# Patient Record
Sex: FEMALE | Race: WHITE | Employment: OTHER | URBAN - NONMETROPOLITAN AREA
[De-identification: names, ages, dates, MRNs, and addresses within clinical notes are randomized per-mention and may not be internally consistent; named-entity substitution may affect disease eponyms.]

---

## 2017-01-08 PROBLEM — E87.6 HYPOKALEMIA: Status: ACTIVE | Noted: 2017-01-08

## 2017-01-08 PROBLEM — I50.32 CHRONIC DIASTOLIC HEART FAILURE (HCC): Status: ACTIVE | Noted: 2017-01-08

## 2017-01-08 PROBLEM — R53.83 LETHARGY: Status: ACTIVE | Noted: 2017-01-08

## 2017-01-17 ENCOUNTER — CLINICAL DOCUMENTATION (OUTPATIENT)
Dept: FAMILY MEDICINE CLINIC | Age: 67
End: 2017-01-17

## 2017-01-17 VITALS
HEART RATE: 59 BPM | SYSTOLIC BLOOD PRESSURE: 132 MMHG | WEIGHT: 169 LBS | TEMPERATURE: 97.6 F | RESPIRATION RATE: 18 BRPM | DIASTOLIC BLOOD PRESSURE: 81 MMHG | BODY MASS INDEX: 29.94 KG/M2

## 2017-01-17 DIAGNOSIS — J18.9 CAP (COMMUNITY ACQUIRED PNEUMONIA): ICD-10-CM

## 2017-01-17 DIAGNOSIS — E23.0 HYPOPITUITARISM DUE TO PITUITARY TUMOR (HCC): ICD-10-CM

## 2017-01-17 DIAGNOSIS — E78.00 PURE HYPERCHOLESTEROLEMIA: ICD-10-CM

## 2017-01-17 DIAGNOSIS — E03.8 SECONDARY HYPOTHYROIDISM: ICD-10-CM

## 2017-01-17 DIAGNOSIS — I50.32 CHRONIC DIASTOLIC CONGESTIVE HEART FAILURE (HCC): ICD-10-CM

## 2017-01-17 DIAGNOSIS — N39.41 URGE URINARY INCONTINENCE: ICD-10-CM

## 2017-01-17 DIAGNOSIS — I10 ESSENTIAL HYPERTENSION: ICD-10-CM

## 2017-01-17 DIAGNOSIS — R53.81 PHYSICAL DECONDITIONING: ICD-10-CM

## 2017-01-17 DIAGNOSIS — D49.7 HYPOPITUITARISM DUE TO PITUITARY TUMOR (HCC): ICD-10-CM

## 2017-01-17 DIAGNOSIS — K21.9 GASTROESOPHAGEAL REFLUX DISEASE WITHOUT ESOPHAGITIS: ICD-10-CM

## 2017-01-17 DIAGNOSIS — J45.20 MILD INTERMITTENT ASTHMA WITHOUT COMPLICATION: ICD-10-CM

## 2017-01-17 DIAGNOSIS — E87.0 HYPERNATREMIA: ICD-10-CM

## 2017-01-17 DIAGNOSIS — E27.2 ACUTE ADRENAL CRISIS (HCC): ICD-10-CM

## 2017-01-17 DIAGNOSIS — R40.4 TRANSIENT ALTERATION OF AWARENESS: Primary | ICD-10-CM

## 2017-01-24 PROBLEM — R41.82 MENTAL STATUS ALTERATION: Status: ACTIVE | Noted: 2017-01-24

## 2017-01-24 PROBLEM — A41.9 SEPSIS (HCC): Status: ACTIVE | Noted: 2017-01-24

## 2017-02-03 ENCOUNTER — TELEPHONE (OUTPATIENT)
Dept: CARDIOLOGY | Age: 67
End: 2017-02-03

## 2017-02-03 DIAGNOSIS — R06.02 SHORTNESS OF BREATH: ICD-10-CM

## 2017-02-03 DIAGNOSIS — R53.83 LETHARGY: ICD-10-CM

## 2017-02-03 DIAGNOSIS — R00.1 SINUS BRADYCARDIA: ICD-10-CM

## 2017-02-03 DIAGNOSIS — R07.89 OTHER CHEST PAIN: ICD-10-CM

## 2017-02-03 DIAGNOSIS — R41.89 UNRESPONSIVE: Primary | ICD-10-CM

## 2017-02-07 ENCOUNTER — CLINICAL DOCUMENTATION (OUTPATIENT)
Dept: FAMILY MEDICINE CLINIC | Age: 67
End: 2017-02-07

## 2017-02-07 VITALS
TEMPERATURE: 98.2 F | DIASTOLIC BLOOD PRESSURE: 70 MMHG | SYSTOLIC BLOOD PRESSURE: 134 MMHG | WEIGHT: 165.6 LBS | HEART RATE: 62 BPM | RESPIRATION RATE: 16 BRPM | BODY MASS INDEX: 29.33 KG/M2

## 2017-02-07 DIAGNOSIS — R40.4 TRANSIENT ALTERATION OF AWARENESS: Primary | ICD-10-CM

## 2017-02-07 DIAGNOSIS — R53.81 PHYSICAL DECONDITIONING: ICD-10-CM

## 2017-02-07 DIAGNOSIS — N39.41 URGE URINARY INCONTINENCE: ICD-10-CM

## 2017-02-07 DIAGNOSIS — E78.00 PURE HYPERCHOLESTEROLEMIA: ICD-10-CM

## 2017-02-07 DIAGNOSIS — E87.0 HYPERNATREMIA: ICD-10-CM

## 2017-02-07 DIAGNOSIS — K21.9 GASTROESOPHAGEAL REFLUX DISEASE WITHOUT ESOPHAGITIS: ICD-10-CM

## 2017-02-07 DIAGNOSIS — I50.32 CHRONIC DIASTOLIC CONGESTIVE HEART FAILURE (HCC): ICD-10-CM

## 2017-02-07 DIAGNOSIS — E23.0 HYPOPITUITARISM DUE TO PITUITARY TUMOR (HCC): ICD-10-CM

## 2017-02-07 DIAGNOSIS — R30.0 DYSURIA: ICD-10-CM

## 2017-02-07 DIAGNOSIS — E03.8 SECONDARY HYPOTHYROIDISM: ICD-10-CM

## 2017-02-07 DIAGNOSIS — I10 ESSENTIAL HYPERTENSION: ICD-10-CM

## 2017-02-07 DIAGNOSIS — E27.2 ACUTE ADRENAL CRISIS (HCC): ICD-10-CM

## 2017-02-07 DIAGNOSIS — D72.828 OTHER ELEVATED WHITE BLOOD CELL (WBC) COUNT: ICD-10-CM

## 2017-02-07 DIAGNOSIS — J45.20 MILD INTERMITTENT ASTHMA WITHOUT COMPLICATION: ICD-10-CM

## 2017-02-07 DIAGNOSIS — D49.7 HYPOPITUITARISM DUE TO PITUITARY TUMOR (HCC): ICD-10-CM

## 2017-02-07 DIAGNOSIS — Z86.39 HISTORY OF DIABETES INSIPIDUS: ICD-10-CM

## 2017-02-09 PROBLEM — I48.0 PAROXYSMAL ATRIAL FIBRILLATION (HCC): Status: ACTIVE | Noted: 2017-02-09

## 2017-02-09 PROBLEM — R55 SYNCOPE AND COLLAPSE: Status: ACTIVE | Noted: 2017-02-09

## 2017-02-10 PROBLEM — H49.22 SIXTH NERVE PALSY OF LEFT EYE: Status: ACTIVE | Noted: 2017-02-10

## 2017-02-10 PROBLEM — I69.30 HISTORY OF STROKE WITH RESIDUAL DEFICIT: Chronic | Status: ACTIVE | Noted: 2017-02-10

## 2017-02-10 PROBLEM — I69.30 HISTORY OF STROKE WITH RESIDUAL DEFICIT: Status: ACTIVE | Noted: 2017-02-10

## 2017-02-10 PROBLEM — G81.94 LEFT HEMIPARESIS (HCC): Chronic | Status: ACTIVE | Noted: 2017-02-10

## 2017-02-21 ENCOUNTER — CLINICAL DOCUMENTATION (OUTPATIENT)
Dept: FAMILY MEDICINE CLINIC | Age: 67
End: 2017-02-21

## 2017-02-21 VITALS
DIASTOLIC BLOOD PRESSURE: 78 MMHG | BODY MASS INDEX: 28.23 KG/M2 | TEMPERATURE: 97 F | HEIGHT: 62 IN | HEART RATE: 62 BPM | WEIGHT: 153.4 LBS | SYSTOLIC BLOOD PRESSURE: 138 MMHG | RESPIRATION RATE: 16 BRPM

## 2017-02-21 DIAGNOSIS — E03.8 SECONDARY HYPOTHYROIDISM: ICD-10-CM

## 2017-02-21 DIAGNOSIS — I10 ESSENTIAL HYPERTENSION: ICD-10-CM

## 2017-02-21 DIAGNOSIS — I51.3 ATRIAL THROMBOSIS: ICD-10-CM

## 2017-02-21 DIAGNOSIS — E87.0 HYPERNATREMIA: ICD-10-CM

## 2017-02-21 DIAGNOSIS — K21.9 GASTROESOPHAGEAL REFLUX DISEASE WITHOUT ESOPHAGITIS: ICD-10-CM

## 2017-02-21 DIAGNOSIS — R40.4 TRANSIENT ALTERATION OF AWARENESS: Primary | ICD-10-CM

## 2017-02-21 DIAGNOSIS — D49.7 HYPOPITUITARISM DUE TO PITUITARY TUMOR (HCC): ICD-10-CM

## 2017-02-21 DIAGNOSIS — J45.20 MILD INTERMITTENT ASTHMA WITHOUT COMPLICATION: ICD-10-CM

## 2017-02-21 DIAGNOSIS — Z86.39 HISTORY OF DIABETES INSIPIDUS: ICD-10-CM

## 2017-02-21 DIAGNOSIS — D72.828 OTHER ELEVATED WHITE BLOOD CELL (WBC) COUNT: ICD-10-CM

## 2017-02-21 DIAGNOSIS — E23.0 HYPOPITUITARISM DUE TO PITUITARY TUMOR (HCC): ICD-10-CM

## 2017-02-21 DIAGNOSIS — R53.81 PHYSICAL DECONDITIONING: ICD-10-CM

## 2017-02-21 DIAGNOSIS — E78.00 PURE HYPERCHOLESTEROLEMIA: ICD-10-CM

## 2017-02-21 DIAGNOSIS — I50.32 CHRONIC DIASTOLIC CONGESTIVE HEART FAILURE (HCC): ICD-10-CM

## 2017-02-21 DIAGNOSIS — N39.41 URGE URINARY INCONTINENCE: ICD-10-CM

## 2017-03-14 ENCOUNTER — CLINICAL DOCUMENTATION (OUTPATIENT)
Dept: FAMILY MEDICINE CLINIC | Age: 67
End: 2017-03-14

## 2017-03-14 VITALS
HEART RATE: 72 BPM | RESPIRATION RATE: 18 BRPM | HEIGHT: 62 IN | BODY MASS INDEX: 31.73 KG/M2 | TEMPERATURE: 97.5 F | WEIGHT: 172.4 LBS | DIASTOLIC BLOOD PRESSURE: 80 MMHG | SYSTOLIC BLOOD PRESSURE: 140 MMHG

## 2017-03-14 DIAGNOSIS — D72.828 OTHER ELEVATED WHITE BLOOD CELL (WBC) COUNT: ICD-10-CM

## 2017-03-14 DIAGNOSIS — I71.20 THORACIC AORTIC ANEURYSM WITHOUT RUPTURE: ICD-10-CM
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
## 2017-03-14 DIAGNOSIS — R40.4 TRANSIENT ALTERATION OF AWARENESS: ICD-10-CM
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
## 2017-03-14 DIAGNOSIS — S01.81XA LACERATION OF FACE, INITIAL ENCOUNTER: ICD-10-CM

## 2017-03-14 DIAGNOSIS — S02.85XA ORBITAL FRACTURE, CLOSED, INITIAL ENCOUNTER (HCC): Primary | ICD-10-CM

## 2017-03-14 DIAGNOSIS — I10 ESSENTIAL HYPERTENSION: ICD-10-CM

## 2017-03-17 PROBLEM — N17.9 AKI (ACUTE KIDNEY INJURY) (HCC): Status: ACTIVE | Noted: 2017-03-17

## 2017-03-17 PROBLEM — E87.5 HYPERKALEMIA: Status: ACTIVE | Noted: 2017-03-17
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
## 2017-03-17 PROBLEM — S06.5XAA SUBDURAL HEMATOMA (HCC): Status: ACTIVE | Noted: 2017-03-17

## 2017-03-28 ENCOUNTER — CLINICAL DOCUMENTATION (OUTPATIENT)
Dept: FAMILY MEDICINE CLINIC | Age: 67
End: 2017-03-28

## 2017-03-28 VITALS
HEIGHT: 62 IN | BODY MASS INDEX: 32.02 KG/M2 | TEMPERATURE: 97.6 F | DIASTOLIC BLOOD PRESSURE: 66 MMHG | RESPIRATION RATE: 16 BRPM | WEIGHT: 174 LBS | SYSTOLIC BLOOD PRESSURE: 138 MMHG | HEART RATE: 74 BPM

## 2017-03-28 DIAGNOSIS — R53.81 PHYSICAL DECONDITIONING: ICD-10-CM

## 2017-03-28 DIAGNOSIS — A49.8 CLOSTRIDIUM DIFFICILE INFECTION: ICD-10-CM

## 2017-03-28 DIAGNOSIS — S06.5XAA SUBDURAL HEMATOMA: Primary | ICD-10-CM

## 2017-03-28 DIAGNOSIS — Z86.39 HISTORY OF DIABETES INSIPIDUS: ICD-10-CM

## 2017-03-28 DIAGNOSIS — R40.4 TRANSIENT ALTERATION OF AWARENESS: ICD-10-CM

## 2017-03-28 DIAGNOSIS — I51.3 ATRIAL THROMBOSIS: ICD-10-CM

## 2017-03-28 DIAGNOSIS — E03.8 SECONDARY HYPOTHYROIDISM: ICD-10-CM

## 2017-03-28 DIAGNOSIS — I71.20 THORACIC AORTIC ANEURYSM WITHOUT RUPTURE: ICD-10-CM

## 2017-03-28 DIAGNOSIS — E78.00 PURE HYPERCHOLESTEROLEMIA: ICD-10-CM

## 2017-03-28 DIAGNOSIS — D72.828 OTHER ELEVATED WHITE BLOOD CELL (WBC) COUNT: ICD-10-CM

## 2017-03-28 DIAGNOSIS — S01.81XD LACERATION OF FACE, SUBSEQUENT ENCOUNTER: ICD-10-CM

## 2017-03-28 DIAGNOSIS — D49.7 HYPOPITUITARISM DUE TO PITUITARY TUMOR (HCC): ICD-10-CM

## 2017-03-28 DIAGNOSIS — K21.9 GASTROESOPHAGEAL REFLUX DISEASE WITHOUT ESOPHAGITIS: ICD-10-CM

## 2017-03-28 DIAGNOSIS — N39.41 URGE URINARY INCONTINENCE: ICD-10-CM

## 2017-03-28 DIAGNOSIS — E23.0 HYPOPITUITARISM DUE TO PITUITARY TUMOR (HCC): ICD-10-CM

## 2017-03-28 DIAGNOSIS — J45.20 MILD INTERMITTENT ASTHMA WITHOUT COMPLICATION: ICD-10-CM

## 2017-03-28 DIAGNOSIS — I50.32 CHRONIC DIASTOLIC CONGESTIVE HEART FAILURE (HCC): ICD-10-CM

## 2017-03-28 DIAGNOSIS — I10 ESSENTIAL HYPERTENSION: ICD-10-CM

## 2017-03-28 DIAGNOSIS — E87.0 HYPERNATREMIA: ICD-10-CM

## 2017-04-05 PROBLEM — I50.31 ACUTE DIASTOLIC HEART FAILURE (HCC): Status: ACTIVE | Noted: 2017-04-05

## 2017-04-05 PROBLEM — J96.01 ACUTE HYPOXEMIC RESPIRATORY FAILURE (HCC): Status: ACTIVE | Noted: 2017-04-05

## 2017-04-14 PROBLEM — I50.33 ACUTE ON CHRONIC DIASTOLIC (CONGESTIVE) HEART FAILURE (HCC): Status: ACTIVE | Noted: 2017-01-08

## 2017-04-18 ENCOUNTER — CLINICAL DOCUMENTATION (OUTPATIENT)
Dept: FAMILY MEDICINE CLINIC | Age: 67
End: 2017-04-18

## 2017-04-18 VITALS
TEMPERATURE: 97.8 F | WEIGHT: 175 LBS | HEART RATE: 78 BPM | HEIGHT: 62 IN | BODY MASS INDEX: 32.2 KG/M2 | SYSTOLIC BLOOD PRESSURE: 149 MMHG | RESPIRATION RATE: 18 BRPM | DIASTOLIC BLOOD PRESSURE: 83 MMHG

## 2017-04-18 DIAGNOSIS — E78.00 PURE HYPERCHOLESTEROLEMIA: ICD-10-CM

## 2017-04-18 DIAGNOSIS — J45.20 MILD INTERMITTENT ASTHMA WITHOUT COMPLICATION: ICD-10-CM

## 2017-04-18 DIAGNOSIS — A49.8 CLOSTRIDIUM DIFFICILE INFECTION: ICD-10-CM

## 2017-04-18 DIAGNOSIS — D72.828 OTHER ELEVATED WHITE BLOOD CELL (WBC) COUNT: ICD-10-CM

## 2017-04-18 DIAGNOSIS — D49.7 HYPOPITUITARISM DUE TO PITUITARY TUMOR (HCC): ICD-10-CM

## 2017-04-18 DIAGNOSIS — K21.9 GASTROESOPHAGEAL REFLUX DISEASE WITHOUT ESOPHAGITIS: ICD-10-CM

## 2017-04-18 DIAGNOSIS — I51.3 ATRIAL THROMBOSIS: ICD-10-CM

## 2017-04-18 DIAGNOSIS — E23.0 HYPOPITUITARISM DUE TO PITUITARY TUMOR (HCC): ICD-10-CM

## 2017-04-18 DIAGNOSIS — E87.0 HYPERNATREMIA: ICD-10-CM

## 2017-04-18 DIAGNOSIS — E23.2 DIABETES INSIPIDUS (HCC): ICD-10-CM

## 2017-04-18 DIAGNOSIS — E87.6 HYPOKALEMIA: ICD-10-CM

## 2017-04-18 DIAGNOSIS — E03.8 SECONDARY HYPOTHYROIDISM: ICD-10-CM

## 2017-04-18 DIAGNOSIS — S06.5XAA SUBDURAL HEMATOMA: ICD-10-CM

## 2017-04-18 DIAGNOSIS — N39.41 URGE URINARY INCONTINENCE: ICD-10-CM

## 2017-04-18 DIAGNOSIS — L03.115 CELLULITIS OF RIGHT LEG: Primary | ICD-10-CM

## 2017-04-18 DIAGNOSIS — D64.9 NORMOCYTIC ANEMIA: ICD-10-CM

## 2017-04-18 DIAGNOSIS — I50.33 ACUTE ON CHRONIC DIASTOLIC CONGESTIVE HEART FAILURE (HCC): ICD-10-CM

## 2017-04-18 DIAGNOSIS — I10 ESSENTIAL HYPERTENSION: ICD-10-CM

## 2017-04-18 DIAGNOSIS — I71.20 THORACIC AORTIC ANEURYSM WITHOUT RUPTURE: ICD-10-CM

## 2017-04-18 DIAGNOSIS — R53.81 PHYSICAL DECONDITIONING: ICD-10-CM

## 2017-05-01 RX ORDER — LISINOPRIL 40 MG/1
40 TABLET ORAL DAILY
Qty: 90 TABLET | Refills: 3 | Status: SHIPPED | OUTPATIENT
Start: 2017-05-01 | End: 2018-02-23 | Stop reason: SDUPTHER

## 2017-05-01 RX ORDER — CARVEDILOL 12.5 MG/1
12.5 TABLET ORAL 2 TIMES DAILY WITH MEALS
Qty: 180 TABLET | Refills: 3 | Status: SHIPPED | OUTPATIENT
Start: 2017-05-01 | End: 2018-05-29 | Stop reason: SDUPTHER

## 2017-05-01 RX ORDER — AMLODIPINE BESYLATE 2.5 MG/1
2.5 TABLET ORAL DAILY
Qty: 90 TABLET | Refills: 3 | Status: SHIPPED | OUTPATIENT
Start: 2017-05-01 | End: 2017-05-15

## 2017-05-02 ENCOUNTER — TELEPHONE (OUTPATIENT)
Dept: FAMILY MEDICINE CLINIC | Age: 67
End: 2017-05-02

## 2017-05-09 ENCOUNTER — TELEPHONE (OUTPATIENT)
Dept: CARDIOLOGY | Age: 67
End: 2017-05-09

## 2017-05-09 DIAGNOSIS — I10 ESSENTIAL HYPERTENSION: Primary | ICD-10-CM

## 2017-05-15 RX ORDER — AMLODIPINE BESYLATE 5 MG/1
5 TABLET ORAL DAILY
Qty: 90 TABLET | Refills: 3 | Status: SHIPPED | OUTPATIENT
Start: 2017-05-15 | End: 2018-02-23 | Stop reason: SDUPTHER

## 2017-05-15 RX ORDER — AMLODIPINE BESYLATE 5 MG/1
5 TABLET ORAL DAILY
Qty: 90 TABLET | Refills: 3 | Status: CANCELLED | OUTPATIENT
Start: 2017-05-15

## 2017-05-17 LAB
ANION GAP SERPL CALCULATED.3IONS-SCNC: 15 MMOL/L
BUN BLDV-MCNC: 22 MG/DL (ref 10–20)
CALCIUM SERPL-MCNC: 9 MG/DL (ref 8.7–10.8)
CHLORIDE BLD-SCNC: 107 MMOL/L (ref 95–111)
CO2: 23 MMOL/L (ref 21–32)
CREAT SERPL-MCNC: 1 MG/DL (ref 0.5–1.3)
EGFR AFRICAN AMERICAN: 67
EGFR IF NONAFRICAN AMERICAN: 56
GLUCOSE: 77 MG/DL (ref 70–100)
POTASSIUM SERPL-SCNC: 4.6 MMOL/L (ref 3.5–5.4)
SODIUM BLD-SCNC: 140 MMOL/L (ref 134–147)

## 2017-05-18 ENCOUNTER — OFFICE VISIT (OUTPATIENT)
Dept: CARDIOLOGY | Age: 67
End: 2017-05-18

## 2017-05-18 VITALS
HEART RATE: 64 BPM | DIASTOLIC BLOOD PRESSURE: 90 MMHG | SYSTOLIC BLOOD PRESSURE: 118 MMHG | BODY MASS INDEX: 26.98 KG/M2 | HEIGHT: 68 IN | WEIGHT: 178 LBS

## 2017-05-18 DIAGNOSIS — I50.32 CHRONIC DIASTOLIC CONGESTIVE HEART FAILURE (HCC): Primary | ICD-10-CM

## 2017-05-18 PROCEDURE — 99213 OFFICE O/P EST LOW 20 MIN: CPT | Performed by: INTERNAL MEDICINE

## 2017-05-18 RX ORDER — POTASSIUM CHLORIDE 20MEQ/15ML
LIQUID (ML) ORAL SEE ADMIN INSTRUCTIONS
COMMUNITY
End: 2018-03-20 | Stop reason: ALTCHOICE

## 2017-05-18 RX ORDER — METOLAZONE 2.5 MG/1
2.5 TABLET ORAL EVERY OTHER DAY
Qty: 30 TABLET | Refills: 3 | Status: SHIPPED | OUTPATIENT
Start: 2017-05-18 | End: 2017-05-18 | Stop reason: SDUPTHER

## 2017-05-18 RX ORDER — METOLAZONE 2.5 MG/1
2.5 TABLET ORAL SEE ADMIN INSTRUCTIONS
COMMUNITY
End: 2018-04-23 | Stop reason: SDUPTHER

## 2017-05-18 RX ORDER — METOLAZONE 2.5 MG/1
2.5 TABLET ORAL SEE ADMIN INSTRUCTIONS
Qty: 30 TABLET | Refills: 3 | Status: SHIPPED | OUTPATIENT
Start: 2017-05-18 | End: 2018-03-20 | Stop reason: SDUPTHER

## 2017-05-18 RX ORDER — POTASSIUM CHLORIDE 20MEQ/15ML
20 LIQUID (ML) ORAL SEE ADMIN INSTRUCTIONS
Qty: 450 ML | Refills: 3 | Status: SHIPPED | OUTPATIENT
Start: 2017-05-18 | End: 2018-03-20 | Stop reason: ALTCHOICE

## 2017-06-05 ENCOUNTER — TELEPHONE (OUTPATIENT)
Dept: FAMILY MEDICINE CLINIC | Age: 67
End: 2017-06-05

## 2017-06-06 ENCOUNTER — TELEPHONE (OUTPATIENT)
Dept: FAMILY MEDICINE CLINIC | Age: 67
End: 2017-06-06

## 2017-06-08 RX ORDER — POTASSIUM CHLORIDE 1500 MG/1
TABLET, FILM COATED, EXTENDED RELEASE ORAL
Qty: 72 TABLET | Refills: 4 | Status: SHIPPED | OUTPATIENT
Start: 2017-06-08 | End: 2017-11-27 | Stop reason: SDUPTHER

## 2017-06-08 RX ORDER — BUMETANIDE 1 MG/1
TABLET ORAL
Qty: 30 TABLET | Refills: 3 | Status: SHIPPED | OUTPATIENT
Start: 2017-06-08 | End: 2017-11-20 | Stop reason: SDUPTHER

## 2017-06-15 ENCOUNTER — TELEPHONE (OUTPATIENT)
Dept: FAMILY MEDICINE CLINIC | Age: 67
End: 2017-06-15

## 2017-06-16 ENCOUNTER — OFFICE VISIT (OUTPATIENT)
Dept: FAMILY MEDICINE CLINIC | Age: 67
End: 2017-06-16

## 2017-06-16 VITALS
OXYGEN SATURATION: 95 % | SYSTOLIC BLOOD PRESSURE: 120 MMHG | HEART RATE: 61 BPM | RESPIRATION RATE: 12 BRPM | DIASTOLIC BLOOD PRESSURE: 70 MMHG

## 2017-06-16 DIAGNOSIS — Z12.31 ENCOUNTER FOR SCREENING MAMMOGRAM FOR BREAST CANCER: ICD-10-CM

## 2017-06-16 DIAGNOSIS — E03.8 OTHER SPECIFIED HYPOTHYROIDISM: ICD-10-CM

## 2017-06-16 DIAGNOSIS — K21.9 GASTROESOPHAGEAL REFLUX DISEASE WITHOUT ESOPHAGITIS: ICD-10-CM

## 2017-06-16 DIAGNOSIS — E23.0 HYPOPITUITARISM (HCC): ICD-10-CM

## 2017-06-16 DIAGNOSIS — Z13.820 OSTEOPOROSIS SCREENING: ICD-10-CM

## 2017-06-16 DIAGNOSIS — I50.41 ACUTE COMBINED SYSTOLIC AND DIASTOLIC CONGESTIVE HEART FAILURE (HCC): ICD-10-CM

## 2017-06-16 DIAGNOSIS — E78.00 PURE HYPERCHOLESTEROLEMIA: ICD-10-CM

## 2017-06-16 DIAGNOSIS — D64.89 ANEMIA DUE TO OTHER CAUSE: ICD-10-CM

## 2017-06-16 DIAGNOSIS — L89.302: ICD-10-CM

## 2017-06-16 DIAGNOSIS — D32.9 MENINGIOMA (HCC): ICD-10-CM

## 2017-06-16 DIAGNOSIS — I63.89 CEREBROVASCULAR ACCIDENT (CVA) DUE TO OTHER MECHANISM (HCC): ICD-10-CM

## 2017-06-16 DIAGNOSIS — L89.302: Primary | ICD-10-CM

## 2017-06-16 PROCEDURE — 99215 OFFICE O/P EST HI 40 MIN: CPT | Performed by: FAMILY MEDICINE

## 2017-06-16 RX ORDER — CEPHALEXIN 500 MG/1
500 CAPSULE ORAL 3 TIMES DAILY
Qty: 30 CAPSULE | Refills: 0 | Status: SHIPPED | OUTPATIENT
Start: 2017-06-16 | End: 2017-06-26

## 2017-06-16 RX ORDER — LIDOCAINE 40 MG/G
CREAM TOPICAL
Qty: 1 TUBE | Refills: 0 | Status: SHIPPED | OUTPATIENT
Start: 2017-06-16 | End: 2018-03-20 | Stop reason: ALTCHOICE

## 2017-06-16 ASSESSMENT — PATIENT HEALTH QUESTIONNAIRE - PHQ9
SUM OF ALL RESPONSES TO PHQ QUESTIONS 1-9: 0
1. LITTLE INTEREST OR PLEASURE IN DOING THINGS: 0
SUM OF ALL RESPONSES TO PHQ9 QUESTIONS 1 & 2: 0
2. FEELING DOWN, DEPRESSED OR HOPELESS: 0

## 2017-06-18 LAB
AEROBIC CULTURE: ABNORMAL
GRAM STAIN RESULT: ABNORMAL
ORGANISM: ABNORMAL

## 2017-06-19 ENCOUNTER — TELEPHONE (OUTPATIENT)
Dept: FAMILY MEDICINE CLINIC | Age: 67
End: 2017-06-19

## 2017-06-19 RX ORDER — SULFAMETHOXAZOLE AND TRIMETHOPRIM 400; 80 MG/1; MG/1
1 TABLET ORAL 2 TIMES DAILY
Qty: 20 TABLET | Refills: 0 | Status: SHIPPED | OUTPATIENT
Start: 2017-06-19 | End: 2017-06-29

## 2017-06-22 ENCOUNTER — TELEPHONE (OUTPATIENT)
Dept: FAMILY MEDICINE CLINIC | Age: 67
End: 2017-06-22

## 2017-06-22 DIAGNOSIS — L89.303: Primary | ICD-10-CM

## 2017-06-27 LAB
ABSOLUTE BASO #: 0 K/UL (ref 0–0.1)
ABSOLUTE EOS #: 0.1 K/UL (ref 0.1–0.4)
ABSOLUTE LYMPH #: 3.2 K/UL (ref 0.8–5.2)
ABSOLUTE MONO #: 0.6 K/UL (ref 0.1–0.9)
ABSOLUTE NEUT #: 9.7 K/UL (ref 1.3–9.1)
BASOPHILS RELATIVE PERCENT: 0.2 %
EOSINOPHILS RELATIVE PERCENT: 0.5 %
HCT VFR BLD CALC: 35.3 % (ref 36–48)
HEMOGLOBIN: 11.3 G/DL (ref 12–16)
LYMPHOCYTE %: 23.1 %
MCH RBC QN AUTO: 27.4 PG (ref 27–34)
MCHC RBC AUTO-ENTMCNC: 32 G/DL (ref 31–36)
MCV RBC AUTO: 85.5 FL (ref 80–100)
MONOCYTES # BLD: 4.6 %
NEUTROPHILS RELATIVE PERCENT: 70.9 %
PDW BLD-RTO: 15.1 % (ref 10.8–14.8)
PLATELETS: 454 K/UL (ref 150–450)
RBC: 4.13 M/UL (ref 4–5.5)
WBC: 13.7 K/UL (ref 3.7–10.8)

## 2017-06-28 LAB
ALBUMIN SERPL-MCNC: 3.4 G/DL (ref 3.2–5.3)
ALK PHOSPHATASE: 72 IU/L (ref 35–121)
ALT SERPL-CCNC: 24 IU/L (ref 5–59)
ANION GAP SERPL CALCULATED.3IONS-SCNC: 16 MMOL/L
ANION GAP SERPL CALCULATED.3IONS-SCNC: 23 MMOL/L
AST SERPL-CCNC: 20 IU/L (ref 10–42)
BILIRUB SERPL-MCNC: 0.3 MG/DL (ref 0.2–1.3)
BUN BLDV-MCNC: 29 MG/DL (ref 10–20)
BUN BLDV-MCNC: 29 MG/DL (ref 10–20)
CALCIUM SERPL-MCNC: 9.4 MG/DL (ref 8.7–10.8)
CALCIUM SERPL-MCNC: 9.4 MG/DL (ref 8.7–10.8)
CHLORIDE BLD-SCNC: 105 MMOL/L (ref 95–111)
CHLORIDE BLD-SCNC: 105 MMOL/L (ref 95–111)
CO2: 22 MMOL/L (ref 21–32)
CO2: 27 MMOL/L (ref 21–32)
CREAT SERPL-MCNC: 1.5 MG/DL (ref 0.5–1.3)
CREAT SERPL-MCNC: 1.5 MG/DL (ref 0.5–1.3)
EGFR AFRICAN AMERICAN: 42
EGFR AFRICAN AMERICAN: 42
EGFR IF NONAFRICAN AMERICAN: 35
EGFR IF NONAFRICAN AMERICAN: 35
GLUCOSE: 64 MG/DL (ref 70–100)
GLUCOSE: 65 MG/DL (ref 70–100)
IRON: 35 MCG/DL (ref 40–150)
POTASSIUM SERPL-SCNC: 4.7 MMOL/L (ref 3.5–5.4)
POTASSIUM SERPL-SCNC: 4.8 MMOL/L (ref 3.5–5.4)
SODIUM BLD-SCNC: 143 MMOL/L (ref 134–147)
SODIUM BLD-SCNC: 145 MMOL/L (ref 134–147)
T4 FREE: 1.13 NG/DL (ref 0.8–1.8)
TOTAL PROTEIN: 5.9 G/DL (ref 5.8–8)
TSH SERPL DL<=0.05 MIU/L-ACNC: 0.05 UIU/ML (ref 0.4–4.4)

## 2017-07-19 ENCOUNTER — HOSPITAL ENCOUNTER (OUTPATIENT)
Dept: WOUND CARE | Age: 67
Discharge: HOME | End: 2017-07-19
Payer: MEDICARE

## 2017-11-20 RX ORDER — BUMETANIDE 1 MG/1
TABLET ORAL
Qty: 30 TABLET | Refills: 3 | Status: SHIPPED | OUTPATIENT
Start: 2017-11-20 | End: 2018-03-20 | Stop reason: SDUPTHER

## 2017-11-27 RX ORDER — POTASSIUM CHLORIDE 1500 MG/1
TABLET, FILM COATED, EXTENDED RELEASE ORAL
Qty: 72 TABLET | Refills: 4 | Status: SHIPPED | OUTPATIENT
Start: 2017-11-27 | End: 2018-05-29 | Stop reason: SDUPTHER

## 2017-11-27 RX ORDER — TOLTERODINE 2 MG/1
CAPSULE, EXTENDED RELEASE ORAL
Qty: 180 CAPSULE | Refills: 0 | Status: SHIPPED | OUTPATIENT
Start: 2017-11-27 | End: 2018-02-23 | Stop reason: SDUPTHER

## 2017-12-04 RX ORDER — RIVAROXABAN 20 MG/1
TABLET, FILM COATED ORAL
Qty: 90 TABLET | Refills: 3 | Status: SHIPPED | OUTPATIENT
Start: 2017-12-04 | End: 2018-03-20 | Stop reason: SDUPTHER

## 2018-01-15 ENCOUNTER — TELEPHONE (OUTPATIENT)
Dept: FAMILY MEDICINE CLINIC | Age: 68
End: 2018-01-15

## 2018-01-22 ENCOUNTER — TELEPHONE (OUTPATIENT)
Dept: CARDIOLOGY CLINIC | Age: 68
End: 2018-01-22

## 2018-01-22 ENCOUNTER — OFFICE VISIT (OUTPATIENT)
Dept: FAMILY MEDICINE CLINIC | Age: 68
End: 2018-01-22
Payer: MEDICARE

## 2018-01-22 VITALS
WEIGHT: 177.91 LBS | SYSTOLIC BLOOD PRESSURE: 108 MMHG | HEIGHT: 68 IN | BODY MASS INDEX: 26.96 KG/M2 | RESPIRATION RATE: 12 BRPM | HEART RATE: 62 BPM | DIASTOLIC BLOOD PRESSURE: 56 MMHG | TEMPERATURE: 98.3 F

## 2018-01-22 DIAGNOSIS — I50.43 ACUTE ON CHRONIC COMBINED SYSTOLIC AND DIASTOLIC CONGESTIVE HEART FAILURE (HCC): ICD-10-CM

## 2018-01-22 DIAGNOSIS — D50.9 MICROCYTIC ANEMIA: ICD-10-CM

## 2018-01-22 DIAGNOSIS — E03.9 ACQUIRED HYPOTHYROIDISM: ICD-10-CM

## 2018-01-22 DIAGNOSIS — D50.9 IRON DEFICIENCY ANEMIA, UNSPECIFIED IRON DEFICIENCY ANEMIA TYPE: ICD-10-CM

## 2018-01-22 DIAGNOSIS — E03.8 OTHER SPECIFIED HYPOTHYROIDISM: Primary | ICD-10-CM

## 2018-01-22 DIAGNOSIS — K21.9 GASTROESOPHAGEAL REFLUX DISEASE WITHOUT ESOPHAGITIS: Primary | ICD-10-CM

## 2018-01-22 DIAGNOSIS — E23.0 HYPOPITUITARISM (HCC): ICD-10-CM

## 2018-01-22 DIAGNOSIS — L98.9 LESION OF SKIN OF FACE: ICD-10-CM

## 2018-01-22 PROCEDURE — 99214 OFFICE O/P EST MOD 30 MIN: CPT | Performed by: FAMILY MEDICINE

## 2018-01-22 RX ORDER — PANTOPRAZOLE SODIUM 40 MG/1
40 TABLET, DELAYED RELEASE ORAL NIGHTLY
Qty: 90 TABLET | Refills: 3 | Status: SHIPPED | OUTPATIENT
Start: 2018-01-22 | End: 2019-02-07 | Stop reason: SDUPTHER

## 2018-01-23 NOTE — PROGRESS NOTES
[facility] PROFESSIONAL SERVS  [facility] FAMILY MEDICINE  [address] [facility]  Dept: [phone]  Dept Fax: [phone]  Loc: Elena Fuller is a 78 y.o. female who presents today for:  Chief Complaint   Patient presents with    Check-Up     Follow up    Other     Patient has a spot on left check they would like looked at    Heartburn     Patient has been having a lot of heart burn           HPI:     HPI  Ciro Cifuentes is here for a couple of different issues. She is continually concerned about swelling in her feet but she was told that she has heart failure and is in denial yet. This is associated with cough with swallowing and weakness. She also has low appetite with reflux. Zantac prior to meals helps. There is a sore on the left cheek that is getting bigger the past 3 months. Reviewed chart for past medical history , surgical history , allergies, social history , family history and medications. Health Maintenance   Topic Date Due    Breast cancer screen  10/02/2015    DEXA (modify frequency per FRAX score)  09/07/2016    Flu vaccine (1) 09/01/2017    TSH testing  06/27/2018    Potassium monitoring  06/27/2018    Creatinine monitoring  06/27/2018    Lipid screen  04/06/2022    Colon cancer screen colonoscopy  06/12/2025    DTaP/Tdap/Td vaccine (2 - Td) 03/13/2027    Zostavax vaccine  Addressed    Pneumococcal low/med risk  Completed    Hepatitis C screen  Excluded       Subjective:      Constitutional: Negative for fever, chills, diaphoresis, activity change, appetite change and fatigue. HENT: Negative for hearing loss, ear pain, congestion, sore throat, rhinorrhea, postnasal drip and ear discharge. Eyes: Negative for photophobia and visual disturbance. Respiratory: Negative for cough, chest tightness, shortness of breath and wheezing. Cardiovascular: Negative for chest pain and leg swelling.    Gastrointestinal: Negative for nausea, appearance and bowel sounds are normal. She exhibits no distension and no mass. There is no hepatosplenomegaly. No tenderness. She has no rigidity, no rebound and no guarding. No hernia. Musculoskeletal:        Right lower leg: She exhibits 3+ edema. Left lower leg: She exhibits 3+ edema. Neurological: She is alert. Lesion on left cheek with patient's observations stated as increasing diameter, increasing thickness, tendency to be traumatized, exam of this area shows possible squamous cell carcinoma pigmented uneven, raised, pearly edges, symmetrical and friable size 20 mm noted on the left cheek. Assessment/Plan   Yoly Jenkins was seen today for check-up, other and heartburn. Diagnoses and all orders for this visit:    Gastroesophageal reflux disease without esophagitis    Acute on chronic combined systolic and diastolic congestive heart failure ([facility] [address]  -     Comprehensive Metabolic Panel; Future    Lesion of skin of face    Iron deficiency anemia, unspecified iron deficiency anemia type  -     CBC; Future  -     Iron; Future    Acquired hypothyroidism  -     TSH With Reflex Ft4; Future    Other orders  -     pantoprazole (PROTONIX) 40 MG tablet; Take 1 tablet by mouth nightly    [place] foods  Small meals  No late meals    Prpp up feet as much as possible  Compression stockings on am and off pm    Schedule excision    Discussed use, benefit, and side effects of prescribed medications. All patient questions answered. Pt voiced understanding. Reviewed health maintenance. Instructed to continue current medications, diet and exercise. Patient agreed with treatment plan. Follow up as directed.      Electronically signed by Naila Gomez MD

## 2018-01-30 ENCOUNTER — PROCEDURE VISIT (OUTPATIENT)
Dept: FAMILY MEDICINE CLINIC | Age: 68
End: 2018-01-30
Payer: MEDICARE

## 2018-01-30 VITALS
DIASTOLIC BLOOD PRESSURE: 64 MMHG | RESPIRATION RATE: 16 BRPM | HEIGHT: 68 IN | SYSTOLIC BLOOD PRESSURE: 100 MMHG | BODY MASS INDEX: 26.96 KG/M2 | WEIGHT: 177.91 LBS | HEART RATE: 60 BPM

## 2018-01-30 DIAGNOSIS — L98.9 SKIN LESION OF CHEEK: Primary | ICD-10-CM

## 2018-01-30 PROCEDURE — 11443 EXC FACE-MM B9+MARG 2.1-3 CM: CPT | Performed by: FAMILY MEDICINE

## 2018-01-31 LAB
ABSOLUTE BASO #: 0.1 K/UL (ref 0–0.1)
ABSOLUTE EOS #: 0.1 K/UL (ref 0.1–0.4)
ABSOLUTE LYMPH #: 2.4 K/UL (ref 0.8–5.2)
ABSOLUTE MONO #: 0.7 K/UL (ref 0.1–0.9)
ABSOLUTE NEUT #: 12.1 K/UL (ref 1.3–9.1)
ALBUMIN SERPL-MCNC: 3.4 G/DL (ref 3.2–5.3)
ALK PHOSPHATASE: 100 IU/L (ref 35–121)
ALT SERPL-CCNC: 10 IU/L (ref 5–59)
ANION GAP SERPL CALCULATED.3IONS-SCNC: 13 MMOL/L
ANION GAP SERPL CALCULATED.3IONS-SCNC: 21 MMOL/L
AST SERPL-CCNC: 12 IU/L (ref 10–42)
BASOPHILS RELATIVE PERCENT: 0.3 %
BILIRUB SERPL-MCNC: 0.3 MG/DL (ref 0.2–1.3)
BUN BLDV-MCNC: 37 MG/DL (ref 10–20)
BUN BLDV-MCNC: 37 MG/DL (ref 10–20)
CALCIUM SERPL-MCNC: 8.6 MG/DL (ref 8.7–10.8)
CALCIUM SERPL-MCNC: 8.6 MG/DL (ref 8.7–10.8)
CHLORIDE BLD-SCNC: 109 MMOL/L (ref 95–111)
CHLORIDE BLD-SCNC: 109 MMOL/L (ref 95–111)
CO2: 21 MMOL/L (ref 21–32)
CO2: 26 MMOL/L (ref 21–32)
CREAT SERPL-MCNC: 2.1 MG/DL (ref 0.5–1.3)
CREAT SERPL-MCNC: 2.1 MG/DL (ref 0.5–1.3)
EGFR AFRICAN AMERICAN: 29
EGFR AFRICAN AMERICAN: 29
EGFR IF NONAFRICAN AMERICAN: 24
EGFR IF NONAFRICAN AMERICAN: 24
EOSINOPHILS RELATIVE PERCENT: 0.7 %
FERRITIN: 25 NG/ML (ref 10–291)
GLUCOSE: 82 MG/DL (ref 70–100)
GLUCOSE: 83 MG/DL (ref 70–100)
HCT VFR BLD CALC: 37 % (ref 36–48)
HEMOGLOBIN: 12.1 G/DL (ref 12–16)
IRON: 50 MCG/DL (ref 40–150)
LYMPHOCYTE %: 15.6 %
MCH RBC QN AUTO: 29.4 PG (ref 27–34)
MCHC RBC AUTO-ENTMCNC: 32.7 G/DL (ref 31–36)
MCV RBC AUTO: 90 FL (ref 80–100)
MONOCYTES # BLD: 4.7 %
NEUTROPHILS RELATIVE PERCENT: 78 %
PDW BLD-RTO: 13.1 % (ref 10.8–14.8)
PLATELETS: 442 K/UL (ref 150–450)
POTASSIUM SERPL-SCNC: 5.1 MMOL/L (ref 3.5–5.4)
POTASSIUM SERPL-SCNC: 5.3 MMOL/L (ref 3.5–5.4)
RBC: 4.11 M/UL (ref 4–5.5)
SODIUM BLD-SCNC: 143 MMOL/L (ref 134–147)
SODIUM BLD-SCNC: 146 MMOL/L (ref 134–147)
T4 FREE: 1.2 NG/DL (ref 0.8–1.8)
T4 FREE: 1.25 NG/DL (ref 0.8–1.8)
TOTAL PROTEIN: 6 G/DL (ref 5.8–8)
TSH SERPL DL<=0.05 MIU/L-ACNC: 0.07 UIU/ML (ref 0.4–4.4)
TSH SERPL DL<=0.05 MIU/L-ACNC: 0.07 UIU/ML (ref 0.4–4.4)
WBC: 15.5 K/UL (ref 3.7–10.8)

## 2018-02-08 ENCOUNTER — NURSE ONLY (OUTPATIENT)
Dept: FAMILY MEDICINE CLINIC | Age: 68
End: 2018-02-08

## 2018-02-08 PROCEDURE — 99024 POSTOP FOLLOW-UP VISIT: CPT | Performed by: FAMILY MEDICINE

## 2018-02-22 ENCOUNTER — TELEPHONE (OUTPATIENT)
Dept: CARDIOLOGY CLINIC | Age: 68
End: 2018-02-22

## 2018-02-22 NOTE — TELEPHONE ENCOUNTER
02/22/2018 Patient  Rosaline Noe called scheduled appt. For patient to see Dr. Felipe Quiroz. Pt. Is a Dr. Helene Sandhoff pt.  wants to go over medications patient is needing refilled. 1st medication chart shows different dosage. Please call  at [phone]. da

## 2018-02-23 RX ORDER — LISINOPRIL 40 MG/1
40 TABLET ORAL DAILY
Qty: 90 TABLET | Refills: 0 | Status: SHIPPED | OUTPATIENT
Start: 2018-02-23 | End: 2018-03-20 | Stop reason: SDUPTHER

## 2018-02-23 RX ORDER — AMLODIPINE BESYLATE 2.5 MG/1
2.5 TABLET ORAL DAILY
Qty: 90 TABLET | Refills: 3 | Status: CANCELLED | OUTPATIENT
Start: 2018-02-23

## 2018-02-23 RX ORDER — AMLODIPINE BESYLATE 5 MG/1
5 TABLET ORAL DAILY
Qty: 90 TABLET | Refills: 0 | Status: SHIPPED | OUTPATIENT
Start: 2018-02-23 | End: 2018-08-06 | Stop reason: SDUPTHER

## 2018-02-23 NOTE — TELEPHONE ENCOUNTER
Clayton Hartman called requesting a refill on the following medications:  Requested Prescriptions     Pending Prescriptions Disp Refills    amLODIPine (NORVASC) 5 MG tablet 90 tablet 3     Sig: Take 1 tablet by mouth daily    amLODIPine (NORVASC) 2.5 MG tablet 90 tablet 3     Sig: Take 1 tablet by mouth daily    lisinopril (PRINIVIL;ZESTRIL) 40 MG tablet 90 tablet 3     Sig: Take 1 tablet by mouth daily     Pharmacy verified:  .pv      Date of last visit: 2/8/2018  Date of next visit (if applicable): Visit date not found

## 2018-03-20 ENCOUNTER — TELEPHONE (OUTPATIENT)
Dept: CARDIOLOGY CLINIC | Age: 68
End: 2018-03-20

## 2018-03-20 ENCOUNTER — OFFICE VISIT (OUTPATIENT)
Dept: CARDIOLOGY CLINIC | Age: 68
End: 2018-03-20
Payer: MEDICARE

## 2018-03-20 VITALS
WEIGHT: 182 LBS | BODY MASS INDEX: 32.25 KG/M2 | HEIGHT: 63 IN | HEART RATE: 56 BPM | SYSTOLIC BLOOD PRESSURE: 104 MMHG | DIASTOLIC BLOOD PRESSURE: 65 MMHG

## 2018-03-20 DIAGNOSIS — E78.00 PURE HYPERCHOLESTEROLEMIA: ICD-10-CM

## 2018-03-20 DIAGNOSIS — I48.0 PAROXYSMAL ATRIAL FIBRILLATION (HCC): ICD-10-CM

## 2018-03-20 DIAGNOSIS — I50.32 CHRONIC DIASTOLIC CONGESTIVE HEART FAILURE (HCC): Primary | ICD-10-CM

## 2018-03-20 DIAGNOSIS — N18.4 CKD (CHRONIC KIDNEY DISEASE), STAGE IV (HCC): ICD-10-CM

## 2018-03-20 DIAGNOSIS — I10 ESSENTIAL HYPERTENSION: ICD-10-CM

## 2018-03-20 PROCEDURE — 93000 ELECTROCARDIOGRAM COMPLETE: CPT | Performed by: INTERNAL MEDICINE

## 2018-03-20 PROCEDURE — 99214 OFFICE O/P EST MOD 30 MIN: CPT | Performed by: INTERNAL MEDICINE

## 2018-03-20 RX ORDER — BUMETANIDE 1 MG/1
TABLET ORAL
Qty: 90 TABLET | Refills: 1 | Status: SHIPPED | OUTPATIENT
Start: 2018-03-20 | End: 2019-02-15 | Stop reason: SDUPTHER

## 2018-03-20 RX ORDER — LISINOPRIL 20 MG/1
40 TABLET ORAL DAILY
Qty: 30 TABLET | Refills: 5 | Status: SHIPPED | OUTPATIENT
Start: 2018-03-20 | End: 2018-04-18 | Stop reason: DRUGHIGH

## 2018-03-20 RX ORDER — AMLODIPINE BESYLATE 2.5 MG/1
2.5 TABLET ORAL DAILY
COMMUNITY
End: 2018-04-18 | Stop reason: SDUPTHER

## 2018-03-20 NOTE — PROGRESS NOTES
Chief Complaint   Patient presents with    Check-Up    Congestive Heart Failure    Hypertension   Pt here for check up, former  pt. For chf and PAF    Denied cp, sob or palpitations      Occasional dizziness on standing    Hx of leg edema      EKG done today!      stated she has episodes of difficulty of walking    Records reviewed      Patient Active Problem List   Diagnosis    Asthma    Stroke ([facility] [address]    Hypothyroidism    Hyperlipidemia    Osteoarthritis    GERD (gastroesophageal reflux disease)    Meningioma (HCC)    Obesity    Hypopituitarism ([address]    Urinary incontinence    Bursitis of shoulder    Mechanical loosening of prosthetic joint (HCC)    Abdominal pain    Acute appendicitis    Hyperglycemia    SIRS (systemic inflammatory response syndrome) ([facility])    Leukocytosis    Gastroenteritis    Diarrhea    Nausea vomiting and diarrhea    Acute adrenal crisis ([address]    Right sided weakness    Slurred speech    Cerebrovascular disease    Metabolic acidosis    Sinus bradycardia    Generalized weakness    Hx of subdural hematoma    Cerebral infarction ([address]    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 ([address]    Acquired hypercoagulable state ([address]    Hematoma of left lower extremity    Intractable pain    Debility    Hematoma    Atrial thrombosis    Venous ulcer of left leg (HCC)    Left lower lobe pneumonia (HCC)    Bilateral edema of lower extremity    Pneumonia due to organism    Microcytic anemia    Leg wound, left    Peripheral edema    Accelerated hypertension    Shortness of breath    Chest pain    Acute congestive heart failure (HCC)    Lethargy    Acute on chronic diastolic congestive heart failure (HCC)    Hypokalemia    Altered mental status    Postoperative hypothyroidism    History of pituitary tumor    Hypernatremia    Metabolic encephalopathy    Mental status alteration    Sepsis ([address]  cellulitis lower extremities    Stupor    Panhypopituitarism ([facility] [address]    Transient alteration of awareness    Acquired hypothyroidism    Hypersomnia    Long term (current) use of systemic steroids    Essential hypertension    Cellulitis of right leg    Diabetes insipidus (HCC)    Somnolence    Syncope and collapse    Paroxysmal atrial fibrillation (HCC)    Sixth nerve palsy of left eye    Left hemiparesis ([facility] [address]    History of stroke with residual deficit    Subdural hematoma (HCC)    Fall from chair    JESSE (acute kidney injury) ([facility] [address]    Hyperkalemia    C. difficile enteritis    Gastroenteritis due to norovirus    Acute diastolic heart failure (HCC)    Acute hypoxemic respiratory failure (HCC)    History of stroke with residual effects    Chronic venous stasis dermatitis of both lower extremities    Bronchitis    Normocytic anemia    Chronic diastolic congestive heart failure (HCC)    CKD (chronic kidney disease), stage IV (HCC)       Past Surgical History:   Procedure Laterality Date    APPENDECTOMY      BACK SURGERY      BRAIN SURGERY      1970 due to pituitary mass, drained and s/p radiation    CHOLECYSTECTOMY      HYSTERECTOMY      total done for DUB    OTHER SURGICAL HISTORY  9/30/2014    LAPAROSCOPIC RUPTURED APPENDECTOMY    OTHER SURGICAL HISTORY Left 10/26/15    Evacuation and Debridement of Left Lower Leg Hematoma - Dr. Jnaet Alfaro  3/13/2013    left    SHOULDER ARTHROPLASTY      right    TOOTH EXTRACTION  03/19/2018    TOTAL HIP ARTHROPLASTY      bilateral    TOTAL KNEE ARTHROPLASTY      right        Allergies   Allergen Reactions    Tape [Adhesive Tape] Dermatitis        Family History   Problem Relation Age of Onset    High Blood Pressure Mother     Stroke Maternal Grandmother 72        Social History     Social History    Marital status:      Spouse name: N/A    Number of children: 2    Years of education: N/A     Occupational History  Not on file.      Social History Main Topics    Smoking status: Never Smoker    Smokeless tobacco: Never Used    Alcohol use No    Drug use: No    Sexual activity: Yes     Partners: Male     Other Topics Concern    Not on file     Social History Narrative    No narrative on file       Current Outpatient Prescriptions   Medication Sig Dispense Refill    lisinopril (PRINIVIL;ZESTRIL) 20 MG tablet Take 2 tablets by mouth daily 30 tablet 5    bumetanide (BUMEX) 1 MG tablet TAKE 1 TABLET BY MOUTH AS NEEDED FOR WEIGHT GAIN>3 POUNDS 90 tablet 1    amLODIPine (NORVASC) 2.5 MG tablet Take 2.5 mg by mouth daily      rivaroxaban (XARELTO) 15 MG TABS tablet Take 1 tablet by mouth Daily with supper 30 tablet 2    tolterodine (DETROL LA) 2 MG extended release capsule TAKE 1 CAPSULE BY MOUTH TWICE DAILY 180 capsule 3    amLODIPine (NORVASC) 5 MG tablet Take 1 tablet by mouth daily (Patient taking differently: Take 5 mg by mouth daily Pt taking 2.5 mg tab with 5 mg tab for total of 7.5 mg daily.) 90 tablet 0    pantoprazole (PROTONIX) 40 MG tablet Take 1 tablet by mouth nightly 90 tablet 3    potassium chloride (KLOR-CON M) 20 MEQ TBCR extended release tablet TAKE 1 TABLET BY MOUTH TWICE DAILY AND 1 TABLET THREE TIMES DAILY ON MONDAY, WEDNESDAY AND FRIDAY 72 tablet 4    metolazone (ZAROXOLYN) 2.5 MG tablet Take 2.5 mg by mouth See Admin Instructions Monday Wednesday and Friday      carvedilol (COREG) 12.5 MG tablet Take 1 tablet by mouth 2 times daily (with meals) 180 tablet 3    beclomethasone (QVAR) 80 MCG/ACT inhaler Inhale 2 puffs into the lungs 2 times daily 3 Inhaler 3    miconazole (MICOTIN) 2 % powder Apply topically 2 times daily PRN for excoriation 45 g 1    ferrous sulfate 325 (65 FE) MG tablet Take 1 tablet by mouth 2 times daily (with meals) 30 tablet 3    hydrocortisone (CORTEF) 20 MG tablet Take 20 mg by mouth every morning       hydrocortisone (CORTEF) 5 MG tablet Take 10 mg by mouth nightly 01/30/2018    TSH 0.065 01/30/2018       EKG  3/20/18    Sinus  Bradycardia  -Short AR syndrome   Miguel A = 104  Rate 56 bpm  Low voltage -possible pulmonary disease. ABNORMAL     Assessment    1. Chronic diastolic congestive heart failure (HCC)     2. Paroxysmal atrial fibrillation ([address]     3. Essential hypertension     4. Pure hypercholesterolemia     5. CKD (chronic kidney disease), stage IV (HCC)           Plan   Congestive heart failure: no evidence of fluid overload today, no recent hospitalization for CHF    PAF  Hx of CVA-multiple CVA  Cont coreg  Cont xeralto for now  And consider to change to apixaban for CKD  For now decrease xarelto to 15 mg po qd due to CKD stage IV       stated she has episodes of difficulty of walking  ?? If related to low BP  Decrease lisinopril and see      Hypertension, on medical treatment. Seems to be under good control. Patient is compliant with medical treatment. Low normal BP  Decrease lisinopril to 20 from 40  Consider to decrease norvasc down the line if sbp remain low normal     Hyperlipidemia: on statins, followed periodically. Patient need periodic lipid and liver profile.     NO hx of CAD    CKD IV and pat and  about CKD and would to talk to pcp and address it there  Recommend nephrology eval  Unhappy that no  Informed him of the CKD and want to call His pcp    RTC in 1 months for more meds adjustement        Steve Damon [facility]

## 2018-04-18 RX ORDER — AMLODIPINE BESYLATE 2.5 MG/1
2.5 TABLET ORAL DAILY
Qty: 90 TABLET | Refills: 0 | Status: SHIPPED | OUTPATIENT
Start: 2018-04-18 | End: 2018-08-06 | Stop reason: SDUPTHER

## 2018-04-18 RX ORDER — LISINOPRIL 20 MG/1
20 TABLET ORAL DAILY
COMMUNITY
End: 2018-12-30

## 2018-04-23 RX ORDER — METOLAZONE 2.5 MG/1
2.5 TABLET ORAL SEE ADMIN INSTRUCTIONS
Qty: 36 TABLET | Refills: 0 | Status: SHIPPED | OUTPATIENT
Start: 2018-04-23 | End: 2018-07-06 | Stop reason: SDUPTHER

## 2018-05-29 RX ORDER — POTASSIUM CHLORIDE 1500 MG/1
TABLET, FILM COATED, EXTENDED RELEASE ORAL
Qty: 72 TABLET | Refills: 4 | Status: SHIPPED | OUTPATIENT
Start: 2018-05-29 | End: 2018-12-09 | Stop reason: SDUPTHER

## 2018-05-29 RX ORDER — CARVEDILOL 12.5 MG/1
12.5 TABLET ORAL 2 TIMES DAILY WITH MEALS
Qty: 180 TABLET | Refills: 3 | Status: SHIPPED | OUTPATIENT
Start: 2018-05-29 | End: 2019-06-28 | Stop reason: SDUPTHER

## 2018-06-04 ENCOUNTER — TELEPHONE (OUTPATIENT)
Dept: CARDIOLOGY CLINIC | Age: 68
End: 2018-06-04

## 2018-07-06 RX ORDER — METOLAZONE 2.5 MG/1
2.5 TABLET ORAL SEE ADMIN INSTRUCTIONS
Qty: 36 TABLET | Refills: 0 | Status: SHIPPED | OUTPATIENT
Start: 2018-07-06 | End: 2018-10-04 | Stop reason: SDUPTHER

## 2018-08-06 RX ORDER — AMLODIPINE BESYLATE 5 MG/1
5 TABLET ORAL DAILY
Qty: 90 TABLET | Refills: 0 | Status: SHIPPED | OUTPATIENT
Start: 2018-08-06 | End: 2018-09-27

## 2018-08-06 RX ORDER — AMLODIPINE BESYLATE 2.5 MG/1
2.5 TABLET ORAL DAILY
Qty: 90 TABLET | Refills: 0 | Status: SHIPPED | OUTPATIENT
Start: 2018-08-06 | End: 2018-09-27

## 2018-08-31 ENCOUNTER — TELEPHONE (OUTPATIENT)
Dept: FAMILY MEDICINE CLINIC | Age: 68
End: 2018-08-31

## 2018-08-31 DIAGNOSIS — Z00.00 ROUTINE GENERAL MEDICAL EXAMINATION AT A HEALTH CARE FACILITY: Primary | ICD-10-CM

## 2018-08-31 DIAGNOSIS — E03.9 ACQUIRED HYPOTHYROIDISM: ICD-10-CM

## 2018-08-31 RX ORDER — RIVAROXABAN 15 MG/1
15 TABLET, FILM COATED ORAL
Qty: 30 TABLET | Refills: 0 | Status: SHIPPED | OUTPATIENT
Start: 2018-08-31 | End: 2018-09-27 | Stop reason: SDUPTHER

## 2018-09-27 ENCOUNTER — OFFICE VISIT (OUTPATIENT)
Dept: CARDIOLOGY CLINIC | Age: 68
End: 2018-09-27
Payer: MEDICARE

## 2018-09-27 VITALS
HEIGHT: 63 IN | WEIGHT: 174 LBS | SYSTOLIC BLOOD PRESSURE: 108 MMHG | BODY MASS INDEX: 30.83 KG/M2 | HEART RATE: 59 BPM | DIASTOLIC BLOOD PRESSURE: 67 MMHG

## 2018-09-27 DIAGNOSIS — I50.32 CHRONIC DIASTOLIC CONGESTIVE HEART FAILURE (HCC): ICD-10-CM

## 2018-09-27 DIAGNOSIS — I10 ESSENTIAL HYPERTENSION: ICD-10-CM

## 2018-09-27 DIAGNOSIS — E78.00 PURE HYPERCHOLESTEROLEMIA: ICD-10-CM

## 2018-09-27 DIAGNOSIS — I48.0 PAROXYSMAL ATRIAL FIBRILLATION (HCC): Primary | ICD-10-CM

## 2018-09-27 PROCEDURE — 99214 OFFICE O/P EST MOD 30 MIN: CPT | Performed by: INTERNAL MEDICINE

## 2018-09-27 RX ORDER — AMLODIPINE BESYLATE 2.5 MG/1
2.5 TABLET ORAL DAILY
Qty: 30 TABLET | Refills: 5
Start: 2018-09-27 | End: 2018-11-12 | Stop reason: SDUPTHER

## 2018-09-27 NOTE — PROGRESS NOTES
Chief Complaint   Patient presents with    1 Month Follow-Up    Hypertension   , former  pt. For chf and PAF  Pt here for one month follow up. Pt c/o SOB on exertion, occasional palpitations, bilateral swelling in feet. Would like something other than Xarelto because of cost.      Denied cp, or palpitations      Occasional dizziness on standing    Hx of leg edema      EKG done today!      stated she has episodes of difficulty of walking    Records reviewed      Patient Active Problem List   Diagnosis    Asthma    Stroke ([facility] [address]    Hypothyroidism    Hyperlipidemia    Osteoarthritis    GERD (gastroesophageal reflux disease)    Meningioma (HCC)    Obesity    Hypopituitarism ([address]    Urinary incontinence    Bursitis of shoulder    Mechanical loosening of prosthetic joint (HCC)    Abdominal pain    Acute appendicitis    Hyperglycemia    SIRS (systemic inflammatory response syndrome) (HCC)    Leukocytosis    Gastroenteritis    Diarrhea    Nausea vomiting and diarrhea    Acute adrenal crisis ([address]    Right sided weakness    Slurred speech    Cerebrovascular disease    Metabolic acidosis    Sinus bradycardia    Generalized weakness    Hx of subdural hematoma    Cerebral infarction ([address]    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 ([address]    Acquired hypercoagulable state ([address]    Hematoma of left lower extremity    Intractable pain    Debility    Hematoma    Atrial thrombosis    Venous ulcer of left leg (HCC)    Left lower lobe pneumonia (HCC)    Bilateral edema of lower extremity    Pneumonia due to organism    Microcytic anemia    Leg wound, left    Peripheral edema    Accelerated hypertension    Shortness of breath    Chest pain    Acute congestive heart failure (HCC)    Lethargy    Acute on chronic diastolic congestive heart failure (HCC)    Hypokalemia    Altered mental status    Postoperative hypothyroidism    History of pituitary tumor   

## 2018-10-02 PROBLEM — I50.33 ACUTE ON CHRONIC DIASTOLIC CONGESTIVE HEART FAILURE (HCC): Status: RESOLVED | Noted: 2017-01-08 | Resolved: 2018-10-02

## 2018-10-02 PROBLEM — R55 SYNCOPE AND COLLAPSE: Status: RESOLVED | Noted: 2017-02-09 | Resolved: 2018-10-02

## 2018-10-02 PROBLEM — N17.9 AKI (ACUTE KIDNEY INJURY) (HCC): Status: RESOLVED | Noted: 2017-03-17 | Resolved: 2018-10-02

## 2018-10-02 PROBLEM — A41.9 SEPSIS (HCC): Status: RESOLVED | Noted: 2017-01-24 | Resolved: 2018-10-02

## 2018-10-02 PROBLEM — J96.01 ACUTE HYPOXEMIC RESPIRATORY FAILURE (HCC): Status: RESOLVED | Noted: 2017-04-05 | Resolved: 2018-10-02

## 2018-10-02 PROBLEM — R41.82 MENTAL STATUS ALTERATION: Status: RESOLVED | Noted: 2017-01-24 | Resolved: 2018-10-02

## 2018-10-02 PROBLEM — R53.83 LETHARGY: Status: RESOLVED | Noted: 2017-01-08 | Resolved: 2018-10-02
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
## 2018-10-05 RX ORDER — METOLAZONE 2.5 MG/1
TABLET ORAL
Qty: 30 TABLET | Refills: 0 | Status: ON HOLD | OUTPATIENT
Start: 2018-10-05 | End: 2018-11-19 | Stop reason: HOSPADM

## 2018-10-16 LAB
ABSOLUTE BASO #: 0.1 K/UL (ref 0–0.1)
ABSOLUTE EOS #: 0.1 K/UL (ref 0.1–0.4)
ABSOLUTE LYMPH #: 4 K/UL (ref 0.8–5.2)
ABSOLUTE MONO #: 0.8 K/UL (ref 0.1–0.9)
ABSOLUTE NEUT #: 8.9 K/UL (ref 1.3–9.1)
ALBUMIN SERPL-MCNC: 3.2 G/DL (ref 3.5–5.2)
ALK PHOSPHATASE: 102 U/L (ref 30–146)
ALT SERPL-CCNC: 13 U/L (ref 5–40)
ANION GAP SERPL CALCULATED.3IONS-SCNC: 11 MEQ/L (ref 10–19)
ANION GAP SERPL CALCULATED.3IONS-SCNC: 14 MEQ/L (ref 10–19)
AST SERPL-CCNC: 12 U/L (ref 9–40)
BASOPHILS RELATIVE PERCENT: 0.4 %
BILIRUB SERPL-MCNC: <0.2 MG/DL
BUN BLDV-MCNC: 31 MG/DL (ref 8–23)
BUN BLDV-MCNC: 31 MG/DL (ref 8–23)
CALCIUM SERPL-MCNC: 9.4 MG/DL (ref 8.5–10.5)
CALCIUM SERPL-MCNC: 9.4 MG/DL (ref 8.5–10.5)
CHLORIDE BLD-SCNC: 110 MEQ/L (ref 95–107)
CHLORIDE BLD-SCNC: 111 MEQ/L (ref 95–107)
CHOLESTEROL/HDL RATIO: 4.1
CHOLESTEROL: 143 MG/DL
CO2: 20 MEQ/L (ref 19–31)
CO2: 22 MEQ/L (ref 19–31)
CREAT SERPL-MCNC: 1.7 MG/DL (ref 0.6–1.3)
CREAT SERPL-MCNC: 1.7 MG/DL (ref 0.6–1.3)
EGFR AFRICAN AMERICAN: 35.5 ML/MIN/1.73 M2
EGFR AFRICAN AMERICAN: 35.5 ML/MIN/1.73 M2
EGFR IF NONAFRICAN AMERICAN: 30.7 ML/MIN/1.73 M2
EGFR IF NONAFRICAN AMERICAN: 30.7 ML/MIN/1.73 M2
EOSINOPHILS RELATIVE PERCENT: 0.9 %
GLUCOSE: 86 MG/DL (ref 70–99)
GLUCOSE: 86 MG/DL (ref 70–99)
HCT VFR BLD CALC: 30.2 % (ref 36–48)
HDLC SERPL-MCNC: 35.1 MG/DL
HEMOGLOBIN: 10.4 G/DL (ref 12–16)
IRON: 39 MCG/DL (ref 37–145)
LDL CHOLESTEROL CALCULATED: 56 MG/DL
LDL/HDL RATIO: 1.6
LYMPHOCYTE %: 28.6 %
MAGNESIUM: 2.3 MG/DL (ref 1.6–2.6)
MCH RBC QN AUTO: 29.7 PG (ref 27–34)
MCHC RBC AUTO-ENTMCNC: 34.4 G/DL (ref 31–36)
MCV RBC AUTO: 86.3 FL (ref 80–100)
MONOCYTES # BLD: 5.9 %
NEUTROPHILS RELATIVE PERCENT: 63.3 %
PDW BLD-RTO: 14 % (ref 10.8–14.8)
PLATELETS: 516 K/UL (ref 150–450)
POTASSIUM SERPL-SCNC: 4.9 MEQ/L (ref 3.5–5.4)
POTASSIUM SERPL-SCNC: 5 MEQ/L (ref 3.5–5.4)
RBC: 3.5 M/UL (ref 4–5.5)
SODIUM BLD-SCNC: 144 MEQ/L (ref 135–146)
SODIUM BLD-SCNC: 144 MEQ/L (ref 135–146)
T4 FREE: 1.32 NG/DL (ref 0.8–1.9)
TOTAL PROTEIN: 5.5 G/DL (ref 6.1–8.3)
TRIGL SERPL-MCNC: 262 MG/DL
TSH SERPL DL<=0.05 MIU/L-ACNC: 0.07 UIU/ML (ref 0.4–4.1)
VLDLC SERPL CALC-MCNC: 52 MG/DL
WBC: 14 K/UL (ref 3.7–10.8)

## 2018-10-18 ENCOUNTER — TELEPHONE (OUTPATIENT)
Dept: FAMILY MEDICINE CLINIC | Age: 68
End: 2018-10-18

## 2018-10-19 ENCOUNTER — TELEPHONE (OUTPATIENT)
Dept: CARDIOLOGY CLINIC | Age: 68
End: 2018-10-19

## 2018-10-19 NOTE — TELEPHONE ENCOUNTER
Spoke with patient's .   Patient states patient is taking iron 65mg BID,  Instructed filomena to have patient increase to TID

## 2018-11-12 RX ORDER — AMLODIPINE BESYLATE 2.5 MG/1
TABLET ORAL
Qty: 90 TABLET | Refills: 3 | Status: SHIPPED | OUTPATIENT
Start: 2018-11-12 | End: 2019-02-15

## 2018-11-14 ENCOUNTER — HOSPITAL ENCOUNTER (INPATIENT)
Age: 68
LOS: 4 days | Discharge: HOME HEALTH | DRG: 871 | End: 2018-11-19
Attending: FAMILY MEDICINE | Admitting: INTERNAL MEDICINE
Payer: MEDICARE

## 2018-11-14 ENCOUNTER — APPOINTMENT (OUTPATIENT)
Dept: CT IMAGING | Age: 68
DRG: 871 | End: 2018-11-14
Payer: MEDICARE

## 2018-11-14 ENCOUNTER — APPOINTMENT (OUTPATIENT)
Dept: GENERAL RADIOLOGY | Age: 68
DRG: 871 | End: 2018-11-14
Payer: MEDICARE

## 2018-11-14 DIAGNOSIS — R41.0 CONFUSION: ICD-10-CM

## 2018-11-14 DIAGNOSIS — N17.9 ACUTE KIDNEY INJURY (HCC): ICD-10-CM

## 2018-11-14 DIAGNOSIS — R77.8 ELEVATED TROPONIN: ICD-10-CM

## 2018-11-14 DIAGNOSIS — J18.9 SEPSIS DUE TO PNEUMONIA (HCC): Primary | ICD-10-CM

## 2018-11-14 DIAGNOSIS — A41.9 SEPSIS DUE TO PNEUMONIA (HCC): Primary | ICD-10-CM

## 2018-11-14 DIAGNOSIS — E87.5 HYPERKALEMIA: ICD-10-CM

## 2018-11-14 DIAGNOSIS — D72.829 LEUKOCYTOSIS, UNSPECIFIED TYPE: ICD-10-CM

## 2018-11-14 LAB
AMMONIA: 37 UMOL/L (ref 11–60)
AMPHETAMINE+METHAMPHETAMINE URINE SCREEN: NEGATIVE
BACTERIA: ABNORMAL /HPF
BARBITURATE QUANTITATIVE URINE: NEGATIVE
BASOPHILS # BLD: 0.2 %
BASOPHILS ABSOLUTE: 0 THOU/MM3 (ref 0–0.1)
BENZODIAZEPINE QUANTITATIVE URINE: NEGATIVE
BILIRUBIN URINE: NEGATIVE
BLOOD, URINE: ABNORMAL
CANNABINOID QUANTITATIVE URINE: NEGATIVE
CASTS 2: ABNORMAL /LPF
CASTS UA: ABNORMAL /LPF
CHARACTER, URINE: ABNORMAL
COCAINE METABOLITE QUANTITATIVE URINE: NEGATIVE
COLOR: YELLOW
CRYSTALS, UA: ABNORMAL
EOSINOPHIL # BLD: 0.5 %
EOSINOPHILS ABSOLUTE: 0.1 THOU/MM3 (ref 0–0.4)
EPITHELIAL CELLS, UA: ABNORMAL /HPF
ERYTHROCYTE [DISTWIDTH] IN BLOOD BY AUTOMATED COUNT: 15.2 % (ref 11.5–14.5)
ERYTHROCYTE [DISTWIDTH] IN BLOOD BY AUTOMATED COUNT: 53.2 FL (ref 35–45)
FLU A ANTIGEN: NEGATIVE
FLU B ANTIGEN: NEGATIVE
GLUCOSE BLD-MCNC: 92 MG/DL (ref 70–108)
GLUCOSE URINE: NEGATIVE MG/DL
HCT VFR BLD CALC: 34.7 % (ref 37–47)
HEMOGLOBIN: 10.9 GM/DL (ref 12–16)
IMMATURE GRANS (ABS): 0.07 THOU/MM3 (ref 0–0.07)
IMMATURE GRANULOCYTES: 0.3 %
KETONES, URINE: NEGATIVE
LACTIC ACID: 0.8 MMOL/L (ref 0.5–2.2)
LEUKOCYTE ESTERASE, URINE: NEGATIVE
LYMPHOCYTES # BLD: 17 %
LYMPHOCYTES ABSOLUTE: 3.5 THOU/MM3 (ref 1–4.8)
MCH RBC QN AUTO: 29.8 PG (ref 26–33)
MCHC RBC AUTO-ENTMCNC: 31.4 GM/DL (ref 32.2–35.5)
MCV RBC AUTO: 94.8 FL (ref 81–99)
MISCELLANEOUS 2: ABNORMAL
MONOCYTES # BLD: 3.8 %
MONOCYTES ABSOLUTE: 0.8 THOU/MM3 (ref 0.4–1.3)
NITRITE, URINE: NEGATIVE
NUCLEATED RED BLOOD CELLS: 0 /100 WBC
OPIATES, URINE: NEGATIVE
OXYCODONE: NEGATIVE
PH UA: 5
PHENCYCLIDINE QUANTITATIVE URINE: NEGATIVE
PLATELET # BLD: 521 THOU/MM3 (ref 130–400)
PMV BLD AUTO: 9.6 FL (ref 9.4–12.4)
PROTEIN UA: NEGATIVE
RBC # BLD: 3.66 MILL/MM3 (ref 4.2–5.4)
RBC URINE: ABNORMAL /HPF
RENAL EPITHELIAL, UA: ABNORMAL
SEG NEUTROPHILS: 78.2 %
SEGMENTED NEUTROPHILS ABSOLUTE COUNT: 16 THOU/MM3 (ref 1.8–7.7)
SPECIFIC GRAVITY, URINE: 1.02 (ref 1–1.03)
UROBILINOGEN, URINE: 0.2 EU/DL
WBC # BLD: 20.5 THOU/MM3 (ref 4.8–10.8)
WBC UA: ABNORMAL /HPF
YEAST: ABNORMAL

## 2018-11-14 PROCEDURE — 83605 ASSAY OF LACTIC ACID: CPT

## 2018-11-14 PROCEDURE — 81001 URINALYSIS AUTO W/SCOPE: CPT

## 2018-11-14 PROCEDURE — 74176 CT ABD & PELVIS W/O CONTRAST: CPT

## 2018-11-14 PROCEDURE — 71046 X-RAY EXAM CHEST 2 VIEWS: CPT

## 2018-11-14 PROCEDURE — 96365 THER/PROPH/DIAG IV INF INIT: CPT

## 2018-11-14 PROCEDURE — 85025 COMPLETE CBC W/AUTO DIFF WBC: CPT

## 2018-11-14 PROCEDURE — 93005 ELECTROCARDIOGRAM TRACING: CPT | Performed by: FAMILY MEDICINE

## 2018-11-14 PROCEDURE — 82948 REAGENT STRIP/BLOOD GLUCOSE: CPT

## 2018-11-14 PROCEDURE — 2580000003 HC RX 258: Performed by: FAMILY MEDICINE

## 2018-11-14 PROCEDURE — 87040 BLOOD CULTURE FOR BACTERIA: CPT

## 2018-11-14 PROCEDURE — 6360000002 HC RX W HCPCS: Performed by: FAMILY MEDICINE

## 2018-11-14 PROCEDURE — 87804 INFLUENZA ASSAY W/OPTIC: CPT

## 2018-11-14 PROCEDURE — 82248 BILIRUBIN DIRECT: CPT

## 2018-11-14 PROCEDURE — 70450 CT HEAD/BRAIN W/O DYE: CPT

## 2018-11-14 PROCEDURE — 84443 ASSAY THYROID STIM HORMONE: CPT

## 2018-11-14 PROCEDURE — 84145 PROCALCITONIN (PCT): CPT

## 2018-11-14 PROCEDURE — 84439 ASSAY OF FREE THYROXINE: CPT

## 2018-11-14 PROCEDURE — 36415 COLL VENOUS BLD VENIPUNCTURE: CPT

## 2018-11-14 PROCEDURE — 80053 COMPREHEN METABOLIC PANEL: CPT

## 2018-11-14 PROCEDURE — 82140 ASSAY OF AMMONIA: CPT

## 2018-11-14 PROCEDURE — 84484 ASSAY OF TROPONIN QUANT: CPT

## 2018-11-14 PROCEDURE — G0480 DRUG TEST DEF 1-7 CLASSES: HCPCS

## 2018-11-14 PROCEDURE — 80307 DRUG TEST PRSMV CHEM ANLYZR: CPT

## 2018-11-14 PROCEDURE — 99285 EMERGENCY DEPT VISIT HI MDM: CPT

## 2018-11-14 RX ORDER — 0.9 % SODIUM CHLORIDE 0.9 %
2000 INTRAVENOUS SOLUTION INTRAVENOUS ONCE
Status: COMPLETED | OUTPATIENT
Start: 2018-11-14 | End: 2018-11-15

## 2018-11-14 RX ORDER — DEXAMETHASONE SODIUM PHOSPHATE 4 MG/ML
8 INJECTION, SOLUTION INTRA-ARTICULAR; INTRALESIONAL; INTRAMUSCULAR; INTRAVENOUS; SOFT TISSUE ONCE
Status: COMPLETED | OUTPATIENT
Start: 2018-11-14 | End: 2018-11-14

## 2018-11-14 RX ORDER — DEXAMETHASONE SODIUM PHOSPHATE 4 MG/ML
4.5 INJECTION, SOLUTION INTRA-ARTICULAR; INTRALESIONAL; INTRAMUSCULAR; INTRAVENOUS; SOFT TISSUE ONCE
Status: DISCONTINUED | OUTPATIENT
Start: 2018-11-14 | End: 2018-11-14

## 2018-11-14 RX ADMIN — DEXAMETHASONE SODIUM PHOSPHATE 8 MG: 4 INJECTION, SOLUTION INTRA-ARTICULAR; INTRALESIONAL; INTRAMUSCULAR; INTRAVENOUS; SOFT TISSUE at 23:22

## 2018-11-14 RX ADMIN — CEFEPIME HYDROCHLORIDE 2 G: 2 INJECTION, POWDER, FOR SOLUTION INTRAVENOUS at 22:34

## 2018-11-14 RX ADMIN — VANCOMYCIN HYDROCHLORIDE 1500 MG: 1 INJECTION, POWDER, LYOPHILIZED, FOR SOLUTION INTRAVENOUS at 23:18

## 2018-11-14 RX ADMIN — SODIUM CHLORIDE 2000 ML: 9 INJECTION, SOLUTION INTRAVENOUS at 22:34

## 2018-11-14 ASSESSMENT — ENCOUNTER SYMPTOMS
COUGH: 0
VOMITING: 0
EYE PAIN: 0
SHORTNESS OF BREATH: 0
ABDOMINAL PAIN: 1
RHINORRHEA: 0
EYE DISCHARGE: 0
DIARRHEA: 0
WHEEZING: 0
BACK PAIN: 0
NAUSEA: 0
SORE THROAT: 0

## 2018-11-15 ENCOUNTER — APPOINTMENT (OUTPATIENT)
Dept: INTERVENTIONAL RADIOLOGY/VASCULAR | Age: 68
DRG: 871 | End: 2018-11-15
Payer: MEDICARE

## 2018-11-15 PROBLEM — J18.9 SEPSIS DUE TO PNEUMONIA (HCC): Status: ACTIVE | Noted: 2018-11-15

## 2018-11-15 PROBLEM — A41.9 SEPSIS DUE TO PNEUMONIA (HCC): Status: ACTIVE | Noted: 2018-11-15

## 2018-11-15 LAB
ALBUMIN SERPL-MCNC: 3 G/DL (ref 3.5–5.1)
ALP BLD-CCNC: 133 U/L (ref 38–126)
ALT SERPL-CCNC: 31 U/L (ref 11–66)
ANION GAP SERPL CALCULATED.3IONS-SCNC: 13 MEQ/L (ref 8–16)
ANION GAP SERPL CALCULATED.3IONS-SCNC: 13 MEQ/L (ref 8–16)
ANION GAP SERPL CALCULATED.3IONS-SCNC: 14 MEQ/L (ref 8–16)
ANION GAP SERPL CALCULATED.3IONS-SCNC: 15 MEQ/L (ref 8–16)
ANTISTREPTOLYSIN-O: 85.3 IU/ML (ref 0–200)
AST SERPL-CCNC: 41 U/L (ref 5–40)
BILIRUB SERPL-MCNC: 0.4 MG/DL (ref 0.3–1.2)
BILIRUBIN DIRECT: < 0.2 MG/DL (ref 0–0.3)
BUN BLDV-MCNC: 41 MG/DL (ref 7–22)
BUN BLDV-MCNC: 43 MG/DL (ref 7–22)
CALCIUM SERPL-MCNC: 9 MG/DL (ref 8.5–10.5)
CALCIUM SERPL-MCNC: 9.1 MG/DL (ref 8.5–10.5)
CALCIUM SERPL-MCNC: 9.2 MG/DL (ref 8.5–10.5)
CALCIUM SERPL-MCNC: 9.7 MG/DL (ref 8.5–10.5)
CHLORIDE BLD-SCNC: 110 MEQ/L (ref 98–111)
CHLORIDE BLD-SCNC: 111 MEQ/L (ref 98–111)
CHLORIDE BLD-SCNC: 113 MEQ/L (ref 98–111)
CHLORIDE BLD-SCNC: 113 MEQ/L (ref 98–111)
CHLORIDE, URINE: 68 MEQ/L
CO2: 13 MEQ/L (ref 23–33)
CO2: 14 MEQ/L (ref 23–33)
CO2: 15 MEQ/L (ref 23–33)
CO2: 18 MEQ/L (ref 23–33)
CREAT SERPL-MCNC: 2.4 MG/DL (ref 0.4–1.2)
CREAT SERPL-MCNC: 2.4 MG/DL (ref 0.4–1.2)
CREAT SERPL-MCNC: 2.5 MG/DL (ref 0.4–1.2)
CREAT SERPL-MCNC: 2.5 MG/DL (ref 0.4–1.2)
CREATININE URINE: 118 MG/DL
EKG ATRIAL RATE: 71 BPM
EKG P AXIS: 49 DEGREES
EKG P-R INTERVAL: 124 MS
EKG Q-T INTERVAL: 362 MS
EKG QRS DURATION: 98 MS
EKG QTC CALCULATION (BAZETT): 393 MS
EKG R AXIS: -31 DEGREES
EKG T AXIS: 45 DEGREES
EKG VENTRICULAR RATE: 71 BPM
EOSINOPHIL SMEAR: NORMAL
GFR SERPL CREATININE-BSD FRML MDRD: 19 ML/MIN/1.73M2
GFR SERPL CREATININE-BSD FRML MDRD: 19 ML/MIN/1.73M2
GFR SERPL CREATININE-BSD FRML MDRD: 20 ML/MIN/1.73M2
GFR SERPL CREATININE-BSD FRML MDRD: 20 ML/MIN/1.73M2
GLUCOSE BLD-MCNC: 100 MG/DL (ref 70–108)
GLUCOSE BLD-MCNC: 139 MG/DL (ref 70–108)
GLUCOSE BLD-MCNC: 142 MG/DL (ref 70–108)
GLUCOSE BLD-MCNC: 146 MG/DL (ref 70–108)
MYOGLOBIN URINE: POSITIVE
OSMOLALITY CALCULATION: 288.4 MOSMOL/KG (ref 275–300)
OSMOLALITY CALCULATION: 294 MOSMOL/KG (ref 275–300)
OSMOLALITY CALCULATION: 295.7 MOSMOL/KG (ref 275–300)
OSMOLALITY URINE: 469 MOSMOL/KG (ref 250–750)
OSMOLALITY: 312 MOSMOL/KG (ref 275–295)
POTASSIUM SERPL-SCNC: 5.2 MEQ/L (ref 3.5–5.2)
POTASSIUM SERPL-SCNC: 5.4 MEQ/L (ref 3.5–5.2)
POTASSIUM SERPL-SCNC: 5.7 MEQ/L (ref 3.5–5.2)
POTASSIUM SERPL-SCNC: 6.8 MEQ/L (ref 3.5–5.2)
POTASSIUM, URINE: 88.9 MEQ/L
PROCALCITONIN: 1.47 NG/ML (ref 0.01–0.09)
SODIUM BLD-SCNC: 138 MEQ/L (ref 135–145)
SODIUM BLD-SCNC: 140 MEQ/L (ref 135–145)
SODIUM BLD-SCNC: 142 MEQ/L (ref 135–145)
SODIUM BLD-SCNC: 142 MEQ/L (ref 135–145)
SODIUM URINE: 25 MEQ/L
SPECIMEN: NORMAL
T4 FREE: 1.71 NG/DL (ref 0.93–1.76)
TOTAL CK: 75 U/L (ref 30–135)
TOTAL PROTEIN: 5.8 G/DL (ref 6.1–8)
TROPONIN T: 0.06 NG/ML
TROPONIN T: 0.07 NG/ML
TSH SERPL DL<=0.05 MIU/L-ACNC: 0.06 UIU/ML (ref 0.4–4.2)
URIC ACID: 10.5 MG/DL (ref 2.4–5.7)
VANCOMYCIN RESISTANT ENTEROCOCCUS: NEGATIVE

## 2018-11-15 PROCEDURE — 86060 ANTISTREPTOLYSIN O TITER: CPT

## 2018-11-15 PROCEDURE — 02H633Z INSERTION OF INFUSION DEVICE INTO RIGHT ATRIUM, PERCUTANEOUS APPROACH: ICD-10-PCS | Performed by: RADIOLOGY

## 2018-11-15 PROCEDURE — 82550 ASSAY OF CK (CPK): CPT

## 2018-11-15 PROCEDURE — 96366 THER/PROPH/DIAG IV INF ADDON: CPT

## 2018-11-15 PROCEDURE — 2709999900 HC NON-CHARGEABLE SUPPLY

## 2018-11-15 PROCEDURE — 84300 ASSAY OF URINE SODIUM: CPT

## 2018-11-15 PROCEDURE — 6370000000 HC RX 637 (ALT 250 FOR IP): Performed by: INTERNAL MEDICINE

## 2018-11-15 PROCEDURE — 99223 1ST HOSP IP/OBS HIGH 75: CPT | Performed by: INTERNAL MEDICINE

## 2018-11-15 PROCEDURE — 82610 CYSTATIN C: CPT

## 2018-11-15 PROCEDURE — 6360000002 HC RX W HCPCS

## 2018-11-15 PROCEDURE — 84550 ASSAY OF BLOOD/URIC ACID: CPT

## 2018-11-15 PROCEDURE — 89190 NASAL SMEAR FOR EOSINOPHILS: CPT

## 2018-11-15 PROCEDURE — 2500000003 HC RX 250 WO HCPCS: Performed by: FAMILY MEDICINE

## 2018-11-15 PROCEDURE — 99999 PR OFFICE/OUTPT VISIT,PROCEDURE ONLY: CPT | Performed by: FAMILY MEDICINE

## 2018-11-15 PROCEDURE — 82436 ASSAY OF URINE CHLORIDE: CPT

## 2018-11-15 PROCEDURE — 1200000003 HC TELEMETRY R&B

## 2018-11-15 PROCEDURE — 76937 US GUIDE VASCULAR ACCESS: CPT | Performed by: RADIOLOGY

## 2018-11-15 PROCEDURE — 83930 ASSAY OF BLOOD OSMOLALITY: CPT

## 2018-11-15 PROCEDURE — 99221 1ST HOSP IP/OBS SF/LOW 40: CPT | Performed by: INTERNAL MEDICINE

## 2018-11-15 PROCEDURE — B2141ZZ FLUOROSCOPY OF RIGHT HEART USING LOW OSMOLAR CONTRAST: ICD-10-PCS | Performed by: RADIOLOGY

## 2018-11-15 PROCEDURE — 77001 FLUOROGUIDE FOR VEIN DEVICE: CPT | Performed by: RADIOLOGY

## 2018-11-15 PROCEDURE — 2580000003 HC RX 258: Performed by: INTERNAL MEDICINE

## 2018-11-15 PROCEDURE — 2500000003 HC RX 250 WO HCPCS: Performed by: INTERNAL MEDICINE

## 2018-11-15 PROCEDURE — 2580000003 HC RX 258: Performed by: FAMILY MEDICINE

## 2018-11-15 PROCEDURE — 84133 ASSAY OF URINE POTASSIUM: CPT

## 2018-11-15 PROCEDURE — C1751 CATH, INF, PER/CENT/MIDLINE: HCPCS

## 2018-11-15 PROCEDURE — 97163 PT EVAL HIGH COMPLEX 45 MIN: CPT

## 2018-11-15 PROCEDURE — 84484 ASSAY OF TROPONIN QUANT: CPT

## 2018-11-15 PROCEDURE — 6370000000 HC RX 637 (ALT 250 FOR IP): Performed by: FAMILY MEDICINE

## 2018-11-15 PROCEDURE — 83874 ASSAY OF MYOGLOBIN: CPT

## 2018-11-15 PROCEDURE — G8978 MOBILITY CURRENT STATUS: HCPCS

## 2018-11-15 PROCEDURE — 97530 THERAPEUTIC ACTIVITIES: CPT

## 2018-11-15 PROCEDURE — 83935 ASSAY OF URINE OSMOLALITY: CPT

## 2018-11-15 PROCEDURE — G8979 MOBILITY GOAL STATUS: HCPCS

## 2018-11-15 PROCEDURE — 36415 COLL VENOUS BLD VENIPUNCTURE: CPT

## 2018-11-15 PROCEDURE — 82570 ASSAY OF URINE CREATININE: CPT

## 2018-11-15 PROCEDURE — 93010 ELECTROCARDIOGRAM REPORT: CPT | Performed by: INTERNAL MEDICINE

## 2018-11-15 PROCEDURE — 87081 CULTURE SCREEN ONLY: CPT

## 2018-11-15 PROCEDURE — 6360000002 HC RX W HCPCS: Performed by: INTERNAL MEDICINE

## 2018-11-15 PROCEDURE — 87500 VANOMYCIN DNA AMP PROBE: CPT

## 2018-11-15 PROCEDURE — 94640 AIRWAY INHALATION TREATMENT: CPT

## 2018-11-15 PROCEDURE — 92610 EVALUATE SWALLOWING FUNCTION: CPT

## 2018-11-15 PROCEDURE — 80048 BASIC METABOLIC PNL TOTAL CA: CPT

## 2018-11-15 PROCEDURE — C1769 GUIDE WIRE: HCPCS

## 2018-11-15 PROCEDURE — 36556 INSERT NON-TUNNEL CV CATH: CPT | Performed by: RADIOLOGY

## 2018-11-15 RX ORDER — DIPHENHYDRAMINE HCL 25 MG
25 TABLET ORAL NIGHTLY
Status: DISCONTINUED | OUTPATIENT
Start: 2018-11-15 | End: 2018-11-16

## 2018-11-15 RX ORDER — DEXTROSE MONOHYDRATE 25 G/50ML
12.5 INJECTION, SOLUTION INTRAVENOUS PRN
Status: DISCONTINUED | OUTPATIENT
Start: 2018-11-15 | End: 2018-11-19 | Stop reason: HOSPADM

## 2018-11-15 RX ORDER — DEXTROSE MONOHYDRATE 50 MG/ML
100 INJECTION, SOLUTION INTRAVENOUS PRN
Status: DISCONTINUED | OUTPATIENT
Start: 2018-11-15 | End: 2018-11-19 | Stop reason: HOSPADM

## 2018-11-15 RX ORDER — DEXTROSE MONOHYDRATE 25 G/50ML
25 INJECTION, SOLUTION INTRAVENOUS ONCE
Status: DISCONTINUED | OUTPATIENT
Start: 2018-11-15 | End: 2018-11-19 | Stop reason: HOSPADM

## 2018-11-15 RX ORDER — ACETAMINOPHEN 325 MG/1
650 TABLET ORAL EVERY 6 HOURS PRN
Status: DISCONTINUED | OUTPATIENT
Start: 2018-11-15 | End: 2018-11-19 | Stop reason: HOSPADM

## 2018-11-15 RX ORDER — ACETAMINOPHEN,DIPHENHYDRAMINE HCL 500; 25 MG/1; MG/1
1 TABLET, FILM COATED ORAL NIGHTLY
Status: DISCONTINUED | OUTPATIENT
Start: 2018-11-15 | End: 2018-11-15 | Stop reason: CLARIF

## 2018-11-15 RX ORDER — SODIUM POLYSTYRENE SULFONATE 4.1 MEQ/G
30 POWDER, FOR SUSPENSION ORAL; RECTAL ONCE
Status: COMPLETED | OUTPATIENT
Start: 2018-11-15 | End: 2018-11-15

## 2018-11-15 RX ORDER — HYDROCORTISONE 10 MG/1
10 TABLET ORAL NIGHTLY
Status: DISCONTINUED | OUTPATIENT
Start: 2018-11-15 | End: 2018-11-19 | Stop reason: HOSPADM

## 2018-11-15 RX ORDER — LIDOCAINE HYDROCHLORIDE 10 MG/ML
5 INJECTION, SOLUTION EPIDURAL; INFILTRATION; INTRACAUDAL; PERINEURAL ONCE
Status: DISCONTINUED | OUTPATIENT
Start: 2018-11-15 | End: 2018-11-19 | Stop reason: HOSPADM

## 2018-11-15 RX ORDER — SODIUM CHLORIDE 0.9 % (FLUSH) 0.9 %
10 SYRINGE (ML) INJECTION EVERY 12 HOURS SCHEDULED
Status: DISCONTINUED | OUTPATIENT
Start: 2018-11-15 | End: 2018-11-19 | Stop reason: HOSPADM

## 2018-11-15 RX ORDER — M-VIT,TX,IRON,MINS/CALC/FOLIC 27MG-0.4MG
1 TABLET ORAL
Status: DISCONTINUED | OUTPATIENT
Start: 2018-11-15 | End: 2018-11-19 | Stop reason: HOSPADM

## 2018-11-15 RX ORDER — AMLODIPINE BESYLATE 2.5 MG/1
2.5 TABLET ORAL DAILY
Status: DISCONTINUED | OUTPATIENT
Start: 2018-11-15 | End: 2018-11-19 | Stop reason: HOSPADM

## 2018-11-15 RX ORDER — SODIUM CHLORIDE 0.9 % (FLUSH) 0.9 %
10 SYRINGE (ML) INJECTION PRN
Status: DISCONTINUED | OUTPATIENT
Start: 2018-11-15 | End: 2018-11-19 | Stop reason: HOSPADM

## 2018-11-15 RX ORDER — FERROUS SULFATE 325(65) MG
325 TABLET ORAL 2 TIMES DAILY WITH MEALS
Status: DISCONTINUED | OUTPATIENT
Start: 2018-11-15 | End: 2018-11-19 | Stop reason: HOSPADM

## 2018-11-15 RX ORDER — CARVEDILOL 6.25 MG/1
12.5 TABLET ORAL 2 TIMES DAILY WITH MEALS
Status: DISCONTINUED | OUTPATIENT
Start: 2018-11-15 | End: 2018-11-19 | Stop reason: HOSPADM

## 2018-11-15 RX ORDER — IPRATROPIUM BROMIDE AND ALBUTEROL SULFATE 2.5; .5 MG/3ML; MG/3ML
1 SOLUTION RESPIRATORY (INHALATION)
Status: DISCONTINUED | OUTPATIENT
Start: 2018-11-15 | End: 2018-11-19 | Stop reason: HOSPADM

## 2018-11-15 RX ORDER — SIMVASTATIN 20 MG
20 TABLET ORAL NIGHTLY
Status: DISCONTINUED | OUTPATIENT
Start: 2018-11-15 | End: 2018-11-19 | Stop reason: HOSPADM

## 2018-11-15 RX ORDER — NICOTINE POLACRILEX 4 MG
15 LOZENGE BUCCAL PRN
Status: DISCONTINUED | OUTPATIENT
Start: 2018-11-15 | End: 2018-11-19 | Stop reason: HOSPADM

## 2018-11-15 RX ORDER — DEXTROSE MONOHYDRATE 25 G/50ML
25 INJECTION, SOLUTION INTRAVENOUS PRN
Status: DISCONTINUED | OUTPATIENT
Start: 2018-11-15 | End: 2018-11-15 | Stop reason: DRUGHIGH

## 2018-11-15 RX ORDER — HYDROCORTISONE 10 MG/1
20 TABLET ORAL EVERY MORNING
Status: DISCONTINUED | OUTPATIENT
Start: 2018-11-15 | End: 2018-11-19 | Stop reason: HOSPADM

## 2018-11-15 RX ORDER — SODIUM CHLORIDE 9 MG/ML
INJECTION, SOLUTION INTRAVENOUS CONTINUOUS
Status: DISCONTINUED | OUTPATIENT
Start: 2018-11-15 | End: 2018-11-16

## 2018-11-15 RX ORDER — LEVOTHYROXINE SODIUM 137 UG/1
137 TABLET ORAL
Status: DISCONTINUED | OUTPATIENT
Start: 2018-11-15 | End: 2018-11-19 | Stop reason: HOSPADM

## 2018-11-15 RX ORDER — ACETAMINOPHEN 500 MG
500 TABLET ORAL NIGHTLY
Status: DISCONTINUED | OUTPATIENT
Start: 2018-11-15 | End: 2018-11-16

## 2018-11-15 RX ORDER — PANTOPRAZOLE SODIUM 40 MG/1
40 TABLET, DELAYED RELEASE ORAL NIGHTLY
Status: DISCONTINUED | OUTPATIENT
Start: 2018-11-15 | End: 2018-11-19 | Stop reason: HOSPADM

## 2018-11-15 RX ORDER — LACTOBACILLUS RHAMNOSUS GG 10B CELL
1 CAPSULE ORAL 2 TIMES DAILY WITH MEALS
Status: DISCONTINUED | OUTPATIENT
Start: 2018-11-15 | End: 2018-11-19 | Stop reason: HOSPADM

## 2018-11-15 RX ADMIN — PIPERACILLIN SODIUM AND TAZOBACTAM SODIUM 3.38 G: 3; .375 INJECTION, POWDER, LYOPHILIZED, FOR SOLUTION INTRAVENOUS at 11:48

## 2018-11-15 RX ADMIN — CARVEDILOL 12.5 MG: 6.25 TABLET, FILM COATED ORAL at 12:04

## 2018-11-15 RX ADMIN — PANTOPRAZOLE SODIUM 40 MG: 40 TABLET, DELAYED RELEASE ORAL at 21:24

## 2018-11-15 RX ADMIN — PIPERACILLIN SODIUM AND TAZOBACTAM SODIUM 3.38 G: 3; .375 INJECTION, POWDER, LYOPHILIZED, FOR SOLUTION INTRAVENOUS at 23:44

## 2018-11-15 RX ADMIN — SIMVASTATIN 20 MG: 20 TABLET, FILM COATED ORAL at 21:24

## 2018-11-15 RX ADMIN — Medication 1 CAPSULE: at 21:24

## 2018-11-15 RX ADMIN — SODIUM POLYSTYRENE SULFONATE 30 G: 1 POWDER ORAL; RECTAL at 07:11

## 2018-11-15 RX ADMIN — MICONAZOLE NITRATE: 2 POWDER TOPICAL at 23:44

## 2018-11-15 RX ADMIN — FERROUS SULFATE TAB 325 MG (65 MG ELEMENTAL FE) 325 MG: 325 (65 FE) TAB at 12:04

## 2018-11-15 RX ADMIN — DIPHENHYDRAMINE HCL 25 MG: 25 TABLET ORAL at 21:25

## 2018-11-15 RX ADMIN — LEVOTHYROXINE SODIUM 137 MCG: 137 TABLET ORAL at 12:04

## 2018-11-15 RX ADMIN — IPRATROPIUM BROMIDE AND ALBUTEROL SULFATE 1 AMPULE: .5; 3 SOLUTION RESPIRATORY (INHALATION) at 07:43

## 2018-11-15 RX ADMIN — HYDROCORTISONE 20 MG: 10 TABLET ORAL at 12:04

## 2018-11-15 RX ADMIN — Medication 10 ML: at 21:26

## 2018-11-15 RX ADMIN — FERROUS SULFATE TAB 325 MG (65 MG ELEMENTAL FE) 325 MG: 325 (65 FE) TAB at 21:24

## 2018-11-15 RX ADMIN — AZITHROMYCIN MONOHYDRATE 500 MG: 500 INJECTION, POWDER, LYOPHILIZED, FOR SOLUTION INTRAVENOUS at 12:45

## 2018-11-15 RX ADMIN — CALCIUM GLUCONATE 1 G: 98 INJECTION, SOLUTION INTRAVENOUS at 11:39

## 2018-11-15 RX ADMIN — IPRATROPIUM BROMIDE AND ALBUTEROL SULFATE 1 AMPULE: .5; 3 SOLUTION RESPIRATORY (INHALATION) at 20:33

## 2018-11-15 RX ADMIN — IPRATROPIUM BROMIDE AND ALBUTEROL SULFATE 1 AMPULE: .5; 3 SOLUTION RESPIRATORY (INHALATION) at 16:41

## 2018-11-15 RX ADMIN — CARVEDILOL 12.5 MG: 6.25 TABLET, FILM COATED ORAL at 21:24

## 2018-11-15 RX ADMIN — ACETAMINOPHEN 500 MG: 500 TABLET, FILM COATED ORAL at 21:25

## 2018-11-15 RX ADMIN — MULTIPLE VITAMINS W/ MINERALS TAB 1 TABLET: TAB at 12:03

## 2018-11-15 RX ADMIN — Medication 1 CAPSULE: at 12:04

## 2018-11-15 RX ADMIN — HYDROCORTISONE 10 MG: 10 TABLET ORAL at 21:24

## 2018-11-15 RX ADMIN — AMLODIPINE BESYLATE 2.5 MG: 2.5 TABLET ORAL at 12:04

## 2018-11-15 RX ADMIN — SODIUM BICARBONATE 50 MEQ: 84 INJECTION, SOLUTION INTRAVENOUS at 07:02

## 2018-11-15 RX ADMIN — SODIUM CHLORIDE: 9 INJECTION, SOLUTION INTRAVENOUS at 03:45

## 2018-11-15 RX ADMIN — Medication 10 ML: at 12:04

## 2018-11-15 RX ADMIN — IPRATROPIUM BROMIDE AND ALBUTEROL SULFATE 1 AMPULE: .5; 3 SOLUTION RESPIRATORY (INHALATION) at 11:59

## 2018-11-15 ASSESSMENT — PAIN SCALES - GENERAL
PAINLEVEL_OUTOF10: 0

## 2018-11-15 NOTE — H&P
HYSTERECTOMY      total done for DUB    OTHER SURGICAL HISTORY  9/30/2014    LAPAROSCOPIC RUPTURED APPENDECTOMY    OTHER SURGICAL HISTORY Left 10/26/15    Evacuation and Debridement of Left Lower Leg Hematoma - Dr. Nicol Peguero  3/13/2013    left    SHOULDER ARTHROPLASTY      right    TOOTH EXTRACTION  03/19/2018    TOTAL HIP ARTHROPLASTY      bilateral    TOTAL KNEE ARTHROPLASTY      right        Medications Prior to Admission:      Prior to Admission medications    Medication Sig Start Date End Date Taking?  Authorizing Provider   amLODIPine (NORVASC) 2.5 MG tablet TAKE 1 TABLET DAILY 11/12/18   Jyothi Puckett PA-C   metolazone (ZAROXOLYN) 2.5 MG tablet TAKE 1 TABLET BY MOUTH ON MONDAYS, WEDNESDAYS, AND FRIDAYS 10/5/18   NIDIA Gan - CNP   rivaroxaban (XARELTO) 15 MG TABS tablet Take 1 tablet by mouth Daily with supper 9/27/18   Irineo Whitney MD   carvedilol (COREG) 12.5 MG tablet Take 1 tablet by mouth 2 times daily (with meals) 5/29/18   Irineo Whitney MD   potassium chloride (KLOR-CON M) 20 MEQ TBCR extended release tablet TAKE 1 TABLET BY MOUTH TWICE DAILY AND 1 TABLET THREE TIMES DAILY ON Theresa Mustard 5/29/18   Irineo Whitney MD   lisinopril (PRINIVIL;ZESTRIL) 20 MG tablet Take 20 mg by mouth daily    Historical Provider, MD   bumetanide (BUMEX) 1 MG tablet TAKE 1 TABLET BY MOUTH AS NEEDED FOR WEIGHT GAIN>3 POUNDS 3/20/18   Irineo Whitney MD   tolterodine (DETROL LA) 2 MG extended release capsule TAKE 1 CAPSULE BY MOUTH TWICE DAILY 2/23/18   Umesh Kelly MD   pantoprazole (PROTONIX) 40 MG tablet Take 1 tablet by mouth nightly 1/22/18   Umesh Kelly MD   beclomethasone (QVAR) 80 MCG/ACT inhaler Inhale 2 puffs into the lungs 2 times daily 5/1/17   Umesh Kelly MD   miconazole (MICOTIN) 2 % powder Apply topically 2 times daily PRN for excoriation 4/14/17   Claudy Borden MD   ferrous sulfate 325 (65 FE) MG tablet Take 1 tablet by mouth 2 times daily (with meals) 4/14/17   Willie Sears MD   hydrocortisone (CORTEF) 20 MG tablet Take 20 mg by mouth every morning     Historical Provider, MD   hydrocortisone (CORTEF) 5 MG tablet Take 15 mg by mouth nightly     Historical Provider, MD   ranitidine (ZANTAC) 150 MG tablet Take 150 mg by mouth 2 times daily    Historical Provider, MD   Multiple Vitamins-Minerals (THERAPEUTIC MULTIVITAMIN-MINERALS) tablet Take 1 tablet by mouth daily    Historical Provider, MD   Nutritional Supplements (BOOST) LIQD Take by mouth as needed     Historical Provider, MD   diphenhydrAMINE-APAP, sleep, (TYLENOL PM EXTRA STRENGTH)  MG tablet Take 1 tablet by mouth nightly     Historical Provider, MD   acetaminophen (TYLENOL) 325 MG tablet Take 650 mg by mouth every 6 hours as needed for Pain 1 or 2 tabs q6h prn pain    Historical Provider, MD   lactobacillus (CULTURELLE) capsule Take 1 capsule by mouth 2 times daily (with meals)  Patient taking differently: Take 1 capsule by mouth daily  1/11/17   Jamel Saenz MD   SYNTHROID 137 MCG tablet Take 1 tablet by mouth daily 10/27/15   Valentino Forrester MD   Calcium-Phosphorus-Vitamin D (CITRACAL +D3 PO) Take 1 tablet by mouth 2 times daily     Historical Provider, MD   simvastatin (ZOCOR) 20 MG tablet Take 1 tablet by mouth nightly 7/2/15   Leann Alberto MD       Allergies:  Tape Marj Font tape]    Social History:      TOBACCO:   reports that she has never smoked. She has never used smokeless tobacco.  ETOH:   reports that she does not drink alcohol. Family History:    Reviewed in detail and negative for DM, CAD, Cancer, CVA. Positive as follows:    Family History   Problem Relation Age of Onset    High Blood Pressure Mother     Stroke Maternal Grandmother 72       Diet:       REVIEW OF SYSTEMS:   Pertinent positives as noted in the HPI. All other systems reviewed and negative.     PHYSICAL EXAM:    BP (!) 117/58   Pulse 68   Temp 100.2 °F (37.9 °C) (Rectal)   Resp 19   Ht 5' 3\" (1.6 m)   Wt 185 lb (83.9 kg)   SpO2 97%   BMI 32.77 kg/m²     General appearance:  Patient eyes open, follows commands. HEENT:  Normal cephalic, atraumatic without obvious deformity. Pupils equal, round, and reactive to light. Extra ocular muscles intact. Conjunctivae/corneas clear. Neck: Supple, with full range of motion. No jugular venous distention. Trachea midline. Respiratory:  Normal respiratory effort. Clear to auscultation, bilaterally without Rales/Wheezes/Rhonchi. Cardiovascular:  Regular rate and rhythm with normal S1/S2 without murmurs, rubs or gallops. Abdomen: Soft, non-tender, non-distended with normal bowel sounds. Musculoskeletal:  No clubbing, cyanosis or edema bilaterally. Full range of motion without deformity. Skin: Skin color, texture, turgor normal.  Multiple skin traumas over her arms  Neurologic:  Axox1, squeeze fingers and raise rle but no movement against gravity in LLE. Psychiatric:  Alert and oriented, thought content appropriate, normal insight  Capillary Refill: Brisk,< 3 seconds   Peripheral Pulses: +2 palpable, equal bilaterally       Labs:     Recent Labs      11/14/18 2125   WBC  20.5*   HGB  10.9*   HCT  34.7*   PLT  521*     Recent Labs      11/14/18   2239   NA  142   K  5.7*   CL  113*   CO2  14*   BUN  43*   CREATININE  2.5*   CALCIUM  9.7     Recent Labs      11/14/18   2239   AST  41*   ALT  31   BILIDIR  <0.2   BILITOT  0.4   ALKPHOS  133*     No results for input(s): INR in the last 72 hours. No results for input(s): Mal Roche in the last 72 hours. Urinalysis:      Lab Results   Component Value Date    NITRU NEGATIVE 11/14/2018    WBCUA 0-2 11/14/2018    WBCUA 2-4 02/22/2012    BACTERIA NONE 11/14/2018    RBCUA 0-2 11/14/2018    BLOODU SMALL 11/14/2018    SPECGRAV 1.009 04/05/2017    GLUCOSEU NEGATIVE 11/14/2018       Intake & Output:  No intake/output data recorded.   No

## 2018-11-15 NOTE — PLAN OF CARE
Problem: Impaired respiratory status  Goal: Clear lung sounds  Outcome: Ongoing  Pt started on aerosol txs to improve breath sounds/aeration.

## 2018-11-15 NOTE — CARE COORDINATION
Nightly    And    diphenhydrAMINE  25 mg Oral Nightly    piperacillin-tazobactam  3.375 g Intravenous Q12H     Continuous Infusions:   sodium chloride 75 mL/hr at 11/15/18 0345    dextrose        Pertinent Info/Orders/Treatment Plan:  Troponin 0.058, BUN 41, creatinine 2.5, procalcitonin 1.47, IV fluids, IV antibiotics, diabetes management, telemetry, PT,Nephrology consult. Diet:     Smoking status:  reports that she has never smoked. She has never used smokeless tobacco.   PCP: Roderick Deng MD  Readmission: no  Readmission Risk Score: 19%    Discharge Planning  Current Residence:  Private Residence  Living Arrangements:  Spouse/Significant Other   Support Systems:  Spouse/Significant Other, [employer], Children  Current Services PTA:     Potential Assistance Needed:  N/A  Potential Assistance Purchasing Medications:  Yes  Does patient want to participate in local refill/ meds to beds program?  Yes  Type of Home Care Services:     Patient expects to be discharged to:     Expected Discharge date: Follow Up Appointment: Best Day/ Time:      Discharge Plan: Met with Stephani Diaz. She currently lives at home with her spouse. She uses a walker. Plan is to return at discharge. She denies need for DME and is receptive to [facility]. Will follow. SW consult placed.      Evaluation: yes

## 2018-11-15 NOTE — CONSULTS
[facility]    [facility]  [address]  [facility]  [address] [place] Nephrology Consult      11/15/2018 6:02 AM         Pt Name:    Mark Villa  MRN:     [id number]   [id number]  YOB: 1950  Admit Date:    2018  8:29 PM  Primary Care Physician:  Lissa Orellana MD   Room Number:  4A-11/011-A   Reason for Consult:  Acute decrease in eGFR  Requesting Providor: Dr. Tj Álvarez  Primary Nephrologist: none    ### ISOLATION ###:   yes     Admit Chief Complaint: abdominal pain     History of Chief Complaint:   The patient is a 80y.o. year old white female who has never seen a nephrologist before. She developed abdominal pain 18. She presented to ER where interview was difficult. CT scan of the abdomen without IV contrast provided no answer as to the pain. Her appetite has been poor. She denied N/V/C/D/SOB/CP. She was taking lisinopril, Bumex, metolazone prior to admission. She has some trouble at bladder emptying. She is . She has never had kidney stones nor hematuria. Data suggests that she has had CKD for months. She has HTN but does not know for how long. Nephrology is following the patient for: acute kidney injury from dehydration. 24 hour summary:   The patient was asleep in bed. She woke up easily. She answers questions slowly. She has vague abdominal pain. Baseline eGFR 30-35 ml/min   Admit eGFR 19 ml/min   Current eGFR 20 ml/min       eGFR trend    Lab Results   Component Value Date    LABGLOM 20 11/15/2018    LABGLOM 20 11/15/2018    LABGLOM 19 2018    LABGLOM 84 2017    LABGLOM 72 2017    LABGLOM 84 2017    LABGLOM 84 2017    LABGLOM 72 04/10/2017    LABGLOM 84 2017    LABGLOM 84 2017    LABGLOM 63 2017    LABGLOM 63 2017          Compliance with treatment plan: unknown     Allergies and Intolerances:   ALLERGIES: Tape Dejon Devoid tape]  MEDICATION INTOLERANCES: none  FOOD Historical Provider, MD   lactobacillus (CULTURELLE) capsule Take 1 capsule by mouth 2 times daily (with meals)  Patient taking differently: Take 1 capsule by mouth daily  1/11/17  Yes Jailene Pitts MD   SYNTHROID 137 MCG tablet Take 1 tablet by mouth daily 10/27/15  Yes Linda Quinonez MD   Calcium-Phosphorus-Vitamin D (CITRACAL +D3 PO) Take 1 tablet by mouth 2 times daily    Yes Historical Provider, MD   simvastatin (ZOCOR) 20 MG tablet Take 1 tablet by mouth nightly 7/2/15  Yes Latosha Recio MD   metolazone (ZAROXOLYN) 2.5 MG tablet TAKE 1 TABLET BY MOUTH ON MONDAYS, WEDNESDAYS, AND FRIDAYS 10/5/18   NIDIA Wright - CNP   pantoprazole (PROTONIX) 40 MG tablet Take 1 tablet by mouth nightly 1/22/18   Andrade Perkins MD   beclomethasone (QVAR) 80 MCG/ACT inhaler Inhale 2 puffs into the lungs 2 times daily 5/1/17   Andrade Perkins MD   miconazole (MICOTIN) 2 % powder Apply topically 2 times daily PRN for excoriation 4/14/17   Yolanda Grider MD   Multiple Vitamins-Minerals (THERAPEUTIC MULTIVITAMIN-MINERALS) tablet Take 1 tablet by mouth daily    Historical Provider, MD   Nutritional Supplements (BOOST) LIQD Take by mouth as needed     Historical Provider, MD   diphenhydrAMINE-APAP, sleep, (TYLENOL PM EXTRA STRENGTH)  MG tablet Take 1 tablet by mouth nightly     Historical Provider, MD   acetaminophen (TYLENOL) 325 MG tablet Take 650 mg by mouth every 6 hours as needed for Pain 1 or 2 tabs q6h prn pain    Historical Provider, MD        Lab data:  CBC:    Recent Labs      11/14/18   2125   WBC  20.5*   HGB  10.9*   PLT  521*     CMP:    Recent Labs      11/14/18   2239  11/15/18   0050  11/15/18   0339   NA  142  138  140   K  5.7*  5.2  6.8*   CL  113*  110  113*   CO2  14*  15*  13*   BUN  43*  41*  41*   CREATININE  2.5*  2.4*  2.4*   GLUCOSE  100  146*  139*   CALCIUM  9.7  9.0  9.2     Urine:  Recent Labs      11/14/18   2159   COLORU  YELLOW   PHUR  5.0   WBCUA  0-2   RBCUA  0-2

## 2018-11-15 NOTE — PROGRESS NOTES
[facility] Palliative [facility]           Progress Note    Patient Name:  Arnold Atwood  Medical Record Number:  [id number]  YOB: 1950    Date:  11/15/2018  Time:  9:54 AM  Completed By:  Johnie Jones RN    Reason for Palliative Care Evaluation: code status    Current Issues:none noted at present  []  Pain  []  Fatigue  []  Nausea  []  Anxiety  []  Depression  []  Shortness of Breath  []  Constipation  []  Appetite  []  Other:    Advance Directives  [x] [place] DNR Form  [x] Living Will  [x] Medical POA    Current Code Status  [x] Full Resuscitation  [] DNR-Comfort Care-Arrest  [] DNR-Comfort Care  [] Limited   [] No CPR   [] No shock   [] No ET intubation/reintubation   [] No resuscitative medications   [] Other limitation:    Performance Status: 50    Family/Patient Discussion:  Patient resting in bed. Patient is alert and oriented to name only at this time. Did update her to place, month, year and president. Patient is able to state that Majo Ramachandran is her . Patient is able to state, \"yes that's right\" when discussed that she has expressed in the past that she did not want resuscitative measures. Plan/Follow-Up:  Updated primary RN, Yen Gottlieb. Will attempt to stop back when her  is present to confirm patient's wishes regarding resuscitative measures.     Johnie Jones RN  11/15/2018,  9:54 AM

## 2018-11-15 NOTE — ED PROVIDER NOTES
[facility]  eMERGENCY dEPARTMENT eNCOUnter          CHIEF COMPLAINT       Chief Complaint   Patient presents with    Abdominal Pain    Extremity Weakness     general       Nurses Notes reviewed and I agree except as noted in the HPI. HISTORY OF PRESENT ILLNESS    Deana Azul is a 80 y.o. female who presents to the Emergency Department with a medical history of DM, HTN, cancer, stroke, TIA, and urinary incontinence for the evaluation of abdominal pain and fatigue. The patient's  states that the patient woke up this morning complaining of abdominal pain and generalized fatigue. He states that due to the patient's continual symptoms that she was brought to the ED for evaluation. The patient's  denies any vomiting, diarrhea, or constipation. The patient, during my initial evaluation, denied any chest pain, abdominal pain, back pain, or weakness. She is a DNR-CCA. A limited history is obtained due to the patient's presenting condition. The HPI was provided by the patient and her . REVIEW OF SYSTEMS     Review of Systems   Constitutional: Positive for fatigue. Negative for appetite change, chills and fever. HENT: Negative for congestion, ear pain, rhinorrhea and sore throat. Eyes: Negative for pain, discharge and visual disturbance. Respiratory: Negative for cough, shortness of breath and wheezing. Cardiovascular: Negative for chest pain, palpitations and leg swelling. Gastrointestinal: Positive for abdominal pain. Negative for diarrhea, nausea and vomiting. Genitourinary: Negative for difficulty urinating, dysuria, hematuria and vaginal discharge. Musculoskeletal: Negative for arthralgias, back pain, joint swelling and neck pain. Skin: Negative for pallor and rash. Neurological: Negative for dizziness, syncope, weakness, light-headedness, numbness and headaches. Hematological: Negative for adenopathy.    Psychiatric/Behavioral: Negative for confusion and suicidal ideas. The patient is not nervous/anxious. PAST MEDICAL HISTORY    has a past medical history of Asthma; Atrial thrombosis; Bursitis; Cancer ([facility] CVA (cerebral infarction); Diabetes insipidus ([facility] GERD (gastroesophageal reflux disease); History of diabetes insipidus; History of meningioma of the brain; Hyperlipidemia; Hypertension; 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 ([facility] Hypothyroidism; Meningioma ([facility] MRSA nasal colonization; Obesity (BMI 30-39.9); Osteoarthritis; [facility] ([facility] Stroke [facility] Stroke [facility]); and Urinary incontinence. SURGICAL HISTORY      has a past surgical history that includes brain surgery; Total hip arthroplasty; Total knee arthroplasty; Shoulder Arthroplasty; Cholecystectomy; Hysterectomy; back surgery; Revision total hip arthroplasty (3/13/2013); other surgical history (9/30/2014); Appendectomy; pituitary surgery (1988); other surgical history (Left, 10/26/15); and Tooth Extraction (03/19/2018).     CURRENT MEDICATIONS       Previous Medications    ACETAMINOPHEN (TYLENOL) 325 MG TABLET    Take 650 mg by mouth every 6 hours as needed for Pain 1 or 2 tabs q6h prn pain    AMLODIPINE (NORVASC) 2.5 MG TABLET    TAKE 1 TABLET DAILY    BECLOMETHASONE (QVAR) 80 MCG/ACT INHALER    Inhale 2 puffs into the lungs 2 times daily    BUMETANIDE (BUMEX) 1 MG TABLET    TAKE 1 TABLET BY MOUTH AS NEEDED FOR WEIGHT GAIN>3 POUNDS    CALCIUM-PHOSPHORUS-VITAMIN D (CITRACAL +D3 PO)    Take 1 tablet by mouth 2 times daily     CARVEDILOL (COREG) 12.5 MG TABLET    Take 1 tablet by mouth 2 times daily (with meals)    DIPHENHYDRAMINE-APAP, SLEEP, (TYLENOL PM EXTRA STRENGTH)  MG TABLET    Take 1 tablet by mouth nightly     FERROUS SULFATE 325 (65 FE) MG TABLET    Take 1 tablet by mouth 2 times daily (with meals)    HYDROCORTISONE (CORTEF) 20 MG TABLET    Take 20 mg by mouth every morning     HYDROCORTISONE (CORTEF) 5 MG 97%.    Physical Exam   Constitutional: She is oriented to person, place, and time. She appears well-developed and well-nourished. Non-toxic appearance. HENT:   Head: Normocephalic and atraumatic. Right Ear: Tympanic membrane and external ear normal.   Left Ear: Tympanic membrane and external ear normal.   Nose: Nose normal.   Mouth/Throat: Oropharynx is clear and moist and mucous membranes are normal. No oropharyngeal exudate, posterior oropharyngeal edema or posterior oropharyngeal erythema. Eyes: Conjunctivae and EOM are normal.   Neck: Normal range of motion. Neck supple. No JVD present. Cardiovascular: Normal rate, regular rhythm, normal heart sounds, intact distal pulses and normal pulses. Exam reveals no gallop and no friction rub. No murmur heard. Pulmonary/Chest: Effort normal. No respiratory distress. She has no decreased breath sounds. She has no wheezes. She has no rhonchi. She has rales. Abdominal: Soft. Bowel sounds are normal. She exhibits no distension. There is no tenderness. There is no rebound, no guarding and no CVA tenderness. Musculoskeletal: Normal range of motion. She exhibits no edema. Neurological: She is alert and oriented to person, place, and time. She exhibits normal muscle tone. Coordination normal.   The patient has residual weakness of the left upper and left lower extremity due to her history of stroke. Skin: Skin is warm and dry. No rash noted. She is not diaphoretic. Nursing note and vitals reviewed. DIFFERENTIALDIAGNOSIS:   Include and are not limited to: sepsis, AMS, electrolyte abnormality, hypopituitarism, infection, pneumonia    DIAGNOSTICRESULTS     EKG: All EKG's are interpreted by the Emergency Department Physician who either signs or Co-signs this chart in the absence of acardiologist.  EKG interpreted by Fabian Marte MD:    Vent. Rate: 71 bpm  VA interval: 124 ms  QRS duration: 98 ms  QTc: 393 ms  P-R-T axes: 49, -31, 45  No STEMI.  EKG gives impression of NSR. Compared to old EKG on 20-Mar-2018    RADIOLOGY: non-plain film images(s) such as CT, Ultrasound and MRI are read by the radiologist.    CT ABDOMEN PELVIS WO CONTRAST Additional Contrast? None   Final Result   Right lower lobe pneumonia. Small amount of fluid versus adenopathy adjacent to the distal esophagus. Small hiatal hernia with thickening of the wall of the hernia suggestive of inflammation. No acute inflammatory or infectious process in the abdomen or pelvis. No evidence of bowel obstruction. Moderate stool throughout the colon. No urinary tract calculi. No hydroureteronephrosis. **This report has been created using voice recognition software. It may contain minor errors which are inherent in voice recognition technology. **      Final report electronically signed by Dr. Richard Langston on 11/15/2018 12:52 AM      CT HEAD WO CONTRAST   Final Result      No acute ischemic infarct, hemorrhage, or mass effect. Stable appearance of the brain and calvarium compared to the prior study as discussed above. **This report has been created using voice recognition software. It may contain minor errors which are inherent in voice recognition technology. **      Final report electronically signed by Dr. Richard Langston on 11/15/2018 12:42 AM      XR CHEST STANDARD (2 VW)   Final Result      Right lower lobe pneumonia. **This report has been created using voice recognition software. It may contain minor errors which are inherent in voice recognition technology. **      Final report electronically signed by Dr. Richard Langston on 11/14/2018 10:41 PM          LABS:   Labs Reviewed   CBC WITH AUTO DIFFERENTIAL - Abnormal; Notable for the following:        Result Value    WBC 20.5 (*)     RBC 3.66 (*)     Hemoglobin 10.9 (*)     Hematocrit 34.7 (*)     MCHC 31.4 (*)     RDW-CV 15.2 (*)     RDW-SD 53.2 (*)     Platelets 509 (*)     Segs Absolute 16.0 (*)     All other

## 2018-11-16 PROBLEM — J18.9 COMMUNITY ACQUIRED PNEUMONIA OF RIGHT LUNG: Status: ACTIVE | Noted: 2018-11-16

## 2018-11-16 PROBLEM — J18.9 SEPSIS DUE TO PNEUMONIA (HCC): Status: RESOLVED | Noted: 2018-11-15 | Resolved: 2018-11-16

## 2018-11-16 PROBLEM — A41.9 SEPSIS DUE TO PNEUMONIA (HCC): Status: RESOLVED | Noted: 2018-11-15 | Resolved: 2018-11-16

## 2018-11-16 LAB
ALBUMIN SERPL-MCNC: 3 G/DL (ref 3.5–5.1)
ALP BLD-CCNC: 112 U/L (ref 38–126)
ALT SERPL-CCNC: 24 U/L (ref 11–66)
ANION GAP SERPL CALCULATED.3IONS-SCNC: 15 MEQ/L (ref 8–16)
AST SERPL-CCNC: 18 U/L (ref 5–40)
BILIRUB SERPL-MCNC: 0.2 MG/DL (ref 0.3–1.2)
BUN BLDV-MCNC: 32 MG/DL (ref 7–22)
CALCIUM SERPL-MCNC: 8.8 MG/DL (ref 8.5–10.5)
CHLORIDE BLD-SCNC: 111 MEQ/L (ref 98–111)
CO2: 19 MEQ/L (ref 23–33)
CREAT SERPL-MCNC: 1.8 MG/DL (ref 0.4–1.2)
CREATININE URINE: 68.3 MG/DL
ERYTHROCYTE [DISTWIDTH] IN BLOOD BY AUTOMATED COUNT: 14.5 % (ref 11.5–14.5)
ERYTHROCYTE [DISTWIDTH] IN BLOOD BY AUTOMATED COUNT: 50.1 FL (ref 35–45)
GFR SERPL CREATININE-BSD FRML MDRD: 28 ML/MIN/1.73M2
GLUCOSE BLD-MCNC: 129 MG/DL (ref 70–108)
GLUCOSE BLD-MCNC: 133 MG/DL (ref 70–108)
HCT VFR BLD CALC: 32 % (ref 37–47)
HEMOGLOBIN: 10.4 GM/DL (ref 12–16)
MCH RBC QN AUTO: 30.5 PG (ref 26–33)
MCHC RBC AUTO-ENTMCNC: 32.5 GM/DL (ref 32.2–35.5)
MCV RBC AUTO: 93.8 FL (ref 81–99)
MRSA SCREEN: NORMAL
PLATELET # BLD: 465 THOU/MM3 (ref 130–400)
PMV BLD AUTO: 9.7 FL (ref 9.4–12.4)
POTASSIUM SERPL-SCNC: 3.8 MEQ/L (ref 3.5–5.2)
PROT/CREAT RATIO, UR: 0.21
PROTEIN, URINE: 14.2 MG/DL
RBC # BLD: 3.41 MILL/MM3 (ref 4.2–5.4)
SODIUM BLD-SCNC: 145 MEQ/L (ref 135–145)
TOTAL PROTEIN: 5 G/DL (ref 6.1–8)
WBC # BLD: 17.6 THOU/MM3 (ref 4.8–10.8)

## 2018-11-16 PROCEDURE — 97110 THERAPEUTIC EXERCISES: CPT

## 2018-11-16 PROCEDURE — 84156 ASSAY OF PROTEIN URINE: CPT

## 2018-11-16 PROCEDURE — 1200000003 HC TELEMETRY R&B

## 2018-11-16 PROCEDURE — 36415 COLL VENOUS BLD VENIPUNCTURE: CPT

## 2018-11-16 PROCEDURE — G8988 SELF CARE GOAL STATUS: HCPCS

## 2018-11-16 PROCEDURE — 97166 OT EVAL MOD COMPLEX 45 MIN: CPT

## 2018-11-16 PROCEDURE — 6370000000 HC RX 637 (ALT 250 FOR IP): Performed by: INTERNAL MEDICINE

## 2018-11-16 PROCEDURE — 99233 SBSQ HOSP IP/OBS HIGH 50: CPT | Performed by: INTERNAL MEDICINE

## 2018-11-16 PROCEDURE — 82570 ASSAY OF URINE CREATININE: CPT

## 2018-11-16 PROCEDURE — 2500000003 HC RX 250 WO HCPCS: Performed by: INTERNAL MEDICINE

## 2018-11-16 PROCEDURE — 80053 COMPREHEN METABOLIC PANEL: CPT

## 2018-11-16 PROCEDURE — 2580000003 HC RX 258: Performed by: INTERNAL MEDICINE

## 2018-11-16 PROCEDURE — 94640 AIRWAY INHALATION TREATMENT: CPT

## 2018-11-16 PROCEDURE — 6360000002 HC RX W HCPCS: Performed by: INTERNAL MEDICINE

## 2018-11-16 PROCEDURE — 82948 REAGENT STRIP/BLOOD GLUCOSE: CPT

## 2018-11-16 PROCEDURE — 99232 SBSQ HOSP IP/OBS MODERATE 35: CPT | Performed by: INTERNAL MEDICINE

## 2018-11-16 PROCEDURE — G8987 SELF CARE CURRENT STATUS: HCPCS

## 2018-11-16 PROCEDURE — 2580000003 HC RX 258: Performed by: FAMILY MEDICINE

## 2018-11-16 PROCEDURE — 6370000000 HC RX 637 (ALT 250 FOR IP): Performed by: FAMILY MEDICINE

## 2018-11-16 PROCEDURE — 85027 COMPLETE CBC AUTOMATED: CPT

## 2018-11-16 PROCEDURE — 2709999900 HC NON-CHARGEABLE SUPPLY

## 2018-11-16 PROCEDURE — 97535 SELF CARE MNGMENT TRAINING: CPT

## 2018-11-16 RX ADMIN — HYDROCORTISONE 20 MG: 10 TABLET ORAL at 08:54

## 2018-11-16 RX ADMIN — MULTIPLE VITAMINS W/ MINERALS TAB 1 TABLET: TAB at 13:36

## 2018-11-16 RX ADMIN — MICONAZOLE NITRATE: 2 POWDER TOPICAL at 08:54

## 2018-11-16 RX ADMIN — IPRATROPIUM BROMIDE AND ALBUTEROL SULFATE 1 AMPULE: .5; 3 SOLUTION RESPIRATORY (INHALATION) at 20:39

## 2018-11-16 RX ADMIN — Medication 10 ML: at 08:54

## 2018-11-16 RX ADMIN — IPRATROPIUM BROMIDE AND ALBUTEROL SULFATE 1 AMPULE: .5; 3 SOLUTION RESPIRATORY (INHALATION) at 17:16

## 2018-11-16 RX ADMIN — LEVOTHYROXINE SODIUM 137 MCG: 137 TABLET ORAL at 06:09

## 2018-11-16 RX ADMIN — AZITHROMYCIN MONOHYDRATE 500 MG: 500 INJECTION, POWDER, LYOPHILIZED, FOR SOLUTION INTRAVENOUS at 08:47

## 2018-11-16 RX ADMIN — SIMVASTATIN 20 MG: 20 TABLET, FILM COATED ORAL at 22:16

## 2018-11-16 RX ADMIN — CARVEDILOL 12.5 MG: 6.25 TABLET, FILM COATED ORAL at 17:51

## 2018-11-16 RX ADMIN — IPRATROPIUM BROMIDE AND ALBUTEROL SULFATE 1 AMPULE: .5; 3 SOLUTION RESPIRATORY (INHALATION) at 09:55

## 2018-11-16 RX ADMIN — SODIUM BICARBONATE: 84 INJECTION, SOLUTION INTRAVENOUS at 08:50

## 2018-11-16 RX ADMIN — PANTOPRAZOLE SODIUM 40 MG: 40 TABLET, DELAYED RELEASE ORAL at 22:17

## 2018-11-16 RX ADMIN — CEFTRIAXONE SODIUM 1 G: 1 INJECTION, POWDER, FOR SOLUTION INTRAMUSCULAR; INTRAVENOUS at 16:22

## 2018-11-16 RX ADMIN — IPRATROPIUM BROMIDE AND ALBUTEROL SULFATE 1 AMPULE: .5; 3 SOLUTION RESPIRATORY (INHALATION) at 00:30

## 2018-11-16 RX ADMIN — FERROUS SULFATE TAB 325 MG (65 MG ELEMENTAL FE) 325 MG: 325 (65 FE) TAB at 08:54

## 2018-11-16 RX ADMIN — HYDROCORTISONE 10 MG: 10 TABLET ORAL at 22:16

## 2018-11-16 RX ADMIN — Medication 10 ML: at 22:17

## 2018-11-16 RX ADMIN — AMLODIPINE BESYLATE 2.5 MG: 2.5 TABLET ORAL at 08:54

## 2018-11-16 RX ADMIN — IPRATROPIUM BROMIDE AND ALBUTEROL SULFATE 1 AMPULE: .5; 3 SOLUTION RESPIRATORY (INHALATION) at 14:17

## 2018-11-16 RX ADMIN — RIVAROXABAN 15 MG: 15 TABLET, FILM COATED ORAL at 17:51

## 2018-11-16 RX ADMIN — Medication 1 CAPSULE: at 08:54

## 2018-11-16 RX ADMIN — MICONAZOLE NITRATE: 2 POWDER TOPICAL at 22:17

## 2018-11-16 RX ADMIN — FERROUS SULFATE TAB 325 MG (65 MG ELEMENTAL FE) 325 MG: 325 (65 FE) TAB at 17:52

## 2018-11-16 RX ADMIN — PIPERACILLIN SODIUM AND TAZOBACTAM SODIUM 3.38 G: 3; .375 INJECTION, POWDER, LYOPHILIZED, FOR SOLUTION INTRAVENOUS at 09:58

## 2018-11-16 RX ADMIN — CARVEDILOL 12.5 MG: 6.25 TABLET, FILM COATED ORAL at 08:54

## 2018-11-16 RX ADMIN — Medication 1 CAPSULE: at 17:51

## 2018-11-16 ASSESSMENT — PAIN SCALES - GENERAL
PAINLEVEL_OUTOF10: 0
PAINLEVEL_OUTOF10: 0

## 2018-11-16 NOTE — PROGRESS NOTES
Renal Adjustment Follow-up    Recent Labs      11/15/18   0528  11/16/18   0526   BUN  41*  32*       Recent Labs      11/15/18   0528  11/16/18   0526   CREATININE  2.5*  1.8*       Estimated Creatinine Clearance: 32 mL/min (A) (based on SCr of 1.8 mg/dL (H)). Plan: Will adjust Zosyn to 3.375 grams IV every 8 hours (infused over 4 hours). No other medications need to be renally adjusted at this time.     Johann LinD, BCPS  11/16/2018  7:35 AM

## 2018-11-16 NOTE — PROGRESS NOTES
2  Entrance Stairs - Rails: Both  Home Equipment: 4 wheeled walker, Rolling walker, Lift chair     Bathroom Shower/Tub: Tub/Shower unit (tub with door)  Bathroom Toilet: Handicap height  Bathroom Equipment: Shower chair, Grab bars in shower  Bathroom Accessibility: Accessible    Receives Help From: Family  ADL Assistance: Needs assistance  Bath: Moderate assistance ( assists)  Dressing: Maximum assistance  Grooming: Modified independent   Feeding: Modified independent   Toileting: Needs assistance ( performs all hygiene tasks)  Homemaking Assistance: Needs assistance ( does all)  Homemaking Responsibilities: No    Ambulation Assistance: Needs assistance (pt does not walk, uses rollator as a wheelchair)  Transfer Assistance: Needs assistance    Active : No  Leisure & Hobbies: go to [employer]       Objective        Overall Cognitive Status: Exceptions  Arousal/Alertness: Delayed responses to stimuli  Following Commands:  Follows one step commands with increased time, Follows one step commands with repetition  Attention Span: Attends with cues to redirect  Memory: Decreased short term memory, Decreased recall of recent events (unable to recall the last time she ambulated)  Safety Judgement: Decreased awareness of need for safety, Decreased awareness of need for assistance  Insights: Decreased awareness of deficits  Initiation: Requires cues for some  Sequencing: Requires cues for some  Cognition Comment: poor recall, stating ate breakfast this AM, RN reporting pt did not eat this AM; delayed processing         Sensation  Overall Sensation Status: WNL                 LUE AROM (degrees)  LUE General AROM: shoulder ROM severely limited, less than 30 degrees; Elbow ROM WFL, lacking 15 degrees extension and 10 flexion; hand, wrist, digits WFL          RUE AROM (degrees)  RUE General AROM: shoulder ROM limited to less than 90, distally WFL       LUE Strength  L Elbow Flex: 3+/5  L Elbow Ext: 3+/5  L

## 2018-11-16 NOTE — CONSULTS
Take 1 tablet by mouth 2 times daily (with meals)  Patient taking differently: Take 325 mg by mouth 2 times daily (with meals)  4/14/17  Yes Alexei Wall MD   hydrocortisone (CORTEF) 20 MG tablet Take 20 mg by mouth every morning    Yes Historical Provider, MD   hydrocortisone (CORTEF) 5 MG tablet Take 10 mg by mouth nightly    Yes Historical Provider, MD   ranitidine (ZANTAC) 150 MG tablet Take 150 mg by mouth 2 times daily   Yes Historical Provider, MD   Multiple Vitamins-Minerals (THERAPEUTIC MULTIVITAMIN-MINERALS) tablet Take 1 tablet by mouth daily   Yes Historical Provider, MD   lactobacillus (CULTURELLE) capsule Take 1 capsule by mouth 2 times daily (with meals)  Patient taking differently: Take 1 capsule by mouth daily  1/11/17  Yes Madison Logan MD   SYNTHROID 137 MCG tablet Take 1 tablet by mouth daily 10/27/15  Yes Lauren Tavera MD   Calcium-Phosphorus-Vitamin D (CITRACAL +D3 PO) Take 1 tablet by mouth 2 times daily    Yes Historical Provider, MD   simvastatin (ZOCOR) 20 MG tablet Take 1 tablet by mouth nightly 7/2/15  Yes Deshawn Melgar MD   beclomethasone (QVAR) 80 MCG/ACT inhaler Inhale 2 puffs into the lungs 2 times daily 5/1/17   Nela Stephenson MD   miconazole (MICOTIN) 2 % powder Apply topically 2 times daily PRN for excoriation 4/14/17   Alexei Wall MD   Nutritional Supplements (BOOST) LIQD Take by mouth as needed     Historical Provider, MD   diphenhydrAMINE-APAP, sleep, (TYLENOL PM EXTRA STRENGTH)  MG tablet Take 1 tablet by mouth nightly     Historical Provider, MD   acetaminophen (TYLENOL) 325 MG tablet Take 650 mg by mouth every 6 hours as needed for Pain 1 or 2 tabs q6h prn pain    Historical Provider, MD        Lab data:  CBC:    Recent Labs      11/14/18   2125  11/16/18   0422   WBC  20.5*  17.6*   HGB  10.9*  10.4*   PLT  521*  465*     CMP:    Recent Labs      11/15/18   0339  11/15/18   0528  11/16/18   0526   NA  140  142  145   K  6.8*  5.4*  3.8   CL  113*

## 2018-11-17 LAB
ANION GAP SERPL CALCULATED.3IONS-SCNC: 12 MEQ/L (ref 8–16)
BUN BLDV-MCNC: 24 MG/DL (ref 7–22)
CALCIUM SERPL-MCNC: 8.5 MG/DL (ref 8.5–10.5)
CHLORIDE BLD-SCNC: 107 MEQ/L (ref 98–111)
CO2: 26 MEQ/L (ref 23–33)
CREAT SERPL-MCNC: 1.4 MG/DL (ref 0.4–1.2)
CYSTATIN C: 2.4 MG/L (ref 0.5–1)
GFR SERPL CREATININE-BSD FRML MDRD: 37 ML/MIN/1.73M2
GLUCOSE BLD-MCNC: 103 MG/DL (ref 70–108)
GLUCOSE BLD-MCNC: 107 MG/DL (ref 70–108)
GLUCOSE BLD-MCNC: 113 MG/DL (ref 70–108)
GLUCOSE BLD-MCNC: 123 MG/DL (ref 70–108)
GLUCOSE BLD-MCNC: 97 MG/DL (ref 70–108)
MISC REFERENCE: NORMAL
MRSA SCREEN: NORMAL
POTASSIUM SERPL-SCNC: 3 MEQ/L (ref 3.5–5.2)
POTASSIUM SERPL-SCNC: 4 MEQ/L (ref 3.5–5.2)
SODIUM BLD-SCNC: 145 MEQ/L (ref 135–145)

## 2018-11-17 PROCEDURE — 94640 AIRWAY INHALATION TREATMENT: CPT

## 2018-11-17 PROCEDURE — 80048 BASIC METABOLIC PNL TOTAL CA: CPT

## 2018-11-17 PROCEDURE — 6370000000 HC RX 637 (ALT 250 FOR IP): Performed by: FAMILY MEDICINE

## 2018-11-17 PROCEDURE — 2580000003 HC RX 258: Performed by: INTERNAL MEDICINE

## 2018-11-17 PROCEDURE — 2500000003 HC RX 250 WO HCPCS: Performed by: INTERNAL MEDICINE

## 2018-11-17 PROCEDURE — 6370000000 HC RX 637 (ALT 250 FOR IP): Performed by: INTERNAL MEDICINE

## 2018-11-17 PROCEDURE — 1200000003 HC TELEMETRY R&B

## 2018-11-17 PROCEDURE — 99232 SBSQ HOSP IP/OBS MODERATE 35: CPT | Performed by: INTERNAL MEDICINE

## 2018-11-17 PROCEDURE — 84132 ASSAY OF SERUM POTASSIUM: CPT

## 2018-11-17 PROCEDURE — 6360000002 HC RX W HCPCS: Performed by: FAMILY MEDICINE

## 2018-11-17 PROCEDURE — 6360000002 HC RX W HCPCS: Performed by: INTERNAL MEDICINE

## 2018-11-17 PROCEDURE — 36415 COLL VENOUS BLD VENIPUNCTURE: CPT

## 2018-11-17 PROCEDURE — 82948 REAGENT STRIP/BLOOD GLUCOSE: CPT

## 2018-11-17 PROCEDURE — 2580000003 HC RX 258: Performed by: FAMILY MEDICINE

## 2018-11-17 PROCEDURE — 2709999900 HC NON-CHARGEABLE SUPPLY

## 2018-11-17 RX ORDER — POTASSIUM CHLORIDE 29.8 MG/ML
20 INJECTION INTRAVENOUS
Status: COMPLETED | OUTPATIENT
Start: 2018-11-17 | End: 2018-11-17

## 2018-11-17 RX ADMIN — Medication 1 CAPSULE: at 18:15

## 2018-11-17 RX ADMIN — MULTIPLE VITAMINS W/ MINERALS TAB 1 TABLET: TAB at 12:18

## 2018-11-17 RX ADMIN — SODIUM BICARBONATE: 84 INJECTION, SOLUTION INTRAVENOUS at 00:26

## 2018-11-17 RX ADMIN — POTASSIUM CHLORIDE 20 MEQ: 29.8 INJECTION, SOLUTION INTRAVENOUS at 09:51

## 2018-11-17 RX ADMIN — LEVOTHYROXINE SODIUM 137 MCG: 137 TABLET ORAL at 06:16

## 2018-11-17 RX ADMIN — POTASSIUM CHLORIDE 20 MEQ: 29.8 INJECTION, SOLUTION INTRAVENOUS at 08:56

## 2018-11-17 RX ADMIN — IPRATROPIUM BROMIDE AND ALBUTEROL SULFATE 1 AMPULE: .5; 3 SOLUTION RESPIRATORY (INHALATION) at 16:16

## 2018-11-17 RX ADMIN — RIVAROXABAN 15 MG: 15 TABLET, FILM COATED ORAL at 18:16

## 2018-11-17 RX ADMIN — Medication 10 ML: at 09:04

## 2018-11-17 RX ADMIN — SIMVASTATIN 20 MG: 20 TABLET, FILM COATED ORAL at 22:15

## 2018-11-17 RX ADMIN — POTASSIUM CHLORIDE 20 MEQ: 29.8 INJECTION, SOLUTION INTRAVENOUS at 10:50

## 2018-11-17 RX ADMIN — IPRATROPIUM BROMIDE AND ALBUTEROL SULFATE 1 AMPULE: .5; 3 SOLUTION RESPIRATORY (INHALATION) at 22:26

## 2018-11-17 RX ADMIN — IPRATROPIUM BROMIDE AND ALBUTEROL SULFATE 1 AMPULE: .5; 3 SOLUTION RESPIRATORY (INHALATION) at 13:32

## 2018-11-17 RX ADMIN — PANTOPRAZOLE SODIUM 40 MG: 40 TABLET, DELAYED RELEASE ORAL at 22:15

## 2018-11-17 RX ADMIN — Medication 1 CAPSULE: at 09:11

## 2018-11-17 RX ADMIN — FERROUS SULFATE TAB 325 MG (65 MG ELEMENTAL FE) 325 MG: 325 (65 FE) TAB at 09:11

## 2018-11-17 RX ADMIN — Medication 10 ML: at 22:15

## 2018-11-17 RX ADMIN — HYDROCORTISONE 20 MG: 10 TABLET ORAL at 09:11

## 2018-11-17 RX ADMIN — CARVEDILOL 12.5 MG: 6.25 TABLET, FILM COATED ORAL at 09:11

## 2018-11-17 RX ADMIN — MICONAZOLE NITRATE: 2 POWDER TOPICAL at 22:16

## 2018-11-17 RX ADMIN — AMLODIPINE BESYLATE 2.5 MG: 2.5 TABLET ORAL at 09:11

## 2018-11-17 RX ADMIN — CEFTRIAXONE SODIUM 1 G: 1 INJECTION, POWDER, FOR SOLUTION INTRAMUSCULAR; INTRAVENOUS at 18:16

## 2018-11-17 RX ADMIN — IPRATROPIUM BROMIDE AND ALBUTEROL SULFATE 1 AMPULE: .5; 3 SOLUTION RESPIRATORY (INHALATION) at 08:56

## 2018-11-17 RX ADMIN — HYDROCORTISONE 10 MG: 10 TABLET ORAL at 22:15

## 2018-11-17 RX ADMIN — MICONAZOLE NITRATE: 2 POWDER TOPICAL at 09:04

## 2018-11-17 RX ADMIN — AZITHROMYCIN MONOHYDRATE 500 MG: 500 INJECTION, POWDER, LYOPHILIZED, FOR SOLUTION INTRAVENOUS at 08:54

## 2018-11-17 RX ADMIN — FERROUS SULFATE TAB 325 MG (65 MG ELEMENTAL FE) 325 MG: 325 (65 FE) TAB at 18:16

## 2018-11-17 ASSESSMENT — PAIN SCALES - GENERAL
PAINLEVEL_OUTOF10: 0

## 2018-11-17 NOTE — PLAN OF CARE
Problem: Falls - Risk of:  Goal: Will remain free from falls  Will remain free from falls   Outcome: Ongoing  Call light in reach, bed in lowest position, bed alarm activated. Educated on use of call light before ambulation and when in need of assistance. Patient expressed understanding. Hourly visual checks performed and charted. Patient has a zaman. No falls during shift, at this time. Arm band & falling star in place. Continuing to monitor     Problem: Risk for Impaired Skin Integrity  Goal: Tissue integrity - skin and mucous membranes  Structural intactness and normal physiological function of skin and  mucous membranes. Outcome: Ongoing       11/15/18 2005   Skin Integrity   Skin Integrity Bruising   Location Scattered across body   Preventative Dressing No   Skin Fold Management No   Multiple Skin Integrity Sites Yes   Skin Integrity Site 2   Skin Integrity Location 2 Excoriation   Location 2 Groin fold   Skin Integrity Site 3   Skin Integrity Location 3 Redness  (Powder applied)   Location 3 Under bilateral breasts   Skin Integrity Site 4   Skin Integrity Location 4 Redness  (Blanches)   Location 4 Coccyx         Problem: Discharge Planning:    Goal: Discharged to appropriate level of care    Discharged to appropriate level of care    Outcome: Ongoing    Patient wants to live at home with her , Eliseo. Would like to use [employer].        Problem: Pain:    Goal: Pain level will decrease    Pain level will decrease    Outcome: Ongoing    Patient able to use 0-10 pain scale. Denies pain at this time. Agreeable to take     PRN pain medications     Comments: Care plan reviewed with patient. Patient verbalize understanding of the plan of care and contribute to goal setting.

## 2018-11-17 NOTE — PROGRESS NOTES
MG 2.3 10/15/2018     Lab Results   Component Value Date    PHOS 3.1 04/12/2017     No results found for: BNP  Lab Results   Component Value Date    ALKPHOS 112 11/16/2018    ALT 24 11/16/2018    AST 18 11/16/2018    PROT 5.0 11/16/2018    BILITOT 0.2 11/16/2018    BILITOT NEGATIVE 08/19/2015    BILIDIR <0.2 11/14/2018    LABALBU 3.0 11/16/2018     Lab Results   Component Value Date    LACTA 0.8 11/14/2018     Lab Results   Component Value Date    AMYLASE 39 12/29/2016     Lab Results   Component Value Date    LIPASE 30.6 12/29/2016     Lab Results   Component Value Date    CHOL 143 10/15/2018    CHOL 122 04/06/2017    TRIG 262 10/15/2018    HDL 35.1 10/15/2018    LDLCALC 56 10/15/2018     Recent Labs      11/17/18   0809  11/17/18   1239  11/17/18   1803   POCGLU  113*  107  97     Recent Labs      11/15/18   0339   CKTOTAL  75     Lab Results   Component Value Date    LABA1C 4.9 01/25/2017     Lab Results   Component Value Date    INR 2.69 03/17/2017    PROTIME 9.9 08/14/2015     No results found for: PHART, PO2ART, PPH2KDN, TMZ8TYT, BEART    CXR:     ABD XR:    Normal.  See report for details. EKG:   Clinical Impression: no acute changes  Conduction: normal  Ectopy: PVCs: No  Axis: normal  ST Segment: no acute change      Physical Exam:    Vitals: /63   Pulse 59   Temp 97.9 °F (36.6 °C) (Oral)   Resp 16   Ht 5' 3\" (1.6 m)   Wt 193 lb 9.6 oz (87.8 kg)   SpO2 97%   BMI 34.29 kg/m²   24 hour intake/output:  Intake/Output Summary (Last 24 hours) at 11/17/18 1830  Last data filed at 11/17/18 1806   Gross per 24 hour   Intake             2062 ml   Output              825 ml   Net             1237 ml     Last 3 weights:   Wt Readings from Last 3 Encounters:   11/17/18 193 lb 9.6 oz (87.8 kg)   09/27/18 174 lb (78.9 kg)   03/20/18 182 lb (82.6 kg)         General appearance - alert, well appearing, and in no distress  Eyes - pupils equal and reactive, extraocular eye movements intact  Ears - bilateral

## 2018-11-18 LAB
ALBUMIN SERPL-MCNC: 2.6 G/DL (ref 3.5–5.1)
ALP BLD-CCNC: 78 U/L (ref 38–126)
ALT SERPL-CCNC: 16 U/L (ref 11–66)
ANION GAP SERPL CALCULATED.3IONS-SCNC: 10 MEQ/L (ref 8–16)
AST SERPL-CCNC: 12 U/L (ref 5–40)
BILIRUB SERPL-MCNC: < 0.2 MG/DL (ref 0.3–1.2)
BUN BLDV-MCNC: 20 MG/DL (ref 7–22)
CALCIUM SERPL-MCNC: 8.3 MG/DL (ref 8.5–10.5)
CHLORIDE BLD-SCNC: 109 MEQ/L (ref 98–111)
CO2: 27 MEQ/L (ref 23–33)
CREAT SERPL-MCNC: 1.1 MG/DL (ref 0.4–1.2)
GFR SERPL CREATININE-BSD FRML MDRD: 50 ML/MIN/1.73M2
GLUCOSE BLD-MCNC: 119 MG/DL (ref 70–108)
GLUCOSE BLD-MCNC: 126 MG/DL (ref 70–108)
GLUCOSE BLD-MCNC: 81 MG/DL (ref 70–108)
GLUCOSE BLD-MCNC: 86 MG/DL (ref 70–108)
GLUCOSE BLD-MCNC: 94 MG/DL (ref 70–108)
MYOGLOBIN URINE: < 1 MG/L (ref 0–1)
POTASSIUM SERPL-SCNC: 3.8 MEQ/L (ref 3.5–5.2)
SODIUM BLD-SCNC: 146 MEQ/L (ref 135–145)
TOTAL PROTEIN: 4.7 G/DL (ref 6.1–8)

## 2018-11-18 PROCEDURE — 82948 REAGENT STRIP/BLOOD GLUCOSE: CPT

## 2018-11-18 PROCEDURE — 94760 N-INVAS EAR/PLS OXIMETRY 1: CPT

## 2018-11-18 PROCEDURE — 94640 AIRWAY INHALATION TREATMENT: CPT

## 2018-11-18 PROCEDURE — 6370000000 HC RX 637 (ALT 250 FOR IP): Performed by: INTERNAL MEDICINE

## 2018-11-18 PROCEDURE — 99232 SBSQ HOSP IP/OBS MODERATE 35: CPT | Performed by: INTERNAL MEDICINE

## 2018-11-18 PROCEDURE — 6360000002 HC RX W HCPCS: Performed by: INTERNAL MEDICINE

## 2018-11-18 PROCEDURE — 2580000003 HC RX 258: Performed by: FAMILY MEDICINE

## 2018-11-18 PROCEDURE — 2580000003 HC RX 258: Performed by: INTERNAL MEDICINE

## 2018-11-18 PROCEDURE — 80053 COMPREHEN METABOLIC PANEL: CPT

## 2018-11-18 PROCEDURE — 6370000000 HC RX 637 (ALT 250 FOR IP): Performed by: FAMILY MEDICINE

## 2018-11-18 PROCEDURE — 1200000003 HC TELEMETRY R&B

## 2018-11-18 PROCEDURE — 36415 COLL VENOUS BLD VENIPUNCTURE: CPT

## 2018-11-18 RX ADMIN — Medication 10 ML: at 08:53

## 2018-11-18 RX ADMIN — MICONAZOLE NITRATE: 2 POWDER TOPICAL at 08:53

## 2018-11-18 RX ADMIN — IPRATROPIUM BROMIDE AND ALBUTEROL SULFATE 1 AMPULE: .5; 3 SOLUTION RESPIRATORY (INHALATION) at 21:02

## 2018-11-18 RX ADMIN — PANTOPRAZOLE SODIUM 40 MG: 40 TABLET, DELAYED RELEASE ORAL at 20:41

## 2018-11-18 RX ADMIN — IPRATROPIUM BROMIDE AND ALBUTEROL SULFATE 1 AMPULE: .5; 3 SOLUTION RESPIRATORY (INHALATION) at 11:57

## 2018-11-18 RX ADMIN — SIMVASTATIN 20 MG: 20 TABLET, FILM COATED ORAL at 21:17

## 2018-11-18 RX ADMIN — HYDROCORTISONE 10 MG: 10 TABLET ORAL at 21:17

## 2018-11-18 RX ADMIN — AZITHROMYCIN MONOHYDRATE 500 MG: 500 INJECTION, POWDER, LYOPHILIZED, FOR SOLUTION INTRAVENOUS at 08:53

## 2018-11-18 RX ADMIN — CARVEDILOL 12.5 MG: 6.25 TABLET, FILM COATED ORAL at 16:29

## 2018-11-18 RX ADMIN — MICONAZOLE NITRATE: 2 POWDER TOPICAL at 20:41

## 2018-11-18 RX ADMIN — CARVEDILOL 12.5 MG: 6.25 TABLET, FILM COATED ORAL at 08:53

## 2018-11-18 RX ADMIN — MULTIPLE VITAMINS W/ MINERALS TAB 1 TABLET: TAB at 14:52

## 2018-11-18 RX ADMIN — FERROUS SULFATE TAB 325 MG (65 MG ELEMENTAL FE) 325 MG: 325 (65 FE) TAB at 16:29

## 2018-11-18 RX ADMIN — LEVOTHYROXINE SODIUM 137 MCG: 137 TABLET ORAL at 06:34

## 2018-11-18 RX ADMIN — HYDROCORTISONE 20 MG: 10 TABLET ORAL at 08:53

## 2018-11-18 RX ADMIN — IPRATROPIUM BROMIDE AND ALBUTEROL SULFATE 1 AMPULE: .5; 3 SOLUTION RESPIRATORY (INHALATION) at 08:25

## 2018-11-18 RX ADMIN — Medication 1 CAPSULE: at 08:53

## 2018-11-18 RX ADMIN — CEFTRIAXONE SODIUM 1 G: 1 INJECTION, POWDER, FOR SOLUTION INTRAMUSCULAR; INTRAVENOUS at 15:06

## 2018-11-18 RX ADMIN — AMLODIPINE BESYLATE 2.5 MG: 2.5 TABLET ORAL at 08:53

## 2018-11-18 RX ADMIN — Medication 10 ML: at 20:41

## 2018-11-18 RX ADMIN — RIVAROXABAN 15 MG: 15 TABLET, FILM COATED ORAL at 16:29

## 2018-11-18 RX ADMIN — Medication 1 CAPSULE: at 16:29

## 2018-11-18 RX ADMIN — FERROUS SULFATE TAB 325 MG (65 MG ELEMENTAL FE) 325 MG: 325 (65 FE) TAB at 08:53

## 2018-11-18 ASSESSMENT — PAIN SCALES - GENERAL: PAINLEVEL_OUTOF10: 0

## 2018-11-19 ENCOUNTER — TELEPHONE (OUTPATIENT)
Dept: FAMILY MEDICINE CLINIC | Age: 68
End: 2018-11-19

## 2018-11-19 VITALS
RESPIRATION RATE: 16 BRPM | DIASTOLIC BLOOD PRESSURE: 60 MMHG | TEMPERATURE: 97.8 F | WEIGHT: 200.3 LBS | BODY MASS INDEX: 35.49 KG/M2 | OXYGEN SATURATION: 96 % | HEIGHT: 63 IN | HEART RATE: 61 BPM | SYSTOLIC BLOOD PRESSURE: 115 MMHG

## 2018-11-19 LAB
ALBUMIN SERPL-MCNC: 2.7 G/DL (ref 3.5–5.1)
ALP BLD-CCNC: 79 U/L (ref 38–126)
ALT SERPL-CCNC: 18 U/L (ref 11–66)
ANION GAP SERPL CALCULATED.3IONS-SCNC: 13 MEQ/L (ref 8–16)
AST SERPL-CCNC: 14 U/L (ref 5–40)
BILIRUB SERPL-MCNC: < 0.2 MG/DL (ref 0.3–1.2)
BUN BLDV-MCNC: 18 MG/DL (ref 7–22)
CALCIUM SERPL-MCNC: 8.7 MG/DL (ref 8.5–10.5)
CHLORIDE BLD-SCNC: 108 MEQ/L (ref 98–111)
CO2: 26 MEQ/L (ref 23–33)
CREAT SERPL-MCNC: 1 MG/DL (ref 0.4–1.2)
GFR SERPL CREATININE-BSD FRML MDRD: 55 ML/MIN/1.73M2
GLUCOSE BLD-MCNC: 86 MG/DL (ref 70–108)
POTASSIUM SERPL-SCNC: 3.7 MEQ/L (ref 3.5–5.2)
SODIUM BLD-SCNC: 147 MEQ/L (ref 135–145)
TOTAL PROTEIN: 5.2 G/DL (ref 6.1–8)

## 2018-11-19 PROCEDURE — 6370000000 HC RX 637 (ALT 250 FOR IP): Performed by: INTERNAL MEDICINE

## 2018-11-19 PROCEDURE — 99239 HOSP IP/OBS DSCHRG MGMT >30: CPT | Performed by: INTERNAL MEDICINE

## 2018-11-19 PROCEDURE — 2580000003 HC RX 258: Performed by: FAMILY MEDICINE

## 2018-11-19 PROCEDURE — 2709999900 HC NON-CHARGEABLE SUPPLY

## 2018-11-19 PROCEDURE — 97530 THERAPEUTIC ACTIVITIES: CPT

## 2018-11-19 PROCEDURE — 92523 SPEECH SOUND LANG COMPREHEN: CPT

## 2018-11-19 PROCEDURE — 36592 COLLECT BLOOD FROM PICC: CPT

## 2018-11-19 PROCEDURE — 97110 THERAPEUTIC EXERCISES: CPT

## 2018-11-19 PROCEDURE — 2580000003 HC RX 258: Performed by: INTERNAL MEDICINE

## 2018-11-19 PROCEDURE — 94640 AIRWAY INHALATION TREATMENT: CPT

## 2018-11-19 PROCEDURE — 80053 COMPREHEN METABOLIC PANEL: CPT

## 2018-11-19 PROCEDURE — 6360000002 HC RX W HCPCS: Performed by: INTERNAL MEDICINE

## 2018-11-19 PROCEDURE — 99232 SBSQ HOSP IP/OBS MODERATE 35: CPT | Performed by: INTERNAL MEDICINE

## 2018-11-19 PROCEDURE — 36415 COLL VENOUS BLD VENIPUNCTURE: CPT

## 2018-11-19 RX ORDER — LEVOFLOXACIN 500 MG/1
500 TABLET, FILM COATED ORAL DAILY
Qty: 5 TABLET | Refills: 0 | Status: SHIPPED | OUTPATIENT
Start: 2018-11-19 | End: 2018-11-24

## 2018-11-19 RX ORDER — DEXTROSE MONOHYDRATE 50 MG/ML
INJECTION, SOLUTION INTRAVENOUS CONTINUOUS
Status: DISCONTINUED | OUTPATIENT
Start: 2018-11-19 | End: 2018-11-19 | Stop reason: HOSPADM

## 2018-11-19 RX ADMIN — FERROUS SULFATE TAB 325 MG (65 MG ELEMENTAL FE) 325 MG: 325 (65 FE) TAB at 09:56

## 2018-11-19 RX ADMIN — AZITHROMYCIN MONOHYDRATE 500 MG: 500 INJECTION, POWDER, LYOPHILIZED, FOR SOLUTION INTRAVENOUS at 09:57

## 2018-11-19 RX ADMIN — MICONAZOLE NITRATE: 2 POWDER TOPICAL at 09:57

## 2018-11-19 RX ADMIN — DEXTROSE MONOHYDRATE: 50 INJECTION, SOLUTION INTRAVENOUS at 11:29

## 2018-11-19 RX ADMIN — HYDROCORTISONE 20 MG: 10 TABLET ORAL at 09:56

## 2018-11-19 RX ADMIN — CARVEDILOL 12.5 MG: 6.25 TABLET, FILM COATED ORAL at 09:56

## 2018-11-19 RX ADMIN — Medication 10 ML: at 09:57

## 2018-11-19 RX ADMIN — IPRATROPIUM BROMIDE AND ALBUTEROL SULFATE 1 AMPULE: .5; 3 SOLUTION RESPIRATORY (INHALATION) at 12:50

## 2018-11-19 RX ADMIN — Medication 1 CAPSULE: at 09:56

## 2018-11-19 RX ADMIN — LEVOTHYROXINE SODIUM 137 MCG: 137 TABLET ORAL at 05:32

## 2018-11-19 RX ADMIN — AMLODIPINE BESYLATE 2.5 MG: 2.5 TABLET ORAL at 09:56

## 2018-11-19 RX ADMIN — IPRATROPIUM BROMIDE AND ALBUTEROL SULFATE 1 AMPULE: .5; 3 SOLUTION RESPIRATORY (INHALATION) at 08:56

## 2018-11-19 NOTE — PROGRESS NOTES
[place] [address]  INPATIENT PHYSICAL THERAPY  DAILYNOTE  [facility] NEUROSCIENCES 4A - 4A-11/011-A    Time In: 1200  Time Out: 0178  Timed Code Treatment Minutes: 27 Minutes  Minutes: 27          Date: 2018  Patient Name: Deana Azul,  Gender:  female        MRN: [id number]  : 1950  (80 y.o.)     Referring Practitioner: Terrell Loya MD  Diagnosis: sepsis due to pneumonia  Additional Pertinent Hx: per ED, 80 y.o. female who presented to [place] [address] with abdominal pain per       Past Medical History:   Diagnosis Date    Asthma     uses albuterol 1-2x per year    Atrial thrombosis     on [address]    Bursitis     right shoulder -s/p steroid injections    Cancer ([address]     Chronic diastolic heart failure ([address]     CVA (cerebral infarction) 6/14/15    Mult. 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 Dylan Waldron    Hypothyroidism     Meningioma [facility])     3 separate meningiomas, s/p gamma knife (2012) , [facility]    Obesity (BMI 30-39. 9)     Osteoarthritis     Panhypopituitarism ([address]     status post resection for macroadenoma in the s    Stroke [facility])     due to radiation -bleeding CVA - residual left upper and lower extremity weakness    Stroke [facility]) 2015    Urinary incontinence      Past Surgical History:   Procedure Laterality Date    APPENDECTOMY      BACK SURGERY      BRAIN SURGERY       due to pituitary mass, drained and s/p radiation    CHOLECYSTECTOMY      HYSTERECTOMY      total done for DUB    OTHER SURGICAL HISTORY  2014    LAPAROSCOPIC RUPTURED APPENDECTOMY    OTHER

## 2018-11-19 NOTE — PLAN OF CARE
Problem: Impaired respiratory status  Goal: Clear lung sounds  Outcome: Ongoing  Continue aerosols as ordered to improve breath sounds

## 2018-11-19 NOTE — PROGRESS NOTES
following significant coughing episode). Pt has goals to return to home environment with . At this time, due to cognitive-linguistic deficits, pt will require extensive care in the home setting. Further skilled ST services are required to address the above deficits so pt can make the safest return to the home setting upon discharge. ST services may be recommend upon discharge. EDUCATION:   Learner: [x]Patient [] Significant other [] Son/Daughter [] Parent     [] Other:   Education: [x] Reviewed results and recommendations of this evaluation     [x] Reviewed diet and strategies     [] Reviewed signs, symptoms and risk of aspiration     [] Demonstrated how to thick liquid appropriately. [] Reviewed goals and Plan of Care     [] OTHER:   Method: [x] Discussion [] Demonstration [] Hand-out     [] OTHER:   Evaluation of Education:     [x] Verbalizes understanding [] Demonstrates with assistance     [] Demonstrates without assistance [x]Needs further instruction     [] No evidence of learning  [x] Family not present    PLAN / TREATMENT RECOMMENDATIONS:  [x] Skilled SLP intervention on acute care 3-5 x per week or until goals met and/or pt plateaus in function. Specific interventions for next session may include: dysphagia and cognitive tasks    SHORT TERM GOALS:  Short-term Goals  Timeframe for Short-term Goals: 2 weeks  Goal 1: Patient will tolerate dysphagia II diet with thin liquids, no straws without s/s of apsiration in order to safely maintain adequate hydration and nutrition  Goal 2: Patient will complete advanced PO trials demonstrating with functional mastication and approrpaite endurance without s/s of aspiraiton in order to safley advance patient diet. Goal 3: Complete close pulmonary monitoning, if delcine presents recommend instrumental evaluation to furthur evaluate dysphagia.    Goal 4: Pt will complete reasoning, problem solving, and thought organization tasks with 70% accuracy, given mod cues, to improve safe return to home setting  Goal 5: Pt will complete immediate and delayed recall tasks with 70% accuracy, given mod cues, to permit retention of newly learned medical information  Goal 6: Pt will complete naming tasks with 70% accuracy, given mod cues to improve mental flexibility     LONG TERM GOALS:  No LTG due to short ELOS.     Saida Woods, Speech Intern   Elizabeth Couch M.A., [address] [place] [address]

## 2018-11-19 NOTE — PROGRESS NOTES
[facility]    [facility]  [address]  [place], [address]  [address] [place] [facility] Progress Note      2018 3:06 PM         Pt Name:    Anton Fine  MRN:     [id number]   [id number]  YOB: 1950  Admit Date:    2018  8:29 PM  Primary Care Physician:  Zulema Leblanc MD   Room Number:  4A-11/011-A   Reason for Consult:  Acute decrease in eGFR  Requesting Providor: Dr. Marija Alonso  Primary Nephrologist: none    ### ISOLATION ###:   yes     Admit Chief Complaint: abdominal pain     History of Chief Complaint:   The patient is a 80y.o. year old white female who has never seen a nephrologist before. She developed abdominal pain 18. She presented to ER where interview was difficult. CT scan of the abdomen without IV contrast provided no answer as to the pain. Her appetite has been poor. She denied N/V/C/D/SOB/CP. She was taking lisinopril, Bumex, metolazone prior to admission. She has some trouble at bladder emptying. She is . She has never had kidney stones nor hematuria. Data suggests that she has had CKD for months. She has HTN but does not know for how long. Nephrology is following the patient for: acute kidney injury from dehydration. 24 hour summary:   The patient was relaxing in bed. She seems mores alert and stated that she feels improved. Her appetite and bowel habits and eGFR are all improving. She has metabolic acidosis from acute kidney injury that has resolved.       Baseline eGFR 30-35 ml/min   Admit eGFR 19 ml/min   Current eGFR 55 ml/min       eGFR trend    Lab Results   Component Value Date    LABGLOM 55 2018    LABGLOM 50 2018    LABGLOM 37 2018    LABGLOM 28 2018    LABGLOM 19 11/15/2018    LABGLOM 20 11/15/2018    LABGLOM 20 11/15/2018    LABGLOM 19 2018    LABGLOM 84 2017    LABGLOM 72 2017    LABGLOM 84 2017    LABGLOM 84 2017          Compliance with treatment plan: Associates.

## 2018-11-20 ENCOUNTER — TELEPHONE (OUTPATIENT)
Dept: UROLOGY | Age: 68
End: 2018-11-20

## 2018-11-20 LAB
BLOOD CULTURE, ROUTINE: NORMAL
BLOOD CULTURE, ROUTINE: NORMAL

## 2018-11-21 ENCOUNTER — TELEPHONE (OUTPATIENT)
Dept: CARDIOLOGY CLINIC | Age: 68
End: 2018-11-21

## 2018-11-21 ENCOUNTER — TELEPHONE (OUTPATIENT)
Dept: FAMILY MEDICINE CLINIC | Age: 68
End: 2018-11-21

## 2018-11-21 ENCOUNTER — TELEPHONE (OUTPATIENT)
Dept: UROLOGY | Age: 68
End: 2018-11-21

## 2018-12-03 ENCOUNTER — OFFICE VISIT (OUTPATIENT)
Dept: FAMILY MEDICINE CLINIC | Age: 68
End: 2018-12-03
Payer: MEDICARE

## 2018-12-03 VITALS
SYSTOLIC BLOOD PRESSURE: 118 MMHG | DIASTOLIC BLOOD PRESSURE: 72 MMHG | HEART RATE: 64 BPM | TEMPERATURE: 97.8 F | RESPIRATION RATE: 16 BRPM

## 2018-12-03 DIAGNOSIS — M15.9 PRIMARY OSTEOARTHRITIS INVOLVING MULTIPLE JOINTS: ICD-10-CM

## 2018-12-03 DIAGNOSIS — E61.1 IRON DEFICIENCY: ICD-10-CM

## 2018-12-03 DIAGNOSIS — E78.00 PURE HYPERCHOLESTEROLEMIA: ICD-10-CM

## 2018-12-03 DIAGNOSIS — J45.909 MODERATE ASTHMA WITHOUT COMPLICATION, UNSPECIFIED WHETHER PERSISTENT: ICD-10-CM

## 2018-12-03 DIAGNOSIS — N18.30 CKD (CHRONIC KIDNEY DISEASE) STAGE 3, GFR 30-59 ML/MIN (HCC): ICD-10-CM

## 2018-12-03 DIAGNOSIS — E03.8 OTHER SPECIFIED HYPOTHYROIDISM: ICD-10-CM

## 2018-12-03 DIAGNOSIS — Z12.39 BREAST CANCER SCREENING: ICD-10-CM

## 2018-12-03 DIAGNOSIS — R65.10 SIRS (SYSTEMIC INFLAMMATORY RESPONSE SYNDROME) (HCC): Primary | ICD-10-CM

## 2018-12-03 DIAGNOSIS — E66.9 OBESITY (BMI 30.0-34.9): ICD-10-CM

## 2018-12-03 DIAGNOSIS — K21.9 GASTROESOPHAGEAL REFLUX DISEASE WITHOUT ESOPHAGITIS: ICD-10-CM

## 2018-12-03 DIAGNOSIS — D32.9 MENINGIOMA (HCC): ICD-10-CM

## 2018-12-03 DIAGNOSIS — Z23 NEEDS FLU SHOT: ICD-10-CM

## 2018-12-03 DIAGNOSIS — I63.00 CEREBRAL INFARCTION DUE TO THROMBOSIS OF PRECEREBRAL ARTERY (HCC): ICD-10-CM

## 2018-12-03 DIAGNOSIS — E66.01 CLASS 2 SEVERE OBESITY WITH SERIOUS COMORBIDITY AND BODY MASS INDEX (BMI) OF 35.0 TO 35.9 IN ADULT, UNSPECIFIED OBESITY TYPE (HCC): ICD-10-CM

## 2018-12-03 PROCEDURE — 90662 IIV NO PRSV INCREASED AG IM: CPT | Performed by: FAMILY MEDICINE

## 2018-12-03 PROCEDURE — 99215 OFFICE O/P EST HI 40 MIN: CPT | Performed by: FAMILY MEDICINE

## 2018-12-03 PROCEDURE — G0008 ADMIN INFLUENZA VIRUS VAC: HCPCS | Performed by: FAMILY MEDICINE

## 2018-12-03 ASSESSMENT — PATIENT HEALTH QUESTIONNAIRE - PHQ9
2. FEELING DOWN, DEPRESSED OR HOPELESS: 0
1. LITTLE INTEREST OR PLEASURE IN DOING THINGS: 0
SUM OF ALL RESPONSES TO PHQ9 QUESTIONS 1 & 2: 0
SUM OF ALL RESPONSES TO PHQ QUESTIONS 1-9: 0
SUM OF ALL RESPONSES TO PHQ QUESTIONS 1-9: 0

## 2018-12-11 ENCOUNTER — TELEPHONE (OUTPATIENT)
Dept: FAMILY MEDICINE CLINIC | Age: 68
End: 2018-12-11

## 2018-12-11 RX ORDER — POTASSIUM CHLORIDE 1500 MG/1
TABLET, FILM COATED, EXTENDED RELEASE ORAL
Qty: 72 TABLET | Refills: 0 | Status: ON HOLD | OUTPATIENT
Start: 2018-12-11 | End: 2018-12-31

## 2018-12-17 ENCOUNTER — OFFICE VISIT (OUTPATIENT)
Dept: FAMILY MEDICINE CLINIC | Age: 68
End: 2018-12-17
Payer: MEDICARE

## 2018-12-17 VITALS — SYSTOLIC BLOOD PRESSURE: 124 MMHG | DIASTOLIC BLOOD PRESSURE: 74 MMHG | TEMPERATURE: 98.2 F | HEART RATE: 62 BPM

## 2018-12-17 DIAGNOSIS — S71.001A OPEN WOUND OF RIGHT HIP AND THIGH, INITIAL ENCOUNTER: Primary | ICD-10-CM

## 2018-12-17 DIAGNOSIS — S71.101A OPEN WOUND OF RIGHT HIP AND THIGH, INITIAL ENCOUNTER: Primary | ICD-10-CM

## 2018-12-17 PROCEDURE — 99213 OFFICE O/P EST LOW 20 MIN: CPT | Performed by: FAMILY MEDICINE

## 2018-12-17 RX ORDER — SULFAMETHOXAZOLE AND TRIMETHOPRIM 800; 160 MG/1; MG/1
1 TABLET ORAL 2 TIMES DAILY
Qty: 20 TABLET | Refills: 0 | Status: SHIPPED | OUTPATIENT
Start: 2018-12-17 | End: 2018-12-27

## 2018-12-17 NOTE — PROGRESS NOTES
and bowel sounds are normal. She exhibits no distension and no mass. There is no hepatosplenomegaly. No tenderness. She has no rigidity, no rebound and no guarding. No hernia. Musculoskeletal:        Right lower leg: She exhibits no edema. Left lower leg: She exhibits no edema. Neurological: She is alert. Skin:  Right hip has a 2 cm diameter ulceration that is oozing mild amount of greenish yellow thick drainage. Pain only with pain directly to the ulceration. Assessment:      Osman Nielsen was seen today for other. Diagnoses and all orders for this visit:    Open wound of right hip and thigh, initial encounter  -     Cancel: Aerobic Culture  -     XR HIP RIGHT (2-3 VIEWS); Future  -     Cancel: Aerobic Culture  -     sulfamethoxazole-trimethoprim (BACTRIM DS;SEPTRA DS) 800-160 MG per tablet; Take 1 tablet by mouth 2 times daily for 10 days  -     Aerobic Culture      Keep clean   Sleep on another side and alternate the side every 3 hours. Call or return to clinic prn if these symptoms worsen or fail to improve as anticipated.     Zulema Leblanc MD

## 2018-12-19 LAB
AEROBIC CULTURE: ABNORMAL
AEROBIC CULTURE: ABNORMAL
GRAM STAIN RESULT: ABNORMAL
ORGANISM: ABNORMAL

## 2018-12-21 ENCOUNTER — TELEPHONE (OUTPATIENT)
Dept: FAMILY MEDICINE CLINIC | Age: 68
End: 2018-12-21

## 2018-12-21 DIAGNOSIS — F51.02 ADJUSTMENT INSOMNIA: Primary | ICD-10-CM

## 2018-12-21 RX ORDER — ZOLPIDEM TARTRATE 5 MG/1
5-10 TABLET ORAL NIGHTLY PRN
Qty: 20 TABLET | Refills: 0 | Status: ON HOLD | OUTPATIENT
Start: 2018-12-21 | End: 2019-01-04 | Stop reason: HOSPADM

## 2018-12-30 ENCOUNTER — HOSPITAL ENCOUNTER (INPATIENT)
Age: 68
LOS: 5 days | Discharge: NURSING, REHAB OR LONG-TERM CARE | DRG: 683 | End: 2019-01-04
Attending: EMERGENCY MEDICINE | Admitting: INTERNAL MEDICINE
Payer: MEDICARE

## 2018-12-30 ENCOUNTER — APPOINTMENT (OUTPATIENT)
Dept: GENERAL RADIOLOGY | Age: 68
DRG: 683 | End: 2018-12-30
Payer: MEDICARE

## 2018-12-30 DIAGNOSIS — E86.0 DEHYDRATION: ICD-10-CM

## 2018-12-30 DIAGNOSIS — N18.3 ACUTE RENAL FAILURE SUPERIMPOSED ON STAGE 3 CHRONIC KIDNEY DISEASE, UNSPECIFIED ACUTE RENAL FAILURE TYPE: ICD-10-CM

## 2018-12-30 DIAGNOSIS — N17.9 ACUTE RENAL FAILURE SUPERIMPOSED ON STAGE 3 CHRONIC KIDNEY DISEASE, UNSPECIFIED ACUTE RENAL FAILURE TYPE: ICD-10-CM

## 2018-12-30 DIAGNOSIS — E87.5 HYPERKALEMIA: Primary | ICD-10-CM

## 2018-12-30 PROBLEM — N18.30 ACUTE RENAL FAILURE SUPERIMPOSED ON STAGE 3 CHRONIC KIDNEY DISEASE (HCC): Status: ACTIVE | Noted: 2018-12-30

## 2018-12-30 LAB
ALBUMIN SERPL-MCNC: 3.5 G/DL (ref 3.5–5.1)
ALP BLD-CCNC: 117 U/L (ref 38–126)
ALT SERPL-CCNC: 12 U/L (ref 11–66)
AMMONIA: 29 UMOL/L (ref 11–60)
ANION GAP SERPL CALCULATED.3IONS-SCNC: 12 MEQ/L (ref 8–16)
AST SERPL-CCNC: 13 U/L (ref 5–40)
BASOPHILS # BLD: 0.2 %
BASOPHILS ABSOLUTE: 0 THOU/MM3 (ref 0–0.1)
BILIRUB SERPL-MCNC: 0.2 MG/DL (ref 0.3–1.2)
BILIRUBIN DIRECT: < 0.2 MG/DL (ref 0–0.3)
BILIRUBIN URINE: ABNORMAL
BLOOD, URINE: NEGATIVE
BUN BLDV-MCNC: 42 MG/DL (ref 7–22)
CALCIUM SERPL-MCNC: 9.9 MG/DL (ref 8.5–10.5)
CHARACTER, URINE: CLEAR
CHLORIDE BLD-SCNC: 112 MEQ/L (ref 98–111)
CHLORIDE, URINE: 80 MEQ/L
CO2: 15 MEQ/L (ref 23–33)
COLOR: YELLOW
CREAT SERPL-MCNC: 3.5 MG/DL (ref 0.4–1.2)
CREATININE URINE: 133.9 MG/DL
EKG ATRIAL RATE: 62 BPM
EKG P AXIS: 55 DEGREES
EKG P-R INTERVAL: 138 MS
EKG Q-T INTERVAL: 364 MS
EKG QRS DURATION: 112 MS
EKG QTC CALCULATION (BAZETT): 369 MS
EKG R AXIS: -51 DEGREES
EKG T AXIS: 19 DEGREES
EKG VENTRICULAR RATE: 62 BPM
EOSINOPHIL # BLD: 0.9 %
EOSINOPHIL SMEAR: NORMAL
EOSINOPHILS ABSOLUTE: 0.1 THOU/MM3 (ref 0–0.4)
ERYTHROCYTE [DISTWIDTH] IN BLOOD BY AUTOMATED COUNT: 15.7 % (ref 11.5–14.5)
ERYTHROCYTE [DISTWIDTH] IN BLOOD BY AUTOMATED COUNT: 55.8 FL (ref 35–45)
FERRITIN: 153 NG/ML (ref 10–291)
GFR SERPL CREATININE-BSD FRML MDRD: 13 ML/MIN/1.73M2
GLUCOSE BLD-MCNC: 106 MG/DL (ref 70–108)
GLUCOSE URINE: NEGATIVE MG/DL
HBV SURFACE AB TITR SER: NEGATIVE {TITER}
HCT VFR BLD CALC: 37.4 % (ref 37–47)
HEMOGLOBIN: 11.1 GM/DL (ref 12–16)
HEPATITIS B CORE IGM ANTIBODY: NEGATIVE
HEPATITIS B SURFACE ANTIGEN: NEGATIVE
ICTOTEST: NEGATIVE
IMMATURE GRANS (ABS): 0.06 THOU/MM3 (ref 0–0.07)
IMMATURE GRANULOCYTES: 0.5 %
IRON: 25 UG/DL (ref 50–170)
KETONES, URINE: NEGATIVE
LEUKOCYTE ESTERASE, URINE: NEGATIVE
LIPASE: 43.3 U/L (ref 5.6–51.3)
LYMPHOCYTES # BLD: 27.1 %
LYMPHOCYTES ABSOLUTE: 3.5 THOU/MM3 (ref 1–4.8)
MAGNESIUM: 2.2 MG/DL (ref 1.6–2.4)
MCH RBC QN AUTO: 29 PG (ref 26–33)
MCHC RBC AUTO-ENTMCNC: 29.7 GM/DL (ref 32.2–35.5)
MCV RBC AUTO: 97.7 FL (ref 81–99)
MONOCYTES # BLD: 7.1 %
MONOCYTES ABSOLUTE: 0.9 THOU/MM3 (ref 0.4–1.3)
MRSA SCREEN RT-PCR: NEGATIVE
NITRITE, URINE: NEGATIVE
NUCLEATED RED BLOOD CELLS: 0 /100 WBC
OSMOLALITY CALCULATION: 288.4 MOSMOL/KG (ref 275–300)
OSMOLALITY URINE: 518 MOSMOL/KG (ref 250–750)
OSMOLALITY: 312 MOSMOL/KG (ref 275–295)
PH UA: 5
PHOSPHORUS: 2.3 MG/DL (ref 2.4–4.7)
PLATELET # BLD: 511 THOU/MM3 (ref 130–400)
PMV BLD AUTO: 9.1 FL (ref 9.4–12.4)
POTASSIUM SERPL-SCNC: 8.4 MEQ/L (ref 3.5–5.2)
POTASSIUM, URINE: 113.8 MEQ/L
PRO-BNP: 837.7 PG/ML (ref 0–900)
PROTEIN UA: NEGATIVE
RBC # BLD: 3.83 MILL/MM3 (ref 4.2–5.4)
SEG NEUTROPHILS: 64.2 %
SEGMENTED NEUTROPHILS ABSOLUTE COUNT: 8.2 THOU/MM3 (ref 1.8–7.7)
SODIUM BLD-SCNC: 139 MEQ/L (ref 135–145)
SODIUM URINE: 31 MEQ/L
SPECIFIC GRAVITY, URINE: 1.02 (ref 1–1.03)
SPECIMEN: NORMAL
T4 FREE: 1.31 NG/DL (ref 0.93–1.76)
TOTAL IRON BINDING CAPACITY: 173 UG/DL (ref 171–450)
TOTAL PROTEIN: 5.9 G/DL (ref 6.1–8)
TROPONIN T: 0.03 NG/ML
TSH SERPL DL<=0.05 MIU/L-ACNC: 0.08 UIU/ML (ref 0.4–4.2)
URIC ACID: 6.6 MG/DL (ref 2.4–5.7)
UROBILINOGEN, URINE: 0.2 EU/DL
VANCOMYCIN RESISTANT ENTEROCOCCUS: NEGATIVE
WBC # BLD: 12.8 THOU/MM3 (ref 4.8–10.8)

## 2018-12-30 PROCEDURE — 81003 URINALYSIS AUTO W/O SCOPE: CPT

## 2018-12-30 PROCEDURE — 90935 HEMODIALYSIS ONE EVALUATION: CPT

## 2018-12-30 PROCEDURE — 86705 HEP B CORE ANTIBODY IGM: CPT

## 2018-12-30 PROCEDURE — 86060 ANTISTREPTOLYSIN O TITER: CPT

## 2018-12-30 PROCEDURE — 82436 ASSAY OF URINE CHLORIDE: CPT

## 2018-12-30 PROCEDURE — 89190 NASAL SMEAR FOR EOSINOPHILS: CPT

## 2018-12-30 PROCEDURE — 83930 ASSAY OF BLOOD OSMOLALITY: CPT

## 2018-12-30 PROCEDURE — 84550 ASSAY OF BLOOD/URIC ACID: CPT

## 2018-12-30 PROCEDURE — 6360000002 HC RX W HCPCS: Performed by: EMERGENCY MEDICINE

## 2018-12-30 PROCEDURE — 84100 ASSAY OF PHOSPHORUS: CPT

## 2018-12-30 PROCEDURE — 83735 ASSAY OF MAGNESIUM: CPT

## 2018-12-30 PROCEDURE — 84133 ASSAY OF URINE POTASSIUM: CPT

## 2018-12-30 PROCEDURE — 36556 INSERT NON-TUNNEL CV CATH: CPT

## 2018-12-30 PROCEDURE — 82140 ASSAY OF AMMONIA: CPT

## 2018-12-30 PROCEDURE — 93010 ELECTROCARDIOGRAM REPORT: CPT | Performed by: INTERNAL MEDICINE

## 2018-12-30 PROCEDURE — 83540 ASSAY OF IRON: CPT

## 2018-12-30 PROCEDURE — 83880 ASSAY OF NATRIURETIC PEPTIDE: CPT

## 2018-12-30 PROCEDURE — 86706 HEP B SURFACE ANTIBODY: CPT

## 2018-12-30 PROCEDURE — 80053 COMPREHEN METABOLIC PANEL: CPT

## 2018-12-30 PROCEDURE — 82570 ASSAY OF URINE CREATININE: CPT

## 2018-12-30 PROCEDURE — 96375 TX/PRO/DX INJ NEW DRUG ADDON: CPT

## 2018-12-30 PROCEDURE — 2060000000 HC ICU INTERMEDIATE R&B

## 2018-12-30 PROCEDURE — 5A1D70Z PERFORMANCE OF URINARY FILTRATION, INTERMITTENT, LESS THAN 6 HOURS PER DAY: ICD-10-PCS | Performed by: INTERNAL MEDICINE

## 2018-12-30 PROCEDURE — 2709999900 HC NON-CHARGEABLE SUPPLY

## 2018-12-30 PROCEDURE — 96376 TX/PRO/DX INJ SAME DRUG ADON: CPT

## 2018-12-30 PROCEDURE — 71046 X-RAY EXAM CHEST 2 VIEWS: CPT

## 2018-12-30 PROCEDURE — 87340 HEPATITIS B SURFACE AG IA: CPT

## 2018-12-30 PROCEDURE — 99223 1ST HOSP IP/OBS HIGH 75: CPT | Performed by: INTERNAL MEDICINE

## 2018-12-30 PROCEDURE — 93005 ELECTROCARDIOGRAM TRACING: CPT | Performed by: EMERGENCY MEDICINE

## 2018-12-30 PROCEDURE — 99285 EMERGENCY DEPT VISIT HI MDM: CPT

## 2018-12-30 PROCEDURE — 2580000003 HC RX 258: Performed by: INTERNAL MEDICINE

## 2018-12-30 PROCEDURE — 84443 ASSAY THYROID STIM HORMONE: CPT

## 2018-12-30 PROCEDURE — 87081 CULTURE SCREEN ONLY: CPT

## 2018-12-30 PROCEDURE — 2580000003 HC RX 258: Performed by: EMERGENCY MEDICINE

## 2018-12-30 PROCEDURE — 2500000003 HC RX 250 WO HCPCS: Performed by: EMERGENCY MEDICINE

## 2018-12-30 PROCEDURE — 82728 ASSAY OF FERRITIN: CPT

## 2018-12-30 PROCEDURE — 87641 MR-STAPH DNA AMP PROBE: CPT

## 2018-12-30 PROCEDURE — 2500000003 HC RX 250 WO HCPCS

## 2018-12-30 PROCEDURE — 87500 VANOMYCIN DNA AMP PROBE: CPT

## 2018-12-30 PROCEDURE — 06HY33Z INSERTION OF INFUSION DEVICE INTO LOWER VEIN, PERCUTANEOUS APPROACH: ICD-10-PCS | Performed by: EMERGENCY MEDICINE

## 2018-12-30 PROCEDURE — 74018 RADEX ABDOMEN 1 VIEW: CPT

## 2018-12-30 PROCEDURE — 83550 IRON BINDING TEST: CPT

## 2018-12-30 PROCEDURE — 2500000003 HC RX 250 WO HCPCS: Performed by: INTERNAL MEDICINE

## 2018-12-30 PROCEDURE — 84439 ASSAY OF FREE THYROXINE: CPT

## 2018-12-30 PROCEDURE — 96365 THER/PROPH/DIAG IV INF INIT: CPT

## 2018-12-30 PROCEDURE — 6370000000 HC RX 637 (ALT 250 FOR IP): Performed by: EMERGENCY MEDICINE

## 2018-12-30 PROCEDURE — 83935 ASSAY OF URINE OSMOLALITY: CPT

## 2018-12-30 PROCEDURE — 85025 COMPLETE CBC W/AUTO DIFF WBC: CPT

## 2018-12-30 PROCEDURE — 84300 ASSAY OF URINE SODIUM: CPT

## 2018-12-30 PROCEDURE — 36415 COLL VENOUS BLD VENIPUNCTURE: CPT

## 2018-12-30 PROCEDURE — 84484 ASSAY OF TROPONIN QUANT: CPT

## 2018-12-30 PROCEDURE — 83690 ASSAY OF LIPASE: CPT

## 2018-12-30 PROCEDURE — 82248 BILIRUBIN DIRECT: CPT

## 2018-12-30 PROCEDURE — 6370000000 HC RX 637 (ALT 250 FOR IP): Performed by: INTERNAL MEDICINE

## 2018-12-30 RX ORDER — AMLODIPINE BESYLATE 2.5 MG/1
2.5 TABLET ORAL DAILY
Status: DISCONTINUED | OUTPATIENT
Start: 2018-12-31 | End: 2019-01-04 | Stop reason: HOSPADM

## 2018-12-30 RX ORDER — SODIUM CHLORIDE 0.9 % (FLUSH) 0.9 %
10 SYRINGE (ML) INJECTION EVERY 12 HOURS SCHEDULED
Status: DISCONTINUED | OUTPATIENT
Start: 2018-12-30 | End: 2019-01-04 | Stop reason: HOSPADM

## 2018-12-30 RX ORDER — DOCUSATE SODIUM 100 MG/1
100 CAPSULE, LIQUID FILLED ORAL 2 TIMES DAILY
Status: DISCONTINUED | OUTPATIENT
Start: 2018-12-30 | End: 2019-01-04 | Stop reason: HOSPADM

## 2018-12-30 RX ORDER — CALCIUM CHLORIDE 100 MG/ML
1 INJECTION INTRAVENOUS; INTRAVENTRICULAR ONCE
Status: COMPLETED | OUTPATIENT
Start: 2018-12-30 | End: 2018-12-30

## 2018-12-30 RX ORDER — ONDANSETRON 2 MG/ML
4 INJECTION INTRAMUSCULAR; INTRAVENOUS EVERY 6 HOURS PRN
Status: DISCONTINUED | OUTPATIENT
Start: 2018-12-30 | End: 2019-01-04 | Stop reason: HOSPADM

## 2018-12-30 RX ORDER — PANTOPRAZOLE SODIUM 40 MG/1
40 TABLET, DELAYED RELEASE ORAL NIGHTLY
Status: DISCONTINUED | OUTPATIENT
Start: 2018-12-30 | End: 2019-01-02

## 2018-12-30 RX ORDER — HYDROCORTISONE 10 MG/1
10 TABLET ORAL NIGHTLY
Status: DISCONTINUED | OUTPATIENT
Start: 2018-12-30 | End: 2019-01-04 | Stop reason: HOSPADM

## 2018-12-30 RX ORDER — SODIUM CHLORIDE 0.9 % (FLUSH) 0.9 %
10 SYRINGE (ML) INJECTION PRN
Status: DISCONTINUED | OUTPATIENT
Start: 2018-12-30 | End: 2019-01-04 | Stop reason: HOSPADM

## 2018-12-30 RX ORDER — LEVOTHYROXINE SODIUM 137 UG/1
137 TABLET ORAL DAILY
Status: DISCONTINUED | OUTPATIENT
Start: 2018-12-31 | End: 2019-01-04 | Stop reason: HOSPADM

## 2018-12-30 RX ORDER — CARVEDILOL 6.25 MG/1
12.5 TABLET ORAL 2 TIMES DAILY WITH MEALS
Status: DISCONTINUED | OUTPATIENT
Start: 2018-12-30 | End: 2019-01-04 | Stop reason: HOSPADM

## 2018-12-30 RX ORDER — DEXTROSE MONOHYDRATE 25 G/50ML
25 INJECTION, SOLUTION INTRAVENOUS ONCE
Status: COMPLETED | OUTPATIENT
Start: 2018-12-30 | End: 2018-12-30

## 2018-12-30 RX ORDER — HYDROCORTISONE 10 MG/1
20 TABLET ORAL EVERY MORNING
Status: DISCONTINUED | OUTPATIENT
Start: 2018-12-31 | End: 2019-01-04 | Stop reason: HOSPADM

## 2018-12-30 RX ORDER — 0.9 % SODIUM CHLORIDE 0.9 %
1000 INTRAVENOUS SOLUTION INTRAVENOUS ONCE
Status: COMPLETED | OUTPATIENT
Start: 2018-12-30 | End: 2018-12-30

## 2018-12-30 RX ORDER — HEPARIN SODIUM 5000 [USP'U]/ML
5000 INJECTION, SOLUTION INTRAVENOUS; SUBCUTANEOUS EVERY 8 HOURS SCHEDULED
Status: DISCONTINUED | OUTPATIENT
Start: 2018-12-30 | End: 2018-12-30 | Stop reason: SDUPTHER

## 2018-12-30 RX ORDER — SIMVASTATIN 20 MG
20 TABLET ORAL NIGHTLY
Status: DISCONTINUED | OUTPATIENT
Start: 2018-12-30 | End: 2019-01-04 | Stop reason: HOSPADM

## 2018-12-30 RX ORDER — CALCIUM CHLORIDE 100 MG/ML
INJECTION INTRAVENOUS; INTRAVENTRICULAR
Status: COMPLETED
Start: 2018-12-30 | End: 2018-12-30

## 2018-12-30 RX ORDER — SODIUM CHLORIDE 9 MG/ML
INJECTION, SOLUTION INTRAVENOUS CONTINUOUS
Status: CANCELLED | OUTPATIENT
Start: 2018-12-30

## 2018-12-30 RX ORDER — DIPHENHYDRAMINE HYDROCHLORIDE 50 MG/ML
INJECTION INTRAMUSCULAR; INTRAVENOUS
Status: DISCONTINUED
Start: 2018-12-30 | End: 2018-12-30 | Stop reason: WASHOUT

## 2018-12-30 RX ORDER — ACETAMINOPHEN 325 MG/1
650 TABLET ORAL EVERY 4 HOURS PRN
Status: DISCONTINUED | OUTPATIENT
Start: 2018-12-30 | End: 2018-12-30 | Stop reason: SDUPTHER

## 2018-12-30 RX ORDER — ACETAMINOPHEN 325 MG/1
650 TABLET ORAL EVERY 6 HOURS PRN
Status: DISCONTINUED | OUTPATIENT
Start: 2018-12-30 | End: 2019-01-04 | Stop reason: HOSPADM

## 2018-12-30 RX ADMIN — CALCIUM CHLORIDE 1 G: 100 INJECTION, SOLUTION INTRAVENOUS at 14:14

## 2018-12-30 RX ADMIN — DOCUSATE SODIUM 100 MG: 100 CAPSULE, LIQUID FILLED ORAL at 21:18

## 2018-12-30 RX ADMIN — HYDROCORTISONE 10 MG: 10 TABLET ORAL at 21:18

## 2018-12-30 RX ADMIN — CALCIUM CHLORIDE 1 G: 100 INJECTION INTRAVENOUS; INTRAVENTRICULAR at 16:49

## 2018-12-30 RX ADMIN — SIMVASTATIN 20 MG: 20 TABLET, FILM COATED ORAL at 21:17

## 2018-12-30 RX ADMIN — HYDROCORTISONE SODIUM SUCCINATE 100 MG: 100 INJECTION, POWDER, FOR SOLUTION INTRAMUSCULAR; INTRAVENOUS at 14:28

## 2018-12-30 RX ADMIN — PIPERACILLIN SODIUM,TAZOBACTAM SODIUM 3.38 G: 3; .375 INJECTION, POWDER, FOR SOLUTION INTRAVENOUS at 15:58

## 2018-12-30 RX ADMIN — CALCIUM CHLORIDE 1 G: 100 INJECTION, SOLUTION INTRAVENOUS at 16:49

## 2018-12-30 RX ADMIN — DEXTROSE MONOHYDRATE 25 G: 25 INJECTION, SOLUTION INTRAVENOUS at 14:13

## 2018-12-30 RX ADMIN — SODIUM CHLORIDE 1000 ML: 9 INJECTION, SOLUTION INTRAVENOUS at 14:22

## 2018-12-30 RX ADMIN — Medication: at 14:25

## 2018-12-30 RX ADMIN — ACETAMINOPHEN 650 MG: 325 TABLET ORAL at 21:18

## 2018-12-30 RX ADMIN — Medication 50 MEQ: at 14:14

## 2018-12-30 RX ADMIN — PANTOPRAZOLE SODIUM 40 MG: 40 TABLET, DELAYED RELEASE ORAL at 21:17

## 2018-12-30 RX ADMIN — CARVEDILOL 12.5 MG: 6.25 TABLET, FILM COATED ORAL at 21:17

## 2018-12-30 RX ADMIN — Medication 4 UNITS: at 14:14

## 2018-12-30 ASSESSMENT — ENCOUNTER SYMPTOMS
SORE THROAT: 0
EYE PAIN: 0
EYE DISCHARGE: 0
VOMITING: 0
BACK PAIN: 0
SHORTNESS OF BREATH: 0
NAUSEA: 0
RHINORRHEA: 0
DIARRHEA: 0
WHEEZING: 0
ABDOMINAL PAIN: 0
COUGH: 0

## 2018-12-30 ASSESSMENT — PAIN SCALES - GENERAL
PAINLEVEL_OUTOF10: 0
PAINLEVEL_OUTOF10: 6

## 2018-12-30 ASSESSMENT — PAIN DESCRIPTION - PAIN TYPE: TYPE: CHRONIC PAIN;ACUTE PAIN

## 2018-12-30 ASSESSMENT — PAIN DESCRIPTION - LOCATION: LOCATION: BUTTOCKS;HIP

## 2018-12-31 LAB
ALBUMIN SERPL-MCNC: 2.4 G/DL (ref 3.5–5.1)
ALP BLD-CCNC: 105 U/L (ref 38–126)
ALT SERPL-CCNC: 13 U/L (ref 11–66)
ANION GAP SERPL CALCULATED.3IONS-SCNC: 10 MEQ/L (ref 8–16)
ANTISTREPTOLYSIN-O: 63.5 IU/ML (ref 0–200)
AST SERPL-CCNC: 14 U/L (ref 5–40)
BASOPHILS # BLD: 0.2 %
BASOPHILS ABSOLUTE: 0 THOU/MM3 (ref 0–0.1)
BILIRUB SERPL-MCNC: 0.2 MG/DL (ref 0.3–1.2)
BUN BLDV-MCNC: 31 MG/DL (ref 7–22)
CALCIUM SERPL-MCNC: 9.9 MG/DL (ref 8.5–10.5)
CHLORIDE BLD-SCNC: 101 MEQ/L (ref 98–111)
CO2: 24 MEQ/L (ref 23–33)
CREAT SERPL-MCNC: 2.7 MG/DL (ref 0.4–1.2)
EOSINOPHIL # BLD: 0 %
EOSINOPHILS ABSOLUTE: 0 THOU/MM3 (ref 0–0.4)
ERYTHROCYTE [DISTWIDTH] IN BLOOD BY AUTOMATED COUNT: 15 % (ref 11.5–14.5)
ERYTHROCYTE [DISTWIDTH] IN BLOOD BY AUTOMATED COUNT: 52 FL (ref 35–45)
GFR SERPL CREATININE-BSD FRML MDRD: 18 ML/MIN/1.73M2
GLUCOSE BLD-MCNC: 147 MG/DL (ref 70–108)
HCT VFR BLD CALC: 31 % (ref 37–47)
HEMOGLOBIN: 9.5 GM/DL (ref 12–16)
IMMATURE GRANS (ABS): 0.09 THOU/MM3 (ref 0–0.07)
IMMATURE GRANULOCYTES: 0.5 %
LV EF: 55 %
LVEF MODALITY: NORMAL
LYMPHOCYTES # BLD: 13.1 %
LYMPHOCYTES ABSOLUTE: 2.2 THOU/MM3 (ref 1–4.8)
MAGNESIUM: 1.9 MG/DL (ref 1.6–2.4)
MCH RBC QN AUTO: 29.5 PG (ref 26–33)
MCHC RBC AUTO-ENTMCNC: 30.6 GM/DL (ref 32.2–35.5)
MCV RBC AUTO: 96.3 FL (ref 81–99)
MONOCYTES # BLD: 5.4 %
MONOCYTES ABSOLUTE: 0.9 THOU/MM3 (ref 0.4–1.3)
NUCLEATED RED BLOOD CELLS: 0 /100 WBC
PHOSPHORUS: 3.9 MG/DL (ref 2.4–4.7)
PLATELET # BLD: 398 THOU/MM3 (ref 130–400)
PMV BLD AUTO: 9.2 FL (ref 9.4–12.4)
POTASSIUM REFLEX MAGNESIUM: 5.5 MEQ/L (ref 3.5–5.2)
POTASSIUM SERPL-SCNC: 5.5 MEQ/L (ref 3.5–5.2)
RBC # BLD: 3.22 MILL/MM3 (ref 4.2–5.4)
SEG NEUTROPHILS: 80.8 %
SEGMENTED NEUTROPHILS ABSOLUTE COUNT: 13.5 THOU/MM3 (ref 1.8–7.7)
SODIUM BLD-SCNC: 135 MEQ/L (ref 135–145)
TOTAL PROTEIN: 5 G/DL (ref 6.1–8)
WBC # BLD: 16.7 THOU/MM3 (ref 4.8–10.8)

## 2018-12-31 PROCEDURE — 93306 TTE W/DOPPLER COMPLETE: CPT

## 2018-12-31 PROCEDURE — 2580000003 HC RX 258: Performed by: INTERNAL MEDICINE

## 2018-12-31 PROCEDURE — 80053 COMPREHEN METABOLIC PANEL: CPT

## 2018-12-31 PROCEDURE — 2709999900 HC NON-CHARGEABLE SUPPLY

## 2018-12-31 PROCEDURE — 80048 BASIC METABOLIC PNL TOTAL CA: CPT

## 2018-12-31 PROCEDURE — 99232 SBSQ HOSP IP/OBS MODERATE 35: CPT | Performed by: INTERNAL MEDICINE

## 2018-12-31 PROCEDURE — 6360000002 HC RX W HCPCS: Performed by: INTERNAL MEDICINE

## 2018-12-31 PROCEDURE — 85025 COMPLETE CBC W/AUTO DIFF WBC: CPT

## 2018-12-31 PROCEDURE — 2500000003 HC RX 250 WO HCPCS: Performed by: INTERNAL MEDICINE

## 2018-12-31 PROCEDURE — 99233 SBSQ HOSP IP/OBS HIGH 50: CPT | Performed by: FAMILY MEDICINE

## 2018-12-31 PROCEDURE — 84100 ASSAY OF PHOSPHORUS: CPT

## 2018-12-31 PROCEDURE — 83735 ASSAY OF MAGNESIUM: CPT

## 2018-12-31 PROCEDURE — 6370000000 HC RX 637 (ALT 250 FOR IP): Performed by: INTERNAL MEDICINE

## 2018-12-31 PROCEDURE — 2060000000 HC ICU INTERMEDIATE R&B

## 2018-12-31 PROCEDURE — 36415 COLL VENOUS BLD VENIPUNCTURE: CPT

## 2018-12-31 RX ORDER — POTASSIUM CHLORIDE 20 MEQ/1
20 TABLET, EXTENDED RELEASE ORAL 2 TIMES DAILY
Status: ON HOLD | COMMUNITY
End: 2019-01-04 | Stop reason: HOSPADM

## 2018-12-31 RX ORDER — FERROUS SULFATE 325(65) MG
650 TABLET ORAL
Status: ON HOLD | COMMUNITY
End: 2019-01-04 | Stop reason: HOSPADM

## 2018-12-31 RX ORDER — LACTOBACILLUS RHAMNOSUS GG 10B CELL
1 CAPSULE ORAL DAILY
COMMUNITY
End: 2020-09-16

## 2018-12-31 RX ORDER — FERROUS SULFATE 325(65) MG
325 TABLET ORAL
Status: ON HOLD | COMMUNITY
End: 2019-01-04 | Stop reason: HOSPADM

## 2018-12-31 RX ORDER — SODIUM CHLORIDE 9 MG/ML
INJECTION, SOLUTION INTRAVENOUS CONTINUOUS
Status: DISCONTINUED | OUTPATIENT
Start: 2018-12-31 | End: 2019-01-02

## 2018-12-31 RX ADMIN — LEVOTHYROXINE SODIUM 137 MCG: 137 TABLET ORAL at 08:39

## 2018-12-31 RX ADMIN — CARVEDILOL 12.5 MG: 6.25 TABLET, FILM COATED ORAL at 11:36

## 2018-12-31 RX ADMIN — CARVEDILOL 12.5 MG: 6.25 TABLET, FILM COATED ORAL at 17:25

## 2018-12-31 RX ADMIN — AMLODIPINE BESYLATE 2.5 MG: 2.5 TABLET ORAL at 11:36

## 2018-12-31 RX ADMIN — PANTOPRAZOLE SODIUM 40 MG: 40 TABLET, DELAYED RELEASE ORAL at 21:05

## 2018-12-31 RX ADMIN — Medication: at 05:23

## 2018-12-31 RX ADMIN — HYDROCORTISONE 10 MG: 10 TABLET ORAL at 21:06

## 2018-12-31 RX ADMIN — DOCUSATE SODIUM 100 MG: 100 CAPSULE, LIQUID FILLED ORAL at 21:05

## 2018-12-31 RX ADMIN — SODIUM CHLORIDE: 9 INJECTION, SOLUTION INTRAVENOUS at 13:40

## 2018-12-31 RX ADMIN — HYDROCORTISONE 20 MG: 10 TABLET ORAL at 08:39

## 2018-12-31 RX ADMIN — PIPERACILLIN SODIUM,TAZOBACTAM SODIUM 3.38 G: 3; .375 INJECTION, POWDER, FOR SOLUTION INTRAVENOUS at 03:33

## 2018-12-31 RX ADMIN — RIVAROXABAN 15 MG: 15 TABLET, FILM COATED ORAL at 17:25

## 2018-12-31 RX ADMIN — SIMVASTATIN 20 MG: 20 TABLET, FILM COATED ORAL at 21:05

## 2018-12-31 ASSESSMENT — PAIN SCALES - GENERAL
PAINLEVEL_OUTOF10: 0
PAINLEVEL_OUTOF10: 0

## 2019-01-01 LAB
ANION GAP SERPL CALCULATED.3IONS-SCNC: 11 MEQ/L (ref 8–16)
BUN BLDV-MCNC: 21 MG/DL (ref 7–22)
CALCIUM SERPL-MCNC: 9.1 MG/DL (ref 8.5–10.5)
CHLORIDE BLD-SCNC: 108 MEQ/L (ref 98–111)
CO2: 24 MEQ/L (ref 23–33)
CREAT SERPL-MCNC: 2.1 MG/DL (ref 0.4–1.2)
ERYTHROCYTE [DISTWIDTH] IN BLOOD BY AUTOMATED COUNT: 15 % (ref 11.5–14.5)
ERYTHROCYTE [DISTWIDTH] IN BLOOD BY AUTOMATED COUNT: 53.5 FL (ref 35–45)
GFR SERPL CREATININE-BSD FRML MDRD: 23 ML/MIN/1.73M2
GLUCOSE BLD-MCNC: 81 MG/DL (ref 70–108)
HCT VFR BLD CALC: 28.6 % (ref 37–47)
HEMOGLOBIN: 8.6 GM/DL (ref 12–16)
MCH RBC QN AUTO: 29.5 PG (ref 26–33)
MCHC RBC AUTO-ENTMCNC: 30.1 GM/DL (ref 32.2–35.5)
MCV RBC AUTO: 97.9 FL (ref 81–99)
MRSA SCREEN: NORMAL
PLATELET # BLD: 358 THOU/MM3 (ref 130–400)
PMV BLD AUTO: 9.2 FL (ref 9.4–12.4)
POTASSIUM SERPL-SCNC: 4.3 MEQ/L (ref 3.5–5.2)
RBC # BLD: 2.92 MILL/MM3 (ref 4.2–5.4)
SODIUM BLD-SCNC: 143 MEQ/L (ref 135–145)
WBC # BLD: 11.4 THOU/MM3 (ref 4.8–10.8)

## 2019-01-01 PROCEDURE — 80048 BASIC METABOLIC PNL TOTAL CA: CPT

## 2019-01-01 PROCEDURE — 99232 SBSQ HOSP IP/OBS MODERATE 35: CPT | Performed by: INTERNAL MEDICINE

## 2019-01-01 PROCEDURE — 2709999900 HC NON-CHARGEABLE SUPPLY

## 2019-01-01 PROCEDURE — 36415 COLL VENOUS BLD VENIPUNCTURE: CPT

## 2019-01-01 PROCEDURE — 85027 COMPLETE CBC AUTOMATED: CPT

## 2019-01-01 PROCEDURE — 99232 SBSQ HOSP IP/OBS MODERATE 35: CPT | Performed by: FAMILY MEDICINE

## 2019-01-01 PROCEDURE — 6370000000 HC RX 637 (ALT 250 FOR IP): Performed by: INTERNAL MEDICINE

## 2019-01-01 PROCEDURE — 2580000003 HC RX 258: Performed by: INTERNAL MEDICINE

## 2019-01-01 PROCEDURE — 2060000000 HC ICU INTERMEDIATE R&B

## 2019-01-01 RX ADMIN — DOCUSATE SODIUM 100 MG: 100 CAPSULE, LIQUID FILLED ORAL at 09:16

## 2019-01-01 RX ADMIN — LEVOTHYROXINE SODIUM 137 MCG: 137 TABLET ORAL at 09:16

## 2019-01-01 RX ADMIN — RIVAROXABAN 15 MG: 15 TABLET, FILM COATED ORAL at 18:01

## 2019-01-01 RX ADMIN — SIMVASTATIN 20 MG: 20 TABLET, FILM COATED ORAL at 20:32

## 2019-01-01 RX ADMIN — HYDROCORTISONE 20 MG: 10 TABLET ORAL at 09:17

## 2019-01-01 RX ADMIN — CARVEDILOL 12.5 MG: 6.25 TABLET, FILM COATED ORAL at 09:17

## 2019-01-01 RX ADMIN — HYDROCORTISONE 10 MG: 10 TABLET ORAL at 20:32

## 2019-01-01 RX ADMIN — SODIUM CHLORIDE: 9 INJECTION, SOLUTION INTRAVENOUS at 18:06

## 2019-01-01 RX ADMIN — DOCUSATE SODIUM 100 MG: 100 CAPSULE, LIQUID FILLED ORAL at 20:32

## 2019-01-01 RX ADMIN — SODIUM CHLORIDE: 9 INJECTION, SOLUTION INTRAVENOUS at 03:59

## 2019-01-01 RX ADMIN — PANTOPRAZOLE SODIUM 40 MG: 40 TABLET, DELAYED RELEASE ORAL at 20:32

## 2019-01-01 RX ADMIN — AMLODIPINE BESYLATE 2.5 MG: 2.5 TABLET ORAL at 09:17

## 2019-01-01 RX ADMIN — CARVEDILOL 12.5 MG: 6.25 TABLET, FILM COATED ORAL at 18:01

## 2019-01-01 ASSESSMENT — PAIN SCALES - GENERAL
PAINLEVEL_OUTOF10: 0

## 2019-01-02 LAB
ANION GAP SERPL CALCULATED.3IONS-SCNC: 10 MEQ/L (ref 8–16)
BUN BLDV-MCNC: 15 MG/DL (ref 7–22)
CALCIUM SERPL-MCNC: 8.7 MG/DL (ref 8.5–10.5)
CHLORIDE BLD-SCNC: 110 MEQ/L (ref 98–111)
CO2: 22 MEQ/L (ref 23–33)
CREAT SERPL-MCNC: 1.8 MG/DL (ref 0.4–1.2)
GFR SERPL CREATININE-BSD FRML MDRD: 28 ML/MIN/1.73M2
GLUCOSE BLD-MCNC: 79 MG/DL (ref 70–108)
POTASSIUM SERPL-SCNC: 4.5 MEQ/L (ref 3.5–5.2)
SODIUM BLD-SCNC: 142 MEQ/L (ref 135–145)

## 2019-01-02 PROCEDURE — 6370000000 HC RX 637 (ALT 250 FOR IP): Performed by: INTERNAL MEDICINE

## 2019-01-02 PROCEDURE — 2580000003 HC RX 258: Performed by: INTERNAL MEDICINE

## 2019-01-02 PROCEDURE — 36415 COLL VENOUS BLD VENIPUNCTURE: CPT

## 2019-01-02 PROCEDURE — 2709999900 HC NON-CHARGEABLE SUPPLY

## 2019-01-02 PROCEDURE — 80048 BASIC METABOLIC PNL TOTAL CA: CPT

## 2019-01-02 PROCEDURE — 99232 SBSQ HOSP IP/OBS MODERATE 35: CPT | Performed by: INTERNAL MEDICINE

## 2019-01-02 PROCEDURE — 2060000000 HC ICU INTERMEDIATE R&B

## 2019-01-02 RX ORDER — FAMOTIDINE 20 MG/1
20 TABLET, FILM COATED ORAL DAILY
Status: DISCONTINUED | OUTPATIENT
Start: 2019-01-02 | End: 2019-01-04 | Stop reason: HOSPADM

## 2019-01-02 RX ADMIN — CARVEDILOL 12.5 MG: 6.25 TABLET, FILM COATED ORAL at 19:45

## 2019-01-02 RX ADMIN — DOCUSATE SODIUM 100 MG: 100 CAPSULE, LIQUID FILLED ORAL at 19:47

## 2019-01-02 RX ADMIN — CARVEDILOL 12.5 MG: 6.25 TABLET, FILM COATED ORAL at 08:55

## 2019-01-02 RX ADMIN — SIMVASTATIN 20 MG: 20 TABLET, FILM COATED ORAL at 19:45

## 2019-01-02 RX ADMIN — AMLODIPINE BESYLATE 2.5 MG: 2.5 TABLET ORAL at 08:55

## 2019-01-02 RX ADMIN — RIVAROXABAN 15 MG: 15 TABLET, FILM COATED ORAL at 19:45

## 2019-01-02 RX ADMIN — HYDROCORTISONE 20 MG: 10 TABLET ORAL at 08:55

## 2019-01-02 RX ADMIN — LEVOTHYROXINE SODIUM 137 MCG: 137 TABLET ORAL at 08:55

## 2019-01-02 RX ADMIN — HYDROCORTISONE 10 MG: 10 TABLET ORAL at 19:46

## 2019-01-02 RX ADMIN — Medication 10 ML: at 19:46

## 2019-01-02 RX ADMIN — FAMOTIDINE 20 MG: 20 TABLET ORAL at 08:54

## 2019-01-02 RX ADMIN — DOCUSATE SODIUM 100 MG: 100 CAPSULE, LIQUID FILLED ORAL at 08:54

## 2019-01-02 ASSESSMENT — PAIN SCALES - GENERAL
PAINLEVEL_OUTOF10: 0

## 2019-01-03 LAB
ANION GAP SERPL CALCULATED.3IONS-SCNC: 10 MEQ/L (ref 8–16)
BUN BLDV-MCNC: 14 MG/DL (ref 7–22)
CALCIUM SERPL-MCNC: 9 MG/DL (ref 8.5–10.5)
CHLORIDE BLD-SCNC: 112 MEQ/L (ref 98–111)
CO2: 20 MEQ/L (ref 23–33)
CREAT SERPL-MCNC: 1.5 MG/DL (ref 0.4–1.2)
GFR SERPL CREATININE-BSD FRML MDRD: 35 ML/MIN/1.73M2
GLUCOSE BLD-MCNC: 88 MG/DL (ref 70–108)
POTASSIUM SERPL-SCNC: 4.8 MEQ/L (ref 3.5–5.2)
SODIUM BLD-SCNC: 142 MEQ/L (ref 135–145)

## 2019-01-03 PROCEDURE — G8988 SELF CARE GOAL STATUS: HCPCS

## 2019-01-03 PROCEDURE — 6370000000 HC RX 637 (ALT 250 FOR IP): Performed by: INTERNAL MEDICINE

## 2019-01-03 PROCEDURE — 97110 THERAPEUTIC EXERCISES: CPT

## 2019-01-03 PROCEDURE — G8979 MOBILITY GOAL STATUS: HCPCS

## 2019-01-03 PROCEDURE — 36415 COLL VENOUS BLD VENIPUNCTURE: CPT

## 2019-01-03 PROCEDURE — G8987 SELF CARE CURRENT STATUS: HCPCS

## 2019-01-03 PROCEDURE — 99232 SBSQ HOSP IP/OBS MODERATE 35: CPT | Performed by: INTERNAL MEDICINE

## 2019-01-03 PROCEDURE — 97166 OT EVAL MOD COMPLEX 45 MIN: CPT

## 2019-01-03 PROCEDURE — G8978 MOBILITY CURRENT STATUS: HCPCS

## 2019-01-03 PROCEDURE — 97535 SELF CARE MNGMENT TRAINING: CPT

## 2019-01-03 PROCEDURE — 80048 BASIC METABOLIC PNL TOTAL CA: CPT

## 2019-01-03 PROCEDURE — 97162 PT EVAL MOD COMPLEX 30 MIN: CPT

## 2019-01-03 PROCEDURE — 2580000003 HC RX 258: Performed by: INTERNAL MEDICINE

## 2019-01-03 PROCEDURE — 2709999900 HC NON-CHARGEABLE SUPPLY

## 2019-01-03 PROCEDURE — 2060000000 HC ICU INTERMEDIATE R&B

## 2019-01-03 RX ADMIN — CARVEDILOL 12.5 MG: 6.25 TABLET, FILM COATED ORAL at 17:04

## 2019-01-03 RX ADMIN — CARVEDILOL 12.5 MG: 6.25 TABLET, FILM COATED ORAL at 08:46

## 2019-01-03 RX ADMIN — Medication 10 ML: at 08:48

## 2019-01-03 RX ADMIN — Medication 10 ML: at 21:38

## 2019-01-03 RX ADMIN — AMLODIPINE BESYLATE 2.5 MG: 2.5 TABLET ORAL at 08:46

## 2019-01-03 RX ADMIN — SIMVASTATIN 20 MG: 20 TABLET, FILM COATED ORAL at 21:37

## 2019-01-03 RX ADMIN — DOCUSATE SODIUM 100 MG: 100 CAPSULE, LIQUID FILLED ORAL at 08:46

## 2019-01-03 RX ADMIN — HYDROCORTISONE 10 MG: 10 TABLET ORAL at 21:38

## 2019-01-03 RX ADMIN — RIVAROXABAN 15 MG: 15 TABLET, FILM COATED ORAL at 17:04

## 2019-01-03 RX ADMIN — FAMOTIDINE 20 MG: 20 TABLET ORAL at 08:46

## 2019-01-03 RX ADMIN — HYDROCORTISONE 20 MG: 10 TABLET ORAL at 08:46

## 2019-01-03 RX ADMIN — LEVOTHYROXINE SODIUM 137 MCG: 137 TABLET ORAL at 08:46

## 2019-01-03 ASSESSMENT — PAIN SCALES - GENERAL
PAINLEVEL_OUTOF10: 0

## 2019-01-04 VITALS
HEART RATE: 57 BPM | SYSTOLIC BLOOD PRESSURE: 132 MMHG | RESPIRATION RATE: 18 BRPM | DIASTOLIC BLOOD PRESSURE: 63 MMHG | BODY MASS INDEX: 36.25 KG/M2 | TEMPERATURE: 98.3 F | WEIGHT: 204.6 LBS | OXYGEN SATURATION: 98 % | HEIGHT: 63 IN

## 2019-01-04 LAB
ANION GAP SERPL CALCULATED.3IONS-SCNC: 9 MEQ/L (ref 8–16)
BUN BLDV-MCNC: 13 MG/DL (ref 7–22)
CALCIUM SERPL-MCNC: 8.8 MG/DL (ref 8.5–10.5)
CHLORIDE BLD-SCNC: 113 MEQ/L (ref 98–111)
CO2: 22 MEQ/L (ref 23–33)
CREAT SERPL-MCNC: 1.3 MG/DL (ref 0.4–1.2)
GFR SERPL CREATININE-BSD FRML MDRD: 41 ML/MIN/1.73M2
GLUCOSE BLD-MCNC: 99 MG/DL (ref 70–108)
POTASSIUM SERPL-SCNC: 4 MEQ/L (ref 3.5–5.2)
SODIUM BLD-SCNC: 144 MEQ/L (ref 135–145)

## 2019-01-04 PROCEDURE — 2709999900 HC NON-CHARGEABLE SUPPLY

## 2019-01-04 PROCEDURE — 2580000003 HC RX 258: Performed by: INTERNAL MEDICINE

## 2019-01-04 PROCEDURE — 6370000000 HC RX 637 (ALT 250 FOR IP): Performed by: INTERNAL MEDICINE

## 2019-01-04 PROCEDURE — 99232 SBSQ HOSP IP/OBS MODERATE 35: CPT | Performed by: INTERNAL MEDICINE

## 2019-01-04 PROCEDURE — 97530 THERAPEUTIC ACTIVITIES: CPT

## 2019-01-04 PROCEDURE — 80048 BASIC METABOLIC PNL TOTAL CA: CPT

## 2019-01-04 PROCEDURE — 99239 HOSP IP/OBS DSCHRG MGMT >30: CPT | Performed by: INTERNAL MEDICINE

## 2019-01-04 PROCEDURE — 97110 THERAPEUTIC EXERCISES: CPT

## 2019-01-04 PROCEDURE — 36415 COLL VENOUS BLD VENIPUNCTURE: CPT

## 2019-01-04 RX ORDER — RANITIDINE 150 MG/1
150 TABLET ORAL 2 TIMES DAILY
Qty: 60 TABLET | Refills: 3 | DISCHARGE
Start: 2019-01-04 | End: 2020-09-16

## 2019-01-04 RX ORDER — CALCIUM CARBONATE 200(500)MG
500 TABLET,CHEWABLE ORAL 3 TIMES DAILY PRN
DISCHARGE
Start: 2019-01-04 | End: 2019-02-03

## 2019-01-04 RX ORDER — CALCIUM CARBONATE 200(500)MG
500 TABLET,CHEWABLE ORAL 3 TIMES DAILY PRN
Status: DISCONTINUED | OUTPATIENT
Start: 2019-01-04 | End: 2019-01-04 | Stop reason: HOSPADM

## 2019-01-04 RX ORDER — BUMETANIDE 1 MG/1
1 TABLET ORAL ONCE
Status: COMPLETED | OUTPATIENT
Start: 2019-01-04 | End: 2019-01-04

## 2019-01-04 RX ADMIN — Medication 10 ML: at 09:00

## 2019-01-04 RX ADMIN — BUMETANIDE 1 MG: 1 TABLET ORAL at 11:30

## 2019-01-04 RX ADMIN — DOCUSATE SODIUM 100 MG: 100 CAPSULE, LIQUID FILLED ORAL at 08:59

## 2019-01-04 RX ADMIN — CARVEDILOL 12.5 MG: 6.25 TABLET, FILM COATED ORAL at 08:59

## 2019-01-04 RX ADMIN — FAMOTIDINE 20 MG: 20 TABLET ORAL at 08:59

## 2019-01-04 RX ADMIN — LEVOTHYROXINE SODIUM 137 MCG: 137 TABLET ORAL at 08:59

## 2019-01-04 RX ADMIN — HYDROCORTISONE 20 MG: 10 TABLET ORAL at 08:59

## 2019-01-04 RX ADMIN — ANTACID TABLETS 500 MG: 500 TABLET, CHEWABLE ORAL at 13:09

## 2019-01-04 RX ADMIN — AMLODIPINE BESYLATE 2.5 MG: 2.5 TABLET ORAL at 08:59

## 2019-01-04 ASSESSMENT — PAIN SCALES - GENERAL: PAINLEVEL_OUTOF10: 0

## 2019-01-21 ENCOUNTER — TELEPHONE (OUTPATIENT)
Dept: FAMILY MEDICINE CLINIC | Age: 69
End: 2019-01-21

## 2019-01-25 ENCOUNTER — TELEPHONE (OUTPATIENT)
Dept: FAMILY MEDICINE CLINIC | Age: 69
End: 2019-01-25

## 2019-01-25 ENCOUNTER — TELEPHONE (OUTPATIENT)
Dept: CARDIOLOGY CLINIC | Age: 69
End: 2019-01-25

## 2019-01-28 ENCOUNTER — TELEPHONE (OUTPATIENT)
Dept: CARDIOLOGY CLINIC | Age: 69
End: 2019-01-28

## 2019-01-28 ENCOUNTER — HOSPITAL ENCOUNTER (OUTPATIENT)
Dept: WOUND CARE | Age: 69
Discharge: HOME | End: 2019-01-28
Payer: MEDICARE

## 2019-01-28 ENCOUNTER — OFFICE VISIT (OUTPATIENT)
Dept: CARDIOLOGY CLINIC | Age: 69
End: 2019-01-28
Payer: MEDICARE

## 2019-01-28 VITALS
HEIGHT: 63 IN | DIASTOLIC BLOOD PRESSURE: 64 MMHG | SYSTOLIC BLOOD PRESSURE: 136 MMHG | HEART RATE: 61 BPM | BODY MASS INDEX: 35.26 KG/M2 | WEIGHT: 199 LBS | RESPIRATION RATE: 16 BRPM | TEMPERATURE: 97.8 F | OXYGEN SATURATION: 99 %

## 2019-01-28 VITALS
HEART RATE: 62 BPM | BODY MASS INDEX: 35.26 KG/M2 | SYSTOLIC BLOOD PRESSURE: 126 MMHG | WEIGHT: 199 LBS | HEIGHT: 63 IN | DIASTOLIC BLOOD PRESSURE: 79 MMHG

## 2019-01-28 DIAGNOSIS — I50.32 CHRONIC DIASTOLIC CONGESTIVE HEART FAILURE (HCC): Primary | ICD-10-CM

## 2019-01-28 DIAGNOSIS — N18.30 CHRONIC KIDNEY DISEASE (CKD), STAGE III (MODERATE) (HCC): ICD-10-CM

## 2019-01-28 DIAGNOSIS — Z74.09 IMPAIRED MOBILITY: ICD-10-CM

## 2019-01-28 DIAGNOSIS — I10 ESSENTIAL HYPERTENSION: ICD-10-CM

## 2019-01-28 DIAGNOSIS — R60.0 BILATERAL LEG EDEMA: ICD-10-CM

## 2019-01-28 DIAGNOSIS — I48.0 PAF (PAROXYSMAL ATRIAL FIBRILLATION) (HCC): ICD-10-CM

## 2019-01-28 DIAGNOSIS — E78.5 HYPERLIPIDEMIA, UNSPECIFIED HYPERLIPIDEMIA TYPE: ICD-10-CM

## 2019-01-28 DIAGNOSIS — L89.210 PRESSURE INJURY OF RIGHT HIP, UNSTAGEABLE (HCC): ICD-10-CM

## 2019-01-28 PROCEDURE — 99213 OFFICE O/P EST LOW 20 MIN: CPT

## 2019-01-28 PROCEDURE — 97597 DBRDMT OPN WND 1ST 20 CM/<: CPT | Performed by: NURSE PRACTITIONER

## 2019-01-28 PROCEDURE — 2709999900 HC NON-CHARGEABLE SUPPLY

## 2019-01-28 PROCEDURE — 99215 OFFICE O/P EST HI 40 MIN: CPT | Performed by: INTERNAL MEDICINE

## 2019-01-28 PROCEDURE — 99203 OFFICE O/P NEW LOW 30 MIN: CPT | Performed by: NURSE PRACTITIONER

## 2019-01-28 PROCEDURE — 6370000000 HC RX 637 (ALT 250 FOR IP): Performed by: NURSE PRACTITIONER

## 2019-01-28 RX ORDER — POTASSIUM CITRATE 10 MEQ/1
10 TABLET, EXTENDED RELEASE ORAL
Qty: 24 TABLET | Refills: 3 | Status: SHIPPED | OUTPATIENT
Start: 2019-01-28 | End: 2019-02-15

## 2019-01-28 RX ORDER — POTASSIUM CITRATE 10 MEQ/1
10 TABLET, EXTENDED RELEASE ORAL
COMMUNITY
End: 2019-01-28 | Stop reason: SDUPTHER

## 2019-01-28 RX ADMIN — LIDOCAINE HYDROCHLORIDE: 20 JELLY TOPICAL at 10:08

## 2019-01-28 ASSESSMENT — PAIN SCALES - GENERAL: PAINLEVEL_OUTOF10: 0

## 2019-02-06 ENCOUNTER — TELEPHONE (OUTPATIENT)
Dept: FAMILY MEDICINE CLINIC | Age: 69
End: 2019-02-06

## 2019-02-15 ENCOUNTER — OFFICE VISIT (OUTPATIENT)
Dept: NEPHROLOGY | Age: 69
End: 2019-02-15
Payer: MEDICARE

## 2019-02-15 ENCOUNTER — HOSPITAL ENCOUNTER (OUTPATIENT)
Dept: WOUND CARE | Age: 69
Discharge: HOME | End: 2019-02-15
Payer: MEDICARE

## 2019-02-15 VITALS
HEART RATE: 60 BPM | WEIGHT: 199 LBS | BODY MASS INDEX: 35.25 KG/M2 | SYSTOLIC BLOOD PRESSURE: 104 MMHG | DIASTOLIC BLOOD PRESSURE: 78 MMHG | OXYGEN SATURATION: 99 %

## 2019-02-15 VITALS
RESPIRATION RATE: 16 BRPM | DIASTOLIC BLOOD PRESSURE: 65 MMHG | TEMPERATURE: 97.7 F | SYSTOLIC BLOOD PRESSURE: 136 MMHG | HEART RATE: 59 BPM | OXYGEN SATURATION: 96 %

## 2019-02-15 DIAGNOSIS — N18.30 CKD (CHRONIC KIDNEY DISEASE) STAGE 3, GFR 30-59 ML/MIN (HCC): Primary | ICD-10-CM

## 2019-02-15 DIAGNOSIS — E87.79 OTHER HYPERVOLEMIA: ICD-10-CM

## 2019-02-15 DIAGNOSIS — I10 ESSENTIAL HYPERTENSION: ICD-10-CM

## 2019-02-15 PROBLEM — L89.213 PRESSURE INJURY OF RIGHT HIP, STAGE 3 (HCC): Status: ACTIVE | Noted: 2019-01-28

## 2019-02-15 PROBLEM — E87.70 FLUID OVERLOAD: Status: ACTIVE | Noted: 2019-02-15

## 2019-02-15 LAB
ANION GAP SERPL CALCULATED.3IONS-SCNC: 13 MEQ/L (ref 10–19)
BUN BLDV-MCNC: 18 MG/DL (ref 8–23)
CALCIUM SERPL-MCNC: 9.4 MG/DL (ref 8.5–10.5)
CHLORIDE BLD-SCNC: 108 MEQ/L (ref 95–107)
CO2: 26 MEQ/L (ref 19–31)
CREAT SERPL-MCNC: 1.1 MG/DL (ref 0.6–1.3)
EGFR AFRICAN AMERICAN: 60.2 ML/MIN/1.73 M2
EGFR IF NONAFRICAN AMERICAN: 51.9 ML/MIN/1.73 M2
GLUCOSE: 106 MG/DL (ref 70–99)
POTASSIUM SERPL-SCNC: 4.2 MEQ/L (ref 3.5–5.4)
SODIUM BLD-SCNC: 147 MEQ/L (ref 135–146)

## 2019-02-15 PROCEDURE — 11042 DBRDMT SUBQ TIS 1ST 20SQCM/<: CPT | Performed by: NURSE PRACTITIONER

## 2019-02-15 PROCEDURE — 97597 DBRDMT OPN WND 1ST 20 CM/<: CPT

## 2019-02-15 PROCEDURE — 2709999900 HC NON-CHARGEABLE SUPPLY

## 2019-02-15 PROCEDURE — 99213 OFFICE O/P EST LOW 20 MIN: CPT | Performed by: INTERNAL MEDICINE

## 2019-02-15 RX ORDER — BUMETANIDE 1 MG/1
1 TABLET ORAL DAILY
Qty: 90 TABLET | Refills: 1 | Status: SHIPPED | OUTPATIENT
Start: 2019-02-15 | End: 2019-08-13 | Stop reason: SDUPTHER

## 2019-02-15 RX ORDER — POTASSIUM CHLORIDE 750 MG/1
10 TABLET, FILM COATED, EXTENDED RELEASE ORAL DAILY
Qty: 90 TABLET | Refills: 3 | Status: SHIPPED | OUTPATIENT
Start: 2019-02-15 | End: 2020-02-07

## 2019-02-19 ENCOUNTER — OFFICE VISIT (OUTPATIENT)
Dept: FAMILY MEDICINE CLINIC | Age: 69
End: 2019-02-19
Payer: MEDICARE

## 2019-02-19 VITALS
RESPIRATION RATE: 16 BRPM | HEART RATE: 60 BPM | SYSTOLIC BLOOD PRESSURE: 130 MMHG | DIASTOLIC BLOOD PRESSURE: 78 MMHG | TEMPERATURE: 98.3 F

## 2019-02-19 DIAGNOSIS — I50.32 CHRONIC DIASTOLIC CONGESTIVE HEART FAILURE (HCC): ICD-10-CM

## 2019-02-19 DIAGNOSIS — R65.10 SIRS (SYSTEMIC INFLAMMATORY RESPONSE SYNDROME) (HCC): ICD-10-CM

## 2019-02-19 DIAGNOSIS — I48.0 PAF (PAROXYSMAL ATRIAL FIBRILLATION) (HCC): ICD-10-CM

## 2019-02-19 DIAGNOSIS — E03.9 HYPOTHYROIDISM, UNSPECIFIED TYPE: ICD-10-CM

## 2019-02-19 DIAGNOSIS — K21.9 GASTROESOPHAGEAL REFLUX DISEASE WITHOUT ESOPHAGITIS: ICD-10-CM

## 2019-02-19 DIAGNOSIS — N18.4 CKD (CHRONIC KIDNEY DISEASE), STAGE IV (HCC): ICD-10-CM

## 2019-02-19 DIAGNOSIS — D64.9 ANEMIA, UNSPECIFIED TYPE: Primary | ICD-10-CM

## 2019-02-19 PROCEDURE — 99214 OFFICE O/P EST MOD 30 MIN: CPT | Performed by: FAMILY MEDICINE

## 2019-03-07 ENCOUNTER — TELEPHONE (OUTPATIENT)
Dept: FAMILY MEDICINE CLINIC | Age: 69
End: 2019-03-07

## 2019-03-08 ENCOUNTER — HOSPITAL ENCOUNTER (OUTPATIENT)
Dept: WOUND CARE | Age: 69
Discharge: HOME | End: 2019-03-08
Payer: MEDICARE

## 2019-03-08 VITALS
RESPIRATION RATE: 16 BRPM | DIASTOLIC BLOOD PRESSURE: 56 MMHG | SYSTOLIC BLOOD PRESSURE: 109 MMHG | TEMPERATURE: 97.8 F | HEART RATE: 56 BPM

## 2019-03-08 DIAGNOSIS — L89.213 PRESSURE INJURY OF RIGHT HIP, STAGE 3 (HCC): ICD-10-CM

## 2019-03-08 DIAGNOSIS — Z74.09 IMPAIRED MOBILITY: ICD-10-CM

## 2019-03-08 PROCEDURE — 2709999900 HC NON-CHARGEABLE SUPPLY

## 2019-03-08 PROCEDURE — 99213 OFFICE O/P EST LOW 20 MIN: CPT

## 2019-03-08 PROCEDURE — 99213 OFFICE O/P EST LOW 20 MIN: CPT | Performed by: NURSE PRACTITIONER

## 2019-03-08 ASSESSMENT — PAIN SCALES - GENERAL: PAINLEVEL_OUTOF10: 0

## 2019-03-09 LAB
ABSOLUTE BASO #: 0.1 K/UL (ref 0–0.1)
ABSOLUTE EOS #: 0.1 K/UL (ref 0.1–0.4)
ABSOLUTE LYMPH #: 2.4 K/UL (ref 0.8–5.2)
ABSOLUTE MONO #: 0.7 K/UL (ref 0.1–0.9)
ABSOLUTE NEUT #: 10.5 K/UL (ref 1.3–9.1)
ANION GAP SERPL CALCULATED.3IONS-SCNC: 13 MEQ/L (ref 10–19)
BASOPHILS RELATIVE PERCENT: 0.6 %
BUN BLDV-MCNC: 26 MG/DL (ref 8–23)
CALCIUM SERPL-MCNC: 10 MG/DL (ref 8.5–10.5)
CHLORIDE BLD-SCNC: 105 MEQ/L (ref 95–107)
CO2: 28 MEQ/L (ref 19–31)
CREAT SERPL-MCNC: 1.3 MG/DL (ref 0.6–1.3)
EGFR AFRICAN AMERICAN: 49.2 ML/MIN/1.73 M2
EGFR IF NONAFRICAN AMERICAN: 42.4 ML/MIN/1.73 M2
EOSINOPHILS RELATIVE PERCENT: 0.8 %
GLUCOSE: 90 MG/DL (ref 70–99)
HCT VFR BLD CALC: 39.6 % (ref 36–48)
HEMOGLOBIN: 12.7 G/DL (ref 12–16)
IRON: 48 MCG/DL (ref 37–145)
LYMPHOCYTE %: 17.5 %
MCH RBC QN AUTO: 28.5 PG (ref 27–34)
MCHC RBC AUTO-ENTMCNC: 32.1 G/DL (ref 31–36)
MCV RBC AUTO: 89 FL (ref 80–100)
MONOCYTES # BLD: 5.1 %
NEUTROPHILS RELATIVE PERCENT: 75.4 %
PDW BLD-RTO: 13.2 % (ref 10.8–14.8)
PLATELETS: 471 K/UL (ref 150–450)
POTASSIUM SERPL-SCNC: 4.6 MEQ/L (ref 3.5–5.4)
RBC: 4.45 M/UL (ref 4–5.5)
SODIUM BLD-SCNC: 146 MEQ/L (ref 135–146)
WBC: 13.9 K/UL (ref 3.7–10.8)

## 2019-03-20 ENCOUNTER — TELEPHONE (OUTPATIENT)
Dept: NEPHROLOGY | Age: 69
End: 2019-03-20

## 2019-03-20 ENCOUNTER — TELEPHONE (OUTPATIENT)
Dept: FAMILY MEDICINE CLINIC | Age: 69
End: 2019-03-20

## 2019-03-26 ENCOUNTER — TELEPHONE (OUTPATIENT)
Dept: CARE COORDINATION | Age: 69
End: 2019-03-26

## 2019-04-02 ENCOUNTER — TELEPHONE (OUTPATIENT)
Dept: WOUND CARE | Age: 69
End: 2019-04-02

## 2019-04-05 DIAGNOSIS — N18.30 CKD (CHRONIC KIDNEY DISEASE), STAGE III (HCC): Primary | ICD-10-CM

## 2019-04-08 ENCOUNTER — HOSPITAL ENCOUNTER (OUTPATIENT)
Dept: WOUND CARE | Age: 69
Discharge: HOME | End: 2019-04-08
Payer: MEDICARE

## 2019-04-12 LAB
ANION GAP SERPL CALCULATED.3IONS-SCNC: 9 MMOL/L (ref 4–12)
BUN BLDV-MCNC: 23 MG/DL (ref 5–27)
CALCIUM SERPL-MCNC: 9.6 MG/DL (ref 8.5–10.5)
CHLORIDE BLD-SCNC: 109 MMOL/L (ref 98–109)
CO2: 26 MMOL/L (ref 22–32)
CREAT SERPL-MCNC: 1.22 MG/DL (ref 0.4–1)
EGFR AFRICAN AMERICAN: 53 ML/MIN/1.73SQ.M
EGFR IF NONAFRICAN AMERICAN: 44 ML/MIN/1.73SQ.M
GLUCOSE: 72 MG/DL (ref 65–99)
POTASSIUM SERPL-SCNC: 4.4 MMOL/L (ref 3.5–5)
SODIUM BLD-SCNC: 144 MMOL/L (ref 134–146)

## 2019-04-24 ENCOUNTER — TELEPHONE (OUTPATIENT)
Dept: FAMILY MEDICINE CLINIC | Age: 69
End: 2019-04-24

## 2019-04-24 NOTE — TELEPHONE ENCOUNTER
Luis with [facility] home care called to inform patient was discharged today.   Patient was being seen for pressure ulcer and Laila states ulcer has healed and patient is doing well

## 2019-04-29 ENCOUNTER — TELEPHONE (OUTPATIENT)
Dept: WOUND CARE | Age: 69
End: 2019-04-29

## 2019-04-29 NOTE — TELEPHONE ENCOUNTER
Called pt regarding canceled appt at wound clinic.  stated wound is healed and they are not rescheduling.

## 2019-06-30 RX ORDER — CARVEDILOL 12.5 MG/1
12.5 TABLET ORAL 2 TIMES DAILY WITH MEALS
Qty: 180 TABLET | Refills: 0 | Status: SHIPPED | OUTPATIENT
Start: 2019-06-30 | End: 2019-07-02 | Stop reason: SDUPTHER

## 2019-06-30 RX ORDER — CARVEDILOL 12.5 MG/1
12.5 TABLET ORAL 2 TIMES DAILY WITH MEALS
Qty: 30 TABLET | Refills: 0 | Status: SHIPPED | OUTPATIENT
Start: 2019-06-30 | End: 2019-06-30 | Stop reason: SDUPTHER

## 2019-07-01 RX ORDER — CARVEDILOL 12.5 MG/1
TABLET ORAL
Qty: 180 TABLET | Refills: 0 | Status: SHIPPED | OUTPATIENT
Start: 2019-07-01 | End: 2019-10-10 | Stop reason: SDUPTHER

## 2019-07-01 RX ORDER — CARVEDILOL 12.5 MG/1
TABLET ORAL
Qty: 60 TABLET | Refills: 0 | Status: CANCELLED | OUTPATIENT
Start: 2019-07-01

## 2019-07-02 RX ORDER — CARVEDILOL 12.5 MG/1
12.5 TABLET ORAL 2 TIMES DAILY WITH MEALS
Qty: 60 TABLET | Refills: 0 | Status: SHIPPED | OUTPATIENT
Start: 2019-07-02 | End: 2019-10-10 | Stop reason: ALTCHOICE

## 2019-08-13 RX ORDER — BUMETANIDE 1 MG/1
1 TABLET ORAL DAILY
Qty: 90 TABLET | Refills: 1 | Status: SHIPPED | OUTPATIENT
Start: 2019-08-13 | End: 2020-11-02

## 2019-08-20 ENCOUNTER — HOSPITAL ENCOUNTER (EMERGENCY)
Age: 69
Discharge: HOME | End: 2019-08-20
Attending: EMERGENCY MEDICINE
Payer: MEDICARE

## 2019-08-20 ENCOUNTER — APPOINTMENT (OUTPATIENT)
Dept: CT IMAGING | Age: 69
End: 2019-08-20
Payer: MEDICARE

## 2019-08-20 ENCOUNTER — APPOINTMENT (OUTPATIENT)
Dept: GENERAL RADIOLOGY | Age: 69
End: 2019-08-20
Payer: MEDICARE

## 2019-08-20 VITALS
DIASTOLIC BLOOD PRESSURE: 52 MMHG | WEIGHT: 210 LBS | OXYGEN SATURATION: 99 % | HEART RATE: 60 BPM | HEIGHT: 63 IN | BODY MASS INDEX: 37.21 KG/M2 | SYSTOLIC BLOOD PRESSURE: 107 MMHG | TEMPERATURE: 98.3 F | RESPIRATION RATE: 18 BRPM

## 2019-08-20 DIAGNOSIS — S51.819A SKIN TEAR OF FOREARM WITHOUT COMPLICATION, INITIAL ENCOUNTER: ICD-10-CM

## 2019-08-20 DIAGNOSIS — S41.112A LACERATION OF LEFT UPPER EXTREMITY, INITIAL ENCOUNTER: ICD-10-CM
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
## 2019-08-20 PROCEDURE — 70450 CT HEAD/BRAIN W/O DYE: CPT

## 2019-08-20 PROCEDURE — 73502 X-RAY EXAM HIP UNI 2-3 VIEWS: CPT

## 2019-08-20 PROCEDURE — 99284 EMERGENCY DEPT VISIT MOD MDM: CPT

## 2019-08-20 PROCEDURE — 2500000003 HC RX 250 WO HCPCS: Performed by: EMERGENCY MEDICINE

## 2019-08-20 PROCEDURE — 6370000000 HC RX 637 (ALT 250 FOR IP): Performed by: EMERGENCY MEDICINE

## 2019-08-20 PROCEDURE — 2709999900 HC NON-CHARGEABLE SUPPLY

## 2019-08-20 PROCEDURE — 73060 X-RAY EXAM OF HUMERUS: CPT

## 2019-08-20 PROCEDURE — 12004 RPR S/N/AX/GEN/TRK7.6-12.5CM: CPT

## 2019-08-20 RX ORDER — LIDOCAINE HYDROCHLORIDE AND EPINEPHRINE 10; 10 MG/ML; UG/ML
5 INJECTION, SOLUTION INFILTRATION; PERINEURAL ONCE
Status: COMPLETED | OUTPATIENT
Start: 2019-08-20 | End: 2019-08-20

## 2019-08-20 RX ORDER — GINSENG 100 MG
CAPSULE ORAL 3 TIMES DAILY
Status: DISCONTINUED | OUTPATIENT
Start: 2019-08-20 | End: 2019-08-20 | Stop reason: HOSPADM

## 2019-08-20 RX ADMIN — LIDOCAINE HYDROCHLORIDE,EPINEPHRINE BITARTRATE 5 ML: 10; .01 INJECTION, SOLUTION INFILTRATION; PERINEURAL at 15:30

## 2019-08-20 RX ADMIN — BACITRACIN: 500 OINTMENT TOPICAL at 15:30

## 2019-08-20 ASSESSMENT — ENCOUNTER SYMPTOMS
DIARRHEA: 0
BACK PAIN: 0
RHINORRHEA: 0
VOMITING: 0
EYE PAIN: 0
WHEEZING: 0
NAUSEA: 0
EYE DISCHARGE: 0
SHORTNESS OF BREATH: 0
SORE THROAT: 0
COUGH: 0
ABDOMINAL PAIN: 0

## 2019-08-20 ASSESSMENT — PAIN DESCRIPTION - LOCATION: LOCATION: ARM

## 2019-08-20 ASSESSMENT — PAIN DESCRIPTION - PAIN TYPE: TYPE: ACUTE PAIN

## 2019-08-20 ASSESSMENT — PAIN DESCRIPTION - ORIENTATION: ORIENTATION: LEFT

## 2019-08-20 ASSESSMENT — PAIN SCALES - GENERAL: PAINLEVEL_OUTOF10: 2

## 2019-08-20 NOTE — ED PROVIDER NOTES
Negative for adenopathy. Psychiatric/Behavioral: Negative for confusion and suicidal ideas. The patient is not nervous/anxious. PAST MEDICALHISTORY    has a past medical history of Asthma, Atrial thrombosis, Bursitis, Cancer ([facility] Chronic diastolic heart failure ([facility] CVA (cerebral infarction), Diabetes insipidus ([facility] GERD (gastroesophageal reflux disease), History of diabetes insipidus, History of meningioma of the brain, Hyperlipidemia, Hypertension, 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 ([facility] Hypothyroidism, Meningioma ([facility] MRSA nasal colonization, Obesity (BMI 30-39.9), Osteoarthritis, PAF (paroxysmal atrial fibrillation) ([facility] Panhypopituitarism ([facility] Pneumonia, Stroke [facility]), Stroke ([facility] and Urinary incontinence. SURGICAL HISTORY    has a past surgical history that includes brain surgery; Total hip arthroplasty; Total knee arthroplasty; Shoulder Arthroplasty; Cholecystectomy; Hysterectomy; back surgery; Revision total hip arthroplasty (3/13/2013); other surgical history (9/30/2014); Appendectomy; pituitary surgery (1988); other surgical history (Left, 10/26/15); and Tooth Extraction (03/19/2018).     CURRENT MEDICATIONS       Previous Medications    ACETAMINOPHEN (TYLENOL) 325 MG TABLET    Take 650 mg by mouth every 6 hours as needed for Pain 1 or 2 tabs q6h prn pain    BECLOMETHASONE (QVAR) 80 MCG/ACT INHALER    Inhale 2 puffs into the lungs 2 times daily    BUMETANIDE (BUMEX) 1 MG TABLET    TAKE 1 TABLET BY MOUTH DAILY    CALCIUM-PHOSPHORUS-VITAMIN D (CITRACAL +D3 PO)    Take 1 tablet by mouth daily    CARVEDILOL (COREG) 12.5 MG TABLET    TAKE 1 TABLET BY MOUTH TWICE DAILY WITH MEALS    CARVEDILOL (COREG) 12.5 MG TABLET    Take 1 tablet by mouth 2 times daily (with meals)    DIPHENHYDRAMINE-APAP, SLEEP, (TYLENOL PM EXTRA STRENGTH)  MG TABLET    Take 1 tablet by mouth nightly     HYDROCORTISONE (CORTEF) 20 MG TABLET    Take 20 mg by mouth every morning     HYDROCORTISONE (CORTEF) 5 COURSE:   Vitals:    Vitals:    08/20/19 1253 08/20/19 1355 08/20/19 1625   BP: (!) 112/57 (!) 107/52    Pulse: 68 70 60   Resp: 18  18   Temp: 98.3 °F (36.8 °C)     TempSrc: Oral     SpO2: 95% 97% 99%   Weight: 210 lb (95.3 kg)     Height: 5' 3\" (1.6 m)         1:21 PM: The patient was seen and evaluated in a timely fashion. MDM:  21-year-old female history of left-sided CVA nonambulatory mechanical fall today. No LOC no known head injury fell into potty chair and found to have patient with laceration to left upper extremity. Superficial fat exposed. No muscle. Bleeding controlled. Skin tear to right forearm. No LOC on Xarelto CT head ordered and was negative left upper extremity no fractures sutured well approximated and bleeding controlled. X-ray of hip negative  Instructed on strict return precautions for head injury and laceration repairs suture removal in 10 days family and patient acknowledge and are agreeable to plan of care    CRITICAL CARE:   None     CONSULTS:  None    PROCEDURES:  Lac Repair  Date/Time: 8/20/2019 4:02 PM  Performed by: Sreekanth Drew DO  Authorized by: Sreekanth Drew DO     Consent:     Consent obtained:  Verbal    Consent given by:  Patient    Risks discussed:  Infection, need for additional repair and pain    Alternatives discussed:  No treatment  Anesthesia (see MAR for exact dosages): Anesthesia method:  Local infiltration    Local anesthetic:  Lidocaine 1% w/o epi  Laceration details:     Location:  Shoulder/arm    Shoulder/arm location:  L upper arm    Length (cm):  10    Depth (mm):  3  Repair type:     Repair type:   Intermediate  Pre-procedure details:     Preparation:  Patient was prepped and draped in usual sterile fashion  Exploration:     Hemostasis achieved with:  Direct pressure and epinephrine    Wound exploration: wound explored through full range of motion and entire depth of wound probed and visualized      Wound extent: no fascia violation noted, no foreign

## 2019-08-20 NOTE — ED NOTES
Pt wound irrigated and cleaned. 4x4 bandage applied and wrapped for Dr. Shiva Hollingsworth to do lac repair. Pt tolerated procedure well.       Song Burton RN  08/20/19 2361

## 2019-08-21 ENCOUNTER — TELEPHONE (OUTPATIENT)
Dept: FAMILY MEDICINE CLINIC | Age: 69
End: 2019-08-21

## 2019-08-22 ENCOUNTER — CARE COORDINATION (OUTPATIENT)
Dept: CASE MANAGEMENT | Age: 69
End: 2019-08-22

## 2019-08-30 ENCOUNTER — CARE COORDINATION (OUTPATIENT)
Dept: CASE MANAGEMENT | Age: 69
End: 2019-08-30

## 2019-10-10 ENCOUNTER — OFFICE VISIT (OUTPATIENT)
Dept: CARDIOLOGY CLINIC | Age: 69
End: 2019-10-10
Payer: MEDICARE

## 2019-10-10 VITALS
WEIGHT: 193 LBS | SYSTOLIC BLOOD PRESSURE: 130 MMHG | HEART RATE: 71 BPM | HEIGHT: 63 IN | BODY MASS INDEX: 34.2 KG/M2 | DIASTOLIC BLOOD PRESSURE: 88 MMHG

## 2019-10-10 DIAGNOSIS — N18.30 CKD (CHRONIC KIDNEY DISEASE) STAGE 3, GFR 30-59 ML/MIN (HCC): ICD-10-CM

## 2019-10-10 DIAGNOSIS — I50.32 CHRONIC DIASTOLIC CONGESTIVE HEART FAILURE (HCC): Primary | ICD-10-CM

## 2019-10-10 DIAGNOSIS — I10 ESSENTIAL HYPERTENSION: ICD-10-CM

## 2019-10-10 DIAGNOSIS — I48.0 PAF (PAROXYSMAL ATRIAL FIBRILLATION) (HCC): ICD-10-CM

## 2019-10-10 DIAGNOSIS — E78.00 PURE HYPERCHOLESTEROLEMIA: ICD-10-CM

## 2019-10-10 DIAGNOSIS — I69.30 HISTORY OF STROKE WITH RESIDUAL EFFECTS: ICD-10-CM

## 2019-10-10 DIAGNOSIS — R60.0 BILATERAL LEG EDEMA: ICD-10-CM

## 2019-10-10 PROCEDURE — 99214 OFFICE O/P EST MOD 30 MIN: CPT | Performed by: INTERNAL MEDICINE

## 2019-10-10 RX ORDER — CARVEDILOL 12.5 MG/1
TABLET ORAL
Qty: 180 TABLET | Refills: 4 | Status: SHIPPED | OUTPATIENT
Start: 2019-10-10 | End: 2020-12-28

## 2019-11-04 LAB
ALBUMIN SERPL-MCNC: 3.6 G/DL (ref 3.2–5.3)
ALK PHOSPHATASE: 98 U/L (ref 39–130)
ALT SERPL-CCNC: 15 U/L (ref 0–31)
ANION GAP SERPL CALCULATED.3IONS-SCNC: 10 MMOL/L (ref 4–12)
AST SERPL-CCNC: 11 U/L (ref 0–41)
BILIRUB SERPL-MCNC: 0.3 MG/DL (ref 0.3–1.2)
BILIRUBIN DIRECT: 0.1 MG/DL (ref 0–0.4)
BUN BLDV-MCNC: 18 MG/DL (ref 5–27)
CALCIUM SERPL-MCNC: 9.5 MG/DL (ref 8.5–10.5)
CHLORIDE BLD-SCNC: 109 MMOL/L (ref 98–109)
CHOLESTEROL/HDL RATIO: 4 (ref 1–5)
CHOLESTEROL: 145 MG/DL (ref 150–200)
CO2: 28 MMOL/L (ref 22–32)
CREAT SERPL-MCNC: 1.21 MG/DL (ref 0.4–1)
CREATINE, URINE: 88.66 MG/DL
EGFR AFRICAN AMERICAN: 54 ML/MIN/1.73SQ.M
EGFR IF NONAFRICAN AMERICAN: 44 ML/MIN/1.73SQ.M
GLUCOSE: 74 MG/DL (ref 65–99)
HDLC SERPL-MCNC: 36 MG/DL
LDL CHOLESTEROL CALCULATED: 50 MG/DL
LDL/HDL RATIO: 1.4
MICROALBUMIN/CREAT 24H UR: 1.1 MG/DL (ref 0–1.9)
MICROALBUMIN/CREAT UR-RTO: 12.4 MG/G CREAT (ref 0–30)
POTASSIUM SERPL-SCNC: 3.9 MMOL/L (ref 3.5–5)
PROTEIN, URINE: 50 MG/L
SODIUM BLD-SCNC: 147 MMOL/L (ref 134–146)
TOTAL PROTEIN: 5.8 G/DL (ref 6–8)
TRIGL SERPL-MCNC: 294 MG/DL (ref 27–150)
VLDLC SERPL CALC-MCNC: 59 MG/DL (ref 0–30)

## 2019-11-05 ENCOUNTER — TELEPHONE (OUTPATIENT)
Dept: NEPHROLOGY | Age: 69
End: 2019-11-05

## 2019-12-04 ENCOUNTER — TELEPHONE (OUTPATIENT)
Dept: NEPHROLOGY | Age: 69
End: 2019-12-04

## 2020-02-03 RX ORDER — PANTOPRAZOLE SODIUM 40 MG/1
TABLET, DELAYED RELEASE ORAL
Qty: 90 TABLET | Refills: 3 | Status: SHIPPED | OUTPATIENT
Start: 2020-02-03 | End: 2021-01-27

## 2020-02-07 RX ORDER — POTASSIUM CHLORIDE 750 MG/1
10 TABLET, FILM COATED, EXTENDED RELEASE ORAL DAILY
Qty: 90 TABLET | Refills: 3 | Status: SHIPPED | OUTPATIENT
Start: 2020-02-07 | End: 2021-04-26

## 2020-02-12 ENCOUNTER — TELEPHONE (OUTPATIENT)
Dept: FAMILY MEDICINE CLINIC | Age: 70
End: 2020-02-12

## 2020-02-12 NOTE — TELEPHONE ENCOUNTER
Getting weaker in the home   would like referral for home PT  Home health referral  Schedule office visit in 3 weeks for face to face

## 2020-02-28 RX ORDER — TOLTERODINE 2 MG/1
CAPSULE, EXTENDED RELEASE ORAL
Qty: 180 CAPSULE | Refills: 3 | Status: SHIPPED | OUTPATIENT
Start: 2020-02-28 | End: 2021-03-08

## 2020-03-14 LAB
BASOPHILS ABSOLUTE: NORMAL
BASOPHILS RELATIVE PERCENT: NORMAL
BUN BLDV-MCNC: 26 MG/DL
CALCIUM SERPL-MCNC: 10.4 MG/DL
CHLORIDE BLD-SCNC: 108 MMOL/L
CHOLESTEROL, TOTAL: 136 MG/DL
CHOLESTEROL/HDL RATIO: 3.7
CO2: 28 MMOL/L
CREAT SERPL-MCNC: 1.45 MG/DL
EOSINOPHILS ABSOLUTE: NORMAL
EOSINOPHILS RELATIVE PERCENT: NORMAL
GFR CALCULATED: 35
GLUCOSE BLD-MCNC: 82 MG/DL
HCT VFR BLD CALC: 38.9 % (ref 36–46)
HDLC SERPL-MCNC: 37 MG/DL (ref 35–70)
HEMOGLOBIN: 12.6 G/DL (ref 12–16)
LDL CHOLESTEROL CALCULATED: 51 MG/DL (ref 0–160)
LYMPHOCYTES ABSOLUTE: NORMAL
LYMPHOCYTES RELATIVE PERCENT: NORMAL
MCH RBC QN AUTO: NORMAL PG
MCHC RBC AUTO-ENTMCNC: NORMAL G/DL
MCV RBC AUTO: NORMAL FL
MONOCYTES ABSOLUTE: NORMAL
MONOCYTES RELATIVE PERCENT: NORMAL
NEUTROPHILS ABSOLUTE: NORMAL
NEUTROPHILS RELATIVE PERCENT: NORMAL
PLATELET # BLD: 493 K/ΜL
PMV BLD AUTO: NORMAL FL
POTASSIUM SERPL-SCNC: 4.7 MMOL/L
RBC # BLD: 4.47 10^6/ΜL
SODIUM BLD-SCNC: 147 MMOL/L
T4 TOTAL: 10.3
TRIGL SERPL-MCNC: 238 MG/DL
VLDLC SERPL CALC-MCNC: 48 MG/DL
WBC # BLD: 11.3 10^3/ML

## 2020-03-17 LAB
CREATINE, URINE: 108.95 MG/DL
MICROALBUMIN/CREAT 24H UR: <0.7 MG/DL (ref 0–1.9)
MICROALBUMIN/CREAT UR-RTO: <1.8 MG/G CREAT (ref 0–30)
PROTEIN, URINE: 70 MG/L

## 2020-03-25 PROBLEM — E03.9 HYPOTHYROIDISM: Status: RESOLVED | Noted: 2020-03-25 | Resolved: 2020-03-24

## 2020-06-08 ENCOUNTER — TELEPHONE (OUTPATIENT)
Dept: FAMILY MEDICINE CLINIC | Age: 70
End: 2020-06-08

## 2020-06-08 NOTE — TELEPHONE ENCOUNTER
Conner Mendoza (spouse) said Andry Jiménez needs a new script for her handicap placard. They are asking for the script to be mailed to her home address. (Address confirmed). Please advise.     DOLV  2/19/19  DONV  6/15/20 (VV-he cannot get her out and around easily at all to come in for an appt)

## 2020-06-15 ENCOUNTER — VIRTUAL VISIT (OUTPATIENT)
Dept: NEPHROLOGY | Age: 70
End: 2020-06-15
Payer: MEDICARE

## 2020-06-15 ENCOUNTER — VIRTUAL VISIT (OUTPATIENT)
Dept: FAMILY MEDICINE CLINIC | Age: 70
End: 2020-06-15
Payer: MEDICARE

## 2020-06-15 PROCEDURE — 99215 OFFICE O/P EST HI 40 MIN: CPT | Performed by: FAMILY MEDICINE

## 2020-06-15 PROCEDURE — 99213 OFFICE O/P EST LOW 20 MIN: CPT | Performed by: INTERNAL MEDICINE

## 2020-06-15 NOTE — PROGRESS NOTES
Meningioma [facility])     3 separate meningiomas, s/p gamma knife (1/2012) , Dr. Fior Kenny at [facility] MRSA nasal colonization 6/2013    Obesity (BMI 30-39. 9)     Osteoarthritis     PAF (paroxysmal atrial fibrillation) (HCC)     Panhypopituitarism (HCC)     status post resection for macroadenoma in the 1970's    Pneumonia 11/2018    Stroke [facility]) 1988    due to radiation -bleeding CVA - residual left upper and lower extremity weakness    Stroke [facility]) October 2015    Urinary incontinence      Past Surgical History:   Procedure Laterality Date    APPENDECTOMY      BACK SURGERY      BRAIN SURGERY      1970 due to pituitary mass, drained and s/p radiation    CHOLECYSTECTOMY      HYSTERECTOMY      total done for DUB    OTHER SURGICAL HISTORY  9/30/2014    LAPAROSCOPIC RUPTURED APPENDECTOMY    OTHER SURGICAL HISTORY Left 10/26/15    Evacuation and Debridement of Left Lower Leg Hematoma - Dr. Juarez Paris  3/13/2013    left    SHOULDER ARTHROPLASTY      right    TOOTH EXTRACTION  03/19/2018    TOTAL HIP ARTHROPLASTY      bilateral    TOTAL KNEE ARTHROPLASTY      right         Medications :     Outpatient Medications Marked as Taking for the 6/15/20 encounter (Virtual Visit) with Paige Grullon MD   Medication Sig Dispense Refill    tolterodine (DETROL LA) 2 MG extended release capsule TAKE 1 CAPSULE BY MOUTH TWICE DAILY 180 capsule 3    potassium chloride (KLOR-CON) 10 MEQ extended release tablet TAKE 1 TABLET BY MOUTH DAILY 90 tablet 3    pantoprazole (PROTONIX) 40 MG tablet TAKE 1 TABLET BY MOUTH EVERY NIGHT 90 tablet 3    rivaroxaban (XARELTO) 15 MG TABS tablet Take 1 tablet by mouth Daily with supper 90 tablet 2    carvedilol (COREG) 12.5 MG tablet TAKE 1 TABLET BY MOUTH TWICE DAILY WITH MEALS 180 tablet 4    bumetanide (BUMEX) 1 MG tablet TAKE 1 TABLET BY MOUTH DAILY (Patient taking differently: Take 0.5 mg by mouth daily ) Renal Ultrasound Scan -- 2016  Right kidney 10.8 cm left kidney 10.6 cm no other acute abnormality. Assessment    1. Renal - Chronic kidney disease stage III most likely secondary to senile nephrosclerosis and to some degree cardiorenal  -Creatinine slightly worse from the labs in March and her sodium is slightly higher at 147  -Mostly she is slightly volume depleted. Advised to repeat a BMP on Thursday  -We will advise accordingly. 2. Essential hypertension running slightly borderline plan as mentioned above. Amlodipine  3. Acute on chronic diastolic congestive heart failure plan as mentioned above. Please call with any questions or concerns or if the edema does not improve well  4. Electrolytes within normal limits. Continue potassium while on diuretics  5. meds reviewed and D/W patient and also the   6. Follow-up in 6 months    Tests and orders placed this Encounter     Orders Placed This Encounter   Procedures    Basic Metabolic Panel    Basic Metabolic Panel    Urinalysis with Microscopic    Creatinine, Random Urine    MICROALBUMIN, RANDOM URINE (W/O CREATININE)    Protein, urine, random       Paige Grullon M.D  [facility].

## 2020-06-15 NOTE — PROGRESS NOTES
6/15/2020    TELEHEALTH EVALUATION -- Audio/Visual (During NMIDF-44 public health emergency)    HPI:    Brooke Vasquez (:  1950) has requested an audio/video evaluation for the following concern(s):    Left knee pain schedule with OIO on 20. Also losing appetite. Calling for recheck after hospitalized from 18 to 19. She was then sent to [facility] for rehab and did well. She is now home with Home Health and the help of her . She is only able to walk with a walker for very short distances in the home. She is not strong enough to cook or clean but her  does a lot of these ADLS for her. Prn PT OT for strengthening. She has urinary frequency on a diuretic and potassium under the watch of Nephrology : Dr David Jett for CKD. Cardiology follow up is scheduled as well for CHF and persistent pedal edema. She usually keeps her feet up during the day and wears wraps but can not tolerate the compression stockings. Meningioma stable as of CT in 2019. Seeing endocrinology for hypothyroid and panhypopituitarism. Asthma controlled on current medications. Patient eval with videovisit. Patient at home. Provider at office. Visit performed as a synchronous telecommunication system. Review of Systems  Constitutional: Negative for fever, chills, diaphoresis, activity change, appetite change and fatigue. Early satiety but no weight change. HENT: Negative for hearing loss, ear pain, congestion, sore throat, rhinorrhea, postnasal drip and ear discharge. Eyes: Negative for photophobia and visual disturbance. Respiratory: Negative for cough, chest tightness, Exertional shortness of breath and wheezing. Cardiovascular: Negative for chest pain and leg swelling. Gastrointestinal: Negative for nausea, vomiting, abdominal pain, diarrhea and constipation. Genitourinary: Negative for dysuria, urgency and frequency.    Neurological: Negative for weakness, light-headedness and headaches. Psychiatric/Behavioral: Negative for sleep disturbance. Prior to Visit Medications    Medication Sig Taking?  Authorizing Provider   tolterodine (DETROL LA) 2 MG extended release capsule TAKE 1 CAPSULE BY MOUTH TWICE DAILY  Emeka Banerjee MD   potassium chloride (KLOR-CON) 10 MEQ extended release tablet TAKE 1 TABLET BY MOUTH DAILY  Beto Dumont MD   pantoprazole (PROTONIX) 40 MG tablet TAKE 1 TABLET BY MOUTH EVERY NIGHT  Emeka Banerjee MD   rivaroxaban (XARELTO) 15 MG TABS tablet Take 1 tablet by mouth Daily with supper  Garry Nunez MD   carvedilol (COREG) 12.5 MG tablet TAKE 1 TABLET BY MOUTH TWICE DAILY WITH MEALS  Garry Nunez MD   bumetanide (BUMEX) 1 MG tablet TAKE 1 TABLET BY MOUTH DAILY  Patient taking differently: Take 0.5 mg by mouth daily   Abhishek Bhatt MD   beclomethasone (QVAR) 80 MCG/ACT inhaler Inhale 2 puffs into the lungs 2 times daily  Emeka Banerjee MD   ranitidine (ZANTAC) 150 MG tablet Take 1 tablet by mouth 2 times daily  Patient taking differently: Take 150 mg by mouth nightly   Noah Diaz MD   Calcium-Phosphorus-Vitamin D (CITRACAL +D3 PO) Take 1 tablet by mouth daily  Historical Provider, MD   lactobacillus (CULTURELLE) capsule Take 1 capsule by mouth daily  Historical Provider, MD   miconazole (MICOTIN) 2 % powder Apply topically 2 times daily PRN for excoriation  Aditi Gilbert MD   hydrocortisone (CORTEF) 20 MG tablet Take 20 mg by mouth every morning   Historical Provider, MD   hydrocortisone (CORTEF) 5 MG tablet Take 10 mg by mouth nightly   Historical Provider, MD   Multiple Vitamins-Minerals (THERAPEUTIC MULTIVITAMIN-MINERALS) tablet Take 1 tablet by mouth daily  Historical Provider, MD   Nutritional Supplements (BOOST) LIQD Take by mouth as needed   Historical Provider, MD   diphenhydrAMINE-APAP, sleep, (TYLENOL PM EXTRA STRENGTH)  MG tablet Take 1 tablet by mouth nightly   Historical Provider, MD   acetaminophen  OTHER SURGICAL HISTORY  9/30/2014    LAPAROSCOPIC RUPTURED APPENDECTOMY    OTHER SURGICAL HISTORY Left 10/26/15    Evacuation and Debridement of Left Lower Leg Hematoma - Dr. Rodriguez Floor  3/13/2013    left    SHOULDER ARTHROPLASTY      right    TOOTH EXTRACTION  03/19/2018    TOTAL HIP ARTHROPLASTY      bilateral    TOTAL KNEE ARTHROPLASTY      right    ,   Social History     Tobacco Use    Smoking status: Never Smoker    Smokeless tobacco: Never Used   Substance Use Topics    Alcohol use: No    Drug use: No   ,   Family History   Problem Relation Age of Onset    High Blood Pressure Mother     Stroke Maternal Grandmother 72   ,   Immunization History   Administered Date(s) Administered    Influenza A ([id number]) Vaccine PF IM 11/17/2009    Influenza Virus Vaccine 10/16/2014, 12/01/2016    Influenza Whole 12/01/2016    Influenza, High Dose (Fluzone 65 yrs and older) 12/03/2018    Pneumococcal Conjugate 13-valent ([id number]) 01/28/2016    Pneumococcal Polysaccharide ([id number]) 01/31/2017    Tdap (Boostrix, Adacel) 08/29/2015, 03/13/2017    Zoster Live (Zostavax) 06/08/2015   ,   Health Maintenance   Topic Date Due    DEXA (modify frequency per FRAX score)  09/07/2006    Shingles Vaccine (2 of 3) 08/03/2015    Breast cancer screen  10/02/2015    Annual Wellness Visit (AWV)  05/29/2019    TSH testing  12/30/2019    Flu vaccine (Season Ended) 09/01/2020    Lipid screen  03/14/2021    Potassium monitoring  03/14/2021    Creatinine monitoring  03/14/2021    Colon cancer screen colonoscopy  06/12/2025    DTaP/Tdap/Td vaccine (3 - Td) 03/13/2027    Pneumococcal 65+ yrs at Risk Vaccine  Completed    Hepatitis A vaccine  Aged Out    Hepatitis B vaccine  Aged Out    Hib vaccine  Aged Out    Meningococcal (ACWY) vaccine  Aged Out    Hepatitis C screen  Discontinued       PHYSICAL EXAMINATION:  [ INSTRUCTIONS:  \"[x]\" Indicates (chronic kidney disease) stage 3, GFR 30-59 ml/min ([facility])  Care of nephrology    7. Diabetes insipidus ([address]  Care of endo    8. Hypercoagulable state ([address]    9. Meningioma ([address]  Recheck CT after COVID less severe    10. Essential hypertension  No change to medications   Continue healthy diet and exercise  Aspirin daily      11. Acquired hypothyroidism    12. Pure hypercholesterolemia    13. Gastroesophageal reflux disease without esophagitis  [place] foods  Small meals  No late meals    14. Panhypopituitarism ([address]    15. Moderate asthma without complication, unspecified whether persistent    16. Skin lesion of face  See dermatology but  to call for removal here if not able to get there in a timely fashion      Return in about 6 months (around 12/15/2020). Alexia Luna is a 77 y.o. female being evaluated by a Virtual Visit (video visit) encounter to address concerns as mentioned above. A caregiver was present when appropriate. Due to this being a TeleHealth encounter (During [facility]- public health emergency), evaluation of the following organ systems was limited: Vitals/Constitutional/EENT/Resp/CV/GI//MS/Neuro/Skin/Heme-Lymph-Imm. Pursuant to the emergency declaration under the [address] [place] [address] authority and the [employer] Act, this Virtual Visit was conducted with patient's (and/or legal guardian's) consent, to reduce the patient's risk of exposure to COVID-19 and provide necessary medical care. The patient (and/or legal guardian) has also been advised to contact this office for worsening conditions or problems, and seek emergency medical treatment and/or call 911 if deemed necessary.      Patient identification was verified at the start of the visit: Yes    Total time spent on this encounter: 45 minutes    Services were provided through a video synchronous discussion virtually to substitute for in-person clinic visit. Patient and provider were located at their individual homes. --Ashley Tatum MD on 6/15/2020 at 11:31 AM    An electronic signature was used to authenticate this note.

## 2020-06-15 NOTE — Clinical Note
08/09/18 1640   Quick Adds   Type of Visit Initial PT Evaluation   Living Environment   Lives With alone   Living Arrangements apartment   Home Accessibility stairs within home   Number of Stairs to Enter Home 0   Number of Stairs Within Home 12   Stair Railings at Home inside, present at both sides   Self-Care   Usual Activity Tolerance good   Current Activity Tolerance moderate   Regular Exercise yes   Activity/Exercise Type walking  (2-3 miles)   Equipment Currently Used at Home none  (FWW and crutches at home but does not use)   Functional Level Prior   Ambulation 0-->independent   Transferring 0-->independent   Fall history within last six months no   Which of the above functional risks had a recent onset or change? ambulation;transferring   General Information   Onset of Illness/Injury or Date of Surgery - Date 08/09/18   Referring Physician Chester Ortiz MD   Patient/Family Goals Statement return home   Pertinent History of Current Problem (include personal factors and/or comorbidities that impact the POC) Pt admitted s/p R anterior approach CATRACHITO. PMH includes: history of DVT, acute DVT of distal vein of L LE, anticoagulation management, chronic L shoulder pain, alcoholism, depression and anxiety, hypotestosteronism, hypothyroidism, hyperlipidemia, persistent insomnia   Precautions/Limitations fall precautions   Weight-Bearing Status - LLE full weight-bearing   Weight-Bearing Status - RLE weight-bearing as tolerated   Cognitive Status Examination   Orientation person;place   Level of Consciousness alert   Follows Commands and Answers Questions 100% of the time;able to follow single-step instructions   Personal Safety and Judgment intact   Pain Assessment   Patient Currently in Pain Yes, see Vital Sign flowsheet   Posture    Posture Forward head position   Range of Motion (ROM)   ROM Quick Adds No deficits were identified   Strength   Strength Comments functional antigravity strength in B LE   Bed  Call [facility] in [address] to see if they need an end date of disease or end date of the handicap placard "Mobility   Bed Mobility Comments independent moving supine to sitting EOB and back to supine. able to boost self in bed   Transfer Skills   Transfer Comments Sit <> stand with CGA and FWW support in standing    Gait   Gait Comments Pt ambulated 10' in room with CGA and FWW. Pt demonstrated decreased francisco javier and small step length bilaterally.    Balance   Balance Comments mild unsteadiness noted when turning to back up to bed prior to sitting   General Therapy Interventions   Planned Therapy Interventions bed mobility training;gait training;strengthening;transfer training   Clinical Impression   Criteria for Skilled Therapeutic Intervention yes, treatment indicated   PT Diagnosis difficulty ambulating    Influenced by the following impairments pain, weakness   Functional limitations due to impairments ambulation, transfers   Clinical Presentation Stable/Uncomplicated   Clinical Presentation Rationale medically stable   Clinical Decision Making (Complexity) Low complexity   Therapy Frequency` 2 times/day   Predicted Duration of Therapy Intervention (days/wks) 2 days   Anticipated Discharge Disposition Home   Risk & Benefits of therapy have been explained Yes   Patient, Family & other staff in agreement with plan of care Yes   [facility] [employer] TM \"6 Clicks\"   2016, Trustees of [facility], under license to [employer].  All rights reserved.   6 Clicks Short Forms Basic Mobility Inpatient Short Form   [facility] [employer]  \"6 Clicks\" V.2 Basic Mobility Inpatient Short Form   1. Turning from your back to your side while in a flat bed without using bedrails? 4 - None   2. Moving from lying on your back to sitting on the side of a flat bed without using bedrails? 4 - None   3. Moving to and from a bed to a chair (including a wheelchair)? 3 - A Little   4. Standing up from a chair using your arms (e.g., wheelchair, or bedside chair)? 3 - A Little   5. To walk in hospital room? 3 - A Little   6. Climbing " 3-5 steps with a railing? 3 - A Little   Basic Mobility Raw Score (Score out of 24.Lower scores equate to lower levels of function) 20   Total Evaluation Time   Total Evaluation Time (Minutes) 10

## 2020-06-29 ENCOUNTER — TELEPHONE (OUTPATIENT)
Dept: NEPHROLOGY | Age: 70
End: 2020-06-29

## 2020-06-29 NOTE — TELEPHONE ENCOUNTER
Adelita Rosales called back. He said that Nida Conway does not have any UTI symptoms. He said she does not drink enough now and she definitely goes to the restroom every 2 hours. I asked if she sees a urologist. He said no. He said it is no big deal just inconvenient.

## 2020-08-10 ENCOUNTER — TELEPHONE (OUTPATIENT)
Dept: FAMILY MEDICINE CLINIC | Age: 70
End: 2020-08-10

## 2020-08-10 NOTE — TELEPHONE ENCOUNTER
Pt's  is calling to schedule to have a growth removed from her forehead that the provider is already aware of.  Please advise

## 2020-09-16 ENCOUNTER — PROCEDURE VISIT (OUTPATIENT)
Dept: FAMILY MEDICINE CLINIC | Age: 70
End: 2020-09-16
Payer: MEDICARE

## 2020-09-16 ENCOUNTER — HOSPITAL ENCOUNTER (OUTPATIENT)
Age: 70
Discharge: HOME | End: 2020-09-16
Payer: MEDICARE

## 2020-09-16 VITALS
RESPIRATION RATE: 16 BRPM | HEART RATE: 60 BPM | TEMPERATURE: 97.4 F | DIASTOLIC BLOOD PRESSURE: 84 MMHG | SYSTOLIC BLOOD PRESSURE: 128 MMHG | OXYGEN SATURATION: 97 %

## 2020-09-16 DIAGNOSIS — E87.0 HYPERNATREMIA: ICD-10-CM

## 2020-09-16 DIAGNOSIS — N17.9 ACUTE RENAL FAILURE, UNSPECIFIED ACUTE RENAL FAILURE TYPE (HCC): ICD-10-CM

## 2020-09-16 DIAGNOSIS — N18.30 CKD (CHRONIC KIDNEY DISEASE), STAGE III (HCC): ICD-10-CM

## 2020-09-16 DIAGNOSIS — I10 HTN (HYPERTENSION), BENIGN: ICD-10-CM

## 2020-09-16 PROCEDURE — 11600 EXC TR-EXT MAL+MARG 0.5 CM/<: CPT | Performed by: FAMILY MEDICINE

## 2020-09-16 PROCEDURE — 80048 BASIC METABOLIC PNL TOTAL CA: CPT

## 2020-09-16 PROCEDURE — 99215 OFFICE O/P EST HI 40 MIN: CPT | Performed by: FAMILY MEDICINE

## 2020-09-16 PROCEDURE — 36415 COLL VENOUS BLD VENIPUNCTURE: CPT

## 2020-09-16 RX ORDER — CEPHALEXIN 500 MG/1
500 CAPSULE ORAL 4 TIMES DAILY
Qty: 40 CAPSULE | Refills: 0 | Status: SHIPPED | OUTPATIENT
Start: 2020-09-16 | End: 2020-09-26

## 2020-09-16 ASSESSMENT — PATIENT HEALTH QUESTIONNAIRE - PHQ9
SUM OF ALL RESPONSES TO PHQ QUESTIONS 1-9: 0
SUM OF ALL RESPONSES TO PHQ QUESTIONS 1-9: 0
1. LITTLE INTEREST OR PLEASURE IN DOING THINGS: 0
SUM OF ALL RESPONSES TO PHQ9 QUESTIONS 1 & 2: 0
2. FEELING DOWN, DEPRESSED OR HOPELESS: 0

## 2020-09-16 NOTE — PROGRESS NOTES
Subjective:      Patient ID: Tierra Kwon is a 72 y.o. female. HPI  Chief Complaint   Patient presents with    Procedure       Here for excision but her  also points out a large scab on her head that started over a year ago with a tick. It is getting larger and now associated with more areas of scabbing on the head. Lots of sun damaged skin all over the body worse on the arms and face. Very thin skin. Weakness getting worse at home and now she is very difficult to transfer. She does not cooperate much with this and needs 2 men to help to transfer her to the exam table for excision of a large forehead lesion that is bleeding much of the time. Her  is ready to have home health come to the house to help with strengthening or he will not be able to care for her at home anymore. Review of Systems  Constitutional: Negative for fever, chills, diaphoresis, activity change, appetite change and fatigue. HENT: Negative for hearing loss, ear pain, congestion, sore throat, rhinorrhea, postnasal drip and ear discharge. Eyes: Negative for photophobia and visual disturbance. Respiratory: Negative for cough, chest tightness, shortness of breath and wheezing. Cardiovascular: Negative for chest pain and leg swelling. Gastrointestinal: Negative for nausea, vomiting, abdominal pain, diarrhea and constipation. Genitourinary: Negative for dysuria, urgency and frequency. Neurological: Negative for weakness, light-headedness and headaches. Psychiatric/Behavioral: Negative for sleep disturbance. Objective:   Physical Exam   Vitals:    09/16/20 1409   BP: 128/84   Pulse: 60   Resp: 16   Temp: 97.4 °F (36.3 °C)   SpO2: 97%     Wt Readings from Last 3 Encounters:   10/10/19 193 lb (87.5 kg)   08/20/19 210 lb (95.3 kg)   02/15/19 199 lb (90.3 kg)       Physical Exam   Constitutional: Vital signs are normal. She appears well-developed and well-nourished. She is active.    Cardiovascular: Normal rate, regular rhythm, S1 normal, S2 normal and normal heart sounds. Exam reveals no gallop. No murmur heard. Pulmonary/Chest: Effort normal and breath sounds normal. No respiratory distress. She has no wheezes. She has no rhonchi. She has no rales. Abdominal: Soft. Normal appearance and bowel sounds are normal. She exhibits no distension and no mass. There is no hepatosplenomegaly. No tenderness. She has no rigidity, no rebound and no guarding. No hernia. Musculoskeletal:        Right lower leg: She exhibits no edema. Left lower leg: She exhibits no edema. Neurological: She is alert. Skin:  Large scab on the top of the head. I do not every recall this present in the past.  On exam there is no pain but there is purulent drainage that is cultured. Much sun damaged skin all over the body with possible early squamous cell cancers on the arms. Procedure:  Jeffery Pulido is a 72 y.o. female who presents for lesion removal. We have already discussed this procedure, including option of not performing surgery, technique of surgery and potential for scarring at a recent visit. OBJECTIVE:   Patient appears well. Vitals are normal.  Skin: changing lesion on the left forehead    ASSESSMENT: Bleeding skin lesion on the left forehead for the past 3 months.  reports it is the size of a dime and raised. PLAN:   After informed consent was obtained, using Betadine for cleansing and 1% Lidocaine with epinephrine for anesthetic, with sterile technique, elliptical excision in total was performed with 3 mm margin. The ski is closed with 4-0 ethilon and 5 interrupted sutures. Antibiotic dressing is applied, and wound care instructions provided. Be alert for any signs of cutaneous infection. The procedure was well tolerated without complications. Follow up: the specimen is labeled and sent to pathology for evaluation, return for suture removal in 14 days.       Assessment:      Thomas Savage was seen today for procedure. Diagnoses and all orders for this visit:    Sun-damaged skin  -     Keagan Fry MD, Dermatology, [facility]    Abscess, scalp  Kori Cabrera MD, Dermatology, [place]  -     cephALEXin [facility]) 500 MG capsule; Take 1 capsule by mouth 4 times daily for 10 days  -     Culture, Aerobic    Skin lesion of face  -     Keagan Fry MD, Dermatology, [facility] [place]note 2][facility]  -     Surgical Pathology    Chronic diastolic congestive heart failure [facility])  -     [address] [facility]'s    Primary osteoarthritis involving multiple joints  -     [address]'s    Left hemiparesis [facility])  -     [address] [facility]'s    History of CVA with residual deficit  -     [address] [address] area clean and use antibiotic ointment BID   Watch for redness swelling or drainage  Open to air until exposed to dirt then cover  Suture removal in 10 days    May need to consider debridement of the scalp wound    Call or return to clinic prn if these symptoms worsen or fail to improve as anticipated.     Over 50 minutes spent with patient with >50% spent in counseling and coordination of care    Lindsey Roman MD

## 2020-09-17 LAB
ANION GAP SERPL CALCULATED.3IONS-SCNC: 12 MEQ/L (ref 8–16)
BUN BLDV-MCNC: 17 MG/DL (ref 7–22)
CALCIUM SERPL-MCNC: 9.8 MG/DL (ref 8.5–10.5)
CHLORIDE BLD-SCNC: 106 MEQ/L (ref 98–111)
CO2: 23 MEQ/L (ref 23–33)
CREAT SERPL-MCNC: 1.1 MG/DL (ref 0.4–1.2)
GFR SERPL CREATININE-BSD FRML MDRD: 49 ML/MIN/1.73M2
GLUCOSE BLD-MCNC: 103 MG/DL (ref 70–108)
POTASSIUM SERPL-SCNC: 4.2 MEQ/L (ref 3.5–5.2)
SODIUM BLD-SCNC: 141 MEQ/L (ref 135–145)

## 2020-09-18 ENCOUNTER — HOSPITAL ENCOUNTER (OUTPATIENT)
Age: 70
Setting detail: SPECIMEN
Discharge: HOME | End: 2020-09-18
Payer: MEDICARE

## 2020-09-18 DIAGNOSIS — E87.0 HYPERNATREMIA: ICD-10-CM

## 2020-09-18 DIAGNOSIS — N18.30 CKD (CHRONIC KIDNEY DISEASE), STAGE III (HCC): ICD-10-CM

## 2020-09-18 DIAGNOSIS — I10 HTN (HYPERTENSION), BENIGN: ICD-10-CM

## 2020-09-18 DIAGNOSIS — N17.9 ACUTE RENAL FAILURE, UNSPECIFIED ACUTE RENAL FAILURE TYPE (HCC): ICD-10-CM

## 2020-09-18 LAB
AMORPHOUS: NORMAL
BACTERIA: NORMAL
BILIRUBIN URINE: NEGATIVE
BLOOD, URINE: NEGATIVE
CASTS UA: NORMAL /LPF
CHARACTER, URINE: CLEAR
COLOR: YELLOW
CRYSTALS, UA: NORMAL
EPITHELIAL CELLS, UA: NORMAL /HPF
GLUCOSE, URINE: NEGATIVE MG/DL
KETONES, URINE: NEGATIVE
LEUKOCYTE ESTERASE, URINE: NEGATIVE
MUCUS: NORMAL
NITRITE, URINE: NEGATIVE
PH UA: 5.5 (ref 5–9)
PROTEIN UA: NEGATIVE MG/DL
RBC URINE: NORMAL /HPF
SPECIFIC GRAVITY UA: 1.02 (ref 1–1.03)
UROBILINOGEN, URINE: 0.2 EU/DL (ref 0.2–1)
WBC UA: NORMAL /HPF

## 2020-09-18 PROCEDURE — 82570 ASSAY OF URINE CREATININE: CPT

## 2020-09-18 PROCEDURE — 82043 UR ALBUMIN QUANTITATIVE: CPT

## 2020-09-18 PROCEDURE — 81001 URINALYSIS AUTO W/SCOPE: CPT

## 2020-09-18 PROCEDURE — 84156 ASSAY OF PROTEIN URINE: CPT

## 2020-09-19 LAB
AEROBIC CULTURE: NORMAL
CREATININE URINE: 231.3 MG/DL
CREATININE, URINE: 231.3 MG/DL
GRAM STAIN RESULT: NORMAL
MICROALBUMIN UR-MCNC: < 1.2 MG/DL
MICROALBUMIN/CREAT UR-RTO: 5 MG/G (ref 0–30)
PROTEIN, URINE: 35.5 MG/DL

## 2020-10-23 NOTE — TELEPHONE ENCOUNTER
Ramy Stern called requesting a refill on the following medications:  Requested Prescriptions     Pending Prescriptions Disp Refills    rivaroxaban (XARELTO) 15 MG TABS tablet 90 tablet 2     Sig: Take 1 tablet by mouth Daily with supper     Pharmacy verified: [facility]   . lexa      Date of last visit: 10/10/2019  Date of next visit (if applicable): 76/16/2881

## 2020-11-02 RX ORDER — BUMETANIDE 1 MG/1
1 TABLET ORAL DAILY
Qty: 90 TABLET | Refills: 1 | Status: SHIPPED | OUTPATIENT
Start: 2020-11-02 | End: 2021-11-19

## 2020-11-03 PROBLEM — E03.9 ACQUIRED HYPOTHYROIDISM: Status: RESOLVED | Noted: 2020-11-03 | Resolved: 2020-11-03

## 2020-11-11 ENCOUNTER — TELEPHONE (OUTPATIENT)
Dept: FAMILY MEDICINE CLINIC | Age: 70
End: 2020-11-11

## 2020-11-11 NOTE — TELEPHONE ENCOUNTER
Continue dry dressing , change BID and prn  If signs of infection like redness or drainage then make appointment  Call Located within [facility]

## 2020-12-21 ENCOUNTER — VIRTUAL VISIT (OUTPATIENT)
Dept: NEPHROLOGY | Age: 70
End: 2020-12-21
Payer: MEDICARE

## 2020-12-21 PROCEDURE — 99213 OFFICE O/P EST LOW 20 MIN: CPT | Performed by: INTERNAL MEDICINE

## 2020-12-21 RX ORDER — FAMOTIDINE 10 MG
10 TABLET ORAL EVERY MORNING
COMMUNITY

## 2020-12-21 NOTE — PROGRESS NOTES
[facility]        Outpatient Follow-Up note         12/21/2020 2:32 PM    Patient Name:   Yen Velez  YOB: 1950  Primary Care Physician:  Jeremías Encinas MD       TELEHEALTH EVALUATION -- Audio/Visual (During [id number] public health emergency)     Telehealth service was provided with the patient at his home and myself the physician in my [address] [facility] office and my  Yolie who has initiated the visit. Pursuant to the emergency declaration under the [facility], [facility] waiver authority and the [employer] Act, this Virtual  Visit was conducted, with patient's consent, to reduce the patient's risk of exposure to COVID-19 and provide continuity of care for an established patient. Services were provided through a video synchronous discussion virtually to substitute for in-person clinic visit. Chief Complaint / Reason for follow-up : Follow Up of CKD     Interval History :  Patient seen and examined by me. Feels well. No chest pain or SOB.  B/L leg swelling  No hospitaliations or med changes      Past History :  Past Medical History:   Diagnosis Date    Asthma     uses albuterol 1-2x per year    Atrial thrombosis     on [address]    Bursitis     right shoulder -s/p steroid injections    Cancer ([address]     Chronic diastolic heart failure (HCC)     CVA (cerebral infarction) 6/14/15    Mult. 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 Kacie Mooney    Hypothyroidism     Meningioma [facility])     3 separate meningiomas, s/p gamma knife (1/2012) , Dr. Lissette Yates at [address] nasal colonization 6/2013    Obesity (BMI 30-39. 9)     Osteoarthritis     PAF (paroxysmal atrial fibrillation) (HCC)     Panhypopituitarism (HCC)     status post resection for macroadenoma in the 1970's    Pneumonia 11/2018    Stroke [facility]) 1988    due to radiation -bleeding CVA - residual left upper and lower extremity weakness    Stroke [facility]) October 2015    Urinary incontinence      Past Surgical History:   Procedure Laterality Date    APPENDECTOMY      BACK SURGERY      BRAIN SURGERY      1970 due to pituitary mass, drained and s/p radiation    CHOLECYSTECTOMY      HYSTERECTOMY      total done for DUB    OTHER SURGICAL HISTORY  9/30/2014    LAPAROSCOPIC RUPTURED APPENDECTOMY    OTHER SURGICAL HISTORY Left 10/26/15    Evacuation and Debridement of Left Lower Leg Hematoma - Dr. Maddi Lay  3/13/2013    left    SHOULDER ARTHROPLASTY      right    TOOTH EXTRACTION  03/19/2018    TOTAL HIP ARTHROPLASTY      bilateral    TOTAL KNEE ARTHROPLASTY      right         Medications :     Outpatient Medications Marked as Taking for the 12/21/20 encounter (Virtual Visit) with Addis Ruiz MD   Medication Sig Dispense Refill    famotidine (PEPCID) 10 MG tablet Take 10 mg by mouth every morning OTC      bumetanide (BUMEX) 1 MG tablet TAKE 1 TABLET BY MOUTH DAILY (Patient taking differently: Take 0.5 mg by mouth daily ) 90 tablet 1    rivaroxaban (XARELTO) 15 MG TABS tablet Take 1 tablet by mouth Daily with supper 90 tablet 0    tolterodine (DETROL LA) 2 MG extended release capsule TAKE 1 CAPSULE BY MOUTH TWICE DAILY 180 capsule 3    potassium chloride (KLOR-CON) 10 MEQ extended release tablet TAKE 1 TABLET BY MOUTH DAILY 90 tablet 3    pantoprazole (PROTONIX) 40 MG tablet TAKE 1 TABLET BY MOUTH EVERY NIGHT 90 tablet 3    carvedilol (COREG) 12.5 MG tablet TAKE 1 TABLET BY MOUTH TWICE DAILY WITH MEALS 180 tablet 4    Calcium-Phosphorus-Vitamin D (CITRACAL +D3 PO) Take 1 tablet by mouth daily      miconazole (MICOTIN) 2 % powder Apply topically 2 times daily PRN for excoriation 45 g 1    hydrocortisone (CORTEF) 20 MG tablet Take 20 mg by mouth every morning       hydrocortisone (CORTEF) 5 MG tablet Take 10 mg by mouth nightly       Multiple Vitamins-Minerals (THERAPEUTIC MULTIVITAMIN-MINERALS) tablet Take 1 tablet by mouth daily      diphenhydrAMINE-APAP, sleep, (TYLENOL PM EXTRA STRENGTH)  MG tablet Take 1 tablet by mouth nightly       acetaminophen (TYLENOL) 325 MG tablet Take 650 mg by mouth every 6 hours as needed for Pain 1 or 2 tabs q6h prn pain      SYNTHROID 137 MCG tablet Take 1 tablet by mouth daily 30 tablet 0    simvastatin (ZOCOR) 20 MG tablet Take 1 tablet by mouth nightly 1 tablet 0       Vitals     There were no vitals taken for this visit. Wt Readings from Last 3 Encounters:   10/10/19 193 lb (87.5 kg)   08/20/19 210 lb (95.3 kg)   02/15/19 199 lb (90.3 kg)        Physical Exam   General -- no distress  Oral Mucosa -- moist  Neck --  JVD - no  Extremities -- briuses and cuts noted both legs  CNS - awake and alert   Pschy - not agitated, mood and memory normal    Due to this being a TeleHealth encounter, evaluation of the following organ systems is limited: Vitals/EENT/Resp/CV/GI//MS/Neuro/Skin/Heme-Lymph-Imm. Labs, Radiology and Tests    Labs -    2/19 3/20 9/20         Potassium 4.2 4.7 4.2         BUN 18 26 17         Creatinine 1.1 1.4 1.1         eGFR 52 35 49                     UPCR            [facility]                          Renal Ultrasound Scan -- 2016  Right kidney 10.8 cm left kidney 10.6 cm no other acute abnormality. Assessment    1. Renal - Chronic kidney disease stage III most likely secondary to senile nephrosclerosis and to some degree cardiorenal  -Creatinine overall improved   - will obtain labs and monitor renal fx . 2. Essential hypertension running well.   3. Acute on chronic diastolic congestive heart failure plan - well compensated . 4. Electrolytes within normal limits. Continue potassium while on diuretics  5. meds reviewed and D/W patient and also the   6. Follow-up in 6 months    Tests and orders placed this Encounter     Orders Placed This Encounter   Procedures    Comprehensive Metabolic Panel    Comprehensive Metabolic Panel       Yanelis Jackson M.D  [facility].

## 2020-12-28 RX ORDER — CARVEDILOL 12.5 MG/1
TABLET ORAL
Qty: 180 TABLET | Refills: 3 | Status: SHIPPED | OUTPATIENT
Start: 2020-12-28 | End: 2022-01-03

## 2021-01-15 NOTE — TELEPHONE ENCOUNTER
[facility] the [facility]:  Requested Prescriptions     Pending Prescriptions Disp Refills    rivaroxaban (XARELTO) 15 MG TABS tablet 90 tablet 0     Sig: Take 1 tablet by mouth Daily with supper     [facility]    Date of last visit: 10/10/19  Date of next visit (if applicable): 1/20/3845

## 2021-01-27 RX ORDER — PANTOPRAZOLE SODIUM 40 MG/1
TABLET, DELAYED RELEASE ORAL
Qty: 90 TABLET | Refills: 3 | Status: SHIPPED | OUTPATIENT
Start: 2021-01-27 | End: 2021-11-01

## 2021-03-08 RX ORDER — TOLTERODINE 2 MG/1
CAPSULE, EXTENDED RELEASE ORAL
Qty: 180 CAPSULE | Refills: 3 | Status: SHIPPED | OUTPATIENT
Start: 2021-03-08 | End: 2021-12-20

## 2021-03-16 ENCOUNTER — VIRTUAL VISIT (OUTPATIENT)
Dept: FAMILY MEDICINE CLINIC | Age: 71
End: 2021-03-16
Payer: MEDICARE

## 2021-03-16 DIAGNOSIS — I69.30 HISTORY OF CVA WITH RESIDUAL DEFICIT: ICD-10-CM

## 2021-03-16 DIAGNOSIS — M15.9 PRIMARY OSTEOARTHRITIS INVOLVING MULTIPLE JOINTS: ICD-10-CM

## 2021-03-16 DIAGNOSIS — E23.0 PANHYPOPITUITARISM (HCC): ICD-10-CM

## 2021-03-16 DIAGNOSIS — D32.9 MENINGIOMA (HCC): ICD-10-CM

## 2021-03-16 DIAGNOSIS — E78.00 PURE HYPERCHOLESTEROLEMIA: ICD-10-CM

## 2021-03-16 DIAGNOSIS — D68.59 HYPERCOAGULABLE STATE (HCC): ICD-10-CM

## 2021-03-16 DIAGNOSIS — I10 ESSENTIAL HYPERTENSION: ICD-10-CM

## 2021-03-16 DIAGNOSIS — I50.32 CHRONIC DIASTOLIC CONGESTIVE HEART FAILURE (HCC): Primary | ICD-10-CM

## 2021-03-16 DIAGNOSIS — E23.2 DIABETES INSIPIDUS (HCC): ICD-10-CM

## 2021-03-16 DIAGNOSIS — K21.9 GASTROESOPHAGEAL REFLUX DISEASE WITHOUT ESOPHAGITIS: ICD-10-CM

## 2021-03-16 DIAGNOSIS — I48.0 PAF (PAROXYSMAL ATRIAL FIBRILLATION) (HCC): ICD-10-CM

## 2021-03-16 DIAGNOSIS — N18.32 STAGE 3B CHRONIC KIDNEY DISEASE (HCC): ICD-10-CM

## 2021-03-16 DIAGNOSIS — L02.811 ABSCESS, SCALP: ICD-10-CM

## 2021-03-16 DIAGNOSIS — E66.9 OBESITY (BMI 30.0-34.9): ICD-10-CM

## 2021-03-16 DIAGNOSIS — J45.909 MODERATE ASTHMA WITHOUT COMPLICATION, UNSPECIFIED WHETHER PERSISTENT: ICD-10-CM

## 2021-03-16 DIAGNOSIS — E03.9 ACQUIRED HYPOTHYROIDISM: ICD-10-CM

## 2021-03-16 PROCEDURE — 99215 OFFICE O/P EST HI 40 MIN: CPT | Performed by: FAMILY MEDICINE

## 2021-03-16 NOTE — PROGRESS NOTES
dysuria, urgency and frequency. Neurological: Negative for weakness, light-headedness and headaches. Psychiatric/Behavioral: Negative for sleep disturbance. Prior to Visit Medications    Medication Sig Taking?  Authorizing Provider   tolterodine (DETROL LA) 2 MG extended release capsule TAKE ONE CAPSULE BY MOUTH TWICE DAILY  Alistair Francis MD   pantoprazole (PROTONIX) 40 MG tablet TAKE 1 TABLET BY MOUTH EVERY NIGHT  Alistair Francis MD   rivaroxaban (XARELTO) 15 MG TABS tablet Take 1 tablet by mouth Daily with supper  Patricia Scales MD   carvedilol (COREG) 12.5 MG tablet TAKE 1 TABLET TWICE A DAY WITH MEALS  Patricia Scales MD   famotidine (PEPCID) 10 MG tablet Take 10 mg by mouth every morning OTC  Historical Provider, MD   bumetanide (BUMEX) 1 MG tablet TAKE 1 TABLET BY MOUTH DAILY  Patient taking differently: Take 0.5 mg by mouth daily   Bernice Houser MD   potassium chloride (KLOR-CON) 10 MEQ extended release tablet TAKE 1 TABLET BY MOUTH DAILY  Bernice Houser MD   Calcium-Phosphorus-Vitamin D (CITRACAL +D3 PO) Take 1 tablet by mouth daily  Historical Provider, MD   miconazole (MICOTIN) 2 % powder Apply topically 2 times daily PRN for excoriation  Titus Edmonds MD   hydrocortisone (CORTEF) 20 MG tablet Take 20 mg by mouth every morning   Historical Provider, MD   hydrocortisone (CORTEF) 5 MG tablet Take 10 mg by mouth nightly   Historical Provider, MD   Multiple Vitamins-Minerals (THERAPEUTIC MULTIVITAMIN-MINERALS) tablet Take 1 tablet by mouth daily  Historical Provider, MD   diphenhydrAMINE-APAP, sleep, (TYLENOL PM EXTRA STRENGTH)  MG tablet Take 1 tablet by mouth nightly   Historical Provider, MD   acetaminophen (TYLENOL) 325 MG tablet Take 650 mg by mouth every 6 hours as needed for Pain 1 or 2 tabs q6h prn pain  Historical Provider, MD   SYNTHROID 137 MCG tablet Take 1 tablet by mouth daily  Surya Dao MD   simvastatin (ZOCOR) 20 MG tablet Take 1 tablet by mouth nightly  Maria L Félix TOTAL HIP ARTHROPLASTY  3/13/2013    left    SHOULDER ARTHROPLASTY      right    TOOTH EXTRACTION  03/19/2018    TOTAL HIP ARTHROPLASTY      bilateral    TOTAL KNEE ARTHROPLASTY      right    ,   Social History     Tobacco Use    Smoking status: Never Smoker    Smokeless tobacco: Never Used   Substance Use Topics    Alcohol use: No    Drug use: No   ,   Family History   Problem Relation Age of Onset    High Blood Pressure Mother     Stroke Maternal Grandmother 72   ,   Immunization History   Administered Date(s) Administered    Influenza A ([id number]) Vaccine PF IM 11/17/2009    Influenza Virus Vaccine 10/16/2014, 12/01/2016    Influenza Whole 12/01/2016    Influenza, High Dose (Fluzone 65 yrs and older) 12/03/2018    Pneumococcal Conjugate 13-valent ([id number]) 01/28/2016    Pneumococcal Polysaccharide ([id number]) 01/31/2017    Tdap (Boostrix, Adacel) 08/29/2015, 03/13/2017    Zoster Live (Zostavax) 06/08/2015   ,   Health Maintenance   Topic Date Due    COVID-19 Vaccine (1) Never done    DEXA (modify frequency per FRAX score)  Never done    Shingles Vaccine (2 of 3) 08/03/2015    Breast cancer screen  10/02/2015    Annual Wellness Visit (AWV)  Never done    TSH testing  12/30/2019    Lipid screen  03/14/2021    Flu vaccine (1) 06/30/2021 (Originally 9/1/2020)    Potassium monitoring  09/16/2021    Creatinine monitoring  09/16/2021    Colon cancer screen colonoscopy  06/12/2025    DTaP/Tdap/Td vaccine (3 - Td) 03/13/2027    Pneumococcal 65+ yrs at Risk Vaccine  Completed    Hepatitis A vaccine  Aged Out    Hepatitis B vaccine  Aged Out    Hib vaccine  Aged Out    Meningococcal (ACWY) vaccine  Aged Out    Hepatitis C screen  Discontinued       PHYSICAL EXAMINATION:  [ INSTRUCTIONS:  \"[x]\" Indicates a positive item  \"[]\" Indicates a negative item  -- DELETE ALL ITEMS NOT EXAMINED]  Vital Signs: (As obtained by patient/caregiver or practitioner observation)    Blood pressure-  Heart rate-    Respiratory rate-    Temperature-  Pulse oximetry-     Patient-Reported Vitals 3/16/2021   Patient-Reported Systolic 340   Patient-Reported Diastolic 65   Patient-Reported Pulse 60        Constitutional: [x] Appears well-developed and well-nourished [x] No apparent distress      [] Abnormal-   Mental status  [x] Alert and awake  [x] Oriented to person/place/time [x]Able to follow commands      Eyes:  EOM    [x]  Normal  [] Abnormal-  Sclera  [x]  Normal  [] Abnormal -         Discharge [x]  None visible  [] Abnormal -    HENT:   [x] Normocephalic, atraumatic. [] Abnormal   [x] Mouth/Throat: Mucous membranes are moist.     External Ears [x] Normal  [] Abnormal-     Neck: [x] No visualized mass     Pulmonary/Chest: [x] Respiratory effort normal.  [x] No visualized signs of difficulty breathing or respiratory distress        [] Abnormal-      Musculoskeletal:   [x] Normal gait with no signs of ataxia         [x] Normal range of motion of neck        [] Abnormal-       Neurological:        [x] No Facial Asymmetry (Cranial nerve 7 motor function) (limited exam to video visit)          [x] No gaze palsy        [] Abnormal-         Skin:        [x] No significant exanthematous lesions or discoloration noted on facial skin         [] Abnormal-            Psychiatric:       [x] Normal Affect [x] No Hallucinations        [] Abnormal-     Other pertinent observable physical exam findings-     ASSESSMENT/PLAN:  1. Chronic diastolic congestive heart failure ([address]  Continue with cardiology    2. Abscess, scalp  Continue with dermatology    3. Primary osteoarthritis involving multiple joints  PT prn    4. History of CVA with residual deficit  PT prn    5. PAF (paroxysmal atrial fibrillation) ([address]  C/o cardiology    6. Stage 3b chronic kidney disease  C/o nephrology  - Basic Metabolic Panel; Future    7. Diabetes insipidus ([address]  C/o nephrology    8. Hypercoagulable state ([address]  Continue xarelto    9.  Meningioma [facility])  Consider recheck CT head    10. Essential hypertension  controlled    11. Acquired hypothyroidism  C/o endocrinology  - TSH With Reflex Ft4; Future    12. Pure hypercholesterolemia  controlled  - Basic Metabolic Panel; Future  - Lipid Panel; Future    13. Gastroesophageal reflux disease without esophagitis  Dent foods  Small meals  No late meals    - CBC; Future    14. Panhypopituitarism ([address]  endo    15. Moderate asthma without complication, unspecified whether persistent  Controlled on medications    16. Obesity (BMI 30.0-34. 9)  Exercise as tolerated      Return in about 6 months (around 9/16/2021). Caitlyn Nirmalagary, was evaluated through a synchronous (real-time) audio-video encounter. The patient (or guardian if applicable) is aware that this is a billable service. Verbal consent to proceed has been obtained within the past 12 months. The visit was conducted pursuant to the emergency declaration under the [address] [place] [address], [address] [place] [address] authority and the [employer] Act. Patient identification was verified, and a caregiver was present when appropriate. The patient was located in a state where the provider was credentialed to provide care. Total time spent on this encounter: Over 50 minutes spent with patient with >50% spent in counseling and coordination of care      --Meredith Brandt MD on 3/16/2021 at 8:05 PM    An electronic signature was used to authenticate this note.

## 2021-04-16 NOTE — TELEPHONE ENCOUNTER
Carol Ann Cortez called requesting a refill on the following medications:  Requested Prescriptions     Pending Prescriptions Disp Refills    rivaroxaban (XARELTO) 15 MG TABS tablet 90 tablet 0     Sig: Take 1 tablet by mouth Daily with supper     Pharmacy verified:  [facility] [address]    Date of last visit: 10/10/2019  Date of next visit (if applicable): 1/43/1322

## 2021-04-26 RX ORDER — POTASSIUM CHLORIDE 750 MG/1
10 TABLET, FILM COATED, EXTENDED RELEASE ORAL DAILY
Qty: 90 TABLET | Refills: 3 | Status: SHIPPED | OUTPATIENT
Start: 2021-04-26 | End: 2021-07-21

## 2021-06-17 ENCOUNTER — HOSPITAL ENCOUNTER (OUTPATIENT)
Dept: WOUND CARE | Age: 71
Discharge: HOME | End: 2021-06-17
Payer: MEDICARE

## 2021-06-17 VITALS
SYSTOLIC BLOOD PRESSURE: 128 MMHG | OXYGEN SATURATION: 97 % | HEART RATE: 58 BPM | RESPIRATION RATE: 16 BRPM | TEMPERATURE: 97.3 F | DIASTOLIC BLOOD PRESSURE: 59 MMHG

## 2021-06-17 DIAGNOSIS — L98.9 SCALP LESION: ICD-10-CM

## 2021-06-17 PROBLEM — L89.213 PRESSURE INJURY OF RIGHT HIP, STAGE 3 (HCC): Status: RESOLVED | Noted: 2019-01-28 | Resolved: 2021-06-17

## 2021-06-17 PROCEDURE — 99203 OFFICE O/P NEW LOW 30 MIN: CPT | Performed by: NURSE PRACTITIONER

## 2021-06-17 PROCEDURE — 99213 OFFICE O/P EST LOW 20 MIN: CPT

## 2021-06-17 RX ORDER — CLOBETASOL PROPIONATE 0.5 MG/G
OINTMENT TOPICAL ONCE
Status: CANCELLED | OUTPATIENT
Start: 2021-06-17 | End: 2021-06-17

## 2021-06-17 RX ORDER — BETAMETHASONE DIPROPIONATE 0.05 %
OINTMENT (GRAM) TOPICAL ONCE
Status: CANCELLED | OUTPATIENT
Start: 2021-06-17 | End: 2021-06-17

## 2021-06-17 RX ORDER — BACITRACIN ZINC AND POLYMYXIN B SULFATE 500; 1000 [USP'U]/G; [USP'U]/G
OINTMENT TOPICAL ONCE
Status: CANCELLED | OUTPATIENT
Start: 2021-06-17 | End: 2021-06-17

## 2021-06-17 RX ORDER — LIDOCAINE 40 MG/G
CREAM TOPICAL ONCE
Status: CANCELLED | OUTPATIENT
Start: 2021-06-17 | End: 2021-06-17

## 2021-06-17 RX ORDER — LIDOCAINE HYDROCHLORIDE 40 MG/ML
SOLUTION TOPICAL ONCE
Status: CANCELLED | OUTPATIENT
Start: 2021-06-17 | End: 2021-06-17

## 2021-06-17 RX ORDER — BACITRACIN, NEOMYCIN, POLYMYXIN B 400; 3.5; 5 [USP'U]/G; MG/G; [USP'U]/G
OINTMENT TOPICAL ONCE
Status: CANCELLED | OUTPATIENT
Start: 2021-06-17 | End: 2021-06-17

## 2021-06-17 RX ORDER — GINSENG 100 MG
CAPSULE ORAL ONCE
Status: CANCELLED | OUTPATIENT
Start: 2021-06-17 | End: 2021-06-17

## 2021-06-17 RX ORDER — LIDOCAINE HYDROCHLORIDE 20 MG/ML
JELLY TOPICAL ONCE
Status: CANCELLED | OUTPATIENT
Start: 2021-06-17 | End: 2021-06-17

## 2021-06-17 RX ORDER — GENTAMICIN SULFATE 1 MG/G
OINTMENT TOPICAL ONCE
Status: CANCELLED | OUTPATIENT
Start: 2021-06-17 | End: 2021-06-17

## 2021-06-17 RX ORDER — LIDOCAINE 50 MG/G
OINTMENT TOPICAL ONCE
Status: CANCELLED | OUTPATIENT
Start: 2021-06-17 | End: 2021-06-17

## 2021-06-17 RX ORDER — CHLORHEXIDINE GLUCONATE 4 G/100ML
SOLUTION TOPICAL
Qty: 1 BOTTLE | Refills: 2 | Status: SHIPPED | OUTPATIENT
Start: 2021-06-17

## 2021-06-17 ASSESSMENT — PAIN SCALES - GENERAL: PAINLEVEL_OUTOF10: 8

## 2021-06-17 ASSESSMENT — PAIN DESCRIPTION - PAIN TYPE: TYPE: CHRONIC PAIN

## 2021-06-17 ASSESSMENT — PAIN DESCRIPTION - LOCATION: LOCATION: LEG

## 2021-06-17 ASSESSMENT — PAIN DESCRIPTION - ORIENTATION: ORIENTATION: RIGHT

## 2021-06-17 NOTE — PROGRESS NOTES
[facility]  Consult and Procedure Note      [address] [place] RECORD NUMBER:  [id number]  AGE: 72 y.o. GENDER: female  : 1950  EPISODE DATE:  2021    SUBJECTIVE:     Chief Complaint   Patient presents with    Wound Check     scalp         HISTORY OF PRESENT ILLNESS      Sheldon Feldman is a 72 y.o. female who presents today for evaluation of scalp wounds. History obtained mostly from patient brother due to patient history of CVA. Patient has been following Dr. Harpreet Llanes for this problem for approximately 1 year. Brother states that she has been on several courses of antibiotics for this problem. She has been using Ketoconazole shampoo and mupirocin to lesions with no improvements. Review of Dr. Pratibha Khan notes she several debridements of the area with regrowth of abnormal tissue shortly after. Brother states a shave biopsy was performed, revealed lobular capillary hemangioma  He states that this area excised by Dr. Harpreet Llanes. He denies drainage from lesions. Patient does have history of Basal cell carcinoma, had lesion removed from her face by PCP 2020. No further needs or concerns identified.       PAST MEDICAL HISTORY             Diagnosis Date    Asthma     uses albuterol 1-2x per year    Atrial thrombosis     on [address]    Bursitis     right shoulder -s/p steroid injections    Cancer (HCC)     Chronic diastolic heart failure (HCC)     CVA (cerebral infarction) 6/14/15    Mult. 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 Medina Ann    Hypothyroidism     Meningioma [facility])     3 separate meningiomas, s/p gamma knife (2012) , Dr. uSssy Kidd at [facility] 0 cm^3 06/17/21 1142   Wound Assessment Eschar dry 06/17/21 1142   Drainage Amount None 06/17/21 1142   Odor None 06/17/21 1142   Jennifer-wound Assessment Intact;Dry/flaky 06/17/21 1142   Margins Attached edges 06/17/21 1142   Wound Thickness Description not for Pressure Injury Full thickness 06/17/21 1142   Number of days: 0       Wound 06/17/21 Head Left left side of scalp #3 (Active)   Wound Etiology Other 06/17/21 1142   Wound Cleansed Cleansed with saline 06/17/21 1142   Wound Length (cm) 3 cm 06/17/21 1142   Wound Width (cm) 1.5 cm 06/17/21 1142   Wound Depth (cm) 0 cm 06/17/21 1142   Wound Surface Area (cm^2) 4.5 cm^2 06/17/21 1142   Wound Volume (cm^3) 0 cm^3 06/17/21 1142   Wound Assessment Eschar dry 06/17/21 1142   Drainage Amount None 06/17/21 1142   Odor None 06/17/21 1142   Jennifer-wound Assessment Intact;Dry/flaky 06/17/21 1142   Margins Attached edges 06/17/21 1142   Wound Thickness Description not for Pressure Injury Full thickness 06/17/21 1142   Number of days: 0         LABS/IMAGING       UA: No results for input(s): SPECGRAV, PHUR, COLORU, CLARITYU, MUCUS, PROTEINU, BLOODU, RBCUA, WBCUA, BACTERIA, NITRU, GLUCOSEU, BILIRUBINUR, UROBILINOGEN, KETUA, LABCAST, LABCASTTY, AMORPHOS in the last 72 hours.     Invalid input(s): CRYSTALS  Micro:   Lab Results   Component Value Date    BC No growth-preliminary  No growth   11/14/2018       ASSESSMENT     -Scalp lesions  -History of basal cell carcinoma    Ulcer Identification:  Ulcer Type: nonhealing, nonsurgical wound of scalp    Depth of Diabetic/Pressure/Non Pressure Ulcers or Wound:  Wound, full thickness     Patient Active Problem List   Diagnosis Code    Asthma J45.909    Hyperlipidemia E78.5    Osteoarthritis M19.90    GERD (gastroesophageal reflux disease) K21.9    Meningioma ([facility]) D32.9    Mechanical loosening of prosthetic joint ([facility]) T84.039A    Hyperglycemia R73.9    SIRS (systemic inflammatory response syndrome) ([facility]) R65.10    Leukocytosis soap twice a week  - plan to have a punch biopsy at the next visit  - We will reach out to cardiology for clearance to stop the blood thinner prior to the biopsy    Wound Location: scalp      Follow up visit:   3 Weeks 7/7/21 at [address] next scheduled appointment. Please give 24 hour notice if unable to keep appointment. [phone]    If you experience any of the following, please call the Wound Care Service during business hours: Monday through Friday 8:00 am - 4:30 pm  ([phone]). *Increase in pain   *Temperature over 101   *Increase in drainage from your wound or a foul odor   *Uncontrolled swelling   *Need for compression bandage changes due to slippage, breakthrough drainage    If you need medical attention outside of business hours, please contact your Primary Care Doctor or go to the nearest emergency room.                    Electronically signed by Bryan Kramer, NIDIA - CNP on 6/17/2021 at 2:09 PM

## 2021-06-17 NOTE — PLAN OF CARE
Problem: Wound:  Goal: Will show signs of wound healing; wound closure and no evidence of infection  Description: Will show signs of wound healing; wound closure and no evidence of infection  Outcome: Ongoing   Pt. Seen today for scalp wounds see AVS for new orders. Follow up in 3 weeks. Care plan reviewed with patient and brother. Patient and brother verbalize understanding of the plan of care and contribute to goal setting.

## 2021-06-23 ENCOUNTER — TELEPHONE (OUTPATIENT)
Dept: WOUND CARE | Age: 71
End: 2021-06-23

## 2021-06-23 NOTE — TELEPHONE ENCOUNTER
----- Message from Allison Donohue sent at 6/23/2021  1:04 PM EDT -----  Regarding: Cardiology Clearance  A gentleman called in and left a voicemail stating the patient needs cardiology clearance before the punch biopsy is done, he stated the patient is to be taken off the zeralto (sp?) a couple days prior, and cardiology needs that cleared through us? [facility] # [phone] for the patient.

## 2021-06-24 ENCOUNTER — TELEPHONE (OUTPATIENT)
Dept: WOUND CARE | Age: 71
End: 2021-06-24

## 2021-06-24 DIAGNOSIS — L98.9 SCALP LESION: ICD-10-CM

## 2021-06-24 NOTE — TELEPHONE ENCOUNTER
Pt is needing to hold Xarelto prior to procedure. Pt has not been seen since 10/10/19. appt scheduled for 7/6/21 to see Dr. Alejandro An.

## 2021-06-24 NOTE — TELEPHONE ENCOUNTER
Called and left voicemail with Dr. Jeter Fuelling office regarding pt. Being able to stop Xarelto for biopsy on 7/7/21.

## 2021-06-25 NOTE — TELEPHONE ENCOUNTER
Record reviewed  PAF  Hx of multiple CVA    Recommend  May hold xeralto for 3 days  Recommend bridging with lovenox the 1st 2.5 days (5 doses)

## 2021-07-02 ENCOUNTER — TELEPHONE (OUTPATIENT)
Dept: WOUND CARE | Age: 71
End: 2021-07-02

## 2021-07-02 DIAGNOSIS — L98.9 SCALP LESION: ICD-10-CM

## 2021-07-02 NOTE — TELEPHONE ENCOUNTER
Patients spouse Godwin Amanda called questioning the patients anticoagulants prior to the patients scheduled biopsy on 7/14 at 230 pm. Spoke with Compa Fernandes CNP and she instructed the patient to follow up with the patients cardiologist regarding the patients anticoagulants.

## 2021-07-06 ENCOUNTER — TELEPHONE (OUTPATIENT)
Dept: CARDIOLOGY CLINIC | Age: 71
End: 2021-07-06

## 2021-07-06 ENCOUNTER — HOSPITAL ENCOUNTER (OUTPATIENT)
Age: 71
Discharge: HOME | End: 2021-07-06
Payer: MEDICARE

## 2021-07-06 ENCOUNTER — OFFICE VISIT (OUTPATIENT)
Dept: CARDIOLOGY CLINIC | Age: 71
End: 2021-07-06
Payer: MEDICARE

## 2021-07-06 VITALS
DIASTOLIC BLOOD PRESSURE: 74 MMHG | SYSTOLIC BLOOD PRESSURE: 129 MMHG | HEART RATE: 59 BPM | BODY MASS INDEX: 35.3 KG/M2 | HEIGHT: 62 IN

## 2021-07-06 DIAGNOSIS — I50.32 CHRONIC DIASTOLIC CONGESTIVE HEART FAILURE (HCC): ICD-10-CM

## 2021-07-06 DIAGNOSIS — I10 ESSENTIAL HYPERTENSION: ICD-10-CM

## 2021-07-06 DIAGNOSIS — I48.0 PAF (PAROXYSMAL ATRIAL FIBRILLATION) (HCC): ICD-10-CM

## 2021-07-06 DIAGNOSIS — N18.31 STAGE 3A CHRONIC KIDNEY DISEASE (HCC): ICD-10-CM

## 2021-07-06 DIAGNOSIS — Z91.199 NON-COMPLIANCE: ICD-10-CM

## 2021-07-06 DIAGNOSIS — R60.0 BILATERAL LEG EDEMA: ICD-10-CM

## 2021-07-06 DIAGNOSIS — E78.00 PURE HYPERCHOLESTEROLEMIA: ICD-10-CM

## 2021-07-06 DIAGNOSIS — I50.32 CHRONIC DIASTOLIC CONGESTIVE HEART FAILURE (HCC): Primary | ICD-10-CM

## 2021-07-06 DIAGNOSIS — Z01.818 PRE-OP EVALUATION: ICD-10-CM

## 2021-07-06 LAB
ALBUMIN SERPL-MCNC: 3.8 G/DL (ref 3.5–5.1)
ALP BLD-CCNC: 89 U/L (ref 38–126)
ALT SERPL-CCNC: 17 U/L (ref 11–66)
ANION GAP SERPL CALCULATED.3IONS-SCNC: 13 MEQ/L (ref 8–16)
AST SERPL-CCNC: 25 U/L (ref 5–40)
BILIRUB SERPL-MCNC: 0.4 MG/DL (ref 0.3–1.2)
BILIRUBIN DIRECT: < 0.2 MG/DL (ref 0–0.3)
BUN BLDV-MCNC: 17 MG/DL (ref 7–22)
CALCIUM SERPL-MCNC: 9.9 MG/DL (ref 8.5–10.5)
CHLORIDE BLD-SCNC: 106 MEQ/L (ref 98–111)
CHOLESTEROL, TOTAL: 135 MG/DL (ref 100–199)
CO2: 22 MEQ/L (ref 23–33)
CREAT SERPL-MCNC: 1 MG/DL (ref 0.4–1.2)
GFR SERPL CREATININE-BSD FRML MDRD: 55 ML/MIN/1.73M2
GLUCOSE BLD-MCNC: 95 MG/DL (ref 70–108)
HDLC SERPL-MCNC: 46 MG/DL
LDL CHOLESTEROL CALCULATED: 44 MG/DL
MAGNESIUM: 2.1 MG/DL (ref 1.6–2.4)
POTASSIUM SERPL-SCNC: 5.2 MEQ/L (ref 3.5–5.2)
SODIUM BLD-SCNC: 141 MEQ/L (ref 135–145)
TOTAL PROTEIN: 7.1 G/DL (ref 6.1–8)
TRIGL SERPL-MCNC: 225 MG/DL (ref 0–199)

## 2021-07-06 PROCEDURE — 83735 ASSAY OF MAGNESIUM: CPT

## 2021-07-06 PROCEDURE — 80053 COMPREHEN METABOLIC PANEL: CPT

## 2021-07-06 PROCEDURE — 99215 OFFICE O/P EST HI 40 MIN: CPT | Performed by: INTERNAL MEDICINE

## 2021-07-06 PROCEDURE — 82248 BILIRUBIN DIRECT: CPT

## 2021-07-06 PROCEDURE — 36415 COLL VENOUS BLD VENIPUNCTURE: CPT

## 2021-07-06 PROCEDURE — 80061 LIPID PANEL: CPT

## 2021-07-06 PROCEDURE — 93000 ELECTROCARDIOGRAM COMPLETE: CPT | Performed by: INTERNAL MEDICINE

## 2021-07-06 NOTE — PROGRESS NOTES
Chief Complaint   Patient presents with    Check-Up     ov 1 yr check up     Congestive Heart Failure    Atrial Fibrillation   , former  pt.  For chf and PAF    Hx of admission  admitted for jesse twice and seen by Nephrologist dr. Love Fontenot for JESSE and high K and med adjusted ad Dced  Last seen 10/2019    Pt here for over 1.6 yr check up     EKG  done today    Pt has upcoming biopsy 7/14/21 already cleared to hold xarelto over the phone     Sob on exertion    Denied sob, chest pain, or dizziness     occasional palpitations- better     Leg edema better    Records reviewed    Wheelchair bound after CVA      Patient Active Problem List   Diagnosis    Asthma    Hyperlipidemia    Osteoarthritis    GERD (gastroesophageal reflux disease)    Meningioma (HCC)    Mechanical loosening of prosthetic joint ([address]    Hyperglycemia    SIRS (systemic inflammatory response syndrome) (HCC)    Leukocytosis    Right sided weakness    Cerebrovascular disease    Metabolic acidosis    Sinus bradycardia    Generalized weakness    Hx of subdural hematoma    Cerebral infarction ([address]    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 ([address]    Hypercoagulable state ([address]    Fatigue    Atrial thrombosis    Microcytic anemia    Hypokalemia    Altered mental status    Postoperative hypothyroidism    Hypernatremia    Metabolic encephalopathy    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) ([address]    Hypersomnia    Long term (current) use of systemic steroids    Essential hypertension    Diabetes insipidus ([address]    Somnolence    PAF (paroxysmal atrial fibrillation) ([address]    Sixth nerve palsy of left eye    Left hemiparesis ([address]    History of CVA with residual deficit    Subdural hematoma (HCC)    JESSE (acute kidney injury) ([address]    Hyperkalemia    Gastroenteritis due to norovirus    Acute diastolic heart failure ([address]    History of stroke with residual effects    Chronic venous stasis dermatitis of both lower extremities    Normocytic anemia    Chronic diastolic congestive heart failure (HCC)    CKD (chronic kidney disease), stage IV (HCC)    Confusion    Chronic kidney disease (CKD), stage III (moderate) (HCC)    Obesity (BMI 30.0-34. 9)    Community acquired pneumonia of right lung    Acute renal failure superimposed on stage 3 chronic kidney disease (HCC)    Chronic anticoagulation    Hypoalbuminemia    Impaired mobility    Bilateral leg edema +2- better now trace    CKD (chronic kidney disease) stage 3, GFR 30-59 ml/min (HCC)    Fluid overload    Panhypopituitarism (HCC)    Hypothyroidism    Scalp lesion    Non-compliance F/u advised    Pre-op evaluation for scalp Biopsy       Past Surgical History:   Procedure Laterality Date    APPENDECTOMY      BACK SURGERY      BRAIN SURGERY      1970 due to pituitary mass, drained and s/p radiation    CHOLECYSTECTOMY      HYSTERECTOMY      total done for DUB    OTHER SURGICAL HISTORY  9/30/2014    LAPAROSCOPIC RUPTURED APPENDECTOMY    OTHER SURGICAL HISTORY Left 10/26/15    Evacuation and Debridement of Left Lower Leg Hematoma - Dr. Jessee Daugherty  3/13/2013    left    SHOULDER ARTHROPLASTY      right    TOOTH EXTRACTION  03/19/2018    TOTAL HIP ARTHROPLASTY      bilateral    TOTAL KNEE ARTHROPLASTY      right        Allergies   Allergen Reactions    Pregabalin      Other reaction(s): dizziness    Tape [Adhesive Tape] Dermatitis        Family History   Problem Relation Age of Onset    High Blood Pressure Mother     Stroke Maternal Grandmother 72        Social History     Socioeconomic History    Marital status:      Spouse name: Not on file    Number of children: 2    Years of education: Not on file    Highest education level: Not on file   Occupational History    Not on file   Tobacco Use    Smoking status: Never Smoker    Smokeless tobacco: Never Used   Vaping Use    Vaping Use: Never used   Substance and Sexual Activity    Alcohol use: No    Drug use: No    Sexual activity: Yes     Partners: Male   Other Topics Concern    Not on file   Social History Narrative    Not on file     Social Determinants of Health     Financial Resource Strain:     Difficulty of Paying Living Expenses:    Food Insecurity:     Worried About Running Out of Food in the Last Year:     [address] in the Last Year:    Transportation Needs:     Lack of Transportation (Medical):  Lack of Transportation (Non-Medical):    Physical Activity:     Days of Exercise per Week:     Minutes of Exercise per Session:    Stress:     Feeling of Stress :    Social Connections:     Frequency of Communication with Friends and Family:     Frequency of Social Gatherings with Friends and Family:     Attends [employer] Services:     Active Member of Clubs or Organizations:     Attends Club or Organization Meetings:     Marital Status:    Intimate Partner Violence:     Fear of Current or Ex-Partner:     Emotionally Abused:     Physically Abused:     Sexually Abused:        Current Outpatient Medications   Medication Sig Dispense Refill    Multiple Vitamin (MULTIVITAMIN ADULT PO) Take by mouth daily      enoxaparin (LOVENOX) 100 MG/ML injection Inject into the skin See Admin Instructions Holding xarelto, day 1 bid, day 2 bid, day 3 am only      chlorhexidine (HIBICLENS) 4 % external liquid Wash hair with small amount twice weekly.  1 Bottle 2    potassium chloride (KLOR-CON) 10 MEQ extended release tablet TAKE 1 TABLET BY MOUTH DAILY 90 tablet 3    rivaroxaban (XARELTO) 15 MG TABS tablet Take 1 tablet by mouth Daily with supper 90 tablet 0    tolterodine (DETROL LA) 2 MG extended release capsule TAKE ONE CAPSULE BY MOUTH TWICE DAILY 180 capsule 3    pantoprazole (PROTONIX) 40 MG tablet TAKE 1 TABLET BY MOUTH EVERY NIGHT 90 tablet 3    carvedilol (COREG) 12.5 MG tablet TAKE 1 TABLET TWICE A DAY WITH MEALS 180 tablet 3    famotidine (PEPCID) 10 MG tablet Take 10 mg by mouth every morning OTC      bumetanide (BUMEX) 1 MG tablet TAKE 1 TABLET BY MOUTH DAILY (Patient taking differently: Take 0.5 mg by mouth daily ) 90 tablet 1    miconazole (MICOTIN) 2 % powder Apply topically 2 times daily PRN for excoriation 45 g 1    hydrocortisone (CORTEF) 20 MG tablet Take 20 mg by mouth every morning       hydrocortisone (CORTEF) 5 MG tablet Take 10 mg by mouth nightly       Multiple Vitamins-Minerals (THERAPEUTIC MULTIVITAMIN-MINERALS) tablet Take 1 tablet by mouth daily      diphenhydrAMINE-APAP, sleep, (TYLENOL PM EXTRA STRENGTH)  MG tablet Take 1 tablet by mouth nightly       acetaminophen (TYLENOL) 325 MG tablet Take 650 mg by mouth every 6 hours as needed for Pain 1 or 2 tabs q6h prn pain      SYNTHROID 137 MCG tablet Take 1 tablet by mouth daily 30 tablet 0    simvastatin (ZOCOR) 20 MG tablet Take 1 tablet by mouth nightly 1 tablet 0     No current facility-administered medications for this visit. Review of Systems -     General ROS: negative  Psychological ROS: negative  Hematological and Lymphatic ROS: No history of blood clots or bleeding disorder. Respiratory ROS: no cough, shortness of breath, or wheezing  Cardiovascular ROS: no chest pain or dyspnea on exertion  Gastrointestinal ROS: negative  Genito-Urinary ROS: no dysuria, trouble voiding, or hematuria  Musculoskeletal ROS: negative  Neurological ROS: no TIA or stroke symptoms  Dermatological ROS: negative      Blood pressure 129/74, pulse 59, height 5' 2\" (1.575 m), not currently breastfeeding.       Physical Examination:    General appearance - alert, well appearing, and in no distress  Mental status - alert, oriented to person, place, and time  Neck - supple, no significant adenopathy, no JVD, or carotid bruits  Chest - clear to auscultation, no wheezes, rales or rhonchi, symmetric air entry  Heart - normal rate, regular rhythm, normal S1, S2, no murmurs, rubs, clicks or gallops  Abdomen - soft, nontender, nondistended, no masses or organomegaly  Neurological - alert, oriented, normal speech, no focal findings or movement disorder noted  Musculoskeletal - no joint tenderness, deformity or swelling  Extremities - peripheral pulses normal, no pedal edema, no clubbing or cyanosis  Skin - normal coloration and turgor, no rashes, no suspicious skin lesions noted    Lab  No results for input(s): CKTOTAL, CKMB, CKMBINDEX, TROPONINI in the last 72 hours.   CBC:   Lab Results   Component Value Date    WBC 11.3 03/14/2020    RBC 4.47 03/14/2020    RBC 4.45 03/08/2019    HGB 12.6 03/14/2020    HCT 38.9 03/14/2020    MCV 89.0 03/08/2019    MCH 28.5 03/08/2019    MCHC 32.1 03/08/2019    RDW 13.2 03/08/2019     03/14/2020    MPV 9.2 01/01/2019     BMP:    Lab Results   Component Value Date     09/16/2020    K 4.2 09/16/2020    K 5.5 12/31/2018     09/16/2020    CO2 23 09/16/2020    BUN 17 09/16/2020    LABALBU 3.6 11/04/2019    CREATININE 1.1 09/16/2020    CALCIUM 9.8 09/16/2020    LABGLOM 49 09/16/2020    GLUCOSE 103 09/16/2020    GLUCOSE 74 11/04/2019     Hepatic Function Panel:    Lab Results   Component Value Date    ALKPHOS 98 11/04/2019    ALKPHOS 105 12/31/2018    ALT 15 11/04/2019    AST 11 11/04/2019    PROT 5.8 11/04/2019    BILITOT 0.3 11/04/2019    BILITOT NEGATIVE 08/19/2015    BILIDIR 0.1 11/04/2019    LABALBU 3.6 11/04/2019     Magnesium:    Lab Results   Component Value Date    MG 1.9 12/31/2018     Warfarin PT/INR:  No components found for: PTPATWAR, PTINRWAR  HgBA1c:    Lab Results   Component Value Date    LABA1C 4.9 01/25/2017     FLP:    Lab Results   Component Value Date    TRIG 238 03/14/2020    HDL 37 03/14/2020    LDLCALC 51 03/14/2020    LABVLDL 59 11/04/2019     TSH:    Lab Results   Component Value Date    TSH 0.077 12/30/2018       EKG  3/20/18    Sinus  Bradycardia  -Short DE syndrome Miguel A = 104  Rate 56 bpm  Low voltage -possible pulmonary disease. ABNORMAL     ekg 7/6/21  Sinus   Bradycardia rate 59  Low voltage in precordial leads.    -Old anterior infarct.    -  Nonspecific T-abnormality. ABNORMAL       Assessment       Diagnosis Orders   1. Chronic diastolic congestive heart failure (HCC)  Basic Metabolic Panel    Magnesium    Lipid Panel    Hepatic Function Panel   2. PAF (paroxysmal atrial fibrillation) (HCC)  Basic Metabolic Panel    Magnesium    Lipid Panel    Hepatic Function Panel   3. Pure hypercholesterolemia  Basic Metabolic Panel    Magnesium    Lipid Panel    Hepatic Function Panel   4. Essential hypertension  Basic Metabolic Panel    Magnesium    Lipid Panel    Hepatic Function Panel   5. Pre-op evaluation for scalp Biopsy  Basic Metabolic Panel    Magnesium    Lipid Panel    Hepatic Function Panel   6. Bilateral leg edema +2- better now trace  Basic Metabolic Panel    Magnesium    Lipid Panel    Hepatic Function Panel   7. Non-compliance F/u advised  Basic Metabolic Panel    Magnesium    Lipid Panel    Hepatic Function Panel   8. Stage 3a chronic kidney disease (HCC)  Basic Metabolic Panel    Magnesium    Lipid Panel    Hepatic Function Panel         Plan   Hx of hyperkalemia nd JESSE  Has been off standing dose of lisinopril,bumex and kcl- now on  Bumex low dose    The current meds and labs reviewed    PAF  Hx of CVA-multiple CVA  Cont coreg  Cont xeralto for now  And consider to change to apixaban for CKD  On xarelto to 15 mg po qd due to CKD stage IV    Hx of CVA  And wheelchir bound    Congestive heart failure: no evidence of fluid overload today, no recent hospitalization for CHF  CKD and need med adjustment after BMP  BMP prior to visit to nephrology  Leg edema +2  And now trace better  Cont bumex 0.5 po qd  Cont KCL  10 po qd    To have scalp Bx  My hold xeralto and bridged with lovenox    Hypertension, on medical treatment. Seems to be under good control.  Patient is compliant with medical treatment. Hyperlipidemia: on statins, followed periodically. Patient need periodic lipid and liver profile. NO hx of CAD    CKD III to see Nephrology     Noncompliance with f/u advised    Need labs asap  BMP and mg  LP and LFT    Advised to f/u with nephrology    Discussed use, benefit, and side effects of prescribed medications. All patient questions answered. Pt voiced understanding. Instructed to continue current medications, diet and exercise. Continue risk factor modification and medical management. Patient agreed with treatment plan. Follow up as directed.       I spent 40 minutes involved in face-to-face discussion of medical issues, prognosis, record review  and plan with the patient today and more than 50% of the time was spent on counseling and coordination of care        RTC in 3 months      Lianey Hauser, [facility]

## 2021-07-14 ENCOUNTER — HOSPITAL ENCOUNTER (OUTPATIENT)
Dept: WOUND CARE | Age: 71
Discharge: HOME | End: 2021-07-14
Payer: MEDICARE

## 2021-07-14 VITALS
OXYGEN SATURATION: 98 % | RESPIRATION RATE: 16 BRPM | TEMPERATURE: 97.3 F | SYSTOLIC BLOOD PRESSURE: 130 MMHG | DIASTOLIC BLOOD PRESSURE: 60 MMHG | HEART RATE: 61 BPM

## 2021-07-14 DIAGNOSIS — L98.9 SCALP LESION: Primary | ICD-10-CM

## 2021-07-14 PROCEDURE — 11104 PUNCH BX SKIN SINGLE LESION: CPT | Performed by: NURSE PRACTITIONER

## 2021-07-14 PROCEDURE — 2500000003 HC RX 250 WO HCPCS: Performed by: NURSE PRACTITIONER

## 2021-07-14 PROCEDURE — 99214 OFFICE O/P EST MOD 30 MIN: CPT | Performed by: NURSE PRACTITIONER

## 2021-07-14 PROCEDURE — 87075 CULTR BACTERIA EXCEPT BLOOD: CPT

## 2021-07-14 PROCEDURE — 88305 TISSUE EXAM BY PATHOLOGIST: CPT

## 2021-07-14 PROCEDURE — 11105 PUNCH BX SKIN EA SEP/ADDL: CPT

## 2021-07-14 PROCEDURE — 11104 PUNCH BX SKIN SINGLE LESION: CPT

## 2021-07-14 PROCEDURE — 87205 SMEAR GRAM STAIN: CPT

## 2021-07-14 PROCEDURE — 87070 CULTURE OTHR SPECIMN AEROBIC: CPT

## 2021-07-14 RX ORDER — LIDOCAINE HYDROCHLORIDE AND EPINEPHRINE 10; 10 MG/ML; UG/ML
20 INJECTION, SOLUTION INFILTRATION; PERINEURAL ONCE
Status: COMPLETED | OUTPATIENT
Start: 2021-07-14 | End: 2021-07-14

## 2021-07-14 RX ORDER — RIVAROXABAN 15 MG/1
TABLET, FILM COATED ORAL
Qty: 90 TABLET | Refills: 2 | Status: SHIPPED | OUTPATIENT
Start: 2021-07-14 | End: 2022-04-13

## 2021-07-14 RX ADMIN — LIDOCAINE HYDROCHLORIDE,EPINEPHRINE BITARTRATE 20 ML: 10; .01 INJECTION, SOLUTION INFILTRATION; PERINEURAL at 14:26

## 2021-07-14 ASSESSMENT — PAIN SCALES - GENERAL: PAINLEVEL_OUTOF10: 0

## 2021-07-14 NOTE — PLAN OF CARE
Problem: Wound:  Goal: Will show signs of wound healing; wound closure and no evidence of infection  Description: Will show signs of wound healing; wound closure and no evidence of infection  Outcome: Ongoing   Pt. Seen today for scalp lesions. Biopsies taken today will go over results at next visit. Follow up in 1 week. Care plan reviewed with patient and brother. Patient and brother verbalize understanding of the plan of care and contribute to goal setting.

## 2021-07-14 NOTE — PROGRESS NOTES
[facility]  Consult and Procedure Note      [address] RECORD NUMBER:  [id number]  AGE: 72 y.o. GENDER: female  : 1950  EPISODE DATE:  2021    SUBJECTIVE:     Chief Complaint   Patient presents with    Wound Check     scalp         HISTORY OF PRESENT ILLNESS      Lizett Trimble is a 72 y.o. female who presents today for re-evaluation of scalp wounds. History obtained mostly from patient brother due to patient history of CVA. Patient has been following Dr. Veda Stephen for this problem for approximately 1 year. Brother states that she has been on several courses of antibiotics for this problem. She has been using Ketoconazole shampoo and mupirocin to lesions with no improvements. Review of Dr. Arlene Howell notes several debridements of the area with regrowth of abnormal tissue shortly after. Brother states a shave biopsy was performed, revealed lobular capillary hemangioma  He states that this area excised by Dr. Veda Stephen. Patient was provided with chlorhexidine wash at last visit. She reports occasional bloody drainage from lesions. Denies pain. Patient is on xarelto chronically for history of CVA, was bridged with Lovenox per cardiology recommendations to allow for planned biopsy today. Patient does have history of Basal cell carcinoma, had lesion removed from her face by PCP 2020. No further needs or concerns identified.       PAST MEDICAL HISTORY             Diagnosis Date    Asthma     uses albuterol 1-2x per year    Atrial thrombosis     on [address]    Bursitis     right shoulder -s/p steroid injections    Cancer (HCC)     Chronic diastolic heart failure (HCC)     CVA (cerebral infarction) 6/14/15    Mult. small acute infarctions- Left temporal, internal capsule, basal ganglia    Diabetes insipidus ([address] 1970's    borderline since 's    GERD (gastroesophageal reflux disease)     History of diabetes insipidus     History of meningioma of the brain     Hyperlipidemia     Hypertension     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 ([facility]     Dr. Taran Philip    Hypothyroidism     Meningioma [facility])     3 separate meningiomas, s/p gamma knife (1/2012) , Dr. Hiral Perdomo at [facility] MRSA nasal colonization 6/2013    Obesity (BMI 30-39. 9)     Osteoarthritis     PAF (paroxysmal atrial fibrillation) (HCC)     Panhypopituitarism (HCC)     status post resection for macroadenoma in the 1970's    Pneumonia 11/2018    Stroke [facility]) 1988    due to radiation -bleeding CVA - residual left upper and lower extremity weakness    Stroke [facility]) October 2015    Urinary incontinence        PAST SURGICAL HISTORY     Past Surgical History:   Procedure Laterality Date    APPENDECTOMY     [address] [place] due to pituitary mass, drained and s/p radiation    CHOLECYSTECTOMY      HYSTERECTOMY      total done for DUB    OTHER SURGICAL HISTORY  9/30/2014    LAPAROSCOPIC RUPTURED APPENDECTOMY    OTHER SURGICAL HISTORY Left 10/26/15    Evacuation and Debridement of Left Lower Leg Hematoma - Dr. Rob Ser  3/13/2013    left    SHOULDER ARTHROPLASTY      right    TOOTH EXTRACTION  03/19/2018    TOTAL HIP ARTHROPLASTY      bilateral    TOTAL KNEE ARTHROPLASTY      right        FAMILY HISTORY     Family History   Problem Relation Age of Onset    High Blood Pressure Mother     Stroke Maternal Grandmother 72       SOCIAL HISTORY     Social History     Tobacco Use    Smoking status: Never Smoker    Smokeless tobacco: Never Used   Vaping Use    Vaping Use: Never used   Substance Use Topics    Alcohol use: No    Drug use: No       ALLERGIES     Allergies   Allergen Reactions    Pregabalin      Other reaction(s): dizziness    Tape [Adhesive Tape] Dermatitis       MEDICATIONS     Current Outpatient Medications on File Prior to Encounter   Medication Sig Dispense Refill    Multiple Vitamin (MULTIVITAMIN ADULT PO) Take by mouth daily      enoxaparin (LOVENOX) 100 MG/ML injection Inject into the skin See Admin Instructions Holding xarelto, day 1 bid, day 2 bid, day 3 am only      chlorhexidine (HIBICLENS) 4 % external liquid Wash hair with small amount twice weekly. 1 Bottle 2    potassium chloride (KLOR-CON) 10 MEQ extended release tablet TAKE 1 TABLET BY MOUTH DAILY 90 tablet 3    tolterodine (DETROL LA) 2 MG extended release capsule TAKE ONE CAPSULE BY MOUTH TWICE DAILY 180 capsule 3    pantoprazole (PROTONIX) 40 MG tablet TAKE 1 TABLET BY MOUTH EVERY NIGHT 90 tablet 3    carvedilol (COREG) 12.5 MG tablet TAKE 1 TABLET TWICE A DAY WITH MEALS 180 tablet 3    famotidine (PEPCID) 10 MG tablet Take 10 mg by mouth every morning OTC      bumetanide (BUMEX) 1 MG tablet TAKE 1 TABLET BY MOUTH DAILY (Patient taking differently: Take 0.5 mg by mouth daily ) 90 tablet 1    miconazole (MICOTIN) 2 % powder Apply topically 2 times daily PRN for excoriation 45 g 1    hydrocortisone (CORTEF) 20 MG tablet Take 20 mg by mouth every morning       hydrocortisone (CORTEF) 5 MG tablet Take 10 mg by mouth nightly       Multiple Vitamins-Minerals (THERAPEUTIC MULTIVITAMIN-MINERALS) tablet Take 1 tablet by mouth daily      diphenhydrAMINE-APAP, sleep, (TYLENOL PM EXTRA STRENGTH)  MG tablet Take 1 tablet by mouth nightly       acetaminophen (TYLENOL) 325 MG tablet Take 650 mg by mouth every 6 hours as needed for Pain 1 or 2 tabs q6h prn pain      simvastatin (ZOCOR) 20 MG tablet Take 1 tablet by mouth nightly 1 tablet 0    SYNTHROID 137 MCG tablet Take 1 tablet by mouth daily 30 tablet 0     No current facility-administered medications on file prior to encounter.        REVIEW OF SYSTEMS:     Constitutional: Denies fever, chills, night sweats, fatigue, weight loss/gain, loss of appetite   Head: Denies headache,  dizziness, loss of consciousness  Respiratory: Denies shortness of breath, cough, wheezing, dyspnea with exertion  Cardiovascular:Denies chest pain, palpitations, edema  Gastrointestinal: Denies nausea, vomiting, constipation, diarrhea, abdominal pain   WALKER: Denies dysuria, frequency, urgency, hematuria  Musculoskeletal: +weakness  Denies joint pain, swelling , stiffness,  Endocrine: Denies polyuria, polydipsia, cold or heat intolerance  Hematology: +easy brusing or bleeding  Dermatology: +scalp lesions Denies skin rash, eczema, pruritis    PHYSICAL EXAM:     /60   Pulse 61   Temp 97.3 °F (36.3 °C) (Infrared)   Resp 16   SpO2 98%   Wt Readings from Last 3 Encounters:   10/10/19 193 lb (87.5 kg)   08/20/19 210 lb (95.3 kg)   02/15/19 199 lb (90.3 kg)     General:  Awake, alert, no apparent distress. Appears stated age  [de-identified]: conjuctivae are clear without exudate or hemorrhage, anicteric sclera, moist oral mucosa. Chest:  Respirations regular, non-labored. Chest rise and fall equal bilaterally. Neurological: Awake, alert  Psychiatric:  Appropriate mood and affect  Extremities: non-traumatic in appearance. Skin:  Warm and dry  Scalp:  Multiple crusted, dark brown and tan lesions to scalp with small amounts of bloody drainage. No drainage, fluctuance, edema or erythema noted. Wound 06/17/21 Head scalp #1 (Active)   Wound Image   07/14/21 1412   Wound Etiology Other 07/14/21 1412   Dressing Status Intact; Old drainage noted 07/14/21 1412   Wound Cleansed Cleansed with saline 07/14/21 1412   Dressing/Treatment ABD 07/14/21 1412   Wound Length (cm) 6 cm 07/14/21 1412   Wound Width (cm) 9.5 cm 07/14/21 1412   Wound Depth (cm) 0 cm 07/14/21 1412   Wound Surface Area (cm^2) 57 cm^2 07/14/21 1412   Change in Wound Size % (l*w) 0 07/14/21 1412   Wound Volume (cm^3) 0 cm^3 07/14/21 1412   Wound Assessment Eschar dry; Epithelialization 07/14/21 1412   Drainage Amount None 07/14/21 1412   Odor None 07/14/21 1412   Jennifer-wound Assessment Dry/flaky; Intact 07/14/21 1412   Margins Attached edges 07/14/21 1412   Wound Thickness Description not for Pressure Injury Full thickness 07/14/21 1412   Number of days: 27       Wound 06/17/21 Head Right right side of scalp #2 (Active)   Wound Image   07/14/21 1412   Wound Etiology Other 07/14/21 1412   Dressing Status Other (Comment) 07/14/21 1412   Wound Cleansed Cleansed with saline 07/14/21 1412   Dressing/Treatment ABD 07/14/21 1412   Wound Length (cm) 1.4 cm 07/14/21 1412   Wound Width (cm) 2.5 cm 07/14/21 1412   Wound Depth (cm) 0 cm 07/14/21 1412   Wound Surface Area (cm^2) 3.5 cm^2 07/14/21 1412   Change in Wound Size % (l*w) -55.56 07/14/21 1412   Wound Volume (cm^3) 0 cm^3 07/14/21 1412   Wound Assessment Eschar dry 07/14/21 1412   Drainage Amount None 07/14/21 1412   Odor None 07/14/21 1412   Jennifer-wound Assessment Intact;Dry/flaky 07/14/21 1412   Margins Attached edges 07/14/21 1412   Wound Thickness Description not for Pressure Injury Full thickness 07/14/21 1412   Number of days: 27         LABS/IMAGING     UA: No results for input(s): SPECGRAV, PHUR, COLORU, CLARITYU, MUCUS, PROTEINU, BLOODU, RBCUA, WBCUA, BACTERIA, NITRU, GLUCOSEU, BILIRUBINUR, UROBILINOGEN, KETUA, LABCAST, LABCASTTY, AMORPHOS in the last 72 hours.     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 Performed by: [address] [place] [address], APRN - [facility]    Consent obtained: Yes    Time out taken:  Yes    Pain control:  lidocaine 1% with epinephrine    After informed written consent was obtained, using Betadine for cleansing and 1% Lidocaine with epinephrine for anesthetic the loosely adhered devitalized tissue was removed with 10 blade and pickup forceps. With sterile technique a 5 mm punch biopsy was used to obtain a biopsy specimen of the lesion x3 locations. Hemostasis was obtained by pressure and Quickclot. Antibiotic dressing is applied, and wound care instructions provided. Be alert for any signs of cutaneous infection. The specimen is labeled and sent to pathology for evaluation. The procedure was well tolerated without complications. Wound/Ulcer Descriptions are listed under Physical Exam above. Wound/Ulcer Descriptions are Pre Debridement except measurements    Bleeding:  Minimal    Hemostasis Achieved:  by pressure    Procedural Pain:  2  / 10     Post Procedural Pain:  0 / 10     Response to treatment:  Well tolerated by patient. PLAN     Patient examined and evaluated. All available lab work, radiology studies, and progress notes pertaining to [place] reviewed prior to or during patient visit today.    -Scalp lesions- Loosely adhered devitalized tissue removed and punch biopsy was obtained as above. Skin of scalp revealed post removal of devitalized tissue showed ulcerated, friable tissue clotted blood.   Prescription sent for application of Mupirocin and dry gauze dressings twice daily until follow up visit next week at which time biopsy results will be reviewed. Leave dressing applied at clinic in place until tomorrow. No active bleeding observed from biopsy site at discharge from clinic today. If bleeding should occur recommend holding gentle pressure to area . If continues to bleed seek emergency care.     -No indications of acute infection requiring oral antibiotics at today's visit. Tissue culture of lesions sent, will await results prior to any further treatment. Topical Mupirocin ordered at this time as described above. Education provided on signs and symptoms of infection. Call clinic or seek emergency care should these occur. Follow up 1 week for re-evaulation. Call clinic with any needs or concerns prior to scheduled visit. All questions and concerns addressed prior to discharge from today's visit. Please see attached Discharge Instructions    Written patient dismissal instructions given to patient and signed by patient or POA. Treatment:   Orders Placed This Encounter   Procedures    Culture, Anaerobic and Aerobic     Standing Status:   Standing     Number of Occurrences:   1    Culture, Anaerobic and Aerobic     Standing Status:   Standing     Number of Occurrences:   1    Culture, Anaerobic and Aerobic     Standing Status:   Standing     Number of Occurrences:   1    Surgical Pathology     Standing Status:   Standing     Number of Occurrences:   1    Surgical Pathology     Standing Status:   Standing     Number of Occurrences:   1    Surgical Pathology     Standing Status:   Standing     Number of Occurrences:   1       I spent a total of 40 minutes reviewing previous notes, test results and face to face with Neo Quinn  on 7/14/2021. Greater than 50% of this time was spent on counseling, reviewing and discussing test results, answering questions, instructions on treatment and/or medications ordered, and coordinating the care based on my plan and assessment as noted.        Discharge Instructions Discharge Instructions       Visit Discharge/Physician Orders:   - punch biopsy done today we will go over results at next visit  - Apply Bactroban ointment to the areas 2 times per day and keep covered with a dry dressing  - Bactroban ointment sent to the pharmacy  - quick clot applied today leave in place until tomorrow  - hold off on using the chlorhexidine soap until monday     Wound Location: scalp        Follow up visit:   1 week 7/21/21 at [address] [place] [address] next scheduled appointment. Please give 24 hour notice if unable to keep appointment. [phone]     If you experience any of the following, please call the Wound Care Service during business hours: Monday through Friday 8:00 am - 4:30 pm  ([phone]). *Increase in pain              *Temperature over 101              *Increase in drainage from your wound or a foul odor              *Uncontrolled swelling              *Need for compression bandage changes due to slippage, breakthrough drainage     If you need medical attention outside of business hours, please contact your Primary Care Doctor or go to the nearest emergency room.          Electronically signed by NIDIA Adan CNP on 7/14/2021 at 5:08 PM

## 2021-07-18 LAB
AEROBIC CULTURE: NORMAL
ANAEROBIC CULTURE: NORMAL
GRAM STAIN RESULT: NORMAL

## 2021-07-19 LAB
AEROBIC CULTURE: NORMAL
AEROBIC CULTURE: NORMAL
ANAEROBIC CULTURE: NORMAL
ANAEROBIC CULTURE: NORMAL
GRAM STAIN RESULT: NORMAL
GRAM STAIN RESULT: NORMAL

## 2021-07-21 ENCOUNTER — HOSPITAL ENCOUNTER (OUTPATIENT)
Dept: WOUND CARE | Age: 71
Discharge: HOME | End: 2021-07-21
Payer: MEDICARE

## 2021-07-21 ENCOUNTER — OFFICE VISIT (OUTPATIENT)
Dept: NEPHROLOGY | Age: 71
End: 2021-07-21
Payer: MEDICARE

## 2021-07-21 VITALS
RESPIRATION RATE: 16 BRPM | SYSTOLIC BLOOD PRESSURE: 120 MMHG | HEART RATE: 58 BPM | OXYGEN SATURATION: 99 % | DIASTOLIC BLOOD PRESSURE: 63 MMHG | TEMPERATURE: 96 F

## 2021-07-21 VITALS
OXYGEN SATURATION: 99 % | SYSTOLIC BLOOD PRESSURE: 110 MMHG | DIASTOLIC BLOOD PRESSURE: 64 MMHG | HEART RATE: 67 BPM | TEMPERATURE: 96.8 F

## 2021-07-21 DIAGNOSIS — I10 HTN (HYPERTENSION), BENIGN: ICD-10-CM

## 2021-07-21 DIAGNOSIS — N18.31 STAGE 3A CHRONIC KIDNEY DISEASE (HCC): Primary | ICD-10-CM

## 2021-07-21 DIAGNOSIS — B07.9 VERRUCA VULGARIS: ICD-10-CM

## 2021-07-21 PROCEDURE — 99212 OFFICE O/P EST SF 10 MIN: CPT | Performed by: NURSE PRACTITIONER

## 2021-07-21 PROCEDURE — 99213 OFFICE O/P EST LOW 20 MIN: CPT

## 2021-07-21 PROCEDURE — 99213 OFFICE O/P EST LOW 20 MIN: CPT | Performed by: INTERNAL MEDICINE

## 2021-07-21 NOTE — PROGRESS NOTES
[facility]        Outpatient Follow-Up note         7/21/2021 3:31 PM    Patient Name:   Concepcion Barbour  YOB: 1950  Primary Care Physician:  Blabir Guzman MD     Chief Complaint / Reason for follow-up : Follow Up of CKD     Interval History :  Patient seen and examined by me. Feels well. No chest pain or SOB. B/L leg swelling  No hospitaliations or med changes      Past History :  Past Medical History:   Diagnosis Date    Asthma     uses albuterol 1-2x per year    Atrial thrombosis     on [address]    Bursitis     right shoulder -s/p steroid injections    Cancer ([address]     Chronic diastolic heart failure (HCC)     CVA (cerebral infarction) 6/14/15    Mult. small acute infarctions- Left temporal, internal capsule, basal ganglia    Diabetes insipidus ([address] 1970's    borderline since 1970's    GERD (gastroesophageal reflux disease)     History of diabetes insipidus     History of meningioma of the brain     Hyperlipidemia     Hypertension     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 ([address]     Dr. Jaja Lyle    Hypothyroidism     Meningioma [facility])     3 separate meningiomas, s/p gamma knife (1/2012) , Dr[facility] Hayde Dang at [facility] MRSA nasal colonization 6/2013    Obesity (BMI 30-39. 9)     Osteoarthritis     PAF (paroxysmal atrial fibrillation) (HCC)     Panhypopituitarism (HCC)     status post resection for macroadenoma in the 1970's    Pneumonia 11/2018    Stroke [facility]) 1988    due to radiation -bleeding CVA - residual left upper and lower extremity weakness    Stroke [facility]) October 2015    Urinary incontinence      Past Surgical History:   Procedure Laterality Date    APPENDECTOMY      BACK SURGERY      BRAIN SURGERY      1970 due to pituitary mass, drained and s/p radiation    CHOLECYSTECTOMY      HYSTERECTOMY      total done for DUB    OTHER SURGICAL HISTORY  9/30/2014    LAPAROSCOPIC RUPTURED APPENDECTOMY    OTHER SURGICAL HISTORY Left 10/26/15    Evacuation and Debridement of Left Lower Leg Hematoma - Dr. August Morales  3/13/2013    left    SHOULDER ARTHROPLASTY      right    TOOTH EXTRACTION  03/19/2018    TOTAL HIP ARTHROPLASTY      bilateral    TOTAL KNEE ARTHROPLASTY      right         Medications :     Outpatient Medications Marked as Taking for the 7/21/21 encounter (Office Visit) with Yanelis Jackson MD   Medication Sig Dispense Refill    XARELTO 15 MG TABS tablet TAKE 1 TABLET BY MOUTH DAILY WITH SUPPER 90 tablet 2    mupirocin (BACTROBAN) 2 % ointment Apply 2 times daily. 1 Tube 1    Multiple Vitamin (MULTIVITAMIN ADULT PO) Take by mouth daily      enoxaparin (LOVENOX) 100 MG/ML injection Inject into the skin See Admin Instructions Holding xarelto, day 1 bid, day 2 bid, day 3 am only      chlorhexidine (HIBICLENS) 4 % external liquid Wash hair with small amount twice weekly.  1 Bottle 2    potassium chloride (KLOR-CON) 10 MEQ extended release tablet TAKE 1 TABLET BY MOUTH DAILY 90 tablet 3    tolterodine (DETROL LA) 2 MG extended release capsule TAKE ONE CAPSULE BY MOUTH TWICE DAILY 180 capsule 3    pantoprazole (PROTONIX) 40 MG tablet TAKE 1 TABLET BY MOUTH EVERY NIGHT 90 tablet 3    carvedilol (COREG) 12.5 MG tablet TAKE 1 TABLET TWICE A DAY WITH MEALS 180 tablet 3    famotidine (PEPCID) 10 MG tablet Take 10 mg by mouth every morning OTC      bumetanide (BUMEX) 1 MG tablet TAKE 1 TABLET BY MOUTH DAILY (Patient taking differently: Take 0.5 mg by mouth daily ) 90 tablet 1    miconazole (MICOTIN) 2 % powder Apply topically 2 times daily PRN for excoriation 45 g 1    hydrocortisone (CORTEF) 20 MG tablet Take 20 mg by mouth every morning       hydrocortisone (CORTEF) 5 MG tablet Take 10 mg by mouth nightly       Multiple Vitamins-Minerals (THERAPEUTIC MULTIVITAMIN-MINERALS) tablet Take 1 tablet by mouth daily      diphenhydrAMINE-APAP, sleep, (TYLENOL PM EXTRA

## 2021-07-21 NOTE — PROGRESS NOTES
[facility]  Consult and Procedure Note      [address] [place] RECORD NUMBER:  [id number]  AGE: 72 y.o. GENDER: female  : 1950  EPISODE DATE:  2021    SUBJECTIVE:     Chief Complaint   Patient presents with    Wound Check     scalp         HISTORY OF PRESENT ILLNESS      Dinora Muñoz is a 72 y.o. female who presents today for re-evaluation of scalp wounds. Charlynne Mater obtained mostly from patient brother due to patient history of CVA. Georgette Escobar has been following Dr. Taran Mata for this problem for approximately 1 year. Husam Drew states that she has been on several courses of antibiotics for this problem.  She has been using Ketoconazole shampoo and mupirocin to lesions with no improvements.  Review of Dr. Fili Ramos notes several debridements of the area with regrowth of abnormal tissue shortly after. Husam Drew states a shave biopsy was performed, revealed lobular capillary hemangioma  He states that this area excised by Dr. Taran Mata. At last visit tissue culture and punch biopsy was completed. Patient seen today for re-evaluation of wounds and to review results.       PAST MEDICAL HISTORY             Diagnosis Date    Asthma     uses albuterol 1-2x per year    Atrial thrombosis     on [address]    Bursitis     right shoulder -s/p steroid injections    Cancer (HCC)     Chronic diastolic heart failure (HCC)     CVA (cerebral infarction) 6/14/15    Mult. 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 Xavier Laws    Hypothyroidism     Meningioma [facility])     3 separate meningiomas, s/p gamma knife (2012) , Dr. Zachary Carl at [facility] MRSA nasal colonization 2013    Obesity (BMI 30-39. 9)     Osteoarthritis     PAF (paroxysmal atrial fibrillation) (HCC)     Panhypopituitarism ([address]     status post resection for macroadenoma in the 1970's    Pneumonia 11/2018    Stroke [facility]) 1988    due to radiation -bleeding CVA - residual left upper and lower extremity weakness    Stroke [facility]) October 2015    Urinary incontinence        PAST SURGICAL HISTORY     Past Surgical History:   Procedure Laterality Date    APPENDECTOMY     [address] [place] due to pituitary mass, drained and s/p radiation    CHOLECYSTECTOMY      HYSTERECTOMY      total done for DUB    OTHER SURGICAL HISTORY  9/30/2014    LAPAROSCOPIC RUPTURED APPENDECTOMY    OTHER SURGICAL HISTORY Left 10/26/15    Evacuation and Debridement of Left Lower Leg Hematoma - Dr. Jory Esquivel  3/13/2013    left    SHOULDER ARTHROPLASTY      right    TOOTH EXTRACTION  03/19/2018    TOTAL HIP ARTHROPLASTY      bilateral    TOTAL KNEE ARTHROPLASTY      right        FAMILY HISTORY     Family History   Problem Relation Age of Onset    High Blood Pressure Mother     Stroke Maternal Grandmother 72       SOCIAL HISTORY     Social History     Tobacco Use    Smoking status: Never Smoker    Smokeless tobacco: Never Used   Vaping Use    Vaping Use: Never used   Substance Use Topics    Alcohol use: No    Drug use: No       ALLERGIES     Allergies   Allergen Reactions    Pregabalin      Other reaction(s): dizziness    Tape [Adhesive Tape] Dermatitis       MEDICATIONS     Current Outpatient Medications on File Prior to Encounter   Medication Sig Dispense Refill    XARELTO 15 MG TABS tablet TAKE 1 TABLET BY MOUTH DAILY WITH SUPPER 90 tablet 2    mupirocin (BACTROBAN) 2 % ointment Apply 2 times daily.  1 Tube 1    Multiple Vitamin (MULTIVITAMIN ADULT PO) Take by mouth daily      enoxaparin (LOVENOX) 100 MG/ML injection Inject into the skin See Admin Instructions Holding xarelto, day 1 bid, day 2 bid, day 3 am only      chlorhexidine (HIBICLENS) 4 % external liquid Wash hair with small amount twice weekly. 1 Bottle 2    potassium chloride (KLOR-CON) 10 MEQ extended release tablet TAKE 1 TABLET BY MOUTH DAILY 90 tablet 3    tolterodine (DETROL LA) 2 MG extended release capsule TAKE ONE CAPSULE BY MOUTH TWICE DAILY 180 capsule 3    pantoprazole (PROTONIX) 40 MG tablet TAKE 1 TABLET BY MOUTH EVERY NIGHT 90 tablet 3    carvedilol (COREG) 12.5 MG tablet TAKE 1 TABLET TWICE A DAY WITH MEALS 180 tablet 3    famotidine (PEPCID) 10 MG tablet Take 10 mg by mouth every morning OTC      bumetanide (BUMEX) 1 MG tablet TAKE 1 TABLET BY MOUTH DAILY (Patient taking differently: Take 0.5 mg by mouth daily ) 90 tablet 1    miconazole (MICOTIN) 2 % powder Apply topically 2 times daily PRN for excoriation 45 g 1    hydrocortisone (CORTEF) 20 MG tablet Take 20 mg by mouth every morning       hydrocortisone (CORTEF) 5 MG tablet Take 10 mg by mouth nightly       Multiple Vitamins-Minerals (THERAPEUTIC MULTIVITAMIN-MINERALS) tablet Take 1 tablet by mouth daily      diphenhydrAMINE-APAP, sleep, (TYLENOL PM EXTRA STRENGTH)  MG tablet Take 1 tablet by mouth nightly       acetaminophen (TYLENOL) 325 MG tablet Take 650 mg by mouth every 6 hours as needed for Pain 1 or 2 tabs q6h prn pain      SYNTHROID 137 MCG tablet Take 1 tablet by mouth daily 30 tablet 0    simvastatin (ZOCOR) 20 MG tablet Take 1 tablet by mouth nightly 1 tablet 0     No current facility-administered medications on file prior to encounter.        REVIEW OF SYSTEMS:     Constitutional: Denies fever, chills, night sweats, fatigue, weight loss/gain, loss of appetite   Head: Denies headache,  dizziness, loss of consciousness  Respiratory: Denies shortness of breath, cough, wheezing, dyspnea with exertion  Cardiovascular:Denies chest pain, palpitations, edema  Gastrointestinal: Denies nausea, vomiting, constipation, diarrhea, abdominal pain   WALKER: Denies dysuria, frequency, urgency, hematuria  Musculoskeletal: +weakness  Denies joint pain, swelling , stiffness,  Endocrine: Denies polyuria, polydipsia, cold or heat intolerance  Hematology: +easy brusing or bleeding  Dermatology: +scalp lesions Denies skin rash, eczema, pruritis    PHYSICAL EXAM:     /63   Pulse 58   Temp 96 °F (35.6 °C) (Infrared)   Resp 16   SpO2 99%   Wt Readings from Last 3 Encounters:   10/10/19 193 lb (87.5 kg)   08/20/19 210 lb (95.3 kg)   02/15/19 199 lb (90.3 kg)     General:  Awake, alert, no apparent distress.  Appears stated age  HEENT: conjuctivae are clear without exudate or hemorrhage, anicteric sclera, moist oral mucosa. Chest:  Respirations regular, non-labored.  Chest rise and fall equal bilaterally.    Neurological: Awake, alert  Psychiatric:  Appropriate mood and affect  Extremities: non-traumatic in appearance.    Skin:  Warm and dry  Scalp:  Multiple crusted, dark brown, ulcerated lesions to scalp with small amounts of bloody drainage.  No fluctuance, edema or erythema noted. Wound 06/17/21 Head scalp #1 (Active)   Wound Image   07/14/21 1412   Wound Etiology Other 07/21/21 1108   Dressing Status Intact; Old drainage noted 07/21/21 1108   Wound Cleansed Cleansed with saline 07/21/21 1108   Dressing/Treatment ABD 07/14/21 1412   Wound Length (cm) 5.5 cm 07/21/21 1108   Wound Width (cm) 8 cm 07/21/21 1108   Wound Depth (cm) 0 cm 07/21/21 1108   Wound Surface Area (cm^2) 44 cm^2 07/21/21 1108   Change in Wound Size % (l*w) 22.81 07/21/21 1108   Wound Volume (cm^3) 0 cm^3 07/21/21 1108   Wound Assessment Eschar dry; Epithelialization 07/21/21 1108   Drainage Amount Moderate 07/21/21 1108   Drainage Description Serosanguinous 07/21/21 1108   Odor None 07/21/21 1108   Jennifer-wound Assessment Dry/flaky; Intact 07/21/21 1108   Margins Attached edges 07/21/21 1108   Wound Thickness Description not for Pressure Injury Full SKIN BIOPSY, SCALP WOUND MARGIN   C) SKIN BIOPSY, SCALP WOUND BED PROXIMAL       Gross Examination:   A - The container is labeled Merissa Needs, scalp wound bed.  Received in   saline is an unoriented bit of yellow-tan tissue measuring 0.3 x 0.2 x   0.2 cm.  1 ns. B - The container is labeled Merissa Needs, scalp wound margin.  Received   in saline is a bit of white-tan tissue measuring 0.3 x 0.2 x 0.2 cm.  1   ns. C - The container is labeled Merissa Needs, scalp wound bed, proximal.   Received in saline are 2 mm bits of white-tan tissue.  1 ns. JJS/DKR:[id number]     Microscopic Examination:   A. Multiple sections of soft tissue are present for evaluation.  The   sections demonstrate acute and chronic inflammation with accompanying   fibrosis. [place] is no evidence of malignancy or other significant   abnormality noted. B. Multiple sections of skin and underlying dermal tissue are present   for evaluation.  The sections demonstrate an excoriated verruca. Shane [place]   is no evidence of malignancy or other abnormality noted. C. Multiple sections of soft tissue are examined microscopically.  The   sections contain acute and chronic inflammation and areas of fibrosis. There are areas of vascular dilatation. Shane [place] is no evidence of   malignancy. [id number]   [id number]                                                     <Sign Out Dr. Emiliano Carrera M.D., F.C.A.P. NVML/ [address] [address]  Printed on:  7/16/2021   [employer] [place] [address]   [facility].[place], [employer] [facility]   Original print date: 07/16/2021      Specimen Collected: 07/14/21 06:02 Last Resulted: 07/16/21 11:14           Component 7 d ago   Aerobic Culture No growth-preliminary No growth     Anaerobic Culture No growth-preliminary No growth     Gram Stain Result No segmented neutrophils observed. Rare epithelial cells observed. No organisms observed.      Resulting Agency [facility] Narrative  Performed by: [facility]  Source: scalp       Site: tissue scalp margin          Current Antibiotics: not stated      Specimen Collected: 07/14/21 14:39 Last Resulted: 07/19/21 10:27           Component 7 d ago   Aerobic Culture No growth-preliminary No growth     Anaerobic Culture No growth-preliminary Culture yielded very light growth of anaerobic gram positive bacilli most consistent with Cutibacterium species. Clinical correlation required. Gram Stain Result Rare segmented neutrophils observed. Rare epithelial cells observed. No organisms observed.      Resulting Agency [facility]      Narrative  Performed by: Rosy Wei.: scalp       Site: tissue wound bed proximal           Current Antibiotics: not   stated      Specimen Collected: 07/14/21 14:46 Last Resulted: 07/18/21 14:43            ASSESSMENT     -Verruca vulgaris     Patient Active Problem List   Diagnosis Code    Asthma J45.909    Hyperlipidemia E78.5    Osteoarthritis M19.90    GERD (gastroesophageal reflux disease) K21.9    Meningioma ([facility]) D32.9    Mechanical loosening of prosthetic joint ([address] T84.039A    Hyperglycemia R73.9    SIRS (systemic inflammatory response syndrome) ([facility]) R65.10    Leukocytosis D72.829    Right sided weakness R53.1    Cerebrovascular disease O03.0    Metabolic acidosis R66.8    Sinus bradycardia R00.1    Generalized weakness R53.1    Hx of subdural hematoma Z86.79    Cerebral infarction ([facility]) I63.9    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 ([facility]) E23.0, D49.7    Hypercoagulable state ([address] D68.59    Fatigue R53.83    Atrial thrombosis I51.3    Microcytic anemia D50.9    Hypokalemia E87.6    Altered mental status R41.82    Postoperative hypothyroidism E89.0    Hypernatremia K00.9    Metabolic encephalopathy Z91.84    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) ([facility]) E23.0    Hypersomnia G47.10    Long term (current) use of systemic steroids Z79.52    Essential hypertension I10    Diabetes insipidus (HCC) E23.2    Somnolence R40.0    PAF (paroxysmal atrial fibrillation) ([facility]) I48.0    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    Left hemiparesis ([facility]) G81.94    History of CVA with residual deficit I69.30    Subdural hematoma ([facility] S06.5X9A    JESSE (acute kidney injury) ([facility] N17.9    Hyperkalemia E87.5    Gastroenteritis due to norovirus A08.11    Acute diastolic heart failure ([facility]) I50.31    History of stroke with residual effects I69.30    Chronic venous stasis dermatitis of both lower extremities I87.2    Normocytic anemia D64.9    Chronic diastolic congestive heart failure ([facility]) I50.32    CKD (chronic kidney disease), stage IV (HCC) N18.4    Confusion R41.0    Chronic kidney disease (CKD), stage III (moderate) ([facility]) N18.30    Obesity (BMI 30.0-34. 9) E66.9    Community acquired pneumonia of right lung J18.9    Acute renal failure superimposed on stage 3 chronic kidney disease (HCC) N17.9, N18.30    Chronic anticoagulation Z79.01    Hypoalbuminemia E88.09    Impaired mobility Z74.09    Bilateral leg edema +2- better now trace R60.0    CKD (chronic kidney disease) stage 3, GFR 30-59 ml/min ([facility]) N18.30    Fluid overload E87.70    Panhypopituitarism ([facility]) E23.0    Hypothyroidism E03.9    Scalp lesion L98.9    Non-compliance F/u advised Z91.19    Pre-op evaluation for scalp Biopsy Z01.818    Verruca vulgaris B07.9     PLAN     Patient examined and evaluated. All available lab work, radiology studies, and progress notes pertaining to Dereck Pardo reviewed prior to or during patient visit today. -Verruca vulgaris- Culture and biopsy results reviewed with patient, her  and her brother. Recommend follow up with dermatology for ongoing care and treatment planning. Continue Mupirocin ointment to ulcerated areas as ordered for next week or until follow up with Dr. Tyson Negro. Continue Chlorhexidine wash twice weekly until evaluation by Dr. Tyson Negro.      -No indications of infectious process to wounds at today's visit. Education provided on signs and symptoms of infection. Call clinic or seek emergency care should these occur. Follow up as needed. Discharge from wound care clinic today, further plan of care per dermatology. Call clinic with any needs or concerns. All questions and concerns addressed prior to discharge from today's visit. Please see attached Discharge Instructions    Written patient dismissal instructions given to patient and signed by patient or POA. I spent a total of 18 minutes reviewing previous notes, test results and face to face with Dereck Pardo  on 7/21/2021. Greater than 50% of this time was spent on counseling, reviewing and discussing test results, answering questions, instructions on treatment and/or medications ordered, and coordinating the care based on my plan and assessment as noted. Discharge Instructions         Discharge Instructions       Visit Discharge/Physician Orders:   - Apply Bactroban ointment to the areas a few times a week  - Contact your dermatologist for wart treatments   - Continue washing the scalp with the chlorhexidine once or twice a week  - If a referral for a dermatologist is needed give us a call     Wound Location: scalp - warts        Follow up visit:   As needed      Keep next scheduled appointment. Please give 24 hour notice if unable to keep appointment. [phone]     If you experience any of the following, please call the Wound Care Service during business hours: Monday through Friday 8:00 am - 4:30 pm  ([phone]).               *Increase in pain              *Temperature over 101              *Increase in drainage from your wound or a foul odor              *Uncontrolled swelling              *Need for compression bandage changes due to slippage, breakthrough drainage     If you need medical attention outside of business hours, please contact your Primary Care Doctor or go to the nearest emergency room.               Electronically signed by NIDIA Donis CNP on 7/21/2021 at 11:35 AM

## 2021-07-21 NOTE — PLAN OF CARE
Problem: Wound:  Goal: Will show signs of wound healing; wound closure and no evidence of infection  Description: Will show signs of wound healing; wound closure and no evidence of infection  Outcome: Ongoing   Patient seen for scalp wound. Wound shows signs of proper closure and healing. No s/s of infection noted. Follow up as needed. See AVS for order changes. Care plan reviewed with patient and family. Patient and family verbalize understanding of the plan of care and contribute to goal setting.

## 2021-08-05 PROBLEM — Z01.818 PRE-OP EVALUATION: Status: RESOLVED | Noted: 2021-07-06 | Resolved: 2021-08-05

## 2021-10-11 ENCOUNTER — TELEPHONE (OUTPATIENT)
Dept: WOUND CARE | Age: 71
End: 2021-10-11

## 2021-11-01 RX ORDER — PANTOPRAZOLE SODIUM 40 MG/1
TABLET, DELAYED RELEASE ORAL
Qty: 90 TABLET | Refills: 0 | Status: SHIPPED | OUTPATIENT
Start: 2021-11-01 | End: 2022-04-29 | Stop reason: SDUPTHER

## 2021-11-19 RX ORDER — BUMETANIDE 1 MG/1
1 TABLET ORAL DAILY
Qty: 90 TABLET | Refills: 1 | Status: SHIPPED | OUTPATIENT
Start: 2021-11-19

## 2021-11-29 ENCOUNTER — TELEPHONE (OUTPATIENT)
Dept: FAMILY MEDICINE CLINIC | Age: 71
End: 2021-11-29

## 2021-11-29 DIAGNOSIS — R35.0 FREQUENT URINATION: ICD-10-CM

## 2021-11-29 DIAGNOSIS — R82.90 FOUL SMELLING URINE: Primary | ICD-10-CM

## 2021-12-01 LAB
APPEARANCE: CLEAR
BILIRUBIN: NEGATIVE
COLOR: YELLOW
GLUCOSE BLD-MCNC: NEGATIVE MG/DL
KETONES, URINE: NEGATIVE MG/DL
LEUKOCYTE ESTERASE, URINE: NEGATIVE
NITRITE, URINE: NEGATIVE
OCCULT BLOOD,URINE: NEGATIVE
PH: 6 (ref 5–8.5)
PROTEIN, URINE: NEGATIVE MG/DL
SP GRAVITY MISCELLANEOUS: 1.02 (ref 1–1.03)
UROBILINOGEN, URINE: <1.1 EU/DL

## 2021-12-16 ENCOUNTER — OFFICE VISIT (OUTPATIENT)
Dept: FAMILY MEDICINE CLINIC | Age: 71
End: 2021-12-16
Payer: MEDICARE

## 2021-12-16 VITALS
OXYGEN SATURATION: 97 % | BODY MASS INDEX: 35.3 KG/M2 | DIASTOLIC BLOOD PRESSURE: 82 MMHG | RESPIRATION RATE: 14 BRPM | SYSTOLIC BLOOD PRESSURE: 118 MMHG | HEART RATE: 61 BPM | TEMPERATURE: 97.8 F | WEIGHT: 193 LBS

## 2021-12-16 DIAGNOSIS — D68.59 HYPERCOAGULABLE STATE (HCC): ICD-10-CM

## 2021-12-16 DIAGNOSIS — K21.9 GASTROESOPHAGEAL REFLUX DISEASE WITHOUT ESOPHAGITIS: ICD-10-CM

## 2021-12-16 DIAGNOSIS — E03.9 ACQUIRED HYPOTHYROIDISM: ICD-10-CM

## 2021-12-16 DIAGNOSIS — N18.4 CKD (CHRONIC KIDNEY DISEASE), STAGE IV (HCC): ICD-10-CM

## 2021-12-16 DIAGNOSIS — M15.9 PRIMARY OSTEOARTHRITIS INVOLVING MULTIPLE JOINTS: ICD-10-CM

## 2021-12-16 DIAGNOSIS — D32.9 MENINGIOMA (HCC): ICD-10-CM

## 2021-12-16 DIAGNOSIS — I69.30 HISTORY OF CVA WITH RESIDUAL DEFICIT: ICD-10-CM

## 2021-12-16 DIAGNOSIS — E66.9 OBESITY (BMI 30.0-34.9): ICD-10-CM

## 2021-12-16 DIAGNOSIS — I50.32 CHRONIC DIASTOLIC CONGESTIVE HEART FAILURE (HCC): Primary | ICD-10-CM

## 2021-12-16 DIAGNOSIS — L98.9 SCALP LESION: ICD-10-CM

## 2021-12-16 DIAGNOSIS — E78.00 PURE HYPERCHOLESTEROLEMIA: ICD-10-CM

## 2021-12-16 DIAGNOSIS — J45.909 MODERATE ASTHMA WITHOUT COMPLICATION, UNSPECIFIED WHETHER PERSISTENT: ICD-10-CM

## 2021-12-16 DIAGNOSIS — N18.32 STAGE 3B CHRONIC KIDNEY DISEASE (HCC): ICD-10-CM

## 2021-12-16 DIAGNOSIS — I48.0 PAF (PAROXYSMAL ATRIAL FIBRILLATION) (HCC): ICD-10-CM

## 2021-12-16 DIAGNOSIS — I10 ESSENTIAL HYPERTENSION: ICD-10-CM

## 2021-12-16 DIAGNOSIS — E23.2 DIABETES INSIPIDUS (HCC): ICD-10-CM

## 2021-12-16 DIAGNOSIS — E23.0 PANHYPOPITUITARISM (HCC): ICD-10-CM

## 2021-12-16 DIAGNOSIS — G81.94 LEFT HEMIPARESIS (HCC): ICD-10-CM

## 2021-12-16 PROCEDURE — G0008 ADMIN INFLUENZA VIRUS VAC: HCPCS | Performed by: FAMILY MEDICINE

## 2021-12-16 PROCEDURE — 90694 VACC AIIV4 NO PRSRV 0.5ML IM: CPT | Performed by: FAMILY MEDICINE

## 2021-12-16 PROCEDURE — 99214 OFFICE O/P EST MOD 30 MIN: CPT | Performed by: FAMILY MEDICINE

## 2021-12-16 RX ORDER — TERBINAFINE HYDROCHLORIDE 250 MG/1
250 TABLET ORAL DAILY
Qty: 90 TABLET | Refills: 1 | Status: SHIPPED | OUTPATIENT
Start: 2021-12-16 | End: 2022-06-14

## 2021-12-16 SDOH — ECONOMIC STABILITY: FOOD INSECURITY: WITHIN THE PAST 12 MONTHS, YOU WORRIED THAT YOUR FOOD WOULD RUN OUT BEFORE YOU GOT MONEY TO BUY MORE.: NEVER TRUE

## 2021-12-16 SDOH — ECONOMIC STABILITY: FOOD INSECURITY: WITHIN THE PAST 12 MONTHS, THE FOOD YOU BOUGHT JUST DIDN'T LAST AND YOU DIDN'T HAVE MONEY TO GET MORE.: NEVER TRUE

## 2021-12-16 ASSESSMENT — SOCIAL DETERMINANTS OF HEALTH (SDOH): HOW HARD IS IT FOR YOU TO PAY FOR THE VERY BASICS LIKE FOOD, HOUSING, MEDICAL CARE, AND HEATING?: NOT HARD AT ALL

## 2021-12-16 NOTE — PROGRESS NOTES
[address] [place] [address]  Dept: [phone]  Dept Fax: [phone]  Loc: Adrian Gee is a 80 y.o. female who presents today for:  Chief Complaint   Patient presents with    6 Month Follow-Up    Nail Problem     fungus    Other     sores on head           HPI:     HPI    History of hospitalization from 12-30-18 to 1-4-19. She was then sent to [facility] for rehab and did well. She went home with Home Health and the help of her . She is only able to transfer with a walker in the home with a lot of help from her  who is also now laid up after a knee replacement. She is not strong enough to cook or clean but her  does a lot of these ADLS for her. Prn PT OT for strengthening. She has urinary frequency on a diuretic and potassium under the watch of Nephrology : Dr Suki Tobar for CKD. Cardiology follow up is scheduled as well for CHF and persistent pedal edema. She usually keeps her feet up during the day and wears wraps but can not tolerate the compression stockings. Meningioma stable as of CT in 8/2019. She has had a CVA with residual left sided weakness. Seeing endocrinology for hypothyroid and panhypopituitarism. Asthma controlled on current medications. Still seeing dermatology for multiple abscesses on her scalp. They are healing very slowly. He is using a water and vinegar spray daily to help debride and dermatology and wound clninccis following. He is offered a second opinion but refuses. Reviewed chart forpast medical history , surgical history , allergies, social history , family history and medications.     Health Maintenance   Topic Date Due    COVID-19 Vaccine (1) Never done    DEXA (modify frequency per FRAX score)  Never done    Shingles Vaccine (2 of 3) 08/03/2015    Breast cancer screen  10/02/2015   [employer] Annual Wellness Visit (AWV) Never done    TSH testing  12/30/2019    Lipid screen  07/06/2022    Potassium monitoring  07/06/2022    Creatinine monitoring  07/06/2022    Colon cancer screen colonoscopy  06/12/2025    DTaP/Tdap/Td vaccine (3 - Td or Tdap) 03/13/2027    Flu vaccine  Completed    Pneumococcal 65+ yrs at Risk Vaccine  Completed    Hepatitis A vaccine  Aged Out    Hepatitis B vaccine  Aged Out    Hib vaccine  Aged Out    Meningococcal (ACWY) vaccine  Aged Out    Hepatitis C screen  Discontinued       Subjective:      Constitutional:Negative for fever, chills, diaphoresis, activity change, appetite change and fatigue. HENT: Negative for hearing loss, ear pain, congestion, sore throat, rhinorrhea, postnasal drip and ear discharge. Eyes: Negative for photophobia and visual disturbance. Respiratory: Negative for cough, chest tightness, shortness of breath and wheezing. Cardiovascular: Negative for chest pain and leg swelling. Gastrointestinal: Negative for nausea, vomiting, abdominal pain, diarrhea and constipation. Genitourinary: Negative for dysuria, urgency and frequency. Neurological: Negative for weakness, light-headedness and headaches. Psychiatric/Behavioral: Negative for sleep disturbance.      :     Vitals:    12/16/21 1502   BP: 118/82   Site: Right Upper Arm   Position: Sitting   Cuff Size: Large Adult   Pulse: 61   Resp: 14   Temp: 97.8 °F (36.6 °C)   TempSrc: Temporal   SpO2: 97%   Weight: 193 lb (87.5 kg)     Wt Readings from Last 3 Encounters:   12/16/21 193 lb (87.5 kg)   10/10/19 193 lb (87.5 kg)   08/20/19 210 lb (95.3 kg)       Physical Exam  Physical Exam   Constitutional: Vital signs are normal. She appears well-developed and well-nourished. She is active. HENT:   Head: Normocephalic and atraumatic. Right Ear: Tympanic membrane, external ear and ear canal normal. No drainage or tenderness. Left Ear: Tympanic membrane, external ear and ear canal normal. No drainage or tenderness. Nose: Nose normal. No mucosal edema or rhinorrhea. Mouth/Throat: Uvula is midline, oropharynx is clear and moist and mucous membranes are normal. Mucous membranes are not pale. Normal dentition. No posterior oropharyngeal edema or posterior oropharyngeal erythema. Eyes: Lids are normal. Right eye exhibits no chemosis and no discharge. Left eye exhibits no chemosis and no drainage. Right conjunctiva has no hemorrhage. Left conjunctiva has no hemorrhage. Right eye exhibits normal extraocular motion. Left eye exhibits normal extraocular motion. Right pupil is round and reactive. Left pupil is round and reactive. Pupils are equal.   Cardiovascular: Normal rate, regular rhythm, S1 normal, S2 normal and normal heart sounds. Exam reveals no gallop. No murmur heard. Pulmonary/Chest: Effort normal and breath sounds normal. No respiratory distress. She has no wheezes. She has no rhonchi. She has no rales. Abdominal: Soft. Normal appearance and bowel sounds are normal. She exhibits no distension and no mass. There is no hepatosplenomegaly. No tenderness. She has no rigidity, no rebound and no guarding. No hernia. Musculoskeletal:        Right lower leg: She exhibits no edema. Left lower leg: She exhibits no edema. Neurological: She is alert. Assessment/Plan   Thad Fuller was seen today for 6 month follow-up, nail problem and other. Diagnoses and all orders for this visit:    Chronic diastolic congestive heart failure (HCC)  -     Comprehensive Metabolic Panel, Fasting; Future    Left hemiparesis (HCC)    CKD (chronic kidney disease), stage IV (HCC)  -     CBC;  Future    History of CVA with residual deficit    Primary osteoarthritis involving multiple joints    PAF (paroxysmal atrial fibrillation) (HCC)    Stage 3b chronic kidney disease ([address]    Diabetes insipidus ([address]    Essential hypertension    Meningioma (HCC)    Hypercoagulable state ([address]    Acquired hypothyroidism  -     TSH With Reflex Ft4; Future    Pure hypercholesterolemia    Gastroesophageal reflux disease without esophagitis    Obesity (BMI 30.0-34. 9)    Moderate asthma without complication, unspecified whether persistent    Panhypopituitarism (HCC)    Scalp lesion    Other orders  -     INFLUENZA, QUADV, ADJUVANTED, 65 YRS =, IM, PF, PREFILL SYR, 0.5ML (FLUAD)  -     terbinafine (LAMISIL) 250 MG tablet; Take 1 tablet by mouth daily    No change to medications   Continue healthy diet and exercise  Aspirin daily    [place] foods  Small meals  No late meals    Discussed use, benefit, and side effectsof prescribed medications. All patient questions answered. Pt voiced understanding. Reviewed health maintenance. Instructed to continue current medications, diet and exercise. Patient agreed with treatment plan. Followup as directed.      Electronically signed by Elia Magallanes MD

## 2021-12-16 NOTE — PROGRESS NOTES
Immunizations Administered     Name Date Dose Route    Influenza, Quadv, adjuvanted, 65 yrs +, IM, PF (Fluad) 12/16/2021 0.5 mL Intramuscular    Site: Deltoid- Left    Lot: [id number]    NDC: 04694-385-60          VIS GIVEN. CONSENT SIGNED  PATIENT TOLERATED WELL. Vaccine Information Sheet, \"Influenza - Inactivated\"  given to Charlene Garrett, or parent/legal guardian of  Charlene Garrett and verbalized understanding. Patient responses:    Have you ever had a reaction to a flu vaccine? No  Do you have an allergy to eggs, neomycin or polymixin? No  Do you have an allergy to Thimerosal, contact lens solution, or Merthiolate? No  Have you ever had Guillian Chatfield Syndrome? No  Do you have any current illness? No  Do you have a temperature above 100 degrees? No  Are you pregnant? No  If pregnant, permission obtained from physician? No  Do you have an active neurological disorder? No      Flu vaccine given per order. Please see immunization tab.

## 2021-12-20 RX ORDER — TOLTERODINE 2 MG/1
CAPSULE, EXTENDED RELEASE ORAL
Qty: 180 CAPSULE | Refills: 3 | Status: SHIPPED | OUTPATIENT
Start: 2021-12-20 | End: 2022-05-12

## 2021-12-21 ENCOUNTER — HOSPITAL ENCOUNTER (OUTPATIENT)
Dept: AUDIOLOGY | Age: 71
Discharge: HOME | End: 2021-12-21

## 2021-12-21 PROCEDURE — 92592 HC HEARING AID CHECK, ONE EAR: CPT | Performed by: AUDIOLOGIST

## 2021-12-21 PROCEDURE — V5267 HEARING AID SUP/ACCESS/DEV: HCPCS | Performed by: AUDIOLOGIST

## 2021-12-21 NOTE — PROGRESS NOTES
ACCOUNT #: [de-identified]    DIAGNOSIS: Asymmetrical HL (most recent audio 2016)    HEARING AID PROBLEM: Paulo Z Series i70 PRAKASH (R only, 2016). Patient's  brought aid in. C/O high battery drain recently. Replacing every couple days. Most recently heard low battery signal shortly after replacing.  purchases batteries on [employer] in large quantities. Cleaned and checked aid. Filter and smith screens clear, aid sounds good on initial check. Cleaned, vac'd, adjusted battery contacts. Replaced med open dome. No retention lock present. Aid sounds good. Reviewed battery troubleshooting tips with . Gave pack of power 1 312 tosha to try. Advised him to check expiration date on supply at home. Leave tab off for 2-5 minutes before closing battery door. He purchased 3 packs wax filters for $5. Paid $10 for out of warranty clean and check. F/U PRN.

## 2022-01-03 RX ORDER — CARVEDILOL 12.5 MG/1
TABLET ORAL
Qty: 180 TABLET | Refills: 0 | Status: SHIPPED | OUTPATIENT
Start: 2022-01-03 | End: 2022-01-05 | Stop reason: SDUPTHER

## 2022-01-05 ENCOUNTER — TELEPHONE (OUTPATIENT)
Dept: CARDIOLOGY CLINIC | Age: 72
End: 2022-01-05

## 2022-01-05 RX ORDER — CARVEDILOL 12.5 MG/1
TABLET ORAL
Qty: 60 TABLET | Refills: 0 | Status: SHIPPED | OUTPATIENT
Start: 2022-01-05 | End: 2022-04-29 | Stop reason: SDUPTHER

## 2022-01-05 NOTE — TELEPHONE ENCOUNTER
Patient's  called to check on this refill request, he said she will be out on Friday and he doesn't think they will have it in time. He wants to know if a temporary script can be sent to [facility] on [address]. He wants to know if this can just be for a week.

## 2022-01-05 NOTE — TELEPHONE ENCOUNTER
Pt  called saying they can not get the coreg from local due to having an script [facility]     Spoke with the local and they are going to call the insurance  And do an override   Pt  notified

## 2022-01-07 PROCEDURE — 99231 SBSQ HOSP IP/OBS SF/LOW 25: CPT | Performed by: NURSE PRACTITIONER

## 2022-03-11 ENCOUNTER — TELEPHONE (OUTPATIENT)
Dept: FAMILY MEDICINE CLINIC | Age: 72
End: 2022-03-11

## 2022-03-14 RX ORDER — OXYBUTYNIN CHLORIDE 15 MG/1
15 TABLET, EXTENDED RELEASE ORAL DAILY
Qty: 30 TABLET | Refills: 11 | Status: SHIPPED | OUTPATIENT
Start: 2022-03-14 | End: 2022-03-14

## 2022-03-14 RX ORDER — OXYBUTYNIN CHLORIDE 15 MG/1
15 TABLET, EXTENDED RELEASE ORAL DAILY
Qty: 90 TABLET | Refills: 1 | Status: SHIPPED | OUTPATIENT
Start: 2022-03-14 | End: 2022-10-03

## 2022-03-14 NOTE — TELEPHONE ENCOUNTER
Spoke with Karson Campos from the pharmacy who stated it's depending on pts deductible not being met. Marya Leonard said ramachandran of medication has not been changed. Marya Leonard did not have any alternatives to give but would check with pharmacist who stated oxybutynin would be least expensive for pt.

## 2022-03-14 NOTE — TELEPHONE ENCOUNTER
oxybutinin sent to the pharmacy  [facility] call to see if this is cheaper  Call patient

## 2022-04-13 RX ORDER — RIVAROXABAN 15 MG/1
TABLET, FILM COATED ORAL
Qty: 90 TABLET | Refills: 0 | Status: SHIPPED | OUTPATIENT
Start: 2022-04-13 | End: 2022-04-21

## 2022-04-14 ENCOUNTER — TELEPHONE (OUTPATIENT)
Dept: FAMILY MEDICINE CLINIC | Age: 72
End: 2022-04-14

## 2022-04-14 NOTE — TELEPHONE ENCOUNTER
Pt  called stating that pt is having heart palpitations. BP was 110/83 with HR 64. Pt is resting, non-anxious, and having no pain.

## 2022-04-21 RX ORDER — RIVAROXABAN 15 MG/1
TABLET, FILM COATED ORAL
Qty: 90 TABLET | Refills: 0 | Status: SHIPPED | OUTPATIENT
Start: 2022-04-21 | End: 2022-10-14 | Stop reason: SDUPTHER

## 2022-04-29 RX ORDER — CARVEDILOL 12.5 MG/1
TABLET ORAL
Qty: 180 TABLET | Refills: 0 | Status: SHIPPED | OUTPATIENT
Start: 2022-04-29 | End: 2022-08-12

## 2022-04-29 RX ORDER — CARVEDILOL 12.5 MG/1
TABLET ORAL
Qty: 60 TABLET | Refills: 0 | Status: SHIPPED | OUTPATIENT
Start: 2022-04-29 | End: 2022-04-29

## 2022-05-02 RX ORDER — PANTOPRAZOLE SODIUM 40 MG/1
TABLET, DELAYED RELEASE ORAL
Qty: 90 TABLET | Refills: 3 | Status: SHIPPED | OUTPATIENT
Start: 2022-05-02

## 2022-05-12 ENCOUNTER — OFFICE VISIT (OUTPATIENT)
Dept: CARDIOLOGY CLINIC | Age: 72
End: 2022-05-12
Payer: MEDICARE

## 2022-05-12 VITALS
BODY MASS INDEX: 34.19 KG/M2 | SYSTOLIC BLOOD PRESSURE: 104 MMHG | HEART RATE: 52 BPM | HEIGHT: 63 IN | DIASTOLIC BLOOD PRESSURE: 64 MMHG

## 2022-05-12 DIAGNOSIS — R60.0 BILATERAL LEG EDEMA: ICD-10-CM

## 2022-05-12 DIAGNOSIS — E78.00 PURE HYPERCHOLESTEROLEMIA: ICD-10-CM

## 2022-05-12 DIAGNOSIS — N18.31 STAGE 3A CHRONIC KIDNEY DISEASE (HCC): ICD-10-CM

## 2022-05-12 DIAGNOSIS — I50.32 CHRONIC DIASTOLIC CONGESTIVE HEART FAILURE (HCC): Primary | ICD-10-CM

## 2022-05-12 DIAGNOSIS — I10 ESSENTIAL HYPERTENSION: ICD-10-CM

## 2022-05-12 DIAGNOSIS — I48.0 PAF (PAROXYSMAL ATRIAL FIBRILLATION) (HCC): ICD-10-CM

## 2022-05-12 PROCEDURE — 99214 OFFICE O/P EST MOD 30 MIN: CPT | Performed by: INTERNAL MEDICINE

## 2022-05-12 NOTE — PROGRESS NOTES
Chief Complaint   Patient presents with    Follow-up   , former  pt.  For chf and PAF    Hx of admission  admitted for jesse twice and seen by Nephrologist dr. Pop Elizalde for JESSE and high K and med adjusted ad Dced  Last seen 10/2019      Pt here for 10 months follow up    Last ek21    Sob on exertion    Denied sob, chest pain, palpitation  or dizziness     occasional palpitations- better     Leg edema better    Records reviewed    Wheelchair bound after CVA      Patient Active Problem List   Diagnosis    Asthma    Hyperlipidemia    Osteoarthritis    GERD (gastroesophageal reflux disease)    Meningioma (HCC)    Mechanical loosening of prosthetic joint (HCC)    Hyperglycemia    SIRS (systemic inflammatory response syndrome) (HCC)    Leukocytosis    Right sided weakness    Cerebrovascular disease    Metabolic acidosis    Sinus bradycardia    Generalized weakness    Hx of subdural hematoma    [address]erebral infarction ([address]    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 ([address]    Hypercoagulable state ([address]    Fatigue    Atrial thrombosis    Microcytic anemia    Hypokalemia    Altered mental status    Postoperative hypothyroidism    Hypernatremia    Metabolic encephalopathy    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) ([address]    Hypersomnia    Long term (current) use of systemic steroids    Essential hypertension    Diabetes insipidus ([address]    Somnolence    PAF (paroxysmal atrial fibrillation) ([address]    Sixth nerve palsy of left eye    Left hemiparesis ([address]    History of CVA with residual deficit    Subdural hematoma (HCC)    JESSE (acute kidney injury) ([address]    Hyperkalemia    Gastroenteritis due to norovirus    Acute diastolic heart failure (HCC)    History of stroke with residual effects    Chronic venous stasis dermatitis of both lower extremities    Normocytic anemia    Chronic diastolic congestive heart failure (HCC)    CKD (chronic kidney disease), stage IV [facility]    Confusion    Chronic kidney disease (CKD), stage III (moderate) ([facility]    Obesity (BMI 30.0-34. 9)    Community acquired pneumonia of right lung    Acute renal failure superimposed on stage 3 chronic kidney disease (HCC)    Chronic anticoagulation    Hypoalbuminemia    Impaired mobility    Bilateral leg edema +2- better now trace    CKD (chronic kidney disease) stage 3, GFR 30-59 ml/min (HCC)    Fluid overload    Panhypopituitarism (HCC)    Hypothyroidism    Scalp lesion    Non-compliance F/u advised    Verruca vulgaris       Past Surgical History:   Procedure Laterality Date    APPENDECTOMY      BACK SURGERY      BRAIN SURGERY      1970 due to pituitary mass, drained and s/p radiation    CHOLECYSTECTOMY      HYSTERECTOMY      total done for DUB    OTHER SURGICAL HISTORY  9/30/2014    LAPAROSCOPIC RUPTURED APPENDECTOMY    OTHER SURGICAL HISTORY Left 10/26/15    Evacuation and Debridement of Left Lower Leg Hematoma - Dr. Jessee Daugherty  3/13/2013    left    SHOULDER ARTHROPLASTY      right    TOOTH EXTRACTION  03/19/2018    TOTAL HIP ARTHROPLASTY      bilateral    TOTAL KNEE ARTHROPLASTY      right        Allergies   Allergen Reactions    Pregabalin      Other reaction(s): dizziness    Tape [Adhesive Tape] Dermatitis        Family History   Problem Relation Age of Onset    High Blood Pressure Mother     Stroke Maternal Grandmother 72        Social History     Socioeconomic History    Marital status:      Spouse name: Not on file    Number of children: 2    Years of education: Not on file    Highest education level: Not on file   Occupational History    Not on file   Tobacco Use    Smoking status: Never Smoker    Smokeless tobacco: Never Used   Vaping Use    Vaping Use: Never used   Substance and Sexual Activity    Alcohol use: No    Drug use: No    Sexual activity: Yes     Partners: Male   Other Topics Concern    Not on file   Social History Narrative    Not on file     Social Determinants of Health     Financial Resource Strain: Low Risk     Difficulty of Paying Living Expenses: Not hard at all   Food Insecurity: No Food Insecurity    Worried About Running Out of Food in the Last Year: Never true    [address] in the Last Year: Never true   Transportation Needs:     Lack of Transportation (Medical): Not on file    Lack of Transportation (Non-Medical):  Not on file   Physical Activity:     Days of Exercise per Week: Not on file    Minutes of Exercise per Session: Not on file   Stress:     Feeling of Stress : Not on file   Social Connections:     Frequency of Communication with Friends and Family: Not on file    Frequency of Social Gatherings with Friends and Family: Not on file    Attends Gnosticism Services: Not on file    Active Member of Clubs or Organizations: Not on file    Attends Club or Organization Meetings: Not on file    Marital Status: Not on file   Intimate Partner Violence:     Fear of Current or Ex-Partner: Not on file    Emotionally Abused: Not on file    Physically Abused: Not on file    Sexually Abused: Not on file   Housing Stability:     Unable to Pay for Housing in the Last Year: Not on file    Number of Places Lived in the Last Year: Not on file    Unstable Housing in the Last Year: Not on file       Current Outpatient Medications   Medication Sig Dispense Refill    pantoprazole (PROTONIX) 40 MG tablet TAKE 1 TABLET BY MOUTH EVERY NIGHT 90 tablet 3    carvedilol (COREG) 12.5 MG tablet TAKE 1 TABLET BY MOUTH TWICE DAILY 180 tablet 0    XARELTO 15 MG TABS tablet TAKE 1 TABLET BY MOUTH DAILY WITH SUPPER 90 tablet 0    oxybutynin (DITROPAN XL) 15 MG extended release tablet TAKE 1 TABLET BY MOUTH DAILY 90 tablet 1    terbinafine (LAMISIL) 250 MG tablet Take 1 tablet by mouth daily 90 tablet 1    bumetanide (BUMEX) 1 MG tablet TAKE 1 TABLET BY MOUTH DAILY 90 tablet 1    Multiple Vitamin (MULTIVITAMIN ADULT PO) Take by mouth daily      chlorhexidine (HIBICLENS) 4 % external liquid Wash hair with small amount twice weekly. 1 Bottle 2    famotidine (PEPCID) 10 MG tablet Take 10 mg by mouth every morning OTC      miconazole (MICOTIN) 2 % powder Apply topically 2 times daily PRN for excoriation 45 g 1    hydrocortisone (CORTEF) 20 MG tablet Take 20 mg by mouth every morning       hydrocortisone (CORTEF) 5 MG tablet Take 10 mg by mouth nightly       Multiple Vitamins-Minerals (THERAPEUTIC MULTIVITAMIN-MINERALS) tablet Take 1 tablet by mouth daily      diphenhydrAMINE-APAP, sleep, (TYLENOL PM EXTRA STRENGTH)  MG tablet Take 1 tablet by mouth nightly       acetaminophen (TYLENOL) 325 MG tablet Take 650 mg by mouth every 6 hours as needed for Pain 1 or 2 tabs q6h prn pain      SYNTHROID 137 MCG tablet Take 1 tablet by mouth daily 30 tablet 0    simvastatin (ZOCOR) 20 MG tablet Take 1 tablet by mouth nightly 1 tablet 0     No current facility-administered medications for this visit. Review of Systems -     General ROS: negative  Psychological ROS: negative  Hematological and Lymphatic ROS: No history of blood clots or bleeding disorder. Respiratory ROS: no cough, shortness of breath, or wheezing  Cardiovascular ROS: no chest pain or dyspnea on exertion  Gastrointestinal ROS: negative  Genito-Urinary ROS: no dysuria, trouble voiding, or hematuria  Musculoskeletal ROS: negative  Neurological ROS: no TIA or stroke symptoms  Dermatological ROS: negative      Blood pressure 104/64, pulse 52, height 5' 3\" (1.6 m), not currently breastfeeding.       Physical Examination:    General appearance - alert, well appearing, and in no distress  Mental status - alert, oriented to person, place, and time  Neck - supple, no significant adenopathy, no JVD, or carotid bruits  Chest - clear to auscultation, no wheezes, rales or rhonchi, symmetric air entry  Heart - normal rate, regular rhythm, normal S1, S2, no murmurs, rubs, clicks or gallops  Abdomen - soft, nontender, nondistended, no masses or organomegaly  Neurological - alert, oriented, normal speech, no focal findings or movement disorder noted  Musculoskeletal - no joint tenderness, deformity or swelling  Extremities - peripheral pulses normal, no pedal edema, no clubbing or cyanosis  Skin - normal coloration and turgor, no rashes, no suspicious skin lesions noted    Lab  No results for input(s): CKTOTAL, CKMB, CKMBINDEX, TROPONINI in the last 72 hours.   CBC:   Lab Results   Component Value Date    WBC 11.3 03/14/2020    RBC 4.47 03/14/2020    RBC 4.45 03/08/2019    HGB 12.6 03/14/2020    HCT 38.9 03/14/2020    MCV 89.0 03/08/2019    MCH 28.5 03/08/2019    MCHC 32.1 03/08/2019    RDW 13.2 03/08/2019     03/14/2020    MPV 9.2 01/01/2019     BMP:    Lab Results   Component Value Date     07/06/2021    K 5.2 07/06/2021    K 5.5 12/31/2018     07/06/2021    CO2 22 07/06/2021    BUN 17 07/06/2021    LABALBU 3.8 07/06/2021    CREATININE 1.0 07/06/2021    CALCIUM 9.9 07/06/2021    LABGLOM 55 07/06/2021    GLUCOSE Negative 11/30/2021    GLUCOSE 74 11/04/2019     Hepatic Function Panel:    Lab Results   Component Value Date    ALKPHOS 89 07/06/2021    ALT 17 07/06/2021    AST 25 07/06/2021    PROT 7.1 07/06/2021    BILITOT Negative 11/30/2021    BILIDIR <0.2 07/06/2021    LABALBU 3.8 07/06/2021     Magnesium:    Lab Results   Component Value Date    MG 2.1 07/06/2021     Warfarin PT/INR:  No components found for: PTPATWAR, PTINRWAR  HgBA1c:    Lab Results   Component Value Date    LABA1C 4.9 01/25/2017     FLP:    Lab Results   Component Value Date    TRIG 225 07/06/2021    HDL 46 07/06/2021    LDLCALC 44 07/06/2021    LABVLDL 59 11/04/2019     TSH:    Lab Results   Component Value Date    TSH 0.077 12/30/2018       EKG  3/20/18    Sinus  Bradycardia  -Short SD syndrome   Miguel A = 104  Rate 56 bpm  Low voltage -possible pulmonary disease. ABNORMAL     ekg 7/6/21  Sinus   Bradycardia rate 59  Low voltage in precordial leads.    -Old anterior infarct.    -  Nonspecific T-abnormality. ABNORMAL       Assessment       Diagnosis Orders   1. Chronic diastolic congestive heart failure ([facility]     2. PAF (paroxysmal atrial fibrillation) ([facility]     3. Essential hypertension     4. Pure hypercholesterolemia     5. Bilateral leg edema +2- better now trace     6. Stage 3a chronic kidney disease (HCC)           Plan       Hx of hyperkalemia nd JESSE  Has been off standing dose of lisinopril,bumex and kcl- now on  Bumex low dose    The most current meds and labs reviewed    PAF  Hx of CVA-multiple CVA  Cont coreg  Cont xeralto for now  And consider to change to apixaban for CKD  On xarelto to 15 mg po qd due to CKD stage IV    Hx of CVA  And wheelchir bound    Congestive heart failure: no evidence of fluid overload today, no recent hospitalization for CHF  CKD and need med adjustment after BMP  BMP prior to visit to nephrology  Leg edema +2  And now trace better  Cont bumex  1mg   Off  KCL  10 po qd    Hypertension, on medical treatment. Seems to be under good control. Patient is compliant with medical treatment. Hyperlipidemia: on statins, followed periodically. Patient need periodic lipid and liver profile. NO hx of CAD    CKD III to see Nephrology     Noncompliance with f/u advised    Need labs asap  BMP and mg  LP and LFT    Advised to f/u with nephrology    Stable and cont the current    Discussed use, benefit, and side effects of prescribed medications. All patient questions answered. Pt voiced understanding. Instructed to continue current medications, diet and exercise. Continue risk factor modification and medical management. Patient agreed with treatment plan. Follow up as directed.       RTC in 12 months      [facility]

## 2022-05-13 LAB
ABSOLUTE BASO #: 0.1 X10E9/L (ref 0–0.2)
ABSOLUTE EOS #: 0.2 X10E9/L (ref 0–0.4)
ABSOLUTE LYMPH #: 2.2 X10E9/L (ref 1–3.5)
ABSOLUTE MONO #: 0.4 X10E9/L (ref 0–0.9)
ABSOLUTE NEUT #: 10.3 X10E9/L (ref 1.5–6.6)
ALBUMIN SERPL-MCNC: 3.9 G/DL (ref 3.2–5.3)
ALK PHOSPHATASE: 86 U/L (ref 39–130)
ALT SERPL-CCNC: 29 U/L (ref 0–31)
ANION GAP SERPL CALCULATED.3IONS-SCNC: 15 MMOL/L (ref 5–15)
AST SERPL-CCNC: 25 U/L (ref 0–41)
BASOPHILS RELATIVE PERCENT: 0.6 %
BILIRUB SERPL-MCNC: 0.6 MG/DL (ref 0.3–1.2)
BUN BLDV-MCNC: 17 MG/DL (ref 5–27)
CALCIUM SERPL-MCNC: 9.7 MG/DL (ref 8.5–10.5)
CHLORIDE BLD-SCNC: 105 MMOL/L (ref 98–109)
CO2: 24 MMOL/L (ref 22–32)
CREAT SERPL-MCNC: 0.97 MG/DL (ref 0.4–1)
EGFR AFRICAN AMERICAN: >60 ML/MIN/1.73SQ.M
EGFR IF NONAFRICAN AMERICAN: 57 ML/MIN/1.73SQ.M
EOSINOPHILS RELATIVE PERCENT: 1.2 %
GLUCOSE: 85 MG/DL (ref 65–99)
HCT VFR BLD CALC: 42.5 % (ref 35–47)
HEMOGLOBIN: 14.7 G/DL (ref 11.7–15.5)
LYMPHOCYTE %: 16.8 %
MCH RBC QN AUTO: 31.2 PG (ref 27–34)
MCHC RBC AUTO-ENTMCNC: 34.5 G/DL (ref 32–36)
MCV RBC AUTO: 91 FL (ref 80–100)
MONOCYTES # BLD: 3.1 %
NEUTROPHILS RELATIVE PERCENT: 78.3 %
PDW BLD-RTO: 14.1 % (ref 11.5–15)
PLATELETS: 272 X10E9/L (ref 150–450)
PMV BLD AUTO: 9.1 FL (ref 7–12)
POTASSIUM SERPL-SCNC: 4.5 MMOL/L (ref 3.5–5)
RBC: 4.7 X10E12/L (ref 3.8–5.2)
SODIUM BLD-SCNC: 144 MMOL/L (ref 134–146)
T4 FREE: 1.84 NG/DL (ref 0.61–1.6)
TOTAL PROTEIN: 6.3 G/DL (ref 6–8)
TSH SERPL DL<=0.05 MIU/L-ACNC: 0.02 UIU/ML (ref 0.49–4.67)
WBC: 13.2 X10E9/L (ref 4–11)

## 2022-05-16 ENCOUNTER — TELEPHONE (OUTPATIENT)
Dept: FAMILY MEDICINE CLINIC | Age: 72
End: 2022-05-16

## 2022-05-16 DIAGNOSIS — D72.828 OTHER ELEVATED WHITE BLOOD CELL (WBC) COUNT: ICD-10-CM

## 2022-05-16 DIAGNOSIS — E03.9 ACQUIRED HYPOTHYROIDISM: Primary | ICD-10-CM

## 2022-05-16 DIAGNOSIS — E03.9 ACQUIRED HYPOTHYROIDISM: ICD-10-CM

## 2022-05-16 RX ORDER — LEVOTHYROXINE SODIUM 0.12 MG/1
125 TABLET ORAL DAILY
Qty: 90 TABLET | Refills: 1 | Status: SHIPPED | OUTPATIENT
Start: 2022-05-16

## 2022-05-16 RX ORDER — LEVOTHYROXINE SODIUM 0.12 MG/1
125 TABLET ORAL DAILY
Qty: 30 TABLET | Refills: 5 | Status: SHIPPED | OUTPATIENT
Start: 2022-05-16 | End: 2022-05-16

## 2022-05-16 NOTE — TELEPHONE ENCOUNTER
Spoke with patient's .  states patient is taking synthroid every day. Patient is being treated for scabs on her head.  also states she has a fungus on her toes and that is why labs were drawn.   To see if patient could take lamisil

## 2022-05-16 NOTE — TELEPHONE ENCOUNTER
Pt sees derm in June. Her  also asked about the pt using Lamisil for her foot fungus. He wanted it rx'ed for her.

## 2022-05-16 NOTE — TELEPHONE ENCOUNTER
Decrease synthroid to 125 mcg and recheck in 6 weeks    WBC likely from the sores on the scalp  Is she taking any antibiotic or seeing derm for the sores?     Call

## 2022-05-16 NOTE — TELEPHONE ENCOUNTER
Thyroid is high  Is she taking synthroid? WBC high  Is she having any sign of infection?     Call patient

## 2022-05-17 NOTE — TELEPHONE ENCOUNTER
Pt never started taking the Lamisil. Her  wanted to know if she could. He states he wasn't sure if it would affect her kidney or liver.

## 2022-06-14 ENCOUNTER — TELEPHONE (OUTPATIENT)
Dept: NEPHROLOGY | Age: 72
End: 2022-06-14

## 2022-06-14 NOTE — TELEPHONE ENCOUNTER
Telephone call from the patient's , Gloria Powers. He states that he didn't give the patient her Bumex yesterday because she had a doctor appointment. While out she hurt her knee. He would like to know if it would be OK to not give it to her again today due to the fact it is hard for her to get to the bathroom with the injured knee. Please advise.

## 2022-08-13 RX ORDER — CARVEDILOL 12.5 MG/1
TABLET ORAL
Qty: 180 TABLET | Refills: 3 | Status: SHIPPED | OUTPATIENT
Start: 2022-08-13

## 2022-10-03 RX ORDER — OXYBUTYNIN CHLORIDE 15 MG/1
15 TABLET, EXTENDED RELEASE ORAL DAILY
Qty: 90 TABLET | Refills: 1 | Status: SHIPPED | OUTPATIENT
Start: 2022-10-03

## 2022-10-04 RX ORDER — BUMETANIDE 1 MG/1
1 TABLET ORAL DAILY
Qty: 90 TABLET | Refills: 1 | OUTPATIENT
Start: 2022-10-04

## 2022-10-14 NOTE — TELEPHONE ENCOUNTER
Jeannine Padilla called requesting a refill on the following medications:  Requested Prescriptions     Pending Prescriptions Disp Refills    rivaroxaban (XARELTO) 15 MG TABS tablet 90 tablet 0     Pharmacy verified:  [id number] - [place], [place] - [address] [phone]    Date of last visit: 05/12/2022  Date of next visit (if applicable): 49/80/6318

## 2022-11-21 RX ORDER — TERBINAFINE HYDROCHLORIDE 250 MG/1
250 TABLET ORAL DAILY
Qty: 90 TABLET | Refills: 1 | Status: ON HOLD | OUTPATIENT
Start: 2022-11-21 | End: 2023-05-20

## 2022-11-23 ENCOUNTER — HOSPITAL ENCOUNTER (INPATIENT)
Age: 72
LOS: 12 days | Discharge: NURSING, REHAB OR LONG-TERM CARE | DRG: 177 | End: 2022-12-09
Attending: EMERGENCY MEDICINE
Payer: MEDICARE

## 2022-11-23 ENCOUNTER — APPOINTMENT (OUTPATIENT)
Dept: GENERAL RADIOLOGY | Age: 72
DRG: 177 | End: 2022-11-23
Payer: MEDICARE

## 2022-11-23 DIAGNOSIS — Z74.09 IMPAIRED MOBILITY AND ADLS: ICD-10-CM

## 2022-11-23 DIAGNOSIS — U07.1 COVID-19 VIRUS INFECTION: Primary | ICD-10-CM

## 2022-11-23 DIAGNOSIS — Z78.9 IMPAIRED MOBILITY AND ADLS: ICD-10-CM

## 2022-11-23 LAB
ALBUMIN SERPL-MCNC: 3.8 G/DL (ref 3.5–5.1)
ALP BLD-CCNC: 100 U/L (ref 38–126)
ALT SERPL-CCNC: 17 U/L (ref 11–66)
ANION GAP SERPL CALCULATED.3IONS-SCNC: 12 MEQ/L (ref 8–16)
AST SERPL-CCNC: 25 U/L (ref 5–40)
BACTERIA: ABNORMAL /HPF
BASOPHILS # BLD: 0.4 %
BASOPHILS ABSOLUTE: 0 THOU/MM3 (ref 0–0.1)
BILIRUB SERPL-MCNC: 0.3 MG/DL (ref 0.3–1.2)
BILIRUBIN URINE: NEGATIVE
BLOOD, URINE: NEGATIVE
BUN BLDV-MCNC: 15 MG/DL (ref 7–22)
CALCIUM SERPL-MCNC: 9.4 MG/DL (ref 8.5–10.5)
CASTS 2: ABNORMAL /LPF
CASTS UA: ABNORMAL /LPF
CHARACTER, URINE: CLEAR
CHLORIDE BLD-SCNC: 107 MEQ/L (ref 98–111)
CO2: 24 MEQ/L (ref 23–33)
COLOR: YELLOW
CREAT SERPL-MCNC: 1 MG/DL (ref 0.4–1.2)
CRYSTALS, UA: ABNORMAL
EKG ATRIAL RATE: 81 BPM
EKG P AXIS: 37 DEGREES
EKG P-R INTERVAL: 132 MS
EKG Q-T INTERVAL: 342 MS
EKG QRS DURATION: 90 MS
EKG QTC CALCULATION (BAZETT): 397 MS
EKG R AXIS: -41 DEGREES
EKG T AXIS: 37 DEGREES
EKG VENTRICULAR RATE: 81 BPM
EOSINOPHIL # BLD: 1.6 %
EOSINOPHILS ABSOLUTE: 0.1 THOU/MM3 (ref 0–0.4)
EPITHELIAL CELLS, UA: ABNORMAL /HPF
ERYTHROCYTE [DISTWIDTH] IN BLOOD BY AUTOMATED COUNT: 13.7 % (ref 11.5–14.5)
ERYTHROCYTE [DISTWIDTH] IN BLOOD BY AUTOMATED COUNT: 48.3 FL (ref 35–45)
GFR SERPL CREATININE-BSD FRML MDRD: 60 ML/MIN/1.73M2
GLUCOSE BLD-MCNC: 82 MG/DL (ref 70–108)
GLUCOSE URINE: NEGATIVE MG/DL
HCT VFR BLD CALC: 46.4 % (ref 37–47)
HEMOGLOBIN: 14.6 GM/DL (ref 12–16)
IMMATURE GRANS (ABS): 0.04 THOU/MM3 (ref 0–0.07)
IMMATURE GRANULOCYTES: 0.5 %
INFLUENZA A: NOT DETECTED
INFLUENZA B: NOT DETECTED
KETONES, URINE: 15
LEUKOCYTE ESTERASE, URINE: ABNORMAL
LYMPHOCYTES # BLD: 24.7 %
LYMPHOCYTES ABSOLUTE: 2 THOU/MM3 (ref 1–4.8)
MCH RBC QN AUTO: 30 PG (ref 26–33)
MCHC RBC AUTO-ENTMCNC: 31.5 GM/DL (ref 32.2–35.5)
MCV RBC AUTO: 95.5 FL (ref 81–99)
MISCELLANEOUS 2: ABNORMAL
MONOCYTES # BLD: 7.4 %
MONOCYTES ABSOLUTE: 0.6 THOU/MM3 (ref 0.4–1.3)
NITRITE, URINE: NEGATIVE
NUCLEATED RED BLOOD CELLS: 0 /100 WBC
PH UA: 5.5 (ref 5–9)
PLATELET # BLD: 241 THOU/MM3 (ref 130–400)
PMV BLD AUTO: 9.5 FL (ref 9.4–12.4)
POTASSIUM REFLEX MAGNESIUM: 4.6 MEQ/L (ref 3.5–5.2)
POTASSIUM SERPL-SCNC: 3.9 MEQ/L (ref 3.5–5.2)
PRO-BNP: 240.6 PG/ML (ref 0–900)
PROTEIN UA: NEGATIVE
RBC # BLD: 4.86 MILL/MM3 (ref 4.2–5.4)
RBC URINE: ABNORMAL /HPF
RENAL EPITHELIAL, UA: ABNORMAL
SARS-COV-2 RNA, RT PCR: DETECTED
SEG NEUTROPHILS: 65.4 %
SEGMENTED NEUTROPHILS ABSOLUTE COUNT: 5.2 THOU/MM3 (ref 1.8–7.7)
SODIUM BLD-SCNC: 143 MEQ/L (ref 135–145)
SPECIFIC GRAVITY, URINE: 1.02 (ref 1–1.03)
T4 FREE: 1.96 NG/DL (ref 0.93–1.76)
TOTAL PROTEIN: 6.8 G/DL (ref 6.1–8)
TROPONIN T: < 0.01 NG/ML
TSH SERPL DL<=0.05 MIU/L-ACNC: 0.04 UIU/ML (ref 0.4–4.2)
UROBILINOGEN, URINE: 0.2 EU/DL (ref 0–1)
WBC # BLD: 8 THOU/MM3 (ref 4.8–10.8)
WBC UA: ABNORMAL /HPF
YEAST: ABNORMAL

## 2022-11-23 PROCEDURE — 85025 COMPLETE CBC W/AUTO DIFF WBC: CPT

## 2022-11-23 PROCEDURE — 81001 URINALYSIS AUTO W/SCOPE: CPT

## 2022-11-23 PROCEDURE — 93005 ELECTROCARDIOGRAM TRACING: CPT

## 2022-11-23 PROCEDURE — G0378 HOSPITAL OBSERVATION PER HR: HCPCS

## 2022-11-23 PROCEDURE — 2580000003 HC RX 258

## 2022-11-23 PROCEDURE — 36415 COLL VENOUS BLD VENIPUNCTURE: CPT

## 2022-11-23 PROCEDURE — 6370000000 HC RX 637 (ALT 250 FOR IP)

## 2022-11-23 PROCEDURE — 93010 ELECTROCARDIOGRAM REPORT: CPT | Performed by: INTERNAL MEDICINE

## 2022-11-23 PROCEDURE — 84132 ASSAY OF SERUM POTASSIUM: CPT

## 2022-11-23 PROCEDURE — 84439 ASSAY OF FREE THYROXINE: CPT

## 2022-11-23 PROCEDURE — 87636 SARSCOV2 & INF A&B AMP PRB: CPT

## 2022-11-23 PROCEDURE — 84484 ASSAY OF TROPONIN QUANT: CPT

## 2022-11-23 PROCEDURE — 84443 ASSAY THYROID STIM HORMONE: CPT

## 2022-11-23 PROCEDURE — 80053 COMPREHEN METABOLIC PANEL: CPT

## 2022-11-23 PROCEDURE — 94640 AIRWAY INHALATION TREATMENT: CPT

## 2022-11-23 PROCEDURE — 71045 X-RAY EXAM CHEST 1 VIEW: CPT

## 2022-11-23 PROCEDURE — 83880 ASSAY OF NATRIURETIC PEPTIDE: CPT

## 2022-11-23 PROCEDURE — 99223 1ST HOSP IP/OBS HIGH 75: CPT

## 2022-11-23 PROCEDURE — 99285 EMERGENCY DEPT VISIT HI MDM: CPT

## 2022-11-23 RX ORDER — IPRATROPIUM BROMIDE AND ALBUTEROL SULFATE 2.5; .5 MG/3ML; MG/3ML
1 SOLUTION RESPIRATORY (INHALATION) ONCE
Status: COMPLETED | OUTPATIENT
Start: 2022-11-23 | End: 2022-11-23

## 2022-11-23 RX ORDER — HYDROCORTISONE 10 MG/1
20 TABLET ORAL EVERY MORNING
Status: DISCONTINUED | OUTPATIENT
Start: 2022-11-23 | End: 2022-12-09 | Stop reason: HOSPADM

## 2022-11-23 RX ORDER — MAGNESIUM SULFATE IN WATER 40 MG/ML
2000 INJECTION, SOLUTION INTRAVENOUS PRN
Status: DISCONTINUED | OUTPATIENT
Start: 2022-11-23 | End: 2022-12-09 | Stop reason: HOSPADM

## 2022-11-23 RX ORDER — BUMETANIDE 1 MG/1
1 TABLET ORAL DAILY
Status: DISCONTINUED | OUTPATIENT
Start: 2022-11-23 | End: 2022-12-09 | Stop reason: HOSPADM

## 2022-11-23 RX ORDER — SODIUM CHLORIDE 0.9 % (FLUSH) 0.9 %
10 SYRINGE (ML) INJECTION PRN
Status: DISCONTINUED | OUTPATIENT
Start: 2022-11-23 | End: 2022-12-09 | Stop reason: HOSPADM

## 2022-11-23 RX ORDER — OXYBUTYNIN CHLORIDE 15 MG/1
1 TABLET, EXTENDED RELEASE ORAL DAILY
Status: DISCONTINUED | OUTPATIENT
Start: 2022-11-23 | End: 2022-11-23 | Stop reason: SDUPTHER

## 2022-11-23 RX ORDER — ONDANSETRON 4 MG/1
4 TABLET, ORALLY DISINTEGRATING ORAL EVERY 8 HOURS PRN
Status: DISCONTINUED | OUTPATIENT
Start: 2022-11-23 | End: 2022-12-09 | Stop reason: HOSPADM

## 2022-11-23 RX ORDER — ONDANSETRON 2 MG/ML
4 INJECTION INTRAMUSCULAR; INTRAVENOUS EVERY 6 HOURS PRN
Status: DISCONTINUED | OUTPATIENT
Start: 2022-11-23 | End: 2022-12-09 | Stop reason: HOSPADM

## 2022-11-23 RX ORDER — CARVEDILOL 6.25 MG/1
12.5 TABLET ORAL 2 TIMES DAILY
Status: DISCONTINUED | OUTPATIENT
Start: 2022-11-23 | End: 2022-12-09 | Stop reason: HOSPADM

## 2022-11-23 RX ORDER — HYDROCORTISONE 10 MG/1
20 TABLET ORAL EVERY MORNING
Status: DISCONTINUED | OUTPATIENT
Start: 2022-11-24 | End: 2022-11-23

## 2022-11-23 RX ORDER — ACETAMINOPHEN 650 MG/1
650 SUPPOSITORY RECTAL EVERY 6 HOURS PRN
Status: DISCONTINUED | OUTPATIENT
Start: 2022-11-23 | End: 2022-12-09 | Stop reason: HOSPADM

## 2022-11-23 RX ORDER — POTASSIUM CHLORIDE 20 MEQ/1
40 TABLET, EXTENDED RELEASE ORAL PRN
Status: DISCONTINUED | OUTPATIENT
Start: 2022-11-23 | End: 2022-12-09 | Stop reason: HOSPADM

## 2022-11-23 RX ORDER — FAMOTIDINE 20 MG/1
10 TABLET, FILM COATED ORAL EVERY MORNING
Status: DISCONTINUED | OUTPATIENT
Start: 2022-11-24 | End: 2022-11-23

## 2022-11-23 RX ORDER — HYDROCORTISONE 10 MG/1
10 TABLET ORAL NIGHTLY
Status: DISCONTINUED | OUTPATIENT
Start: 2022-11-23 | End: 2022-12-09 | Stop reason: HOSPADM

## 2022-11-23 RX ORDER — LEVOTHYROXINE SODIUM 0.12 MG/1
125 TABLET ORAL DAILY
Status: DISCONTINUED | OUTPATIENT
Start: 2022-11-24 | End: 2022-12-09 | Stop reason: HOSPADM

## 2022-11-23 RX ORDER — TERBINAFINE HYDROCHLORIDE 250 MG/1
250 TABLET ORAL DAILY
Status: DISCONTINUED | OUTPATIENT
Start: 2022-11-23 | End: 2022-12-09 | Stop reason: HOSPADM

## 2022-11-23 RX ORDER — SODIUM CHLORIDE 9 MG/ML
INJECTION, SOLUTION INTRAVENOUS PRN
Status: DISCONTINUED | OUTPATIENT
Start: 2022-11-23 | End: 2022-12-09 | Stop reason: HOSPADM

## 2022-11-23 RX ORDER — ATORVASTATIN CALCIUM 20 MG/1
20 TABLET, FILM COATED ORAL NIGHTLY
Status: DISCONTINUED | OUTPATIENT
Start: 2022-11-23 | End: 2022-11-24

## 2022-11-23 RX ORDER — SODIUM CHLORIDE 0.9 % (FLUSH) 0.9 %
10 SYRINGE (ML) INJECTION EVERY 12 HOURS SCHEDULED
Status: DISCONTINUED | OUTPATIENT
Start: 2022-11-23 | End: 2022-12-09 | Stop reason: HOSPADM

## 2022-11-23 RX ORDER — POLYETHYLENE GLYCOL 3350 17 G/17G
17 POWDER, FOR SOLUTION ORAL DAILY PRN
Status: DISCONTINUED | OUTPATIENT
Start: 2022-11-23 | End: 2022-11-30

## 2022-11-23 RX ORDER — ACETAMINOPHEN 325 MG/1
650 TABLET ORAL EVERY 6 HOURS PRN
Status: DISCONTINUED | OUTPATIENT
Start: 2022-11-23 | End: 2022-12-09 | Stop reason: HOSPADM

## 2022-11-23 RX ORDER — PANTOPRAZOLE SODIUM 40 MG/1
40 TABLET, DELAYED RELEASE ORAL
Status: DISCONTINUED | OUTPATIENT
Start: 2022-11-24 | End: 2022-12-09 | Stop reason: HOSPADM

## 2022-11-23 RX ORDER — ELECTROLYTES/DEXTROSE
1 SOLUTION, ORAL ORAL DAILY
Status: DISCONTINUED | OUTPATIENT
Start: 2022-11-23 | End: 2022-11-23

## 2022-11-23 RX ORDER — POTASSIUM CHLORIDE 7.45 MG/ML
10 INJECTION INTRAVENOUS PRN
Status: DISCONTINUED | OUTPATIENT
Start: 2022-11-23 | End: 2022-12-09 | Stop reason: HOSPADM

## 2022-11-23 RX ADMIN — TERBINAFINE TABLETS 250 MG 250 MG: 250 TABLET ORAL at 22:07

## 2022-11-23 RX ADMIN — BUMETANIDE 1 MG: 1 TABLET ORAL at 21:58

## 2022-11-23 RX ADMIN — HYDROCORTISONE 20 MG: 10 TABLET ORAL at 14:25

## 2022-11-23 RX ADMIN — ATORVASTATIN CALCIUM 20 MG: 20 TABLET, FILM COATED ORAL at 21:57

## 2022-11-23 RX ADMIN — SODIUM CHLORIDE, PRESERVATIVE FREE 10 ML: 5 INJECTION INTRAVENOUS at 21:30

## 2022-11-23 RX ADMIN — RIVAROXABAN 15 MG: 15 TABLET, FILM COATED ORAL at 21:59

## 2022-11-23 RX ADMIN — ACETAMINOPHEN 650 MG: 325 TABLET ORAL at 21:17

## 2022-11-23 RX ADMIN — IPRATROPIUM BROMIDE AND ALBUTEROL SULFATE 1 AMPULE: .5; 3 SOLUTION RESPIRATORY (INHALATION) at 11:39

## 2022-11-23 RX ADMIN — HYDROCORTISONE 10 MG: 10 TABLET ORAL at 21:59

## 2022-11-23 RX ADMIN — CARVEDILOL 12.5 MG: 6.25 TABLET, FILM COATED ORAL at 21:30

## 2022-11-23 ASSESSMENT — PAIN - FUNCTIONAL ASSESSMENT
PAIN_FUNCTIONAL_ASSESSMENT: NONE - DENIES PAIN

## 2022-11-23 ASSESSMENT — ENCOUNTER SYMPTOMS
RHINORRHEA: 0
CONSTIPATION: 0
DIARRHEA: 0
COLOR CHANGE: 0
SHORTNESS OF BREATH: 0
COUGH: 0
NAUSEA: 0
ABDOMINAL PAIN: 0
CHEST TIGHTNESS: 0
VOMITING: 0
SORE THROAT: 0

## 2022-11-23 NOTE — ED NOTES
Patient offered straight cath to retreive urine sample. Patient declines stating she will use external catheter. Patient is resting in bed with easy and unlabored respirations. Call light in reach. Side rails up x2. Patient denies further complaints or concerns. Will monitor.         Chung Estrada RN  11/23/22 [id number]

## 2022-11-23 NOTE — ED NOTES
ED to inpatient nurses report    Chief Complaint   Patient presents with    Fatigue      Present to ED from home  LOC: alert and orientated to name, place, date  Vital signs   Vitals:    11/23/22 0958 11/23/22 1125 11/23/22 1139 11/23/22 1403   BP: 129/78 (!) 124/103  128/89   Pulse: 79 85 84 92   Resp: 20 19 19 18   Temp: 97.8 °F (36.6 °C)      TempSrc: Oral      SpO2: 96% 96%  97%   Weight: 193 lb (87.5 kg)         Oxygen Baseline 97  Bipap/Cpap No  LDAs:   Peripheral IV 11/23/22 Right Forearm (Active)   Site Assessment Clean, dry & intact 11/23/22 1406   Line Status Infusing 11/23/22 1406   Phlebitis Assessment No symptoms 11/23/22 1406   Infiltration Assessment 0 11/23/22 1406   Dressing Status Clean, dry & intact 11/23/22 1406   Dressing Type Transparent 11/23/22 1406     Mobility: Requires assistance * 2  Pending ED orders: Urine reflex  Present condition: stable    C-SSRS Risk of Suicide: No Risk  Swallow Screening    Preferred Language: [place]     Electronically signed by Shannon Walker RN on 11/23/2022 at 2:06 PM       Shannon Walker RN  11/23/22 1406

## 2022-11-23 NOTE — ED NOTES
Pt transported to [facility]. Floor called prior to transport, spoke with Miracle.      Fidel Thomas  11/23/22 [id number]

## 2022-11-23 NOTE — ED PROVIDER NOTES
[facility] ENCOUNTER          Pt Name: Rashad Mckeon  MRN: [id number]  Armstrongfdeandra 1950  Date of evaluation: 11/23/2022  Treating Resident Physician: Radha Negro MD  Supervising Physician: Dana Cohen    History obtained from the patient. CHIEF COMPLAINT       Chief Complaint   Patient presents with    Fatigue     HISTORY OF PRESENT ILLNESS    This is a 78-year-old female who presented to [address] [place] [address] ED on 11/23/2022 via EMS. Patient presented with a complaint of fatigue over the last 24 hours. She was seen and evaluated at the bedside. She is oriented to self, time, and place. She denies chest pain, fever, chills, nausea, vomiting, or diarrhea. She endorses general lethargy. She denies any recent hospitalizations, surgical interventions, changes in medication, travel, recent exposures, sick contacts, recent illness. She denies any additional acute symptoms or concerns. Case discussed with patient's  at bedside. He endorses that she has a history of CVA for which she has had left-sided weakness since. He also states that she has been wheelchair-bound since the CVA. 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 He states that patient was brought in primarily for a decreasing ability to participate in her ADLs over the last 24 hours. He is typically lifting her by himself out of the chair to a bedside commode to use the restroom and she has had difficulty with participating in that. He also endorses that she is unable to feed herself over the last 24 hours and that she has been primarily slumped over in her chair. He denies any evidence of worsening neurologic function compared to her baseline.   He also states that he does not have a standing order for stress dosing of her hydrocortisone and does not feel as though she would be able to transition back to the home given her declining participation in her ADLs as well as his personal history of active bladder carcinoma for which he is having treatment. He denies any additional acute symptoms or concerns. REVIEW OF SYSTEMS   12 point review of systems negative unless otherwise specified in HPI. PAST MEDICAL AND SURGICAL HISTORY     Past Medical History:   Diagnosis Date    Asthma     uses albuterol 1-2x per year    Atrial thrombosis     on [address]    Bursitis     right shoulder -s/p steroid injections    Cancer (HCC)     Chronic diastolic heart failure (HCC)     CVA (cerebral infarction) 6/14/15    Mult. 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 Christianson     Hypothyroidism     Meningioma [facility])     3 separate meningiomas, s/p gamma knife (1/2012) , Dr. Rossana Morton at [facility]    MRSA nasal colonization 6/2013    Obesity (BMI 30-39. 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[address]     status post resection for macroadenoma in the 1970's    Pneumonia 11/2018    Stroke [facility]) 1988    due to radiation -bleeding CVA - residual left upper and lower extremity weakness    Stroke [facility]) October 2015    Urinary incontinence      Past Surgical History:   Procedure Laterality Date    APPENDECTOMY      BACK [address] due to pituitary mass, drained and s/p radiation    CHOLECYSTECTOMY      HYSTERECTOMY (CERVIX STATUS UNKNOWN)      total done for DUB    OTHER SURGICAL HISTORY  9/30/2014    LAPAROSCOPIC RUPTURED APPENDECTOMY    OTHER SURGICAL HISTORY Left 10/26/15    Evacuation and Debridement of Left Lower Leg Hematoma - Dr. oLw Adamson  3/13/2013    left    SHOULDER ARTHROPLASTY      right    TOOTH EXTRACTION  03/19/2018    TOTAL HIP ARTHROPLASTY      bilateral    TOTAL KNEE ARTHROPLASTY      right          MEDICATIONS     Current Facility-Administered Medications:     bumetanide (BUMEX) tablet 1 mg, 1 tablet, Oral, Daily, Cee Saenz PA-C    carvedilol (COREG) tablet 12.5 mg, 1 tablet, Oral, BID, Cee Saenz PA-C    [START ON 11/24/2022] famotidine (PEPCID) tablet 10 mg, 10 mg, Oral, QAM, Cee Saenz PA-C    hydrocortisone (CORTEF) tablet 10 mg, 10 mg, Oral, Nightly, Cee Saenz PA-C    levothyroxine (SYNTHROID) tablet 125 mcg, 1 tablet, Oral, Daily, Cee Saenz PA-C    Multivitamin Adult TABS 1 tablet, 1 tablet, Oral, Daily, Cee Saenz PA-C    oxybutynin (DITROPAN XL) extended release tablet 15 mg, 1 tablet, Oral, Daily, Cee Saenz PA-C    [START ON 11/24/2022] pantoprazole (PROTONIX) tablet 40 mg, 40 mg, Oral, QAM AC, Cee Saenz PA-C    rivaroxaban (XARELTO) tablet 15 mg, 15 mg, Oral, Daily, Cee Saenz PA-C    atorvastatin (LIPITOR) tablet 20 mg, 20 mg, Oral, Daily, Cee Saenz PA-C    terbinafine (LAMISIL) tablet 250 mg, 250 mg, Oral, Daily, Cee Saenz PA-C    sodium chloride flush 0.9 % injection 10 mL, 10 mL, IntraVENous, 2 times per day, Cee Saenz PA-C    sodium chloride flush 0.9 % injection 10 mL, 10 mL, IntraVENous, PRN, Cee Saenz PA-C    0.9 % sodium chloride infusion, , IntraVENous, PRN, Cee Saenz PA-C    ondansetron (ZOFRAN-ODT) disintegrating tablet 4 mg, 4 mg, Oral, Q8H PRN **OR** ondansetron (ZOFRAN) injection 4 mg, 4 mg, IntraVENous, Q6H PRN, Cee Saenz PA-C    polyethylene glycol (GLYCOLAX) packet 17 g, 17 g, Oral, Daily PRN, Cee Saenz PA-C    acetaminophen (TYLENOL) tablet 650 mg, 650 mg, Oral, Q6H PRN **OR** acetaminophen (TYLENOL) suppository 650 mg, 650 mg, Rectal, Q6H PRN, MARIA ALEJANDRA ArredondoC    potassium chloride (KLOR-CON M) extended release tablet 40 mEq, 40 mEq, Oral, PRN **OR** potassium bicarb-citric acid (EFFER-K) effervescent tablet 40 mEq, 40 mEq, Oral, PRN **OR** potassium chloride 10 mEq/100 mL IVPB (Peripheral Line), 10 mEq, IntraVENous, PRN, OFELIA Arredondo-C    magnesium sulfate 2000 mg in 50 mL IVPB premix, 2,000 mg, IntraVENous, PRN, OFELIA Arredondo-C    hydrocortisone (CORTEF) tablet 20 mg, 20 mg, Oral, QAM, Cee Saenz PA-C, 20 mg at 11/23/22 4305      SOCIAL HISTORY     Social History     Social History Narrative    Not on file     Social History     Tobacco Use    Smoking status: Never    Smokeless tobacco: Never   Vaping Use    Vaping Use: Never used   Substance Use Topics    Alcohol use: No    Drug use: No         ALLERGIES     Allergies   Allergen Reactions    Pregabalin      Other reaction(s): dizziness    Tape [Adhesive Tape] Dermatitis         FAMILY HISTORY     Family History   Problem Relation Age of Onset    High Blood Pressure Mother     Stroke Maternal Grandmother 72         PREVIOUS RECORDS   05/12/2022 outpatient cardiology appointment for CHF and PAF. Noted history of JESSE. Documented CKD stage IIIa. Patient was recommended at that time given a history of multiple CVAs to continue with carvedilol as well as Xarelto. 12/16/2021 outpatient encounter for 6-month follow-up. Patient was noted to have a hospitalization from 12/30/2018 until 001/04/2019. Patient was documented to only be able to transfer with a walker it was documented that patient's  was doing the majority of her ADLs for her. Patient has a history of documented meningioma as well as CVA with residual left-sided weakness. He patient was established with endocrinology for hypothyroidism as well as panhypopituitarism. Patient was also documented to have multiple abscesses on her scalp.       PHYSICAL EXAM     ED Triage Vitals [11/23/22 0958]   BP Temp Temp Source Heart Rate Resp SpO2 Height Weight   129/78 97.8 °F (36.6 °C) Oral 79 20 96 % -- 193 lb (87.5 kg)     Initial vital signs and nursing assessment reviewed and normal. Body mass index is 34.19 kg/m². Pulsoximetry is normal per my interpretation. Additional Vital Signs:  Vitals:    11/23/22 1515   BP: 131/68   Pulse: 87   Resp: 18   Temp: 99.7 °F (37.6 °C)   SpO2: 100%       Physical Exam  Constitutional:       Appearance: Normal appearance. HENT:      Head: Normocephalic and atraumatic. Right Ear: Tympanic membrane normal.      Left Ear: Tympanic membrane normal.      Nose: Nose normal.      Mouth/Throat:      Mouth: Mucous membranes are moist.   Eyes:      Extraocular Movements: Extraocular movements intact. Pupils: Pupils are equal, round, and reactive to light. Cardiovascular:      Rate and Rhythm: Normal rate and regular rhythm. Pulses: Normal pulses. Heart sounds: Normal heart sounds. Pulmonary:      Comments: Diminished breath sounds bilaterally with no wheezes, crackles, or rhonchi  Abdominal:      General: Abdomen is flat. Bowel sounds are normal.      Palpations: Abdomen is soft. Musculoskeletal:      Cervical back: Normal range of motion and neck supple. Comments: +1 pitting edema of the lower extremities bilaterally trace pitting edema of the lower extremities bilaterally. Patient is unable to lift either extremity and hold. Patient is able to plantarflex as well as dorsiflex both lower extremities on command. Full range of motion in the upper extremities bilaterally with 4 out of 5 strength. Skin:     General: Skin is warm and dry. Capillary Refill: Capillary refill takes less than 2 seconds. Neurological:      Mental Status: She is alert. Comments: Neurological examination performed at the bedside with  present. No focal neurologic deficits noted compared to patient's baseline.   She is alert and oriented to self, place, time, and situation. Psychiatric:         Mood and Affect: Mood normal.         Behavior: Behavior normal.         Thought Content: Thought content normal.         Judgment: Judgment normal.           MEDICAL DECISION MAKING   Initial Assessment:   Acute metabolic encephalopathy, rule out  COVID-19, rule out  Urinary tract infection, rule out  Influenza, rule out  Bacterial pneumonia, rule out  Uncontrolled adrenal insufficiency, rule out  CHF exacerbation, rule out  Acute exacerbation of suspected obstructive lung disease, rule out  Plan:   COVID-19 and influenza testing  CBC  TSH  BNP  Urinalysis  Troponin  CXR        ED RESULTS   Laboratory results:  Labs Reviewed   COVID-19 & INFLUENZA COMBO - Abnormal; Notable for the following components:       Result Value    SARS-CoV-2 RNA, RT PCR DETECTED (*)     All other components within normal limits   CBC WITH AUTO DIFFERENTIAL - Abnormal; Notable for the following components:    MCHC 31.5 (*)     RDW-SD 48.3 (*)     All other components within normal limits   TSH WITH REFLEX - Abnormal; Notable for the following components:    TSH 0.043 (*)     All other components within normal limits   T4, FREE - Abnormal; Notable for the following components:    T4 Free 1.96 (*)     All other components within normal limits   BRAIN NATRIURETIC PEPTIDE   COMPREHENSIVE METABOLIC PANEL W/ REFLEX TO MG FOR LOW K   TROPONIN   GLOMERULAR FILTRATION RATE, ESTIMATED   ANION GAP   POTASSIUM   URINALYSIS WITH REFLEX TO CULTURE       Radiologic studies results:  XR CHEST PORTABLE   Final Result   No acute cardiopulmonary disease            **This report has been created using voice recognition software. It may contain minor errors which are inherent in voice recognition technology. **      Final report electronically signed by Dr. Nuris Chan on 11/23/2022 10:36 AM          ED Medications administered this visit:   Medications   bumetanide (BUMEX) tablet 1 mg (has no administration in time range)   carvedilol (COREG) tablet 12.5 mg (has no administration in time range)   famotidine (PEPCID) tablet 10 mg (has no administration in time range)   hydrocortisone (CORTEF) tablet 10 mg (has no administration in time range)   levothyroxine (SYNTHROID) tablet 125 mcg (has no administration in time range)   Multivitamin Adult TABS 1 tablet (has no administration in time range)   oxybutynin (DITROPAN XL) extended release tablet 15 mg (has no administration in time range)   pantoprazole (PROTONIX) tablet 40 mg (has no administration in time range)   rivaroxaban (XARELTO) tablet 15 mg (has no administration in time range)   atorvastatin (LIPITOR) tablet 20 mg (has no administration in time range)   terbinafine (LAMISIL) tablet 250 mg (has no administration in time range)   sodium chloride flush 0.9 % injection 10 mL (has no administration in time range)   sodium chloride flush 0.9 % injection 10 mL (has no administration in time range)   0.9 % sodium chloride infusion (has no administration in time range)   ondansetron (ZOFRAN-ODT) disintegrating tablet 4 mg (has no administration in time range)     Or   ondansetron (ZOFRAN) injection 4 mg (has no administration in time range)   polyethylene glycol (GLYCOLAX) packet 17 g (has no administration in time range)   acetaminophen (TYLENOL) tablet 650 mg (has no administration in time range)     Or   acetaminophen (TYLENOL) suppository 650 mg (has no administration in time range)   potassium chloride (KLOR-CON M) extended release tablet 40 mEq (has no administration in time range)     Or   potassium bicarb-citric acid (EFFER-K) effervescent tablet 40 mEq (has no administration in time range)     Or   potassium chloride 10 mEq/100 mL IVPB (Peripheral Line) (has no administration in time range)   magnesium sulfate 2000 mg in 50 mL IVPB premix (has no administration in time range)   hydrocortisone (CORTEF) tablet 20 mg (20 mg Oral Given 11/23/22 9531) ipratropium-albuterol (DUONEB) nebulizer solution 1 ampule (1 ampule Inhalation Given 11/23/22 1138)         ED COURSE   Patient was seen and evaluated at the bedside in no acute distress. No focal neurologic deficits were noted. On physical examination there was evidence of diminished breath sounds as well as increased respiratory effort. COVID-19 testing was ordered and was positive. Patient was also administered a DuoNeb with minimal alleviation of symptoms. Case was discussed extensively with patient's  at bedside. He states that he is having increasing difficulty with having the patient participate in her ADLs. Case discussed with hospitalist as well as  who graciously agreed to accept the patient for further evaluation and possible placement for-year-old as well as stress dosing of patient's hydrocortisone. All findings were relayed to the patient at bedside and patient has been she verbalized understanding. Case was also discussed with patient's  at bedside and he is agreeable to the plan of care. Patient was admitted in stable condition on 11/23/2022 for management of COVID-19 pneumonia. MEDICATION CHANGES     Current Discharge Medication List            FINAL DISPOSITION     Final diagnoses:   UMGCM-20 virus infection   Impaired mobility and ADLs     Condition: condition: stable  Dispo: Admit to med/surg floor      This transcription was electronically signed. Parts of this transcriptions may have been dictated by use of voice recognition software and electronically transcribed, and parts may have been transcribed with the assistance of an ED scribe. The transcription may contain errors not detected in proofreading. Please refer to my supervising physician's documentation if my documentation differs.     Electronically Signed: Jason Reyes MD, 11/23/22, 3:46 PM        Jason Reyes MD  Resident  11/23/22 7401

## 2022-11-23 NOTE — H&P
Hospitalist - History & Physical      Patient: Nia Garcia    Unit/Bed:31/[facility][note 3]A  YOB: 1950  MRN: [id number]   Acct: [de-identified]   PCP: Bell Navas MD    Date of Service: Pt seen/examined on 11/23/22  and Admitted to Observation with expected LOS less than two midnights due to medical therapy. Chief Complaint:  fatigue    Assessment and Plan:  COVID-19 infection, with generalized weakness and fatigue:   Tested positive today 11/23. Pt presents with gradually worsening weakness and fatigue since last night. Pt is afebrile, chronically ill appearing, and hemodynamically stable. Labs largely unremarkable- no acute leukocytosis. UA ordered. EKG with no acute changes. CXR unremarkable. Suspect generalized weakness and fatigue secondary to COVID-19 infection, however unable to say definitively. Given duoneb x 1 in the ED. Continue home meds. Await UA results, TSH ordered. PT/OT. Social work consulted as patient may need rehab upon discharge. Suspected adrenal insufficiency, with Hx of pituitary tumor:    Pt noted per ugo review and to be on hydrocortisone BID. Pt's  states she follows with an Endocrinologist in [facility]. Unable to find the documentation upon admission. Continue hydrocortisone BID. HFp EF:    Last ECHO 12/30/2018 reveals EF 55%. Follows with Dr. Rossy Teresa. Pt does not appear overterly overloaded today, but with chronic LE edema noted. Continue Bumex. Continue to monitor with daily weights, I&Os, Telemetry. Paroxysmal A. Fib:    Noted. Continue Xarelto, Coreg. CKD:    Cr. 1.0 today. Follows with Dr. Millie Amanda. Continue to monitor with daily BMP. Hypothyroidism:    Continue Synthroid. TSH with reflex ordered. Hx of CVA:    Noted per chart review. Chronic residual left side weakness noted.            History Of Present Illness:    Nasreen Urrutia is a 72 y/o  female with a PMHx of HTN, CVA, who presents to [facility] ED today via EMS for the evaluation of generalized weakness and fatigue since last night. Pt's overall very tired and poor historian.  at bedside states that he lives alone with his wife and yesterday when he was helping her pivot from her chair to the commode, the patient was very weak and had to carry her back into the chair. Pt reportedly slept very well in her recliner last night, as she does normally, but  states she woke up appearing very tired and ill appearing to him. He states once again this AM, when transitioning her from the chair to the commode, she could barely hold her own body weight and had to call the EMS for assistance. Pt denies chest pain, cough or shortness of breath, chills, abdominal pain, changes in bowel or urinary habits. Pt's  does states she has had a slight decrease in appetite since yesterday. Past Medical History:        Diagnosis Date    Asthma     uses albuterol 1-2x per year    Atrial thrombosis     on OAC    Bursitis     right shoulder -s/p steroid injections    Cancer (HCC)     Chronic diastolic heart failure (HCC)     CVA (cerebral infarction) 6/14/15    Mult. 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 Nasreen Olvera    Hypothyroidism     Meningioma [facility])     3 separate meningiomas, s/p gamma knife (1/2012) , Dr. Desi Gautam at [facility]    MRSA nasal colonization 6/2013    Obesity (BMI 30-39. 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[address]     status post resection for macroadenoma in the 1970's    Pneumonia 11/2018    Stroke [facility]) 1988    due to radiation -bleeding CVA - residual left upper and lower extremity weakness    Stroke [facility]) October 2015 Urinary incontinence        Past Surgical History:        Procedure Laterality Date    APPENDECTOMY      BACK SURGERY      BRAIN SURGERY      1970 due to pituitary mass, drained and s/p radiation    CHOLECYSTECTOMY      HYSTERECTOMY (CERVIX STATUS UNKNOWN)      total done for DUB    OTHER SURGICAL HISTORY  9/30/2014    LAPAROSCOPIC RUPTURED APPENDECTOMY    OTHER SURGICAL HISTORY Left 10/26/15    Evacuation and Debridement of Left Lower Leg Hematoma - Dr. Rama Enriquez  3/13/2013    left    SHOULDER ARTHROPLASTY      right    TOOTH EXTRACTION  03/19/2018    TOTAL HIP ARTHROPLASTY      bilateral    TOTAL KNEE ARTHROPLASTY      right        Home Medications:   No current facility-administered medications on file prior to encounter. Current Outpatient Medications on File Prior to Encounter   Medication Sig Dispense Refill    terbinafine (LAMISIL) 250 MG tablet Take 1 tablet by mouth daily 90 tablet 1    rivaroxaban (XARELTO) 15 MG TABS tablet TAKE 1 TABLET BY MOUTH DAILY WITH SUPPER 90 tablet 3    oxybutynin (DITROPAN XL) 15 MG extended release tablet TAKE 1 TABLET BY MOUTH DAILY 90 tablet 1    carvedilol (COREG) 12.5 MG tablet TAKE 1 TABLET BY MOUTH TWICE DAILY 180 tablet 3    levothyroxine (SYNTHROID) 125 MCG tablet TAKE 1 TABLET BY MOUTH DAILY 90 tablet 1    pantoprazole (PROTONIX) 40 MG tablet TAKE 1 TABLET BY MOUTH EVERY NIGHT 90 tablet 3    bumetanide (BUMEX) 1 MG tablet TAKE 1 TABLET BY MOUTH DAILY 90 tablet 1    Multiple Vitamin (MULTIVITAMIN ADULT PO) Take by mouth daily      chlorhexidine (HIBICLENS) 4 % external liquid Wash hair with small amount twice weekly.  1 Bottle 2    famotidine (PEPCID) 10 MG tablet Take 10 mg by mouth every morning OTC      miconazole (MICOTIN) 2 % powder Apply topically 2 times daily PRN for excoriation 45 g 1    hydrocortisone (CORTEF) 20 MG tablet Take 20 mg by mouth every morning       hydrocortisone (CORTEF) 5 MG tablet Take 10 mg by mouth nightly       Multiple Vitamins-Minerals (THERAPEUTIC MULTIVITAMIN-MINERALS) tablet Take 1 tablet by mouth daily      diphenhydrAMINE-APAP, sleep, (TYLENOL PM EXTRA STRENGTH)  MG tablet Take 1 tablet by mouth nightly       acetaminophen (TYLENOL) 325 MG tablet Take 650 mg by mouth every 6 hours as needed for Pain 1 or 2 tabs q6h prn pain      simvastatin (ZOCOR) 20 MG tablet Take 1 tablet by mouth nightly 1 tablet 0       Allergies:    Pregabalin and Tape [adhesive tape]    Social History:    reports that she has never smoked. She has never used smokeless tobacco. She reports that she does not drink alcohol and does not use drugs. Family History:       Problem Relation Age of Onset    High Blood Pressure Mother     Stroke Maternal Grandmother 72       Diet:  No diet orders on file    Review of systems:     Review of Systems   Constitutional:  Positive for activity change, appetite change, chills and fatigue. Negative for fever. HENT:  Negative for congestion, rhinorrhea and sore throat. Eyes:  Negative for visual disturbance. Respiratory:  Negative for cough, chest tightness and shortness of breath. Cardiovascular:  Positive for leg swelling (chronic). Negative for chest pain. Gastrointestinal:  Negative for abdominal pain, constipation, diarrhea, nausea and vomiting. Genitourinary:  Negative for difficulty urinating, frequency and urgency. Musculoskeletal:  Positive for gait problem. Negative for arthralgias. Skin:  Negative for color change, rash and wound. Neurological:  Positive for weakness. Negative for dizziness, syncope and light-headedness. Psychiatric/Behavioral:  Negative for confusion and sleep disturbance. The patient is not nervous/anxious. PHYSICAL EXAM:  BP (!) 124/103   Pulse 84   Temp 97.8 °F (36.6 °C) (Oral)   Resp 19   Wt 193 lb (87.5 kg)   SpO2 96%   BMI 34.19 kg/m²   General appearance: Chronically ill appearing.  No apparent distress. Appears stated age and cooperative. Skin: Appears very dry and flaky with sporadic areas of ecchymosis noted on exposed skin of upper and lower extremities. Skin color, normal.  No rashes. HEENT: Normal cephalic, atraumatic without obvious deformity. Pupils equal, round, and reactive to light. Extra-ocular muscles intact. Conjunctivae/corneas clear. Neck: Trachea midline. Supple, with full range of motion. Cardiovascular: Regular rate and rhythm with normal S1/S2. No murmurs, rubs or gallops. Respiratory:  Diminished lung sounds. Normal respiratory effort. Clear to auscultation, bilaterally without rales, wheezes, or rhonchi. Abdomen: Soft, non-tender, non-distended. Normal bowel sounds. Musculoskeletal:  Pitting bilateral LE edema noted. No weakness or instability noted. No erythema, or gross deformity noted. Vascular: Capillary refill brisk,< 3 seconds. Pulses +2 palpable, equal bilaterally. Neurologic:  Neurovascularly intact without any focal sensory/motor deficits. Cranial nerves: II-XII grossly intact. Psychiatric: Alert and oriented, thought content appropriate, normal insight      Labs:   Recent Labs     11/23/22  1015   WBC 8.0   HGB 14.6   HCT 46.4        Recent Labs     11/23/22  1015      K 4.6      CO2 24   BUN 15   CREATININE 1.0   CALCIUM 9.4     Recent Labs     11/23/22  1015   AST 25   ALT 17   BILITOT 0.3   ALKPHOS 100     No results for input(s): INR in the last 72 hours. No results for input(s): Kory Bigness in the last 72 hours.     Urinalysis:    Lab Results   Component Value Date/Time    NITRU Negative 11/30/2021 12:09 PM    WBCUA 5-9 09/18/2020 12:00 PM    WBCUA 2-4 02/22/2012 01:00 PM    BACTERIA FEW 09/18/2020 12:00 PM    RBCUA NONE 09/18/2020 12:00 PM    BLOODU NEGATIVE 09/18/2020 12:00 PM    SPECGRAV 1.025 09/18/2020 12:00 PM    GLUCOSEU NEGATIVE 12/30/2018 01:15 PM       Radiology:   XR CHEST PORTABLE   Final Result   No acute cardiopulmonary disease            **This report has been created using voice recognition software. It may contain minor errors which are inherent in voice recognition technology. **      Final report electronically signed by Dr. Nguyen Yee on 11/23/2022 10:36 AM        XR CHEST PORTABLE    Result Date: 11/23/2022  PROCEDURE: XR CHEST PORTABLE CLINICAL INFORMATION: shortness of breath . TECHNIQUE: Portable upright COMPARISON: 12/30/2018 FINDINGS: Patient is rotated and leaning to the right. Heart size is likely within normal limits based on the depth of inspiration and portable technique mediastinum is not widened. No infiltrates or effusions are seen. Vessels are not congested. Bilateral shoulder replacements. No acute cardiopulmonary disease **This report has been created using voice recognition software. It may contain minor errors which are inherent in voice recognition technology. ** Final report electronically signed by Dr. Nguyen Yee on 11/23/2022 10:36 AM        EKG:  NSR with left axis deviation    Electronically signed by Nichole Moore PA-C on 11/23/2022 at 1:12 PM

## 2022-11-23 NOTE — ED NOTES
Patient to the ED with complaint of fatigue since last night. Patient states that she has been feeling \"like I don't want to do anything. \" Patient also notes some shortness of breath. .Patient is resting in bed with easy and unlabored respirations. Call light in reach. Side rails up x2. Patient denies further complaints or concerns. Will monitor.         Ronel Blankenship RN  11/23/22 [id number]

## 2022-11-23 NOTE — ED NOTES
Patient is resting in bed with easy and unlabored respirations. Call light in reach. Side rails up x2. Patient denies further complaints or concerns. Will monitor.         Chiqui Sanches RN  11/23/22 7394

## 2022-11-23 NOTE — PROGRESS NOTES
Follow Up / Progress Note        Patient:   Anai Lott  YOB: 1950  Age:  71 y.o. Room:  [facility]/027-A  MRN:  [id number]                   Plan/Follow-Up:  Pt was transferred to unit, [facility] Writer aware code status to be Limited, no intubation for resp failure and no resuscitation for cardiac arrest when pt was in ER. After transport, code status noted to be Full code. Secure text sent to [facility] to discuss. Telephone order received from OFELIA Mike to change code status back to Limited, no intubation for resp failure and no resuscitation efforts for cardiac arrset-placed into Epic. Pt's nurse,EITAN Macr updated. Please call palliative care PRN if further needs arise.        Electronically signed by Mecca Lewis RN on 11/23/2022 at 2934 [facility] Office: [phone]

## 2022-11-23 NOTE — PROGRESS NOTES
Initial Evaluation          Patient:   Candance Corwin  YOB: 1950  Age:  71 y.o. Room:  DANG /DANG  MRN:  [id number]   Acct: [de-identified]    Date of Admission:  11/23/2022  9:50 AM  Date of Service:  11/23/2022  Completed By:  Demond Rothman RN                 Reason for Palliative Care Evaluation:-             [x] Code Status Discussion              [x] Goals of Care              [] Pain/Symptom Management               [] Emotional Support              [] Other:                   Current Issues:-  []  Pain  [x]  Fatigue  []  Nausea  []  Anxiety  []  Depression  []  Shortness of Breath  []  Constipation  []  Appetite  []  Other:             Advance Directives:-  [] [place] DNR Form  [] Living Will  [] Medical POA             Current Code Status:-changed to limited no x 4 after discussion  [x] Full Resuscitation  [] DNR-Comfort Care-Arrest  [] DNR-Comfort Care       [] Limited Resuscitation             [] No CPR            [] No shock            [] No ET intubation/reintubation            [] No resuscitative medications            [] Other limitation:              Palliative Performance Status:          [] 60%  Ambulation reduced; Significant disease;Can't do hobbies/housework; intake normal or reduced; occasional assist; LOC full/confusion        [] 50%  Mainly sit/lie; Extensive disease; Can't do any work; Considerable assist; intake normal or reduced; LOC full/confusion        [x] 40%  Mainly in bed; Extensive disease; Mainly assist; intake normal or reduced; LOC full/confusion         [] 30%  Bed Bound; Extensive disease; Total care; intake reduced; LOC full/confusion        [] 20%  Bed Bound; Extensive disease; Total care; intake minimal; Drowsy/coma        [] 10%  Bed Bound; Extensive disease;  Total care; Mouth care only; Drowsy/coma        [] 0  Death        Goals of care evaluation:-        The patient goals of care are to provide comfort care/supportive services/palliation & relieve suffering:  Goals of care discussed with:  [] Patient independently  [x] Patient and Family  [] Family or Healthcare DPOA independently  [] Unable to discuss with patient, family/DPOA not present         Family/Patient Discussion:  Patient resting in bed. Patient is having difficulty maintaining alertness. Patient is oriented to name and place but not month, year and situation. Patient indicates that tomorrow is [employer] Day when asked what holiday is tomorrow.  Saulother Elliot is at the bedside. Palliative care introduced. Discussion about code status levels and what each level entails. Discussed complications of rib fractures, brain and organ damage associated with resuscitative measures. After much discussion,  indicates that the patient would not want any resuscitative measures if her heart/breathing should stop. Discussed intubation with respiratory failure and  would not want the  patient intubated as he believes that this aligns with her wishes. Plan/Follow-Up:  Updated primary RN. Updated Quyen Gonsales PAC and order received to change code status. Please call palliative care if further needs arise.         Electronically signed by Altagracia Villarreal RN on 11/23/2022 at 2:57 PM           Palliative Care Office: [phone]

## 2022-11-24 LAB
ANION GAP SERPL CALCULATED.3IONS-SCNC: 13 MEQ/L (ref 8–16)
BASOPHILS # BLD: 0.4 %
BASOPHILS ABSOLUTE: 0 THOU/MM3 (ref 0–0.1)
BUN BLDV-MCNC: 16 MG/DL (ref 7–22)
C-REACTIVE PROTEIN: 14 MG/DL (ref 0–1)
CALCIUM SERPL-MCNC: 8.8 MG/DL (ref 8.5–10.5)
CHLORIDE BLD-SCNC: 108 MEQ/L (ref 98–111)
CO2: 25 MEQ/L (ref 23–33)
CREAT SERPL-MCNC: 1.3 MG/DL (ref 0.4–1.2)
D-DIMER QUANTITATIVE: 1109 NG/ML FEU (ref 0–500)
EOSINOPHIL # BLD: 0.4 %
EOSINOPHILS ABSOLUTE: 0 THOU/MM3 (ref 0–0.4)
ERYTHROCYTE [DISTWIDTH] IN BLOOD BY AUTOMATED COUNT: 13.9 % (ref 11.5–14.5)
ERYTHROCYTE [DISTWIDTH] IN BLOOD BY AUTOMATED COUNT: 49.2 FL (ref 35–45)
FERRITIN: 433 NG/ML (ref 10–291)
GFR SERPL CREATININE-BSD FRML MDRD: 44 ML/MIN/1.73M2
GLUCOSE BLD-MCNC: 79 MG/DL (ref 70–108)
HCT VFR BLD CALC: 43.2 % (ref 37–47)
HEMOGLOBIN: 13.6 GM/DL (ref 12–16)
IMMATURE GRANS (ABS): 0.03 THOU/MM3 (ref 0–0.07)
IMMATURE GRANULOCYTES: 0.4 %
LYMPHOCYTES # BLD: 28.9 %
LYMPHOCYTES ABSOLUTE: 2.1 THOU/MM3 (ref 1–4.8)
MCH RBC QN AUTO: 30.2 PG (ref 26–33)
MCHC RBC AUTO-ENTMCNC: 31.5 GM/DL (ref 32.2–35.5)
MCV RBC AUTO: 96 FL (ref 81–99)
MONOCYTES # BLD: 14 %
MONOCYTES ABSOLUTE: 1 THOU/MM3 (ref 0.4–1.3)
NUCLEATED RED BLOOD CELLS: 0 /100 WBC
PLATELET # BLD: 201 THOU/MM3 (ref 130–400)
PMV BLD AUTO: 9.9 FL (ref 9.4–12.4)
POTASSIUM REFLEX MAGNESIUM: 3.9 MEQ/L (ref 3.5–5.2)
RBC # BLD: 4.5 MILL/MM3 (ref 4.2–5.4)
SEG NEUTROPHILS: 55.9 %
SEGMENTED NEUTROPHILS ABSOLUTE COUNT: 4 THOU/MM3 (ref 1.8–7.7)
SODIUM BLD-SCNC: 146 MEQ/L (ref 135–145)
WBC # BLD: 7.2 THOU/MM3 (ref 4.8–10.8)

## 2022-11-24 PROCEDURE — 99233 SBSQ HOSP IP/OBS HIGH 50: CPT | Performed by: PHYSICIAN ASSISTANT

## 2022-11-24 PROCEDURE — 80048 BASIC METABOLIC PNL TOTAL CA: CPT

## 2022-11-24 PROCEDURE — 86140 C-REACTIVE PROTEIN: CPT

## 2022-11-24 PROCEDURE — G0378 HOSPITAL OBSERVATION PER HR: HCPCS

## 2022-11-24 PROCEDURE — 6370000000 HC RX 637 (ALT 250 FOR IP)

## 2022-11-24 PROCEDURE — 85379 FIBRIN DEGRADATION QUANT: CPT

## 2022-11-24 PROCEDURE — 85025 COMPLETE CBC W/AUTO DIFF WBC: CPT

## 2022-11-24 PROCEDURE — 36415 COLL VENOUS BLD VENIPUNCTURE: CPT

## 2022-11-24 PROCEDURE — 2580000003 HC RX 258

## 2022-11-24 PROCEDURE — 82728 ASSAY OF FERRITIN: CPT

## 2022-11-24 RX ORDER — SIMVASTATIN 20 MG
20 TABLET ORAL NIGHTLY
Status: DISCONTINUED | OUTPATIENT
Start: 2022-11-24 | End: 2022-12-09 | Stop reason: HOSPADM

## 2022-11-24 RX ADMIN — TERBINAFINE TABLETS 250 MG 250 MG: 250 TABLET ORAL at 09:00

## 2022-11-24 RX ADMIN — Medication 20 MG: at 22:22

## 2022-11-24 RX ADMIN — HYDROCORTISONE 20 MG: 10 TABLET ORAL at 08:01

## 2022-11-24 RX ADMIN — LEVOTHYROXINE SODIUM 125 MCG: 0.12 TABLET ORAL at 06:20

## 2022-11-24 RX ADMIN — SODIUM CHLORIDE, PRESERVATIVE FREE 10 ML: 5 INJECTION INTRAVENOUS at 22:17

## 2022-11-24 RX ADMIN — SODIUM CHLORIDE, PRESERVATIVE FREE 10 ML: 5 INJECTION INTRAVENOUS at 08:02

## 2022-11-24 RX ADMIN — PANTOPRAZOLE SODIUM 40 MG: 40 TABLET, DELAYED RELEASE ORAL at 06:21

## 2022-11-24 RX ADMIN — ACETAMINOPHEN 650 MG: 325 TABLET ORAL at 22:17

## 2022-11-24 RX ADMIN — CARVEDILOL 12.5 MG: 6.25 TABLET, FILM COATED ORAL at 22:17

## 2022-11-24 RX ADMIN — RIVAROXABAN 15 MG: 15 TABLET, FILM COATED ORAL at 17:32

## 2022-11-24 RX ADMIN — BUMETANIDE 1 MG: 1 TABLET ORAL at 08:01

## 2022-11-24 RX ADMIN — HYDROCORTISONE 10 MG: 10 TABLET ORAL at 22:17

## 2022-11-24 RX ADMIN — CARVEDILOL 12.5 MG: 6.25 TABLET, FILM COATED ORAL at 08:01

## 2022-11-24 ASSESSMENT — PAIN SCALES - GENERAL: PAINLEVEL_OUTOF10: 0

## 2022-11-24 NOTE — PROGRESS NOTES
Baricitinib Initiation    This patient does not meet criteria for baricitinib due to not requiring supplemental oxygen, high flow nasal cannula, invasive or non-invasive ventilation, or ECMO. Notified [facility]. Criteria:  Age 2 years or greater  COVID-19 positive & hospitalized  Requiring supplemental oxygen, high flow nasal cannula, invasive or non-invasive ventilation, or ECMO   Any elevation in any of the following: CRP, D-dimer, ferritin, or LDH  Must also be on dexamethasone     Exclusions: If patient already received tocilizumab (due to long half-life)  Patients with active TB    Special Considerations (that warrant provider discussion): Active DVT or PE  Pregnancy  Severe hepatic impairment  Chronic/recurrent infections  Hemoglobin < 8.0  Chronically immunosuppressed patients (drug or disease etiology)    eGFR:  Recent Labs     11/23/22  1003 11/23/22  1810   LABGLOM 60 44*     Plan:  Re-evaluate is respiratory support required.      Brandie Guadalupe [facility] , PGY1 Pharmacy Resident 11/24/2022 5:49 PM

## 2022-11-24 NOTE — PROGRESS NOTES
Hospitalist Progress Note    Patient:  Susanna Reyes      Unit/Bed:8B-27/027-A    YOB: 1950    MRN: [id number]       Acct: [de-identified]     PCP: Mele Restrepo MD    Date of Admission: 11/23/2022    Assessment/Plan:    COVID-19 infection, with generalized weakness and fatigue:              -Continue supportive care  -PT/OT/SLP   -Pharmacy consulted for possible barcitinib therapy, message received from pharmacist, as patient not currently hypoxic, she is not a candidate at this time  -Inflammatory markers ordered  -Patient already on hydrocortisone, consider increasing dose if patient becomes hypoxic     Suspected adrenal insufficiency, with Hx of pituitary tumor:               -Continue hydrocortisone  -Consider increased steroid dosage if patient becomes hypoxic     HFp EF:               -Per colleague, \"Last ECHO 12/30/2018 reveals EF 55%. Follows with Dr. Dasha Joseph. \"  -Continue Bumex   -Dialy weights  -Strict I&O's     Paroxysmal A. Fib:               -Continue Xarelto   -Continue Coreg     CKD:               -Trend BMP PRN     Hypothyroidism:               -Continue Synthroid. Hx of CVA:              -Noted per chart review. 8. Scalp Irritation   -Possible pustular dermatitis of scalp, patient has reportedly seen a dermatologist before   -Wound ostomy consulted for evaluation of scalp irritation, and notable wound of left leg    Disposition pending clinical course, anticipate patient will require at least home health, possible [facility] Course:    11/24/22  Inflammatory markers ordered  Pharmacy consulted for possible COVID-19 therapy    Chief Complaint: fatigue      Subjective: 71 y.o. female admitted to the hospitalist service. Patient seen in bed. Patient's  in room.  reports caring for and applying medication to an area of scalp irritation.        Medications:    Infusion Medications    sodium chloride       Scheduled Medications    simvastatin  20 mg Oral Nightly bumetanide  1 mg Oral Daily    carvedilol  12.5 mg Oral BID    hydrocortisone  10 mg Oral Nightly    levothyroxine  125 mcg Oral Daily    pantoprazole  40 mg Oral QAM AC    rivaroxaban  15 mg Oral Daily    terbinafine  250 mg Oral Daily    sodium chloride flush  10 mL IntraVENous 2 times per day    hydrocortisone  20 mg Oral QAM    oxybutynin  15 mg Oral Daily     PRN Meds: sodium chloride flush, sodium chloride, ondansetron **OR** ondansetron, polyethylene glycol, acetaminophen **OR** acetaminophen, potassium chloride **OR** potassium alternative oral replacement **OR** potassium chloride, magnesium sulfate      Intake/Output Summary (Last 24 hours) at 11/24/2022 1022  Last data filed at 11/24/2022 0224  Gross per 24 hour   Intake --   Output 400 ml   Net -400 ml       Diet:  ADULT DIET; Regular    Review of Systems   Cardiovascular:  Negative for chest pain. Genitourinary:  Negative for dysuria. Exam:  /61   Pulse 78   Temp 98.4 °F (36.9 °C) (Oral)   Resp 16   Wt 193 lb (87.5 kg)   SpO2 97%   BMI 34.19 kg/m²     Physical Exam  Constitutional:       Interventions: She is not intubated. HENT:      Head:      Comments: Area of parietal scalp irritation noted  Pulmonary:      Effort: She is not intubated. Musculoskeletal:      Comments: DO x 4   Neurological:      Comments: Patient drowsy but arouseable   Psychiatric:         Speech: She is communicative. Labs:   Recent Labs     11/23/22  1015 11/24/22  0558   WBC 8.0 7.2   HGB 14.6 13.6   HCT 46.4 43.2    201     Recent Labs     11/23/22  1015 11/23/22  1431 11/24/22  0558     --  146*   K 4.6 3.9 3.9     --  108   CO2 24  --  25   BUN 15  --  16   CREATININE 1.0  --  1.3*   CALCIUM 9.4  --  8.8     Recent Labs     11/23/22  1015   AST 25   ALT 17   BILITOT 0.3   ALKPHOS 100     No results for input(s): INR in the last 72 hours. No results for input(s): [place] in the last 72 hours.     Urinalysis: Lab Results   Component Value Date/Time    NITRU NEGATIVE 11/23/2022 09:30 PM    WBCUA 5-9 11/23/2022 09:30 PM    WBCUA 2-4 02/22/2012 01:00 PM    BACTERIA NONE SEEN 11/23/2022 09:30 PM    RBCUA 3-5 11/23/2022 09:30 PM    BLOODU NEGATIVE 11/23/2022 09:30 PM    SPECGRAV 1.025 09/18/2020 12:00 PM    GLUCOSEU NEGATIVE 11/23/2022 09:30 PM       Radiology:  XR CHEST PORTABLE   Final Result   No acute cardiopulmonary disease            **This report has been created using voice recognition software. It may contain minor errors which are inherent in voice recognition technology. **      Final report electronically signed by Dr. Ane Boast on 11/23/2022 10:36 AM          Diet: ADULT DIET;  Regular    DVT prophylaxis: [] Lovenox                                 [] SCDs                                 [] SQ Heparin                                 [] Encourage ambulation           [x] Already on Anticoagulation     Disposition:    [] Home       [] TCU       [] Rehab       [] Psych       [] SNF       [] [place]       [] Other-    Code Status: Limited    PT/OT Eval:        Electronically signed by Vinicius Cooper PA-C on 11/24/2022 at 8:37 AM

## 2022-11-24 NOTE — PLAN OF CARE
Problem: Discharge Planning  Goal: Discharge to home or other facility with appropriate resources  Outcome: Progressing     Problem: Neurosensory - Adult  Goal: Achieves stable or improved neurological status  Outcome: Progressing     Problem: Respiratory - Adult  Goal: Achieves optimal ventilation and oxygenation  Outcome: Progressing     Problem: Skin/Tissue Integrity - Adult  Goal: Skin integrity remains intact  Outcome: Progressing  Flowsheets (Taken 11/24/2022 0302)  Skin Integrity Remains Intact: Monitor for areas of redness and/or skin breakdown  Note: Patient presents with blanchable redness to bottom. Patient understands need to be turned every 2 hours with pillow support     Problem: Musculoskeletal - Adult  Goal: Return mobility to safest level of function  Outcome: Progressing     Problem: Infection - Adult  Goal: Absence of infection at discharge  Outcome: Progressing     Problem: Skin/Tissue Integrity  Goal: Absence of new skin breakdown  Description: 1. Monitor for areas of redness and/or skin breakdown  2. Assess vascular access sites hourly  3. Every 4-6 hours minimum:  Change oxygen saturation probe site  4. Every 4-6 hours:  If on nasal continuous positive airway pressure, respiratory therapy assess nares and determine need for appliance change or resting period. Outcome: Progressing     Problem: Safety - Adult  Goal: Free from fall injury  Outcome: Progressing     Problem: ABCDS Injury Assessment  Goal: Absence of physical injury  Outcome: Progressing     Care plan reviewed with patient. Patient verbalize understanding of the plan of care and contribute to goal setting.

## 2022-11-25 LAB
ANION GAP SERPL CALCULATED.3IONS-SCNC: 17 MEQ/L (ref 8–16)
BASOPHILS # BLD: 0.2 %
BASOPHILS ABSOLUTE: 0 THOU/MM3 (ref 0–0.1)
BUN BLDV-MCNC: 21 MG/DL (ref 7–22)
CALCIUM SERPL-MCNC: 8.8 MG/DL (ref 8.5–10.5)
CHLORIDE BLD-SCNC: 106 MEQ/L (ref 98–111)
CO2: 21 MEQ/L (ref 23–33)
CREAT SERPL-MCNC: 1.5 MG/DL (ref 0.4–1.2)
EOSINOPHIL # BLD: 0.3 %
EOSINOPHILS ABSOLUTE: 0 THOU/MM3 (ref 0–0.4)
ERYTHROCYTE [DISTWIDTH] IN BLOOD BY AUTOMATED COUNT: 13.9 % (ref 11.5–14.5)
ERYTHROCYTE [DISTWIDTH] IN BLOOD BY AUTOMATED COUNT: 49.2 FL (ref 35–45)
GFR SERPL CREATININE-BSD FRML MDRD: 37 ML/MIN/1.73M2
GLUCOSE BLD-MCNC: 65 MG/DL (ref 70–108)
HCT VFR BLD CALC: 44.4 % (ref 37–47)
HEMOGLOBIN: 14 GM/DL (ref 12–16)
IMMATURE GRANS (ABS): 0.05 THOU/MM3 (ref 0–0.07)
IMMATURE GRANULOCYTES: 0.5 %
LYMPHOCYTES # BLD: 29 %
LYMPHOCYTES ABSOLUTE: 2.9 THOU/MM3 (ref 1–4.8)
MCH RBC QN AUTO: 30.1 PG (ref 26–33)
MCHC RBC AUTO-ENTMCNC: 31.5 GM/DL (ref 32.2–35.5)
MCV RBC AUTO: 95.5 FL (ref 81–99)
MONOCYTES # BLD: 8.6 %
MONOCYTES ABSOLUTE: 0.9 THOU/MM3 (ref 0.4–1.3)
NUCLEATED RED BLOOD CELLS: 0 /100 WBC
PLATELET # BLD: 204 THOU/MM3 (ref 130–400)
PMV BLD AUTO: 10.7 FL (ref 9.4–12.4)
POTASSIUM REFLEX MAGNESIUM: 3.6 MEQ/L (ref 3.5–5.2)
RBC # BLD: 4.65 MILL/MM3 (ref 4.2–5.4)
SEG NEUTROPHILS: 61.4 %
SEGMENTED NEUTROPHILS ABSOLUTE COUNT: 6.1 THOU/MM3 (ref 1.8–7.7)
SODIUM BLD-SCNC: 144 MEQ/L (ref 135–145)
WBC # BLD: 9.9 THOU/MM3 (ref 4.8–10.8)

## 2022-11-25 PROCEDURE — G0378 HOSPITAL OBSERVATION PER HR: HCPCS

## 2022-11-25 PROCEDURE — 97162 PT EVAL MOD COMPLEX 30 MIN: CPT

## 2022-11-25 PROCEDURE — 97167 OT EVAL HIGH COMPLEX 60 MIN: CPT

## 2022-11-25 PROCEDURE — XW0DXM6 INTRODUCTION OF BARICITINIB INTO MOUTH AND PHARYNX, EXTERNAL APPROACH, NEW TECHNOLOGY GROUP 6: ICD-10-PCS | Performed by: HOSPITALIST

## 2022-11-25 PROCEDURE — 6360000002 HC RX W HCPCS: Performed by: PHYSICIAN ASSISTANT

## 2022-11-25 PROCEDURE — 92610 EVALUATE SWALLOWING FUNCTION: CPT

## 2022-11-25 PROCEDURE — 97530 THERAPEUTIC ACTIVITIES: CPT

## 2022-11-25 PROCEDURE — 85025 COMPLETE CBC W/AUTO DIFF WBC: CPT

## 2022-11-25 PROCEDURE — 80048 BASIC METABOLIC PNL TOTAL CA: CPT

## 2022-11-25 PROCEDURE — 92523 SPEECH SOUND LANG COMPREHEN: CPT

## 2022-11-25 PROCEDURE — 99233 SBSQ HOSP IP/OBS HIGH 50: CPT | Performed by: PHYSICIAN ASSISTANT

## 2022-11-25 PROCEDURE — 96361 HYDRATE IV INFUSION ADD-ON: CPT

## 2022-11-25 PROCEDURE — 97535 SELF CARE MNGMENT TRAINING: CPT

## 2022-11-25 PROCEDURE — 2580000003 HC RX 258: Performed by: PHYSICIAN ASSISTANT

## 2022-11-25 PROCEDURE — 6370000000 HC RX 637 (ALT 250 FOR IP): Performed by: PHYSICIAN ASSISTANT

## 2022-11-25 PROCEDURE — 36415 COLL VENOUS BLD VENIPUNCTURE: CPT

## 2022-11-25 PROCEDURE — 6370000000 HC RX 637 (ALT 250 FOR IP)

## 2022-11-25 PROCEDURE — 96360 HYDRATION IV INFUSION INIT: CPT

## 2022-11-25 RX ORDER — DEXAMETHASONE 4 MG/1
6 TABLET ORAL DAILY
Status: COMPLETED | OUTPATIENT
Start: 2022-11-25 | End: 2022-12-04

## 2022-11-25 RX ORDER — SODIUM CHLORIDE 9 MG/ML
INJECTION, SOLUTION INTRAVENOUS CONTINUOUS
Status: DISCONTINUED | OUTPATIENT
Start: 2022-11-25 | End: 2022-11-30

## 2022-11-25 RX ADMIN — BUMETANIDE 1 MG: 1 TABLET ORAL at 09:18

## 2022-11-25 RX ADMIN — BARICITINIB 2 MG: 2 TABLET, FILM COATED ORAL at 18:14

## 2022-11-25 RX ADMIN — PANTOPRAZOLE SODIUM 40 MG: 40 TABLET, DELAYED RELEASE ORAL at 08:38

## 2022-11-25 RX ADMIN — HYDROCORTISONE 20 MG: 10 TABLET ORAL at 09:18

## 2022-11-25 RX ADMIN — DEXAMETHASONE 6 MG: 4 TABLET ORAL at 18:14

## 2022-11-25 RX ADMIN — HYDROCORTISONE 10 MG: 10 TABLET ORAL at 22:18

## 2022-11-25 RX ADMIN — LEVOTHYROXINE SODIUM 125 MCG: 0.12 TABLET ORAL at 08:40

## 2022-11-25 RX ADMIN — Medication 20 MG: at 22:18

## 2022-11-25 RX ADMIN — RIVAROXABAN 15 MG: 15 TABLET, FILM COATED ORAL at 18:14

## 2022-11-25 RX ADMIN — CARVEDILOL 12.5 MG: 6.25 TABLET, FILM COATED ORAL at 09:18

## 2022-11-25 RX ADMIN — SODIUM CHLORIDE: 9 INJECTION, SOLUTION INTRAVENOUS at 11:26

## 2022-11-25 RX ADMIN — TERBINAFINE TABLETS 250 MG 250 MG: 250 TABLET ORAL at 09:20

## 2022-11-25 ASSESSMENT — PAIN SCALES - GENERAL
PAINLEVEL_OUTOF10: 0

## 2022-11-25 ASSESSMENT — ENCOUNTER SYMPTOMS: VOMITING: 0

## 2022-11-25 NOTE — PROGRESS NOTES
[facility]  INPATIENT PHYSICAL THERAPY  EVALUATION  [facility] MED SURG 8B - 8B-27/027-A    Time In: 1350  Time Out: 1426  Timed Code Treatment Minutes: 25 Minutes  Minutes: 36          Date: 2022  Patient Name: Aly Gardiner,  Gender:  female        MRN: [id number]  : 1950  (76 y.o.)      Referring Practitioner: Prakash Martinez PA-C  Diagnosis: Generalized weakness  Additional Pertinent Hx: 72 y/o  female with a PMHx of HTN, CVA, who presents to [facility] ED today via EMS for the evaluation of generalized weakness and fatigue since last night. Pt's overall very tired and poor historian.  at bedside states that he lives alone with his wife and yesterday when he was helping her pivot from her chair to the commode, the patient was very weak and had to carry her back into the chair. Pt reportedly slept very well in her recliner last night, as she does normally, but  states she woke up appearing very tired and ill appearing to him. He states once again this AM, when transitioning her from the chair to the commode, she could barely hold her own body weight and had to call the EMS for assistance. Pt denies chest pain, cough or shortness of breath, chills, abdominal pain, changes in bowel or urinary habits. Pt's  does states she has had a slight decrease in appetite since yesterday. Restrictions/Precautions:  Restrictions/Precautions: Fall Risk, Isolation    Subjective:  Chart Reviewed: Yes  Patient assessed for rehabilitation services?: Yes  Family / Caregiver Present: No  Subjective: RN approved session. Pt pleasant and agreeable to therapy.  pt very lethargic throughout session and had difficulty answering questions    General:  Orientation Level: Unable to assess  Vision: Within Functional Limits  Hearing: Within functional limits       Pain: 0/10: denies pain    Vitals: Vitals not assessed per clinical judgement, see nursing flowsheet    Social/Functional History: Lives With: Spouse  Type of Home: House  Home Layout: One level  Home Access: Stairs to enter with rails  Entrance Stairs - Number of Steps: 2  Entrance Stairs - Rails: Both  Home Equipment: Walker, rolling, Wheelchair-manual             ADL Assistance: Needs assistance  Homemaking Assistance: Needs assistance  Ambulation Assistance: Non-ambulatory  Transfer Assistance: Needs assistance          Additional Comments:  assist with stand pivot transfers; w/c or chair bound otherwise    OBJECTIVE:  Range of Motion:  Bilateral Lower Extremity: Impaired - ankles lacking full DF, knees and hips lacking full flexion; noted to have knee hyperextension     Strength:  Bilateral Lower Extremity: Impaired - grossly deconditoned    Balance:  Static Sitting Balance: Moderate Assistance  Pt sitting EOB ~5 min; poor trunk control noted, excessive forward flexion and R lean    Bed Mobility:  Supine to Sit: Maximum Assistance, X 1, with head of bed raised, with verbal cues   Sit to Supine: Moderate Assistance, X 2, with head of bed flat, with verbal cues , with increased time for completion     Transfers:  Not Tested    Ambulation:  Not Tested    Functional Outcome Measures: Completed  AM-PAC Inpatient Mobility without Stair Climbing Raw Score : 8  AM-PAC Inpatient without Stair Climbing T-Scale Score : 30.65    ASSESSMENT:  Activity Tolerance:  Patient tolerance of  treatment: fair. Increased time to complete all activity; pt required increased time to respond and then had very limited response. Pt very lethargic       Treatment Initiated: Treatment and education initiated within context of evaluation. Evaluation time included review of current medical information, gathering information related to past medical, social and functional history, completion of standardized testing, formal and informal observation of tasks, assessment of data and development of plan of care and goals.   Treatment time included skilled education and facilitation of tasks to increase safety and independence with functional mobility for improved independence and quality of life. Assessment: Body Structures, Functions, Activity Limitations Requiring Skilled Therapeutic Intervention: Decreased functional mobility , Decreased strength, Decreased endurance, Decreased balance, Decreased posture  Assessment: Nia Garcia is a 71 y.o. female that presents with fatigue. Pt demonstrates a decrease in baseline by way of bed mobility, transfers and ambulation secondary to decreased activity tolerance, strength, fatigue, and balance deficits. Pt will benefit from skilled PT services throughout admission and beyond hospital discharge for improvements in functional mobility and in order to decrease fall risk and return pt to PLOF. Therapy Prognosis: Good    Requires PT Follow-Up: Yes    Discharge Recommendations:  Discharge Recommendations: 24 hour supervision or assist    Patient Education:      . Patient Education  Education Given To: Patient  Education Provided: Role of Therapy, Plan of Care  Education Method: Verbal  Barriers to Learning: None  Education Outcome: Verbalized understanding       Equipment Recommendations:  Equipment Needed: No    Plan:  Current Treatment Recommendations: Strengthening, Balance training, Endurance training, Functional mobility training, Transfer training  General Plan:  (5x GM)    Goals:  Patient Goals : none stated  Short Term Goals  Time Frame for Short Term Goals: by discharge  Short Term Goal 1: bed mobility HOB flat, CGA x1 for increased functional ind  Short Term Goal 2: pt to tolerate sitting EOB 10' with good posture for increased core strength/endurance  Short Term Goal 3: PT to assess transfers  Long Term Goals  Time Frame for Long Term Goals : NA d/t short ELOS    Following session, patient left in safe position with all fall risk precautions in place.     Agnes Ernst ([place]) PT, DPT

## 2022-11-25 NOTE — PROGRESS NOTES
Wound ostomy consulted for scalp wound and leg wound. Photos reviewed. Leg wound has intact scab. Leave open to air and monitor. If area opens, cover with bordered foam dressing changing every 2 days. Scalp wound superficial. Would recommend discontinue use of hydrogen peroxide as over use can by cytotoxic. Would recommend staff to cleanse wound with normal saline and gauze. Pat dry with clean gauze. Apply bacitracin to area twice daily and as needed. Will need order from attending provider if agree with treatment recommendations. Swap out pillow case daily to keep area dry and clean. Would have patient continue to follow with dermatology for scalp upon discharge. Scalp wound: discontinue use of hydrogen peroxide. Cleanse wound with normal saline and gauze. Pat dry with clean gauze. Apply bacitracin to area twice daily and as needed. Will need order from attending provider if agree with treatment recommendations. Swap out pillow case daily to keep area dry and clean. Leg wound intact scab. Leave open to air and monitor. If area opens, cover with bordered foam dressing changing every 2 days.

## 2022-11-25 NOTE — PLAN OF CARE
Problem: Discharge Planning  Goal: Discharge to home or other facility with appropriate resources  Outcome: Progressing  Flowsheets  Taken 11/25/2022 1053 by Tayler Vásquez RN  Discharge to home or other facility with appropriate resources: Identify barriers to discharge with patient and caregiver  Taken 11/24/2022 2200 by Dexter Tompkins RN  Discharge to home or other facility with appropriate resources: Identify barriers to discharge with patient and caregiver  Note: Patient lives at home with spouse who is her caregiver. Spouse wants patient to discharge to ECF to get stronger before coming home. Social work following. Problem: Neurosensory - Adult  Goal: Achieves stable or improved neurological status  Outcome: Progressing  Flowsheets  Taken 11/25/2022 1053 by Tayler Vásquez RN  Achieves stable or improved neurological status: Assess for and report changes in neurological status  Taken 11/24/2022 2200 by Dexter Tompkins RN  Achieves stable or improved neurological status: Assess for and report changes in neurological status  Note: Neuro status at baseline. Patient with history of CVA with left sided weakness. Problem: Respiratory - Adult  Goal: Achieves optimal ventilation and oxygenation  Outcome: Progressing  Flowsheets  Taken 11/25/2022 1053 by Tayler Vásquez RN  Achieves optimal ventilation and oxygenation: Assess for changes in respiratory status  Taken 11/24/2022 2200 by Dexter Tompkins RN  Achieves optimal ventilation and oxygenation: Assess for changes in respiratory status  Note: Oxygen saturations within normal limits at this time. Encouraging patient to cough and deep breath.  COVID +     Problem: Skin/Tissue Integrity - Adult  Goal: Skin integrity remains intact  Outcome: Progressing  Flowsheets  Taken 11/25/2022 1053 by Tayler Vásquez RN  Skin Integrity Remains Intact: Monitor for areas of redness and/or skin breakdown  Taken 11/24/2022 2200 by Dexter Tompkins RN  Skin Integrity Remains Intact: Monitor for areas of redness and/or skin breakdown  Note: Patient has scattered bruising and fragile flaky skin. Skin tears to left arm. Encouraging patient to turn and reposition with pillow support to prevent further breakdown. Problem: Musculoskeletal - Adult  Goal: Return mobility to safest level of function  Outcome: Progressing  Flowsheets (Taken 11/25/2022 1053)  Return Mobility to Safest Level of Function: Assess patient stability and activity tolerance for standing, transferring and ambulating with or without assistive devices  Note: Patient is chair bound at home. PT/OT consults in place to improve mobility. Problem: Infection - Adult  Goal: Absence of infection at discharge  Outcome: Progressing  Flowsheets  Taken 11/25/2022 1053 by Cesar Benjamin RN  Absence of infection at discharge: Assess and monitor for signs and symptoms of infection  Taken 11/24/2022 2200 by Cam Mcgee RN  Absence of infection at discharge:   Assess and monitor for signs and symptoms of infection   Monitor lab/diagnostic results  Note: Patient continues in droplet isolation for COVID. Problem: Skin/Tissue Integrity  Goal: Absence of new skin breakdown  Description: 1. Monitor for areas of redness and/or skin breakdown  2. Assess vascular access sites hourly  3. Every 4-6 hours minimum:  Change oxygen saturation probe site  4. Every 4-6 hours:  If on nasal continuous positive airway pressure, respiratory therapy assess nares and determine need for appliance change or resting period. Outcome: Progressing  Note: Skin breakdown documented in assessment. Encouraging turns. Problem: Safety - Adult  Goal: Free from fall injury  Outcome: Progressing  Flowsheets (Taken 11/25/2022 1053)  Free From Fall Injury: Instruct family/caregiver on patient safety  Note: No falls this shift. Falling star program and alarms in use. Patient uses call light appropriately.         Problem: ABCDS Injury Assessment  Goal: Absence of physical injury  Outcome: Progressing  Note: Patient free from falls. No injuries noted. Problem: Chronic Conditions and Co-morbidities  Goal: Patient's chronic conditions and co-morbidity symptoms are monitored and maintained or improved  Outcome: Progressing  Note: Chronic conditions and co morbidities managed. Care plan reviewed with patient. Patient verbalizes understanding of the plan of care and contributes to goal setting.

## 2022-11-25 NOTE — CARE COORDINATION
11/25/22, 2:02 PM EST      DISCHARGE PLANNING EVALUATION    Melissa Current  Admitted: 11/23/2022  Hospital Day: 0    Location: -27/027-A Reason for admit: Generalized weakness [R53.1]  Impaired mobility and ADLs [Z74.09, Z78.9]  COVID-19 virus infection [U07.1]    Past Medical History:   Diagnosis Date    Asthma     uses albuterol 1-2x per year    Atrial thrombosis     on [address]    Bursitis     right shoulder -s/p steroid injections    Cancer ([address]     Chronic diastolic heart failure (HCC)     CVA (cerebral infarction) 6/14/15    Mult. 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 Stephen Block    Hypothyroidism     Meningioma [facility])     3 separate meningiomas, s/p gamma knife (1/2012) , Dr. Julieth Biswas at [facility]    MRSA nasal colonization 6/2013    Obesity (BMI 30-39. 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  Barriers to Discharge: Admitted for fatigue and found Covid +. Hx CVA with left sided deficits and wheelchair bound. PAF and is on Xarelto. IVF at 50/hr. AFebrile and on room air. MBS ordered. SW consulted for discharge dispositions.      PCP: Dejan Baldwin MD    Readmission Risk              Risk of Unplanned Readmission:  0         Patient's Healthcare Decision Maker: Named in [facility]    Patient Goals/Plan/Treatment Preferences: CM visit deferred today as SW is consulted for possible discharge placement needs. CM will cont to follow for additional needs. Transportation/Food Security/Housekeeping Addressed: No issues identified.

## 2022-11-25 NOTE — PROGRESS NOTES
Hospitalist Progress Note    Patient:  Wendi Scales      Unit/Bed:8B-27/027-A    YOB: 1950    MRN: [id number]       Acct: [de-identified]     PCP: Idalia Escudero MD    Date of Admission: 11/23/2022    Assessment/Plan:    COVID-19 infection, with generalized weakness and fatigue:              -Continue supportive care  -PT/OT/SLP   -Pharmacy consulted for possible barcitinib therapy, message received from pharmacist, as patient not currently hypoxic, she is not a candidate at this time  -Inflammatory markers elevated  -Patient already on hydrocortisone, consider increasing dose if patient becomes hypoxic     Suspected adrenal insufficiency, with Hx of pituitary tumor:               -Continue hydrocortisone  -Consider increased steroid dosage if patient becomes hypoxic     HFp EF:               -Per colleague, \"Last ECHO 12/30/2018 reveals EF 55%. Follows with Dr. Jesenia Daniels. \"  -Continue Bumex   -Dialy weights  -Strict I&O's     Paroxysmal A. Fib:               -Continue Xarelto   -Continue Coreg     JESSE on CKD:               -Patient made NPO, judiscious IVF initiated, 50 mL/hr   -Monitor urine output, consider zaman catheter  -Trend BMP     Hypothyroidism:               -Continue Synthroid. Hx of CVA:              -Noted per chart review. 8. Scalp Irritation   -Possible pustular dermatitis of scalp, patient has reportedly seen a dermatologist before   -Wound ostomy consulted for evaluation of scalp irritation, and notable wound of left leg    Disposition pending clinical course, anticipate patient will require at least home health, possible ECF. Discussed with  and .     Hospital Course:    11/24/22  Inflammatory markers ordered  Pharmacy consulted for possible COVID-19 therapy    11/25/22  Patient made NPO per SLP, plan for swallow study  Patient O2 remains stable  JESSE, IVF, monitor    Chief Complaint: fatigue      Subjective: 71 y.o. female admitted to the hospitalist service. Patient seen in bed. Patient's  in room. Patient reports feeling \"pretty good. \" She denies pain. She denies BM today. Patient made NPO by SLP this AM with plan for swallow study today. Medications:    Infusion Medications    sodium chloride       Scheduled Medications    simvastatin  20 mg Oral Nightly    bumetanide  1 mg Oral Daily    carvedilol  12.5 mg Oral BID    hydrocortisone  10 mg Oral Nightly    levothyroxine  125 mcg Oral Daily    pantoprazole  40 mg Oral QAM AC    rivaroxaban  15 mg Oral Daily    terbinafine  250 mg Oral Daily    sodium chloride flush  10 mL IntraVENous 2 times per day    hydrocortisone  20 mg Oral QAM    oxybutynin  15 mg Oral Daily     PRN Meds: sodium chloride flush, sodium chloride, ondansetron **OR** ondansetron, polyethylene glycol, acetaminophen **OR** acetaminophen, potassium chloride **OR** potassium alternative oral replacement **OR** potassium chloride, magnesium sulfate    No intake or output data in the 24 hours ending 11/25/22 0825      Diet:  ADULT DIET; Regular  ADULT ORAL NUTRITION SUPPLEMENT; Breakfast, Lunch, Dinner; Standard 4 oz Oral Supplement    Review of Systems   Constitutional:  Negative for fever. Cardiovascular:  Negative for chest pain. Gastrointestinal:  Negative for vomiting. Exam:  BP (!) 106/29   Pulse 73   Temp 98.4 °F (36.9 °C) (Oral)   Resp 18   Wt 193 lb (87.5 kg)   SpO2 93%   BMI 34.19 kg/m²     Physical Exam  Constitutional:       Interventions: She is not intubated. Cardiovascular:      Comments: Limited  Pulmonary:      Effort: She is not intubated. Comments: Patient on room air, lung sounds diminished or not well appreciated on the left  Musculoskeletal:      Comments: DO x 4   Skin:     Comments: Pictured   Psychiatric:         Speech: She is communicative.                 Labs:   Recent Labs     11/23/22  1015 11/24/22  0558 11/25/22  0648   WBC 8.0 7.2 9.9   HGB 14.6 13.6 14.0   HCT 46.4 43.2 44.4  201 204       Recent Labs     11/23/22  1015 11/23/22  1431 11/24/22  0558 11/25/22  0648     --  146* 144   K 4.6 3.9 3.9 3.6     --  108 106   CO2 24  --  25 21*   BUN 15  --  16 21   CREATININE 1.0  --  1.3* 1.5*   CALCIUM 9.4  --  8.8 8.8       Recent Labs     11/23/22  1015   AST 25   ALT 17   BILITOT 0.3   ALKPHOS 100       No results for input(s): INR in the last 72 hours. No results for input(s): Estle Carrow in the last 72 hours. Urinalysis:      Lab Results   Component Value Date/Time    NITRU NEGATIVE 11/23/2022 09:30 PM    WBCUA 5-9 11/23/2022 09:30 PM    WBCUA 2-4 02/22/2012 01:00 PM    BACTERIA NONE SEEN 11/23/2022 09:30 PM    RBCUA 3-5 11/23/2022 09:30 PM    BLOODU NEGATIVE 11/23/2022 09:30 PM    SPECGRAV 1.025 09/18/2020 12:00 PM    GLUCOSEU NEGATIVE 11/23/2022 09:30 PM       Radiology:  XR CHEST PORTABLE   Final Result   No acute cardiopulmonary disease            **This report has been created using voice recognition software. It may contain minor errors which are inherent in voice recognition technology. **      Final report electronically signed by Dr. Grant Lou on 11/23/2022 10:36 AM      FL MODIFIED BARIUM SWALLOW W VIDEO    (Results Pending)       Diet: ADULT DIET;  Regular  ADULT ORAL NUTRITION SUPPLEMENT; Breakfast, Lunch, Dinner; Standard 4 oz Oral Supplement    DVT prophylaxis: [] Lovenox                                 [] SCDs                                 [] SQ Heparin                                 [] Encourage ambulation           [x] Already on Anticoagulation     Disposition:    [] Home       [] TCU       [] Rehab       [] Psych       [] SNF       [] [place]       [] Other-    Code Status: Limited    PT/OT Eval: Made priority today        Electronically signed by Chari Tracey PA-C on 11/25/2022 at 8:25 AM

## 2022-11-25 NOTE — PROGRESS NOTES
Virgil Dunham 61  INPATIENT OCCUPATIONAL THERAPY  [facility] MED SURG 8B  EVALUATION    Time:   Time In: 4822  Time Out: 1535  Timed Code Treatment Minutes: 25 Minutes  Minutes: 40          Date: 2022  Patient Name: Christiana Briggs,   Gender: female      MRN: [id number]  : 1950  (76 y.o.)  Referring Practitioner: Chandrika Mott PA-C  Diagnosis: Generalized Weakness  Additional Pertinent Hx: Pt with a PMHx of HTN, CVA, who presents to [facility] ED today via EMS for the evaluation of generalized weakness and fatigue since last night. Pt's overall very tired and poor historian.  at bedside states that he lives alone with his wife and yesterday when he was helping her pivot from her chair to the commode, the patient was very weak and had to carry her back into the chair. Pt reportedly slept very well in her recliner last night, as she does normally, but  states she woke up appearing very tired and ill appearing to him. He states once again this AM, when transitioning her from the chair to the commode, she could barely hold her own body weight and had to call the EMS for assistance. Pt denies chest pain, cough or shortness of breath, chills, abdominal pain, changes in bowel or urinary habits. Pt's  does states she has had a slight decrease in appetite since yesterday. Restrictions/Precautions:  Restrictions/Precautions: Fall Risk, Isolation    Subjective  Chart Reviewed: Yes, Orders, History and Physical, Other (comment) (PT evaluation)  Patient assessed for rehabilitation services?: Yes    Subjective: Pleasant and cooperative. Pt speaks single words. Limited volume. Comments: RN approved session. Pt had PT session 30 minutes prior to this evaluation. Pt agreed to attempt sitting at the edge of bed for activity. SOB noted. Pt was not C/O pain. Redness noted at her calves and L forearm/elbow.    Pt was positioned toward her R hip with a pillow placed under her L hip before and after the session. Her nurse was notified. Comments / Details: No indication of having pain. Pain: None indicated. Redness noted in her LUE and B calves. Vitals: Oxygen: Pt shows 90% O2 saturation while on room air at rest.  She desaturated to 88-89% when first sitting up. She recovered as high as 92% while in sitting with cues provided for posture. Social/Functional History:  Lives With: Spouse  Type of Home: House  Home Layout: One level  Home Access: Stairs to enter with rails  Entrance Stairs - Number of Steps: 2  Entrance Stairs - Rails: Both  Home Equipment: Melvia Crofts, rolling, Wheelchair-manual, Lift chair        Receives Help From: Family  ADL Assistance: Needs assistance  Homemaking Assistance: Needs assistance  Homemaking Responsibilities: No  Ambulation Assistance: Non-ambulatory  Transfer Assistance: Needs assistance       Leisure & Hobbies: Watching classic movies  Additional Comments:  assist with stand pivot transfers; w/c or chair bound otherwise    VISION:Corrected    HEARING:  Corrected R hearing aid only    COGNITION: Slow Processing, Decreased Recall, and Expressive Aphasia    RANGE OF MOTION:  Right Upper Extremity: Impaired - shoulder flexion and abduction actively 0-80 degrees; passive WFL  Left Upper Extremity:  Impaired - shoulder flexion 0-40 degrees actively; passive 0-100 degrees. STRENGTH:  Right Upper Extremity: Impaired - 3-/5 deltoid; 3+/5 pectoral; 3+/5 biceps and 4-/5 triceps  Left Upper Extremity:  Impaired - 2+/5 deltoid; 3+/5 pectoral; 3+/5 biceps and 4-/5 triceps    SENSATION:   Dull sensation in LUE and LLE. ADL:   Grooming: Contact Guard Assistance. Pt wiped her face with a washcloth and facial tissue and don/doffed her glasses when the cloth was handed to her and cues were provided. BALANCE:  Sitting Balance: Moderate Assistance. Posterior and R leaning noted. Forward head posture noted while upright.     BED MOBILITY:  Rolling to Right: Maximum Assistance, with verbal cues  help needed to move her hips and to reach across to the bedrail with her LUE  Supine to Sit: Maximum Assistance Pt had help for bringing his hips forward and shoulders upright. Sit to Supine: Maximum Assistance help needed for bringing her legs up in the bed and lowering her trunk slowly  Scooting: Maximum Assistance cues needed for leaning to her side before bringing her opposite hip forward    TRANSFERS:  Not able to demonstrate secondary to safety concerns as being shown by her fatigue levels and poor posture and sitting balance    Exercise:  Pt completed neck rotation with verbal cues and MIN A provided. 6 reps completed with some improved breathing noted after having completed them. Activity Tolerance:  Patient tolerance of  treatment: fair. Pt had sat at the edge of bed for almost 5 minutes. She asked to return to supine before doing any other activity. Pt attempted to use the A capella and was unable to move air enough to make a sound despite having a good seal around mouthpiece. Assessment:  Assessment: Patient would benefit from continued skilled OT services to address above deficits. She presents with generalized weakness. Pt has hx of CVA in 2015 and L foot drop. She has expressive aphasia. She tested + for COVID this admission. Pt demonstrated bed mobility with Mod A for static sitting balance. Pt has forward head posture throughout. She was on room air but had SOB with bed mobility. She completed a grooming task with CGA. She sat at the edge of bed for over 4 minutes while participating in activity. She is motivated to participate in therapy.     Performance deficits / Impairments: Decreased functional mobility , Decreased ADL status, Decreased strength, Decreased ROM, Decreased endurance, Decreased balance, Decreased cognition  Prognosis: Good  REQUIRES OT FOLLOW-UP: Yes  Decision Making: High Complexity    Treatment Initiated: Treatment and education initiated within context of evaluation. Evaluation time included review of current medical information, gathering information related to past medical, social and functional history, completion of standardized testing, formal and informal observation of tasks, assessment of data and development of plan of care and goals. Treatment time included skilled education and facilitation of tasks to increase safety and independence with ADL's for improved functional independence and quality of life. She had SLP this AM and was reminded that she was tested for her swallowing at time using ice ships and pureed solids. She is NPO until she can have a MBS study done. She verbalized understanding. The role of OT was reviewed with pt. Pt agreed to work with therapy. She requested that her TV be turned on and a classic movie station was found for her. Discharge Recommendations:  24 hour supervision or assist, Patient would benefit from continued therapy after discharge    Patient Education:     Patient Education  Education Given To: Patient  Education Provided: Role of Therapy, Plan of Care, Orientation  Education Method: Verbal  Barriers to Learning: None  Education Outcome: Verbalized understanding, Continued education needed    Equipment Recommendations:  Equipment Needed: No    Plan:  Times Per Week: 3-5x  Current Treatment Recommendations: Strengthening, Endurance training, Functional mobility training, Balance training, Patient/Caregiver education & training, Self-Care / ADL  Additional Comments: Pt would benefit from continued skilled OT services when medically stable and discharged from Acute. 24 hour care with OT at SNF vs home with [address] [place] [address]. Specific Instructions for Next Treatment: Functional transfers; ADLs and sitting balance; upper body positioning and relaxed breathing. See long-term goal time frame for expected duration of plan of care.   If no long-term goals established, a short length of stay is anticipated. Goals:  Patient goals : Not stated. Short Term Goals  Time Frame for Short Term Goals: By discharge  Short Term Goal 1: Pt will demonstrate functional transfers with OTR to prepare for doing any self care while out of bed. Short Term Goal 2: Pt will complete upper body ROM exercises while following verbal and tactile cues to increase her upright posture for ease of engagement in activity. Short Term Goal 3: Pt will complete ADL tasks with no >than CGA while in sitting for over 5 minutes to increase her endurance and balance for ease of participating in morning routine. Short Term Goal 4: Pt will engage in dynamic sitting tasks with no > than MIN A for balance and cues for using her LUE for active support to increase her balance for ease of completing transfers and toileting routine. Additional Goals?: No         Following session, patient left in safe position with all fall risk precautions in place.

## 2022-11-25 NOTE — PROGRESS NOTES
[address] [place] [address]  SPEECH THERAPY  STR[facility] MED SURG 8B  Speech - Language - Cognitive Evaluation + Clinical Swallow Evaluation    SLP Individual Minutes  Time In: 7528  Time Out: 3752  Minutes: 16  Timed Code Treatment Minutes: 0 Minutes     Speech, Language, Cognitive Evaluation: 8 minutes  Clinical Swallow Evaluation: 8 minutes    Date: 2022  Patient Name: Fabio Coronado      CSN: [id number]   : 1950  (71 y.o.)  Gender: female   Referring Physician:  Jono Larson PA-C  Diagnosis: Generalized weakness  Precautions: Fall risk, aspiration precautions, contact/droplet precautions (COVID-19)  History of Present Illness/Injury: Patient admitted to [facility] with above med dx; please refer to physician H&P for full details. Per chart review, \"78 y/o  female with a PMHx of HTN, CVA, who presents to [facility] ED today via EMS for the evaluation of generalized weakness and fatigue since last night. Pt's overall very tired and poor historian.  at bedside states that he lives alone with his wife and yesterday when he was helping her pivot from her chair to the commode, the patient was very weak and had to carry her back into the chair. Pt reportedly slept very well in her recliner last night, as she does normally, but  states she woke up appearing very tired and ill appearing to him. He states once again this AM, when transitioning her from the chair to the commode, she could barely hold her own body weight and had to call the EMS for assistance. Pt denies chest pain, cough or shortness of breath, chills, abdominal pain, changes in bowel or urinary habits. Pt's  does states she has had a slight decrease in appetite since yesterday. \"    Pertinent hx for CVA with residual L sided weakness. No head imaging within this hospitalization to provide comment.  MRI brain  positive for infracts in the L temporal lobe, posterior limb of the L internal capsule, and the L basal ganglia with old infarcts in the miriam. ST consulted to complete clinical swallow evaluation and assessment of cognitive linguistic domains to develop goals per POC. Past Medical History:   Diagnosis Date    Asthma     uses albuterol 1-2x per year    Atrial thrombosis     on OAC    Bursitis     right shoulder -s/p steroid injections    Cancer (HCC)     Chronic diastolic heart failure (HCC)     CVA (cerebral infarction) 6/14/15    Mult. 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 Renay Retana    Hypothyroidism     Meningioma [facility])     3 separate meningiomas, s/p gamma knife (1/2012) , Dr. Franky Herrera at [facility]    MRSA nasal colonization 6/2013    Obesity (BMI 30-39. 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 Subjective:  EITAN Feldman with approval to proceed with evaluations. Upon arrival, patient resting in bed with R sided listing and poor awareness to immediate surroundings; significant confusion noted. No family present. SOCIAL HISTORY:   *will need to verify social hx with spouse pending presence   Living Arrangements: [facility] with spouse   Work History: Retired  Education Level: x4 years college   Driving Status: Does not drive  Finance Management: Dependent/Unable  Medication Management: Dependent/Unable  ADL's: Dependent/Unable.    Hobbies: n/a  Vision Status: Glasses  Hearing: R hearing aid    SPEECH / VOICE:  Limited mandible excursion with imprecise articulatory placement and fast rate of production; decreased functional intensity per perceptual measures with episodes of dysphonia. Not able to complete DDK rates s/t inability to produce in conjunction with ST. Speech intelligibility best approximates 60% in known contexts. LANGUAGE:  Receptive:  1 Step Commands: 2/3  2 Step Commands: 0/2  Simple Yes/No Questions: 2/3  Complex Yes/No Questions: 2/3    Expressive:  Automatic Speech: WFL numerical counting 1-10  Sentence Completion (open-ended): 1/3  Confrontational Namin/3  Responsive Namin/2  Sentence Formulation: Limited for wants/needs  Conversational Speech: Profound impairment  Paraphasias: None evidenced  Repetition: 3/3 CVC    COGNITION:  Not able to administer informal cognitive screener via [address] [place] [address] d/t severity of expressive and receptive language deficits presenting.    Orientation:   Problem Solvin/3  Sequencin/1  Insight: Poor  Attention: Adequate sustained attention    SWALLOWING:    Respiratory Status: Room Air      Behavioral Observation: Alert, Confused, Lethargic, Dysarthric, and Limited Direction Following    CRANIAL NERVE ASSESSMENT  *limitations for comprehensiveness of cranial nerve assessment given difficulties with basic command execution    CN V (Trigeminal) Closes and Opens Mandible Impaired    Rotary Jaw Movement Impaired      CN VII (Facial) Cheeks Hold Food out of Sulci Impaired: Bilateral    Opens, Closes/Seals, Protrudes, Retracts Lips Impaired: Bilateral    General Appearance Impaired    Sensation Not Tested      CN X (Vagus - Pharyngeal) Raises Back of Tongue Not Tested      CN XI (Accessory) Lifts Soft Palate Not Tested      CN XII (Hypoglossal) Elevates Tongue Up and Back Not Tested    Protrusion   Impaired    Lateralizes Tongue Impaired    Sensation Not Tested      Other Observations Dentition Poor oral hygiene     Vocal Quality Reduced functional intensity with episodes of dysphonia     Cough Dystussia      PATIENT WAS EVALUATED USING:  Ice Chips, Thin Liquids, and Puree    ORAL PHASE:  Impaired:  Impaired AP Movement and Oral Bolus Holding    PHARYNGEAL PHASE:  Impaired:  Delayed Swallow, Audible Swallow, Suspected Pharyngeal Residue, and Suspected Decreased Airway Protection  *not able to fully validate presence of pharyngeal phase deficits without formal instrumentation/supportive imaging       SIGNS AND SYMPTOMS OF LARYNGEAL PENETRATION / ASPIRATION:  No signs/symptoms of aspiration evident in this evaluation, but cannot rule out silent aspiration. INSTRUMENTAL EVALUATION: Modified Barium Swallow (MBS)    DIET RECOMMENDATIONS:  NPO; exceptions for critical medications crushed in puree (as pharmacy permits)    STRATEGIES: Strategies pending MBS completion     RECOMMENDATIONS/ASSESSMENT:  DIAGNOSTIC IMPRESSIONS:  Clinical Swallow Evaluation: Patient presents with moderate-severe oral dysphagia with inability to fully discern potential presence of pharyngeal phase deficits without formal instrumentation. Oral phase highly unorganized with overt bolus holding noted to impact efficiency for AP transit. Pharyngeal trigger consistently achieved, however, audible swallow notable with spontaneous/multiple swallows required (4+), concerning for potential presence of pharyngeal residuals vs laryngeal penetration event. Given pertinence for hx of CVA and clinical presentation at date, f/u instrumental evaluation via MBS warranted to further evaluate pharyngeal integrity given heightened concerns for a potential dysfunction. Speech, Language, Cognitive Evaluation: Patient presents with a severe cognitive impairment as derived by clinical findings outlined above to negatively influence abilities to return to home setting and participation in daily activities safely.  Expressive and receptive language domains characterized as a severe impairment given patient not able to convey wants/needs successfully nor answer posed yes/no questions relevant to context. Speech intelligibility best approximates 60% with noted dysarthria; concerns for a potential acute on chronic exacerbation of speech production skills given level of weakness d/t COVID-19 dx. Skilled ST services are recommended to address the above aforementioned deficits to improve cognitive performance for participation in current/future settings. Rehabilitation Potential: fair  Discharge Recommendations: SNF    EDUCATION:  Learner: Patient  Education:  Reviewed results and recommendations of this evaluation, Reviewed signs, symptoms and risks of aspiration, Reviewed ST goals and Plan of Care, and Reviewed recommendations for follow-up  Evaluation of Education: Needs further instruction, No evidence of learning, and Family not present    PLAN:  Recommendations pending MBS    PATIENT GOAL:    Did not state. Will further assess during treatment. SHORT TERM GOALS:  Short Term Goals  Time Frame for Short Term Goals: 2 weeks  Goal 1: Complete formal instrumentation via MBS to further evaluate pharyngeal integrity to r/o dysfunction and to optimize dysphagia management POC development. Goal 2: Patient will complete functional carryover tasks (biographics, orientation, call light, 1-3 units) with 50% accuracy, mod cues to improve awareness to immediate surroundings. Goal 3: Patient will complmete basic safety awareness, sequencing, and problem solving tasks with 50% accuracy, max cues to enhance participation in ADLs and current environment. Goal 4: Patient will complete multi-syllabic word repetition, sentence cmopletion, confrontational/responsive naming, and basic verbal fluency tasks with 60% accuracy, mod cues to improvce expression of wants/needs.   Goal 5: Patient will answer basic yes/no questions and execute 2-step commands with 70% accuracy, mod cues to improve comprehension measures for participation in immediate context. Goal 6: Patient will complete structured speech drills/tasks with implementation of SOS speech strategies to improve intelligibility to 75% to optimize exchanges between social partners.     LONG TERM GOALS:  No LTG established given short ELJOSE Ambrosio M.A., [facility]

## 2022-11-25 NOTE — CARE COORDINATION
DISCHARGE PLANNING EVALUATION  11/25/22, 2:24 PM EST    Reason for Referral:  Wanting ECF placement  Mental Status:  Not assessed  Decision Making:  Spouse assists  Family/Social/Home Environment: Patient resides at home with her spouse. Spouse stated that he assists her in care but that she has gotten weaker. Current Services including food security, transportation and housekeeping:  Spouse  Current Equipment Walker  Payment Source: [employer]  Concerns or Barriers to Discharge:  Patient is a pre-cert  Post-acute [facility]) provider list was provided to patient. Patient was informed of their freedom to choose [facility] provider. Discussed and offered to show the patient the relevant [facility] Providers quality and resource use measures on Medicare Compare web site via computer based on patient's goals of care and treatment preferences. Questions regarding selection process were answered. Teach Back Method used with  Go Adhikari regarding care plan and options for discharge. Go Clipper verbalize understanding of the plan of care and contribute to goal setting. Patient goals, treatment preferences and discharge plan:  Go Adhikari stated that normally patient is able to get up and around at home and walk with assistance but that she has gotten weaker very quickly and is in need of rehab. Spouse is aware that patient has COVID and ECF with COVID units are limited and has to be in her insurance network. [address] is in insurance network and close to patient's home. Spouse in agreement with a referral there. Did discuss with patient's spouse how insurance pre-cert process works and needing PT/OT notes and that patient would get a short stay under her insurance benefit. Referral made to [facility] at [address] and they will look at on Monday.      Electronically signed by LASHAY Leon on 11/25/2022 at 2:24 PM

## 2022-11-26 ENCOUNTER — APPOINTMENT (OUTPATIENT)
Dept: GENERAL RADIOLOGY | Age: 72
DRG: 177 | End: 2022-11-26
Payer: MEDICARE

## 2022-11-26 LAB
ANION GAP SERPL CALCULATED.3IONS-SCNC: 24 MEQ/L (ref 8–16)
BASOPHILS # BLD: 0.1 %
BASOPHILS ABSOLUTE: 0 THOU/MM3 (ref 0–0.1)
BUN BLDV-MCNC: 28 MG/DL (ref 7–22)
CALCIUM SERPL-MCNC: 8.8 MG/DL (ref 8.5–10.5)
CHLORIDE BLD-SCNC: 107 MEQ/L (ref 98–111)
CO2: 17 MEQ/L (ref 23–33)
CREAT SERPL-MCNC: 1.5 MG/DL (ref 0.4–1.2)
EOSINOPHIL # BLD: 0 %
EOSINOPHILS ABSOLUTE: 0 THOU/MM3 (ref 0–0.4)
ERYTHROCYTE [DISTWIDTH] IN BLOOD BY AUTOMATED COUNT: 13.8 % (ref 11.5–14.5)
ERYTHROCYTE [DISTWIDTH] IN BLOOD BY AUTOMATED COUNT: 50 FL (ref 35–45)
GFR SERPL CREATININE-BSD FRML MDRD: 37 ML/MIN/1.73M2
GLUCOSE BLD-MCNC: 115 MG/DL (ref 70–108)
HCT VFR BLD CALC: 44.8 % (ref 37–47)
HEMOGLOBIN: 13.9 GM/DL (ref 12–16)
IMMATURE GRANS (ABS): 0.02 THOU/MM3 (ref 0–0.07)
IMMATURE GRANULOCYTES: 0.2 %
LYMPHOCYTES # BLD: 9.5 %
LYMPHOCYTES ABSOLUTE: 1 THOU/MM3 (ref 1–4.8)
MCH RBC QN AUTO: 30.3 PG (ref 26–33)
MCHC RBC AUTO-ENTMCNC: 31 GM/DL (ref 32.2–35.5)
MCV RBC AUTO: 97.8 FL (ref 81–99)
MONOCYTES # BLD: 2 %
MONOCYTES ABSOLUTE: 0.2 THOU/MM3 (ref 0.4–1.3)
NUCLEATED RED BLOOD CELLS: 0 /100 WBC
PLATELET # BLD: 190 THOU/MM3 (ref 130–400)
PMV BLD AUTO: 10.2 FL (ref 9.4–12.4)
POTASSIUM REFLEX MAGNESIUM: 4.2 MEQ/L (ref 3.5–5.2)
RBC # BLD: 4.58 MILL/MM3 (ref 4.2–5.4)
SEG NEUTROPHILS: 88.2 %
SEGMENTED NEUTROPHILS ABSOLUTE COUNT: 9.3 THOU/MM3 (ref 1.8–7.7)
SODIUM BLD-SCNC: 148 MEQ/L (ref 135–145)
WBC # BLD: 10.6 THOU/MM3 (ref 4.8–10.8)

## 2022-11-26 PROCEDURE — G0378 HOSPITAL OBSERVATION PER HR: HCPCS

## 2022-11-26 PROCEDURE — 99232 SBSQ HOSP IP/OBS MODERATE 35: CPT | Performed by: PHYSICIAN ASSISTANT

## 2022-11-26 PROCEDURE — 74230 X-RAY XM SWLNG FUNCJ C+: CPT

## 2022-11-26 PROCEDURE — 85025 COMPLETE CBC W/AUTO DIFF WBC: CPT

## 2022-11-26 PROCEDURE — 6360000002 HC RX W HCPCS: Performed by: PHYSICIAN ASSISTANT

## 2022-11-26 PROCEDURE — 6370000000 HC RX 637 (ALT 250 FOR IP): Performed by: PHYSICIAN ASSISTANT

## 2022-11-26 PROCEDURE — 80048 BASIC METABOLIC PNL TOTAL CA: CPT

## 2022-11-26 PROCEDURE — 2500000003 HC RX 250 WO HCPCS: Performed by: PHYSICIAN ASSISTANT

## 2022-11-26 PROCEDURE — 92611 MOTION FLUOROSCOPY/SWALLOW: CPT

## 2022-11-26 PROCEDURE — 6370000000 HC RX 637 (ALT 250 FOR IP)

## 2022-11-26 PROCEDURE — 36415 COLL VENOUS BLD VENIPUNCTURE: CPT

## 2022-11-26 RX ADMIN — BARICITINIB 2 MG: 2 TABLET, FILM COATED ORAL at 11:39

## 2022-11-26 RX ADMIN — DEXAMETHASONE 6 MG: 4 TABLET ORAL at 11:38

## 2022-11-26 RX ADMIN — BARIUM SULFATE 20 ML: 400 PASTE ORAL at 09:27

## 2022-11-26 RX ADMIN — BARIUM SULFATE 80 ML: 0.81 POWDER, FOR SUSPENSION ORAL at 09:27

## 2022-11-26 RX ADMIN — RIVAROXABAN 15 MG: 15 TABLET, FILM COATED ORAL at 18:50

## 2022-11-26 RX ADMIN — PANTOPRAZOLE SODIUM 40 MG: 40 TABLET, DELAYED RELEASE ORAL at 06:51

## 2022-11-26 RX ADMIN — Medication 20 MG: at 21:48

## 2022-11-26 RX ADMIN — HYDROCORTISONE 20 MG: 10 TABLET ORAL at 11:39

## 2022-11-26 RX ADMIN — BARIUM SULFATE 50 ML: 400 SUSPENSION ORAL at 09:28

## 2022-11-26 RX ADMIN — TERBINAFINE TABLETS 250 MG 250 MG: 250 TABLET ORAL at 11:37

## 2022-11-26 RX ADMIN — BARIUM SULFATE 60 ML: 400 SUSPENSION ORAL at 09:27

## 2022-11-26 RX ADMIN — BUMETANIDE 1 MG: 1 TABLET ORAL at 11:39

## 2022-11-26 RX ADMIN — ACETAMINOPHEN 650 MG: 325 TABLET ORAL at 05:26

## 2022-11-26 RX ADMIN — CARVEDILOL 12.5 MG: 6.25 TABLET, FILM COATED ORAL at 11:39

## 2022-11-26 RX ADMIN — HYDROCORTISONE 10 MG: 10 TABLET ORAL at 21:48

## 2022-11-26 RX ADMIN — LEVOTHYROXINE SODIUM 125 MCG: 0.12 TABLET ORAL at 06:49

## 2022-11-26 RX ADMIN — CARVEDILOL 12.5 MG: 6.25 TABLET, FILM COATED ORAL at 21:48

## 2022-11-26 ASSESSMENT — ENCOUNTER SYMPTOMS
SHORTNESS OF BREATH: 0
VOMITING: 0

## 2022-11-26 NOTE — PROGRESS NOTES
[address] [place] [address]  SPEECH THERAPY  [facility] MED SURG 8B  Modified Barium Swallow    SLP Individual Minutes  Time In: 7993  Time Out: 8567  Minutes: 15  Timed Code Treatment Minutes: 0 Minutes       Date: 2022  Patient Name: Sergio Garnica      CSN: [id number]   : 1950  (71 y.o.)  Gender: female   Referring Physician:  Storm Denis PA-C  Diagnosis: Generalized weakness   Precautions: Aspiration; Droplet Plus/COVID-19    History of Present Illness/Injury: Patient admitted to [facility] with above dx. Per H&P, \"Shirley is a 72 y/o  female with a PMHx of HTN, CVA, who presents to [address] [place] [address] ED today via EMS for the evaluation of generalized weakness and fatigue since last night. Pt's overall very tired and poor historian.  at bedside states that he lives alone with his wife and yesterday when he was helping her pivot from her chair to the commode, the patient was very weak and had to carry her back into the chair. Pt reportedly slept very well in her recliner last night, as she does normally, but  states she woke up appearing very tired and ill appearing to him. He states once again this AM, when transitioning her from the chair to the commode, she could barely hold her own body weight and had to call the EMS for assistance. Pt denies chest pain, cough or shortness of breath, chills, abdominal pain, changes in bowel or urinary habits. Pt's  does states she has had a slight decrease in appetite since yesterday. \"     CSE completed  recommending NPO; exceptions for critical medications crushed in puree (as pharmacy permits) + completion of MBS to further evaluate. ST to complete MBS and determine safety of PO diet.    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 Current Diet: NPO    Pain: No pain reported. SUBJECTIVE:  Patient seen at bedside within fluoroscopy suite; alert and pleasant. OBJECTIVE:    Respiratory Status:  Nasal Canula 1L    Behavioral Observation:  Alert    PATIENT WAS EVALUATED USING:  Barium: Thin Liquids, Mildly Thick Liquids, Moderately Thick Liquids, Puree, Soft Solids, Coarse Solids, and Mixed Consistency    ORAL PHASE GELA SCORE: (Dysphagia outcome and severity scale)  3 = Moderate Dysphagia - Total assist with strategies - Two or more consistencies restricted - Moderate oral residue clears with cue    PHARYNGEAL PHASE GELA SCORE: (Dysphagia outcome and severity scale)  3 = Moderate Dysphagia - Two or more diet consistencies restricted - May exhibit one or more of the following: Moderate residue clears with cue, Airway penetration to the level of the vocal folds without cough with two or more consistencies, Aspiration with two consistencies with weak or no reflexive cough, Aspiration of one consistency, no cough and airway penetration with one consistency, no cough    EVIDENCE FOR LARYNGEAL PENETRATION AND/OR ASPIRATION:  Laryngeal penetration evident with thin liquids, mildly thick liquids, moderately thick liquids   Audible aspiration evident with thin liquids, mildly thick liquids    PENETRATION-ASPIRATION SCALE (PAS):   Thin Liquids: 7 = Material enters the airway, passes below the vocal folds, and is not ejected from the trachea despite effort  Mildly Thick Liquids:  7 = Material enters the airway, passes below the vocal folds, and is not ejected from the trachea despite effort  Moderately Thick Liquids: 2 = Material enters the airway, remains above vocal folds, and is ejected from the airway  Puree:  1 = Material does not enter the airway  Soft Solid:  1 = Material does not enter the airway  Mixed Consistencies: 1 = Material does not enter the airway  Hard Solid: 1 = Material does not enter the airway    ESOPHAGEAL PHASE:   No significant findings and See Radiology Report for details    ATTEMPTED TECHNIQUES:  Small Bolus Size Effective    Straw Effective    Cup Effective    Large Drinks Not Attempted    Consecutive Drinks Not Attempted    Chin Tuck Not Attempted    Head Turn Not Attempted    Spoon Presentations Not Attempted    Volitional Cough Effective    Spontaneous Cough Effective           DIAGNOSTIC IMPRESSIONS:  Patient presents with moderate oropharyngeal dysphagia as evidence by the findings outlined above. Patient demonstrated with impaired oral control, TBR, premature spillage over BOT to the valleculae and intermittency to the pyriforms; delayed swallow with impaired hyolaryngeal elevation/anterior excursion and epiglottic inversion resulting in poor airway protection. Laryngeal penetration and audible aspiration noted with thin and mildly thick liquids. X1 instance of laryngeal penetration midway to the vocal folds with moderately thick liquids noted. Patient withOUT aspiration of moderatly thick liquids; however, thickened viscosity did result in increased pharyngeal residue. Poor bolus cohesion and control overall with all consistencies evaluated and 2-3 swallows per very small bolus. Impaired pharyngeal sensation as evidence by moderate pharyngeal residue remaining in the valleculae with no attempts to self initiate to clear; verbal cue provided to \"cough and swallow\"- slightly effective overall. Patient certainly remains at [address] [place] [address] for aspiration.  Patient is currently Limited Code status; ST recommending minced and moist diet with moderately thick liquids + ongoing skilled ST services to target dysphagia. Education provided to patient; would benefit from ongoing education. ST also recommend SNF placement upon discharge. RN janessa Ross updated   Diet Recommendations:  Minced and moist diet with moderately thick liquids   Strategies:  Full Upright Position, Small Bite/Sip, Multiple Swallow, Medications Crushed with Puree, Limit Distractions, Monitor for Fatigue, and 1:1 assistance with meals    Rehabilitation Potential: fair  Discharge Recommendations: SNF    EDUCATION:  Learner: Patient  Education:  Reviewed results and recommendations of this evaluation, Reviewed diet and strategies, Reviewed signs, symptoms and risks of aspiration, Reviewed ST goals and Plan of Care, Reviewed recommendations for follow-up, and Education Related to Potential Risks and Complications Due to Impairment/Illness/Injury  Evaluation of Education: Verbalizes understanding, Needs further instruction, No evidence of learning, and Family not present    PLAN:  Skilled SLP intervention on acute care 3-5 x per week or until goals met and/or pt plateaus in function. Specific interventions for next session may include: dysphagia tx, cognitive/linguistic tx. PATIENT GOAL:    Did not state. Will further assess during treatment. SHORT TERM GOALS:  Short Term Goals  Time Frame for Short Term Goals: 2 weeks  Goal 1: Patient will consume minced and moist diet with moderatly thick liquids without s/s of aspiration in order to safely maintain adequate hydration and nutrition + complete pharyngeal exercises x10 in order to improve overall airway protection and pharyngeal constricition + patient will repeat instrumental evaluation in 2-4 weeks to determine readiness for potential dietary/texture upgrade. Goal 2: Patient will complete functional carryover tasks (biographics, orientation, call light, 1-3 units) with 50% accuracy, mod cues to improve awareness to immediate surroundings.   Goal 3: Patient will complmete basic safety awareness, sequencing, and problem solving tasks with 50% accuracy, max cues to enhance participation in ADLs and current environment. Goal 4: Patient will complete multi-syllabic word repetition, sentence cmopletion, confrontational/responsive naming, and basic verbal fluency tasks with 60% accuracy, mod cues to improvce expression of wants/needs. Goal 5: Patient will answer basic yes/no questions and execute 2-step commands with 70% accuracy, mod cues to improve comprehension measures for participation in immediate context. Goal 6: Patient will complete structured speech drills/tasks with implementation of SOS speech strategies to improve intelligibility to 75% to optimize exchanges between social partners.       LONG TERM GOALS:  No LTGs due to [facility]

## 2022-11-26 NOTE — PROGRESS NOTES
Comprehensive Nutrition Assessment    Type and Reason for Visit:  Initial, Consult (oral nutritional supplements)    Nutrition Recommendations/Plan:   Consider MVI. ONS: Magic cup TID. Continue current diet, further recommendations per SLP. Malnutrition Assessment:  Malnutrition Status: At risk for malnutrition (Comment) (11/26/22 0945)    Context:  Acute Illness     Findings of the 6 clinical characteristics of malnutrition:  Energy Intake:   (less than 50% since admit, 3 days)  Weight Loss:  Unable to assess (only stated weight, ? accuracy of bedscale, edema present. Patient and son deny weight change prior to admit)     Body Fat Loss:  No significant body fat loss     Muscle Mass Loss:  No significant muscle mass loss    Fluid Accumulation:  Mild Extremities   Strength:  Not Performed    Nutrition Assessment:     Pt. nutritionally compromised AEB poor oral intake since admit, 3 days. At risk for further nutrition compromise r/t dysphagia, admit with generalized weakness, covid positive 11/23/22, JESSE on CKD, scalp irritation, skin integrity issues, and underlying medical condition (hx: CVA, hypothyroidism, HLD, OA, GERD, meningioma of the brain, hypopituitarism d/t pituitary tumor, Ca, HTN, CHF, \"borderline\" DB, pneumonia). Nutrition Related Findings:    Pt. Report/Treatments/Miscellaneous: Patient and son seen. Patient on 1 liter of oxygen. Son reports patient with good appetite/intake and stable weight prior to admit. Decline in appetite since admit, AMS-which is much improved today per son. SLP following-plan MBS today. Discussed plan for ONS start when diet resumed. Patient voiced she is hungry today. Son reports patient with intermittent coughing with water prior to admit, no coughing with solids.     GI Status: BM 11/24/22  Pertinent Labs: 11/24/22: CRP 14, 11/26/22: Sodium 148, Potassium 4.2, BUN 28, Creatinine 1.5, Glucose 115  Pertinent Meds: baricitinib, bumex, coreg, decadron, cortef, synthroid, protonix, zocor     Wound Type: Multiple (right pretibial wound-open to air, left elbow skin tear, buttocks-redness, scalp wounds)       Current Nutrition Intake & Therapies:    Average Meal Intake: Unable to assess (diet just initiated)  Average Supplements Intake: Unable to assess  ADULT DIET; Dysphagia - Minced and Moist; Moderately Thick (Honey)  ADULT ORAL NUTRITION SUPPLEMENT; Breakfast, Lunch, Dinner; Frozen Oral Supplement    Anthropometric Measures:  Height: 5' 3\" (160 cm)  Ideal Body Weight (IBW): 115 lbs (52 kg)    Admission Body Weight: 193 lb (87.5 kg) (11/23/22 stated, nonpitting UE, +1 LE edema)  Current Body Weight: 166 lb 12.8 oz (75.7 kg) (11/26/22 bedscale, ? accuracy, non-pitting UE, +1 LE edema), 145 %   Current BMI (kg/m2): 29.6  Usual Body Weight:  (~ 180# per pt,  Per EMR: 12/16/21: 193#, no actual weights in the last 1 year)                       BMI Categories: Obese Class 1 (BMI 30.0-34. 9)    Estimated Daily Nutrient Needs:  Energy Requirements Based On: Kcal/kg  Weight Used for Energy Requirements:  (87.5kg)  Energy (kcal/day): ~ 3398-0509 kcals (15-18 kcals/kg/day)  Weight Used for Protein Requirements: Ideal (52 kg)  Protein (g/day): 42-62 grams (0.8-1.2 grams/kg IBW/day) pending renal status         Nutrition Diagnosis:   Inadequate oral intake related to cognitive or neurological impairment, inadequate protein-energy intake as evidenced by intake 0-25%    Nutrition Interventions:   Food and/or Nutrient Delivery: Continue Current Diet, Start Oral Nutrition Supplement  Nutrition Education/Counseling: Education initiated (Discussed plan for swallow test today and ONS use when diet initiated with patient and her son.)  Coordination of Nutrition Care: Continue to monitor while inpatient       Goals:     Goals: PO intake 75% or greater, by next RD assessment       Nutrition Monitoring and Evaluation:      Food/Nutrient Intake Outcomes: Diet Advancement/Tolerance, Food and Nutrient Intake, Supplement Intake  Physical Signs/Symptoms Outcomes: Biochemical Data, Chewing or Swallowing, GI Status, Fluid Status or Edema, Meal Time Behavior, Nutrition Focused Physical Findings, Weight, Skin    Discharge Planning:     Too soon to determine     Suzanne Tiwari RD, LD  Contact: [phone]

## 2022-11-26 NOTE — PROGRESS NOTES
Hospitalist Progress Note    Patient:  Natalee Musa      Unit/Bed:8B-27/027-A    YOB: 1950    MRN: [id number]       Acct: [de-identified]     PCP: Pauline Peter MD    Date of Admission: 11/23/2022    Assessment/Plan:    COVID-19 infection, with generalized weakness and fatigue:              -Continue supportive care  -PT/OT/SLP   -Pharmacy consulted for possible barcitinib therapy, message received from pharmacist, as patient not currently hypoxic, she is not a candidate at this time  -Inflammatory markers elevated   -Patient hypoxic, baricitinib therapy initiated  -Patient already on hydrocortisone, consider increasing dose if patient becomes hypoxic    Discussed case with RN yesterday 11/25/2022, patient became hypoxic and was started on O2 via nasal cannula. Pharmacy was contacted, for reevaluation, patient was then later started on baricitinib therapy for COVID-19.  6 mg Decadron was also ordered. Discussed with pharmacy this a.m., 11/26/2022 regarding Decadron plus patient's maintenance hydrocortisone therapy for adrenal insufficiency, they report that additional Decadron is appropriate. Elevated Anion Gap Metabolic Acidosis: Likely 2/2 renal insufficiency   -Increase IVF to 100 mL/hr   -Repeat BMP   -Consider VBG   -Consider Bicarb    Suspected adrenal insufficiency, with Hx of pituitary tumor:               -Continue hydrocortisone  -Consider increased steroid dosage if patient becomes hypoxic     HFp EF:               -Per colleague, \"Last ECHO 12/30/2018 reveals EF 55%. Follows with Dr. Isha Mills. \"  -Hold Bumex  2/2 JESSE  -Daily weights  -Strict I&O's     Paroxysmal A. Fib:               -Continue Xarelto   -Continue Coreg    JESSE on CKD: 1.5 today              -Increased ICF to 100 mL/hr   -Hold Bumex   -Monitor urine output, consider zaman catheter  -Trend BMP     Hypothyroidism:               -Continue Synthroid.      Hx of CVA:              -Noted    Scalp Irritation   -Possible pustular dermatitis of scalp, patient has reportedly seen a dermatologist before   -Wound ostomy consulted for evaluation of scalp irritation, and notable wound of left leg    Disposition pending clinical course, anticipate patient will require at least home health, possible ECF. Hospital Course:    11/24/22  Inflammatory markers ordered  Pharmacy consulted for possible COVID-19 therapy    11/25/22  Patient made NPO per SLP, plan for swallow study  Patient O2 remains stable  JESSE, IVF, monitor    11/26/22  Dysphagia diet  Started on O2 last evening, off today  Baricitinib and Decadron initiated    Chief Complaint: fatigue      Subjective: 71 y.o. female admitted to the hospitalist service. Patient's  in room. Patient seems to have perked up today, seen in bed with a newspaper. Reports she's \"feeling pretty good. \" She denies feeling feverish. She does not think she's had a BM today. Patient made NPO by SLP, swallow study completed, patient will be placed on dysphagia diet.        Medications:    Infusion Medications    sodium chloride Stopped (11/26/22 0019)    sodium chloride       Scheduled Medications    dexamethasone  6 mg Oral Daily    baricitinib  2 mg Oral Daily    simvastatin  20 mg Oral Nightly    bumetanide  1 mg Oral Daily    carvedilol  12.5 mg Oral BID    hydrocortisone  10 mg Oral Nightly    levothyroxine  125 mcg Oral Daily    pantoprazole  40 mg Oral QAM AC    rivaroxaban  15 mg Oral Daily    terbinafine  250 mg Oral Daily    sodium chloride flush  10 mL IntraVENous 2 times per day    hydrocortisone  20 mg Oral QAM    oxybutynin  15 mg Oral Daily     PRN Meds: sodium chloride flush, sodium chloride, ondansetron **OR** ondansetron, polyethylene glycol, acetaminophen **OR** acetaminophen, potassium chloride **OR** potassium alternative oral replacement **OR** potassium chloride, magnesium sulfate      Intake/Output Summary (Last 24 hours) at 11/26/2022 0957  Last data filed at 11/26/2022 0049  Gross per 24 hour   Intake 635.79 ml   Output 525 ml   Net 110.79 ml         Diet:  ADULT DIET; Dysphagia - Minced and Moist; Moderately Thick (Honey)  ADULT [place]AL NUTRITION SUPPLEMENT; Breakfast, Lunch, Dinner; Frozen Oral Supplement    Review of Systems   Constitutional:  Negative for fever. Respiratory:  Negative for shortness of breath. Cardiovascular:  Negative for chest pain. Gastrointestinal:  Negative for vomiting. Exam:  /63   Pulse 74   Temp 97.1 °F (36.2 °C) ([place])   Resp 16   Ht 5' 3\" (1.6 m)   Wt 193 lb (87.5 kg)   SpO2 93%   BMI 34.19 kg/m²     Physical Exam  Constitutional:       Interventions: She is not intubated. Cardiovascular:      Rate and Rhythm: Normal rate and regular rhythm. Pulmonary:      Effort: She is not intubated. Comments: Bilateral air entry  Musculoskeletal:      Comments: ERNESTINA x4   Psychiatric:         Speech: She is communicative. Labs:   Recent Labs     11/24/22  0558 11/25/22  0648 11/26/22  0556   WBC 7.2 9.9 10.6   HGB 13.6 14.0 13.9   HCT 43.2 44.4 44.8    204 190       Recent Labs     11/24/22  0558 11/25/22  0648 11/26/22  0556   * 144 148*   K 3.9 3.6 4.2    106 107   CO2 25 21* 17*   BUN 16 21 28*   CREATININE 1.3* 1.5* 1.5*   CALCIUM 8.8 8.8 8.8       Recent Labs     11/23/22  1015   AST 25   ALT 17   BILITOT 0.3   ALKPHOS 100       No results for input(s): INR in the last 72 hours. No results for input(s): Jessica Macleod in the last 72 hours.     Urinalysis:      Lab Results   Component Value Date/Time    NITRU NEGATIVE 11/23/2022 09:30 PM    WBCUA 5-9 11/23/2022 09:30 PM    WBCUA 2-4 02/22/2012 01:00 PM    BACTERIA NONE SEEN 11/23/2022 09:30 PM    RBCUA 3-5 11/23/2022 09:30 PM    BLOODU NEGATIVE 11/23/2022 09:30 PM    SPECGRAV 1.025 09/18/2020 12:00 PM    GLUCOSEU NEGATIVE 11/23/2022 09:30 PM       Radiology:  XR CHEST PORTABLE   Final Result   No acute cardiopulmonary disease            **This report has been created using voice recognition software. It may contain minor errors which are inherent in voice recognition technology. **      Final report electronically signed by Dr. Diane Martinez on 11/23/2022 10:36 AM      FL MODIFIED BARIUM SWALLOW W VIDEO    (Results Pending)       Diet: ADULT DIET;  Dysphagia - Minced and Moist; Moderately Thick (Honey)  ADULT ORAL NUTRITION SUPPLEMENT; Breakfast, Lunch, Dinner; Frozen Oral Supplement    DVT prophylaxis: [] Lovenox                                 [] SCDs                                 [] SQ Heparin                                 [] Encourage ambulation           [x] Already on Anticoagulation     Disposition:    [] Home       [] TCU       [] Rehab       [] Psych       [] SNF       [] [place]       [] Other-    Code Status: Limited    PT/OT Eval: Made priority today        Electronically signed by Lane López PA-C on 11/26/2022 at 9:57 AM

## 2022-11-27 PROBLEM — U07.1 COVID-19: Status: ACTIVE | Noted: 2022-11-27

## 2022-11-27 LAB
ANION GAP SERPL CALCULATED.3IONS-SCNC: 12 MEQ/L (ref 8–16)
BASOPHILS # BLD: 0.1 %
BASOPHILS ABSOLUTE: 0 THOU/MM3 (ref 0–0.1)
BUN BLDV-MCNC: 36 MG/DL (ref 7–22)
CALCIUM SERPL-MCNC: 8.6 MG/DL (ref 8.5–10.5)
CHLORIDE BLD-SCNC: 110 MEQ/L (ref 98–111)
CO2: 24 MEQ/L (ref 23–33)
CREAT SERPL-MCNC: 1.2 MG/DL (ref 0.4–1.2)
EOSINOPHIL # BLD: 0 %
EOSINOPHILS ABSOLUTE: 0 THOU/MM3 (ref 0–0.4)
ERYTHROCYTE [DISTWIDTH] IN BLOOD BY AUTOMATED COUNT: 13.4 % (ref 11.5–14.5)
ERYTHROCYTE [DISTWIDTH] IN BLOOD BY AUTOMATED COUNT: 45.4 FL (ref 35–45)
GFR SERPL CREATININE-BSD FRML MDRD: 48 ML/MIN/1.73M2
GLUCOSE BLD-MCNC: 158 MG/DL (ref 70–108)
HCT VFR BLD CALC: 34.8 % (ref 37–47)
HEMOGLOBIN: 11.6 GM/DL (ref 12–16)
IMMATURE GRANS (ABS): 0.03 THOU/MM3 (ref 0–0.07)
IMMATURE GRANULOCYTES: 0.3 %
LYMPHOCYTES # BLD: 6.8 %
LYMPHOCYTES ABSOLUTE: 0.7 THOU/MM3 (ref 1–4.8)
MAGNESIUM: 2 MG/DL (ref 1.6–2.4)
MCH RBC QN AUTO: 30.6 PG (ref 26–33)
MCHC RBC AUTO-ENTMCNC: 33.3 GM/DL (ref 32.2–35.5)
MCV RBC AUTO: 91.8 FL (ref 81–99)
MONOCYTES # BLD: 3.1 %
MONOCYTES ABSOLUTE: 0.3 THOU/MM3 (ref 0.4–1.3)
NUCLEATED RED BLOOD CELLS: 0 /100 WBC
PLATELET # BLD: 207 THOU/MM3 (ref 130–400)
PMV BLD AUTO: 10.5 FL (ref 9.4–12.4)
POTASSIUM REFLEX MAGNESIUM: 3.2 MEQ/L (ref 3.5–5.2)
RBC # BLD: 3.79 MILL/MM3 (ref 4.2–5.4)
SEG NEUTROPHILS: 89.7 %
SEGMENTED NEUTROPHILS ABSOLUTE COUNT: 9.8 THOU/MM3 (ref 1.8–7.7)
SODIUM BLD-SCNC: 146 MEQ/L (ref 135–145)
WBC # BLD: 10.9 THOU/MM3 (ref 4.8–10.8)

## 2022-11-27 PROCEDURE — 36415 COLL VENOUS BLD VENIPUNCTURE: CPT

## 2022-11-27 PROCEDURE — 83735 ASSAY OF MAGNESIUM: CPT

## 2022-11-27 PROCEDURE — 6370000000 HC RX 637 (ALT 250 FOR IP): Performed by: PHYSICIAN ASSISTANT

## 2022-11-27 PROCEDURE — 6360000002 HC RX W HCPCS: Performed by: PHYSICIAN ASSISTANT

## 2022-11-27 PROCEDURE — 6370000000 HC RX 637 (ALT 250 FOR IP)

## 2022-11-27 PROCEDURE — 99232 SBSQ HOSP IP/OBS MODERATE 35: CPT | Performed by: PHYSICIAN ASSISTANT

## 2022-11-27 PROCEDURE — G0378 HOSPITAL OBSERVATION PER HR: HCPCS

## 2022-11-27 PROCEDURE — 85025 COMPLETE CBC W/AUTO DIFF WBC: CPT

## 2022-11-27 PROCEDURE — 94669 MECHANICAL CHEST WALL OSCILL: CPT

## 2022-11-27 PROCEDURE — 80048 BASIC METABOLIC PNL TOTAL CA: CPT

## 2022-11-27 PROCEDURE — 94761 N-INVAS EAR/PLS OXIMETRY MLT: CPT

## 2022-11-27 PROCEDURE — 1200000000 HC SEMI PRIVATE

## 2022-11-27 RX ADMIN — HYDROCORTISONE 20 MG: 10 TABLET ORAL at 10:33

## 2022-11-27 RX ADMIN — PANTOPRAZOLE SODIUM 40 MG: 40 TABLET, DELAYED RELEASE ORAL at 06:27

## 2022-11-27 RX ADMIN — CARVEDILOL 12.5 MG: 6.25 TABLET, FILM COATED ORAL at 10:32

## 2022-11-27 RX ADMIN — HYDROCORTISONE 10 MG: 10 TABLET ORAL at 21:30

## 2022-11-27 RX ADMIN — Medication 20 MG: at 21:30

## 2022-11-27 RX ADMIN — RIVAROXABAN 15 MG: 15 TABLET, FILM COATED ORAL at 18:44

## 2022-11-27 RX ADMIN — BARICITINIB 2 MG: 2 TABLET, FILM COATED ORAL at 10:32

## 2022-11-27 RX ADMIN — TERBINAFINE TABLETS 250 MG 250 MG: 250 TABLET ORAL at 10:30

## 2022-11-27 RX ADMIN — LEVOTHYROXINE SODIUM 125 MCG: 0.12 TABLET ORAL at 06:27

## 2022-11-27 RX ADMIN — DEXAMETHASONE 6 MG: 4 TABLET ORAL at 10:30

## 2022-11-27 ASSESSMENT — ENCOUNTER SYMPTOMS: SHORTNESS OF BREATH: 0

## 2022-11-27 NOTE — PROGRESS NOTES
Hospitalist Progress Note    Patient:  Lon Lyle      Unit/Bed:8B-27/027-A    YOB: 1950    MRN: [id number]       Acct: [de-identified]     PCP: Jairon Rondon MD    Date of Admission: 11/23/2022    Assessment/Plan:    COVID-19 infection, with generalized weakness and fatigue:              -Continue supportive care  -PT/OT/SLP   -Pharmacy consulted for possible barcitinib therapy, message received from pharmacist, as patient not currently hypoxic, she is not a candidate at this time  -Inflammatory markers elevated   -Patient hypoxic, baricitinib therapy initiated  -Patient already on hydrocortisone, consider increasing dose if patient becomes hypoxic    Discussed case with RN 11/25/2022, patient became hypoxic and was started on O2 via nasal cannula. Pharmacy was contacted, for reevaluation, patient was then later started on baricitinib therapy for COVID-19.  6 mg Decadron was also ordered. Discussed with pharmacy this a.m., 11/26/2022 regarding Decadron plus patient's maintenance hydrocortisone therapy for adrenal insufficiency, they report that additional Decadron is appropriate. Elevated Anion Gap Metabolic Acidosis:  -CO2 improved to 24  -Likely 2/2 renal insufficiency   -Increase IVF to 100 mL/hr   -Repeat BMP   -Consider VBG   -Consider Bicarb    Hypokalemia  -Replacement protocol  -Trend BMP    Suspected adrenal insufficiency, with Hx of pituitary tumor:               -Continue hydrocortisone     HFp EF:               -Per colleague, \"Last ECHO 12/30/2018 reveals EF 55%. Follows with Dr. Louisa Sampson. \"  -Hold Bumex  2/2 JESSE  -Daily weights  -Strict I&O's     Paroxysmal A. Fib:               -Continue Xarelto   -Continue Coreg    JESSE on CKD: Cr improved to 1.2 today              -Increased IVF to 100 mL/hr 11/27/22   -Hold Bumex one more day   -Monitor urine output, consider zaman catheter  -Trend BMP     Hypothyroidism:               -Continue Synthroid.      Hx of CVA: -Noted    Scalp Irritation   -Possible pustular dermatitis of scalp, patient has reportedly seen a dermatologist before   -Wound ostomy consulted for evaluation of scalp irritation, and notable wound of left leg    Disposition pending clinical course, anticipate patient will require at least home health, possible ECF. Hospital Course:    11/24/22  Inflammatory markers ordered  Pharmacy consulted for possible COVID-19 therapy    11/25/22  Patient made NPO per SLP, plan for swallow study  Patient O2 remains stable  JESSE, IVF, monitor    11/26/22  Dysphagia diet  Started on O2 last evening, off today  Baricitinib and Decadron initiated    76/76/25  JESSE and metabolic acidosis improved    Chief Complaint: fatigue      Subjective: 71 y.o. female admitted to the hospitalist service. Patient reportedly tolerating diet. She denies BM. Nursing aids in room trying to assist patient to chair.        Medications:    Infusion Medications    sodium chloride 50 mL/hr at 11/27/22 0254    sodium chloride       Scheduled Medications    dexamethasone  6 mg Oral Daily    baricitinib  2 mg Oral Daily    simvastatin  20 mg Oral Nightly    [Held by provider] bumetanide  1 mg Oral Daily    carvedilol  12.5 mg Oral BID    hydrocortisone  10 mg Oral Nightly    levothyroxine  125 mcg Oral Daily    pantoprazole  40 mg Oral QAM AC    rivaroxaban  15 mg Oral Daily    terbinafine  250 mg Oral Daily    sodium chloride flush  10 mL IntraVENous 2 times per day    hydrocortisone  20 mg Oral QAM    oxybutynin  15 mg Oral Daily     PRN Meds: sodium chloride flush, sodium chloride, ondansetron **OR** ondansetron, polyethylene glycol, acetaminophen **OR** acetaminophen, potassium chloride **OR** potassium alternative oral replacement **OR** potassium chloride, magnesium sulfate      Intake/Output Summary (Last 24 hours) at 11/27/2022 1618  Last data filed at 11/27/2022 0511  Gross per 24 hour   Intake 0 ml   Output 400 ml   Net -400 ml Diet:  ADULT DIET; Dysphagia - Minced and Moist; Moderately Thick (Honey)  ADULT ORAL NUTRITION SUPPLEMENT; Breakfast, Lunch, Dinner; Frozen Oral Supplement    Review of Systems   Respiratory:  Negative for shortness of breath. Cardiovascular:  Negative for chest pain. Exam:  BP (!) 100/53   Pulse 78   Temp 97.8 °F (36.6 °C) (Oral)   Resp 23   Ht 5' 3\" (1.6 m)   Wt 193 lb (87.5 kg)   SpO2 93%   BMI 34.19 kg/m²     Physical Exam  Constitutional:       Interventions: She is not intubated. Eyes:      Comments: Glasses   Pulmonary:      Effort: She is not intubated. Breath sounds: Wheezing present. Comments: Bilateral air entry  Neurological:      Mental Status: She is alert. Psychiatric:         Speech: She is communicative. Labs:   Recent Labs     11/25/22  0648 11/26/22  0556 11/27/22  0553   WBC 9.9 10.6 10.9*   HGB 14.0 13.9 11.6*   HCT 44.4 44.8 34.8*    190 207       Recent Labs     11/25/22  0648 11/26/22  0556 11/27/22  0553    148* 146*   K 3.6 4.2 3.2*    107 110   CO2 21* 17* 24   BUN 21 28* 36*   CREATININE 1.5* 1.5* 1.2   CALCIUM 8.8 8.8 8.6       No results for input(s): AST, ALT, BILIDIR, BILITOT, ALKPHOS in the last 72 hours. No results for input(s): INR in the last 72 hours. No results for input(s): Valeri Smack in the last 72 hours. Urinalysis:      Lab Results   Component Value Date/Time    NITRU NEGATIVE 11/23/2022 09:30 PM    WBCUA 5-9 11/23/2022 09:30 PM    WBCUA 2-4 02/22/2012 01:00 PM    BACTERIA NONE SEEN 11/23/2022 09:30 PM    RBCUA 3-5 11/23/2022 09:30 PM    BLOODU NEGATIVE 11/23/2022 09:30 PM    SPECGRAV 1.025 09/18/2020 12:00 PM    GLUCOSEU NEGATIVE 11/23/2022 09:30 PM       Radiology:  Sania Salas MODIFIED BARIUM SWALLOW W VIDEO   Final Result   1. Aspiration with thin liquids and mildly thickened liquids. 2. Additional recommendations from speech therapist will follow.          **This report has been created using voice recognition software. It may contain minor errors which are inherent in voice recognition technology. **      Final report electronically signed by Dr. Damien Giron on 11/26/2022 11:12 AM      XR CHEST PORTABLE   Final Result   No acute cardiopulmonary disease            **This report has been created using voice recognition software. It may contain minor errors which are inherent in voice recognition technology. **      Final report electronically signed by Dr. Mj Osorio on 11/23/2022 10:36 AM          Diet: ADULT DIET;  Dysphagia - Minced and Moist; Moderately Thick (Honey)  ADULT ORAL NUTRITION SUPPLEMENT; Breakfast, Lunch, Dinner; Frozen Oral Supplement    DVT prophylaxis: [] Lovenox                                 [] SCDs                                 [] SQ Heparin                                 [] Encourage ambulation           [x] Already on Anticoagulation     Disposition:    [] Home       [] TCU       [] Rehab       [] Psych       [x] SNF       [] [facility]       [] Other-    Code Status: Limited    PT/OT Eval:        Electronically signed by Ray Baker PA-C on 11/27/2022 at 4:18 PM

## 2022-11-27 NOTE — PROGRESS NOTES
This nurse received report at 1910 from [facility] that the doctor stated it was okay for this patient to not have an IV at this time. Patient lost IV access d/t infiltration on 11/26 early in the morning. Orders just received to increase continuous fluids to 100mL/hr. Order has not yet been acknowledged due to no IV access at this time. Patient needed an ultrasound placed IV prior.

## 2022-11-28 PROCEDURE — 6370000000 HC RX 637 (ALT 250 FOR IP): Performed by: PHYSICIAN ASSISTANT

## 2022-11-28 PROCEDURE — 97530 THERAPEUTIC ACTIVITIES: CPT

## 2022-11-28 PROCEDURE — 2580000003 HC RX 258: Performed by: PHYSICIAN ASSISTANT

## 2022-11-28 PROCEDURE — 99232 SBSQ HOSP IP/OBS MODERATE 35: CPT | Performed by: PHYSICIAN ASSISTANT

## 2022-11-28 PROCEDURE — 1200000000 HC SEMI PRIVATE

## 2022-11-28 PROCEDURE — 97129 THER IVNTJ 1ST 15 MIN: CPT

## 2022-11-28 PROCEDURE — 92526 ORAL FUNCTION THERAPY: CPT

## 2022-11-28 PROCEDURE — 6360000002 HC RX W HCPCS: Performed by: PHYSICIAN ASSISTANT

## 2022-11-28 PROCEDURE — 97110 THERAPEUTIC EXERCISES: CPT

## 2022-11-28 PROCEDURE — 97535 SELF CARE MNGMENT TRAINING: CPT

## 2022-11-28 PROCEDURE — 94669 MECHANICAL CHEST WALL OSCILL: CPT

## 2022-11-28 PROCEDURE — 6370000000 HC RX 637 (ALT 250 FOR IP)

## 2022-11-28 RX ADMIN — PANTOPRAZOLE SODIUM 40 MG: 40 TABLET, DELAYED RELEASE ORAL at 06:44

## 2022-11-28 RX ADMIN — HYDROCORTISONE 10 MG: 10 TABLET ORAL at 20:29

## 2022-11-28 RX ADMIN — SODIUM CHLORIDE: 9 INJECTION, SOLUTION INTRAVENOUS at 15:25

## 2022-11-28 RX ADMIN — DEXAMETHASONE 6 MG: 4 TABLET ORAL at 09:51

## 2022-11-28 RX ADMIN — Medication 20 MG: at 20:29

## 2022-11-28 RX ADMIN — HYDROCORTISONE 20 MG: 10 TABLET ORAL at 09:54

## 2022-11-28 RX ADMIN — TERBINAFINE TABLETS 250 MG 250 MG: 250 TABLET ORAL at 09:53

## 2022-11-28 RX ADMIN — BARICITINIB 2 MG: 2 TABLET, FILM COATED ORAL at 09:52

## 2022-11-28 RX ADMIN — RIVAROXABAN 15 MG: 15 TABLET, FILM COATED ORAL at 17:07

## 2022-11-28 RX ADMIN — LEVOTHYROXINE SODIUM 125 MCG: 0.12 TABLET ORAL at 06:44

## 2022-11-28 ASSESSMENT — ENCOUNTER SYMPTOMS: SHORTNESS OF BREATH: 0

## 2022-11-28 NOTE — ED PROVIDER NOTES
[address]    Pt Name: Oj Shanks  MRN: [id number]  [place] 1950  Date of evaluation: 9/12/20      I personally saw and examined the patient. I have reviewed and agree with the Resident findings, including all diagnostic interpretations and treatment plans as written. I was present for the key portion of any procedures performed and the inclusive time noted in any critical care statement. History: This patient was seen with Sheri Metz, resident physician. 71-year-old female, weak, not eating,, generally failing to thrive has tested positive for COVID while here that may have been the inciting factor. No significant electrolyte abnormalities and BUN/creatinine are normal.  Pulse ox has been good on room air.   We have arranged admission to the hospitalist service              Winston Lafleur DO  11/27/22 5507

## 2022-11-28 NOTE — CARE COORDINATION
11/28/22, 2:43 PM EST    DISCHARGE ON GOING EVALUATION    [facility]  [facility]027-A Reason for admit: Generalized weakness [R53.1]  Impaired mobility and ADLs [Z74.09, Z78.9]  COVID-19 virus infection [U07.1]  COVID-19 [U07.1]   Procedure: No.  Barriers to Discharge: PT/OT continue to follow and recs include SNF. SW following. Pt is afebrile and on room air. BP's soft. IVF cont at 100/hr. PO Baricitinib. PO Decadron. PCP: Winifred Ding MD  Readmission Risk Score: 16%  Patient Goals/Plan/Treatment Preferences: SW following. [address] pending precert.

## 2022-11-28 NOTE — CARE COORDINATION
11/28/22, 2:45 PM EST    DISCHARGE PLANNING EVALUATION    Spoke with Sujey Ann at [facility] regarding referral made on Friday. He does not have the clinicals, asked to resend them. Clinicals faxed.

## 2022-11-28 NOTE — PROGRESS NOTES
Hospitalist Progress Note    Patient:  Melissa Current      Unit/Bed:8B-27/027-A    YOB: 1950    MRN: [id number]       Acct: [de-identified]     PCP: Dejan Baldwin MD    Date of Admission: 11/23/2022    Assessment/Plan:    COVID-19 infection, with generalized weakness and fatigue:              -Continue supportive care  -PT/OT/SLP   -Pharmacy consulted for possible barcitinib therapy, message received from pharmacist, as patient not currently hypoxic, she is not a candidate at this time  -Inflammatory markers elevated   -Patient hypoxic, baricitinib therapy initiated  -Patient already on hydrocortisone, consider increasing dose if patient becomes hypoxic    Discussed case with RN 11/25/2022, patient became hypoxic and was started on O2 via nasal cannula. Pharmacy was contacted, for reevaluation, patient was then later started on baricitinib therapy for COVID-19.  6 mg Decadron was also ordered. Discussed with pharmacy this a.m., 11/26/2022 regarding Decadron plus patient's maintenance hydrocortisone therapy for adrenal insufficiency, they report that additional Decadron is appropriate. Elevated Anion Gap Metabolic Acidosis:  -CO2 improved to 24  -Likely 2/2 renal insufficiency   -Increase IVF to 100 mL/hr   -Repeat BMP   -Consider VBG   -Consider Bicarb    Hypokalemia  -Replacement protocol  -Trend BMP    Suspected adrenal insufficiency, with Hx of pituitary tumor:               -Continue hydrocortisone     HFp EF:               -Per colleague, \"Last ECHO 12/30/2018 reveals EF 55%. Follows with Dr. Inocencio Rojas. \"  -Hold Bumex  2/2 JESSE  -Daily weights  -Strict I&O's     Paroxysmal A. Fib:               -Continue Xarelto   -Continue Coreg    JESSE on CKD: Cr improved to 1.2 today              -Increased IVF to 100 mL/hr 11/27/22   -Hold Bumex one more day   -Monitor urine output, consider zaman catheter  -Trend BMP     Hypothyroidism:               -Continue Synthroid.      Hx of CVA: -Noted    Scalp Irritation   -Possible pustular dermatitis of scalp, patient has reportedly seen a dermatologist before   -Wound ostomy consulted for evaluation of scalp irritation, and notable wound of left leg    Disposition pending clinical course, anticipate patient will require at least home health, possible ECF. Hospital Course:    11/24/22  Inflammatory markers ordered  Pharmacy consulted for possible COVID-19 therapy    11/25/22  Patient made NPO per SLP, plan for swallow study  Patient O2 remains stable  JESSE, IVF, monitor    11/26/22  Dysphagia diet  Started on O2 last evening, off today  Baricitinib and Decadron initiated    88/01/96  JESSE and metabolic acidosis improved    11/28/22  Recheck BMP tomorrow AM    Chief Complaint: fatigue      Subjective: 71 y.o. female admitted to the hospitalist service. Patient reportedly tolerating diet. Patient in recliner chair after working with occupational therapy.        Medications:    Infusion Medications    sodium chloride Stopped (11/28/22 0237)    sodium chloride       Scheduled Medications    dexamethasone  6 mg Oral Daily    baricitinib  2 mg Oral Daily    simvastatin  20 mg Oral Nightly    [Held by provider] bumetanide  1 mg Oral Daily    carvedilol  12.5 mg Oral BID    hydrocortisone  10 mg Oral Nightly    levothyroxine  125 mcg Oral Daily    pantoprazole  40 mg Oral QAM AC    rivaroxaban  15 mg Oral Daily    terbinafine  250 mg Oral Daily    sodium chloride flush  10 mL IntraVENous 2 times per day    hydrocortisone  20 mg Oral QAM    oxybutynin  15 mg Oral Daily     PRN Meds: sodium chloride flush, sodium chloride, ondansetron **OR** ondansetron, polyethylene glycol, acetaminophen **OR** acetaminophen, potassium chloride **OR** potassium alternative oral replacement **OR** potassium chloride, magnesium sulfate      Intake/Output Summary (Last 24 hours) at 11/28/2022 1154  Last data filed at 11/28/2022 0244  Gross per 24 hour   Intake 1038.05 ml   Output 500 ml   Net 538.05 ml         Diet:  ADULT DIET; Dysphagia - Minced and Moist; Moderately Thick (Honey)  ADULT ORAL NUTRITION SUPPLEMENT; Breakfast, Lunch, Dinner; Frozen Oral Supplement    Review of Systems   Constitutional:  Negative for fever. Respiratory:  Negative for shortness of breath. Cardiovascular:  Negative for chest pain. Exam:  BP (!) 99/51   Pulse 69   Temp 98.6 °F (37 °C) (Oral)   Resp 16   Ht 5' 3\" (1.6 m)   Wt 193 lb (87.5 kg)   SpO2 94%   BMI 34.19 kg/m²     Physical Exam  Cardiovascular:      Rate and Rhythm: Normal rate. Pulmonary:      Comments: Bilateral air entry, on room air  Abdominal:      Palpations: Abdomen is soft. Tenderness: There is no abdominal tenderness. Skin:     Findings: Bruising (Scattered) present. Neurological:      Mental Status: She is alert. Psychiatric:         Speech: She is communicative. Labs:   Recent Labs     11/26/22  0556 11/27/22  0553   WBC 10.6 10.9*   HGB 13.9 11.6*   HCT 44.8 34.8*    207       Recent Labs     11/26/22  0556 11/27/22  0553   * 146*   K 4.2 3.2*    110   CO2 17* 24   BUN 28* 36*   CREATININE 1.5* 1.2   CALCIUM 8.8 8.6       No results for input(s): AST, ALT, BILIDIR, BILITOT, ALKPHOS in the last 72 hours. No results for input(s): INR in the last 72 hours. No results for input(s): Tyshawn Lager in the last 72 hours. Urinalysis:      Lab Results   Component Value Date/Time    NITRU NEGATIVE 11/23/2022 09:30 PM    WBCUA 5-9 11/23/2022 09:30 PM    WBCUA 2-4 02/22/2012 01:00 PM    BACTERIA NONE SEEN 11/23/2022 09:30 PM    RBCUA 3-5 11/23/2022 09:30 PM    BLOODU NEGATIVE 11/23/2022 09:30 PM    SPECGRAV 1.025 09/18/2020 12:00 PM    GLUCOSEU NEGATIVE 11/23/2022 09:30 PM       Radiology:  Veronica Fletcher MODIFIED BARIUM SWALLOW W VIDEO   Final Result   1. Aspiration with thin liquids and mildly thickened liquids. 2. Additional recommendations from speech therapist will follow.          **This report has been created using voice recognition software. It may contain minor errors which are inherent in voice recognition technology. **      Final report electronically signed by Dr. Brittany Galan on 11/26/2022 11:12 AM      XR CHEST PORTABLE   Final Result   No acute cardiopulmonary disease            **This report has been created using voice recognition software. It may contain minor errors which are inherent in voice recognition technology. **      Final report electronically signed by Dr. Merline Spoon on 11/23/2022 10:36 AM          Diet: ADULT DIET;  Dysphagia - Minced and Moist; Moderately Thick (Honey)  ADULT ORAL NUTRITION SUPPLEMENT; Breakfast, Lunch, Dinner; Frozen Oral Supplement    DVT prophylaxis: [] Lovenox                                 [] SCDs                                 [] SQ Heparin                                 [] Encourage ambulation           [x] Already on Anticoagulation     Disposition:    [] Home       [] TCU       [] Rehab       [] Psych       [x] SNF       [] [facility]       [] Other-    Code Status: Limited    PT/OT Eval:        Electronically signed by Varghese Woodson PA-C on 11/28/2022 at 11:54 AM

## 2022-11-28 NOTE — PLAN OF CARE
Problem: Neurosensory - Adult  Goal: Achieves stable or improved neurological status  Outcome: Progressing  Flowsheets (Taken 11/28/2022 1732)  Achieves stable or improved neurological status: Assess for and report changes in neurological status     Problem: Respiratory - Adult  Goal: Achieves optimal ventilation and oxygenation  Outcome: Progressing  Flowsheets (Taken 11/28/2022 1732)  Achieves optimal ventilation and oxygenation:   Assess for changes in respiratory status   Assess for changes in mentation and behavior     Problem: Skin/Tissue Integrity - Adult  Goal: Skin integrity remains intact  Outcome: Progressing  Flowsheets (Taken 11/28/2022 1732)  Skin Integrity Remains Intact: Monitor for areas of redness and/or skin breakdown     Problem: Musculoskeletal - Adult  Goal: Return mobility to safest level of function  Outcome: Progressing  Flowsheets (Taken 11/28/2022 1732)  Return Mobility to Safest Level of Function:   Assess patient stability and activity tolerance for standing, transferring and ambulating with or without assistive devices   Assist with transfers and ambulation using safe patient handling equipment as needed     Problem: Infection - Adult  Goal: Absence of infection at discharge  Outcome: Progressing  Flowsheets (Taken 11/28/2022 1732)  Absence of infection at discharge: Assess and monitor for signs and symptoms of infection     Problem: Skin/Tissue Integrity  Goal: Absence of new skin breakdown  Description: 1. Monitor for areas of redness and/or skin breakdown  2. Assess vascular access sites hourly  3. Every 4-6 hours minimum:  Change oxygen saturation probe site  4. Every 4-6 hours:  If on nasal continuous positive airway pressure, respiratory therapy assess nares and determine need for appliance change or resting period.   Outcome: Progressing  Note: Skin assessed throughout the shift     Problem: Safety - Adult  Goal: Free from fall injury  Outcome: Progressing  Flowsheets (Taken 11/28/2022 1732)  Free From Fall Injury: Instruct family/caregiver on patient safety     Problem: ABCDS Injury Assessment  Goal: Absence of physical injury  Outcome: Progressing  Flowsheets (Taken 11/28/2022 1732)  Absence of Physical Injury: Implement safety measures based on patient assessment     Problem: Chronic Conditions and Co-morbidities  Goal: Patient's chronic conditions and co-morbidity symptoms are monitored and maintained or improved  Outcome: Progressing  Flowsheets (Taken 11/28/2022 1732)  Care Plan - Patient's Chronic Conditions and Co-Morbidity Symptoms are Monitored and Maintained or Improved:   Monitor and assess patient's chronic conditions and comorbid symptoms for stability, deterioration, or improvement   Collaborate with multidisciplinary team to address chronic and comorbid conditions and prevent exacerbation or deterioration

## 2022-11-28 NOTE — PROGRESS NOTES
[facility]  INPATIENT PHYSICAL THERAPY  DAILY NOTE  [facility] MED SURG 8B - 8B-27/027-A    Time In: 1401  Time Out: 1424  Timed Code Treatment Minutes: 23 Minutes  Minutes: 23          Date: 2022  Patient Name: Soledad Melgar,  Gender:  female        MRN: [id number]  : 1950  (76 y.o.)     Referring Practitioner: Richard Hood PA-C  Diagnosis: Generalized weakness  Additional Pertinent Hx: 72 y/o  female with a PMHx of HTN, CVA, who presents to [facility] ED today via EMS for the evaluation of generalized weakness and fatigue since last night. Pt's overall very tired and poor historian.  at bedside states that he lives alone with his wife and yesterday when he was helping her pivot from her chair to the commode, the patient was very weak and had to carry her back into the chair. Pt reportedly slept very well in her recliner last night, as she does normally, but  states she woke up appearing very tired and ill appearing to him. He states once again this AM, when transitioning her from the chair to the commode, she could barely hold her own body weight and had to call the EMS for assistance. Pt denies chest pain, cough or shortness of breath, chills, abdominal pain, changes in bowel or urinary habits. Pt's  does states she has had a slight decrease in appetite since yesterday.      Prior Level of Function:  Lives With: Spouse  Type of Home: House  Home Layout: One level  Home Access: Stairs to enter with rails  Entrance Stairs - Number of Steps: 2  Entrance Stairs - Rails: Both  Home Equipment: Britton Jean, rolling, Wheelchair-manual, Lift chair        Receives Help From: Family  ADL Assistance: Needs assistance  Homemaking Assistance: Needs assistance  Homemaking Responsibilities: No  Ambulation Assistance: Non-ambulatory  Transfer Assistance: Needs assistance  Additional Comments:  assist with stand pivot transfers; w/c or chair bound otherwise    Restrictions/Precautions:  Restrictions/Precautions: Fall Risk, Isolation     SUBJECTIVE: RN approved session. Pt pleasant and agreeable to therapy     PAIN: 0/10: denies pain initially, does c/o L LE pain in ayo taylor; ayo taylor knee block wedge rubbing on LLE; RN aware     Vitals: Vitals not assessed per clinical judgement, see nursing flowsheet    OBJECTIVE:  Bed Mobility:  Rolling to Left: Minimal Assistance, with head of bed flat, with verbal cues    Rolling to Right: Minimal Assistance, X 1, with verbal cues    Sit to Supine: Moderate Assistance, X 1, with head of bed flat, with verbal cues , with increased time for completion     Transfers:  Sit to Stand: Moderate Assistance, X 1, with Dulce Cheers, cues for hand placement, with verbal cues  Stand to Sit:Moderate Assistance, X 1, with Dulce Cheers, with increased time for completion, with verbal cues  To/From Bed and Chair: Dependent, with Dulce Cheers  Pt unable to lift LUE to ayo taylor bar initially, requires hand held assist     Ambulation:  Not Tested    Balance:  Static Sitting Balance:  Contact Guard Assistance  Static Standing Balance: Contact Guard Assistance, in ayo taylor     Exercise:  Patient was guided in 1 set(s) 10 reps of exercise to both lower extremities. Seated marches, Seated hamstring curls, Seated heel/toe raises, and Long arc quads. Exercises were completed for increased independence with functional mobility. Functional Outcome Measures: Completed  -PAC Inpatient Mobility without Stair Climbing Raw Score : 8  -PAC Inpatient without Stair Climbing T-Scale Score : 30.65    ASSESSMENT:  Assessment: Patient progressing toward established goals. Activity Tolerance:  Patient tolerance of  treatment: good.       Equipment Recommendations:Equipment Needed: No  Discharge Recommendations: Subacte/Skilled Nursing Facility  Plan: Current Treatment Recommendations: Strengthening, Balance training, Endurance training, Functional mobility training, Transfer training  General Plan:  (5x GM)    Patient Education  Patient Education: Plan of Care, Bed Mobility, Transfers, Verbal Exercise Instruction    Goals:  Patient Goals : none stated  Short Term Goals  Time Frame for Short Term Goals: by discharge  Short Term Goal 1: bed mobility HOB flat, CGA x1 for increased functional ind  Short Term Goal 2: pt to tolerate sitting EOB 10' with good posture for increased core strength/endurance  Short Term Goal 3: sit<>stand from various surfaces with ayo stedy and CGA x1 for safe transfers  Long Term Goals  Time Frame for Long Term Goals : NA d/t short ELOS    Following session, patient left in safe position with all fall risk precautions in place.     Rodo Ruiz ([place]) PT, DPT

## 2022-11-28 NOTE — PROGRESS NOTES
[facility] 8B  Occupational Therapy  Daily Note  Time:   Time In: 9226  Time Out: 6668  Timed Code Treatment Minutes: 42 Minutes  Minutes: 42          Date: 2022  Patient Name: Christiana Briggs,   Gender: female      Room: [facility]/027-A  MRN: [id number]  : 1950  (76 y.o.)  Referring Practitioner: AN CABAN Sterling HeightsZAKI IZQUIERDO PA-C  Diagnosis: Generalized Weakness  Additional Pertinent Hx: Pt with a PMHx of HTN, CVA, who presents to [facility] ED today via EMS for the evaluation of generalized weakness and fatigue since last night. Pt's overall very tired and poor historian.  at bedside states that he lives alone with his wife and yesterday when he was helping her pivot from her chair to the commode, the patient was very weak and had to carry her back into the chair. Pt reportedly slept very well in her recliner last night, as she does normally, but  states she woke up appearing very tired and ill appearing to him. He states once again this AM, when transitioning her from the chair to the commode, she could barely hold her own body weight and had to call the EMS for assistance. Pt denies chest pain, cough or shortness of breath, chills, abdominal pain, changes in bowel or urinary habits. Pt's  does states she has had a slight decrease in appetite since yesterday. Restrictions/Precautions:  Restrictions/Precautions: Fall Risk, Isolation      SUBJECTIVE: Pt supine in bed upon arrival, agreeable to OT session. Pt reported got up to chair yesterday with nursing staff. RN reported pt required assist X3 with utilizing RW, although only required assist of 2 staff when returning to bed and using sera stedy. PAIN: pain in LLE with standing/activity although does not quantify.      Vitals: Vitals not assessed per clinical judgement, see nursing flowsheet    COGNITION: Decreased Recall, Decreased Insight, Impaired Memory, and Decreased Problem Solving    ADL:   Feeding: Pt noted to have water with ice in cup on table (despite being on moderately thickened liquids for diet). Discussed with RN and provided pt with cup of moderately thickened water. SBA with cues for initiating swallow as pt holds liquid in mouth. Grooming: Maximum Assistance. For combing hair due to decreased ROM at B shoulders (L more impaired than R), in addition to needing assist to avoid scabs on top of head. Pt required minimal assistance to brush teeth while seated in chair. UB dressing: moderate assistance for donning gown; cues for juan technique with threading LUE first as is more impaired  Lower Extremity Dressing: Dependent. socks  Toileting: Dependent. Had external zaman in place although was not in appropriate spot and pt was incont of urine. Total assist required for hygiene and replacement of external zaman. BALANCE:  Sitting Balance:  Contact Guard Assistance. Initially, able to progress to SBA. Sitting at EOB for ~5 minutes in prep for transfer. Standing Balance: Moderate Assistance, X 2. Initially, able to progress to moderate assistance X1. Max cues for upright posture. BED MOBILITY:  Supine to Sit: Maximum Assistance, X 1, with head of bed raised, with rail, with verbal cues , with increased time for completion    Scooting: Maximum Assistance ; advancing hips forward to EOB    TRANSFERS:  Sit to Stand: Mod X2-Maximum Assistance, X 2, with Lidia Urbano, with increased time for completion, cues for hand placement. Stand to Sit: Moderate Assistance, X 1, with Lidia Urbano, with increased time for completion, cues for hand placement. **Demoes more difficulty moving LLE compared to RLE as requires physical assist to correctly place on sera stedy platform. Pt requires assist to place/remove LUE from sera stedy bar due to decreased shoulder ROM. FUNCTIONAL MOBILITY:  OTR to further assess as able/appropriate. Pt unable to weight shift/lift BLEs up when standing in sera stedy.      **Discussed discharge recommendations with pt initially reporting \"I\"m planning to go home with help from my . \" Reinforced that pt is requiring significant amount of assistance to get OOB, has not be able to safely take steps at this time, and requires much assistance for ADL completion. Pt would require more assistance than  would be able to safely provide at this point with pt then stating \"yeah, you're probably right. \"     ASSESSMENT:     Activity Tolerance:  Patient tolerance of  treatment: fair. Discharge Recommendations: ECF with OT  Equipment Recommendations: Equipment Needed: No  Plan: Times Per Week: 3-5x  Specific Instructions for Next Treatment: Functional transfers; ADLs and sitting balance; upper body positioning and relaxed breathing  Current Treatment Recommendations: Strengthening, Endurance training, Functional mobility training, Balance training, Patient/Caregiver education & training, Self-Care / ADL  Additional Comments: Pt would benefit from continued skilled OT services when medically stable and discharged from Acute. 24 hour care with OT at SNF vs home with [address] [place] [address]. Patient Education  Patient Education: Plan of Care, ADL's, Importance of Increasing Activity, and transfer training    Goals  Short Term Goals  Time Frame for Short Term Goals: By discharge  Short Term Goal 1: Pt will demonstrate functional transfers with OTR to prepare for doing any self care while out of bed. Goal met, revised. Short Term Goal 2: Pt will complete upper body ROM exercises while following verbal and tactile cues to increase her upright posture for ease of engagement in activity. Short Term Goal 3: Pt will complete ADL tasks with no >than CGA while in sitting for over 4 minutes to increase her endurance and balance for ease of participating in morning routine.   Short Term Goal 4: Pt will engage in dynamic sitting tasks with no > than MIN A for balance and cues for using her LUE for active support to increase her balance for ease of completing transfers and toileting routine. Additional Goals?: No    Revised Short-Term Goals  Short Term Goals  Time Frame for Short Term Goals: By discharge  Short Term Goal 1: Pt will complete functional transfers with minimal assistance X2 in prep for [facility] transfers. Short Term Goal 2: Pt will complete upper body ROM exercises while following verbal and tactile cues to increase her upright posture for ease of engagement in activity. Short Term Goal 3: Pt will complete LB ADLs moderate assistance and LHAE prn to increase independence and ease with self care tasks. Short Term Goal 4: Pt will engage in dynamic sitting tasks with no > than MIN A for balance and cues for using her LUE for active support to increase her balance for ease of completing transfers and toileting routine. Short Term Goal 5: Pt will tolerate further assessment of functional mobility by OTR when appropriate. Additional Goals?: No    Following session, patient left in safe position with all fall risk precautions in place.

## 2022-11-28 NOTE — PROGRESS NOTES
[facility]  INPATIENT SPEECH THERAPY  STRZ MED SURG 8B  DAILY NOTE    TIME   SLP Individual Minutes  Time In: 0576  Time Out: 7168  Minutes: 18  Timed Code Treatment Minutes: 9 Minutes  Dysphagia tx: 9 minutes  Cognitive tx: 9 minutes         Date: 2022  Patient Name: Melissa Current      CSN: [id number]   : 1950  (71 y.o.)  Gender: female   Referring Physician: Arminda Bansal PA-C  Diagnosis: Generalized weakness  Precautions: Fall risk, aspiration, contact/droplet precautions (COVID-19)  Current Diet: Minced and moist diet with moderately thick liquids   Swallowing Strategies: Full Upright Position, Small Bite/Sip, Multiple Swallow, Medications Crushed with Puree, Limit Distractions, Monitor for Fatigue, and 1:1 assistance with meals   Date of Last MBS/FEES:  MBS 22    Pain:  No pain reported. Subjective:  EITAN Lopez reporting, \"good PO intake this date, good success with minced and moist diet with moderately thick liquids. \" Patient seen at bedside, alert and pleasant. No family present. Short-Term Goals:  SHORT TERM GOAL #1:  Goal 1: Patient will consume minced and moist diet with moderatly thick liquids without s/s of aspiration in order to safely maintain adequate hydration and nutrition + complete pharyngeal exercises x10 in order to improve overall airway protection and pharyngeal constricition + patient will repeat instrumental evaluation in 2-4 weeks to determine readiness for potential dietary/texture upgrade. INTERVENTIONS: Skilled dysphagia intervention completed with PO trials of magic cup + moderately thick liquids. Education provided to patient re: review of swallow study and rational to support recommended diet of minced and moist diet with moderately thick liquids. Patient verbalized understanding; however, would benefit from ongoing education d/t impaired recall/memory deficits. Patient with baseline cough d/t COVID-19 dx.  Patient consumed PO trial demonstrating with functional oral mastication, intermittent audible swallow detected, patient completed x2 swallows per bolus provided verbal cue. Education provided re: importance for double swallow to ensure clearance of pharyngeal residue, again would benefit from ongoing education d/t impaired cognition. Recommend continued minced and moist diet with moderately thick liquids. *post dysphagia tx, patient without respiratory distress upon leaving room; RN Jessica notified re: clinical findings and recommendations from the assessment; verbal receptiveness noted       SHORT TERM GOAL #2:  Goal 2: Patient will complete functional carryover tasks (biographics, orientation, call light, 1-3 units) with 50% accuracy, mod cues to improve awareness to immediate surroundings. INTERVENTIONS:  Month-indep  BENEDICT-indep  Date-off by one  Year-min cues   Location-indep  City-indep  Etiology-max cues     Name-indep  's name-[facility]  Home address-indep     Recall; diet; minced and moist diet with moderately thick liquids with use of meal ticket; poor recall, MAX cues, multiple repetitions provided     Call light: MAX cues; limited-0 awareness     Biographical/family information; patient indep reported the following information:   Daughter Effie, [place], 1 little girl  and additional background information   Son Grzegorz Cervantes, 2 girls and additional background family information   's name; [facility] and additional background information    SHORT TERM GOAL #3:  Goal 3: Patient will complmete basic safety awareness, sequencing, and problem solving tasks with 50% accuracy, max cues to enhance participation in ADLs and current environment.   INTERVENTIONS: Reasoning:   Trial 1: 2/3 indep, 1/3 unable to elicit despite max cues  Trial 2: 2/3 indep, 1/3 unable to elicit despite max cues     SHORT TERM GOAL #4:  Goal 4: Patient will complete multi-syllabic word repetition, sentence cmopletion, confrontational/responsive naming, and basic verbal fluency tasks with 60% accuracy, mod cues to improvce expression of wants/needs. INTERVENTIONS:DNT secondary to focus on other goals      SHORT TERM GOAL #5:  Goal 5: Patient will answer basic yes/no questions and execute 2-step commands with 70% accuracy, mod cues to improve comprehension measures for participation in immediate context. INTERVENTIONS: DNT secondary to focus on other goals     SHORT TERM GOAL #6:  Goal 6: Patient will complete structured speech drills/tasks with implementation of SOS speech strategies to improve intelligibility to 75% to optimize exchanges between social partners. INTERVENTIONS: DNT secondary to focus on other goals     Long-Term Goals: No LTGs due to short ELOS             EDUCATION:  Learner: Patient  Education:  Reviewed results and recommendations of this evaluation, Reviewed diet and strategies, Reviewed signs, symptoms and risks of aspiration, Demonstrated how to thicken liquids appropriately, Reviewed ST goals and Plan of Care, Reviewed recommendations for follow-up, and Education Related to Potential Risks and Complications Due to Impairment/Illness/Injury  Evaluation of Education: Verbalizes understanding, Needs further instruction, No evidence of learning, and Family not present    ASSESSMENT/PLAN:  Activity Tolerance:  Patient tolerance of  treatment: good. Assessment/Plan: Patient progressing toward established goals. Continues to require skilled care of licensed speech pathologist to progress toward achievement of established goals and plan of care. .     Plan for Next Session: PO trials   Discharge Recommendations: SNF     SINCERE Shaikh 23

## 2022-11-29 LAB
ANION GAP SERPL CALCULATED.3IONS-SCNC: 12 MEQ/L (ref 8–16)
BASOPHILS # BLD: 0.1 %
BASOPHILS ABSOLUTE: 0 THOU/MM3 (ref 0–0.1)
BUN BLDV-MCNC: 20 MG/DL (ref 7–22)
CALCIUM SERPL-MCNC: 8.4 MG/DL (ref 8.5–10.5)
CHLORIDE BLD-SCNC: 115 MEQ/L (ref 98–111)
CO2: 22 MEQ/L (ref 23–33)
CREAT SERPL-MCNC: 0.8 MG/DL (ref 0.4–1.2)
EOSINOPHIL # BLD: 0 %
EOSINOPHILS ABSOLUTE: 0 THOU/MM3 (ref 0–0.4)
ERYTHROCYTE [DISTWIDTH] IN BLOOD BY AUTOMATED COUNT: 13.3 % (ref 11.5–14.5)
ERYTHROCYTE [DISTWIDTH] IN BLOOD BY AUTOMATED COUNT: 46.1 FL (ref 35–45)
GFR SERPL CREATININE-BSD FRML MDRD: > 60 ML/MIN/1.73M2
GLUCOSE BLD-MCNC: 135 MG/DL (ref 70–108)
HCT VFR BLD CALC: 33.9 % (ref 37–47)
HEMOGLOBIN: 10.9 GM/DL (ref 12–16)
IMMATURE GRANS (ABS): 0.18 THOU/MM3 (ref 0–0.07)
IMMATURE GRANULOCYTES: 1.8 %
LYMPHOCYTES # BLD: 5.3 %
LYMPHOCYTES ABSOLUTE: 0.5 THOU/MM3 (ref 1–4.8)
MCH RBC QN AUTO: 29.9 PG (ref 26–33)
MCHC RBC AUTO-ENTMCNC: 32.2 GM/DL (ref 32.2–35.5)
MCV RBC AUTO: 93.1 FL (ref 81–99)
MONOCYTES # BLD: 6.9 %
MONOCYTES ABSOLUTE: 0.7 THOU/MM3 (ref 0.4–1.3)
NUCLEATED RED BLOOD CELLS: 0 /100 WBC
PLATELET # BLD: 262 THOU/MM3 (ref 130–400)
PMV BLD AUTO: 10.6 FL (ref 9.4–12.4)
POTASSIUM REFLEX MAGNESIUM: 3.8 MEQ/L (ref 3.5–5.2)
RBC # BLD: 3.64 MILL/MM3 (ref 4.2–5.4)
SEG NEUTROPHILS: 85.9 %
SEGMENTED NEUTROPHILS ABSOLUTE COUNT: 8.4 THOU/MM3 (ref 1.8–7.7)
SODIUM BLD-SCNC: 149 MEQ/L (ref 135–145)
WBC # BLD: 9.8 THOU/MM3 (ref 4.8–10.8)

## 2022-11-29 PROCEDURE — 92526 ORAL FUNCTION THERAPY: CPT

## 2022-11-29 PROCEDURE — 80048 BASIC METABOLIC PNL TOTAL CA: CPT

## 2022-11-29 PROCEDURE — 97129 THER IVNTJ 1ST 15 MIN: CPT

## 2022-11-29 PROCEDURE — 85025 COMPLETE CBC W/AUTO DIFF WBC: CPT

## 2022-11-29 PROCEDURE — 99232 SBSQ HOSP IP/OBS MODERATE 35: CPT | Performed by: PHYSICIAN ASSISTANT

## 2022-11-29 PROCEDURE — 97530 THERAPEUTIC ACTIVITIES: CPT

## 2022-11-29 PROCEDURE — 6370000000 HC RX 637 (ALT 250 FOR IP): Performed by: PHYSICIAN ASSISTANT

## 2022-11-29 PROCEDURE — 6370000000 HC RX 637 (ALT 250 FOR IP)

## 2022-11-29 PROCEDURE — 97110 THERAPEUTIC EXERCISES: CPT

## 2022-11-29 PROCEDURE — 2580000003 HC RX 258: Performed by: PHYSICIAN ASSISTANT

## 2022-11-29 PROCEDURE — 36415 COLL VENOUS BLD VENIPUNCTURE: CPT

## 2022-11-29 PROCEDURE — 6360000002 HC RX W HCPCS: Performed by: PHYSICIAN ASSISTANT

## 2022-11-29 PROCEDURE — 1200000000 HC SEMI PRIVATE

## 2022-11-29 RX ADMIN — RIVAROXABAN 15 MG: 15 TABLET, FILM COATED ORAL at 18:19

## 2022-11-29 RX ADMIN — HYDROCORTISONE 10 MG: 10 TABLET ORAL at 20:52

## 2022-11-29 RX ADMIN — TERBINAFINE TABLETS 250 MG 250 MG: 250 TABLET ORAL at 09:42

## 2022-11-29 RX ADMIN — HYDROCORTISONE 20 MG: 10 TABLET ORAL at 09:42

## 2022-11-29 RX ADMIN — BARICITINIB 2 MG: 2 TABLET, FILM COATED ORAL at 09:41

## 2022-11-29 RX ADMIN — LEVOTHYROXINE SODIUM 125 MCG: 0.12 TABLET ORAL at 06:57

## 2022-11-29 RX ADMIN — CARVEDILOL 12.5 MG: 6.25 TABLET, FILM COATED ORAL at 20:52

## 2022-11-29 RX ADMIN — Medication 20 MG: at 20:52

## 2022-11-29 RX ADMIN — SODIUM CHLORIDE: 9 INJECTION, SOLUTION INTRAVENOUS at 11:01

## 2022-11-29 RX ADMIN — PANTOPRAZOLE SODIUM 40 MG: 40 TABLET, DELAYED RELEASE ORAL at 06:58

## 2022-11-29 RX ADMIN — CARVEDILOL 12.5 MG: 6.25 TABLET, FILM COATED ORAL at 09:42

## 2022-11-29 RX ADMIN — ACETAMINOPHEN 650 MG: 325 TABLET ORAL at 21:27

## 2022-11-29 RX ADMIN — DEXAMETHASONE 6 MG: 4 TABLET ORAL at 09:41

## 2022-11-29 ASSESSMENT — PAIN SCALES - GENERAL
PAINLEVEL_OUTOF10: 0
PAINLEVEL_OUTOF10: 4

## 2022-11-29 ASSESSMENT — PAIN DESCRIPTION - ORIENTATION: ORIENTATION: LEFT

## 2022-11-29 ASSESSMENT — PAIN DESCRIPTION - LOCATION: LOCATION: FOOT

## 2022-11-29 ASSESSMENT — ENCOUNTER SYMPTOMS: SHORTNESS OF BREATH: 0

## 2022-11-29 NOTE — PROGRESS NOTES
[facility]  INPATIENT SPEECH THERAPY  STRZ MED SURG 8B  DAILY NOTE    TIME   SLP Individual Minutes  Time In: 7580  Time Out: 0965  Minutes: 24  Timed Code Treatment Minutes: 10 Minutes  Dysphagia tx: 14 minutes  Cognitive tx: 10 minutes         Date: 2022  Patient Name: Mirna Wheat      CSN: [id number]   : 1950  (71 y.o.)  Gender: female   Referring Physician: Ryan Richey PA-C  Diagnosis: Generalized weakness  Precautions: Fall risk, aspiration, contact/droplet precautions (COVID-19)  Current Diet: Minced and moist diet with moderately thick liquids   Swallowing Strategies: Full Upright Position, Small Bite/Sip, Multiple Swallow, Medications Crushed with Puree, Limit Distractions, Monitor for Fatigue, and 1:1 assistance with meals   Date of Last MBS/FEES:  MBS 22    Pain:  No pain reported. Subjective:  EITAN Acevedo reporting, \"good success with PO intake, no coughing or choking noted and good PO intake overall. \" Patient seen at bedside, alert and pleasant. No family present. Short-Term Goals:  SHORT TERM GOAL #1:  Goal 1: Patient will consume minced and moist diet with moderatly thick liquids without s/s of aspiration in order to safely maintain adequate hydration and nutrition + complete pharyngeal exercises x10 in order to improve overall airway protection and pharyngeal constricition + patient will repeat instrumental evaluation in 2-4 weeks to determine readiness for potential dietary/texture upgrade.   INTERVENTIONS: Skilled dysphagia treatment completed with PO trials of moderately thick liquids via cup; independent cup drink, appropriate oral control with heavier viscosity/texture, no s/s of aspiration   *patient with baseline/congested cough     Recommend continued minced and moist diet with moderately thick liquids     SHORT TERM GOAL #2:  Goal 2: Patient will complete functional carryover tasks (biographics, orientation, call light, 1-3 units) with 50% accuracy, mod cues to improve awareness to immediate surroundings. INTERVENTIONS:  Month-indep  BENEDICT-indep  Date-indep  Year-indep  Location-indep  City-indep  Time of day-indep with cues to utilize clock     Call light  Location-indep  Rational-indep  Reasoning-1/3 indep, 2/3 mod-max cues     SHORT TERM GOAL #3:  Goal 3: Patient will complmete basic safety awareness, sequencing, and problem solving tasks with 50% accuracy, max cues to enhance participation in ADLs and current environment. INTERVENTIONS:    Divergent naming:   Trial 1 2/5, 3/5 max cues   Trial 2 5/5 indep   Trial 3 4/5, 1/5 min cues   Trial 5 5/5 indep  *slow processing speed noted     SHORT TERM GOAL #4:  Goal 4: Patient will complete multi-syllabic word repetition, sentence cmopletion, confrontational/responsive naming, and basic verbal fluency tasks with 60% accuracy, mod cues to improvce expression of wants/needs. INTERVENTIONS: DNT secondary to focus on other goals     SHORT TERM GOAL #5:  Goal 5: Patient will answer basic yes/no questions and execute 2-step commands with 70% accuracy, mod cues to improve comprehension measures for participation in immediate context. INTERVENTIONS: DNT secondary to focus on other goals     SHORT TERM GOAL #6:  Goal 6: Patient will complete structured speech drills/tasks with implementation of SOS speech strategies to improve intelligibility to 75% to optimize exchanges between social partners.   INTERVENTIONS: DNT secondary to focus on other goals     Long-Term Goals: No LTGs due to short ELOS             EDUCATION:  Learner: Patient  Education:  Reviewed results and recommendations of this evaluation, Reviewed diet and strategies, Reviewed signs, symptoms and risks of aspiration, Demonstrated how to thicken liquids appropriately, Reviewed ST goals and Plan of Care, Reviewed recommendations for follow-up, and Education Related to Potential Risks and Complications Due to Impairment/Illness/Injury  Evaluation of Education: Wagner Bonner understanding, Needs further instruction, No evidence of learning, and Family not present    ASSESSMENT/PLAN:  Activity Tolerance:  Patient tolerance of  treatment: good. Assessment/Plan: Patient progressing toward established goals. Continues to require skilled care of licensed speech pathologist to progress toward achievement of established goals and plan of care. .     Plan for Next Session: PO trials   Discharge Recommendations: SNF     SINCERE Carcamo 23

## 2022-11-29 NOTE — PROGRESS NOTES
[address] [address]  INPATIENT PHYSICAL THERAPY  DAILY NOTE  STRZ MED SURG 8B - 8B-27/027-A    Time In: 3638  Time Out: 7324  Timed Code Treatment Minutes: 23 Minutes  Minutes: 23          Date: 2022  Patient Name: Loretta Canales,  Gender:  female        MRN: [id number]  : 1950  (76 y.o.)     Referring Practitioner: Roma Che PA-C  Diagnosis: Generalized weakness  Additional Pertinent Hx: 72 y/o  female with a PMHx of HTN, CVA, who presents to [facility] ED today via EMS for the evaluation of generalized weakness and fatigue since last night. Pt's overall very tired and poor historian.  at bedside states that he lives alone with his wife and yesterday when he was helping her pivot from her chair to the commode, the patient was very weak and had to carry her back into the chair. Pt reportedly slept very well in her recliner last night, as she does normally, but  states she woke up appearing very tired and ill appearing to him. He states once again this AM, when transitioning her from the chair to the commode, she could barely hold her own body weight and had to call the EMS for assistance. Pt denies chest pain, cough or shortness of breath, chills, abdominal pain, changes in bowel or urinary habits. Pt's  does states she has had a slight decrease in appetite since yesterday.      Prior Level of Function:  Lives With: Spouse  Type of Home: House  Home Layout: One level  Home Access: Stairs to enter with rails  Entrance Stairs - Number of Steps: 2  Entrance Stairs - Rails: Both  Home Equipment: Arlander Baston, rolling, Wheelchair-manual, Lift chair        Receives Help From: Family  ADL Assistance: Needs assistance  Homemaking Assistance: Needs assistance  Homemaking Responsibilities: No  Ambulation Assistance: Non-ambulatory  Transfer Assistance: Needs assistance  Additional Comments:  assist with stand pivot transfers; w/c or chair bound otherwise    Restrictions/Precautions:  Restrictions/Precautions: Fall Risk, Isolation     SUBJECTIVE: RN approved session. Pt pleasant and agreeable to therapy. PAIN: 0/10: denies pain     Vitals: Vitals not assessed per clinical judgement, see nursing flowsheet    OBJECTIVE:  Bed Mobility:  Supine to Sit: Moderate Assistance, X 1, with head of bed raised    Transfers:  Sit to Stand: Moderate Assistance, X 1, with Florette Duck  Stand to Sit:Moderate Assistance, X 1, with Florette Duck  To/From Bed and Chair: Dependent, with Florette Duck    Ambulation:  Not Tested    Balance:  Static Sitting Balance:  Stand By Assistance, sitting EOB ~3 min unsupported   Static Standing Balance: Contact Guard Assistance, in [facility] for ~2 min     Exercise:  Patient was guided in 1 set(s) 10 reps of exercise to both lower extremities. Ankle pumps, Quad sets, Heelslides, and Hip abduction/adduction. Exercises were completed for increased independence with functional mobility. Functional Outcome Measures: Completed  AM-PAC Inpatient Mobility without Stair Climbing Raw Score : 8  AM-PAC Inpatient without Stair Climbing T-Scale Score : 30.65    ASSESSMENT:  Assessment: Patient progressing toward established goals. Activity Tolerance:  Patient tolerance of  treatment: good.       Equipment Recommendations:Equipment Needed: No  Discharge Recommendations: Subacte/Skilled Nursing Facility  Plan: Current Treatment Recommendations: Strengthening, Balance training, Endurance training, Functional mobility training, Transfer training  General Plan:  (5x GM)    Patient Education  Patient Education: Plan of Care, Bed Mobility, Transfers, Verbal Exercise Instruction    Goals:  Patient Goals : none stated  Short Term Goals  Time Frame for Short Term Goals: by discharge  Short Term Goal 1: bed mobility HOB flat, CGA x1 for increased functional ind  Short Term Goal 2: pt to tolerate sitting EOB 10' with good posture for increased core strength/endurance  Short Term Goal 3: sit<>stand from various surfaces with ayo taylor and CGA x1 for safe transfers  Long Term Goals  Time Frame for Long Term Goals : NA d/t short ELOS    Following session, patient left in safe position with all fall risk precautions in place.     Mary Delvalle ([place]) PT, DPT

## 2022-11-29 NOTE — PROGRESS NOTES
Hospitalist Progress Note    Patient:  Wendi Scales      Unit/Bed:8B-27/027-A    YOB: 1950    MRN: [id number]       Acct: [de-identified]     PCP: Idalia Escudero MD    Date of Admission: 11/23/2022    Assessment/Plan:    COVID-19 infection, with generalized weakness and fatigue:              -Continue supportive care  -PT/OT/SLP   -Pharmacy consulted for possible barcitinib therapy, message received from pharmacist, as patient not currently hypoxic, she is not a candidate at this time  -Inflammatory markers elevated   -Patient hypoxic, baricitinib therapy initiated  -Patient already on hydrocortisone, consider increasing dose if patient becomes hypoxic    Discussed case with RN 11/25/2022, patient became hypoxic and was started on O2 via nasal cannula. Pharmacy was contacted, for reevaluation, patient was then later started on baricitinib therapy for COVID-19.  6 mg Decadron was also ordered. Discussed with pharmacy a.m., 11/26/2022 regarding Decadron plus patient's maintenance hydrocortisone therapy for adrenal insufficiency, they report that additional Decadron is appropriate. Elevated Anion Gap Metabolic Acidosis: Gap closed  -CO2 22  -Likely 2/2 renal insufficiency   -Continue IVF, rate decreased   -Repeat BMP   -Consider VBG   -Consider Bicarb    Left foot pain  -XR ordered  -Tylenol for pain    Hypernatremia  -Resume Bumex  -Trend BMP  -IVF dc'd    Hypokalemia  -Replacement protocol  -Trend BMP    Suspected adrenal insufficiency, with Hx of pituitary tumor:               -Continue hydrocortisone     HFp EF:               -Per colleague, \"Last ECHO 12/30/2018 reveals EF 55%. Follows with Dr. Jesenia Daniels. \"  -Resume Bumex  -Daily weights  -Strict I&O's     Paroxysmal A. Fib:               -Continue Xarelto   -Continue Coreg    JESSE on CKD: Resolved              -Increased IVF to 100 mL/hr 11/27/22, stop IVF   -Cr 0.8   -Resume Bumex    -Monitor urine output, consider zaman catheter  -Trend BMP Hypothyroidism:               -Continue Synthroid. Hx of CVA:              -Noted    Scalp Irritation   -Possible pustular dermatitis of scalp, patient has reportedly seen a dermatologist before   -Wound ostomy consulted for evaluation of scalp irritation, and notable wound of left leg    Disposition pending clinical course, anticipate patient will require at least home health, possible ECF. Hospital Course:    11/24/22  Inflammatory markers ordered  Pharmacy consulted for possible COVID-19 therapy    11/25/22  Patient made NPO per SLP, plan for swallow study  Patient O2 remains stable  JESSE, IVF, monitor    11/26/22  Dysphagia diet  Started on O2 last evening, off today  Baricitinib and Decadron initiated    52/84/99  JESSE and metabolic acidosis improved    11/28/22  Recheck BMP tomorrow AM    11/29/22  JESSE resolved, dispo planning  XR left foot for reported pain    Chief Complaint: fatigue      Subjective: 71 y.o. female admitted to the hospitalist service. Patient reports pain in her left foot. Patient seen in chair. Patient reportedly tolerating diet. She does not think she's had a BM today.         Medications:    Infusion Medications    sodium chloride 100 mL/hr at 11/28/22 1525    sodium chloride       Scheduled Medications    dexamethasone  6 mg Oral Daily    baricitinib  2 mg Oral Daily    simvastatin  20 mg Oral Nightly    [Held by provider] bumetanide  1 mg Oral Daily    carvedilol  12.5 mg Oral BID    hydrocortisone  10 mg Oral Nightly    levothyroxine  125 mcg Oral Daily    pantoprazole  40 mg Oral QAM AC    rivaroxaban  15 mg Oral Daily    terbinafine  250 mg Oral Daily    sodium chloride flush  10 mL IntraVENous 2 times per day    hydrocortisone  20 mg Oral QAM    oxybutynin  15 mg Oral Daily     PRN Meds: sodium chloride flush, sodium chloride, ondansetron **OR** ondansetron, polyethylene glycol, acetaminophen **OR** acetaminophen, potassium chloride **OR** potassium alternative oral replacement **OR** potassium chloride, magnesium sulfate      Intake/Output Summary (Last 24 hours) at 11/29/2022 0840  Last data filed at 11/29/2022 0155  Gross per 24 hour   Intake --   Output 1350 ml   Net -1350 ml         Diet:  ADULT DIET; Dysphagia - Minced and Moist; Moderately Thick (Honey)  ADULT ORAL NUTRITION SUPPLEMENT; Breakfast, Lunch, Dinner; Frozen Oral Supplement    Review of Systems   Respiratory:  Positive for cough. Negative for shortness of breath. Cardiovascular:  Negative for chest pain. Gastrointestinal:  Negative for vomiting. Exam:  BP (!) 129/58   Pulse 70   Temp 98.3 °F (36.8 °C) (Oral)   Resp 18   Ht 5' 3\" (1.6 m)   Wt 193 lb (87.5 kg)   SpO2 96%   BMI 34.19 kg/m²     Physical Exam  Eyes:      Comments: Glasses   Pulmonary:      Breath sounds: Rhonchi present. Musculoskeletal:      Comments: DO x 4   Skin:     Comments: Area of skin discoloration noted to dorsal left foot, patient reports this is not new   Neurological:      Mental Status: She is alert. Psychiatric:         Speech: She is communicative. Labs:   Recent Labs     11/27/22  0553 11/29/22  0642   WBC 10.9* 9.8   HGB 11.6* 10.9*   HCT 34.8* 33.9*    262       Recent Labs     11/27/22  0553 11/29/22  0642   * 149*   K 3.2* 3.8    115*   CO2 24 22*   BUN 36* 20   CREATININE 1.2 0.8   CALCIUM 8.6 8.4*       No results for input(s): AST, ALT, BILIDIR, BILITOT, ALKPHOS in the last 72 hours. No results for input(s): INR in the last 72 hours. No results for input(s): Cleatrice Moreira in the last 72 hours.     Urinalysis:      Lab Results   Component Value Date/Time    NITRU NEGATIVE 11/23/2022 09:30 PM    WBCUA 5-9 11/23/2022 09:30 PM    WBCUA 2-4 02/22/2012 01:00 PM    BACTERIA NONE SEEN 11/23/2022 09:30 PM    RBCUA 3-5 11/23/2022 09:30 PM    BLOODU NEGATIVE 11/23/2022 09:30 PM    SPECGRAV 1.025 09/18/2020 12:00 PM    GLUCOSEU NEGATIVE 11/23/2022 09:30 PM       Radiology:  [facility] MODIFIED BARIUM SWALLOW W VIDEO   Final Result   1. Aspiration with thin liquids and mildly thickened liquids. 2. Additional recommendations from speech therapist will follow. **This report has been created using voice recognition software. It may contain minor errors which are inherent in voice recognition technology. **      Final report electronically signed by Dr. Ricco Alfaro on 11/26/2022 11:12 AM      XR CHEST PORTABLE   Final Result   No acute cardiopulmonary disease            **This report has been created using voice recognition software. It may contain minor errors which are inherent in voice recognition technology. **      Final report electronically signed by Dr. Jone Hernandez on 11/23/2022 10:36 AM          Diet: ADULT DIET;  Dysphagia - Minced and Moist; Moderately Thick (Honey)  ADULT ORAL NUTRITION SUPPLEMENT; Breakfast, Lunch, Dinner; Frozen Oral Supplement    DVT prophylaxis: [] Lovenox                                 [] SCDs                                 [] SQ Heparin                                 [] Encourage ambulation           [x] Already on Anticoagulation     Disposition:    [] Home       [] TCU       [] Rehab       [] Psych       [x] SNF       [] [facility]       [] Other-    Code Status: Limited    PT/OT Eval:        Electronically signed by Latoya Cain PA-C on 11/29/2022 at 8:40 AM

## 2022-11-29 NOTE — CARE COORDINATION
11/29/22, 12:37 PM EST    DISCHARGE PLANNING EVALUATION    Spoke with Geovani Ackerman at [facility], they have accepted patient, hospital needs to start pre-cert process. Call Monica Jacobs at [facility] [place], [place] [phone]. Spoke with Monica Jacobs at [facility] [place], [place], she requested clinicals faxed to [phone]. Faxed clinicals. done

## 2022-11-29 NOTE — CARE COORDINATION
11/29/22, 2:15 PM EST    DISCHARGE ON GOING EVALUATION    [facility]  Location: -27/027-A Reason for admit: Generalized weakness [R53.1]  Impaired mobility and ADLs [Z74.09, Z78.9]  COVID-19 virus infection [U07.1]  COVID-19 [U07.1]   Procedure: No.  Barriers to Discharge: Afebrile and on room air. PT/OT cont to work with pt. IVF continue at 100/hr. Baricitinib and Decadron. BP today 131/76. PCP: Bell Navas MD  Readmission Risk Score: 15.3%  Patient Goals/Plan/Treatment Preferences: SW following. [address] pending precert.

## 2022-11-30 ENCOUNTER — APPOINTMENT (OUTPATIENT)
Dept: GENERAL RADIOLOGY | Age: 72
DRG: 177 | End: 2022-11-30
Payer: MEDICARE

## 2022-11-30 LAB
ANION GAP SERPL CALCULATED.3IONS-SCNC: 11 MEQ/L (ref 8–16)
BASOPHILS # BLD: 0.2 %
BASOPHILS ABSOLUTE: 0 THOU/MM3 (ref 0–0.1)
BUN BLDV-MCNC: 19 MG/DL (ref 7–22)
CALCIUM SERPL-MCNC: 8.3 MG/DL (ref 8.5–10.5)
CHLORIDE BLD-SCNC: 114 MEQ/L (ref 98–111)
CO2: 23 MEQ/L (ref 23–33)
CREAT SERPL-MCNC: 0.8 MG/DL (ref 0.4–1.2)
EOSINOPHIL # BLD: 0 %
EOSINOPHILS ABSOLUTE: 0 THOU/MM3 (ref 0–0.4)
ERYTHROCYTE [DISTWIDTH] IN BLOOD BY AUTOMATED COUNT: 13.2 % (ref 11.5–14.5)
ERYTHROCYTE [DISTWIDTH] IN BLOOD BY AUTOMATED COUNT: 45.2 FL (ref 35–45)
GFR SERPL CREATININE-BSD FRML MDRD: > 60 ML/MIN/1.73M2
GLUCOSE BLD-MCNC: 130 MG/DL (ref 70–108)
HCT VFR BLD CALC: 33 % (ref 37–47)
HEMOGLOBIN: 10.7 GM/DL (ref 12–16)
IMMATURE GRANS (ABS): 0.45 THOU/MM3 (ref 0–0.07)
IMMATURE GRANULOCYTES: 2.8 %
LYMPHOCYTES # BLD: 3.4 %
LYMPHOCYTES ABSOLUTE: 0.5 THOU/MM3 (ref 1–4.8)
MCH RBC QN AUTO: 29.9 PG (ref 26–33)
MCHC RBC AUTO-ENTMCNC: 32.4 GM/DL (ref 32.2–35.5)
MCV RBC AUTO: 92.2 FL (ref 81–99)
MONOCYTES # BLD: 6.3 %
MONOCYTES ABSOLUTE: 1 THOU/MM3 (ref 0.4–1.3)
NUCLEATED RED BLOOD CELLS: 0 /100 WBC
PLATELET # BLD: 296 THOU/MM3 (ref 130–400)
PMV BLD AUTO: 10.5 FL (ref 9.4–12.4)
POTASSIUM REFLEX MAGNESIUM: 4.1 MEQ/L (ref 3.5–5.2)
RBC # BLD: 3.58 MILL/MM3 (ref 4.2–5.4)
SEG NEUTROPHILS: 87.3 %
SEGMENTED NEUTROPHILS ABSOLUTE COUNT: 13.8 THOU/MM3 (ref 1.8–7.7)
SODIUM BLD-SCNC: 148 MEQ/L (ref 135–145)
WBC # BLD: 15.8 THOU/MM3 (ref 4.8–10.8)

## 2022-11-30 PROCEDURE — 6370000000 HC RX 637 (ALT 250 FOR IP)

## 2022-11-30 PROCEDURE — 73630 X-RAY EXAM OF FOOT: CPT

## 2022-11-30 PROCEDURE — 92526 ORAL FUNCTION THERAPY: CPT

## 2022-11-30 PROCEDURE — 85025 COMPLETE CBC W/AUTO DIFF WBC: CPT

## 2022-11-30 PROCEDURE — 6360000002 HC RX W HCPCS: Performed by: PHYSICIAN ASSISTANT

## 2022-11-30 PROCEDURE — 1200000000 HC SEMI PRIVATE

## 2022-11-30 PROCEDURE — 99232 SBSQ HOSP IP/OBS MODERATE 35: CPT | Performed by: NURSE PRACTITIONER

## 2022-11-30 PROCEDURE — 36415 COLL VENOUS BLD VENIPUNCTURE: CPT

## 2022-11-30 PROCEDURE — 80048 BASIC METABOLIC PNL TOTAL CA: CPT

## 2022-11-30 PROCEDURE — 2580000003 HC RX 258

## 2022-11-30 PROCEDURE — 97129 THER IVNTJ 1ST 15 MIN: CPT

## 2022-11-30 PROCEDURE — 6370000000 HC RX 637 (ALT 250 FOR IP): Performed by: PHYSICIAN ASSISTANT

## 2022-11-30 RX ORDER — POLYETHYLENE GLYCOL 3350 17 G/17G
17 POWDER, FOR SOLUTION ORAL DAILY
Status: DISCONTINUED | OUTPATIENT
Start: 2022-11-30 | End: 2022-12-09 | Stop reason: HOSPADM

## 2022-11-30 RX ORDER — SENNA PLUS 8.6 MG/1
2 TABLET ORAL NIGHTLY
Status: DISCONTINUED | OUTPATIENT
Start: 2022-11-30 | End: 2022-12-09 | Stop reason: HOSPADM

## 2022-11-30 RX ORDER — DOCUSATE SODIUM 100 MG/1
100 CAPSULE, LIQUID FILLED ORAL DAILY
Status: DISCONTINUED | OUTPATIENT
Start: 2022-11-30 | End: 2022-12-09 | Stop reason: HOSPADM

## 2022-11-30 RX ADMIN — SODIUM CHLORIDE, PRESERVATIVE FREE 10 ML: 5 INJECTION INTRAVENOUS at 09:04

## 2022-11-30 RX ADMIN — SENNOSIDES 17.2 MG: 8.6 TABLET, COATED ORAL at 20:32

## 2022-11-30 RX ADMIN — TERBINAFINE TABLETS 250 MG 250 MG: 250 TABLET ORAL at 09:04

## 2022-11-30 RX ADMIN — BUMETANIDE 1 MG: 1 TABLET ORAL at 09:05

## 2022-11-30 RX ADMIN — DOCUSATE SODIUM 100 MG: 100 CAPSULE, LIQUID FILLED ORAL at 09:04

## 2022-11-30 RX ADMIN — RIVAROXABAN 15 MG: 15 TABLET, FILM COATED ORAL at 17:45

## 2022-11-30 RX ADMIN — Medication 20 MG: at 20:32

## 2022-11-30 RX ADMIN — CARVEDILOL 12.5 MG: 6.25 TABLET, FILM COATED ORAL at 20:32

## 2022-11-30 RX ADMIN — SODIUM CHLORIDE, PRESERVATIVE FREE 10 ML: 5 INJECTION INTRAVENOUS at 20:32

## 2022-11-30 RX ADMIN — POLYETHYLENE GLYCOL 3350 17 G: 17 POWDER, FOR SOLUTION ORAL at 09:04

## 2022-11-30 RX ADMIN — HYDROCORTISONE 10 MG: 10 TABLET ORAL at 20:32

## 2022-11-30 RX ADMIN — PANTOPRAZOLE SODIUM 40 MG: 40 TABLET, DELAYED RELEASE ORAL at 05:47

## 2022-11-30 RX ADMIN — DEXAMETHASONE 6 MG: 4 TABLET ORAL at 09:04

## 2022-11-30 RX ADMIN — LEVOTHYROXINE SODIUM 125 MCG: 0.12 TABLET ORAL at 05:46

## 2022-11-30 RX ADMIN — ACETAMINOPHEN 650 MG: 325 TABLET ORAL at 03:54

## 2022-11-30 RX ADMIN — HYDROCORTISONE 20 MG: 10 TABLET ORAL at 09:05

## 2022-11-30 RX ADMIN — BARICITINIB 4 MG: 2 TABLET, FILM COATED ORAL at 09:04

## 2022-11-30 ASSESSMENT — ENCOUNTER SYMPTOMS
COUGH: 1
VOMITING: 0

## 2022-11-30 ASSESSMENT — PAIN SCALES - GENERAL: PAINLEVEL_OUTOF10: 0

## 2022-11-30 NOTE — CARE COORDINATION
11/30/22, 2:44 PM EST    DISCHARGE ON GOING EVALUATION    [facility] day: 3  Location: [facility]27/027-A Reason for admit: Generalized weakness [R53.1]  Impaired mobility and ADLs [Z74.09, Z78.9]  COVID-19 virus infection [U07.1]  COVID-19 [U07.1]   Procedure: No.  Barriers to Discharge: Afebrile and on room air. Baricitinib and Decadron continue. PCP: Jin Rogel MD  Readmission Risk Score: 15.5%  Patient Goals/Plan/Treatment Preferences: SW following. [address] pending precert.

## 2022-11-30 NOTE — PROGRESS NOTES
[address] [place] [address]  INPATIENT SPEECH THERAPY  [facility] MED SURG 8B  DAILY NOTE    TIME   SLP Individual Minutes  Time In: 8776  Time Out: 2710  Minutes: 18  Timed Code Treatment Minutes: 10 Minutes  Dysphagia therapy: 8 minutes  Cognitive therapy: 10 minutes      Date: 2022  Patient Name: Karin Wells      CSN: [id number]   : 1950  (71 y.o.)  Gender: female   Referring Physician: Rob Thorne PA-C  Diagnosis: Generalized weakness  Precautions: Fall risk, aspiration, contact/droplet precautions (COVID-19)  Current Diet: Minced and moist diet with moderately thick liquids   Swallowing Strategies: Full Upright Position, Small Bite/Sip, Multiple Swallow, Medications Crushed with Puree, Limit Distractions, Monitor for Fatigue, and 1:1 assistance with meals   Date of Last MBS/FEES:  MBS 22    Pain:  No pain reported. Subjective:  EITAN Brooks with approval to proceed with session. Upon arrival, patient resting in bed with bright affect and active participation in therapeutic interventions; no family at bedside. Ongoing success conveyed from nursing re: diet level, meal consumption, and safe oral intake without distress; indications were conveyed for lung sounds with, \"crackles\", however attributed to be from fluid overload. Short-Term Goals:  SHORT TERM GOAL #1:  Goal 1: Patient will consume minced and moist diet with moderatly thick liquids without s/s of aspiration in order to safely maintain adequate hydration and nutrition + complete pharyngeal exercises x10 in order to improve overall airway protection and pharyngeal constricition + patient will repeat instrumental evaluation in 2-4 weeks to determine readiness for potential dietary/texture upgrade. INTERVENTIONS: Moderately thick juice via cup with patient demonstrating independent carryover for small sips, consistent cues required to engage in multiple swallows.  Nursing with administration of medications crushed in puree, in which patient with immediate cough reflex and grimaced face, attributing overt s/s to taste of crushed medications. No alternate s/s aspiration exhibited; patient does present with a baseline congested cough (?COVID-19 vs fluid overload)    Recommendations maintain for minced and moist diet, moderately thick liquids with strict adherence to identified compensatory strategies; patient is at heightened risk for potential pulmonary compromise in correlation to pharyngeal dysfunction in uncontrolled environments and complexity of medical status. SHORT TERM GOAL #2:  Goal 2: Patient will complete functional carryover tasks (biographics, orientation, call light, 1-3 units) with 50% accuracy, mod cues to improve awareness to immediate surroundings. INTERVENTIONS: Orientation  -Month: independent  -Year: self correct  -Place: independent  -City: independent  -BENEDICT: independent  -Date: independent with calendar    Biographics  -Full name: independent  -Birthdate: 1/3 elements independent, 1/3 mod cues, 1/3 F02  -Age: mod cues  -Spouse's name: independent    Functional carryover  -Call light: max cues required to locate and discern established safety device with simulation x1 conducted to promote carryover; concerns for generalization of usage    SHORT TERM GOAL #3:  Goal 3: Patient will complmete basic safety awareness, sequencing, and problem solving tasks with 50% accuracy, max cues to enhance participation in ADLs and current environment. INTERVENTIONS: Problem solving  -Call light justifications: 1/3 independent, 2/3 mod cues to enhance awareness    SHORT TERM GOAL #4:  Goal 4: Patient will complete multi-syllabic word repetition, sentence cmopletion, confrontational/responsive naming, and basic verbal fluency tasks with 60% accuracy, mod cues to improvce expression of wants/needs.   INTERVENTIONS: DNT d/t focus on additional STGs    SHORT TERM GOAL #5:  Goal 5: Patient will answer basic yes/no questions and execute 2-step commands with 70% accuracy, mod cues to improve comprehension measures for participation in immediate context. INTERVENTIONS: DNT d/t focus on additional STGs    SHORT TERM GOAL #6:  Goal 6: Patient will complete structured speech drills/tasks with implementation of SOS speech strategies to improve intelligibility to 75% to optimize exchanges between social partners. INTERVENTIONS: DNT d/t focus on additional STGs    Long-Term Goals:   No LTGs due to short ELOS     EDUCATION:  Learner: Patient  Education:  Reviewed diet and strategies, Reviewed signs, symptoms and risks of aspiration, Reviewed ST goals and Plan of Care, and Reviewed recommendations for follow-up  Evaluation of Education: Demonstrates with assistance, Needs further instruction, and Family not present    ASSESSMENT/PLAN:  Activity Tolerance:  Patient tolerance of  treatment: good. Assessment/Plan: Patient progressing toward established goals. Continues to require skilled care of licensed speech pathologist to progress toward achievement of established goals and plan of care. .     Plan for Next Session: dysphagia management, basic cognitive rehabilitation   Discharge Recommendations: SNF         Kavin Shea M.A., [facility]

## 2022-11-30 NOTE — PROGRESS NOTES
Hospitalist Progress Note    Patient:  Fabio Coronado      Unit/Bed:8B-27/027-A    YOB: 1950    MRN: [id number]       Acct: [de-identified]     PCP: Boris Sarabia MD    Date of Admission: 11/23/2022    Assessment/Plan:    COVID-19 infection--baricitinib 11/25; Decadron 11/25; currently on room air; CRP was noted to be 14 on admission and will repeat in the morning  Anion gap metabolic acidosis--resolved  Hypernatremia--trending down; check in the morning  Hypokalemia--resolved  Suspected adrenal insufficiency with history of pituitary tumor--on Cortef  Chronic diastolic heart failure--on Bumex, Coreg  Paroxysmal atrial fibrillation  JESSE--resolved  Hypothyroidism--treated with Synthroid  History of CVA--on, Xarelto      Expected discharge date: Possibly 12/1    Disposition:    [x] Home       [] TCU       [] Rehab       [] Psych       [] SNF       [] [facility]       [] Other-    Chief Complaint: Fatigue    Hospital Course: Per H&P done 11/23/2022: \"Shirley is a 72 y/o  female with a PMHx of HTN, CVA, who presents to [facility] ED today via EMS for the evaluation of generalized weakness and fatigue since last night. Pt's overall very tired and poor historian.  at bedside states that he lives alone with his wife and yesterday when he was helping her pivot from her chair to the commode, the patient was very weak and had to carry her back into the chair. Pt reportedly slept very well in her recliner last night, as she does normally, but  states she woke up appearing very tired and ill appearing to him. He states once again this AM, when transitioning her from the chair to the commode, she could barely hold her own body weight and had to call the EMS for assistance. Pt denies chest pain, cough or shortness of breath, chills, abdominal pain, changes in bowel or urinary habits.  Pt's  does states she has had a slight decrease in appetite since yesterday. \"    11/30--> hemodynamically stable, on room air; sodium at 148, creatinine at 0.8; RN stated patient had some shortness of breath in the night    Subjective (past 24 hours): She has a congested cough and she did have some rhonchi in her lungs however once I encouraged her to cough her lungs did clear nicely, she denies any complaints of pain, she denies shortness of breath; states she is eating okay    Medications:  Reviewed    Infusion Medications    sodium chloride       Scheduled Medications    polyethylene glycol  17 g Oral Daily    senna  2 tablet Oral Nightly    docusate sodium  100 mg Oral Daily    baricitinib  4 mg Oral Daily    dexamethasone  6 mg Oral Daily    simvastatin  20 mg Oral Nightly    bumetanide  1 mg Oral Daily    carvedilol  12.5 mg Oral BID    hydrocortisone  10 mg Oral Nightly    levothyroxine  125 mcg Oral Daily    pantoprazole  40 mg Oral QAM AC    rivaroxaban  15 mg Oral Daily    terbinafine  250 mg Oral Daily    sodium chloride flush  10 mL IntraVENous 2 times per day    hydrocortisone  20 mg Oral QAM    oxybutynin  15 mg Oral Daily     PRN Meds: sodium chloride flush, sodium chloride, ondansetron **OR** ondansetron, acetaminophen **OR** acetaminophen, potassium chloride **OR** potassium alternative oral replacement **OR** potassium chloride, magnesium sulfate      Intake/Output Summary (Last 24 hours) at 11/30/2022 7627  Last data filed at 11/30/2022 0022  Gross per 24 hour   Intake 3195.08 ml   Output --   Net 3195.08 ml       Diet:  ADULT DIET; Dysphagia - Minced and Moist; Moderately Thick (Honey)  ADULT ORAL NUTRITION SUPPLEMENT; Breakfast, Lunch, Dinner; Frozen Oral Supplement    Exam:  /62   Pulse 61   Temp 98.3 °F (36.8 °C) (Oral)   Resp 16   Ht 5' 3\" (1.6 m)   Wt 193 lb (87.5 kg)   SpO2 92%   BMI 34.19 kg/m²     General appearance: No apparent distress, appears stated age and cooperative. HEENT: Pupils equal, round, and reactive to light. Conjunctivae/corneas clear. Neck: Supple, with full range of motion. No jugular venous distention. Trachea midline. Respiratory:  Normal respiratory effort. Initially had rhonchi to auscultation, bilaterally however clear nicely with cough  Cardiovascular: Regular rate and rhythm with normal S1/S2 without murmurs, rubs or gallops. Abdomen: Soft, non-tender, non-distended with normal bowel sounds. Musculoskeletal: passive and active ROM x 4 extremities. Skin: Skin color, texture, turgor normal.    Neurologic:  Neurovascularly intact without any focal sensory/motor deficits. Cranial nerves: II-XII intact, grossly non-focal.  Psychiatric: Alert and oriented, thought content appropriate  Capillary Refill: Brisk,< 3 seconds   Peripheral Pulses: +2 palpable, equal bilaterally       Labs:   Recent Labs     11/29/22 0642 11/30/22 0642   WBC 9.8 15.8*   HGB 10.9* 10.7*   HCT 33.9* 33.0*    296     Recent Labs     11/29/22 0642 11/30/22 0642   * 148*   K 3.8 4.1   * 114*   CO2 22* 23   BUN 20 19   CREATININE 0.8 0.8   CALCIUM 8.4* 8.3*     Microbiology:    COVID-19 detected  Influenza negative    Urinalysis:      Lab Results   Component Value Date/Time    NITRU NEGATIVE 11/23/2022 09:30 PM    WBCUA 5-9 11/23/2022 09:30 PM    WBCUA 2-4 02/22/2012 01:00 PM    BACTERIA NONE SEEN 11/23/2022 09:30 PM    RBCUA 3-5 11/23/2022 09:30 PM    BLOODU NEGATIVE 11/23/2022 09:30 PM    SPECGRAV 1.025 09/18/2020 12:00 PM    GLUCOSEU NEGATIVE 11/23/2022 09:30 PM       Radiology:  XR CHEST PORTABLE    Result Date: 11/23/2022  PROCEDURE: XR CHEST PORTABLE CLINICAL INFORMATION: shortness of breath . TECHNIQUE: Portable upright COMPARISON: 12/30/2018 FINDINGS: Patient is rotated and leaning to the right. Heart size is likely within normal limits based on the depth of inspiration and portable technique mediastinum is not widened. No infiltrates or effusions are seen. Vessels are not congested.  Bilateral shoulder replacements. No acute cardiopulmonary disease **This report has been created using voice recognition software. It may contain minor errors which are inherent in voice recognition technology. ** Final report electronically signed by Dr. Willis Castrejon on 11/23/2022 10:36 AM      DVT prophylaxis: [] Lovenox                                 [] SCDs                                 [] SQ Heparin                                 [] Encourage ambulation           [x] Already on Anticoagulation~Xarelto     Code Status: Limited    PT/OT Eval Status:  On case    Tele:   [x] Yes sinus rhythm heart rate 61             [] no    Active Hospital Problems    Diagnosis Date Noted    COVID-19 [U07.1] 11/27/2022     Priority: Medium    Generalized weakness [R53.1]        Electronically signed by NIDIA Galindo CNP on 11/30/2022 at 8:14 AM

## 2022-11-30 NOTE — PROGRESS NOTES
[facility] [address]  OCCUPATIONAL THERAPY MISSED TREATMENT NOTE  [facility] MED SURG 8B  8B-27/027-A      Date: 2022  Patient Name: Loretta Canales        CSN: [id number]   : 1950  (71 y.o.)  Gender: female   Referring Practitioner: Roma Che PA-C  Diagnosis: Generalized Weakness         REASON FOR MISSED TREATMENT: Patient Refused. Pt resting in bed upon arrival immediately stating, \"Are you here to get me some water? \" Therapist educated for role of OT and encouraged for up to chair for lunch tray arrival. Pt demo's decreased insight stating, \"I move fine! I just need water to make me better! \" Retrieved thickened water for pt and she's still continues to decline activity at all levels.

## 2022-11-30 NOTE — PROGRESS NOTES
[facility] PROGRESS NOTE      Patient: Nicholas Colbert  Room #: 7V-15/671-E            YOB: 1950  Age: 71 y.o. Gender: female            Admit Date & Time: 11/23/2022  9:50 AM    Assessment:  Jennifer Rosario is a 71year old female who is in bed on 8b. No family is present at this time. She is in good spirits and seems to be doing well. This  introduced myself to her and we talked of mary kate and family. Interventions:  Prayer is offered and accepted, Jennifer Rosario belongs to the [employer] in [facility]. She accepted prayer and words of encouragement. When the poem \"footprints in the sand \" prayer card was given to her, she stated she has this as an Georgian. Outcomes: Thankful for the spiritual care contact. Plan:     She is hoping to go home soon but is not sure when she will be discharged. Care Plan:  Continue spiritual and emotional care for patient and family. Including prayers.       Electronically signed by Saleem Varela on 11/30/2022 at 4:19 PM.  [facility]  [phone]

## 2022-12-01 ENCOUNTER — APPOINTMENT (OUTPATIENT)
Dept: GENERAL RADIOLOGY | Age: 72
DRG: 177 | End: 2022-12-01
Payer: MEDICARE

## 2022-12-01 LAB
ANION GAP SERPL CALCULATED.3IONS-SCNC: 14 MEQ/L (ref 8–16)
BUN BLDV-MCNC: 19 MG/DL (ref 7–22)
C-REACTIVE PROTEIN: 2.97 MG/DL (ref 0–1)
CALCIUM SERPL-MCNC: 8.6 MG/DL (ref 8.5–10.5)
CHLORIDE BLD-SCNC: 109 MEQ/L (ref 98–111)
CO2: 22 MEQ/L (ref 23–33)
CREAT SERPL-MCNC: 0.8 MG/DL (ref 0.4–1.2)
ERYTHROCYTE [DISTWIDTH] IN BLOOD BY AUTOMATED COUNT: 13.2 % (ref 11.5–14.5)
ERYTHROCYTE [DISTWIDTH] IN BLOOD BY AUTOMATED COUNT: 42.7 FL (ref 35–45)
GFR SERPL CREATININE-BSD FRML MDRD: > 60 ML/MIN/1.73M2
GLUCOSE BLD-MCNC: 109 MG/DL (ref 70–108)
HCT VFR BLD CALC: 36.4 % (ref 37–47)
HEMOGLOBIN: 12.4 GM/DL (ref 12–16)
MAGNESIUM: 1.9 MG/DL (ref 1.6–2.4)
MCH RBC QN AUTO: 30 PG (ref 26–33)
MCHC RBC AUTO-ENTMCNC: 34.1 GM/DL (ref 32.2–35.5)
MCV RBC AUTO: 87.9 FL (ref 81–99)
PATHOLOGIST REVIEW: ABNORMAL
PLATELET # BLD: 530 THOU/MM3 (ref 130–400)
PMV BLD AUTO: 10.4 FL (ref 9.4–12.4)
POTASSIUM REFLEX MAGNESIUM: 3.5 MEQ/L (ref 3.5–5.2)
PROCALCITONIN: 0.1 NG/ML (ref 0.01–0.09)
RBC # BLD: 4.14 MILL/MM3 (ref 4.2–5.4)
SODIUM BLD-SCNC: 145 MEQ/L (ref 135–145)
WBC # BLD: 34.8 THOU/MM3 (ref 4.8–10.8)

## 2022-12-01 PROCEDURE — 6360000002 HC RX W HCPCS: Performed by: NURSE PRACTITIONER

## 2022-12-01 PROCEDURE — 6370000000 HC RX 637 (ALT 250 FOR IP): Performed by: NURSE PRACTITIONER

## 2022-12-01 PROCEDURE — 71045 X-RAY EXAM CHEST 1 VIEW: CPT

## 2022-12-01 PROCEDURE — 97110 THERAPEUTIC EXERCISES: CPT

## 2022-12-01 PROCEDURE — 2580000003 HC RX 258: Performed by: NURSE PRACTITIONER

## 2022-12-01 PROCEDURE — 36415 COLL VENOUS BLD VENIPUNCTURE: CPT

## 2022-12-01 PROCEDURE — 85027 COMPLETE CBC AUTOMATED: CPT

## 2022-12-01 PROCEDURE — 99233 SBSQ HOSP IP/OBS HIGH 50: CPT | Performed by: NURSE PRACTITIONER

## 2022-12-01 PROCEDURE — 6370000000 HC RX 637 (ALT 250 FOR IP)

## 2022-12-01 PROCEDURE — 80048 BASIC METABOLIC PNL TOTAL CA: CPT

## 2022-12-01 PROCEDURE — 97530 THERAPEUTIC ACTIVITIES: CPT

## 2022-12-01 PROCEDURE — 6360000002 HC RX W HCPCS: Performed by: PHYSICIAN ASSISTANT

## 2022-12-01 PROCEDURE — 2580000003 HC RX 258

## 2022-12-01 PROCEDURE — 1200000000 HC SEMI PRIVATE

## 2022-12-01 PROCEDURE — 86140 C-REACTIVE PROTEIN: CPT

## 2022-12-01 PROCEDURE — 6370000000 HC RX 637 (ALT 250 FOR IP): Performed by: PHYSICIAN ASSISTANT

## 2022-12-01 PROCEDURE — 83735 ASSAY OF MAGNESIUM: CPT

## 2022-12-01 PROCEDURE — 84145 PROCALCITONIN (PCT): CPT

## 2022-12-01 RX ORDER — AZITHROMYCIN 250 MG/1
500 TABLET, FILM COATED ORAL EVERY 24 HOURS
Status: COMPLETED | OUTPATIENT
Start: 2022-12-01 | End: 2022-12-03

## 2022-12-01 RX ADMIN — HYDROCORTISONE 20 MG: 10 TABLET ORAL at 09:46

## 2022-12-01 RX ADMIN — Medication 20 MG: at 20:41

## 2022-12-01 RX ADMIN — RIVAROXABAN 15 MG: 15 TABLET, FILM COATED ORAL at 18:17

## 2022-12-01 RX ADMIN — AZITHROMYCIN MONOHYDRATE 500 MG: 250 TABLET ORAL at 13:11

## 2022-12-01 RX ADMIN — DEXAMETHASONE 6 MG: 4 TABLET ORAL at 09:45

## 2022-12-01 RX ADMIN — CARVEDILOL 12.5 MG: 6.25 TABLET, FILM COATED ORAL at 20:43

## 2022-12-01 RX ADMIN — DOCUSATE SODIUM 100 MG: 100 CAPSULE, LIQUID FILLED ORAL at 09:45

## 2022-12-01 RX ADMIN — SODIUM CHLORIDE, PRESERVATIVE FREE 10 ML: 5 INJECTION INTRAVENOUS at 09:45

## 2022-12-01 RX ADMIN — TERBINAFINE TABLETS 250 MG 250 MG: 250 TABLET ORAL at 09:45

## 2022-12-01 RX ADMIN — PANTOPRAZOLE SODIUM 40 MG: 40 TABLET, DELAYED RELEASE ORAL at 06:07

## 2022-12-01 RX ADMIN — ACETAMINOPHEN 650 MG: 325 TABLET ORAL at 20:44

## 2022-12-01 RX ADMIN — CARVEDILOL 12.5 MG: 6.25 TABLET, FILM COATED ORAL at 09:46

## 2022-12-01 RX ADMIN — LEVOTHYROXINE SODIUM 125 MCG: 0.12 TABLET ORAL at 06:07

## 2022-12-01 RX ADMIN — CEFTRIAXONE SODIUM 1000 MG: 1 INJECTION, POWDER, FOR SOLUTION INTRAMUSCULAR; INTRAVENOUS at 13:10

## 2022-12-01 RX ADMIN — POLYETHYLENE GLYCOL 3350 17 G: 17 POWDER, FOR SOLUTION ORAL at 09:45

## 2022-12-01 RX ADMIN — HYDROCORTISONE 10 MG: 10 TABLET ORAL at 20:43

## 2022-12-01 RX ADMIN — BARICITINIB 4 MG: 2 TABLET, FILM COATED ORAL at 09:45

## 2022-12-01 RX ADMIN — BUMETANIDE 1 MG: 1 TABLET ORAL at 09:46

## 2022-12-01 ASSESSMENT — PAIN SCALES - GENERAL
PAINLEVEL_OUTOF10: 0
PAINLEVEL_OUTOF10: 0
PAINLEVEL_OUTOF10: 5

## 2022-12-01 ASSESSMENT — PAIN DESCRIPTION - FREQUENCY: FREQUENCY: CONTINUOUS

## 2022-12-01 ASSESSMENT — PAIN DESCRIPTION - DESCRIPTORS: DESCRIPTORS: ACHING

## 2022-12-01 ASSESSMENT — PAIN DESCRIPTION - ORIENTATION: ORIENTATION: MID

## 2022-12-01 ASSESSMENT — PAIN DESCRIPTION - LOCATION: LOCATION: COCCYX

## 2022-12-01 ASSESSMENT — PAIN DESCRIPTION - PAIN TYPE: TYPE: CHRONIC PAIN

## 2022-12-01 ASSESSMENT — PAIN - FUNCTIONAL ASSESSMENT: PAIN_FUNCTIONAL_ASSESSMENT: PREVENTS OR INTERFERES SOME ACTIVE ACTIVITIES AND ADLS

## 2022-12-01 ASSESSMENT — PAIN DESCRIPTION - ONSET: ONSET: ON-GOING

## 2022-12-01 NOTE — FLOWSHEET NOTE
IV leaking when ATX infusing. Infusion stopped. IV removed. IV team perfect served to place IV d/t being a hard stick. IV team unsuccessful. Resource nurse called. Resource nurse took a look to see if she could get an IV placed. No success. Talked with CNP. Okay for midline. Spoke with PICC team RN. RN stated \" a PICC would be better with the ATX that need to be ran plus patient being a hard stick for lab. CNP notified. Okay for PICC placement. Called  Jeimy Zacarias for verbal consent to place PICC since patient does have some confusion.  Jeimy Zacarias agreed to have PICC placed. Two person phone consent completed by this nurse and [facility].

## 2022-12-01 NOTE — PROGRESS NOTES
Hospitalist Progress Note    Patient:  Emmanuel Melchor      Unit/Bed:8B-27/027-A    YOB: 1950    MRN: [id number]       Acct: [de-identified]     PCP: Carla Melo MD    Date of Admission: 11/23/2022    Assessment/Plan:    Pneumonia, left lower lobe, not present on arrival likely secondary to COVID-19 infection--baricitinib 11/25; Decadron 11/25; currently on room air; CRP was noted to be 14 on admission and improved to 2.97 on 12/1; will add Zithromax/Rocephin on 12/1; check procalcitonin  Acute hypoxic respiratory failure--likely secondary to #1, room air O2 sat was noted to be 97%, currently on 2 L of oxygen satting 91%; encourage incentive spirometry and Acapella  Leukocytosis--is afebrile, could be secondary to steroids as she is on Decadron secondary to COVID along with Cortef; monitor  Thrombocytopenia--could be reactive in nature, monitor  Anion gap metabolic acidosis--resolved  Hypernatremia--resolved  Hypokalemia--replace per protocol for value 3.5, magnesium 1.9  Suspected adrenal insufficiency with history of pituitary tumor--on Cortef  Chronic diastolic heart failure--on Bumex, Coreg  Paroxysmal atrial fibrillation  JESSE--resolved  Hypothyroidism--treated with Synthroid  History of CVA--Xarelto, statin  CODE STATUS--limited x4      Expected discharge date: Pending clinical course    Disposition:    [x] Home       [] TCU       [] Rehab       [] Psych       [] SNF       [] [place]       [] Other-    Chief Complaint: Fatigue    Hospital Course: Per H&P done 11/23/2022: \"Shirley is a 70 y/o  female with a PMHx of HTN, CVA, who presents to [facility] ED today via EMS for the evaluation of generalized weakness and fatigue since last night. Pt's overall very tired and poor historian.   at bedside states that he lives alone with his wife and yesterday when he was helping her pivot from her chair to the commode, the patient was very weak and had to carry her back into the chair. Pt reportedly slept very well in her recliner last night, as she does normally, but  states she woke up appearing very tired and ill appearing to him. He states once again this AM, when transitioning her from the chair to the commode, she could barely hold her own body weight and had to call the EMS for assistance. Pt denies chest pain, cough or shortness of breath, chills, abdominal pain, changes in bowel or urinary habits. Pt's  does states she has had a slight decrease in appetite since yesterday. \"    11/30--> hemodynamically stable, on room air; sodium at 148, creatinine at 0.8; RN stated patient had some shortness of breath in the night    12/1-->hemodynamically stable, was hypoxic this morning at 87% so was placed on 2 L of oxygen; white count greatly increased however patient is on 2 steroids, CRP is greatly improved, sodium has resulted to normal; chest x-ray showed a pneumonia so Zithromax and Rocephin added    Subjective (past 24 hours): Up in the chair, states she feels \"pretty good\" today, states she does feel weak, states she had some shortness of breath and a cough which is nonproductive, no family at bedside    Medications:  Reviewed    Infusion Medications    sodium chloride       Scheduled Medications    azithromycin  500 mg Oral Daily    cefTRIAXone (ROCEPHIN) IV  1,000 mg IntraVENous Q24H    polyethylene glycol  17 g Oral Daily    senna  2 tablet Oral Nightly    docusate sodium  100 mg Oral Daily    baricitinib  4 mg Oral Daily    dexamethasone  6 mg Oral Daily    simvastatin  20 mg Oral Nightly    bumetanide  1 mg Oral Daily    carvedilol  12.5 mg Oral BID    hydrocortisone  10 mg Oral Nightly    levothyroxine  125 mcg Oral Daily    pantoprazole  40 mg Oral QAM AC    rivaroxaban  15 mg Oral Daily    terbinafine  250 mg Oral Daily    sodium chloride flush  10 mL IntraVENous 2 times per day    hydrocortisone  20 mg Oral QAM    oxybutynin  15 mg Oral Daily     PRN Meds: sodium chloride flush, sodium chloride, ondansetron **OR** ondansetron, acetaminophen **OR** acetaminophen, potassium chloride **OR** potassium alternative oral replacement **OR** potassium chloride, magnesium sulfate      Intake/Output Summary (Last 24 hours) at 12/1/2022 1124  Last data filed at 12/1/2022 0570  Gross per 24 hour   Intake --   Output 2400 ml   Net -2400 ml       Diet:  ADULT DIET; Dysphagia - Minced and Moist; Moderately Thick (Honey)  ADULT ORAL NUTRITION SUPPLEMENT; Breakfast, Lunch, Dinner; Frozen Oral Supplement    Exam:  /65   Pulse 74   Temp 97.9 °F (36.6 °C) (Oral)   Resp 16   Ht 5' 3\" (1.6 m)   Wt 193 lb (87.5 kg)   SpO2 91%   BMI 34.19 kg/m²     General appearance: No apparent distress, appears stated age and cooperative. HEENT: Pupils equal, round, and reactive to light. Conjunctivae/corneas clear. Neck: Supple, with full range of motion. No jugular venous distention. Trachea midline. Respiratory:  Normal respiratory effort. Diminished throughout with scattered rhonchi auscultation, bilaterally however clear nicely with cough  Cardiovascular: Regular rate and rhythm with normal S1/S2 without murmurs, rubs or gallops. Abdomen: Soft, non-tender, non-distended with normal bowel sounds. Musculoskeletal: passive and active ROM x 4 extremities. Skin: Skin color, texture, turgor normal.    Neurologic:  Neurovascularly intact without any focal sensory/motor deficits.  Cranial nerves: II-XII intact, grossly non-focal.  Psychiatric: Alert and oriented to name, place, birthday, month and year, thought content appropriate  Capillary Refill: Brisk,< 3 seconds   Peripheral Pulses: +2 palpable, equal bilaterally       Labs:   Recent Labs     11/29/22  0642 11/30/22  0642 12/01/22  1040   WBC 9.8 15.8* 34.8*   HGB 10.9* 10.7* 12.4   HCT 33.9* 33.0* 36.4*    296 530*     Recent Labs     11/29/22  0642 11/30/22  0642 12/01/22  1040   * 148* 145   K 3.8 4.1 3.5   * 114* 109   CO2 22* 23 22*   BUN 20 19 19   CREATININE 0.8 0.8 0.8   CALCIUM 8.4* 8.3* 8.6     Microbiology:    [id number] detected  Influenza negative    Urinalysis:      Lab Results   Component Value Date/Time    NITRU NEGATIVE 11/23/2022 09:30 PM    WBCUA 5-9 11/23/2022 09:30 PM    WBCUA 2-4 02/22/2012 01:00 PM    BACTERIA NONE SEEN 11/23/2022 09:30 PM    RBCUA 3-5 11/23/2022 09:30 PM    BLOODU NEGATIVE 11/23/2022 09:30 PM    SPECGRAV 1.025 09/18/2020 12:00 PM    GLUCOSEU NEGATIVE 11/23/2022 09:30 PM       Radiology:  XR CHEST PORTABLE    Result Date: 11/23/2022  PROCEDURE: XR CHEST PORTABLE CLINICAL INFORMATION: shortness of breath . TECHNIQUE: Portable upright COMPARISON: 12/30/2018 FINDINGS: Patient is rotated and leaning to the right. Heart size is likely within normal limits based on the depth of inspiration and portable technique mediastinum is not widened. No infiltrates or effusions are seen. Vessels are not congested. Bilateral shoulder replacements. No acute cardiopulmonary disease **This report has been created using voice recognition software. It may contain minor errors which are inherent in voice recognition technology. ** Final report electronically signed by Dr. Timothy Pereyra on 11/23/2022 10:36 AM      DVT prophylaxis: [] Lovenox                                 [] SCDs                                 [] SQ Heparin                                 [] Encourage ambulation           [x] Already on Anticoagulation~Xarelto     Code Status: Limited    PT/OT Eval Status:  On case    Tele:   [x] Yes sinus rhythm heart rate 80             [] no    Active Hospital Problems    Diagnosis Date Noted    COVID-19 [U07.1] 11/27/2022     Priority: Medium    Generalized weakness [R53.1]        Electronically signed by NIDIA Hutson CNP on 12/1/2022 at 11:24 AM

## 2022-12-01 NOTE — PROGRESS NOTES
[id number] [address]  INPATIENT PHYSICAL THERAPY  DAILY NOTE  [facility] MED SURG 8B - 8B-27/027-A    Time In: 1001  Time Out: 1030  Timed Code Treatment Minutes: 29 Minutes  Minutes: 29          Date: 2022  Patient Name: Melissa Hudson,  Gender:  female        MRN: [id number]  : 1950  (76 y.o.)     Referring Practitioner: Ramiro Carroll PA-C  Diagnosis: Generalized weakness  Additional Pertinent Hx: 72 y/o  female with a PMHx of HTN, CVA, who presents to [facility] ED today via EMS for the evaluation of generalized weakness and fatigue since last night. Pt's overall very tired and poor historian.  at bedside states that he lives alone with his wife and yesterday when he was helping her pivot from her chair to the commode, the patient was very weak and had to carry her back into the chair. Pt reportedly slept very well in her recliner last night, as she does normally, but  states she woke up appearing very tired and ill appearing to him. He states once again this AM, when transitioning her from the chair to the commode, she could barely hold her own body weight and had to call the EMS for assistance. Pt denies chest pain, cough or shortness of breath, chills, abdominal pain, changes in bowel or urinary habits. Pt's  does states she has had a slight decrease in appetite since yesterday.      Prior Level of Function:  Lives With: Spouse  Type of Home: House  Home Layout: One level  Home Access: Stairs to enter with rails  Entrance Stairs - Number of Steps: 2  Entrance Stairs - Rails: Both  Home Equipment: Marielos Hartman, jennie, Wheelchair-manual, Lift chair        Receives Help From: Family  ADL Assistance: Needs assistance  Homemaking Assistance: Needs assistance  Homemaking Responsibilities: No  Ambulation Assistance: Non-ambulatory  Transfer Assistance: Needs assistance  Additional Comments:  assist with stand pivot transfers; w/c or chair bound otherwise    Restrictions/Precautions:  Restrictions/Precautions: Fall Risk, Isolation     SUBJECTIVE: RN approved session. Patient in bed at arrival and agreeable to therapy. Tech present at beginning of session to assist with cleaning patient who was incontinent of urine and bowels prior to arrival. Patient requested to sit in beside chair at end of session. RN notified of low oxygen stats once in chair however this writer was unable to assess further due to Xray and labs arriving. PAIN: 0/10: Denied     Vitals: Oxygen: 90's with all bed mobility. 85-89% on 2L once in chair. OBJECTIVE:  Bed Mobility:  Rolling to Left: Moderate Assistance, X 1   Rolling to Right: Moderate Assistance   Supine to Sit: Moderate Assistance, X 1  Scooting: Moderate Assistance, X 1    Transfers:  Sit to Stand: Maximum Assistance, X 2, with Jarett Lopez, with increased time for completion, cues for hand placement  Stand to [address] [place] [address], X 2, with Jarett Lopez, with increased time for completion  To/From Bed and Chair: Dependent, with Jarett Lopez  *Patient required max cues for proper hand placement. Minimal ability to assist with motion this date requiring x2 assist for safety. Ambulation:  Not Tested    Balance:  Static Sitting Balance:  Stand By Assistance, Patient sat at EOB for about 5 minutes prior to transfers. Static Standing Balance: Contact Guard Assistance, X 2, Patient stood for about 30 seconds prior to sitting in chair. Exercise:  Patient was guided in 1 set(s) 10 reps of exercise to both lower extremities. Ankle pumps, Glut sets, and Quad sets. Exercises were completed for increased independence with functional mobility. Functional Outcome Measures: Completed  AM-PAC Inpatient Mobility without Stair Climbing Raw Score : 8  AM-PAC Inpatient without Stair Climbing T-Scale Score : 30.65    ASSESSMENT:  Assessment: Patient progressing toward established goals.   Activity Tolerance:  Patient tolerance of  treatment: good. Equipment Recommendations:Equipment Needed: No  Discharge Recommendations: Subacte/Skilled Nursing Facility  Plan: Current Treatment Recommendations: Strengthening, Balance training, Endurance training, Functional mobility training, Transfer training  General Plan:  (5x GM)    Patient Education  Patient Education: Plan of Care    Goals:  Patient Goals : none stated  Short Term Goals  Time Frame for Short Term Goals: by discharge  Short Term Goal 1: bed mobility HOB flat, CGA x1 for increased functional ind  Short Term Goal 2: pt to tolerate sitting EOB 10' with good posture for increased core strength/endurance  Short Term Goal 3: sit<>stand from various surfaces with ayo stedy and CGA x1 for safe transfers  Long Term Goals  Time Frame for Long Term Goals : NA d/t short ELOS    Following session, patient left in safe position with all fall risk precautions in place.

## 2022-12-01 NOTE — PROGRESS NOTES
This RN called and spoke with primary nurse Erwin Prather RN in regards to Midline Catheter order. Alessia Alaniz explained patient is a hard stick with having 2 USGIV being placed already. Lab has difficulty obtaining blood from patient. Alessia Alaniz verbalized CNP stated patient was going to be here for awhile and will also be on IV ATB. This RN recommended placement of PICC Line vs Midline Catheter. Alessia Alaniz in agreement and will contact CNP to see if order can be changed to PICC Line. This RN verbalized understanding. Alessia Alaniz stated she will follow up with IV Team with response. No further questions or concerns voiced at this time.

## 2022-12-01 NOTE — CARE COORDINATION
12/1/22, 9:27 AM EST    DISCHARGE PLANNING EVALUATION    Spoke with Jeff from [facility], advised clinicals sent to [facility] on 11/29. They have not heard from them, hoping today.   Requested someone reach out [facility] regarding approval.

## 2022-12-01 NOTE — PROGRESS NOTES
This RN received Perfect Serve message from Primary nurse EITAN Sofia in regards to follow up of PICC Line vs Midline Catheter placement. Umesh Carrillo RN messaged order was placed for PICC Line. This RN messeged with understanding PICC Line would be placed 12/2/22 in the morning. No further questions or concerns voiced at this time.

## 2022-12-01 NOTE — PLAN OF CARE
Problem: Discharge Planning  Goal: Discharge to home or other facility with appropriate resources  Outcome: Progressing  Note: Discharge to snf     Problem: Neurosensory - Adult  Goal: Achieves stable or improved neurological status  Outcome: Progressing  Note: Interm confusion     Problem: Respiratory - Adult  Goal: Achieves optimal ventilation and oxygenation  Outcome: Progressing  Note: 2L NC     Problem: Skin/Tissue Integrity - Adult  Goal: Skin integrity remains intact  Outcome: Progressing  Note: See wounds LDA. Turn every 2 hours     Problem: Musculoskeletal - Adult  Goal: Return mobility to safest level of function  Outcome: Progressing  Note: Turn every 2 hours pt ot     Problem: Infection - Adult  Goal: Absence of infection at discharge  Outcome: Progressing  Note: WBC elevated     Problem: Skin/Tissue Integrity  Goal: Absence of new skin breakdown  Description: 1. Monitor for areas of redness and/or skin breakdown  2. Assess vascular access sites hourly  3. Every 4-6 hours minimum:  Change oxygen saturation probe site  4. Every 4-6 hours:  If on nasal continuous positive airway pressure, respiratory therapy assess nares and determine need for appliance change or resting period. Outcome: Progressing  Note: Turn every 2 hours. See wounds     Problem: Safety - Adult  Goal: Free from fall injury  Outcome: Progressing  Note: Bed/chair alarm is on.       Problem: Safety - Adult  Goal: Isolation precautions  Description: Isolation precautions  Outcome: Progressing     Problem: ABCDS Injury Assessment  Goal: Absence of physical injury  Outcome: Progressing  Note: Bed/chair alarm     Problem: Chronic Conditions and Co-morbidities  Goal: Patient's chronic conditions and co-morbidity symptoms are monitored and maintained or improved  Outcome: Progressing  Note: 2L NC steroids     Problem: Nutrition Deficit:  Goal: Optimize nutritional status  Outcome: Progressing  Note: Appetite good     Problem: Pain  Goal: Verbalizes/displays adequate comfort level or baseline comfort level  Outcome: Progressing  Note: Denies pain   Care plan reviewed with patient. Patient verbalize understanding of the plan of care and contribute to goal setting.

## 2022-12-02 LAB
ANION GAP SERPL CALCULATED.3IONS-SCNC: 9 MEQ/L (ref 8–16)
BUN BLDV-MCNC: 20 MG/DL (ref 7–22)
CALCIUM SERPL-MCNC: 8.3 MG/DL (ref 8.5–10.5)
CHLORIDE BLD-SCNC: 107 MEQ/L (ref 98–111)
CO2: 29 MEQ/L (ref 23–33)
CREAT SERPL-MCNC: 0.8 MG/DL (ref 0.4–1.2)
ERYTHROCYTE [DISTWIDTH] IN BLOOD BY AUTOMATED COUNT: 13.5 % (ref 11.5–14.5)
ERYTHROCYTE [DISTWIDTH] IN BLOOD BY AUTOMATED COUNT: 45.7 FL (ref 35–45)
GFR SERPL CREATININE-BSD FRML MDRD: > 60 ML/MIN/1.73M2
GLUCOSE BLD-MCNC: 126 MG/DL (ref 70–108)
HCT VFR BLD CALC: 33.6 % (ref 37–47)
HEMOGLOBIN: 11 GM/DL (ref 12–16)
LACTIC ACID: 3.5 MMOL/L (ref 0.5–2)
LACTIC ACID: 3.7 MMOL/L (ref 0.5–2)
MAGNESIUM: 2.1 MG/DL (ref 1.6–2.4)
MCH RBC QN AUTO: 30 PG (ref 26–33)
MCHC RBC AUTO-ENTMCNC: 32.7 GM/DL (ref 32.2–35.5)
MCV RBC AUTO: 91.6 FL (ref 81–99)
PLATELET # BLD: 465 THOU/MM3 (ref 130–400)
PMV BLD AUTO: 10.1 FL (ref 9.4–12.4)
POTASSIUM REFLEX MAGNESIUM: 3.3 MEQ/L (ref 3.5–5.2)
RBC # BLD: 3.67 MILL/MM3 (ref 4.2–5.4)
SODIUM BLD-SCNC: 145 MEQ/L (ref 135–145)
WBC # BLD: 28.1 THOU/MM3 (ref 4.8–10.8)

## 2022-12-02 PROCEDURE — C1751 CATH, INF, PER/CENT/MIDLINE: HCPCS

## 2022-12-02 PROCEDURE — 85027 COMPLETE CBC AUTOMATED: CPT

## 2022-12-02 PROCEDURE — 1200000000 HC SEMI PRIVATE

## 2022-12-02 PROCEDURE — 83605 ASSAY OF LACTIC ACID: CPT

## 2022-12-02 PROCEDURE — 80048 BASIC METABOLIC PNL TOTAL CA: CPT

## 2022-12-02 PROCEDURE — 83735 ASSAY OF MAGNESIUM: CPT

## 2022-12-02 PROCEDURE — 6370000000 HC RX 637 (ALT 250 FOR IP): Performed by: NURSE PRACTITIONER

## 2022-12-02 PROCEDURE — 76937 US GUIDE VASCULAR ACCESS: CPT

## 2022-12-02 PROCEDURE — 2580000003 HC RX 258

## 2022-12-02 PROCEDURE — 6370000000 HC RX 637 (ALT 250 FOR IP): Performed by: PHYSICIAN ASSISTANT

## 2022-12-02 PROCEDURE — 99233 SBSQ HOSP IP/OBS HIGH 50: CPT | Performed by: NURSE PRACTITIONER

## 2022-12-02 PROCEDURE — 36568 INSJ PICC <5 YR W/O IMAGING: CPT

## 2022-12-02 PROCEDURE — 2580000003 HC RX 258: Performed by: NURSE PRACTITIONER

## 2022-12-02 PROCEDURE — 6360000002 HC RX W HCPCS: Performed by: PHYSICIAN ASSISTANT

## 2022-12-02 PROCEDURE — 6360000002 HC RX W HCPCS: Performed by: NURSE PRACTITIONER

## 2022-12-02 PROCEDURE — 02HV33Z INSERTION OF INFUSION DEVICE INTO SUPERIOR VENA CAVA, PERCUTANEOUS APPROACH: ICD-10-PCS | Performed by: HOSPITALIST

## 2022-12-02 PROCEDURE — 6370000000 HC RX 637 (ALT 250 FOR IP)

## 2022-12-02 PROCEDURE — 97110 THERAPEUTIC EXERCISES: CPT

## 2022-12-02 RX ADMIN — SODIUM CHLORIDE, PRESERVATIVE FREE 10 ML: 5 INJECTION INTRAVENOUS at 11:32

## 2022-12-02 RX ADMIN — POLYETHYLENE GLYCOL 3350 17 G: 17 POWDER, FOR SOLUTION ORAL at 11:27

## 2022-12-02 RX ADMIN — DOCUSATE SODIUM 100 MG: 100 CAPSULE, LIQUID FILLED ORAL at 11:26

## 2022-12-02 RX ADMIN — BARICITINIB 4 MG: 2 TABLET, FILM COATED ORAL at 11:26

## 2022-12-02 RX ADMIN — SODIUM CHLORIDE, PRESERVATIVE FREE 10 ML: 5 INJECTION INTRAVENOUS at 20:36

## 2022-12-02 RX ADMIN — DEXAMETHASONE 6 MG: 4 TABLET ORAL at 11:26

## 2022-12-02 RX ADMIN — PANTOPRAZOLE SODIUM 40 MG: 40 TABLET, DELAYED RELEASE ORAL at 05:41

## 2022-12-02 RX ADMIN — SENNOSIDES 17.2 MG: 8.6 TABLET, COATED ORAL at 20:35

## 2022-12-02 RX ADMIN — LEVOTHYROXINE SODIUM 125 MCG: 0.12 TABLET ORAL at 05:40

## 2022-12-02 RX ADMIN — POTASSIUM BICARBONATE 40 MEQ: 782 TABLET, EFFERVESCENT ORAL at 14:27

## 2022-12-02 RX ADMIN — CEFTRIAXONE SODIUM 1000 MG: 1 INJECTION, POWDER, FOR SOLUTION INTRAMUSCULAR; INTRAVENOUS at 14:31

## 2022-12-02 RX ADMIN — AZITHROMYCIN MONOHYDRATE 500 MG: 250 TABLET ORAL at 11:28

## 2022-12-02 RX ADMIN — TERBINAFINE TABLETS 250 MG 250 MG: 250 TABLET ORAL at 11:32

## 2022-12-02 ASSESSMENT — PAIN SCALES - GENERAL
PAINLEVEL_OUTOF10: 0

## 2022-12-02 NOTE — PLAN OF CARE
Problem: Infection - Adult  Goal: Absence of infection at discharge  Outcome: Progressing    Pt remains on IV ATB for infection at this time. PICC line to be placed solomon.

## 2022-12-02 NOTE — PROGRESS NOTES
PICC Procedure Note    Citlalli Lopez   Admitted- 11/23/2022  9:50 AM  Admission diagnosis- Generalized weakness [R53.1]  Impaired mobility and ADLs [Z74.09, Z78.9]  COVID-19 virus infection [U07.1]  COVID-19 [U07.1]      Attending Physician- NIDIA Hutson *  Ordering Physician-same  Indication for Insertion: Poor Vascular Access    Catheter Insertion Date- 12/2/2022   Lot Number- [id number]  Gauge-4  Lumen-single    Insertion Site- FLACA Basilic  Vein Diameter- 8.90 cm  Catheter Length- 37 cm  Internal Length- 37 cm  Exposed Catheter Length- 0cm   Upper Arm Circumference- 39cm  Tip Confirmation System Bundle met- yes  If X-ray required, Tip Location- n/a  Radiologist- n/a    PICC insertion successful- yes  Ultrasound- yes    Okay To Use PICC- yes    Electronically signed by Samir Finch, RN, RN on 12/2/2022 at 11:22 AM

## 2022-12-02 NOTE — CARE COORDINATION
12/2/22, 4:35 PM EST    DISCHARGE PLANNING EVALUATION     Spoke with patient, if pre-cert is approved, family will transport.

## 2022-12-02 NOTE — PROGRESS NOTES
Hospitalist Progress Note    Patient:  Janelle Phillips      Unit/Bed:8B-27/027-A    YOB: 1950    MRN: [id number]       Acct: [de-identified]     PCP: Yuki Villagomez MD    Date of Admission: 11/23/2022    Assessment/Plan:    Pneumonia, left lower lobe, not present on arrival likely secondary to COVID-19 infection--baricitinib 11/25;  Decadron 11/25; CRP was noted to be 14 on admission and improved to 2.97 on 12/1; Zithromax/Rocephin on 12/1; procalcitonin noted be 0.10; encourage good lung hygiene  Acute hypoxic respiratory failure--likely secondary to #1, currently on 5 L of oxygen satting 91%~if we have to go past 6 L of oxygen to maintain a sat greater than 90% we will have to flip to high flow nasal cannula; encourage incentive spirometry and Acapella; speaks in complete sentences in no distress  Lactic acidosis--she is not hypotensive or tachycardic, she is fully alert and oriented; the lactic acid was ordered by nocturnist and no documentation, will repeat at 1900  Leukocytosis--afebrile, could be secondary to steroids as she is on Decadron secondary to COVID along with Cortef; trending down, monitor  Thrombocytopenia--could be reactive in nature, monitor  Anion gap metabolic acidosis--resolved  Hypernatremia--resolved  Hypokalemia--replace per protocol, magnesium 1.9  Suspected adrenal insufficiency with history of pituitary tumor--on Cortef  Chronic diastolic heart failure--on Bumex, Coreg  Paroxysmal atrial fibrillation  JESSE--resolved  Hypothyroidism--treated with Synthroid  History of CVA--Xarelto, statin  CODE STATUS--limited x4      Expected discharge date: Pending clinical course    Disposition:    [x] Home       [] TCU       [] Rehab       [] Psych       [] SNF       [] [place]       [] Other-    Chief Complaint: Fatigue    Hospital Course: Per H&P done 11/23/2022: \"Shirley is a 72 y/o  female with a PMHx of HTN, CVA, who presents to [facility] ED today via EMS for the evaluation of generalized weakness and fatigue since last night. Pt's overall very tired and poor historian.  at bedside states that he lives alone with his wife and yesterday when he was helping her pivot from her chair to the commode, the patient was very weak and had to carry her back into the chair. Pt reportedly slept very well in her recliner last night, as she does normally, but  states she woke up appearing very tired and ill appearing to him. He states once again this AM, when transitioning her from the chair to the commode, she could barely hold her own body weight and had to call the EMS for assistance. Pt denies chest pain, cough or shortness of breath, chills, abdominal pain, changes in bowel or urinary habits. Pt's  does states she has had a slight decrease in appetite since yesterday. \"    11/30--> hemodynamically stable, on room air; sodium at 148, creatinine at 0.8; RN stated patient had some shortness of breath in the night    12/1-->hemodynamically stable, was hypoxic this morning at 87% so was placed on 2 L of oxygen; white count greatly increased however patient is on 2 steroids, CRP is greatly improved, sodium has resulted to normal; chest x-ray showed a pneumonia so Zithromax and Rocephin added    12/2--> hemodynamically stable however was found to be 85% on 2 L of oxygen so was increased and currently at 5 L, remains afebrile; converses well; had PICC line placed today; potassium at 3.3    Subjective (past 24 hours):  states she feels \"pretty good\" today, states she does feel weak, states she had some shortness of breath and a cough which is clear per the patient, no family at bedside    Medications:  Reviewed    Infusion Medications    sodium chloride       Scheduled Medications    azithromycin  500 mg Oral Q24H    cefTRIAXone (ROCEPHIN) IV  1,000 mg IntraVENous Q24H    polyethylene glycol  17 g Oral Daily    senna  2 tablet Oral Nightly    docusate sodium  100 mg Oral Daily    baricitinib  4 mg Oral Daily    dexamethasone  6 mg Oral Daily    simvastatin  20 mg Oral Nightly    bumetanide  1 mg Oral Daily    carvedilol  12.5 mg Oral BID    hydrocortisone  10 mg Oral Nightly    levothyroxine  125 mcg Oral Daily    pantoprazole  40 mg Oral QAM AC    rivaroxaban  15 mg Oral Daily    terbinafine  250 mg Oral Daily    sodium chloride flush  10 mL IntraVENous 2 times per day    hydrocortisone  20 mg Oral QAM    oxybutynin  15 mg Oral Daily     PRN Meds: sodium chloride flush, sodium chloride, ondansetron **OR** ondansetron, acetaminophen **OR** acetaminophen, potassium chloride **OR** potassium alternative oral replacement **OR** potassium chloride, magnesium sulfate      Intake/Output Summary (Last 24 hours) at 12/2/2022 1303  Last data filed at 12/2/2022 7234  Gross per 24 hour   Intake --   Output 2000 ml   Net -2000 ml       Diet:  ADULT DIET; Dysphagia - Minced and Moist; Moderately Thick (Honey)  ADULT ORAL NUTRITION SUPPLEMENT; Breakfast, Lunch, Dinner; Frozen Oral Supplement    Exam:  BP (!) 114/54   Pulse 69   Temp 97.8 °F (36.6 °C) (Oral)   Resp 19   Ht 5' 3\" (1.6 m)   Wt 193 lb (87.5 kg)   SpO2 90%   BMI 34.19 kg/m²     General appearance: No apparent distress, appears stated age and cooperative. HEENT: Pupils equal, round, and reactive to light. Conjunctivae/corneas clear. Neck: Supple, with full range of motion. No jugular venous distention. Trachea midline. Respiratory:  Normal respiratory effort. Diminished throughout   Cardiovascular: Regular rate and rhythm with normal S1/S2 without murmurs, rubs or gallops. Abdomen: Soft, non-tender, non-distended with normal bowel sounds. Musculoskeletal: passive and active ROM x 4 extremities. Skin: Skin color, texture, turgor normal.    Neurologic:  Neurovascularly intact without any focal sensory/motor deficits.  Cranial nerves: II-XII intact, grossly non-focal.  Psychiatric: Alert and oriented to name, place, birthday, month and year, thought content appropriate  Capillary Refill: Brisk,< 3 seconds   Peripheral Pulses: +2 palpable, equal bilaterally       Labs:   Recent Labs     11/30/22  0642 12/01/22  1040 12/02/22  1150   WBC 15.8* 34.8* 28.1*   HGB 10.7* 12.4 11.0*   HCT 33.0* 36.4* 33.6*    530* 465*     Recent Labs     11/30/22  0642 12/01/22  1040 12/02/22  1150   * 145 145   K 4.1 3.5 3.3*   * 109 107   CO2 23 22* 29   BUN 19 19 20   CREATININE 0.8 0.8 0.8   CALCIUM 8.3* 8.6 8.3*     Microbiology:    COVID-19 detected  Influenza negative    Urinalysis:      Lab Results   Component Value Date/Time    NITRU NEGATIVE 11/23/2022 09:30 PM    WBCUA 5-9 11/23/2022 09:30 PM    WBCUA 2-4 02/22/2012 01:00 PM    BACTERIA NONE SEEN 11/23/2022 09:30 PM    RBCUA 3-5 11/23/2022 09:30 PM    BLOODU NEGATIVE 11/23/2022 09:30 PM    SPECGRAV 1.025 09/18/2020 12:00 PM    GLUCOSEU NEGATIVE 11/23/2022 09:30 PM       Radiology:  XR CHEST PORTABLE    Result Date: 11/23/2022  PROCEDURE: XR CHEST PORTABLE CLINICAL INFORMATION: shortness of breath . TECHNIQUE: Portable upright COMPARISON: 12/30/2018 FINDINGS: Patient is rotated and leaning to the right. Heart size is likely within normal limits based on the depth of inspiration and portable technique mediastinum is not widened. No infiltrates or effusions are seen. Vessels are not congested. Bilateral shoulder replacements. No acute cardiopulmonary disease **This report has been created using voice recognition software. It may contain minor errors which are inherent in voice recognition technology. ** Final report electronically signed by Dr. Linda Klein on 11/23/2022 10:36 AM      DVT prophylaxis: [] Lovenox                                 [] SCDs                                 [] SQ Heparin                                 [] Encourage ambulation           [x] Already on Anticoagulation~Xarelto     Code Status: Limited    PT/OT Eval Status:  On case    Tele:   [x] Yes sinus rhythm heart rate 80             [] no    Active Hospital Problems    Diagnosis Date Noted    COVID-19 [U07.1] 11/27/2022     Priority: Medium    Generalized weakness [R53.1]        Electronically signed by Babetta Prader, APRN - CNP on 12/2/2022 at 1:03 PM

## 2022-12-02 NOTE — PROGRESS NOTES
[facility]  INPATIENT PHYSICAL THERAPY  DAILY NOTE  [facility] MED SURG 8B - 8B-27/027-A    Time In:   Time Out:   Timed Code Treatment Minutes: 15 Minutes  Minutes: 15          Date: 2022  Patient Name: Lisseth Munoz,  Gender:  female        MRN: [id number]  : 1950  (76 y.o.)     Referring Practitioner: Sheldon Garcia PA-C  Diagnosis: Generalized weakness  Additional Pertinent Hx: 72 y/o  female with a PMHx of HTN, CVA, who presents to [facility] ED today via EMS for the evaluation of generalized weakness and fatigue since last night. Pt's overall very tired and poor historian.  at bedside states that he lives alone with his wife and yesterday when he was helping her pivot from her chair to the commode, the patient was very weak and had to carry her back into the chair. Pt reportedly slept very well in her recliner last night, as she does normally, but  states she woke up appearing very tired and ill appearing to him. He states once again this AM, when transitioning her from the chair to the commode, she could barely hold her own body weight and had to call the EMS for assistance. Pt denies chest pain, cough or shortness of breath, chills, abdominal pain, changes in bowel or urinary habits. Pt's  does states she has had a slight decrease in appetite since yesterday.      Prior Level of Function:  Lives With: Spouse  Type of Home: House  Home Layout: One level  Home Access: Stairs to enter with rails  Entrance Stairs - Number of Steps: 2  Entrance Stairs - Rails: Both  Home Equipment: Yen Franny, rolling, Wheelchair-manual, Lift chair        Receives Help From: Family  ADL Assistance: Needs assistance  Homemaking Assistance: Needs assistance  Homemaking Responsibilities: No  Ambulation Assistance: Non-ambulatory  Transfer Assistance: Needs assistance  Additional Comments:  assist with stand pivot transfers; w/c or chair bound otherwise    Restrictions/Precautions:  Restrictions/Precautions: Fall Risk, Isolation     SUBJECTIVE: RN approved session. Patient in bed at arrival and agreeable to exercises in bed only. PAIN: 0/10: Denied     Vitals: Oxygen: 86-90% on 3L Rn aware. OBJECTIVE:  Bed Mobility:  Not Tested    Transfers:  Not Tested    Ambulation:  Not Tested      Exercise:  Patient was guided in 1 set(s) 10 reps of exercise to both lower extremities. Ankle pumps, Glut sets, Quad sets, Heelslides, Short arc quads, Hip abduction/adduction, and Straight leg raises. Exercises were completed for increased independence with functional mobility. Functional Outcome Measures: Completed  AM-PAC Inpatient Mobility without Stair Climbing Raw Score : 8  AM-PAC Inpatient without Stair Climbing T-Scale Score : 30.65    ASSESSMENT:  Assessment: Patient progressing toward established goals. Activity Tolerance:  Patient tolerance of  treatment: fair. Equipment Recommendations:Equipment Needed: No  Discharge Recommendations: Subacte/Skilled Nursing Facility  Plan: Current Treatment Recommendations: Strengthening, Balance training, Endurance training, Functional mobility training, Transfer training  General Plan:  (5x GM)    Patient Education  Patient Education: Plan of Care    Goals:  Patient Goals : none stated  Short Term Goals  Time Frame for Short Term Goals: by discharge  Short Term Goal 1: bed mobility HOB flat, CGA x1 for increased functional ind  Short Term Goal 2: pt to tolerate sitting EOB 10' with good posture for increased core strength/endurance  Short Term Goal 3: sit<>stand from various surfaces with ayo stedy and CGA x1 for safe transfers  Long Term Goals  Time Frame for Long Term Goals : NA d/t short ELOS    Following session, patient left in safe position with all fall risk precautions in place.

## 2022-12-02 NOTE — CARE COORDINATION
12/2/22, 3:07 PM EST    DISCHARGE ON GOING EVALUATION    Adams Janeymamadou day: 5  Location: [facility]27/027-A Reason for admit: Generalized weakness [R53.1]  Impaired mobility and ADLs [Z74.09, Z78.9]  COVID-19 virus infection [U07.1]  COVID-19 [U07.1]   Procedure: 12-2-22 PICC placed. Barriers to Discharge: Pneumonia noted likely secondary to Covid. PO Zithromax, Baricitinib, Decadron. IV Rocephin. WBC's 28.1 down from 34.8 yesterday. Replace K+ today. Today noted to be 85% on 2L of O2 . Increased to 5L. PICC placed today. PCP: Gladys Priest MD  Readmission Risk Score: 15.7%  Patient Goals/Plan/Treatment Preferences: SW following. [facility] for [facility] pending pre-cert.

## 2022-12-02 NOTE — PROGRESS NOTES
Comprehensive Nutrition Assessment    Type and Reason for Visit:  Reassess (po/supplement)    Nutrition Recommendations/Plan:   Consider MVI and an appetite stimulant. ONS: Magic Cup TID. Continue current diet per SLP recommendations, note planning repeat instrumentation in 2-4 weeks. Malnutrition Assessment:  Malnutrition Status: At risk for malnutrition (Comment) (11/26/22 5378)    Context:  Acute Illness     Findings of the 6 clinical characteristics of malnutrition:  Energy Intake:  50% or less of estimated energy requirements for 5 or more days (for 9 days since admit)  Weight Loss:  No significant weight loss     Body Fat Loss:  No significant body fat loss     Muscle Mass Loss:  No significant muscle mass loss    Fluid Accumulation:  Mild Generalized   Strength:  Not Performed    Nutrition Assessment:     Pt. with no improvement from a nutritional standpoint AEB continued very poor oral intake, 25% or less of meals, and consuming \"some\" of ONS. Remains at risk for further nutritional compromise r/t dysphagia, admit with generalized weakness, covid positive 11/23/22, JESSE on CKD, scalp irritation, skin integrity issues, and underlying medical condition (hx: CVA, hypothyroidism, HLD, OA, GERD, meningioma of the brain, hypopituitarism d/t pituitary tumor, Ca, HTN, CHF, \"borderline\" DB, pneumonia). Nutrition Related Findings:    Pt. Report/Treatments/Miscellaneous: Patient seen, reports she is feeling better. Appetite a little better but still poor meal intake. Eating \"some\" of magic cup, requested flavor change.  SLP continues to follow-notes reviewed  GI Status: BM 12/1/22  Pertinent Labs: 12/2/22: Sodium 145, Potassium 3.3, BUN 20, Creatinine 0.8, Mg 2.1, Glucose 126  Pertinent Meds: zithromax, baricitinib, bumex, coreg, rocephin, decadron, colace, cortef, synthroid, glycolax, senokot, zocor     Wound Type: Multiple (right pretibial wound-open to air, left elbow skin tear, buttocks-redness, scalp wounds)       Current Nutrition Intake & Therapies:    Average Meal Intake: 1-25%  Average Supplements Intake: Unable to assess  ADULT DIET; Dysphagia - Minced and Moist; Moderately Thick (Honey)  ADULT ORAL NUTRITION SUPPLEMENT; Breakfast, Lunch, Dinner; Frozen Oral Supplement    Anthropometric Measures:  Height: 5' 3\" (160 cm)  Ideal Body Weight (IBW): 115 lbs (52 kg)    Admission Body Weight: 193 lb (87.5 kg) (11/23/22 stated, nonpitting UE, +1 LE edema)  Current Body Weight: 193 lb (87.5 kg) (11/29/22 non-pitting generalized edema, bedscale), 145 %    Current BMI (kg/m2): 34.2  Usual Body Weight:  (~ 180# per pt,  Per EMR: 12/16/21: 193#, no actual weights in the last 1 year)                       BMI Categories: Obese Class 1 (BMI 30.0-34. 9)    Estimated Daily Nutrient Needs:  Energy Requirements Based On: Kcal/kg  Weight Used for Energy Requirements:  (87.5kg)  Energy (kcal/day): ~ 2769-4331 kcals (15-18 kcals/kg/day)  Weight Used for Protein Requirements: Ideal (52 kg)  Protein (g/day): 42-62 grams (0.8-1.2 grams/kg IBW/day) pending renal status         Nutrition Diagnosis:   Inadequate oral intake related to cognitive or neurological impairment, inadequate protein-energy intake as evidenced by intake 0-25%    Nutrition Interventions:   Food and/or Nutrient Delivery: Continue Current Diet, Continue Oral Nutrition Supplement  Nutrition Education/Counseling: Education initiated (Encouraged oral intake and ONS use.)  Coordination of Nutrition Care: Continue to monitor while inpatient       Goals:     Goals: PO intake 75% or greater, by next RD assessment       Nutrition Monitoring and Evaluation:      Food/Nutrient Intake Outcomes: Diet Advancement/Tolerance, Food and Nutrient Intake, Supplement Intake  Physical Signs/Symptoms Outcomes: Biochemical Data, Chewing or Swallowing, GI Status, Fluid Status or Edema, Meal Time Behavior, Nutrition Focused Physical Findings, Weight, Skin    Discharge Planning: Too soon to determine     Lorie Mckeon RD, LD  Contact: [phone]

## 2022-12-02 NOTE — DISCHARGE INSTR - COC
Continuity of Care Form    Patient Name: Lisseth Munoz   :  1950  MRN:  [id number]    Admit date:  2022  Discharge date:  ***    Code Status Order: Limited   Advance Directives:     Admitting Physician:  Richard Easton PA-C  PCP: Jin Rogel MD    Discharging Nurse: [facility] Unit/Room#: 8B-27/027-A  Discharging Unit Phone Number: ***    Emergency Contact:   Extended Emergency Contact Information  Primary Emergency Contact: Liseth Arias  Address:  [facility], [address] [place] [address] Phone: [phone]  Relation: Spouse    Past Surgical History:  Past Surgical History:   Procedure Laterality Date    [address] due to pituitary mass, drained and s/p radiation    CHOLECYSTECTOMY      HYSTERECTOMY ([facility])      total done for DUB    OTHER SURGICAL HISTORY  2014    LAPAROSCOPIC RUPTURED APPENDECTOMY    OTHER SURGICAL HISTORY Left 10/26/15    Evacuation and Debridement of Left Lower Leg Hematoma - Dr. Feliz Arellano  3/13/2013    left    SHOULDER ARTHROPLASTY      right    TOOTH EXTRACTION  2018    TOTAL HIP ARTHROPLASTY      bilateral    TOTAL KNEE ARTHROPLASTY      right        Immunization History:   Immunization History   Administered Date(s) Administered    Influenza A (G2E1-89) Vaccine PF IM 2009    Influenza Virus Vaccine 10/16/2014, 2016    Influenza Whole 2016    Influenza, FLUAD, (age 72 y+), Adjuvanted, 0.5mL 2021    Influenza, High Dose (Fluzone 65 yrs and older) 2018    Pneumococcal Conjugate 13-valent (Uswdlvc76) 2016    Pneumococcal Polysaccharide (Pnmihjkoa88) 2017    Tdap (Boostrix, Adacel) 2015, 2017    Zoster Live (Zostavax) 2015       Active Problems:  Patient Active Problem List   Diagnosis Code    Asthma J45.909    Hyperlipidemia E78.5    Osteoarthritis M19.90    GERD (gastroesophageal reflux disease) K21.9    Meningioma (HCC) D32.9    Mechanical loosening of prosthetic joint ([facility]) T84.039A    Hyperglycemia R73.9    SIRS (systemic inflammatory response syndrome) ([facility]) R65.10    Leukocytosis D72.829    Right sided weakness R53.1    Cerebrovascular disease R06.2    Metabolic acidosis J34.81    Sinus bradycardia R00.1    Generalized weakness R53.1    Hx of subdural hematoma Z86.79    Cerebral infarction (HCC) I63.9    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, 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    Hypercoagulable state ([facility] D68.59    Fatigue R53.83    Atrial thrombosis I51.3    Microcytic anemia D50.9    Hypokalemia E87.6    Altered mental status R41.82    Postoperative hypothyroidism E89.0    Hypernatremia J93.3    Metabolic encephalopathy W50.86    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 5XAA    JESSE (acute kidney injury) ([facility] N17.9    Hyperkalemia E87.5    Gastroenteritis due to norovirus I71.26    Acute diastolic heart failure (HCC) I50.31    History of stroke with residual effects I69.30    Chronic venous stasis dermatitis of both lower extremities I87.2    Normocytic anemia D64.9    Chronic diastolic congestive heart failure (HCC) I50.32    CKD (chronic kidney disease), stage IV (HCC) N18.4    Confusion R41.0    Chronic kidney disease (CKD), stage III (moderate) (HCC) N18.30    Obesity (BMI 30.0-34. 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.0    Hypothyroidism E03.9    Scalp lesion L98.9    Non-compliance F/u advised Z91.199    Verruca vulgaris B07.9    COVID-19 U07.1       Isolation/Infection:   Isolation            Droplet Plus          Patient Infection Status       Infection Onset Added Last Indicated Last Indicated By Review Planned Expiration Resolved Resolved By    COVID-19 11/23/22 11/23/22 11/23/22 COVID-19 & Influenza Combo 12/03/22 12/07/22      + 11/23    Resolved    COVID-19 (Rule Out) 11/23/22 11/23/22 11/23/22 COVID-19 & Influenza Combo (Ordered)   11/23/22 Rule-Out Test Resulted    VRE  01/10/17 01/10/17 Emma López, EITAN   01/02/19 Edith Oropeza RN    Urine 1/17    MRSA  12/14/12 12/14/12 Esmer Gerber RN   06/16/15 Ivanna Chavez RN    + rt leg 12-11-12            Nurse Assessment:  Last Vital Signs: /63   Pulse 60   Temp 97.9 °F (36.6 °C) (Oral)   Resp 20   Ht 5' 3\" (1.6 m)   Wt 193 lb (87.5 kg)   SpO2 96%   BMI 34.19 kg/m²     Last documented pain score (0-10 scale): Pain Level: 0 (pt sleeping)  Last Weight:   Wt Readings from Last 1 Encounters:   11/29/22 193 lb (87.5 kg)     Mental Status:  {IP PT MENTAL STATUS:20030}    IV Access:  {[facility] IV ACCESS:140969728}    Nursing Mobility/ADLs:  Walking   {CHP DME PBBL:575050306}  Transfer  {CHP DME JXHJ:738718326}  Bathing  {CHP DME APOY:230733222}  Dressing  {CHP DME XPID:131045955}  Toileting  {CHP DME PWAO:055871786}  Feeding  {CHP DME CQKR:450032099}  Med Admin  {[facility] KMPO:860672334}  Med Delivery   {[facility] MED Delivery:955849333}    Wound Care Documentation and Therapy:  Wound 11/23/22 Pretibial Proximal;Right (Active)   Dressing Status Other (Comment) 12/01/22 2032   Dressing/Treatment Open to air 12/01/22 2032   Wound Assessment Dry 12/01/22 2032   Drainage Amount None 12/01/22 2032   Odor None 12/01/22 2032   Jennifer-wound Assessment Dry/flaky 12/01/22 2032   Number of days: 8       Wound 11/23/22 Elbow Anterior; Left skin tearing d/t tight coban placed (Active)   Dressing Status Intact 12/01/22 2032   Wound Cleansed Soap and water 12/01/22 2032   Dressing/Treatment Gauze dressing/dressing sponge;Triad hydro/zinc oxide-based hydrophilic paste 56/31/12 7063   Wound Assessment Pink/red 12/01/22 2032   Drainage Amount Scant 12/01/22 2032   Drainage Description Serosanguinous 12/01/22 2032   Odor None 12/01/22 2032   Jennifer-wound Assessment Edematous;Dry/flaky 12/01/22 2032   Number of days: 8       Wound 11/24/22 Buttocks blanchable redness (Active)   Dressing Status Dry;Clean 12/01/22 2032   Wound Cleansed Soap and water 12/01/22 2032   Dressing/Treatment Triad hydro/zinc oxide-based hydrophilic paste 34/37/14 1947   Wound Assessment Purple/maroon;Pink/red;Erythema;Non-blanchable erythema 12/01/22 2032   Drainage Amount None 12/01/22 2032   Odor None 12/01/22 2032   Jennifer-wound Assessment Blanchable erythema 12/01/22 2032   Number of days: 8        Elimination:  Continence:    Bowel: {YES / SO:44263}  Bladder: {YES / PI:15333}  Urinary Catheter: {Urinary Catheter:947314644}   Colostomy/Ileostomy/Ileal Conduit: {YES / GH:34302}       Date of Last BM: ***    Intake/Output Summary (Last 24 hours) at 12/2/2022 0817  Last data filed at 12/2/2022 0259  Gross per 24 hour   Intake --   Output 1700 ml   Net -1700 ml     I/O last 3 completed shifts:  In: -   Out: 2100 [Urine:2100]    Safety Concerns:     [employer] Safety Concerns:786898775}    Impairments/Disabilities:      [employer] Impairments/Disabilities:914573994}    Nutrition Therapy:  Current Nutrition Therapy:   [employer] Diet List:282343820}    Routes of Feeding: {CHP DME Other Feedings:257317582}  Liquids: {Slp liquid thickness:52917}  Daily Fluid Restriction: {LUIS TIERNEY Yes amt IQOTUJY:458880407}  Last Modified Barium Swallow with Video (Video Swallowing Test): {Done Not Done VEYJ:440814808}    Treatments at the Time of Hospital Discharge:   Respiratory Treatments: ***  Oxygen Therapy:  {Therapy; copd oxygen:22666}  Ventilator:    {MH CC Vent KZUF:371886114}    Rehab Therapies: {THERAPEUTIC INTERVENTION:9460213424}  Weight Bearing Status/Restrictions: [address] CC Weight Bearin}  Other Medical Equipment (for information only, NOT a DME order):  {EQUIPMENT:919746030}  Other Treatments: ***    Patient's personal belongings (please select all that are sent with patient):  {CHP DME Belongings:436998657}    RN SIGNATURE:  {Esignature:919381533}    CASE MANAGEMENT/SOCIAL WORK SECTION    Inpatient Status Date: 22    Readmission Risk Assessment Score:  Readmission Risk              Risk of Unplanned Readmission:  17           Discharging to Facility/ Agency   Name: [address] [facility], [address] [place] [address]    Dialysis Facility (if applicable)   Name:  Address:  Dialysis Schedule:  Phone:  Fax:    / signature: Electronically signed by Maricruz Ervin. LASHAY Dwyer on 22 at 8:18 AM EST    PHYSICIAN SECTION    Prognosis: Good    Condition at Discharge: Stable    Rehab Potential (if transferring to Rehab): Fair    Recommended Labs or Other Treatments After Discharge: PT/OT. Wean oxygen, baseline room air. Physician Certification: I certify the above information and transfer of Soledad Melgar  is necessary for the continuing treatment of the diagnosis listed and that she requires Edi Carlos for greater 30 days.      Update Admission H&P: No change in H&P    PHYSICIAN SIGNATURE:  Electronically signed by Gabby Dahl MD on 22 at 8:38 AM EST

## 2022-12-03 ENCOUNTER — APPOINTMENT (OUTPATIENT)
Dept: GENERAL RADIOLOGY | Age: 72
DRG: 177 | End: 2022-12-03
Payer: MEDICARE

## 2022-12-03 LAB
ANION GAP SERPL CALCULATED.3IONS-SCNC: 6 MEQ/L (ref 8–16)
BUN BLDV-MCNC: 22 MG/DL (ref 7–22)
CALCIUM SERPL-MCNC: 8.6 MG/DL (ref 8.5–10.5)
CHLORIDE BLD-SCNC: 106 MEQ/L (ref 98–111)
CO2: 32 MEQ/L (ref 23–33)
CREAT SERPL-MCNC: 0.9 MG/DL (ref 0.4–1.2)
ERYTHROCYTE [DISTWIDTH] IN BLOOD BY AUTOMATED COUNT: 13.8 % (ref 11.5–14.5)
ERYTHROCYTE [DISTWIDTH] IN BLOOD BY AUTOMATED COUNT: 46.5 FL (ref 35–45)
GFR SERPL CREATININE-BSD FRML MDRD: > 60 ML/MIN/1.73M2
GLUCOSE BLD-MCNC: 111 MG/DL (ref 70–108)
HCT VFR BLD CALC: 33.3 % (ref 37–47)
HEMOGLOBIN: 10.9 GM/DL (ref 12–16)
MCH RBC QN AUTO: 30.1 PG (ref 26–33)
MCHC RBC AUTO-ENTMCNC: 32.7 GM/DL (ref 32.2–35.5)
MCV RBC AUTO: 92 FL (ref 81–99)
PLATELET # BLD: 510 THOU/MM3 (ref 130–400)
PMV BLD AUTO: 9.8 FL (ref 9.4–12.4)
POTASSIUM REFLEX MAGNESIUM: 4 MEQ/L (ref 3.5–5.2)
RBC # BLD: 3.62 MILL/MM3 (ref 4.2–5.4)
SODIUM BLD-SCNC: 144 MEQ/L (ref 135–145)
WBC # BLD: 22.8 THOU/MM3 (ref 4.8–10.8)

## 2022-12-03 PROCEDURE — 6360000002 HC RX W HCPCS: Performed by: NURSE PRACTITIONER

## 2022-12-03 PROCEDURE — 36415 COLL VENOUS BLD VENIPUNCTURE: CPT

## 2022-12-03 PROCEDURE — 80048 BASIC METABOLIC PNL TOTAL CA: CPT

## 2022-12-03 PROCEDURE — 2580000003 HC RX 258

## 2022-12-03 PROCEDURE — 99233 SBSQ HOSP IP/OBS HIGH 50: CPT | Performed by: NURSE PRACTITIONER

## 2022-12-03 PROCEDURE — 2580000003 HC RX 258: Performed by: NURSE PRACTITIONER

## 2022-12-03 PROCEDURE — 6370000000 HC RX 637 (ALT 250 FOR IP): Performed by: PHYSICIAN ASSISTANT

## 2022-12-03 PROCEDURE — 6360000002 HC RX W HCPCS: Performed by: PHYSICIAN ASSISTANT

## 2022-12-03 PROCEDURE — 6370000000 HC RX 637 (ALT 250 FOR IP)

## 2022-12-03 PROCEDURE — 71045 X-RAY EXAM CHEST 1 VIEW: CPT

## 2022-12-03 PROCEDURE — 6370000000 HC RX 637 (ALT 250 FOR IP): Performed by: NURSE PRACTITIONER

## 2022-12-03 PROCEDURE — 1200000000 HC SEMI PRIVATE

## 2022-12-03 PROCEDURE — 85027 COMPLETE CBC AUTOMATED: CPT

## 2022-12-03 RX ADMIN — CEFTRIAXONE SODIUM 1000 MG: 1 INJECTION, POWDER, FOR SOLUTION INTRAMUSCULAR; INTRAVENOUS at 12:27

## 2022-12-03 RX ADMIN — CARVEDILOL 12.5 MG: 6.25 TABLET, FILM COATED ORAL at 09:06

## 2022-12-03 RX ADMIN — BARICITINIB 4 MG: 2 TABLET, FILM COATED ORAL at 09:01

## 2022-12-03 RX ADMIN — DOCUSATE SODIUM 100 MG: 100 CAPSULE, LIQUID FILLED ORAL at 09:01

## 2022-12-03 RX ADMIN — SODIUM CHLORIDE, PRESERVATIVE FREE 10 ML: 5 INJECTION INTRAVENOUS at 20:48

## 2022-12-03 RX ADMIN — SODIUM CHLORIDE, PRESERVATIVE FREE 10 ML: 5 INJECTION INTRAVENOUS at 09:05

## 2022-12-03 RX ADMIN — SENNOSIDES 17.2 MG: 8.6 TABLET, COATED ORAL at 20:47

## 2022-12-03 RX ADMIN — RIVAROXABAN 15 MG: 15 TABLET, FILM COATED ORAL at 18:09

## 2022-12-03 RX ADMIN — POLYETHYLENE GLYCOL 3350 17 G: 17 POWDER, FOR SOLUTION ORAL at 09:01

## 2022-12-03 RX ADMIN — AZITHROMYCIN MONOHYDRATE 500 MG: 250 TABLET ORAL at 12:13

## 2022-12-03 RX ADMIN — BUMETANIDE 1 MG: 1 TABLET ORAL at 09:06

## 2022-12-03 RX ADMIN — TERBINAFINE TABLETS 250 MG 250 MG: 250 TABLET ORAL at 09:05

## 2022-12-03 RX ADMIN — DEXAMETHASONE 6 MG: 4 TABLET ORAL at 09:00

## 2022-12-03 RX ADMIN — HYDROCORTISONE 20 MG: 10 TABLET ORAL at 09:06

## 2022-12-03 ASSESSMENT — PAIN SCALES - GENERAL
PAINLEVEL_OUTOF10: 0
PAINLEVEL_OUTOF10: 0

## 2022-12-03 NOTE — PLAN OF CARE
Problem: Discharge Planning  Goal: Discharge to home or other facility with appropriate resources  12/3/2022 1243 by German Carrington RN  Outcome: Progressing  Flowsheets (Taken 12/3/2022 1227)  Discharge to home or other facility with appropriate resources: Identify barriers to discharge with patient and caregiver  Note: Patient planning to discharge to SNF for therapy before being transitioned to home with her . Problem: Neurosensory - Adult  Goal: Achieves stable or improved neurological status  Outcome: Progressing  Flowsheets (Taken 12/3/2022 1227)  Achieves stable or improved neurological status: Assess for and report changes in neurological status  Note: Patient alert and oriented and at baseline. Problem: Respiratory - Adult  Goal: Achieves optimal ventilation and oxygenation  12/3/2022 1243 by German Carrington RN  Outcome: Progressing  Flowsheets (Taken 12/3/2022 1227)  Achieves optimal ventilation and oxygenation:   Oxygen supplementation based on oxygen saturation or arterial blood gases   Assess for changes in respiratory status  Note: O2 sats low on 4L this AM. Oxygen increased to keep sats 90% or greater. Encouraging patient to do acapella/IS to improve lung function. Problem: Skin/Tissue Integrity - Adult  Goal: Skin integrity remains intact  Outcome: Progressing  Flowsheets (Taken 12/3/2022 1227)  Skin Integrity Remains Intact: Monitor for areas of redness and/or skin breakdown  Note: Open areas noted on right and left buttocks. Turning and repositioning every 2 hours to prevent further breakdown. Problem: Musculoskeletal - Adult  Goal: Return mobility to safest level of function  Outcome: Progressing  Flowsheets (Taken 12/3/2022 1239)  Return Mobility to Safest Level of Function: Assess patient stability and activity tolerance for standing, transferring and ambulating with or without assistive devices  Note: Patient up with assist and ayo steady.  PT/OT working with patient to improve mobility. Problem: Infection - Adult  Goal: Absence of infection at discharge  Outcome: Progressing  Flowsheets (Taken 12/3/2022 1239)  Absence of infection at discharge:   Assess and monitor for signs and symptoms of infection   Monitor lab/diagnostic results  Note: Patient continues in [facility] isolation. Isolation period should be over today. Problem: Safety - Adult  Goal: Free from fall injury  Outcome: Progressing  Flowsheets (Taken 12/3/2022 1239)  Free From Fall Injury: Instruct family/caregiver on patient safety  Note: No falls this shift. Fall precautions and alarms in use. Patient uses call light appropriately. Problem: Safety - Adult  Goal: Isolation precautions  Description: Isolation precautions  Outcome: Progressing  Note: Droplet plus isolation continues as patient is COVID positive. Problem: ABCDS Injury Assessment  Goal: Absence of physical injury  Outcome: Progressing  Flowsheets (Taken 12/3/2022 1243)  Absence of Physical Injury: Implement safety measures based on patient assessment  Note: Patient free from falls. No injuries noted. Problem: Chronic Conditions and Co-morbidities  Goal: Patient's chronic conditions and co-morbidity symptoms are monitored and maintained or improved  Outcome: Progressing  Flowsheets (Taken 12/3/2022 1243)  Care Plan - Patient's Chronic Conditions and Co-Morbidity Symptoms are Monitored and Maintained or Improved: Monitor and assess patient's chronic conditions and comorbid symptoms for stability, deterioration, or improvement  Note: Chronic conditions and co-morbidities managed. Problem: Nutrition Deficit:  Goal: Optimize nutritional status  Outcome: Progressing  Flowsheets (Taken 12/3/2022 1243)  Nutrient intake appropriate for improving, restoring, or maintaining nutritional needs: Assess nutritional status and recommend course of action  Note: Patient eating 50% of meals.  Patient not drinking well as she does not like thickened liquids. Encouraging oral intake as much as possible. Problem: Pain  Goal: Verbalizes/displays adequate comfort level or baseline comfort level  Outcome: Progressing  Flowsheets (Taken 12/3/2022 1243)  Verbalizes/displays adequate comfort level or baseline comfort level:   Encourage patient to monitor pain and request assistance   Assess pain using appropriate pain scale  Note: Patient has denied pain so far this shift. PRN Tylenol available if needed. Care plan reviewed with patient. Patient verbalizes understanding of the plan of care and contributes to goal setting.

## 2022-12-03 NOTE — PLAN OF CARE
Problem: Discharge Planning  Goal: Discharge to home or other facility with appropriate resources  12/2/2022 2348 by Naman Elena RN  Outcome: Progressing  Flowsheets (Taken 12/2/2022 2348)  Discharge to home or other facility with appropriate resources:   Identify barriers to discharge with patient and caregiver   Arrange for needed discharge resources and transportation as appropriate   Identify discharge learning needs (meds, wound care, etc)  12/2/2022 1926 by Shantel Story RN  Outcome: Progressing  Flowsheets (Taken 12/2/2022 1926)  Discharge to home or other facility with appropriate resources: Identify barriers to discharge with patient and caregiver  Note: Patient expected to be [facility] to [facility]     Problem: Respiratory - Adult  Goal: Achieves optimal ventilation and oxygenation  12/2/2022 2348 by Naman Elena RN  Outcome: Progressing  Flowsheets (Taken 12/2/2022 2348)  Achieves optimal ventilation and oxygenation:   Position to facilitate oxygenation and minimize respiratory effort   Assess for changes in respiratory status   Assess for changes in mentation and behavior  12/2/2022 1926 by Shantel Story RN  Outcome: Progressing  Flowsheets (Taken 12/2/2022 1926)  Achieves optimal ventilation and oxygenation: Assess for changes in respiratory status  Note: Monitoring respiratory status. Patient on 4 L on O2 at this time. Lungs clear.  Acapella used and tolerated

## 2022-12-03 NOTE — PROGRESS NOTES
Hospitalist Progress Note    Patient:  Lisseth Munoz      Unit/Bed:8B-27/027-A    YOB: 1950    MRN: [id number]       Acct: [de-identified]     PCP: Jin Rogle MD    Date of Admission: 11/23/2022    Assessment/Plan:    Pneumonia, left lower lobe, not present on arrival likely secondary to COVID-19 infection--baricitinib 11/25;  Decadron 11/25; CRP was noted to be 14 on admission and improved to 2.97 on 12/1; Zithromax/Rocephin on 12/1; procalcitonin noted be 0.10; encourage good lung hygiene  Acute hypoxic respiratory failure--likely secondary to #1, currently on 6 L of oxygen satting 92%~if we have to go past 6 L of oxygen to maintain a sat greater than 90% we will have to flip to high flow nasal cannula; encourage incentive spirometry and Acapella; speaks in complete sentences in no distress  Lactic acidosis--she is not hypotensive or tachycardic, she is fully alert and oriented;   Leukocytosis--afebrile, could be secondary to steroids as she is on Decadron secondary to COVID along with Cortef; trending down, monitor  Thrombocytosis--could be reactive in nature, monitor  Anion gap metabolic acidosis--resolved  Hypernatremia--resolved  Hypokalemia--replaced per protocol, magnesium 1.9  Suspected adrenal insufficiency with history of pituitary tumor--on Cortef  Chronic diastolic heart failure--on Bumex, Coreg  Paroxysmal atrial fibrillation  JESSE--resolved  Hypothyroidism--treated with Synthroid  History of CVA--Xarelto, statin  Physical deconditioning/debility likely secondary to numerous comorbid medical conditions--she is wheelchair-bound at home, PT/OT on her case  Obesity with BMI 34.03  CODE STATUS--limited x4      Expected discharge date: Pending clinical course    Disposition:    [x] Home       [] TCU       [] Rehab       [] Psych       [] SNF       [] [place]       [] Other-    Chief Complaint: Fatigue    Hospital Course: Per H&P done 11/23/2022: Dawna Sanders is a 70 y/o  female with a PMHx of HTN, CVA, who presents to [facility] ED today via EMS for the evaluation of generalized weakness and fatigue since last night. Pt's overall very tired and poor historian.  at bedside states that he lives alone with his wife and yesterday when he was helping her pivot from her chair to the commode, the patient was very weak and had to carry her back into the chair. Pt reportedly slept very well in her recliner last night, as she does normally, but  states she woke up appearing very tired and ill appearing to him. He states once again this AM, when transitioning her from the chair to the commode, she could barely hold her own body weight and had to call the EMS for assistance. Pt denies chest pain, cough or shortness of breath, chills, abdominal pain, changes in bowel or urinary habits. Pt's  does states she has had a slight decrease in appetite since yesterday. \"    11/30--> hemodynamically stable, on room air; sodium at 148, creatinine at 0.8; RN stated patient had some shortness of breath in the night    12/1-->hemodynamically stable, was hypoxic this morning at 87% so was placed on 2 L of oxygen; white count greatly increased however patient is on 2 steroids, CRP is greatly improved, sodium has resulted to normal; chest x-ray showed a pneumonia so Zithromax and Rocephin added    12/2--> hemodynamically stable however was found to be 85% on 2 L of oxygen so was increased and currently at 5 L, remains afebrile; converses well; had PICC line placed today; potassium at 3.3    12/3--> hemodynamically stable; currently on 6 L of oxygen satting 92% as this morning patient's O2 sat was 86% on 4 L; on exam she does have rhonchi that does somewhat clear with coughing    Subjective (past 24 hours):  states she feels \"pretty good\" today, states she does feel weak, states she had some shortness of breath and a cough which is clear per the patient, no family at bedside; she is fully alert and oriented and denies any complaints    Medications:  Reviewed    Infusion Medications    sodium chloride       Scheduled Medications    azithromycin  500 mg Oral Q24H    cefTRIAXone (ROCEPHIN) IV  1,000 mg IntraVENous Q24H    polyethylene glycol  17 g Oral Daily    senna  2 tablet Oral Nightly    docusate sodium  100 mg Oral Daily    baricitinib  4 mg Oral Daily    dexamethasone  6 mg Oral Daily    simvastatin  20 mg Oral Nightly    bumetanide  1 mg Oral Daily    carvedilol  12.5 mg Oral BID    hydrocortisone  10 mg Oral Nightly    levothyroxine  125 mcg Oral Daily    pantoprazole  40 mg Oral QAM AC    rivaroxaban  15 mg Oral Daily    terbinafine  250 mg Oral Daily    sodium chloride flush  10 mL IntraVENous 2 times per day    hydrocortisone  20 mg Oral QAM    oxybutynin  15 mg Oral Daily     PRN Meds: sodium chloride flush, sodium chloride, ondansetron **OR** ondansetron, acetaminophen **OR** acetaminophen, potassium chloride **OR** potassium alternative oral replacement **OR** potassium chloride, magnesium sulfate      Intake/Output Summary (Last 24 hours) at 12/3/2022 1151  Last data filed at 12/3/2022 1135  Gross per 24 hour   Intake 270 ml   Output 1600 ml   Net -1330 ml       Diet:  ADULT DIET; Dysphagia - Minced and Moist; Moderately Thick (Honey)  ADULT ORAL NUTRITION SUPPLEMENT; Breakfast, Lunch, Dinner; Frozen Oral Supplement    Exam:  /60   Pulse 77   Temp 98 °F (36.7 °C) (Oral)   Resp 18   Ht 5' 3\" (1.6 m)   Wt 192 lb 1.6 oz (87.1 kg)   SpO2 92%   BMI 34.03 kg/m²     General appearance: No apparent distress, appears stated age and cooperative. HEENT: Pupils equal, round, and reactive to light. Conjunctivae/corneas clear. Neck: Supple, with full range of motion. No jugular venous distention. Trachea midline. Respiratory:  Normal respiratory effort.   Diminished throughout with scattered rhonchi that clears somewhat with coughing, she speaks in complete sentences  Cardiovascular: Regular rate and rhythm with normal S1/S2 without murmurs, rubs or gallops. Abdomen: Soft, non-tender, non-distended with normal bowel sounds. Musculoskeletal: passive and active ROM x 4 extremities. Skin: Skin color, texture, turgor normal.    Neurologic:  Neurovascularly intact without any focal sensory/motor deficits. Cranial nerves: II-XII intact, grossly non-focal.  Psychiatric: Alert and oriented to name, place, birthday, month and year, thought content appropriate  Capillary Refill: Brisk,< 3 seconds   Peripheral Pulses: +2 palpable, equal bilaterally       Labs:   Recent Labs     12/01/22  1040 12/02/22  1150 12/03/22  0545   WBC 34.8* 28.1* 22.8*   HGB 12.4 11.0* 10.9*   HCT 36.4* 33.6* 33.3*   * 465* 510*     Recent Labs     12/01/22  1040 12/02/22  1150 12/03/22  0545    145 144   K 3.5 3.3* 4.0    107 106   CO2 22* 29 32   BUN 19 20 22   CREATININE 0.8 0.8 0.9   CALCIUM 8.6 8.3* 8.6     Microbiology:    COVID-19 detected  Influenza negative    Urinalysis:      Lab Results   Component Value Date/Time    NITRU NEGATIVE 11/23/2022 09:30 PM    WBCUA 5-9 11/23/2022 09:30 PM    WBCUA 2-4 02/22/2012 01:00 PM    BACTERIA NONE SEEN 11/23/2022 09:30 PM    RBCUA 3-5 11/23/2022 09:30 PM    BLOODU NEGATIVE 11/23/2022 09:30 PM    SPECGRAV 1.025 09/18/2020 12:00 PM    GLUCOSEU NEGATIVE 11/23/2022 09:30 PM       Radiology:  XR CHEST PORTABLE    Result Date: 11/23/2022  PROCEDURE: XR CHEST PORTABLE CLINICAL INFORMATION: shortness of breath . TECHNIQUE: Portable upright COMPARISON: 12/30/2018 FINDINGS: Patient is rotated and leaning to the right. Heart size is likely within normal limits based on the depth of inspiration and portable technique mediastinum is not widened. No infiltrates or effusions are seen. Vessels are not congested. Bilateral shoulder replacements. No acute cardiopulmonary disease **This report has been created using voice recognition software.   It may contain minor errors which are inherent in voice recognition technology. ** Final report electronically signed by Dr. Justin Laura on 11/23/2022 10:36 AM      DVT prophylaxis: [] Lovenox                                 [] SCDs                                 [] SQ Heparin                                 [] Encourage ambulation           [x] Already on Anticoagulation~Xarelto     Code Status: Limited    PT/OT Eval Status:  On case    Tele:   [x] Yes sinus rhythm heart rate 65, will stop             [] no    Active Hospital Problems    Diagnosis Date Noted    COVID-19 [U07.1] 11/27/2022     Priority: Medium    Generalized weakness [R53.1]        Electronically signed by NIDIA Isaac CNP on 12/3/2022 at 11:51 AM

## 2022-12-03 NOTE — PLAN OF CARE
Problem: Discharge Planning  Goal: Discharge to home or other facility with appropriate resources  Outcome: Progressing  Flowsheets (Taken 12/2/2022 1926)  Discharge to home or other facility with appropriate resources: Identify barriers to discharge with patient and caregiver  Note: Patient expected to be discharged to [facility]     Problem: Neurosensory - Adult  Goal: Achieves stable or improved neurological status  Outcome: Progressing  Flowsheets (Taken 12/2/2022 1926)  Achieves stable or improved neurological status: Assess for and report changes in neurological status  Note: Monitoring neurological status as ordered. Abnormal results will be reported to provider. Call light in reach     Problem: Respiratory - Adult  Goal: Achieves optimal ventilation and oxygenation  Outcome: Progressing  Flowsheets (Taken 12/2/2022 1926)  Achieves optimal ventilation and oxygenation: Assess for changes in respiratory status  Note: Monitoring respiratory status. Patient on 4 L on O2 at this time. Lungs clear. Acapella used and tolerated     Problem: Skin/Tissue Integrity - Adult  Goal: Skin integrity remains intact  Outcome: Progressing  Flowsheets (Taken 12/2/2022 1926)  Skin Integrity Remains Intact: Monitor for areas of redness and/or skin breakdown  Note: Scattered bruising noted at this time. Redness noted at coccyx that blanches. Patient refused to reposition during the first half of the shift. Call light in reach. Problem: Musculoskeletal - Adult  Goal: Return mobility to safest level of function  Outcome: Progressing  Flowsheets (Taken 12/2/2022 1926)  Return Mobility to Safest Level of Function: Assess patient stability and activity tolerance for standing, transferring and ambulating with or without assistive devices  Note: Patient refused to get up with ayo taylor at this time. Patient worked with therapy this shift. Call light in reach.      Problem: Infection - Adult  Goal: Absence of infection at discharge  Outcome: Progressing  Flowsheets (Taken 12/2/2022 1926)  Absence of infection at discharge:   Assess and monitor for signs and symptoms of infection   Monitor lab/diagnostic results   Monitor all insertion sites i.e., indwelling lines, tubes and drains  Note: Monitoring for s/s of infection. Vitals being monitored. Patient on ATB as ordered. PICC line place right upper arm 12-2-22     Problem: Skin/Tissue Integrity  Goal: Absence of new skin breakdown  Description: 1. Monitor for areas of redness and/or skin breakdown  2. Assess vascular access sites hourly  3. Every 4-6 hours minimum:  Change oxygen saturation probe site  4. Every 4-6 hours:  If on nasal continuous positive airway pressure, respiratory therapy assess nares and determine need for appliance change or resting period. Outcome: Progressing  Note: No new skin breakdown noted. Scattered bruising noted. Redness at coccyx. Skin fragile. Call light in reach. Problem: Safety - Adult  Goal: Free from fall injury  Outcome: Progressing  Flowsheets (Taken 12/2/2022 1926)  Free From Fall Injury: Instruct family/caregiver on patient safety  Note: Educated on fall prevention. Bed alarm on. Bed wheels locked. Call light in reach. Falling star program in place. Patient voices understanding on education. Problem: ABCDS Injury Assessment  Goal: Absence of physical injury  Outcome: Progressing  Flowsheets (Taken 12/2/2022 1926)  Absence of Physical Injury: Implement safety measures based on patient assessment  Note: No injuries noted at this time. Educated on fall prevention. Bed alarm on. Bed wheels locked. Call light in reach. Falling star program in place. Patient voices understanding on education.      Problem: Chronic Conditions and Co-morbidities  Goal: Patient's chronic conditions and co-morbidity symptoms are monitored and maintained or improved  Outcome: Progressing  Flowsheets (Taken 12/2/2022 1926)  Care Plan - Patient's Chronic Conditions and Co-Morbidity Symptoms are Monitored and Maintained or Improved: Monitor and assess patient's chronic conditions and comorbid symptoms for stability, deterioration, or improvement  Note: Monitoring patient's chronic conditions. New or worsening s/s will be reported to provider. Problem: Pain  Goal: Verbalizes/displays adequate comfort level or baseline comfort level  Outcome: Progressing  Flowsheets (Taken 12/2/2022 1926)  Verbalizes/displays adequate comfort level or baseline comfort level:   Encourage patient to monitor pain and request assistance   Assess pain using appropriate pain scale  Note: Patient denies pain at this time. Call light in reach. Care plan reviewed with patient. Patient  verbalize understanding of the plan of care and contribute to goal setting.

## 2022-12-04 LAB
ANION GAP SERPL CALCULATED.3IONS-SCNC: 8 MEQ/L (ref 8–16)
BUN BLDV-MCNC: 25 MG/DL (ref 7–22)
CALCIUM SERPL-MCNC: 8.2 MG/DL (ref 8.5–10.5)
CHLORIDE BLD-SCNC: 103 MEQ/L (ref 98–111)
CO2: 31 MEQ/L (ref 23–33)
CREAT SERPL-MCNC: 0.8 MG/DL (ref 0.4–1.2)
ERYTHROCYTE [DISTWIDTH] IN BLOOD BY AUTOMATED COUNT: 13.5 % (ref 11.5–14.5)
ERYTHROCYTE [DISTWIDTH] IN BLOOD BY AUTOMATED COUNT: 46.2 FL (ref 35–45)
GFR SERPL CREATININE-BSD FRML MDRD: > 60 ML/MIN/1.73M2
GLUCOSE BLD-MCNC: 99 MG/DL (ref 70–108)
HCT VFR BLD CALC: 33.1 % (ref 37–47)
HEMOGLOBIN: 10.8 GM/DL (ref 12–16)
MCH RBC QN AUTO: 30.3 PG (ref 26–33)
MCHC RBC AUTO-ENTMCNC: 32.6 GM/DL (ref 32.2–35.5)
MCV RBC AUTO: 92.7 FL (ref 81–99)
PLATELET # BLD: 548 THOU/MM3 (ref 130–400)
PMV BLD AUTO: 9.6 FL (ref 9.4–12.4)
POTASSIUM REFLEX MAGNESIUM: 3.9 MEQ/L (ref 3.5–5.2)
RBC # BLD: 3.57 MILL/MM3 (ref 4.2–5.4)
SODIUM BLD-SCNC: 142 MEQ/L (ref 135–145)
WBC # BLD: 19.4 THOU/MM3 (ref 4.8–10.8)

## 2022-12-04 PROCEDURE — 99232 SBSQ HOSP IP/OBS MODERATE 35: CPT | Performed by: NURSE PRACTITIONER

## 2022-12-04 PROCEDURE — 2580000003 HC RX 258: Performed by: NURSE PRACTITIONER

## 2022-12-04 PROCEDURE — 6370000000 HC RX 637 (ALT 250 FOR IP)

## 2022-12-04 PROCEDURE — 80048 BASIC METABOLIC PNL TOTAL CA: CPT

## 2022-12-04 PROCEDURE — 36415 COLL VENOUS BLD VENIPUNCTURE: CPT

## 2022-12-04 PROCEDURE — 6370000000 HC RX 637 (ALT 250 FOR IP): Performed by: PHYSICIAN ASSISTANT

## 2022-12-04 PROCEDURE — 2580000003 HC RX 258

## 2022-12-04 PROCEDURE — 1200000000 HC SEMI PRIVATE

## 2022-12-04 PROCEDURE — 6360000002 HC RX W HCPCS: Performed by: PHYSICIAN ASSISTANT

## 2022-12-04 PROCEDURE — 85027 COMPLETE CBC AUTOMATED: CPT

## 2022-12-04 PROCEDURE — 6360000002 HC RX W HCPCS: Performed by: NURSE PRACTITIONER

## 2022-12-04 PROCEDURE — 97530 THERAPEUTIC ACTIVITIES: CPT

## 2022-12-04 RX ADMIN — BUMETANIDE 1 MG: 1 TABLET ORAL at 09:08

## 2022-12-04 RX ADMIN — TERBINAFINE TABLETS 250 MG 250 MG: 250 TABLET ORAL at 09:06

## 2022-12-04 RX ADMIN — SODIUM CHLORIDE, PRESERVATIVE FREE 10 ML: 5 INJECTION INTRAVENOUS at 22:58

## 2022-12-04 RX ADMIN — CEFTRIAXONE SODIUM 1000 MG: 1 INJECTION, POWDER, FOR SOLUTION INTRAMUSCULAR; INTRAVENOUS at 12:07

## 2022-12-04 RX ADMIN — DEXAMETHASONE 6 MG: 4 TABLET ORAL at 09:06

## 2022-12-04 RX ADMIN — DOCUSATE SODIUM 100 MG: 100 CAPSULE, LIQUID FILLED ORAL at 09:06

## 2022-12-04 RX ADMIN — CARVEDILOL 12.5 MG: 6.25 TABLET, FILM COATED ORAL at 09:08

## 2022-12-04 RX ADMIN — SENNOSIDES 17.2 MG: 8.6 TABLET, COATED ORAL at 22:57

## 2022-12-04 RX ADMIN — HYDROCORTISONE 10 MG: 10 TABLET ORAL at 23:00

## 2022-12-04 RX ADMIN — HYDROCORTISONE 20 MG: 10 TABLET ORAL at 09:08

## 2022-12-04 RX ADMIN — RIVAROXABAN 15 MG: 15 TABLET, FILM COATED ORAL at 18:04

## 2022-12-04 RX ADMIN — Medication 20 MG: at 23:00

## 2022-12-04 RX ADMIN — BARICITINIB 4 MG: 2 TABLET, FILM COATED ORAL at 09:06

## 2022-12-04 RX ADMIN — POLYETHYLENE GLYCOL 3350 17 G: 17 POWDER, FOR SOLUTION ORAL at 09:06

## 2022-12-04 RX ADMIN — SODIUM CHLORIDE, PRESERVATIVE FREE 10 ML: 5 INJECTION INTRAVENOUS at 09:05

## 2022-12-04 ASSESSMENT — PAIN SCALES - GENERAL
PAINLEVEL_OUTOF10: 0
PAINLEVEL_OUTOF10: 0

## 2022-12-04 NOTE — Clinical Note
Consider recheck of the CT brain to follow meningiomas Discharge Plan[de-identified] Home with Office Follow-up   Telemetry/Cardiac Monitoring Required?: No

## 2022-12-04 NOTE — PLAN OF CARE
Problem: Discharge Planning  Goal: Discharge to home or other facility with appropriate resources  12/3/2022 2155 by Doreen Koch RN  Outcome: Progressing  Flowsheets (Taken 12/3/2022 2155)  Discharge to home or other facility with appropriate resources:   Identify barriers to discharge with patient and caregiver   Arrange for needed discharge resources and transportation as appropriate  12/3/2022 1243 by Esthela Collins RN  Outcome: Progressing  Flowsheets (Taken 12/3/2022 1227)  Discharge to home or other facility with appropriate resources: Identify barriers to discharge with patient and caregiver  Note: Patient planning to discharge to SNF for therapy before being transitioned to home with her . Problem: Neurosensory - Adult  Goal: Achieves stable or improved neurological status  12/3/2022 2155 by Doreen Koch RN  Outcome: Progressing  Flowsheets (Taken 12/3/2022 2155)  Achieves stable or improved neurological status:   Assess for and report changes in neurological status   Initiate measures to prevent increased intracranial pressure  12/3/2022 1243 by Esthela Collins RN  Outcome: Progressing  Flowsheets (Taken 12/3/2022 1227)  Achieves stable or improved neurological status: Assess for and report changes in neurological status  Note: Patient alert and oriented and at baseline.      Problem: Respiratory - Adult  Goal: Achieves optimal ventilation and oxygenation  12/3/2022 2155 by Doreen Koch RN  Outcome: Progressing  Flowsheets (Taken 12/3/2022 2155)  Achieves optimal ventilation and oxygenation:   Assess for changes in respiratory status   Assess for changes in mentation and behavior   Position to facilitate oxygenation and minimize respiratory effort  12/3/2022 1243 by Esthela Collins RN  Outcome: Progressing  Flowsheets (Taken 12/3/2022 1227)  Achieves optimal ventilation and oxygenation:   Oxygen supplementation based on oxygen saturation or arterial blood gases   Assess for changes in respiratory status  Note: O2 sats low on 4L this AM. Oxygen increased to keep sats 90% or greater. Encouraging patient to do acapella/IS to improve lung function.

## 2022-12-04 NOTE — PROGRESS NOTES
Hospitalist Progress Note    Patient:  Von Gloria      Unit/Bed:8B-27/027-A    YOB: 1950    MRN: [id number]       Acct: [de-identified]     PCP: Sindy Kwon MD    Date of Admission: 11/23/2022    Assessment/Plan:    Pneumonia, left lower lobe, not present on arrival likely secondary to COVID-19 infection--baricitinib 11/25;  Decadron 11/25; CRP was noted to be 14 on admission and improved to 2.97 on 12/1; Zithromax 12/1-12/3; Rocephin on 12/1; procalcitonin noted be 0.10; encourage good lung hygiene; chest x-ray obtained on 12/3 shows moderate bibasilar atelectasis/pneumonia, it is reported that the patient is wheelchair-bound at home  Acute hypoxic respiratory failure--likely secondary to #1, currently on 6 L of oxygen; if we have to go past 6 L of oxygen to maintain a sat greater than 90% we will have to flip to high flow nasal cannula and transferred to stepdown bed; encourage incentive spirometry and Acapella; speaks in complete sentences in no distress  Lactic acidosis--she is not hypotensive or tachycardic, she is fully alert and oriented;   Leukocytosis--afebrile, could be secondary to steroids as she is on Decadron secondary to COVID along with Cortef; trending down, monitor  Thrombocytosis--could be reactive in nature, monitor  Anion gap metabolic acidosis--resolved  Hypernatremia--resolved  Hypokalemia--replaced per protocol, magnesium 1.9  Suspected adrenal insufficiency with history of pituitary tumor--on Cortef  Chronic diastolic heart failure--on Bumex, Coreg  Paroxysmal atrial fibrillation--on Xarelto  JESSE--resolved  Hypothyroidism--treated with Synthroid  History of CVA--Xarelto, statin  Physical deconditioning/debility likely secondary to numerous comorbid medical conditions--she is wheelchair-bound at home, PT/OT on her case  Obesity with BMI 34.03  CODE STATUS--limited x4      Expected discharge date: Pending clinical course    Disposition:    [x] Home       [] TCU [] Rehab       [] Psych       [] SNF       [] [place]       [] Other-    Chief Complaint: Fatigue    Hospital Course: Per H&P done 11/23/2022: Thania Corrales is a 70 y/o  female with a PMHx of HTN, CVA, who presents to [facility] ED today via EMS for the evaluation of generalized weakness and fatigue since last night. Pt's overall very tired and poor historian.  at bedside states that he lives alone with his wife and yesterday when he was helping her pivot from her chair to the commode, the patient was very weak and had to carry her back into the chair. Pt reportedly slept very well in her recliner last night, as she does normally, but  states she woke up appearing very tired and ill appearing to him. He states once again this AM, when transitioning her from the chair to the commode, she could barely hold her own body weight and had to call the EMS for assistance. Pt denies chest pain, cough or shortness of breath, chills, abdominal pain, changes in bowel or urinary habits. Pt's  does states she has had a slight decrease in appetite since yesterday. \"    11/30--> hemodynamically stable, on room air; sodium at 148, creatinine at 0.8; RN stated patient had some shortness of breath in the night    12/1-->hemodynamically stable, was hypoxic this morning at 87% so was placed on 2 L of oxygen; white count greatly increased however patient is on 2 steroids, CRP is greatly improved, sodium has resulted to normal; chest x-ray showed a pneumonia so Zithromax and Rocephin added    12/2--> hemodynamically stable however was found to be 85% on 2 L of oxygen so was increased and currently at 5 L, remains afebrile; converses well; had PICC line placed today; potassium at 3.3    12/3--> hemodynamically stable; currently on 6 L of oxygen satting 92% as this morning patient's O2 sat was 86% on 4 L; on exam she does have rhonchi that does somewhat clear with coughing    12/4--> hemodynamically stable, on 5 L of oxygen satting 87% so increased to 6 L satting 92; on exam lungs are diminished throughout; Zithromax finished yesterday    Subjective (past 24 hours): Denies any complaints except extremely weak, no family at bedside; he is eating well, fully alert and oriented    Medications:  Reviewed    Infusion Medications    sodium chloride       Scheduled Medications    cefTRIAXone (ROCEPHIN) IV  1,000 mg IntraVENous Q24H    polyethylene glycol  17 g Oral Daily    senna  2 tablet Oral Nightly    docusate sodium  100 mg Oral Daily    baricitinib  4 mg Oral Daily    dexamethasone  6 mg Oral Daily    simvastatin  20 mg Oral Nightly    bumetanide  1 mg Oral Daily    carvedilol  12.5 mg Oral BID    hydrocortisone  10 mg Oral Nightly    levothyroxine  125 mcg Oral Daily    pantoprazole  40 mg Oral QAM AC    rivaroxaban  15 mg Oral Daily    terbinafine  250 mg Oral Daily    sodium chloride flush  10 mL IntraVENous 2 times per day    hydrocortisone  20 mg Oral QAM    oxybutynin  15 mg Oral Daily     PRN Meds: sodium chloride flush, sodium chloride, ondansetron **OR** ondansetron, acetaminophen **OR** acetaminophen, potassium chloride **OR** potassium alternative oral replacement **OR** potassium chloride, magnesium sulfate      Intake/Output Summary (Last 24 hours) at 12/4/2022 0701  Last data filed at 12/4/2022 0457  Gross per 24 hour   Intake 270 ml   Output 2250 ml   Net -1980 ml         Diet:  ADULT DIET; Dysphagia - Minced and Moist; Moderately Thick (Honey)  ADULT ORAL NUTRITION SUPPLEMENT; Breakfast, Lunch, Dinner; Frozen Oral Supplement    Exam:  BP (!) 117/54   Pulse 63   Temp 98.3 °F (36.8 °C) (Oral)   Resp 20   Ht 5' 3\" (1.6 m)   Wt 188 lb 12.8 oz (85.6 kg)   SpO2 91%   BMI 33.44 kg/m²     General appearance: No apparent distress, appears stated age and cooperative. HEENT: Pupils equal, round, and reactive to light. Conjunctivae/corneas clear. Neck: Supple, with full range of motion.  No jugular venous distention. Trachea midline. Respiratory:  Normal respiratory effort. Diminished throughout; she speaks in complete sentences  Cardiovascular: Regular rate and rhythm with normal S1/S2 without murmurs, rubs or gallops. Abdomen: Soft, non-tender, non-distended with normal bowel sounds. Obese  Musculoskeletal: passive and active ROM x 4 extremities. Edema noted to lower legs  Skin: Skin color, texture, turgor normal.    Neurologic:  Neurovascularly intact without any focal sensory/motor deficits. Cranial nerves: II-XII intact, grossly non-focal.  Psychiatric: Alert and oriented to name, place, birthday, month and year, thought content appropriate  Capillary Refill: Brisk,< 3 seconds   Peripheral Pulses: +2 palpable, equal bilaterally       Labs:   Recent Labs     12/02/22  1150 12/03/22  0545 12/04/22  0501   WBC 28.1* 22.8* 19.4*   HGB 11.0* 10.9* 10.8*   HCT 33.6* 33.3* 33.1*   * 510* 548*       Recent Labs     12/02/22  1150 12/03/22  0545 12/04/22  0501    144 142   K 3.3* 4.0 3.9    106 103   CO2 29 32 31   BUN 20 22 25*   CREATININE 0.8 0.9 0.8   CALCIUM 8.3* 8.6 8.2*       Microbiology:    COVID-19 detected  Influenza negative    Urinalysis:      Lab Results   Component Value Date/Time    NITRU NEGATIVE 11/23/2022 09:30 PM    WBCUA 5-9 11/23/2022 09:30 PM    WBCUA 2-4 02/22/2012 01:00 PM    BACTERIA NONE SEEN 11/23/2022 09:30 PM    RBCUA 3-5 11/23/2022 09:30 PM    BLOODU NEGATIVE 11/23/2022 09:30 PM    SPECGRAV 1.025 09/18/2020 12:00 PM    GLUCOSEU NEGATIVE 11/23/2022 09:30 PM       Radiology:  XR CHEST PORTABLE    Result Date: 11/23/2022  PROCEDURE: XR CHEST PORTABLE CLINICAL INFORMATION: shortness of breath . TECHNIQUE: Portable upright COMPARISON: 12/30/2018 FINDINGS: Patient is rotated and leaning to the right. Heart size is likely within normal limits based on the depth of inspiration and portable technique mediastinum is not widened. No infiltrates or effusions are seen.  Vessels are not congested. Bilateral shoulder replacements. No acute cardiopulmonary disease **This report has been created using voice recognition software. It may contain minor errors which are inherent in voice recognition technology. ** Final report electronically signed by Dr. Shena Duval on 11/23/2022 10:36 AM      DVT prophylaxis: [] Lovenox                                 [] SCDs                                 [] SQ Heparin                                 [] Encourage ambulation           [x] Already on Anticoagulation~Xarelto     Code Status: Limited    PT/OT Eval Status:  On case    Tele:   [] Yes              [x] no    Active Hospital Problems    Diagnosis Date Noted    COVID-19 [U07.1] 11/27/2022     Priority: Medium    Generalized weakness [R53.1]        Electronically signed by NIDIA Allen CNP on 12/4/2022 at 7:01 AM

## 2022-12-04 NOTE — PROGRESS NOTES
[facility] 8B  Occupational Therapy  Daily Note  Time:   Time In: 1330  Time Out: 1353  Timed Code Treatment Minutes: 23 Minutes  Minutes: 23          Date: 2022  Patient Name: Sergio Garnica,   Gender: female      Room: [facility]27/027-A  MRN: [id number]  : 1950  (76 y.o.)  Referring Practitioner: Fahad Moreno PA-C  Diagnosis: Generalized Weakness  Additional Pertinent Hx: Pt with a PMHx of HTN, CVA, who presents to [facility] ED today via EMS for the evaluation of generalized weakness and fatigue since last night. Pt's overall very tired and poor historian.  at bedside states that he lives alone with his wife and yesterday when he was helping her pivot from her chair to the commode, the patient was very weak and had to carry her back into the chair. Pt reportedly slept very well in her recliner last night, as she does normally, but  states she woke up appearing very tired and ill appearing to him. He states once again this AM, when transitioning her from the chair to the commode, she could barely hold her own body weight and had to call the EMS for assistance. Pt denies chest pain, cough or shortness of breath, chills, abdominal pain, changes in bowel or urinary habits. Pt's  does states she has had a slight decrease in appetite since yesterday. Restrictions/Precautions:  Restrictions/Precautions: Fall Risk, Isolation     SUBJECTIVE: Nurse Sandra Dickens ok'ofelia session, Up in chair upon arrival, agreeable to return back to bed    PAIN: 0/10:     Vitals: Oxygen: 6L02 91-82%, NURSE present in room    COGNITION: Slow Processing    ADL:   Incontinent of urine, dep x 2 for clean up . BED MOBILITY:  Sit to Supine: Dependent, X 2 HOB flat , used bedrail    TRANSFERS:  Sit to Stand:  Minimal Assistance, X 2. Bedside chair using [employer] lift equipment  Stand to Sit: Minimal Assistance. EOB    ASSESSMENT:     Activity Tolerance:  Patient tolerance of  treatment: good. Discharge Recommendations: Continue to assess pending progress  Equipment Recommendations: Equipment Needed: No  Plan: Times Per Week: 3-5x  Specific Instructions for Next Treatment: Functional transfers; ADLs and sitting balance; upper body positioning and relaxed breathing  Current Treatment Recommendations: Strengthening, Endurance training, Functional mobility training, Balance training, Patient/Caregiver education & training, Self-Care / ADL  Additional Comments: Pt would benefit from continued skilled OT services when medically stable and discharged from Acute. 24 hour care with OT at SNF vs home with [address] [place] [address]. Patient Education  Patient Education: ADL's    Goals  Short Term Goals  Time Frame for Short Term Goals: By discharge  Short Term Goal 1: Pt will complete functional transfers with minimal assistance X2 in prep for [facility] transfers. Short Term Goal 2: Pt will complete upper body ROM exercises while following verbal and tactile cues to increase her upright posture for ease of engagement in activity. Short Term Goal 3: Pt will complete LB ADLs moderate assistance and LHAE prn to increase independence and ease with self care tasks. Short Term Goal 4: Pt will engage in dynamic sitting tasks with no > than MIN A for balance and cues for using her LUE for active support to increase her balance for ease of completing transfers and toileting routine. Short Term Goal 5: Pt will tolerate further assessment of functional mobility by OTR when appropriate. Additional Goals?: No    Following session, patient left in safe position with all fall risk precautions in place.

## 2022-12-05 LAB
ANION GAP SERPL CALCULATED.3IONS-SCNC: 7 MEQ/L (ref 8–16)
BUN BLDV-MCNC: 30 MG/DL (ref 7–22)
CALCIUM SERPL-MCNC: 8.3 MG/DL (ref 8.5–10.5)
CHLORIDE BLD-SCNC: 105 MEQ/L (ref 98–111)
CO2: 32 MEQ/L (ref 23–33)
CREAT SERPL-MCNC: 0.8 MG/DL (ref 0.4–1.2)
ERYTHROCYTE [DISTWIDTH] IN BLOOD BY AUTOMATED COUNT: 13.6 % (ref 11.5–14.5)
ERYTHROCYTE [DISTWIDTH] IN BLOOD BY AUTOMATED COUNT: 46.3 FL (ref 35–45)
GFR SERPL CREATININE-BSD FRML MDRD: > 60 ML/MIN/1.73M2
GLUCOSE BLD-MCNC: 100 MG/DL (ref 70–108)
HCT VFR BLD CALC: 33.9 % (ref 37–47)
HEMOGLOBIN: 10.9 GM/DL (ref 12–16)
MCH RBC QN AUTO: 29.7 PG (ref 26–33)
MCHC RBC AUTO-ENTMCNC: 32.2 GM/DL (ref 32.2–35.5)
MCV RBC AUTO: 92.4 FL (ref 81–99)
PLATELET # BLD: 621 THOU/MM3 (ref 130–400)
PMV BLD AUTO: 9.6 FL (ref 9.4–12.4)
POTASSIUM REFLEX MAGNESIUM: 3.8 MEQ/L (ref 3.5–5.2)
RBC # BLD: 3.67 MILL/MM3 (ref 4.2–5.4)
SODIUM BLD-SCNC: 144 MEQ/L (ref 135–145)
WBC # BLD: 19.9 THOU/MM3 (ref 4.8–10.8)

## 2022-12-05 PROCEDURE — 6370000000 HC RX 637 (ALT 250 FOR IP): Performed by: PHYSICIAN ASSISTANT

## 2022-12-05 PROCEDURE — 6360000002 HC RX W HCPCS: Performed by: NURSE PRACTITIONER

## 2022-12-05 PROCEDURE — 99233 SBSQ HOSP IP/OBS HIGH 50: CPT | Performed by: NURSE PRACTITIONER

## 2022-12-05 PROCEDURE — 1200000000 HC SEMI PRIVATE

## 2022-12-05 PROCEDURE — 97530 THERAPEUTIC ACTIVITIES: CPT

## 2022-12-05 PROCEDURE — 6370000000 HC RX 637 (ALT 250 FOR IP)

## 2022-12-05 PROCEDURE — 85027 COMPLETE CBC AUTOMATED: CPT

## 2022-12-05 PROCEDURE — 2580000003 HC RX 258: Performed by: NURSE PRACTITIONER

## 2022-12-05 PROCEDURE — 97110 THERAPEUTIC EXERCISES: CPT

## 2022-12-05 PROCEDURE — 2580000003 HC RX 258

## 2022-12-05 PROCEDURE — 36415 COLL VENOUS BLD VENIPUNCTURE: CPT

## 2022-12-05 PROCEDURE — 80048 BASIC METABOLIC PNL TOTAL CA: CPT

## 2022-12-05 PROCEDURE — 94669 MECHANICAL CHEST WALL OSCILL: CPT

## 2022-12-05 RX ADMIN — HYDROCORTISONE 20 MG: 10 TABLET ORAL at 09:06

## 2022-12-05 RX ADMIN — BARICITINIB 4 MG: 2 TABLET, FILM COATED ORAL at 09:08

## 2022-12-05 RX ADMIN — RIVAROXABAN 15 MG: 15 TABLET, FILM COATED ORAL at 17:26

## 2022-12-05 RX ADMIN — SODIUM CHLORIDE, PRESERVATIVE FREE 10 ML: 5 INJECTION INTRAVENOUS at 09:07

## 2022-12-05 RX ADMIN — SENNOSIDES 17.2 MG: 8.6 TABLET, COATED ORAL at 20:05

## 2022-12-05 RX ADMIN — SODIUM CHLORIDE, PRESERVATIVE FREE 10 ML: 5 INJECTION INTRAVENOUS at 20:05

## 2022-12-05 RX ADMIN — Medication 20 MG: at 20:06

## 2022-12-05 RX ADMIN — CARVEDILOL 12.5 MG: 6.25 TABLET, FILM COATED ORAL at 09:04

## 2022-12-05 RX ADMIN — CARVEDILOL 12.5 MG: 6.25 TABLET, FILM COATED ORAL at 20:05

## 2022-12-05 RX ADMIN — PANTOPRAZOLE SODIUM 40 MG: 40 TABLET, DELAYED RELEASE ORAL at 05:22

## 2022-12-05 RX ADMIN — DOCUSATE SODIUM 100 MG: 100 CAPSULE, LIQUID FILLED ORAL at 09:08

## 2022-12-05 RX ADMIN — HYDROCORTISONE 10 MG: 10 TABLET ORAL at 20:07

## 2022-12-05 RX ADMIN — CEFTRIAXONE SODIUM 1000 MG: 1 INJECTION, POWDER, FOR SOLUTION INTRAMUSCULAR; INTRAVENOUS at 11:44

## 2022-12-05 RX ADMIN — POLYETHYLENE GLYCOL 3350 17 G: 17 POWDER, FOR SOLUTION ORAL at 09:07

## 2022-12-05 RX ADMIN — TERBINAFINE TABLETS 250 MG 250 MG: 250 TABLET ORAL at 09:07

## 2022-12-05 RX ADMIN — LEVOTHYROXINE SODIUM 125 MCG: 0.12 TABLET ORAL at 05:22

## 2022-12-05 RX ADMIN — BUMETANIDE 1 MG: 1 TABLET ORAL at 08:59

## 2022-12-05 ASSESSMENT — PAIN SCALES - GENERAL
PAINLEVEL_OUTOF10: 0
PAINLEVEL_OUTOF10: 0

## 2022-12-05 NOTE — PROGRESS NOTES
[facility]  INPATIENT PHYSICAL THERAPY  DAILY NOTE  [facility] MED SURG 8B - 8B-27/027-A    Time In: 2346  Time Out: 9301  Timed Code Treatment Minutes: 23 Minutes  Minutes: 23          Date: 2022  Patient Name: Heather Bah,  Gender:  female        MRN: [id number]  : 1950  (76 y.o.)     Referring Practitioner: Minda Salgado PA-C  Diagnosis: Generalized weakness  Additional Pertinent Hx: 72 y/o  female with a PMHx of HTN, CVA, who presents to [address] [place] [address] ED today via EMS for the evaluation of generalized weakness and fatigue since last night. Pt's overall very tired and poor historian.  at bedside states that he lives alone with his wife and yesterday when he was helping her pivot from her chair to the commode, the patient was very weak and had to carry her back into the chair. Pt reportedly slept very well in her recliner last night, as she does normally, but  states she woke up appearing very tired and ill appearing to him. He states once again this AM, when transitioning her from the chair to the commode, she could barely hold her own body weight and had to call the EMS for assistance. Pt denies chest pain, cough or shortness of breath, chills, abdominal pain, changes in bowel or urinary habits. Pt's  does states she has had a slight decrease in appetite since yesterday.      Prior Level of Function:  Lives With: Spouse  Type of Home: House  Home Layout: One level  Home Access: Stairs to enter with rails  Entrance Stairs - Number of Steps: 2  Entrance Stairs - Rails: Both  Home Equipment: Laruth Commander, rolling, Wheelchair-manual, Lift chair        Receives Help From: Family  ADL Assistance: Needs assistance  Homemaking Assistance: Needs assistance  Homemaking Responsibilities: No  Ambulation Assistance: Non-ambulatory  Transfer Assistance: Needs assistance  Additional Comments:  assist with stand pivot transfers; w/c or chair bound otherwise    Restrictions/Precautions:  Restrictions/Precautions: Fall Risk, Isolation     SUBJECTIVE: RN approved session. Patient in bed upon arrival and agreeable to therapy. PAIN: Not Rated    Vitals: Oxygen: Upon arrival pt at 5L and 90% SpO2. When beginning to perform bed exercises pt dropped to 78% RN notified. RN increased O2 to 6L and pt increased SpO2 to 92%. When performing exercises SpO2 dropped to low 80's then rebounded within 30 sec with rest break. OBJECTIVE:  Bed Mobility:  Not Tested    Transfers:  Not Tested    Ambulation:  Not Tested    Exercise:  Patient was guided in 2 set(s) 8-10 reps of exercise to both lower extremities. Ankle pumps, Glut sets, Quad sets, Heelslides, and Hip abduction/adduction. Exercises were completed for increased independence with functional mobility. Functional Outcome Measures: Completed  AM-PAC Inpatient Mobility without Stair Climbing Raw Score : 8  AM-PAC Inpatient without Stair Climbing T-Scale Score : 30.65    ASSESSMENT:  Assessment:  Pt only able to perform bed exercises due to SpO2 dropping. Activity Tolerance:  Patient tolerance of  treatment: fair.       Equipment Recommendations:Equipment Needed: No  Discharge Recommendations: Continue to assess pending progress, Subacte/Skilled Nursing Facility, and Patient would benefit from continued PT at discharge  Plan: Current Treatment Recommendations: Strengthening, Balance training, Endurance training, Functional mobility training, Transfer training  General Plan:  (5x GM)    Patient Education  Patient Education: Verbal Exercise Instruction    Goals:  Patient Goals : none stated  Short Term Goals  Time Frame for Short Term Goals: by discharge  Short Term Goal 1: bed mobility HOB flat, CGA x1 for increased functional ind  Short Term Goal 2: pt to tolerate sitting EOB 10' with good posture for increased core strength/endurance  Short Term Goal 3: sit<>stand from various surfaces with ayo taylor and CGA x1 for safe transfers  Long Term Goals  Time Frame for Long Term Goals : NA d/t short ELOS    Following session, patient left in safe position with all fall risk precautions in place.

## 2022-12-05 NOTE — PROGRESS NOTES
Hospitalist Progress Note    Patient:  Nia Garcia      Unit/Bed:8B-27/027-A    YOB: 1950    MRN: [id number]       Acct: [de-identified]     PCP: Bell Navas MD    Date of Admission: 11/23/2022    Assessment/Plan:    Pneumonia, left lower lobe, not present on arrival likely secondary to COVID-19 infection--baricitinib 11/25;  Decadron 11/25-12/4; CRP was noted to be 14 on admission and improved to 2.97 on 12/1; Zithromax 12/1-12/3; Rocephin on 12/1; procalcitonin noted be 0.10; encourage good lung hygiene; chest x-ray obtained on 12/3 shows moderate bibasilar atelectasis/pneumonia, it is reported that the patient is wheelchair-bound at home; removed out of [facility] 12/5 per infection control and put  Acute hypoxic respiratory failure--likely secondary to #1, currently on 5 L of oxygen satting 96%; if we have to go past 6 L of oxygen to maintain a sat greater than 90% we will have to flip to high flow nasal cannula and transferred to stepdown bed; encourage incentive spirometry and Acapella; speaks in complete sentences in no distress; add chest physiotherapy per RT; check CTA chest  Lactic acidosis--she is not hypotensive or tachycardic, she is fully alert and oriented;   Leukocytosis--afebrile, could be secondary to steroids as she is on Decadron secondary to COVID along with Cortef; monitor  Thrombocytosis--possibly reactive in nature, monitor  Anion gap metabolic acidosis--resolved  Hypernatremia--resolved  Hypokalemia--replaced per protocol, monitor  Suspected adrenal insufficiency with history of pituitary tumor--on Cortef  Chronic diastolic heart failure--on Bumex, Coreg  Paroxysmal atrial fibrillation--on Xarelto, Coreg  JESSE--resolved  Hypothyroidism--treated with Synthroid  History of CVA--Xarelto, statin  Physical deconditioning/debility likely secondary to numerous comorbid medical conditions--she is wheelchair-bound at home, PT/OT on her case  Obesity with BMI 34.03  CODE STATUS--limited x4      Expected discharge date: Pending clinical course    Disposition:    [x] Home       [] TCU       [] Rehab       [] Psych       [] SNF       [] [place]       [] Other-    Chief Complaint: Fatigue    Hospital Course: Per H&P done 11/23/2022: Hesham Cox is a 72 y/o  female with a PMHx of HTN, CVA, who presents to [facility] ED today via EMS for the evaluation of generalized weakness and fatigue since last night. Pt's overall very tired and poor historian.  at bedside states that he lives alone with his wife and yesterday when he was helping her pivot from her chair to the commode, the patient was very weak and had to carry her back into the chair. Pt reportedly slept very well in her recliner last night, as she does normally, but  states she woke up appearing very tired and ill appearing to him. He states once again this AM, when transitioning her from the chair to the commode, she could barely hold her own body weight and had to call the EMS for assistance. Pt denies chest pain, cough or shortness of breath, chills, abdominal pain, changes in bowel or urinary habits. Pt's  does states she has had a slight decrease in appetite since yesterday. \"    11/30--> hemodynamically stable, on room air; sodium at 148, creatinine at 0.8; RN stated patient had some shortness of breath in the night    12/1-->hemodynamically stable, was hypoxic this morning at 87% so was placed on 2 L of oxygen; white count greatly increased however patient is on 2 steroids, CRP is greatly improved, sodium has resulted to normal; chest x-ray showed a pneumonia so Zithromax and Rocephin added    12/2--> hemodynamically stable however was found to be 85% on 2 L of oxygen so was increased and currently at 5 L, remains afebrile; converses well; had PICC line placed today; potassium at 3.3    12/3--> hemodynamically stable; currently on 6 L of oxygen satting 92% as this morning patient's O2 sat was 86% on 4 L; on exam she does have rhonchi that does somewhat clear with coughing    12/4--> hemodynamically stable, on 5 L of oxygen satting 87% so increased to 6 L satting 92; on exam lungs are diminished throughout; Zithromax finished yesterday    12/5--> hemodynamically stable, remains afebrile, O2 sat 96% on 5 L; much encouragement given today again on the importance of incentive spirometry, Acapella and deep breathing and coughing; BUN is increasing; PT note reviewed and the patient was on 5 L of oxygen satting 90% and when beginning bed exercises her O2 sat dropped to 78% and rebounded with rest break; patient is on Xarelto, will check CTA chest to make sure were not dealing with a PE possibly Xarelto failure or any other pathology; spoke with patient's spouse Dahiana Trotter at the number provided and gave an update on the increased oxygen needs, he is okay with scanning her lungs, her normal activity is sitting in a lift chair, he assisted her to the bedside commode assist her with cleaning up and she is back to the left chair~he states he has a very hard time coming up to see her as he has had back surgery January 6, 2023; she has a very difficult time getting the incentive spirometry and Acapella to even move per RN; order chest physiotherapy    Subjective (past 24 hours): Denies any complaints except extremely weak, no family at bedside; she is eating well, fully alert and oriented; smiling, states she is swallowing without any difficulties    Medications:  Reviewed    Infusion Medications    sodium chloride       Scheduled Medications    cefTRIAXone (ROCEPHIN) IV  1,000 mg IntraVENous Q24H    polyethylene glycol  17 g Oral Daily    senna  2 tablet Oral Nightly    docusate sodium  100 mg Oral Daily    baricitinib  4 mg Oral Daily    simvastatin  20 mg Oral Nightly    bumetanide  1 mg Oral Daily    carvedilol  12.5 mg Oral BID    hydrocortisone  10 mg Oral Nightly    levothyroxine  125 mcg Oral Daily    pantoprazole  40 mg Oral QAM AC    rivaroxaban  15 mg Oral Daily    terbinafine  250 mg Oral Daily    sodium chloride flush  10 mL IntraVENous 2 times per day    hydrocortisone  20 mg Oral QAM    oxybutynin  15 mg Oral Daily     PRN Meds: sodium chloride flush, sodium chloride, ondansetron **OR** ondansetron, acetaminophen **OR** acetaminophen, potassium chloride **OR** potassium alternative oral replacement **OR** potassium chloride, magnesium sulfate      Intake/Output Summary (Last 24 hours) at 12/5/2022 0751  Last data filed at 12/4/2022 2343  Gross per 24 hour   Intake 260 ml   Output 1500 ml   Net -1240 ml         Diet:  ADULT DIET; Dysphagia - Minced and Moist; Moderately Thick (Honey)  ADULT ORAL NUTRITION SUPPLEMENT; Breakfast, Lunch, Dinner; Frozen Oral Supplement    Exam:  /60   Pulse 56   Temp 97.7 °F (36.5 °C) (Oral)   Resp 16   Ht 5' 3\" (1.6 m)   Wt 188 lb (85.3 kg)   SpO2 90%   BMI 33.30 kg/m²     General appearance: No apparent distress, appears stated age and cooperative. Sitting up eating without difficulties and smiling  HEENT: Pupils equal, round, and reactive to light. Conjunctivae/corneas clear. Neck: Supple, with full range of motion. No jugular venous distention. Trachea midline. Respiratory:  Normal respiratory effort. Diminished throughout; she speaks in complete sentences  Cardiovascular: Regular rate and rhythm with normal S1/S2 without murmurs, rubs or gallops. Abdomen: Soft, non-tender, non-distended with normal bowel sounds. Obese  Musculoskeletal: passive and active ROM x 4 extremities. Edema noted to lower legs  Skin: Skin color, texture, turgor normal.    Neurologic:  Neurovascularly intact without any focal sensory/motor deficits.  Cranial nerves: II-XII intact, grossly non-focal.  Psychiatric: Alert and oriented to name, place, birthday, month and year, thought content appropriate  Capillary Refill: Brisk,< 3 seconds   Peripheral Pulses: +2 palpable, equal bilaterally       Labs:   Recent Labs     12/03/22  0545 12/04/22  0501 12/05/22  0527   WBC 22.8* 19.4* 19.9*   HGB 10.9* 10.8* 10.9*   HCT 33.3* 33.1* 33.9*   * 548* 621*       Recent Labs     12/03/22  0545 12/04/22  0501 12/05/22  0527    142 144   K 4.0 3.9 3.8    103 105   CO2 32 31 32   BUN 22 25* 30*   CREATININE 0.9 0.8 0.8   CALCIUM 8.6 8.2* 8.3*       Microbiology:    COVID-19 detected  Influenza negative    Urinalysis:      Lab Results   Component Value Date/Time    NITRU NEGATIVE 11/23/2022 09:30 PM    WBCUA 5-9 11/23/2022 09:30 PM    WBCUA 2-4 02/22/2012 01:00 PM    BACTERIA NONE SEEN 11/23/2022 09:30 PM    RBCUA 3-5 11/23/2022 09:30 PM    BLOODU NEGATIVE 11/23/2022 09:30 PM    SPECGRAV 1.025 09/18/2020 12:00 PM    GLUCOSEU NEGATIVE 11/23/2022 09:30 PM       Radiology:  XR CHEST PORTABLE    Result Date: 11/23/2022  PROCEDURE: XR CHEST PORTABLE CLINICAL INFORMATION: shortness of breath . TECHNIQUE: Portable upright COMPARISON: 12/30/2018 FINDINGS: Patient is rotated and leaning to the right. Heart size is likely within normal limits based on the depth of inspiration and portable technique mediastinum is not widened. No infiltrates or effusions are seen. Vessels are not congested. Bilateral shoulder replacements. No acute cardiopulmonary disease **This report has been created using voice recognition software. It may contain minor errors which are inherent in voice recognition technology. ** Final report electronically signed by Dr. Diane Martinez on 11/23/2022 10:36 AM      DVT prophylaxis: [] Lovenox                                 [] SCDs                                 [] SQ Heparin                                 [] Encourage ambulation           [x] Already on Anticoagulation~Xarelto     Code Status: Limited    PT/OT Eval Status:  On case    Tele:   [] Yes              [x] no    Active Hospital Problems    Diagnosis Date Noted    [id number] [U07.1] 11/27/2022     Priority: Medium    Generalized weakness [R53.1]        Electronically signed by NIDIA Kang CNP on 12/5/2022 at 7:51 AM

## 2022-12-05 NOTE — PLAN OF CARE
Problem: Discharge Planning  Goal: Discharge to home or other facility with appropriate resources  Outcome: Progressing  Flowsheets (Taken 12/5/2022 1555)  Discharge to home or other facility with appropriate resources:   Identify barriers to discharge with patient and caregiver   Arrange for needed discharge resources and transportation as appropriate   Identify discharge learning needs (meds, wound care, etc)     Problem: Neurosensory - Adult  Goal: Achieves stable or improved neurological status  Outcome: Progressing  Flowsheets (Taken 12/5/2022 1555)  Achieves stable or improved neurological status:   Assess for and report changes in neurological status   Initiate measures to prevent increased intracranial pressure   Maintain blood pressure and fluid volume within ordered parameters to optimize cerebral perfusion and minimize risk of hemorrhage   Monitor temperature, glucose, and sodium.  Initiate appropriate interventions as ordered     Problem: Respiratory - Adult  Goal: Achieves optimal ventilation and oxygenation  Outcome: Progressing  Flowsheets (Taken 12/5/2022 1555)  Achieves optimal ventilation and oxygenation:   Assess for changes in respiratory status   Assess for changes in mentation and behavior   Position to facilitate oxygenation and minimize respiratory effort   Oxygen supplementation based on oxygen saturation or arterial blood gases   Encourage broncho-pulmonary hygiene including cough, deep breathe, incentive spirometry   Assess the need for suctioning and aspirate as needed   Assess and instruct to report shortness of breath or any respiratory difficulty   Respiratory therapy support as indicated     Problem: Skin/Tissue Integrity - Adult  Goal: Skin integrity remains intact  Outcome: Progressing  Flowsheets (Taken 12/5/2022 1555)  Skin Integrity Remains Intact:   Monitor for areas of redness and/or skin breakdown   Assess vascular access sites hourly   Every 4-6 hours minimum: Change oxygen saturation probe site     Problem: Musculoskeletal - Adult  Goal: Return mobility to safest level of function  Outcome: Progressing  Flowsheets (Taken 12/5/2022 1556)  Return Mobility to Safest Level of Function:   Assess patient stability and activity tolerance for standing, transferring and ambulating with or without assistive devices   Assist with transfers and ambulation using safe patient handling equipment as needed   Ensure adequate protection for wounds/incisions during mobilization   Obtain physical therapy/occupational therapy consults as needed   Apply continuous passive motion per provider or physical therapy orders to increase flexion toward goal   Instruct patient/family in ordered activity level     Problem: Infection - Adult  Goal: Absence of infection at discharge  Outcome: Progressing  Flowsheets (Taken 12/5/2022 1555)  Absence of infection at discharge:   Assess and monitor for signs and symptoms of infection   Monitor lab/diagnostic results   Monitor all insertion sites i.e., indwelling lines, tubes and drains   Monitor endotracheal (as able) and nasal secretions for changes in amount and color   Administer medications as ordered   Instruct and encourage patient and family to use good hand hygiene technique   Identify and instruct in appropriate isolation precautions for identified infection/condition     Problem: Skin/Tissue Integrity  Goal: Absence of new skin breakdown  Description: 1. Monitor for areas of redness and/or skin breakdown  2. Assess vascular access sites hourly  3. Every 4-6 hours minimum:  Change oxygen saturation probe site  4. Every 4-6 hours:  If on nasal continuous positive airway pressure, respiratory therapy assess nares and determine need for appliance change or resting period.   Outcome: Progressing  Note: Q2 turns/repositioning, skin assessment qshift, incontinence care, wound care, heels elevated off bed     Problem: Safety - Adult  Goal: Free from fall injury  Outcome: Progressing  Flowsheets (Taken 12/5/2022 1555)  Free From Fall Injury:   Instruct family/caregiver on patient safety   Based on caregiver fall risk screen, instruct family/caregiver to ask for assistance with transferring infant if caregiver noted to have fall risk factors     Problem: ABCDS Injury Assessment  Goal: Absence of physical injury  Outcome: Progressing  Flowsheets (Taken 12/5/2022 1555)  Absence of Physical Injury: Implement safety measures based on patient assessment     Problem: Chronic Conditions and Co-morbidities  Goal: Patient's chronic conditions and co-morbidity symptoms are monitored and maintained or improved  Outcome: Progressing  Flowsheets (Taken 12/5/2022 1555)  Care Plan - Patient's Chronic Conditions and Co-Morbidity Symptoms are Monitored and Maintained or Improved:   Monitor and assess patient's chronic conditions and comorbid symptoms for stability, deterioration, or improvement   Collaborate with multidisciplinary team to address chronic and comorbid conditions and prevent exacerbation or deterioration   Update acute care plan with appropriate goals if chronic or comorbid symptoms are exacerbated and prevent overall improvement and discharge     Problem: Pain  Goal: Verbalizes/displays adequate comfort level or baseline comfort level  Outcome: Progressing  Flowsheets (Taken 12/5/2022 1555)  Verbalizes/displays adequate comfort level or baseline comfort level:   Encourage patient to monitor pain and request assistance   Assess pain using appropriate pain scale   Administer analgesics based on type and severity of pain and evaluate response   Implement non-pharmacological measures as appropriate and evaluate response     Problem: Nutrition Deficit:  Goal: Optimize nutritional status  Flowsheets (Taken 12/5/2022 1555)  Nutrient intake appropriate for improving, restoring, or maintaining nutritional needs:   Monitor oral intake, labs, and treatment plans   Assess nutritional status and recommend course of action   Recommend appropriate diets, oral nutritional supplements, and vitamin/mineral supplements

## 2022-12-05 NOTE — PROGRESS NOTES
[facility] 8B  Occupational Therapy  Daily Note  Time:   Time In: 8549  Time Out: 6350  Timed Code Treatment Minutes: 26 Minutes  Minutes: 26          Date: 2022  Patient Name: Leslie Rueda,   Gender: female      Room: [facility]/027-A  MRN: [id number]  : 1950  (76 y.o.)  Referring Practitioner: Naomy Mason PA-C  Diagnosis: Generalized Weakness  Additional Pertinent Hx: Pt with a PMHx of HTN, CVA, who presents to [facility] ED today via EMS for the evaluation of generalized weakness and fatigue since last night. Pt's overall very tired and poor historian.  at bedside states that he lives alone with his wife and yesterday when he was helping her pivot from her chair to the commode, the patient was very weak and had to carry her back into the chair. Pt reportedly slept very well in her recliner last night, as she does normally, but  states she woke up appearing very tired and ill appearing to him. He states once again this AM, when transitioning her from the chair to the commode, she could barely hold her own body weight and had to call the EMS for assistance. Pt denies chest pain, cough or shortness of breath, chills, abdominal pain, changes in bowel or urinary habits. Pt's  does states she has had a slight decrease in appetite since yesterday. Restrictions/Precautions:  Restrictions/Precautions: Fall Risk, Isolation     SUBJECTIVE: RN okayed session to pt's tolerance as she de' sated to low 80's this AM during PT session requiring increased to 6L O2. Pt seated upright in bed upon arrival, agreeable to OT session. Pt noted to have nasal cannula off stating, \"It's been off for awhile. It bothers me! \"- reapplied NC with pt O2 sats reading 81% on 6L O2, max increased time to recover to 91%. Reiterated importance of O2 compliance to maintain O2 sats >90%, RN notified. PAIN: Denies. Vitals: Oxygen: Patient on 6L O2 via Nasal Canula  upon arrival to room. Patient O2 sats at 91% once recovered during start of session. Upon activity patient dropping O2 at low 80's to 82%. Pt provided extended time and only able to recover to 88%- returned to supine. Heart Rate: 60's-80's    COGNITION: Slow Processing, Decreased Recall, Decreased Insight, Decreased Problem Solving, and Decreased Safety Awareness    ADL:   No ADL's completed this session. Tanda Bun BALANCE:  Sitting Balance:  Stand By Assistance. Pt seated EOB ~5 minutes in order to attempt to 90% O2 sats, pt only reading 88% after extended time and returned to supine to prevent further desaturations. Standing Balance:  Not Tested . BED MOBILITY:  Supine to Sit: Maximum Assistance, X 1    Sit to Supine: Maximum Assistance, X 1    Scooting: Moderate Assistance, X 1 EOB    TRANSFERS:  Not Tested d/t decreased O2 sats. FUNCTIONAL MOBILITY:  OTR to further assess. ADDITIONAL ACTIVITIES:  Trialed BUE AROM exercises after return to supine, however, pt unable to maintain O2 sats >90%. Session terminated at this time. ASSESSMENT:     Activity Tolerance:  Patient tolerance of  treatment: fair. Discharge Recommendations: Subacute/skilled nursing facility  Equipment Recommendations: Equipment Needed: No  Plan: Times Per Week: 3-5x  Specific Instructions for Next Treatment: Functional transfers; ADLs and sitting balance; upper body positioning and relaxed breathing  Current Treatment Recommendations: Strengthening, Endurance training, Functional mobility training, Balance training, Patient/Caregiver education & training, Self-Care / ADL  Additional Comments: Pt would benefit from continued skilled OT services when medically stable and discharged from Acute. 24 hour care with OT at SNF vs home with [address] [place] [address]. Patient Education  Patient Education: Energy Conservation, Home Exercise Program, Importance of Increasing Activity, and Safety with bed mobility, deep breathing.     Goals  Short Term Goals  Time Frame for Short Term Goals: By discharge  Short Term Goal 1: Pt will complete functional transfers with minimal assistance X2 in prep for [facility] transfers. Short Term Goal 2: Pt will complete upper body ROM exercises while following verbal and tactile cues to increase her upright posture for ease of engagement in activity. Short Term Goal 3: Pt will complete LB ADLs moderate assistance and LHAE prn to increase independence and ease with self care tasks. Short Term Goal 4: Pt will engage in dynamic sitting tasks with no > than MIN A for balance and cues for using her LUE for active support to increase her balance for ease of completing transfers and toileting routine. Short Term Goal 5: Pt will tolerate further assessment of functional mobility by OTR when appropriate. Additional Goals?: No    Following session, patient left in safe position with all fall risk precautions in place.

## 2022-12-05 NOTE — CARE COORDINATION
12/5/22, 2:09 PM EST    DISCHARGE ON GOING EVALUATION    Bryan Gtz day: 8  Location: [facility]/027-A Reason for admit: Generalized weakness [R53.1]  Impaired mobility and ADLs [Z74.09, Z78.9]  COVID-19 virus infection [U07.1]  COVID-19 [U07.1]   Procedure: 12-2-22 PICC placed  Barriers to Discharge: Removed from Covid isolation today. Pt is currently on O2 at 5-6L. Sat 93% at 1100. Afebrile. PT/OT working with pt. Baricitinib. Bumex. Rocephin. Decadron complete. PCP: Jenna Owusu MD  Readmission Risk Score: 16.1%  Patient Goals/Plan/Treatment Preferences:  following for discharge disposition. [address] pending pre-cert.

## 2022-12-05 NOTE — CARE COORDINATION
12/5/22, 11:30 AM EST    DISCHARGE PLANNING EVALUATION    Spoke with Mitch Mattson at [facility] regarding pre-cert approval.. He stated they have not heard. Requested someone follow up with insurance company. Faxed clinical updates.

## 2022-12-06 ENCOUNTER — APPOINTMENT (OUTPATIENT)
Dept: CT IMAGING | Age: 72
DRG: 177 | End: 2022-12-06
Payer: MEDICARE

## 2022-12-06 LAB
ANION GAP SERPL CALCULATED.3IONS-SCNC: 6 MEQ/L (ref 8–16)
BUN BLDV-MCNC: 31 MG/DL (ref 7–22)
CALCIUM SERPL-MCNC: 8.1 MG/DL (ref 8.5–10.5)
CHLORIDE BLD-SCNC: 107 MEQ/L (ref 98–111)
CO2: 33 MEQ/L (ref 23–33)
CREAT SERPL-MCNC: 0.9 MG/DL (ref 0.4–1.2)
ERYTHROCYTE [DISTWIDTH] IN BLOOD BY AUTOMATED COUNT: 13.8 % (ref 11.5–14.5)
ERYTHROCYTE [DISTWIDTH] IN BLOOD BY AUTOMATED COUNT: 47.3 FL (ref 35–45)
GFR SERPL CREATININE-BSD FRML MDRD: > 60 ML/MIN/1.73M2
GLUCOSE BLD-MCNC: 95 MG/DL (ref 70–108)
HCT VFR BLD CALC: 34.2 % (ref 37–47)
HEMOGLOBIN: 11.1 GM/DL (ref 12–16)
MCH RBC QN AUTO: 30.3 PG (ref 26–33)
MCHC RBC AUTO-ENTMCNC: 32.5 GM/DL (ref 32.2–35.5)
MCV RBC AUTO: 93.4 FL (ref 81–99)
PLATELET # BLD: 653 THOU/MM3 (ref 130–400)
PMV BLD AUTO: 9.4 FL (ref 9.4–12.4)
POTASSIUM REFLEX MAGNESIUM: 4.2 MEQ/L (ref 3.5–5.2)
RBC # BLD: 3.66 MILL/MM3 (ref 4.2–5.4)
SODIUM BLD-SCNC: 146 MEQ/L (ref 135–145)
WBC # BLD: 21.7 THOU/MM3 (ref 4.8–10.8)

## 2022-12-06 PROCEDURE — 6370000000 HC RX 637 (ALT 250 FOR IP): Performed by: PHYSICIAN ASSISTANT

## 2022-12-06 PROCEDURE — 85027 COMPLETE CBC AUTOMATED: CPT

## 2022-12-06 PROCEDURE — 6360000004 HC RX CONTRAST MEDICATION: Performed by: NURSE PRACTITIONER

## 2022-12-06 PROCEDURE — 6360000002 HC RX W HCPCS: Performed by: NURSE PRACTITIONER

## 2022-12-06 PROCEDURE — 92526 ORAL FUNCTION THERAPY: CPT

## 2022-12-06 PROCEDURE — 94669 MECHANICAL CHEST WALL OSCILL: CPT

## 2022-12-06 PROCEDURE — 97530 THERAPEUTIC ACTIVITIES: CPT

## 2022-12-06 PROCEDURE — 80048 BASIC METABOLIC PNL TOTAL CA: CPT

## 2022-12-06 PROCEDURE — 99232 SBSQ HOSP IP/OBS MODERATE 35: CPT | Performed by: NURSE PRACTITIONER

## 2022-12-06 PROCEDURE — 1200000000 HC SEMI PRIVATE

## 2022-12-06 PROCEDURE — 94760 N-INVAS EAR/PLS OXIMETRY 1: CPT

## 2022-12-06 PROCEDURE — 2700000000 HC OXYGEN THERAPY PER DAY

## 2022-12-06 PROCEDURE — 97129 THER IVNTJ 1ST 15 MIN: CPT

## 2022-12-06 PROCEDURE — 2580000003 HC RX 258: Performed by: NURSE PRACTITIONER

## 2022-12-06 PROCEDURE — 2580000003 HC RX 258

## 2022-12-06 PROCEDURE — 36415 COLL VENOUS BLD VENIPUNCTURE: CPT

## 2022-12-06 PROCEDURE — 97535 SELF CARE MNGMENT TRAINING: CPT

## 2022-12-06 PROCEDURE — 6370000000 HC RX 637 (ALT 250 FOR IP)

## 2022-12-06 PROCEDURE — 71275 CT ANGIOGRAPHY CHEST: CPT

## 2022-12-06 RX ORDER — LANOLIN ALCOHOL/MO/W.PET/CERES
3 CREAM (GRAM) TOPICAL NIGHTLY PRN
Status: DISCONTINUED | OUTPATIENT
Start: 2022-12-06 | End: 2022-12-09 | Stop reason: HOSPADM

## 2022-12-06 RX ADMIN — LEVOTHYROXINE SODIUM 125 MCG: 0.12 TABLET ORAL at 05:16

## 2022-12-06 RX ADMIN — TERBINAFINE TABLETS 250 MG 250 MG: 250 TABLET ORAL at 09:02

## 2022-12-06 RX ADMIN — POLYETHYLENE GLYCOL 3350 17 G: 17 POWDER, FOR SOLUTION ORAL at 09:09

## 2022-12-06 RX ADMIN — DOCUSATE SODIUM 100 MG: 100 CAPSULE, LIQUID FILLED ORAL at 09:04

## 2022-12-06 RX ADMIN — RIVAROXABAN 15 MG: 15 TABLET, FILM COATED ORAL at 17:57

## 2022-12-06 RX ADMIN — PANTOPRAZOLE SODIUM 40 MG: 40 TABLET, DELAYED RELEASE ORAL at 05:17

## 2022-12-06 RX ADMIN — SENNOSIDES 17.2 MG: 8.6 TABLET, COATED ORAL at 21:11

## 2022-12-06 RX ADMIN — IOPAMIDOL 80 ML: 755 INJECTION, SOLUTION INTRAVENOUS at 08:22

## 2022-12-06 RX ADMIN — Medication 20 MG: at 21:12

## 2022-12-06 RX ADMIN — CARVEDILOL 12.5 MG: 6.25 TABLET, FILM COATED ORAL at 09:05

## 2022-12-06 RX ADMIN — CARVEDILOL 12.5 MG: 6.25 TABLET, FILM COATED ORAL at 21:09

## 2022-12-06 RX ADMIN — HYDROCORTISONE 20 MG: 10 TABLET ORAL at 09:04

## 2022-12-06 RX ADMIN — CEFTRIAXONE SODIUM 1000 MG: 1 INJECTION, POWDER, FOR SOLUTION INTRAMUSCULAR; INTRAVENOUS at 12:53

## 2022-12-06 RX ADMIN — SODIUM CHLORIDE, PRESERVATIVE FREE 10 ML: 5 INJECTION INTRAVENOUS at 09:03

## 2022-12-06 RX ADMIN — SODIUM CHLORIDE, PRESERVATIVE FREE 10 ML: 5 INJECTION INTRAVENOUS at 21:12

## 2022-12-06 RX ADMIN — BARICITINIB 4 MG: 2 TABLET, FILM COATED ORAL at 09:05

## 2022-12-06 RX ADMIN — HYDROCORTISONE 10 MG: 10 TABLET ORAL at 23:08

## 2022-12-06 NOTE — PROGRESS NOTES
Hospitalist Progress Note    Patient:  Zac Bearden      Unit/Bed:8B-27/027-A    YOB: 1950    MRN: [id number]       Acct: [de-identified]     PCP: Fritzi Frankel, MD    Date of Admission: 11/23/2022    Assessment/Plan:    Pneumonia, left lower lobe, not present on arrival likely secondary to COVID-19 infection--baricitinib 11/25;  Decadron 11/25-12/4; CRP was noted to be 14 on admission and improved to 2.97 on 12/1; Zithromax 12/1-12/3; Rocephin on 12/1; procalcitonin noted be 0.10; encourage good lung hygiene; chest x-ray obtained on 12/3 shows moderate bibasilar atelectasis/pneumonia, it is reported that the patient is wheelchair-bound at home; removed out of [facility] 12/5 per infection control input  Acute hypoxic respiratory failure--likely secondary to #1, currently on 4 L of oxygen now satting 94-98%~oxygen needs were increased and unsure why as patient has no desats documented; if we have to go past 6 L of oxygen to maintain a sat greater than 90% we will have to flip to high flow nasal cannula and transferred to stepdown bed; encourage incentive spirometry and Acapella; speaks in complete sentences in no distress; chest physiotherapy per RT; CTA chest ordered on 12/5 and not completed yet  Lactic acidosis--she is not hypotensive or tachycardic, she is fully alert and oriented;   Leukocytosis--afebrile, could be secondary to steroids as she is on Decadron secondary to COVID along with Cortef; could be secondary to #1, monitor  Thrombocytosis--possibly reactive in nature with #1, monitor  Azotemia--we will hold Bumex today as BUN is increased 9 points in the past 4 days  Anion gap metabolic acidosis--resolved  Hypernatremia--mild, monitor  Hypokalemia--replaced per protocol, monitor  Suspected adrenal insufficiency with history of pituitary tumor--on Cortef  Chronic diastolic heart failure--on Bumex~holding, Coreg  Paroxysmal atrial fibrillation--on Xarelto, Coreg  JESSE--resolved  Hypothyroidism--treated with Synthroid  History of CVA--Xarelto, statin  Physical deconditioning/debility likely secondary to numerous comorbid medical conditions--she is wheelchair-bound at home, PT/OT on her case;  on the case  Obesity with BMI 33.30  CODE STATUS--limited x4      Expected discharge date: Pending clinical course    Disposition:    [x] Home       [] TCU       [] Rehab       [] Psych       [] SNF       [] [place]       [] Other-    Chief Complaint: Fatigue    Hospital Course: Per H&P done 11/23/2022: Jennifer Corbin is a 72 y/o  female with a PMHx of HTN, CVA, who presents to [facility] ED today via EMS for the evaluation of generalized weakness and fatigue since last night. Pt's overall very tired and poor historian.  at bedside states that he lives alone with his wife and yesterday when he was helping her pivot from her chair to the commode, the patient was very weak and had to carry her back into the chair. Pt reportedly slept very well in her recliner last night, as she does normally, but  states she woke up appearing very tired and ill appearing to him. He states once again this AM, when transitioning her from the chair to the commode, she could barely hold her own body weight and had to call the EMS for assistance. Pt denies chest pain, cough or shortness of breath, chills, abdominal pain, changes in bowel or urinary habits. Pt's  does states she has had a slight decrease in appetite since yesterday. \"    11/30--> hemodynamically stable, on room air; sodium at 148, creatinine at 0.8; RN stated patient had some shortness of breath in the night    12/1-->hemodynamically stable, was hypoxic this morning at 87% so was placed on 2 L of oxygen; white count greatly increased however patient is on 2 steroids, CRP is greatly improved, sodium has resulted to normal; chest x-ray showed a pneumonia so Zithromax and Rocephin added    12/2--> hemodynamically stable however was found to be 85% on 2 L of oxygen so was increased and currently at 5 L, remains afebrile; converses well; had PICC line placed today; potassium at 3.3    12/3--> hemodynamically stable; currently on 6 L of oxygen satting 92% as this morning patient's O2 sat was 86% on 4 L; on exam she does have rhonchi that does somewhat clear with coughing    12/4--> hemodynamically stable, on 5 L of oxygen satting 87% so increased to 6 L satting 92; on exam lungs are diminished throughout; Zithromax finished yesterday    12/5--> hemodynamically stable, remains afebrile, O2 sat 96% on 5 L; much encouragement given today again on the importance of incentive spirometry, Acapella and deep breathing and coughing; BUN is increasing; PT note reviewed and the patient was on 5 L of oxygen satting 90% and when beginning bed exercises her O2 sat dropped to 78% and rebounded with rest break; patient is on Xarelto, will check CTA chest to make sure were not dealing with a PE possibly Xarelto failure or any other pathology; spoke with patient's spouse Trudy Arias at the number provided and gave an update on the increased oxygen needs, he is okay with scanning her lungs, her normal activity is sitting in a lift chair, he assisted her to the bedside commode assist her with cleaning up and she is back to the left chair~he states he has a very hard time coming up to see her as he has had back surgery January 6, 2023; she has a very difficult time getting the incentive spirometry and Acapella to even move per RN; order chest physiotherapy    12/6--> hemodynamically stable, patient was on 4 L of oxygen last evening satting 100%, some reason which there is no documentation the patient was increased to 6 L for an O2 sat of 90%, currently on 6 L satting 95%, percussion added yesterday as patient is struggling with incentive spirometry and Acapella; patient with a sat of 95 to 99% on 6 L so I turned the patient's oxygen down to 4 L at 1242 and nursing communication order left    Subjective (past 24 hours): Denies any complaints except extremely weak~her baseline per my conversation with  yesterday, no family at bedside; she is eating well, fully alert and oriented; smiling, states she is swallowing without any difficulties    Medications:  Reviewed    Infusion Medications    sodium chloride       Scheduled Medications    cefTRIAXone (ROCEPHIN) IV  1,000 mg IntraVENous Q24H    polyethylene glycol  17 g Oral Daily    senna  2 tablet Oral Nightly    docusate sodium  100 mg Oral Daily    baricitinib  4 mg Oral Daily    simvastatin  20 mg Oral Nightly    bumetanide  1 mg Oral Daily    carvedilol  12.5 mg Oral BID    hydrocortisone  10 mg Oral Nightly    levothyroxine  125 mcg Oral Daily    pantoprazole  40 mg Oral QAM AC    rivaroxaban  15 mg Oral Daily    terbinafine  250 mg Oral Daily    sodium chloride flush  10 mL IntraVENous 2 times per day    hydrocortisone  20 mg Oral QAM    oxybutynin  15 mg Oral Daily     PRN Meds: sodium chloride flush, sodium chloride, ondansetron **OR** ondansetron, acetaminophen **OR** acetaminophen, potassium chloride **OR** potassium alternative oral replacement **OR** potassium chloride, magnesium sulfate      Intake/Output Summary (Last 24 hours) at 12/6/2022 0645  Last data filed at 12/5/2022 2314  Gross per 24 hour   Intake 600 ml   Output 1000 ml   Net -400 ml         Diet:  ADULT DIET; Dysphagia - Minced and Moist; Moderately Thick (Honey)  ADULT ORAL NUTRITION SUPPLEMENT; Breakfast, Lunch, Dinner; Frozen Oral Supplement    Exam:  /62   Pulse 64   Temp 98.1 °F (36.7 °C) (Oral)   Resp 16   Ht 5' 3\" (1.6 m)   Wt 188 lb (85.3 kg)   SpO2 95%   BMI 33.30 kg/m²     General appearance: No apparent distress, appears stated age and cooperative. Sitting up eating without difficulties and smiling  HEENT: Pupils equal, round, and reactive to light. Conjunctivae/corneas clear.   Neck: Supple, with full range of motion. No jugular venous distention. Trachea midline. Respiratory:  Normal respiratory effort. Diminished throughout; she speaks in complete sentences  Cardiovascular: Regular rate and rhythm with normal S1/S2 without murmurs, rubs or gallops. Abdomen: Soft, non-tender, non-distended with normal bowel sounds. Obese  Musculoskeletal: passive and active ROM x 4 extremities. Edema noted to lower legs  Skin: Skin color, texture, turgor normal.    Neurologic:  Neurovascularly intact without any focal sensory/motor deficits. Cranial nerves: II-XII intact, grossly non-focal.  Psychiatric: Alert and oriented to name, place, birthday, month and year, thought content appropriate  Capillary Refill: Brisk,< 3 seconds   Peripheral Pulses: +2 palpable, equal bilaterally       Labs:   Recent Labs     12/04/22  0501 12/05/22  0527 12/06/22  0400   WBC 19.4* 19.9* 21.7*   HGB 10.8* 10.9* 11.1*   HCT 33.1* 33.9* 34.2*   * 621* 653*       Recent Labs     12/04/22  0501 12/05/22  0527 12/06/22  0400    144 146*   K 3.9 3.8 4.2    105 107   CO2 31 32 33   BUN 25* 30* 31*   CREATININE 0.8 0.8 0.9   CALCIUM 8.2* 8.3* 8.1*       Microbiology:    COVID-19 detected  Influenza negative    Urinalysis:      Lab Results   Component Value Date/Time    NITRU NEGATIVE 11/23/2022 09:30 PM    WBCUA 5-9 11/23/2022 09:30 PM    WBCUA 2-4 02/22/2012 01:00 PM    BACTERIA NONE SEEN 11/23/2022 09:30 PM    RBCUA 3-5 11/23/2022 09:30 PM    BLOODU NEGATIVE 11/23/2022 09:30 PM    SPECGRAV 1.025 09/18/2020 12:00 PM    GLUCOSEU NEGATIVE 11/23/2022 09:30 PM       Radiology:  XR CHEST PORTABLE    Result Date: 11/23/2022  PROCEDURE: XR CHEST PORTABLE CLINICAL INFORMATION: shortness of breath . TECHNIQUE: Portable upright COMPARISON: 12/30/2018 FINDINGS: Patient is rotated and leaning to the right. Heart size is likely within normal limits based on the depth of inspiration and portable technique mediastinum is not widened.  No infiltrates or effusions are seen. Vessels are not congested. Bilateral shoulder replacements. No acute cardiopulmonary disease **This report has been created using voice recognition software. It may contain minor errors which are inherent in voice recognition technology. ** Final report electronically signed by Dr. Evelia Kidd on 11/23/2022 10:36 AM      DVT prophylaxis: [] Lovenox                                 [] SCDs                                 [] SQ Heparin                                 [] Encourage ambulation           [x] Already on Anticoagulation~Xarelto     Code Status: Limited    PT/OT Eval Status:  On case    Tele:   [] Yes              [x] no    Active Hospital Problems    Diagnosis Date Noted    COVID-19 [U07.1] 11/27/2022     Priority: Medium    Generalized weakness [R53.1]        Electronically signed by NIDIA Valenzuela CNP on 12/6/2022 at 6:45 AM

## 2022-12-06 NOTE — PROGRESS NOTES
[facility] 8B  Occupational Therapy  Daily Note  Time:   Time In:   Time Out:   Timed Code Treatment Minutes: 40 Minutes  Minutes: 40          Date: 2022  Patient Name: Trey Dupont,   Gender: female      Room: -27/027-A  MRN: [id number]  : 1950  (76 y.o.)  Referring Practitioner: Magdalena Teixeira PA-C  Diagnosis: Generalized Weakness  Additional Pertinent Hx: Pt with a PMHx of HTN, CVA, who presents to [facility] ED today via EMS for the evaluation of generalized weakness and fatigue since last night. Pt's overall very tired and poor historian.  at bedside states that he lives alone with his wife and yesterday when he was helping her pivot from her chair to the commode, the patient was very weak and had to carry her back into the chair. Pt reportedly slept very well in her recliner last night, as she does normally, but  states she woke up appearing very tired and ill appearing to him. He states once again this AM, when transitioning her from the chair to the commode, she could barely hold her own body weight and had to call the EMS for assistance. Pt denies chest pain, cough or shortness of breath, chills, abdominal pain, changes in bowel or urinary habits. Pt's  does states she has had a slight decrease in appetite since yesterday. Restrictions/Precautions:  Restrictions/Precautions: Fall Risk  Position Activity Restriction  Other position/activity restrictions: 5-6L of O2     SUBJECTIVE: Nurse ok'd session. Patient lying in bed upon arrival and agreeable to OT session     PAIN: Denies    Vitals: Oxygen: Patient on 5L upon arrival with readings fluctutating between 89-91%. Rolled side to side in bed for toilet hygiene, patient decreased to 84% with increased time to recover to 89%. Increased to 6L for remainder of session- patient O2 saturations fluctuated between 86-94%.  Encouraged deep breathing throughout however patient has difficulty due to being a mouth breather. Increased rest breaks required throughout for O2 saturations to recover. COGNITION: Slow Processing, Decreased Recall, Decreased Problem Solving, and Decreased Safety Awareness    ADL:   Bathing: Maximum Assistance. For bottom/romel area while lying supine in bed. Assist for BLE below knees including B feet. Able to wash UB with Zaria. Upper Extremity Dressing: Moderate Assistance. To doff/don gown seated EOB   Lower Extremity Dressing: Maximum Assistance. To doff/don socks seated EOB   Toileting: Maximum Assistance. For hygiene after incontinence of bowel, increased time to complete. Rolled side to side in bed to complete with increased time. Patient required increased time during ADL routine this AM due to increase of fatigue/SOB and decrease in O2 saturations. Patient's saturations noted above in vitals section. Encouraged deep breathing throughout tasks     BALANCE:  Sitting Balance:  Stand By Assistance. Seated EOB, sat for approx 5 minutes before O2 saturations began to decreased to 86-87%. Returned to supine, O2 saturations increased to 93-94% with increased time     BED MOBILITY:  Rolling to Left: Maximum Assistance +1 with increased time, multiple trials during bathing/toileting and changing of linens. Cueing provided for technique  Rolling to Right: Maximum Assistance +1 with increased time, multiple trials during bathing/toileting and chaning of linens. Cueing provided for technique  Supine to Sit: Maximum Assistance +1 with increased time and cueing provided for technique, HOB slightly elevated  Sit to Supine: Maximum Assistance +1 and Zaria+1 with increased time      ASSESSMENT:     Activity Tolerance:  Patient tolerance of  treatment: fair.        Discharge Recommendations: Continue to assess pending progress and Subacute/skilled nursing facility  Equipment Recommendations: Equipment Needed: No  Plan: Times Per Week: 3-5x  Specific Instructions for Next Treatment: Functional transfers; ADLs and sitting balance; upper body positioning and relaxed breathing  Current Treatment Recommendations: Strengthening, Endurance training, Functional mobility training, Balance training, Patient/Caregiver education & training, Self-Care / ADL  Additional Comments: Pt would benefit from continued skilled OT services when medically stable and discharged from Acute. 24 hour care with OT at SNF vs home with [address] [place] [address]. Patient Education  Patient Education:  bed mobility, ADL strategies, deep breathing     Goals  Short Term Goals  Time Frame for Short Term Goals: By discharge  Short Term Goal 1: Pt will complete functional transfers with minimal assistance X2 in prep for [facility] transfers. Short Term Goal 2: Pt will complete upper body ROM exercises while following verbal and tactile cues to increase her upright posture for ease of engagement in activity. Short Term Goal 3: Pt will complete LB ADLs moderate assistance and LHAE prn to increase independence and ease with self care tasks. Short Term Goal 4: Pt will engage in dynamic sitting tasks with no > than MIN A for balance and cues for using her LUE for active support to increase her balance for ease of completing transfers and toileting routine. Short Term Goal 5: Pt will tolerate further assessment of functional mobility by OTR when appropriate. Additional Goals?: No    Following session, patient left in safe position with all fall risk precautions in place.

## 2022-12-06 NOTE — PROGRESS NOTES
[facility]  INPATIENT SPEECH THERAPY  [facility] SURG 8B  DAILY NOTE    TIME   SLP Individual Minutes  Time In: 9459  Time Out: 5442  Minutes: 20  Timed Code Treatment Minutes: 0 Minutes  Dysphagia therapy: 10 minutes  Cognitive therapy: 10 minutes      Date: 2022  Patient Name: Laurelyn Oppenheim      CSN: [id number]   : 1950  (71 y.o.)  Gender: female   Referring Physician: Maria Antonia Lucas PA-C  Diagnosis: Generalized weakness  Precautions: Fall risk, aspiration, contact/droplet precautions (COVID-19)  Current Diet: Minced and moist diet with moderately thick liquids   Swallowing Strategies: Full Upright Position, Small Bite/Sip, Multiple Swallow, Medications Crushed with Puree, Limit Distractions, Monitor for Fatigue, and 1:1 assistance with meals   Date of Last MBS/FEES:  MBS 22    Pain:  No pain reported. Subjective:  Patient seen at bedside, alert and pleasant. Short-Term Goals:  SHORT TERM GOAL #1:  Goal 1: Patient will consume minced and moist diet with moderatly thick liquids without s/s of aspiration in order to safely maintain adequate hydration and nutrition + complete pharyngeal exercises x10 in order to improve overall airway protection and pharyngeal constricition + patient will repeat instrumental evaluation in 2-4 weeks to determine readiness for potential dietary/texture upgrade. INTERVENTIONS: Moderately thick juice via cup with patient demonstrating independent carryover for small sips, consistent cues required to engage in multiple swallows. No s/s of aspiration. ST thickened new water pitcher of moderately thick liquids for patient; patient enjoys cranberry juice.      Recommendations maintain for minced and moist diet, moderately thick liquids with strict adherence to identified compensatory strategies; patient is at heightened risk for potential pulmonary compromise in correlation to pharyngeal dysfunction in uncontrolled environments and complexity of medical status. SHORT TERM GOAL #2:  Goal 2: Patient will complete functional carryover tasks (biographics, orientation, call light, 1-3 units) with 50% accuracy, mod cues to improve awareness to immediate surroundings. INTERVENTIONS: Orientation  -Month: independent  -Year: self correct  -Place: correct provided [address]: independent  -BENEDICT: off by one   -Date: off by one    Recall x4 item list:   Immediate recall: 3/4 indep   2 minute delay: 1/4 max cues, 3/4 unable to recall despite max cues     SHORT TERM GOAL #3:  Goal 3: Patient will complmete basic safety awareness, sequencing, and problem solving tasks with 50% accuracy, max cues to enhance participation in ADLs and current environment. INTERVENTIONS: Problem solving  -Call light justifications: 0/3 despite max cues  -Call light location and rational- independent     SHORT TERM GOAL #4:  Goal 4: Patient will complete multi-syllabic word repetition, sentence cmopletion, confrontational/responsive naming, and basic verbal fluency tasks with 60% accuracy, mod cues to improvce expression of wants/needs. INTERVENTIONS: DNT d/t focus on additional STGs    SHORT TERM GOAL #5:  Goal 5: Patient will answer basic yes/no questions and execute 2-step commands with 70% accuracy, mod cues to improve comprehension measures for participation in immediate context. INTERVENTIONS: DNT d/t focus on additional STGs    SHORT TERM GOAL #6:  Goal 6: Patient will complete structured speech drills/tasks with implementation of SOS speech strategies to improve intelligibility to 75% to optimize exchanges between social partners.   INTERVENTIONS: DNT d/t focus on additional STGs    Long-Term Goals:   No LTGs due to short ELOS     EDUCATION:  Learner: Patient  Education:  Reviewed diet and strategies, Reviewed signs, symptoms and risks of aspiration, Reviewed ST goals and Plan of Care, and Reviewed recommendations for follow-up  Evaluation of Education: Demonstrates with assistance, Needs further instruction, and Family not present    ASSESSMENT/PLAN:  Activity Tolerance:  Patient tolerance of  treatment: good. Assessment/Plan: Patient progressing toward established goals. Continues to require skilled care of licensed speech pathologist to progress toward achievement of established goals and plan of care. .     Plan for Next Session: dysphagia management, basic cognitive rehabilitation   Discharge Recommendations: SNF       SINCERE Campbell 23

## 2022-12-06 NOTE — CARE COORDINATION
12/6/22, 3:06 PM EST    DISCHARGE ON GOING EVALUATION    Bryan Gtz day: 9  Location: [facility]27/027-A Reason for admit: Generalized weakness [R53.1]  Impaired mobility and ADLs [Z74.09, Z78.9]  COVID-19 virus infection [U07.1]  COVID-19 [U07.1]   Procedure: : 12-2-22 PICC placed  Barriers to Discharge: Out of Covid isolation but was requiring 5-6L O2 past 24 hours. This afternoon pt is afebrile and O2 remains at 5L/NC with sat at 94%. Cont on Baricitinib. IV Rocephin. Cortef po. PCP: Mir Arceo MD  Readmission Risk Score: 16.5%  Patient Goals/Plan/Treatment Preferences: SW following for discharge disposition. [facility] pending pre-cert.

## 2022-12-06 NOTE — PROGRESS NOTES
12/06/22 1450   Encounter Summary   Encounter Overview/Reason  Initial Encounter   Service Provided For: Patient   Referral/Consult From: [facility]   Last Encounter  12/06/22   Complexity of Encounter Low   Begin Time 1415   End Time  1430   Total Time Calculated 15 min   Assessment/Intervention/Outcome   Assessment Calm   Intervention Active listening;Nurtured Hope;Sustaining Presence/Ministry of presence   Outcome Restored Hope   Plan and Referrals   Plan/Referrals Continue to visit, (comment)     ASSESSMENT  The patient informed me that she had a stroke and now she is feeling much better. She has children and grandchildren in her support system. They visit and spend time with her. She is strong in her [employer] [facility]. INTERVENTION    P was ready for prayer. I prayed for her recovery and continued well being. She affirmed the prayer by responding to the prayer. She felt good about this visit, she told me. Glenis Ernst told me that she enjoyed my visit. CARE PLAN  Continue to visit her. She thinks she will be discharged soon.

## 2022-12-06 NOTE — PLAN OF CARE
Problem: Respiratory - Adult  Goal: Achieves optimal ventilation and oxygenation  Outcome: Progressing    Note:  Weaning as tolerated

## 2022-12-07 LAB
ANION GAP SERPL CALCULATED.3IONS-SCNC: 9 MEQ/L (ref 8–16)
BUN BLDV-MCNC: 25 MG/DL (ref 7–22)
CALCIUM SERPL-MCNC: 8.6 MG/DL (ref 8.5–10.5)
CHLORIDE BLD-SCNC: 107 MEQ/L (ref 98–111)
CO2: 29 MEQ/L (ref 23–33)
CREAT SERPL-MCNC: 0.9 MG/DL (ref 0.4–1.2)
ERYTHROCYTE [DISTWIDTH] IN BLOOD BY AUTOMATED COUNT: 14 % (ref 11.5–14.5)
ERYTHROCYTE [DISTWIDTH] IN BLOOD BY AUTOMATED COUNT: 48.2 FL (ref 35–45)
GFR SERPL CREATININE-BSD FRML MDRD: > 60 ML/MIN/1.73M2
GLUCOSE BLD-MCNC: 83 MG/DL (ref 70–108)
GLUCOSE BLD-MCNC: 99 MG/DL (ref 70–108)
HCT VFR BLD CALC: 33.7 % (ref 37–47)
HEMOGLOBIN: 10.6 GM/DL (ref 12–16)
LACTIC ACID: 2.5 MMOL/L (ref 0.5–2)
MCH RBC QN AUTO: 29.7 PG (ref 26–33)
MCHC RBC AUTO-ENTMCNC: 31.5 GM/DL (ref 32.2–35.5)
MCV RBC AUTO: 94.4 FL (ref 81–99)
PLATELET # BLD: 631 THOU/MM3 (ref 130–400)
PMV BLD AUTO: 9.3 FL (ref 9.4–12.4)
POTASSIUM REFLEX MAGNESIUM: 4.3 MEQ/L (ref 3.5–5.2)
RBC # BLD: 3.57 MILL/MM3 (ref 4.2–5.4)
SODIUM BLD-SCNC: 145 MEQ/L (ref 135–145)
WBC # BLD: 20.9 THOU/MM3 (ref 4.8–10.8)

## 2022-12-07 PROCEDURE — 82948 REAGENT STRIP/BLOOD GLUCOSE: CPT

## 2022-12-07 PROCEDURE — 1200000000 HC SEMI PRIVATE

## 2022-12-07 PROCEDURE — 99232 SBSQ HOSP IP/OBS MODERATE 35: CPT | Performed by: NURSE PRACTITIONER

## 2022-12-07 PROCEDURE — 97530 THERAPEUTIC ACTIVITIES: CPT

## 2022-12-07 PROCEDURE — 83605 ASSAY OF LACTIC ACID: CPT

## 2022-12-07 PROCEDURE — 6370000000 HC RX 637 (ALT 250 FOR IP): Performed by: PHYSICIAN ASSISTANT

## 2022-12-07 PROCEDURE — 80048 BASIC METABOLIC PNL TOTAL CA: CPT

## 2022-12-07 PROCEDURE — 85027 COMPLETE CBC AUTOMATED: CPT

## 2022-12-07 PROCEDURE — 6370000000 HC RX 637 (ALT 250 FOR IP)

## 2022-12-07 PROCEDURE — 6360000002 HC RX W HCPCS: Performed by: NURSE PRACTITIONER

## 2022-12-07 PROCEDURE — 2580000003 HC RX 258

## 2022-12-07 PROCEDURE — 97110 THERAPEUTIC EXERCISES: CPT

## 2022-12-07 PROCEDURE — 2580000003 HC RX 258: Performed by: NURSE PRACTITIONER

## 2022-12-07 PROCEDURE — 36415 COLL VENOUS BLD VENIPUNCTURE: CPT

## 2022-12-07 RX ADMIN — TERBINAFINE TABLETS 250 MG 250 MG: 250 TABLET ORAL at 08:06

## 2022-12-07 RX ADMIN — CARVEDILOL 12.5 MG: 6.25 TABLET, FILM COATED ORAL at 08:07

## 2022-12-07 RX ADMIN — BARICITINIB 4 MG: 2 TABLET, FILM COATED ORAL at 08:05

## 2022-12-07 RX ADMIN — RIVAROXABAN 15 MG: 15 TABLET, FILM COATED ORAL at 17:35

## 2022-12-07 RX ADMIN — Medication 20 MG: at 22:00

## 2022-12-07 RX ADMIN — SENNOSIDES 17.2 MG: 8.6 TABLET, COATED ORAL at 21:59

## 2022-12-07 RX ADMIN — HYDROCORTISONE 10 MG: 10 TABLET ORAL at 22:40

## 2022-12-07 RX ADMIN — PANTOPRAZOLE SODIUM 40 MG: 40 TABLET, DELAYED RELEASE ORAL at 05:43

## 2022-12-07 RX ADMIN — CEFTRIAXONE SODIUM 1000 MG: 1 INJECTION, POWDER, FOR SOLUTION INTRAMUSCULAR; INTRAVENOUS at 11:55

## 2022-12-07 RX ADMIN — LEVOTHYROXINE SODIUM 125 MCG: 0.12 TABLET ORAL at 05:42

## 2022-12-07 RX ADMIN — CARVEDILOL 12.5 MG: 6.25 TABLET, FILM COATED ORAL at 22:00

## 2022-12-07 RX ADMIN — HYDROCORTISONE 20 MG: 10 TABLET ORAL at 08:06

## 2022-12-07 RX ADMIN — SODIUM CHLORIDE, PRESERVATIVE FREE 10 ML: 5 INJECTION INTRAVENOUS at 21:59

## 2022-12-07 RX ADMIN — SODIUM CHLORIDE, PRESERVATIVE FREE 10 ML: 5 INJECTION INTRAVENOUS at 08:06

## 2022-12-07 RX ADMIN — DOCUSATE SODIUM 100 MG: 100 CAPSULE, LIQUID FILLED ORAL at 08:05

## 2022-12-07 NOTE — PLAN OF CARE
Problem: Discharge Planning  Goal: Discharge to home or other facility with appropriate resources  Outcome: Progressing  Note: Discharge needs are assessed daily     Problem: Neurosensory - Adult  Goal: Achieves stable or improved neurological status  Outcome: Progressing  Note: Patient alert and oriented x2 this shift     Problem: Respiratory - Adult  Goal: Achieves optimal ventilation and oxygenation  Outcome: Progressing  Note: Patient's O2 sats remain >90% on 5L NC     Problem: Skin/Tissue Integrity - Adult  Goal: Skin integrity remains intact  Outcome: Progressing  Note: Skin is assessed every shift, no new skin breakdown noted     Problem: Musculoskeletal - Adult  Goal: Return mobility to safest level of function  Outcome: Progressing  Note: Patient encouraged to perform daily tasks independently, assistance provided when needed     Problem: Infection - Adult  Goal: Absence of infection at discharge  Outcome: Progressing  Note: Labs and VS monitored for s/s of infection     Problem: Skin/Tissue Integrity  Goal: Absence of new skin breakdown  Description: 1. Monitor for areas of redness and/or skin breakdown  2. Assess vascular access sites hourly  3. Every 4-6 hours minimum:  Change oxygen saturation probe site  4. Every 4-6 hours:  If on nasal continuous positive airway pressure, respiratory therapy assess nares and determine need for appliance change or resting period. Outcome: Progressing  Note: Skin is assessed every shift, no new skin breakdown noted     Problem: Safety - Adult  Goal: Free from fall injury  Outcome: Progressing  Note: Patient free of falls this shift. Patient on falling star program. Fall band intact. RN visually checks on patient with hourly rounds. Patient tolerates ambulation each shift.        Problem: ABCDS Injury Assessment  Goal: Absence of physical injury  Outcome: Progressing  Note: Patient free from injury this shift     Problem: Chronic Conditions and Co-morbidities  Goal: Patient's chronic conditions and co-morbidity symptoms are monitored and maintained or improved  Outcome: Progressing  Flowsheets (Taken 12/5/2022 2535 by Aniyah Melara RN)  Care Plan - Patient's Chronic Conditions and Co-Morbidity Symptoms are Monitored and Maintained or Improved:   Monitor and assess patient's chronic conditions and comorbid symptoms for stability, deterioration, or improvement   Collaborate with multidisciplinary team to address chronic and comorbid conditions and prevent exacerbation or deterioration   Update acute care plan with appropriate goals if chronic or comorbid symptoms are exacerbated and prevent overall improvement and discharge     Problem: Nutrition Deficit:  Goal: Optimize nutritional status  Outcome: Progressing  Note: Patient encouraged to consume % of meals     Problem: Pain  Goal: Verbalizes/displays adequate comfort level or baseline comfort level  Outcome: Progressing  Note: Patient denies pain this shift     Problem: Safety - Adult  Goal: Isolation precautions  Description: Isolation precautions  Outcome: Completed   Care plan reviewed with patient. Patient verbalizes understanding of the plan of care and contribute to goal setting.

## 2022-12-07 NOTE — CARE COORDINATION
12/7/22, 10:47 AM EST    DISCHARGE ON GOING EVALUATION    Bryan Gtz day: 10  Location: [facility]27/027-A Reason for admit: Generalized weakness [R53.1]  Impaired mobility and ADLs [Z74.09, Z78.9]  COVID-19 virus infection [U07.1]  COVID-19 [U07.1]   Procedure: 12-2-22 PICC placed  Barriers to Discharge: VSS. Afebrile. O2 at 4L/NC currently. (Out of isolation for Covid). Baricitinib. Rocephin. Cortef. PT/OT/ST continue. PCP: Laura Tompkins MD  Readmission Risk Score: 16%  Patient Goals/Plan/Treatment Preferences: SW following for placement to [facility] pending pre-cert.

## 2022-12-07 NOTE — PROGRESS NOTES
Comprehensive Nutrition Assessment    Type and Reason for Visit:  Reassess (po/supplement)    Nutrition Recommendations/Plan:   Consider MVI and an appetite stimulant. ONS: Magic Cup TID. Continue current diet per SLP recommendations, note planning repeat instrumentation in 2-4 weeks     Malnutrition Assessment:  Malnutrition Status: At risk for malnutrition (Comment) (11/26/22 0945)    Context:  Acute Illness     Findings of the 6 clinical characteristics of malnutrition:  Energy Intake:  50% or less of estimated energy requirements for 5 or more days (for 9 days since admit)  Weight Loss:  No significant weight loss hard to assess d/t generalized edema    Body Fat Loss:  No significant body fat loss     Muscle Mass Loss:  No significant muscle mass loss    Fluid Accumulation:  Mild Generalized   Strength:  Not Performed    Nutrition Assessment:      Pt. with slight improvement from a nutritional standpoint AEB some meal intake greater than 25% and consuming \"some\" of ONS. Remains at risk for further nutritional compromise r/t dysphagia, meal intake 75% or less, admit with generalized weakness, covid positive 11/23/22, JESSE on CKD, scalp irritation, skin integrity issues, and underlying medical condition (hx: CVA, hypothyroidism, HLD, OA, GERD, meningioma of the brain, hypopituitarism d/t pituitary tumor, Ca, HTN, CHF, \"borderline\" DB, pneumonia). Nutrition Related Findings:   Pt. Report/Treatments/Miscellaneous: Patient seen, reports appetite is doing a little better. Some meal intake greater than 25%. Eating some magic cup. SLP following. Wearing 4 liters of 02. Planning ECF at discharge. GI Status: BM 12/5/22  Pertinent Labs: 12/7/22: Sodium 145, Potassium 4.3, BUN 25, Creatinine 0.9, Glucose 83. Pertinent Meds: baricitinib, bumex, coreg, colace, cortef, synthroid, protonix, glycolax, senokot, zocor, lamisil.       Wound Type: Multiple (right pretibial wound-open to air, left elbow skin tear, buttocks-redness, scalp wounds)       Current Nutrition Intake & Therapies:    Average Meal Intake: 1-25%, 26-50%, 51-75%  Average Supplements Intake:  (eating, likes)  ADULT DIET; Dysphagia - Minced and Moist; Moderately Thick (Honey)  ADULT ORAL NUTRITION SUPPLEMENT; Breakfast, Lunch, Dinner; Frozen Oral Supplement    Anthropometric Measures:  Height: 5' 3\" (160 cm)  Ideal Body Weight (IBW): 115 lbs (52 kg)    Admission Body Weight: 193 lb (87.5 kg) (11/23/22 stated, nonpitting UE, +1 LE edema)  Current Body Weight: 188 lb (85.3 kg) (12/5 bedscale, nonpitting generalized +1 LE edema), 145 %   Current BMI (kg/m2): 33.3  Usual Body Weight:  (~ 180# per pt,  Per EMR: 12/16/21: 193#, no actual weights in the last 1 year)                       BMI Categories: Obese Class 1 (BMI 30.0-34. 9)    Estimated Daily Nutrient Needs:  Energy Requirements Based On: Kcal/kg  Weight Used for Energy Requirements:  (87.5kg)  Energy (kcal/day): ~ 9170-8616 kcals (15-18 kcals/kg/day)  Weight Used for Protein Requirements: Ideal (52 kg)  Protein (g/day): 42-62 grams (0.8-1.2 grams/kg IBW/day) pending renal status         Nutrition Diagnosis:   Inadequate oral intake related to cognitive or neurological impairment, inadequate protein-energy intake as evidenced by intake 51-75%, intake 26-50%, intake 0-25%    Nutrition Interventions:   Food and/or Nutrient Delivery: Continue Current Diet, Continue Oral Nutrition Supplement  Nutrition Education/Counseling: Education initiated (Encouraged oral intake and ONS use.)  Coordination of Nutrition Care: Continue to monitor while inpatient       Goals:     Goals: PO intake 75% or greater, by next RD assessment       Nutrition Monitoring and Evaluation:      Food/Nutrient Intake Outcomes: Diet Advancement/Tolerance, Food and Nutrient Intake, Supplement Intake  Physical Signs/Symptoms Outcomes: Biochemical Data, Chewing or Swallowing, GI Status, Fluid Status or Edema, Meal Time Behavior, Nutrition Focused Physical Findings, Weight, Skin    Discharge Planning:     Too soon to determine     Kika Fowler RD, LD  Contact: [phone]

## 2022-12-07 NOTE — PROGRESS NOTES
[facility]  INPATIENT PHYSICAL THERAPY  Daily Note  [facility] MED SURG 8B - 8B-27/027-A    Time In: 0900  Time Out: 09  Timed Code Treatment Minutes: 44 Minutes  Minutes: 39          Date: 2022  Patient Name: Christiana Briggs,  Gender:  female        MRN: [id number]  : 1950  (76 y.o.)     Referring Practitioner: Chandrika Mott PA-C  Diagnosis: Generalized weakness  Additional Pertinent Hx: 70 y/o  female with a PMHx of HTN, CVA, who presents to [facility] ED today via EMS for the evaluation of generalized weakness and fatigue since last night. Pt's overall very tired and poor historian.  at bedside states that he lives alone with his wife and yesterday when he was helping her pivot from her chair to the commode, the patient was very weak and had to carry her back into the chair. Pt reportedly slept very well in her recliner last night, as she does normally, but  states she woke up appearing very tired and ill appearing to him. He states once again this AM, when transitioning her from the chair to the commode, she could barely hold her own body weight and had to call the EMS for assistance. Pt denies chest pain, cough or shortness of breath, chills, abdominal pain, changes in bowel or urinary habits. Pt's  does states she has had a slight decrease in appetite since yesterday.      Prior Level of Function:  Lives With: Spouse  Type of Home: House  Home Layout: One level  Home Access: Stairs to enter with rails  Entrance Stairs - Number of Steps: 2  Entrance Stairs - Rails: Both  Home Equipment: [email or web address], rolling, Wheelchair-manual, Lift chair        Receives Help From: Family  ADL Assistance: Needs assistance  Homemaking Assistance: Needs assistance  Homemaking Responsibilities: No  Ambulation Assistance: Non-ambulatory  Transfer Assistance: Needs assistance  Additional Comments:  assist with stand pivot transfers; w/c or chair bound otherwise    Restrictions/Precautions:  Restrictions/Precautions: Fall Risk  Position Activity Restriction  Other position/activity restrictions: 5-6L of O2     SUBJECTIVE: Pt. Laying in bed and pleasantly agrees to therapy session. Pt. Eating breakfast upon arrival and noted to have nasal cannula removed, checked O2 and donned nasal cannula. Pt. On 4-6L throughout session. RN approved therapy session. PAIN: None indicated    Vitals:   Patient Vitals for the past 8 hrs:   BP Temp Temp src Pulse Resp SpO2   12/07/22 0931 -- -- -- 64 -- (!) 87 %   12/07/22 0928 -- -- -- 71 -- (!) 77 %   12/07/22 0926 -- -- -- 71 -- (!) 81 %   12/07/22 0918 -- -- -- 61 -- (!) 89 %   12/07/22 0914 -- -- -- 66 -- 93 %   12/07/22 0911 -- -- -- -- -- (!) 79 %   12/07/22 0807 136/77 97 °F (36.1 °C) Oral 68 20 92 %   12/07/22 0543 -- -- -- -- -- 91 %   12/07/22 0532 -- -- -- -- -- (!) 88 %   12/07/22 0530 126/61 98.3 °F (36.8 °C) Oral 59 16 97 %                 OBJECTIVE:  Bed Mobility:  Rolling to Left: Maximum Assistance   Rolling to Right: Maximum Assistance   Supine to Sit: Maximum Assistance  Sit to Supine: Maximum Assistance     Transfers:  Not Tested    Ambulation:  None    Stairs:  None    Balance:  Static Sitting Balance:  Stand By Assistance, Contact Guard Assistance  Pt. Sat on EOB for ~20'. Pt. Slightly unsteady with occasional posterior lean. Pt. Fatigues easily. O2 monitored throughout. Pt. With kyphotic posture. Neuromuscular Re-education  None    Exercise:  Patient was guided in 1 set(s) 5 reps of exercises: Ankle pumps, Glut sets, Quad sets, Heelslides, Short arc quads, Hip abduction/adduction and Straight leg raises,Seated heel/toe raises. Exercises were completed for increased independence with functional mobility. Functional Outcome Measures:   Not completed    ASSESSMENT:  Assessment: Patient progressing toward established goals., Pt.  Continues to demonstrate deficits in Strength and Endurance and would benefit from continued skilled PT to address these impairments and return to PLOF. Activity Tolerance:  Patient tolerance of  treatment: fair. Equipment Recommendations:Equipment Needed: No  Discharge Recommendations: 24 hour supervision or assist     Plan: Current Treatment Recommendations: Strengthening, Balance training, Endurance training, Functional mobility training, Transfer training  General Plan:  (5x GM)    Patient Education  Patient Education: Plan of Care, Reviewed Prior Education, Health Promotion and Wellness Education, Safety, Verbal Exercise Instruction, Bed Mobility, Pursed Lip Breathing    Goals:  Patient Goals : none stated  Short Term Goals  Time Frame for Short Term Goals: by discharge  Short Term Goal 1: bed mobility HOB flat, CGA x1 for increased functional ind  Short Term Goal 2: pt to tolerate sitting EOB 10' with good posture for increased core strength/endurance  Short Term Goal 3: sit<>stand from various surfaces with ayo stedy and CGA x1 for safe transfers  Long Term Goals  Time Frame for Long Term Goals : NA d/t short ELOS    Following session, patient left in safe position with all fall risk precautions in place.

## 2022-12-07 NOTE — CARE COORDINATION
12/7/22, 1:30 PM EST    DISCHARGE PLANNING EVALUATION    Spoke with Jeff at [facility], pre-cert is approved, good through 12/12. Advised patient is not ready for discharge today. Will update tomorrow.

## 2022-12-07 NOTE — PROGRESS NOTES
Hospitalist Progress Note    Patient:  Oj Shanks      Unit/Bed:8B-27/027-A    YOB: 1950    MRN: [id number]       Acct: [de-identified]     PCP: Nel Vivar MD    Date of Admission: 11/23/2022    Assessment/Plan:    Pneumonia, left lower lobe, not present on arrival likely secondary to COVID-19 infection--baricitinib 11/25; Decadron 11/25-12/4; CRP was noted to be 14 on admission and improved to 2.97 on 12/1; Concerns for bacterial overlap. Zithromax 12/1-12/3; Rocephin on 12/1; procalcitonin noted be 0.10; encourage good lung hygiene; chest x-ray obtained on 12/3 shows moderate bibasilar atelectasis/pneumonia, it is reported that the patient is wheelchair-bound at home; removed out of [facility] 12/5 per infection control input. Acute hypoxic respiratory failure secondary to #1, currently on 4 L of oxygen. chest physiotherapy per RT; CTA chest ordered on 12/5 no PE. Lactic acidosis--she is not hypotensive or tachycardic, she is fully alert and oriented; Repeat lactic. Leukocytosis--afebrile, could be secondary to steroids as she is on Decadron secondary to COVID along with Cortef; could be secondary to #1, monitor. Thrombocytosis--possibly reactive in nature with #1, monitor. Prerenal azotemia--Held Bumex. Will restart with pleural effusions on CTA. Anion gap metabolic acidosis--resolved  Hypernatremia, resolved. Mild, monitor  Hypokalemia, resolved. Replaced per protocol, monitor  Suspected adrenal insufficiency with history of pituitary tumor--on Cortef  Chronic diastolic heart failure--restart Bumex.  Coreg  Paroxysmal atrial fibrillation--on Xarelto, Coreg  JESSE--resolved  Hypothyroidism--treated with Synthroid  History of CVA--Xarelto, statin  Physical deconditioning/debility likely secondary to numerous comorbid medical conditions--she is wheelchair-bound at home, PT/OT on her case;  on the case  Obesity with BMI 33.30  CODE STATUS--limited x4      Expected discharge date: Pending clinical course    Disposition:    [x] Home       [] TCU       [] Rehab       [] Psych       [] SNF       [] [place]       [] Other-    Chief Complaint: Fatigue    Hospital Course: Per H&P done 11/23/2022: Js Goode is a 70 y/o  female with a PMHx of HTN, CVA, who presents to [facility] ED today via EMS for the evaluation of generalized weakness and fatigue since last night. Pt's overall very tired and poor historian.  at bedside states that he lives alone with his wife and yesterday when he was helping her pivot from her chair to the commode, the patient was very weak and had to carry her back into the chair. Pt reportedly slept very well in her recliner last night, as she does normally, but  states she woke up appearing very tired and ill appearing to him. He states once again this AM, when transitioning her from the chair to the commode, she could barely hold her own body weight and had to call the EMS for assistance. Pt denies chest pain, cough or shortness of breath, chills, abdominal pain, changes in bowel or urinary habits. Pt's  does states she has had a slight decrease in appetite since yesterday. \"    11/30--> hemodynamically stable, on room air; sodium at 148, creatinine at 0.8; RN stated patient had some shortness of breath in the night    12/1-->hemodynamically stable, was hypoxic this morning at 87% so was placed on 2 L of oxygen; white count greatly increased however patient is on 2 steroids, CRP is greatly improved, sodium has resulted to normal; chest x-ray showed a pneumonia so Zithromax and Rocephin added    12/2--> hemodynamically stable however was found to be 85% on 2 L of oxygen so was increased and currently at 5 L, remains afebrile; converses well; had PICC line placed today; potassium at 3.3    12/3--> hemodynamically stable; currently on 6 L of oxygen satting 92% as this morning patient's O2 sat was 86% on 4 L; on exam she does have rhonchi that does somewhat clear with coughing    12/4--> hemodynamically stable, on 5 L of oxygen satting 87% so increased to 6 L satting 92; on exam lungs are diminished throughout; Zithromax finished yesterday    12/5--> hemodynamically stable, remains afebrile, O2 sat 96% on 5 L; much encouragement given today again on the importance of incentive spirometry, Acapella and deep breathing and coughing; BUN is increasing; PT note reviewed and the patient was on 5 L of oxygen satting 90% and when beginning bed exercises her O2 sat dropped to 78% and rebounded with rest break; patient is on Xarelto, will check CTA chest to make sure were not dealing with a PE possibly Xarelto failure or any other pathology; spoke with patient's spouse Jennifer Amos at the number provided and gave an update on the increased oxygen needs, he is okay with scanning her lungs, her normal activity is sitting in a lift chair, he assisted her to the bedside commode assist her with cleaning up and she is back to the left chair~he states he has a very hard time coming up to see her as he has had back surgery January 6, 2023; she has a very difficult time getting the incentive spirometry and Acapella to even move per RN; order chest physiotherapy    12/6--> hemodynamically stable, patient was on 4 L of oxygen last evening satting 100%, some reason which there is no documentation the patient was increased to 6 L for an O2 sat of 90%, currently on 6 L satting 95%, percussion added yesterday as patient is struggling with incentive spirometry and Acapella; patient with a sat of 95 to 99% on 6 L so I turned the patient's oxygen down to 4 L at 1242 and nursing communication order left    Subjective (past 24 hours): Fatigued. Denies being short of breath. ++ ESTEVEZ. Denies CP.     Medications:  Reviewed    Infusion Medications    sodium chloride       Scheduled Medications    polyethylene glycol  17 g Oral Daily    senna  2 tablet Oral Nightly    docusate sodium  100 mg Oral Daily    baricitinib  4 mg Oral Daily    simvastatin  20 mg Oral Nightly    [Held by provider] bumetanide  1 mg Oral Daily    carvedilol  12.5 mg Oral BID    hydrocortisone  10 mg Oral Nightly    levothyroxine  125 mcg Oral Daily    pantoprazole  40 mg Oral QAM AC    rivaroxaban  15 mg Oral Daily    terbinafine  250 mg Oral Daily    sodium chloride flush  10 mL IntraVENous 2 times per day    hydrocortisone  20 mg Oral QAM    oxybutynin  15 mg Oral Daily     PRN Meds: melatonin, sodium chloride flush, sodium chloride, ondansetron **OR** ondansetron, acetaminophen **OR** acetaminophen, potassium chloride **OR** potassium alternative oral replacement **OR** potassium chloride, magnesium sulfate      Intake/Output Summary (Last 24 hours) at 12/7/2022 1327  Last data filed at 12/6/2022 2033  Gross per 24 hour   Intake 50 ml   Output 500 ml   Net -450 ml         Diet:  ADULT DIET; Dysphagia - Minced and Moist; Moderately Thick (Honey)  ADULT ORAL NUTRITION SUPPLEMENT; Breakfast, Lunch, Dinner; Frozen Oral Supplement    Exam:  /77   Pulse 64   Temp 97 °F (36.1 °C) (Oral)   Resp 20   Ht 5' 3\" (1.6 m)   Wt 188 lb (85.3 kg)   SpO2 (!) 87%   BMI 33.30 kg/m²     General appearance: No apparent distress, appears stated age and cooperative. Sitting up eating without difficulties and smiling  HEENT: Pupils equal, round, and reactive to light. Conjunctivae/corneas clear. Neck: Supple, with full range of motion. No jugular venous distention. Trachea midline. Respiratory:  Normal respiratory effort. Diminished throughout; she speaks in complete sentences  Cardiovascular: Regular rate and rhythm with normal S1/S2 without murmurs, rubs or gallops. Abdomen: Soft, non-tender, non-distended with normal bowel sounds. Obese  Musculoskeletal: passive and active ROM x 4 extremities.   Edema noted to lower legs  Skin: Skin color, texture, turgor normal.    Neurologic:  Neurovascularly intact without any focal sensory/motor deficits. Cranial nerves: II-XII intact, grossly non-focal.  Psychiatric: Alert and oriented to name, place, birthday, month and year, thought content appropriate  Capillary Refill: Brisk,< 3 seconds   Peripheral Pulses: +2 palpable, equal bilaterally       Labs:   Recent Labs     12/05/22 0527 12/06/22  0400 12/07/22  0615   WBC 19.9* 21.7* 20.9*   HGB 10.9* 11.1* 10.6*   HCT 33.9* 34.2* 33.7*   * 653* 631*       Recent Labs     12/05/22 0527 12/06/22  0400 12/07/22  0615    146* 145   K 3.8 4.2 4.3    107 107   CO2 32 33 29   BUN 30* 31* 25*   CREATININE 0.8 0.9 0.9   CALCIUM 8.3* 8.1* 8.6       Microbiology:    COVID-19 detected  Influenza negative    Urinalysis:      Lab Results   Component Value Date/Time    NITRU NEGATIVE 11/23/2022 09:30 PM    WBCUA 5-9 11/23/2022 09:30 PM    WBCUA 2-4 02/22/2012 01:00 PM    BACTERIA NONE SEEN 11/23/2022 09:30 PM    RBCUA 3-5 11/23/2022 09:30 PM    BLOODU NEGATIVE 11/23/2022 09:30 PM    SPECGRAV 1.025 09/18/2020 12:00 PM    GLUCOSEU NEGATIVE 11/23/2022 09:30 PM       Radiology:  XR CHEST PORTABLE    Result Date: 11/23/2022  PROCEDURE: XR CHEST PORTABLE CLINICAL INFORMATION: shortness of breath . TECHNIQUE: Portable upright COMPARISON: 12/30/2018 FINDINGS: Patient is rotated and leaning to the right. Heart size is likely within normal limits based on the depth of inspiration and portable technique mediastinum is not widened. No infiltrates or effusions are seen. Vessels are not congested. Bilateral shoulder replacements. No acute cardiopulmonary disease **This report has been created using voice recognition software. It may contain minor errors which are inherent in voice recognition technology. ** Final report electronically signed by Dr. Nguyen Yee on 11/23/2022 10:36 AM      DVT prophylaxis: [] Lovenox                                 [] SCDs                                 [] SQ Heparin [] Encourage ambulation           [x] Already on Anticoagulation~Xarelto     Code Status: Limited    PT/OT Eval Status:  On case    Tele:   [] Yes              [x] no    Electronically signed by NIDIA Fitzgerald CNP on 12/7/2022 at 1:27 PM

## 2022-12-08 LAB
ANION GAP SERPL CALCULATED.3IONS-SCNC: 6 MEQ/L (ref 8–16)
BASOPHILS # BLD: 0.1 %
BASOPHILS ABSOLUTE: 0 THOU/MM3 (ref 0–0.1)
BUN BLDV-MCNC: 22 MG/DL (ref 7–22)
CALCIUM SERPL-MCNC: 8 MG/DL (ref 8.5–10.5)
CHLORIDE BLD-SCNC: 106 MEQ/L (ref 98–111)
CO2: 30 MEQ/L (ref 23–33)
CREAT SERPL-MCNC: 0.9 MG/DL (ref 0.4–1.2)
EOSINOPHIL # BLD: 1.4 %
EOSINOPHILS ABSOLUTE: 0.3 THOU/MM3 (ref 0–0.4)
ERYTHROCYTE [DISTWIDTH] IN BLOOD BY AUTOMATED COUNT: 14 % (ref 11.5–14.5)
ERYTHROCYTE [DISTWIDTH] IN BLOOD BY AUTOMATED COUNT: 48 FL (ref 35–45)
GFR SERPL CREATININE-BSD FRML MDRD: > 60 ML/MIN/1.73M2
GLUCOSE BLD-MCNC: 88 MG/DL (ref 70–108)
HCT VFR BLD CALC: 33.1 % (ref 37–47)
HEMOGLOBIN: 10.5 GM/DL (ref 12–16)
IMMATURE GRANS (ABS): 0.37 THOU/MM3 (ref 0–0.07)
IMMATURE GRANULOCYTES: 1.8 %
LYMPHOCYTES # BLD: 9.3 %
LYMPHOCYTES ABSOLUTE: 1.9 THOU/MM3 (ref 1–4.8)
MCH RBC QN AUTO: 30.1 PG (ref 26–33)
MCHC RBC AUTO-ENTMCNC: 31.7 GM/DL (ref 32.2–35.5)
MCV RBC AUTO: 94.8 FL (ref 81–99)
MONOCYTES # BLD: 4.1 %
MONOCYTES ABSOLUTE: 0.8 THOU/MM3 (ref 0.4–1.3)
NUCLEATED RED BLOOD CELLS: 0 /100 WBC
PLATELET # BLD: 547 THOU/MM3 (ref 130–400)
PMV BLD AUTO: 9.1 FL (ref 9.4–12.4)
POTASSIUM SERPL-SCNC: 4.3 MEQ/L (ref 3.5–5.2)
RBC # BLD: 3.49 MILL/MM3 (ref 4.2–5.4)
SEG NEUTROPHILS: 83.3 %
SEGMENTED NEUTROPHILS ABSOLUTE COUNT: 16.7 THOU/MM3 (ref 1.8–7.7)
SODIUM BLD-SCNC: 142 MEQ/L (ref 135–145)
WBC # BLD: 20.1 THOU/MM3 (ref 4.8–10.8)

## 2022-12-08 PROCEDURE — 36415 COLL VENOUS BLD VENIPUNCTURE: CPT

## 2022-12-08 PROCEDURE — 6370000000 HC RX 637 (ALT 250 FOR IP): Performed by: PHYSICIAN ASSISTANT

## 2022-12-08 PROCEDURE — 6370000000 HC RX 637 (ALT 250 FOR IP)

## 2022-12-08 PROCEDURE — 99232 SBSQ HOSP IP/OBS MODERATE 35: CPT | Performed by: NURSE PRACTITIONER

## 2022-12-08 PROCEDURE — 92507 TX SP LANG VOICE COMM INDIV: CPT

## 2022-12-08 PROCEDURE — 1200000000 HC SEMI PRIVATE

## 2022-12-08 PROCEDURE — 92526 ORAL FUNCTION THERAPY: CPT

## 2022-12-08 PROCEDURE — 80048 BASIC METABOLIC PNL TOTAL CA: CPT

## 2022-12-08 PROCEDURE — 85025 COMPLETE CBC W/AUTO DIFF WBC: CPT

## 2022-12-08 PROCEDURE — 2580000003 HC RX 258

## 2022-12-08 RX ADMIN — BUMETANIDE 1 MG: 1 TABLET ORAL at 07:54

## 2022-12-08 RX ADMIN — TERBINAFINE TABLETS 250 MG 250 MG: 250 TABLET ORAL at 07:54

## 2022-12-08 RX ADMIN — CARVEDILOL 12.5 MG: 6.25 TABLET, FILM COATED ORAL at 07:54

## 2022-12-08 RX ADMIN — DOCUSATE SODIUM 100 MG: 100 CAPSULE, LIQUID FILLED ORAL at 07:53

## 2022-12-08 RX ADMIN — LEVOTHYROXINE SODIUM 125 MCG: 0.12 TABLET ORAL at 05:32

## 2022-12-08 RX ADMIN — SODIUM CHLORIDE, PRESERVATIVE FREE 10 ML: 5 INJECTION INTRAVENOUS at 21:02

## 2022-12-08 RX ADMIN — PANTOPRAZOLE SODIUM 40 MG: 40 TABLET, DELAYED RELEASE ORAL at 05:32

## 2022-12-08 RX ADMIN — RIVAROXABAN 15 MG: 15 TABLET, FILM COATED ORAL at 17:29

## 2022-12-08 RX ADMIN — HYDROCORTISONE 10 MG: 10 TABLET ORAL at 21:01

## 2022-12-08 RX ADMIN — BARICITINIB 4 MG: 2 TABLET, FILM COATED ORAL at 07:53

## 2022-12-08 RX ADMIN — CARVEDILOL 12.5 MG: 6.25 TABLET, FILM COATED ORAL at 21:02

## 2022-12-08 RX ADMIN — SENNOSIDES 17.2 MG: 8.6 TABLET, COATED ORAL at 21:01

## 2022-12-08 RX ADMIN — HYDROCORTISONE 20 MG: 10 TABLET ORAL at 07:53

## 2022-12-08 RX ADMIN — Medication 20 MG: at 21:02

## 2022-12-08 RX ADMIN — SODIUM CHLORIDE, PRESERVATIVE FREE 10 ML: 5 INJECTION INTRAVENOUS at 07:53

## 2022-12-08 NOTE — PROGRESS NOTES
Problem: Discharge Planning  Goal: Discharge to home or other facility with appropriate resources  Outcome: Progressing  Note: Discharge needs are assessed daily     Problem: Neurosensory - Adult  Goal: Achieves stable or improved neurological status  Outcome: Progressing  Note: Patient alert and oriented x2 this shift     Problem: Respiratory - Adult  Goal: Achieves optimal ventilation and oxygenation  Outcome: Progressing  Note: Patient's O2 sats remain >90% on 2-6L NC     Problem: Skin/Tissue Integrity - Adult  Goal: Skin integrity remains intact  Outcome: Progressing  Note: Skin is assessed every shift, no new skin breakdown noted     Problem: Musculoskeletal - Adult  Goal: Return mobility to safest level of function  Outcome: Progressing  Note: Patient encouraged to perform daily tasks independently, assistance provided when needed     Problem: Infection - Adult  Goal: Absence of infection at discharge  Outcome: Progressing  Note: Labs and VS monitored for s/s of infection     Problem: Skin/Tissue Integrity  Goal: Absence of new skin breakdown  Description: 1. Monitor for areas of redness and/or skin breakdown  2. Assess vascular access sites hourly  3. Every 4-6 hours minimum:  Change oxygen saturation probe site  4. Every 4-6 hours:  If on nasal continuous positive airway pressure, respiratory therapy assess nares and determine need for appliance change or resting period. Outcome: Progressing  Note: Skin is assessed every shift, no new skin breakdown noted     Problem: Safety - Adult  Goal: Free from fall injury  Outcome: Progressing  Note: Patient free of falls this shift. Patient on falling star program. Fall band intact. RN visually checks on patient with hourly rounds. Patient tolerates ambulation each shift.         Problem: ABCDS Injury Assessment  Goal: Absence of physical injury  Outcome: Progressing  Note: Patient free from injury this shift     Problem: Chronic Conditions and Co-morbidities  Goal: Patient's chronic conditions and co-morbidity symptoms are monitored and maintained or improved  Outcome: Progressing     Problem: Nutrition Deficit:  Goal: Optimize nutritional status  Outcome: Progressing  Note: Patient encouraged to consume % of meals     Problem: Pain  Goal: Verbalizes/displays adequate comfort level or baseline comfort level  Outcome: Progressing  Note: Patient denies pain this shift     Problem: Safety - Adult  Goal: Isolation precautions  Description: Isolation precautions  Outcome: Completed   Care plan reviewed with patient. Patient verbalizes understanding of the plan of care and contribute to goal setting.

## 2022-12-08 NOTE — CARE COORDINATION
12/8/22, 1:47 PM EST    DISCHARGE ON GOING EVALUATION    Bryan Gtz day: 11  Location: [facility]27/027A Reason for admit: Generalized weakness [R53.1]  Impaired mobility and ADLs [Z74.09, Z78.9]  COVID-19 virus infection [U07.1]  COVID-19 [U07.1]   Procedure: 12-2-22 PICC placed. Barriers to Discharge: Continue to work on improving O2 needs. Currently down to 2L. VSS. T 99 late this morning. Anticipate discharge to SNF possibly tomorrow pending continued stability/improvement. PCP: Cristian Armendariz MD  Readmission Risk Score: 16.8%  Patient Goals/Plan/Treatment Preferences: [facility] pre-cert approved. Pre-cert good until 91-46. SW following.

## 2022-12-08 NOTE — PROGRESS NOTES
Hospitalist Progress Note    Patient:  Christiana Briggs      Unit/Bed:8B-27/027-A    YOB: 1950    MRN: [id number]       Acct: [de-identified]     PCP: Gladys Priest MD    Date of Admission: 11/23/2022    Assessment/Plan:    Pneumonia, left lower lobe, not present on arrival likely secondary to COVID-19 infection--baricitinib 11/25; Decadron 11/25-12/4; CRP was noted to be 14 on admission and improved to 2.97 on 12/1; Concerns for bacterial overlap. Zithromax 12/1-12/3; Rocephin on 12/1; procalcitonin noted be 0.10; encourage good lung hygiene; chest x-ray obtained on 12/3 shows moderate bibasilar atelectasis/pneumonia, it is reported that the patient is wheelchair-bound at home; removed out of [facility] 12/5 per infection control input. Acute hypoxic respiratory failure secondary to #1, Max oxygen demand 6L/NC. currently on 3L of oxygen. Continue to wean. chest physiotherapy per RT; CTA chest ordered on 12/5 no PE. Lactic acidosis, improved--not hypotensive or tachycardic, she is fully alert and oriented. Leukocytosis--afebrile, could be secondary to steroids as she is on Decadron secondary to COVID along with Cortef; could be secondary to #1, monitor. Thrombocytosis--possibly reactive in nature with #1, monitor. Prerenal azotemia--Held Bumex. Will restart with pleural effusions on CTA. Anion gap metabolic acidosis--resolved  Hypernatremia, resolved. Mild, monitor  Hypokalemia, resolved. Replaced per protocol, monitor  Suspected adrenal insufficiency with history of pituitary tumor--on Cortef  Chronic diastolic heart failure--restart Bumex.  Coreg  Paroxysmal atrial fibrillation--on Xarelto, Coreg  JESSE--resolved  Hypothyroidism--treated with Synthroid  History of CVA--Xarelto, statin  Physical deconditioning/debility likely secondary to numerous comorbid medical conditions--she is wheelchair-bound at home, PT/OT on her case;  on the case  Obesity with BMI 33.30  CODE STATUS--limited x4      Dispo: likely to ECF in AM. Precert has been approved. Disposition:    [] Home       [] TCU       [] Rehab       [] Psych       [x] SNF       [] [place]       [] Other-    Chief Complaint: Fatigue    Hospital Course: Per H&P done 11/23/2022: Michel Jackson is a 70 y/o  female with a PMHx of HTN, CVA, who presents to [facility] ED today via EMS for the evaluation of generalized weakness and fatigue since last night. Pt's overall very tired and poor historian.  at bedside states that he lives alone with his wife and yesterday when he was helping her pivot from her chair to the commode, the patient was very weak and had to carry her back into the chair. Pt reportedly slept very well in her recliner last night, as she does normally, but  states she woke up appearing very tired and ill appearing to him. He states once again this AM, when transitioning her from the chair to the commode, she could barely hold her own body weight and had to call the EMS for assistance. Pt denies chest pain, cough or shortness of breath, chills, abdominal pain, changes in bowel or urinary habits. Pt's  does states she has had a slight decrease in appetite since yesterday. \"    11/30--> hemodynamically stable, on room air; sodium at 148, creatinine at 0.8; RN stated patient had some shortness of breath in the night    12/1-->hemodynamically stable, was hypoxic this morning at 87% so was placed on 2 L of oxygen; white count greatly increased however patient is on 2 steroids, CRP is greatly improved, sodium has resulted to normal; chest x-ray showed a pneumonia so Zithromax and Rocephin added    12/2--> hemodynamically stable however was found to be 85% on 2 L of oxygen so was increased and currently at 5 L, remains afebrile; converses well; had PICC line placed today; potassium at 3.3    12/3--> hemodynamically stable; currently on 6 L of oxygen satting 92% as this morning patient's O2 sat was 86% on 4 L; on exam she does have rhonchi that does somewhat clear with coughing    12/4--> hemodynamically stable, on 5 L of oxygen satting 87% so increased to 6 L satting 92; on exam lungs are diminished throughout; Zithromax finished yesterday    12/5--> hemodynamically stable, remains afebrile, O2 sat 96% on 5 L; much encouragement given today again on the importance of incentive spirometry, Acapella and deep breathing and coughing; BUN is increasing; PT note reviewed and the patient was on 5 L of oxygen satting 90% and when beginning bed exercises her O2 sat dropped to 78% and rebounded with rest break; patient is on Xarelto, will check CTA chest to make sure were not dealing with a PE possibly Xarelto failure or any other pathology; spoke with patient's spouse Diana Fairbanks at the number provided and gave an update on the increased oxygen needs, he is okay with scanning her lungs, her normal activity is sitting in a lift chair, he assisted her to the bedside commode assist her with cleaning up and she is back to the left chair~he states he has a very hard time coming up to see her as he has had back surgery January 6, 2023; she has a very difficult time getting the incentive spirometry and Acapella to even move per RN; order chest physiotherapy    12/6--> hemodynamically stable, patient was on 4 L of oxygen last evening satting 100%, some reason which there is no documentation the patient was increased to 6 L for an O2 sat of 90%, currently on 6 L satting 95%, percussion added yesterday as patient is struggling with incentive spirometry and Acapella; patient with a sat of 95 to 99% on 6 L so I turned the patient's oxygen down to 4 L at 1242 and nursing communication order left    12/7 weaning oxygen. Subjective (past 24 hours): Denies CP/SOB. No n/v/d.      Medications:  Reviewed    Infusion Medications    sodium chloride       Scheduled Medications    polyethylene glycol  17 g Oral Daily senna  2 tablet Oral Nightly    docusate sodium  100 mg Oral Daily    simvastatin  20 mg Oral Nightly    bumetanide  1 mg Oral Daily    carvedilol  12.5 mg Oral BID    hydrocortisone  10 mg Oral Nightly    levothyroxine  125 mcg Oral Daily    pantoprazole  40 mg Oral QAM AC    rivaroxaban  15 mg Oral Daily    terbinafine  250 mg Oral Daily    sodium chloride flush  10 mL IntraVENous 2 times per day    hydrocortisone  20 mg Oral QAM    oxybutynin  15 mg Oral Daily     PRN Meds: melatonin, sodium chloride flush, sodium chloride, ondansetron **OR** ondansetron, acetaminophen **OR** acetaminophen, potassium chloride **OR** potassium alternative oral replacement **OR** potassium chloride, magnesium sulfate      Intake/Output Summary (Last 24 hours) at 12/8/2022 1357  Last data filed at 12/8/2022 1123  Gross per 24 hour   Intake 200 ml   Output --   Net 200 ml         Diet:  ADULT DIET; Dysphagia - Minced and Moist; Moderately Thick (Honey)  ADULT ORAL NUTRITION SUPPLEMENT; Breakfast, Lunch, Dinner; Frozen Oral Supplement    Exam:  /64   Pulse 73   Temp 99 °F (37.2 °C) (Oral)   Resp 20   Ht 5' 3\" (1.6 m)   Wt 162 lb 14.4 oz (73.9 kg)   SpO2 93%   BMI 28.86 kg/m²     General appearance: No apparent distress, appears stated age and cooperative. Sitting up eating without difficulties and smiling  HEENT: Pupils equal, round, and reactive to light. Conjunctivae/corneas clear. Neck: Supple, with full range of motion. No jugular venous distention. Trachea midline. Respiratory:  Normal respiratory effort. Diminished throughout; she speaks in complete sentences  Cardiovascular: Regular rate and rhythm with normal S1/S2 without murmurs, rubs or gallops. Abdomen: Soft, non-tender, non-distended with normal bowel sounds. Obese  Musculoskeletal: passive and active ROM x 4 extremities.   Edema noted to lower legs  Skin: Skin color, texture, turgor normal.    Neurologic:  Neurovascularly intact without any focal sensory/motor deficits. Cranial nerves: II-XII intact, grossly non-focal.  Psychiatric: Alert and oriented to name, place, birthday, month and year, thought content appropriate  Capillary Refill: Brisk,< 3 seconds   Peripheral Pulses: +2 palpable, equal bilaterally       Labs:   Recent Labs     12/06/22  0400 12/07/22  0615 12/08/22  0315   WBC 21.7* 20.9* 20.1*   HGB 11.1* 10.6* 10.5*   HCT 34.2* 33.7* 33.1*   * 631* 547*       Recent Labs     12/06/22  0400 12/07/22  0615 12/08/22  0315   * 145 142   K 4.2 4.3 4.3    107 106   CO2 33 29 30   BUN 31* 25* 22   CREATININE 0.9 0.9 0.9   CALCIUM 8.1* 8.6 8.0*       Microbiology:    COVID-19 detected  Influenza negative    Urinalysis:      Lab Results   Component Value Date/Time    NITRU NEGATIVE 11/23/2022 09:30 PM    WBCUA 5-9 11/23/2022 09:30 PM    WBCUA 2-4 02/22/2012 01:00 PM    BACTERIA NONE SEEN 11/23/2022 09:30 PM    RBCUA 3-5 11/23/2022 09:30 PM    BLOODU NEGATIVE 11/23/2022 09:30 PM    SPECGRAV 1.025 09/18/2020 12:00 PM    GLUCOSEU NEGATIVE 11/23/2022 09:30 PM       Radiology:  XR CHEST PORTABLE    Result Date: 11/23/2022  PROCEDURE: XR CHEST PORTABLE CLINICAL INFORMATION: shortness of breath . TECHNIQUE: Portable upright COMPARISON: 12/30/2018 FINDINGS: Patient is rotated and leaning to the right. Heart size is likely within normal limits based on the depth of inspiration and portable technique mediastinum is not widened. No infiltrates or effusions are seen. Vessels are not congested. Bilateral shoulder replacements. No acute cardiopulmonary disease **This report has been created using voice recognition software. It may contain minor errors which are inherent in voice recognition technology. ** Final report electronically signed by Dr. Mj Osoroi on 11/23/2022 10:36 AM      DVT prophylaxis: [] Lovenox                                 [] SCDs                                 [] SQ Heparin                                 [] Encourage ambulation           [x] Already on Anticoagulation~Xarelto     Code Status: Limited    PT/OT Eval Status:  On case    Tele:   [] Yes              [x] no    Electronically signed by NIDIA Roberts CNP on 12/8/2022 at 1:57 PM

## 2022-12-08 NOTE — PROGRESS NOTES
[address] [place] [address]  INPATIENT SPEECH THERAPY  [facility] MED SURG 8B  DAILY NOTE    TIME   SLP Individual Minutes  Time In: 3103  Time Out: 2456  Minutes: 26  Timed Code Treatment Minutes: 0 Minutes  Dysphagia therapy: 13 minutes  Speech  therapy: 13 minutes      Date: 2022  Patient Name: Olya Raman      CSN: [id number]   : 1950  (71 y.o.)  Gender: female   Referring Physician: Robin Molina PA-C  Diagnosis: Generalized weakness  Precautions: Fall risk, aspiration, contact/droplet precautions (COVID-19)  Current Diet: Minced and moist diet with moderately thick liquids   Swallowing Strategies: Full Upright Position, Small Bite/Sip, Multiple Swallow, Medications Crushed with Puree, Limit Distractions, Monitor for Fatigue, and 1:1 assistance with meals   Date of Last MBS/FEES:  MBS 22    Pain:  No pain reported. Subjective:  Patient seen at bedside, alert and pleasant. Patient with decreased overall motivation; did participate in all therapy tasks asked of patient. Patient with phone call received from   [place] midway through session; slightly improved affect/outlook when answering call. Short-Term Goals:  SHORT TERM GOAL #1:  Goal 1: Patient will consume minced and moist diet with moderatly thick liquids without s/s of aspiration in order to safely maintain adequate hydration and nutrition + complete pharyngeal exercises x10 in order to improve overall airway protection and pharyngeal constricition + patient will repeat instrumental evaluation in 2-4 weeks to determine readiness for potential dietary/texture upgrade. INTERVENTIONS: Skilled dysphagia intervention completed via dietary analysis with minced and moist meal + moderately thick liquids. Patient consumed x1 bite of minced and moist pasta; appropriate/slow oral mastication, no s/s of aspiration with mild oral residue remaining; cleared with use of moderately thick liquid wash via cup.  Patient with preference to puree fruit and magic cup; appropriate bolus formation, no s/s of aspiration. Additional moderately thick juice via cup with patient demonstrating independent carryover for small sips, consistent cues required to engage in multiple swallows. No s/s of aspiration. ST thickened new water pitcher of moderately thick liquids for patient; patient enjoys apple juice. Recommendations maintain for minced and moist diet, moderately thick liquids with strict adherence to identified compensatory strategies; patient is at heightened risk for potential pulmonary compromise in correlation to pharyngeal dysfunction in uncontrolled environments and complexity of medical status. *post dysphagia tx, patient without respiratory distress upon leaving room; RN Jeffrey President notified re: clinical findings and recommendations from the assessment; verbal receptiveness noted       SHORT TERM GOAL #2:  Goal 2: Patient will complete functional carryover tasks (biographics, orientation, call light, 1-3 units) with 50% accuracy, mod cues to improve awareness to immediate surroundings. INTERVENTIONS: Orientation  -Month: independent  -Year: max cues   -BENEDICT: max cues + calendar  -Date: max cues + calendar     SHORT TERM GOAL #3:  Goal 3: Patient will complmete basic safety awareness, sequencing, and problem solving tasks with 50% accuracy, max cues to enhance participation in ADLs and current environment. INTERVENTIONS: Problem solving  -Call light location and demonstration- independent     Sequencing:   Task 1: identifying basic prices of items within magazine; 2/3 indep, 1/3 max cues  Task 2: locating basic pictures in magazine; 2/4 indep, 2/4 max cues    SHORT TERM GOAL #4:  Goal 4: Patient will complete multi-syllabic word repetition, sentence cmopletion, confrontational/responsive naming, and basic verbal fluency tasks with 60% accuracy, mod cues to improvce expression of wants/needs.   INTERVENTIONS: DNT d/t focus on additional STGs    SHORT TERM GOAL #5:  Goal 5: Patient will answer basic yes/no questions and execute 2-step commands with 70% accuracy, mod cues to improve comprehension measures for participation in immediate context. INTERVENTIONS: DNT d/t focus on additional STGs    SHORT TERM GOAL #6:  Goal 6: Patient will complete structured speech drills/tasks with implementation of SOS speech strategies to improve intelligibility to 75% to optimize exchanges between social partners. INTERVENTIONS: education provided re: compensatory speech strategies; speak up, open mouth, slow down. ST then cued patient to utilize strategies while naming basic pictures within magazine; 5/10 indep, 5/10 max cues provided to improve execution of strategies and improvement of speech intelligibility     Long-Term Goals:   No LTGs due to short ELOS     EDUCATION:  Learner: Patient  Education:  Reviewed diet and strategies, Reviewed signs, symptoms and risks of aspiration, Reviewed ST goals and Plan of Care, and Reviewed recommendations for follow-up  Evaluation of Education: Demonstrates with assistance, Needs further instruction, and Family not present    ASSESSMENT/PLAN:  Activity Tolerance:  Patient tolerance of  treatment: good. Assessment/Plan: Patient progressing toward established goals. Continues to require skilled care of licensed speech pathologist to progress toward achievement of established goals and plan of care. .     Plan for Next Session: dysphagia management, basic cognitive rehabilitation   Discharge Recommendations: [facility]       SINCERE Hayes 23

## 2022-12-09 VITALS
HEIGHT: 63 IN | SYSTOLIC BLOOD PRESSURE: 114 MMHG | DIASTOLIC BLOOD PRESSURE: 62 MMHG | WEIGHT: 163.1 LBS | RESPIRATION RATE: 16 BRPM | BODY MASS INDEX: 28.9 KG/M2 | TEMPERATURE: 98.3 F | HEART RATE: 67 BPM | OXYGEN SATURATION: 91 %

## 2022-12-09 PROCEDURE — 6370000000 HC RX 637 (ALT 250 FOR IP)

## 2022-12-09 PROCEDURE — 6370000000 HC RX 637 (ALT 250 FOR IP): Performed by: PHYSICIAN ASSISTANT

## 2022-12-09 PROCEDURE — 99239 HOSP IP/OBS DSCHRG MGMT >30: CPT | Performed by: NURSE PRACTITIONER

## 2022-12-09 PROCEDURE — 2580000003 HC RX 258

## 2022-12-09 RX ADMIN — CARVEDILOL 12.5 MG: 6.25 TABLET, FILM COATED ORAL at 09:28

## 2022-12-09 RX ADMIN — POLYETHYLENE GLYCOL 3350 17 G: 17 POWDER, FOR SOLUTION ORAL at 09:30

## 2022-12-09 RX ADMIN — LEVOTHYROXINE SODIUM 125 MCG: 0.12 TABLET ORAL at 05:13

## 2022-12-09 RX ADMIN — HYDROCORTISONE 20 MG: 10 TABLET ORAL at 09:30

## 2022-12-09 RX ADMIN — DOCUSATE SODIUM 100 MG: 100 CAPSULE, LIQUID FILLED ORAL at 09:30

## 2022-12-09 RX ADMIN — TERBINAFINE TABLETS 250 MG 250 MG: 250 TABLET ORAL at 09:29

## 2022-12-09 RX ADMIN — SODIUM CHLORIDE, PRESERVATIVE FREE 10 ML: 5 INJECTION INTRAVENOUS at 09:28

## 2022-12-09 RX ADMIN — BUMETANIDE 1 MG: 1 TABLET ORAL at 09:28

## 2022-12-09 RX ADMIN — PANTOPRAZOLE SODIUM 40 MG: 40 TABLET, DELAYED RELEASE ORAL at 05:13

## 2022-12-09 NOTE — PLAN OF CARE
Problem: Discharge Planning  Goal: Discharge to home or other facility with appropriate resources  Outcome: Progressing  Note: Discharge needs are assessed daily     Problem: Neurosensory - Adult  Goal: Achieves stable or improved neurological status  Outcome: Progressing  Note: Patient alert and oriented x2 this shift     Problem: Respiratory - Adult  Goal: Achieves optimal ventilation and oxygenation  Outcome: Progressing  Note: Patient's O2 sats remain >90% on 2L NC     Problem: Skin/Tissue Integrity - Adult  Goal: Skin integrity remains intact  Outcome: Progressing  Note: Skin is assessed every shift, no new skin breakdown noted     Problem: Musculoskeletal - Adult  Goal: Return mobility to safest level of function  Outcome: Progressing  Note: Patient encouraged to perform daily tasks independently, assistance provided when needed     Problem: Infection - Adult  Goal: Absence of infection at discharge  Outcome: Progressing  Note: Labs and VS monitored for s/s of infection     Problem: Skin/Tissue Integrity  Goal: Absence of new skin breakdown  Description: 1. Monitor for areas of redness and/or skin breakdown  2. Assess vascular access sites hourly  3. Every 4-6 hours minimum:  Change oxygen saturation probe site  4. Every 4-6 hours:  If on nasal continuous positive airway pressure, respiratory therapy assess nares and determine need for appliance change or resting period. Outcome: Progressing  Note: Skin is assessed every shift, no new skin breakdown noted     Problem: Safety - Adult  Goal: Free from fall injury  Outcome: Progressing  Note: Patient free of falls this shift. Patient on falling star program. Fall band intact. RN visually checks on patient with hourly rounds. Patient tolerates ambulation each shift.         Problem: ABCDS Injury Assessment  Goal: Absence of physical injury  Outcome: Progressing  Note: Patient free from injury this shift     Problem: Chronic Conditions and Co-morbidities  Goal: Patient's chronic conditions and co-morbidity symptoms are monitored and maintained or improved  Outcome: Progressing     Problem: Nutrition Deficit:  Goal: Optimize nutritional status  Outcome: Progressing  Note: Patient encouraged to consume % of meals     Problem: Pain  Goal: Verbalizes/displays adequate comfort level or baseline comfort level  Outcome: Progressing  Note: Patient denies pain this shift     Problem: Safety - Adult  Goal: Isolation precautions  Description: Isolation precautions  Outcome: Completed   Care plan reviewed with patient. Patient verbalizes understanding of the plan of care and contribute to goal setting.

## 2022-12-09 NOTE — DISCHARGE SUMMARY
Hospital Medicine Discharge Summary      Patient Identification:   Lisseth Munoz   : 1950  MRN: [id number]   Account: [de-identified]      Patient's PCP: Jin Rogel MD    Admit Date: 2022     Discharge Date:   2022    Admitting Physician: Sheldon Garcia PA-C. Discharge Physician: NIDIA Francisco - CNP     Discharge Diagnoses and Hospital Course:    Pneumonia, left lower lobe, not present on arrival likely secondary to COVID-19 infection--baricitinib started  and completed recommended coures; Decadron -; She is already on Cortef. CRP was noted to be 14 on admission and improved to 2.97 on ; Concerns for bacterial overlap. Zithromax -12/3; Rocephin on -; procalcitonin noted be 0.10; encourage good lung hygiene; chest x-ray obtained on 12/3 shows moderate bibasilar atelectasis/pneumonia, it is reported that the patient is wheelchair-bound at home; removed out of [facility]  per infection control input. Acute hypoxic respiratory failure secondary to #1, Max oxygen demand 6L/NC. She has been weaned down to 2L/NC. Continue to wean as able at the [facility]. Given chest physiotherapy per RT; CTA chest ordered on  no PE. Lactic acidosis, improved--not hypotensive or tachycardic, she is fully alert and oriented. Leukocytosis--afebrile, could be secondary to steroids as she is on Decadron secondary to COVID along with Cortef; could be secondary to #1, monitor. Thrombocytosis, improved--possibly reactive in nature with #1, . Thalia Moss Prerenal azotemia--Held Bumex initially. Restarted with pleural effusions on CTA  and will continue on discharge. Anion gap metabolic acidosis--resolved  Hypernatremia, resolved. Mild, monitor  Hypokalemia, resolved. Replaced per protocol, monitor  Suspected adrenal insufficiency with history of pituitary tumor--on Cortef, resume previous dosing on discharge. Chronic diastolic heart failure-- Bumex.  Coreg to be continued on discharge. Paroxysmal atrial fibrillation--on Xarelto, Coreg  JESSE--resolved  Hypothyroidism--treated with Synthroid  History of CVA--Xarelto, statin  Physical deconditioning/debility likely secondary to numerous comorbid medical conditions--she is wheelchair-bound at home, PT/OT/SW on her case. Precert was approved. Obesity with BMI 33.30  CODE STATUS--limited x4      Symptomatically improved. Finished treatment course for PNA and COVID 19. She remains weak. PT/OT recs for SNF. Precert was approved. She will go to the facility today. Continue to attempt to wean the oxygen. The patient was seen and examined on day of discharge and this discharge summary is in conjunction with any daily progress note from day of discharge. Exam:     Vitals:  Vitals:    12/08/22 1123 12/08/22 1543 12/08/22 2045 12/09/22 0335   BP: 121/64 (!) 104/57 (!) 117/57 (!) 113/56   Pulse: 73 68 70 66   Resp: 20 18 16 18   Temp: 99 °F (37.2 °C) 99.4 °F (37.4 °C) 98.6 °F (37 °C) 98.6 °F (37 °C)   TempSrc: Oral Oral Oral Axillary   SpO2: 93% 95% 91% 93%   Weight:    163 lb 1.6 oz (74 kg)   Height:         Weight: Weight: 163 lb 1.6 oz (74 kg)     24 hour intake/output:  Intake/Output Summary (Last 24 hours) at 12/9/2022 0839  Last data filed at 12/8/2022 1951  Gross per 24 hour   Intake 600 ml   Output 800 ml   Net -200 ml         General appearance:  No apparent distress, appears stated age and cooperative. HEENT:  Normal cephalic, atraumatic without obvious deformity. Pupils equal, round, and reactive to light. Extra ocular muscles intact. Conjunctivae/corneas clear. Neck: Supple, with full range of motion. No jugular venous distention. Trachea midline. Respiratory:  Normal respiratory effort. discharge to auscultation, bilaterally without Rales/Wheezes/Rhonchi. Cardiovascular:  Regular rate and rhythm with normal S1/S2 without murmurs, rubs or gallops.   Abdomen: Soft, non-tender, non-distended with normal bowel sounds. Musculoskeletal:  No clubbing, cyanosis or edema bilaterally. Full range of motion without deformity. Skin: Skin color, texture, turgor normal.  No rashes or lesions. Neurologic:  Neurovascularly intact without any focal sensory/motor deficits. Cranial nerves: II-XII intact, grossly non-focal.  Psychiatric:  Alert and oriented, thought content appropriate, normal insight  Capillary Refill: Brisk,< 3 seconds   Peripheral Pulses: +2 palpable, equal bilaterally       Labs: For convenience and continuity at follow-up the following most recent labs are provided:      CBC:    Lab Results   Component Value Date/Time    WBC 20.1 12/08/2022 03:15 AM    HGB 10.5 12/08/2022 03:15 AM    HCT 33.1 12/08/2022 03:15 AM     12/08/2022 03:15 AM       Renal:    Lab Results   Component Value Date/Time     12/08/2022 03:15 AM    K 4.3 12/08/2022 03:15 AM    K 4.3 12/07/2022 06:15 AM     12/08/2022 03:15 AM    CO2 30 12/08/2022 03:15 AM    BUN 22 12/08/2022 03:15 AM    CREATININE 0.9 12/08/2022 03:15 AM    CALCIUM 8.0 12/08/2022 03:15 AM    PHOS 3.9 12/31/2018 04:59 AM         Significant Diagnostic Studies    Radiology:   CTA CHEST W WO CONTRAST   Final Result       1. There is no pulmonary embolism. 2. Extensive bilateral airspace infiltrates in a perihilar distribution. Trace pleural effusions. 3. Mild cardiac enlargement. **This report has been created using voice recognition software. It may contain minor errors which are inherent in voice recognition technology. **      Final report electronically signed by Dr. Eve Sumner on 12/6/2022 8:50 AM      XR CHEST PORTABLE   Final Result   1. Bilateral shoulder prostheses. Normal heart size. Right arm PICC line, catheter tip in right atrium. Prior cholecystectomy. 2. Moderate bibasilar atelectasis/pneumonia. Overall appearance of chest has worsened since prior. No effusion is seen.             **This report has been created using voice recognition software. It may contain minor errors which are inherent in voice recognition technology. **      Final report electronically signed by Dr. Sharlyne Buerger on 12/3/2022 1:34 PM      XR CHEST PORTABLE   Final Result   Patchy left lower lobe airspace infiltrates            **This report has been created using voice recognition software. It may contain minor errors which are inherent in voice recognition technology. **      Final report electronically signed by Dr. Joanie Miller on 12/1/2022 10:53 AM      XR FOOT LEFT (MIN 3 VIEWS)   Final Result   Severe osteopenia and soft tissue swelling. No fracture. Evaluation of the calcaneus is mildly limited due to the crosstable lateral projection and overlying artifacts            **This report has been created using voice recognition software. It may contain minor errors which are inherent in voice recognition technology. **      Final report electronically signed by Dr. Joanie Miller on 11/30/2022 8:21 AM      FL MODIFIED BARIUM SWALLOW W VIDEO   Final Result   1. Aspiration with thin liquids and mildly thickened liquids. 2. Additional recommendations from speech therapist will follow. **This report has been created using voice recognition software. It may contain minor errors which are inherent in voice recognition technology. **      Final report electronically signed by Dr. Audra Mckeon on 11/26/2022 11:12 AM      XR CHEST PORTABLE   Final Result   No acute cardiopulmonary disease            **This report has been created using voice recognition software. It may contain minor errors which are inherent in voice recognition technology. **      Final report electronically signed by Dr. Joanie Miller on 11/23/2022 10:36 AM             Consults:     IP CONSULT TO CASE MANAGEMENT  PALLIATIVE CARE EVAL  IP CONSULT TO SOCIAL WORK  IP CONSULT TO SOCIAL WORK  IP CONSULT TO PHARMACY  IP CONSULT TO DIETITIAN  IP CONSULT TO PHARMACY    Disposition:    [] Home [] TCU       [] Rehab       [] Psych       [x] SNF       [] [place]       [] Other-    Condition at Discharge: Stable    Code Status:  Limited     Patient Instructions: Activity: activity as tolerated  Diet: ADULT DIET; Dysphagia - Minced and Moist; Moderately Thick (Honey)  ADULT ORAL NUTRITION SUPPLEMENT; Breakfast, Lunch, Dinner; Frozen Oral Supplement      Follow-up visits:   [address]  [address] [facility]  [phone]             Discharge Medications:        Medication List        CONTINUE taking these medications      acetaminophen 325 MG tablet  Commonly known as: TYLENOL     bumetanide 1 MG tablet  Commonly known as: BUMEX  TAKE 1 TABLET BY MOUTH DAILY     carvedilol 12.5 MG tablet  Commonly known as: COREG  TAKE 1 TABLET BY MOUTH TWICE DAILY     chlorhexidine 4 % external liquid  Commonly known as: HIBICLENS  Wash hair with small amount twice weekly. diphenhydrAMINE-APAP (sleep)  MG tablet  Commonly known as: TYLENOL PM EXTRA STRENGTH     * hydrocortisone 20 MG tablet  Commonly known as: CORTEF     * hydrocortisone 5 MG tablet  Commonly known as: CORTEF     levothyroxine 125 MCG tablet  Commonly known as: SYNTHROID  TAKE 1 TABLET BY MOUTH DAILY     miconazole 2 % powder  Commonly known as: MICOTIN  Apply topically 2 times daily PRN for excoriation     oxybutynin 15 MG extended release tablet  Commonly known as: DITROPAN XL  TAKE 1 TABLET BY MOUTH DAILY     pantoprazole 40 MG tablet  Commonly known as: PROTONIX  TAKE 1 TABLET BY MOUTH EVERY NIGHT     rivaroxaban 15 MG Tabs tablet  Commonly known as: Xarelto  TAKE 1 TABLET BY MOUTH DAILY WITH SUPPER     simvastatin 20 MG tablet  Commonly known as: ZOCOR  Take 1 tablet by mouth nightly     terbinafine 250 MG tablet  Commonly known as: LamISIL  Take 1 tablet by mouth daily           * This list has 2 medication(s) that are the same as other medications prescribed for you.  Read the directions carefully, and ask your doctor or other care provider to review them with you. Time Spent on discharge is more than 45 minutes in the examination, evaluation, counseling and review of medications and discharge plan. Signed: Thank you Dann Hinojosa MD for the opportunity to be involved in this patient's care.     Electronically signed by NIDIA Tripp CNP on 12/9/2022 at 8:39 AM

## 2022-12-09 NOTE — CARE COORDINATION
12/9/22, 9:54 AM EST    DISCHARGE PLANNING EVALUATION    9:30 AM  Spoke with patient, informed discharge today to [facility]. Discussed transport, will need ambulance transport. Transport scheduled for 110:00 today with [facility]. 9:54 AM  Spoke with  Dinaraquel Maria G regarding discharge, informed discharge scheduled for today with transport at 11:00 am.  He stated Andrez Kearns is not being discharge today\". He is refusing to allow her to go to [facility]. The only reason [facility] was chosen was due to [facility] being covid + and only facility in area to accept covid +. She is out of isolation at this time and Tomas Mccarty want her to go to [facility] where she was before. The SW explained that [facility] is a pre-cert, it has taken over a week for the pre-cert to be approved, then for her O2 levels to decrease enough for discharge. She is medically ready for discharge and pre-cert is approved through 12/12. If switching facilities, a new pre-cert would need to be started, which adds days to her stay when she is already medically ready for discharge. Saleem Cummins stated \"From what I saw last night she is not ready for discharge\". He stated he would be to the hospital within the hour, he is going to appeal the discharge. Advised he would need to call to appeal the discharge and the paper with the process and phone number will be in [address] [place] [address] room. [facility], cancelled transport. [id number] [facility], spoke with Sathish Hernandez, will put patient on a will call list.    Spoke with Effie at [facility] inquired about available beds. She is not sure she has a bed, will be meeting with the team in a few minutes, she will get back. Effie called, [facility] does not have a bed. Spoke with patient and , advised no bed at [facility].  is now agreeable to [facility].     12/12/22, 8:00 AM EST  Late Entry  Patient goals/plan/ treatment preferences discussed by  and . Patient goals/plan/ treatment preferences reviewed with patient/ family. Patient/ family verbalize understanding of discharge plan and are in agreement with goal/plan/treatment preferences. Understanding was demonstrated using the teach back method. AVS provided by RN at time of discharge, which includes all necessary medical information pertaining to the patients current course of illness, treatment, post-discharge goals of care, and treatment preferences. Services At/After Discharge: [facility] ([facility]), In ambulance, Nursing service, OT, and PT       IMM Letter  IMM Letter given to Patient/Family/Significant other/Guardian/POA/by[de-identified] Roshni LAIRD Case Manager. IMM Letter date given[de-identified] 12/09/22  IMM Letter time given[de-identified] 1  Observation Status Letter date given[de-identified] 11/23/22  Observation Status Letter time given[de-identified] 2214  Observation Status Letter given to Patient/Family/Significant other/Guardian/POA/by[de-identified] Pt Access. Patient discharged to [facility], skilled medicare bed. Jeff at [facility] informed of discharge with 1:00 transport. Faxed AVS and MAR, RN aware to call report.

## 2022-12-12 ENCOUNTER — TELEPHONE (OUTPATIENT)
Dept: FAMILY MEDICINE CLINIC | Age: 72
End: 2022-12-12

## 2022-12-12 NOTE — TELEPHONE ENCOUNTER
Care Transitions Initial Follow Up Call    Outreach made within 2 business days of discharge: Yes    Patient: Radha Marques Patient : 1950   MRN: [id number]  Reason for Admission: There are no discharge diagnoses documented for the most recent discharge. Discharge Date: 22       Spoke with: Left message for patient to call office back. Discharge department/facility:     [facility] Interactive Patient Contact:  Was patient able to fill all prescriptions:   Was patient instructed to bring all medications to the follow-up visit:   Is patient taking all medications as directed in the discharge summary?    Does patient understand their discharge instructions:   Does patient have questions or concerns that need addressed prior to 7-14 day follow up office visit:     Scheduled appointment with PCP within 7-14 days    Follow Up  Future Appointments   Date Time Provider Shantal Akhtar   2023  2:00 PM Meenakshi Prasad MD [address] [place] [address]

## 2022-12-30 ENCOUNTER — TELEPHONE (OUTPATIENT)
Dept: FAMILY MEDICINE CLINIC | Age: 72
End: 2022-12-30

## 2022-12-30 NOTE — TELEPHONE ENCOUNTER
Pts  called wanting stating that he had some questions for you about nursing homes. He did not go into much detail because he wants to speak with you directly. I think he's wanting to know what facilities you recommend.

## 2023-01-03 NOTE — PROGRESS NOTES
learning  [x] Family not present    PATIENT GOALS: [] Pt did not state. Will further assess during treatment. [] Return to the least restricted diet possible     [x] Return to previous level of function     [] OTHER:    PLAN / TREATMENT RECOMMENDATIONS:  [x] Skilled SLP intervention on acute care 3-5 x per week or until goals met and/or pt plateaus in function. Specific interventions for next session may include: dysphagia, monitor cognitive baseline     SHORT TERM GOALS:  Short-term Goals  Timeframe for Short-term Goals: 2 weeks  Goal 1: Patient will tolerate dysphagia II diet with thin liquids, no straws without s/s of apsiration in order to safely maintain adequate hydration and nutrition  Goal 2: Patient will complete advanced PO trials demonstrating with functional mastication and approrpaite endurance without s/s of aspiraiton in order to safley advance patient diet. Goal 3: Complete close pulmonary monitoning, if delcine presents recommend instrumental evaluation to furthur evaluate dysphagia. Goal 4: Monitor cognitive baseline and complete evaluation as approrapite.        LONG TERM GOALS:  No LTGs due to [address] [facility] SINCERE Altman [address] wine

## 2023-01-04 ENCOUNTER — APPOINTMENT (OUTPATIENT)
Dept: GENERAL RADIOLOGY | Age: 73
DRG: 871 | End: 2023-01-04
Payer: MEDICARE

## 2023-01-04 ENCOUNTER — TELEPHONE (OUTPATIENT)
Dept: FAMILY MEDICINE CLINIC | Age: 73
End: 2023-01-04

## 2023-01-04 ENCOUNTER — APPOINTMENT (OUTPATIENT)
Dept: CT IMAGING | Age: 73
DRG: 871 | End: 2023-01-04
Payer: MEDICARE

## 2023-01-04 ENCOUNTER — HOSPITAL ENCOUNTER (INPATIENT)
Age: 73
LOS: 13 days | Discharge: NURSING, REHAB OR LONG-TERM CARE | DRG: 871 | End: 2023-01-17
Attending: EMERGENCY MEDICINE | Admitting: INTERNAL MEDICINE
Payer: MEDICARE

## 2023-01-04 DIAGNOSIS — J18.9 PNEUMONIA OF RIGHT LUNG DUE TO INFECTIOUS ORGANISM, UNSPECIFIED PART OF LUNG: ICD-10-CM

## 2023-01-04 DIAGNOSIS — N17.9 AKI (ACUTE KIDNEY INJURY) (HCC): ICD-10-CM

## 2023-01-04 DIAGNOSIS — K11.20 SUBMANDIBULAR GLAND INFECTION: ICD-10-CM

## 2023-01-04 DIAGNOSIS — R77.8 TROPONIN LEVEL ELEVATED: ICD-10-CM

## 2023-01-04 DIAGNOSIS — R57.9 SHOCK (HCC): ICD-10-CM

## 2023-01-04 DIAGNOSIS — E03.9 ACQUIRED HYPOTHYROIDISM: ICD-10-CM

## 2023-01-04 DIAGNOSIS — K12.2 CELLULITIS OF SUBMANDIBULAR REGION: Primary | ICD-10-CM

## 2023-01-04 PROBLEM — A41.9 SEPTIC SHOCK (HCC): Status: ACTIVE | Noted: 2023-01-04

## 2023-01-04 PROBLEM — R65.21 SEPTIC SHOCK (HCC): Status: ACTIVE | Noted: 2023-01-04

## 2023-01-04 LAB
ALBUMIN SERPL-MCNC: 3.2 G/DL (ref 3.5–5.1)
ALP BLD-CCNC: 120 U/L (ref 38–126)
ALT SERPL-CCNC: 10 U/L (ref 11–66)
ANION GAP SERPL CALCULATED.3IONS-SCNC: 13 MEQ/L (ref 8–16)
ANISOCYTOSIS: PRESENT
AST SERPL-CCNC: 18 U/L (ref 5–40)
ATYPICAL LYMPHOCYTES: ABNORMAL %
BACTERIA: ABNORMAL /HPF
BASOPHILIA: ABNORMAL
BASOPHILS # BLD: 0.4 %
BASOPHILS ABSOLUTE: 0.1 THOU/MM3 (ref 0–0.1)
BILIRUB SERPL-MCNC: 0.6 MG/DL (ref 0.3–1.2)
BILIRUBIN URINE: NEGATIVE
BLOOD, URINE: ABNORMAL
BUN BLDV-MCNC: 20 MG/DL (ref 7–22)
CALCIUM SERPL-MCNC: 8.8 MG/DL (ref 8.5–10.5)
CASTS 2: ABNORMAL /LPF
CASTS UA: ABNORMAL /LPF
CHARACTER, URINE: ABNORMAL
CHLORIDE BLD-SCNC: 106 MEQ/L (ref 98–111)
CO2: 32 MEQ/L (ref 23–33)
COLOR: YELLOW
CREAT SERPL-MCNC: 1.9 MG/DL (ref 0.4–1.2)
CRYSTALS, UA: ABNORMAL
EOSINOPHIL # BLD: 0.6 %
EOSINOPHILS ABSOLUTE: 0.2 THOU/MM3 (ref 0–0.4)
EPITHELIAL CELLS, UA: ABNORMAL /HPF
ERYTHROCYTE [DISTWIDTH] IN BLOOD BY AUTOMATED COUNT: 16.8 % (ref 11.5–14.5)
ERYTHROCYTE [DISTWIDTH] IN BLOOD BY AUTOMATED COUNT: 58 FL (ref 35–45)
GFR SERPL CREATININE-BSD FRML MDRD: 28 ML/MIN/1.73M2
GLUCOSE BLD-MCNC: 85 MG/DL (ref 70–108)
GLUCOSE URINE: NEGATIVE MG/DL
HCT VFR BLD CALC: 47.3 % (ref 37–47)
HEMOGLOBIN: 14.6 GM/DL (ref 12–16)
IMMATURE GRANS (ABS): 0.27 THOU/MM3 (ref 0–0.07)
IMMATURE GRANULOCYTES: 1.1 %
KETONES, URINE: NEGATIVE
LACTIC ACID, SEPSIS: 1 MMOL/L (ref 0.5–1.9)
LACTIC ACID, SEPSIS: 2 MMOL/L (ref 0.5–1.9)
LEUKOCYTE ESTERASE, URINE: ABNORMAL
LYMPHOCYTES # BLD: 13.5 %
LYMPHOCYTES ABSOLUTE: 3.4 THOU/MM3 (ref 1–4.8)
MCH RBC QN AUTO: 29.7 PG (ref 26–33)
MCHC RBC AUTO-ENTMCNC: 30.9 GM/DL (ref 32.2–35.5)
MCV RBC AUTO: 96.1 FL (ref 81–99)
MISCELLANEOUS 2: ABNORMAL
MONOCYTES # BLD: 4.1 %
MONOCYTES ABSOLUTE: 1 THOU/MM3 (ref 0.4–1.3)
NITRITE, URINE: NEGATIVE
NUCLEATED RED BLOOD CELLS: 0 /100 WBC
OSMOLALITY CALCULATION: 301.7 MOSMOL/KG (ref 275–300)
PH UA: 5 (ref 5–9)
PLATELET # BLD: 281 THOU/MM3 (ref 130–400)
PLATELET ESTIMATE: ADEQUATE
PMV BLD AUTO: 10.3 FL (ref 9.4–12.4)
POTASSIUM SERPL-SCNC: 3.3 MEQ/L (ref 3.5–5.2)
PRO-BNP: 516.3 PG/ML (ref 0–124)
PROTEIN UA: NEGATIVE
RBC # BLD: 4.92 MILL/MM3 (ref 4.2–5.4)
RBC URINE: ABNORMAL /HPF
RENAL EPITHELIAL, UA: ABNORMAL
SCAN OF BLOOD SMEAR: NORMAL
SEG NEUTROPHILS: 80.3 %
SEGMENTED NEUTROPHILS ABSOLUTE COUNT: 20.1 THOU/MM3 (ref 1.8–7.7)
SODIUM BLD-SCNC: 151 MEQ/L (ref 135–145)
SPECIFIC GRAVITY, URINE: 1.01 (ref 1–1.03)
TOTAL CK: 15 U/L (ref 30–135)
TOTAL PROTEIN: 6.1 G/DL (ref 6.1–8)
TROPONIN T: 0.03 NG/ML
TROPONIN T: 0.05 NG/ML
UROBILINOGEN, URINE: 0.2 EU/DL (ref 0–1)
WBC # BLD: 25 THOU/MM3 (ref 4.8–10.8)
WBC UA: ABNORMAL /HPF
YEAST: ABNORMAL

## 2023-01-04 PROCEDURE — 71045 X-RAY EXAM CHEST 1 VIEW: CPT

## 2023-01-04 PROCEDURE — 2580000003 HC RX 258: Performed by: STUDENT IN AN ORGANIZED HEALTH CARE EDUCATION/TRAINING PROGRAM

## 2023-01-04 PROCEDURE — 36415 COLL VENOUS BLD VENIPUNCTURE: CPT

## 2023-01-04 PROCEDURE — 82550 ASSAY OF CK (CPK): CPT

## 2023-01-04 PROCEDURE — 2580000003 HC RX 258: Performed by: EMERGENCY MEDICINE

## 2023-01-04 PROCEDURE — 2500000003 HC RX 250 WO HCPCS: Performed by: STUDENT IN AN ORGANIZED HEALTH CARE EDUCATION/TRAINING PROGRAM

## 2023-01-04 PROCEDURE — 6360000002 HC RX W HCPCS: Performed by: STUDENT IN AN ORGANIZED HEALTH CARE EDUCATION/TRAINING PROGRAM

## 2023-01-04 PROCEDURE — 96367 TX/PROPH/DG ADDL SEQ IV INF: CPT

## 2023-01-04 PROCEDURE — 83880 ASSAY OF NATRIURETIC PEPTIDE: CPT

## 2023-01-04 PROCEDURE — 87641 MR-STAPH DNA AMP PROBE: CPT

## 2023-01-04 PROCEDURE — 81001 URINALYSIS AUTO W/SCOPE: CPT

## 2023-01-04 PROCEDURE — 87070 CULTURE OTHR SPECIMN AEROBIC: CPT

## 2023-01-04 PROCEDURE — 2000000000 HC ICU R&B

## 2023-01-04 PROCEDURE — 84484 ASSAY OF TROPONIN QUANT: CPT

## 2023-01-04 PROCEDURE — 2500000003 HC RX 250 WO HCPCS: Performed by: EMERGENCY MEDICINE

## 2023-01-04 PROCEDURE — 99285 EMERGENCY DEPT VISIT HI MDM: CPT

## 2023-01-04 PROCEDURE — 80053 COMPREHEN METABOLIC PANEL: CPT

## 2023-01-04 PROCEDURE — 85025 COMPLETE CBC W/AUTO DIFF WBC: CPT

## 2023-01-04 PROCEDURE — 99291 CRITICAL CARE FIRST HOUR: CPT | Performed by: INTERNAL MEDICINE

## 2023-01-04 PROCEDURE — 02HV33Z INSERTION OF INFUSION DEVICE INTO SUPERIOR VENA CAVA, PERCUTANEOUS APPROACH: ICD-10-PCS | Performed by: INTERNAL MEDICINE

## 2023-01-04 PROCEDURE — 6360000002 HC RX W HCPCS: Performed by: EMERGENCY MEDICINE

## 2023-01-04 PROCEDURE — 36556 INSERT NON-TUNNEL CV CATH: CPT

## 2023-01-04 PROCEDURE — 70486 CT MAXILLOFACIAL W/O DYE: CPT

## 2023-01-04 PROCEDURE — 96361 HYDRATE IV INFUSION ADD-ON: CPT

## 2023-01-04 PROCEDURE — 83605 ASSAY OF LACTIC ACID: CPT

## 2023-01-04 PROCEDURE — 96365 THER/PROPH/DIAG IV INF INIT: CPT

## 2023-01-04 PROCEDURE — 93005 ELECTROCARDIOGRAM TRACING: CPT | Performed by: EMERGENCY MEDICINE

## 2023-01-04 PROCEDURE — 87040 BLOOD CULTURE FOR BACTERIA: CPT

## 2023-01-04 RX ORDER — SODIUM CHLORIDE 9 MG/ML
INJECTION, SOLUTION INTRAVENOUS CONTINUOUS
Status: DISCONTINUED | OUTPATIENT
Start: 2023-01-04 | End: 2023-01-04

## 2023-01-04 RX ORDER — SODIUM CHLORIDE 9 MG/ML
INJECTION, SOLUTION INTRAVENOUS PRN
Status: DISCONTINUED | OUTPATIENT
Start: 2023-01-04 | End: 2023-01-17 | Stop reason: HOSPADM

## 2023-01-04 RX ORDER — LEVOTHYROXINE SODIUM 0.12 MG/1
125 TABLET ORAL DAILY
Status: DISCONTINUED | OUTPATIENT
Start: 2023-01-05 | End: 2023-01-17 | Stop reason: HOSPADM

## 2023-01-04 RX ORDER — 0.9 % SODIUM CHLORIDE 0.9 %
30 INTRAVENOUS SOLUTION INTRAVENOUS ONCE
Status: COMPLETED | OUTPATIENT
Start: 2023-01-04 | End: 2023-01-04

## 2023-01-04 RX ORDER — POTASSIUM CHLORIDE 7.45 MG/ML
10 INJECTION INTRAVENOUS
Status: DISPENSED | OUTPATIENT
Start: 2023-01-04 | End: 2023-01-04

## 2023-01-04 RX ORDER — ONDANSETRON 2 MG/ML
4 INJECTION INTRAMUSCULAR; INTRAVENOUS EVERY 6 HOURS PRN
Status: DISCONTINUED | OUTPATIENT
Start: 2023-01-04 | End: 2023-01-17 | Stop reason: HOSPADM

## 2023-01-04 RX ORDER — DEXAMETHASONE SODIUM PHOSPHATE 4 MG/ML
10 INJECTION, SOLUTION INTRA-ARTICULAR; INTRALESIONAL; INTRAMUSCULAR; INTRAVENOUS; SOFT TISSUE ONCE
Status: COMPLETED | OUTPATIENT
Start: 2023-01-04 | End: 2023-01-04

## 2023-01-04 RX ORDER — PANTOPRAZOLE SODIUM 40 MG/1
40 TABLET, DELAYED RELEASE ORAL
Status: DISCONTINUED | OUTPATIENT
Start: 2023-01-05 | End: 2023-01-17 | Stop reason: HOSPADM

## 2023-01-04 RX ORDER — ATORVASTATIN CALCIUM 10 MG/1
10 TABLET, FILM COATED ORAL DAILY
Status: DISCONTINUED | OUTPATIENT
Start: 2023-01-04 | End: 2023-01-17 | Stop reason: HOSPADM

## 2023-01-04 RX ORDER — DEXAMETHASONE SODIUM PHOSPHATE 4 MG/ML
4 INJECTION, SOLUTION INTRA-ARTICULAR; INTRALESIONAL; INTRAMUSCULAR; INTRAVENOUS; SOFT TISSUE EVERY 6 HOURS
Status: COMPLETED | OUTPATIENT
Start: 2023-01-04 | End: 2023-01-05

## 2023-01-04 RX ORDER — ONDANSETRON 4 MG/1
4 TABLET, ORALLY DISINTEGRATING ORAL EVERY 8 HOURS PRN
Status: DISCONTINUED | OUTPATIENT
Start: 2023-01-04 | End: 2023-01-17 | Stop reason: HOSPADM

## 2023-01-04 RX ORDER — ACETAMINOPHEN 650 MG/1
650 SUPPOSITORY RECTAL EVERY 6 HOURS PRN
Status: DISCONTINUED | OUTPATIENT
Start: 2023-01-04 | End: 2023-01-17 | Stop reason: HOSPADM

## 2023-01-04 RX ORDER — ACETAMINOPHEN 325 MG/1
650 TABLET ORAL EVERY 6 HOURS PRN
Status: DISCONTINUED | OUTPATIENT
Start: 2023-01-04 | End: 2023-01-17 | Stop reason: HOSPADM

## 2023-01-04 RX ORDER — PANTOPRAZOLE SODIUM 40 MG/10ML
40 INJECTION, POWDER, LYOPHILIZED, FOR SOLUTION INTRAVENOUS
Status: DISCONTINUED | OUTPATIENT
Start: 2023-01-05 | End: 2023-01-15

## 2023-01-04 RX ORDER — SODIUM CHLORIDE 0.9 % (FLUSH) 0.9 %
5-40 SYRINGE (ML) INJECTION EVERY 12 HOURS SCHEDULED
Status: DISCONTINUED | OUTPATIENT
Start: 2023-01-04 | End: 2023-01-17 | Stop reason: HOSPADM

## 2023-01-04 RX ORDER — POLYETHYLENE GLYCOL 3350 17 G/17G
17 POWDER, FOR SOLUTION ORAL DAILY PRN
Status: DISCONTINUED | OUTPATIENT
Start: 2023-01-04 | End: 2023-01-17 | Stop reason: HOSPADM

## 2023-01-04 RX ORDER — SODIUM CHLORIDE 450 MG/100ML
INJECTION, SOLUTION INTRAVENOUS CONTINUOUS
Status: DISCONTINUED | OUTPATIENT
Start: 2023-01-04 | End: 2023-01-06

## 2023-01-04 RX ORDER — SODIUM CHLORIDE 0.9 % (FLUSH) 0.9 %
5-40 SYRINGE (ML) INJECTION PRN
Status: DISCONTINUED | OUTPATIENT
Start: 2023-01-04 | End: 2023-01-17 | Stop reason: HOSPADM

## 2023-01-04 RX ORDER — ENOXAPARIN SODIUM 100 MG/ML
1 INJECTION SUBCUTANEOUS EVERY 24 HOURS
Status: DISCONTINUED | OUTPATIENT
Start: 2023-01-04 | End: 2023-01-05

## 2023-01-04 RX ADMIN — SODIUM CHLORIDE, PRESERVATIVE FREE 10 ML: 5 INJECTION INTRAVENOUS at 22:25

## 2023-01-04 RX ADMIN — SODIUM CHLORIDE: 9 INJECTION, SOLUTION INTRAVENOUS at 13:26

## 2023-01-04 RX ADMIN — SODIUM CHLORIDE: 4.5 INJECTION, SOLUTION INTRAVENOUS at 21:32

## 2023-01-04 RX ADMIN — Medication 5 MCG/MIN: at 16:14

## 2023-01-04 RX ADMIN — POTASSIUM CHLORIDE 10 MEQ: 7.46 INJECTION, SOLUTION INTRAVENOUS at 23:41

## 2023-01-04 RX ADMIN — DEXAMETHASONE SODIUM PHOSPHATE 4 MG: 4 INJECTION, SOLUTION INTRA-ARTICULAR; INTRALESIONAL; INTRAMUSCULAR; INTRAVENOUS; SOFT TISSUE at 22:34

## 2023-01-04 RX ADMIN — DEXAMETHASONE SODIUM PHOSPHATE 10 MG: 4 INJECTION, SOLUTION INTRAMUSCULAR; INTRAVENOUS at 17:17

## 2023-01-04 RX ADMIN — ENOXAPARIN SODIUM 70 MG: 100 INJECTION SUBCUTANEOUS at 22:22

## 2023-01-04 RX ADMIN — CEFTRIAXONE SODIUM 1000 MG: 1 INJECTION, POWDER, FOR SOLUTION INTRAMUSCULAR; INTRAVENOUS at 13:29

## 2023-01-04 RX ADMIN — SODIUM CHLORIDE 3000 MG: 900 INJECTION INTRAVENOUS at 22:30

## 2023-01-04 RX ADMIN — SODIUM CHLORIDE 1572 ML: 9 INJECTION, SOLUTION INTRAVENOUS at 13:30

## 2023-01-04 RX ADMIN — VASOPRESSIN 0.03 UNITS/MIN: 20 INJECTION INTRAVENOUS at 23:44

## 2023-01-04 ASSESSMENT — PAIN DESCRIPTION - LOCATION: LOCATION: JAW

## 2023-01-04 ASSESSMENT — PAIN - FUNCTIONAL ASSESSMENT
PAIN_FUNCTIONAL_ASSESSMENT: NONE - DENIES PAIN
PAIN_FUNCTIONAL_ASSESSMENT: NONE - DENIES PAIN
PAIN_FUNCTIONAL_ASSESSMENT: 0-10
PAIN_FUNCTIONAL_ASSESSMENT: NONE - DENIES PAIN

## 2023-01-04 ASSESSMENT — PAIN SCALES - GENERAL
PAINLEVEL_OUTOF10: 0
PAINLEVEL_OUTOF10: 8

## 2023-01-04 ASSESSMENT — PAIN DESCRIPTION - ORIENTATION: ORIENTATION: LEFT;LOWER

## 2023-01-04 NOTE — ED NOTES
Dr Gisell Napier and Dr Hardeep Whitley preparing for central line placement.           Deirdre Dan RN  01/04/23 [id number]

## 2023-01-04 NOTE — H&P
CRITICAL CARE- History & Physical      Patient: Nishant Stephenson    Unit/Bed:14/014A  YOB: 1950  MRN: [id number]   Acct: [de-identified]   PCP: James Paiz MD    Date of Service: Pt seen/examined on 01/04/23  and Admitted to Inpatient with expected LOS greater than two midnights due to medical therapy. Chief Complaint: Left lower jaw pain    Assessment and Plan:-  Septic Shock POA: Likely secondary to #2; received 30 cc/kg bolus in ER however maps were still less than 60. Currently on Levophed drip. Blood cultures pending at this time. Her lactic acid was elevated however has resolved. We will start patient on Unasyn for antibiotics. Wean pressors to maintain MAP greater than 65. Continue IV fluids  Periapical Abscess with enlargement of the left submandibular gland: No concern for airway compromise at this time. ENT consulted and recommend warm compresses and antibiotics. Patient will need to see dentist after discharge for the periapical abscess. Likely will improve with antibiotics. Start patient on Unasyn  Hypernatremia: Likely secondary to dehydration and poor oral intake. 151 on admission. Patient 2.6 L free water deficit. Continue IV fluids at this time. Repeat BMP tomorrow a.m. JESSE Stage 3: Likely secondary to #1 and dehydration; creatinine on arrival at 1.9 with baseline of 0.9. Avoid nephrotoxic medications at this time. IV fluids. Monitor strict I's and O's. Mild Hypokalemia: Related to poor oral intake; replete and recheck  Elevated troponin: Likely demand; denies chest pain at this time. EKG reviewed and showed no ischemia at this time. Will repeat troponin. Leukocytosis: Afebrile. Secondary to #1 and patient is on chronic steroids. Monitor with CBC  Adrenal Insufficiency: Patient has history of pituitary tumor removal 50 years ago and since then has been on steroids.   Will do stress dose steroids of 4 mg Decadron every 6 hours x 4 doses  Hypothyroidism: Resume home Synthroid  Paroxysmal atrial fibrillation: In normal sinus rhythm per last EKG. Hold Coreg at this time due to patient being on pressors. On Xarelto for anticoagulation at home. We will hold due to patient being n.p.o. and start patient on therapeutic Lovenox. History of COVID infection: Patient was recently admitted to the hospital in early December for pneumonia secondary to COVID-19 infection. Patient had completed course of antibiotics at that time. History of stroke: Prior history of stroke in 81 and in 2015. Xarelto on hold at this time due to patient being n.p.o. Physical deconditioning/debility: Multiple, comorbid conditions. Wheelchair-bound at home   Obesity with BMI 29: Noted  Code Status: Patient is limited code per previous admission. History Of Present Illness:      Patient is a 72-year-old female with a past medical history of adrenal insufficiency, hypothyroidism, paroxysmal atrial fibrillation, history of pituitary tumor, tension, stroke, and chronic diastolic heart failure admitted for left lower back pain. Patient states that for the last few days she has been having submandibular swelling and redness in the last few days. Patient has difficulty opening her mouth and has not been eating or drinking. She was recently admitted for the last month. Does have physical deconditioning and is wheelchair-bound. Denies any fever at home.       Past Medical History:        Diagnosis Date    Asthma     uses albuterol 1-2x per year    Atrial thrombosis     on [address]    Bursitis     right shoulder -s/p steroid injections    Cancer (HCC)     Chronic diastolic heart failure (HCC)     CVA (cerebral infarction) 6/14/15    Mult. small acute infarctions- Left temporal, internal capsule, basal ganglia    Diabetes insipidus ([address] 1970's    borderline since 1970's    GERD (gastroesophageal reflux disease)     History of diabetes insipidus     History of meningioma of the brain     Hyperlipidemia Hypertension     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 ([facility] [address]     Dr. Cecilia Gomez    Hypothyroidism     Meningioma [facility])     3 separate meningiomas, s/p gamma knife (1/2012) , [place]. Claire Riche[place] [facility] [place] [facility]    MRSA nasal colonization 6/2013    Obesity (BMI 30-39. 9)     Osteoarthritis     PAF (paroxysmal atrial fibrillation) (HCC)     Panhypopituitarism (HCC)     status post resection for macroadenoma in the 1970's    Pneumonia 11/2018    Stroke [facility]) 1988    due to radiation -bleeding CVA - residual left upper and lower extremity weakness    Stroke [facility]) October 2015    Urinary incontinence        Past Surgical History:        Procedure Laterality Date    [address] due to pituitary mass, drained and s/p radiation    CHOLECYSTECTOMY      HYSTERECTOMY (CERVIX STATUS UNKNOWN)      total done for DUB    OTHER SURGICAL HISTORY  9/30/2014    LAPAROSCOPIC RUPTURED APPENDECTOMY    OTHER SURGICAL HISTORY Left 10/26/15    Evacuation and Debridement of Left Lower Leg Hematoma - Dr. Yanelis Burch  3/13/2013    left    SHOULDER ARTHROPLASTY      right    TOOTH EXTRACTION  03/19/2018    TOTAL HIP ARTHROPLASTY      bilateral    TOTAL KNEE ARTHROPLASTY      right        Home Medications:   No current facility-administered medications on file prior to encounter.      Current Outpatient Medications on File Prior to Encounter   Medication Sig Dispense Refill    terbinafine (LAMISIL) 250 MG tablet Take 1 tablet by mouth daily 90 tablet 1    rivaroxaban (XARELTO) 15 MG TABS tablet TAKE 1 TABLET BY MOUTH DAILY WITH SUPPER 90 tablet 3    oxybutynin (DITROPAN XL) 15 MG extended release tablet TAKE 1 TABLET BY MOUTH DAILY 90 tablet 1    carvedilol (COREG) 12.5 MG tablet TAKE 1 TABLET BY MOUTH TWICE DAILY 180 tablet 3    levothyroxine (SYNTHROID) 125 MCG tablet TAKE 1 TABLET BY MOUTH DAILY 90 tablet 1    pantoprazole (PROTONIX) 40 MG tablet TAKE 1 TABLET BY MOUTH EVERY NIGHT 90 tablet 3    bumetanide (BUMEX) 1 MG tablet TAKE 1 TABLET BY MOUTH DAILY 90 tablet 1    chlorhexidine (HIBICLENS) 4 % external liquid Wash hair with small amount twice weekly. 1 Bottle 2    miconazole (MICOTIN) 2 % powder Apply topically 2 times daily PRN for excoriation 45 g 1    hydrocortisone (CORTEF) 20 MG tablet Take 20 mg by mouth every morning       hydrocortisone (CORTEF) 5 MG tablet Take 10 mg by mouth nightly       diphenhydrAMINE-APAP, sleep, (TYLENOL PM EXTRA STRENGTH)  MG tablet Take 1 tablet by mouth nightly      acetaminophen (TYLENOL) 325 MG tablet Take 650 mg by mouth every 6 hours as needed for Pain 1 or 2 tabs q6h prn pain      simvastatin (ZOCOR) 20 MG tablet Take 1 tablet by mouth nightly 1 tablet 0       Allergies:    Pregabalin and Tape [adhesive tape]    Social History:    reports that she has never smoked. She has never used smokeless tobacco. She reports that she does not drink alcohol and does not use drugs. Family History:       Problem Relation Age of Onset    High Blood Pressure Mother     Stroke Maternal Grandmother 72       Diet:  Diet NPO    Review of systems:   Pertinent positives as noted in the HPI. All other systems reviewed and negative. PHYSICAL EXAMINATION:  Patient Vitals for the past 8 hrs:   BP Pulse Resp SpO2   01/04/23 1830 (!) 95/40 -- -- --   01/04/23 1731 85/60 59 15 98 %   01/04/23 1721 (!) 85/54 67 23 100 %   01/04/23 1624 91/60 -- -- --   01/04/23 1617 (!) 92/45 58 17 100 %   01/04/23 1605 (!) 74/56 63 18 100 %   01/04/23 1537 82/64 -- -- --   01/04/23 1529 (!) 72/37 63 20 96 %   01/04/23 1420 (!) 75/57 69 17 99 %   01/04/23 1239 (!) 70/52 70 18 98 %     I/O last 3 completed shifts: In: 50 [IV Piggyback:50]  Out: -    Wt Readings from Last 3 Encounters:   01/04/23 165 lb (74.8 kg)   12/09/22 163 lb 1.6 oz (74 kg)   12/16/21 193 lb (87.5 kg)      Body mass index is 29.23 kg/m².      General: Chronically ill-appearing white female  HEENT:  normocephalic and atraumatic. No scleral icterus. PERR; submandibular erythema tenderness and swelling. Airway  Neck: supple. No Thyromegaly. Lungs: clear to auscultation. No retractions; no stridor present  Cardiac: RRR. No JVD. Abdomen: soft. Nontender. Extremities:  No clubbing, cyanosis, or edema x 4. Vasculature: capillary refill < 3 seconds. Palpable dorsalis pedis pulses. Skin:  warm and dry. Psych:  Alert and oriented x3. Affect appropriate  Lymph:  No supraclavicular adenopathy. Neurologic:  No focal deficit. No seizures. Data: (All radiographs, tracings, PFTs, and imaging are personally viewed and interpreted unless otherwise noted).    Sodium 151, potassium 3.3, chloride 106, CO2 32, BUN 20, creatinine 1.9, anion gap 13, glucose 85, calcium 8.8  3.2, alk phos 120, ALT 10, AST 18, bilirubin 0.6  WBC 25, platelet count 510, MCV 96.1, hemoglobin 14 point  Cultures pending  Initial lactic acid at 2.0, repeat lactic acid at 1  Initial troponin I 0.051  CURRENT PARENTERAL VASOACTIVE / INOTROPIC AGENTS:  [] None Vasopressors:  [x] Norepinephrine  [] Dopamine  [] Phenylephrine  [] Vasopressin  [] Epinephrine  [] Other: Sedation:  [x] No Sedation  [] Propofol gtt-    [] BZD gtt -    [] Opiate gtt  -    [] Dexmedetomidine  [] Paralytics Antihypertensives gtt  [] Ca Channel Antagonist:  [] Beta-blocker:  [] Nitroglycerin  [] Nitroprusside     SUPPORT DEVICES:  Additional Respiratory Assessments  Heart Rate: 59  Resp: 15  SpO2: 98 %  Oxygen Delivery - O2 Flow Rate (L/min): 1 L/min  ABGs:   Lab Results   Component Value Date/Time    PH 6.0 11/30/2021 12:09 PM    PCO2 39 04/05/2017 06:37 PM    PO2 88 04/05/2017 06:37 PM    HCO3 29 04/05/2017 06:37 PM    O2SAT 97 04/05/2017 06:37 PM     Lab Results   Component Value Date/Time    IFIO2 2 04/05/2017 06:37 PM    MODE CPAP+PS 04/05/2017 11:10 AM    SETPEEP 6 04/05/2017 11:10 AM         CENTRAL LINES/CHEMOPORT/TUNNEL CATH:-    [] No   [x] Yes   (Date 1/4/2022 )           If yes -    [] Right IJ   [] Left IJ [] Right Femoral [] Left Femoral     [] Right Subclavian [x] Left Subclavian  [] Peripheral IV access  [] Arterial Line (Specify Site)    GROVES CATHETER:-   [] No  [] Yes  (Date  )     ICU PROPHYLAXIS/THERAPY:   Stress ulcer:  [x] PPI Agent  [] H2RA [] Sucralfate [] Other:   VTE:     [x] Enoxaparin    [] Warfarin [] NOAC [] PCD Device:Bilat LE   [] Heparin: [] Subcut / [] IV     LABS/RADIOLOGY:-  Recent Labs     01/04/23  1330   WBC 25.0*   HGB 14.6   HCT 47.3*        Recent Labs     01/04/23  1330   *   K 3.3*      CO2 32   BUN 20   CREATININE 1.9*   CALCIUM 8.8     Recent Labs     01/04/23  1330   AST 18   ALT 10*   BILITOT 0.6   ALKPHOS 120     No results for input(s): INR in the last 72 hours. Recent Labs     01/04/23  1330   CKTOTAL 15*     No results for input(s): PROCAL in the last 72 hours. No results for input(s): LACTA in the last 72 hours. Microbiology:    Blood culture #1:   Lab Results   Component Value Date/Time    BC No growth-preliminary  No growth   11/14/2018 10:45 PM     Blood culture #2:No results found for: Martin Manuel  Organism:  Lab Results   Component Value Date/Time    ORG Staphylococcus aureus 12/17/2018 04:04 PM         Lab Results   Component Value Date/Time    LABGRAM  02/16/2022 04:00 PM     No segmented neutrophils observed. Rare epithelial cells observed. No organisms observed.      MRSA culture only:No results found for: [facility]  Urine culture:   Lab Results   Component Value Date/Time    LABURIN No growth-preliminary  No growth   02/09/2017 12:35 PM     Respiratory culture: No results found for: CULTRESP  Aerobic and Anaerobic :  Lab Results   Component Value Date/Time    LABAERO No growth-preliminary No growth 02/16/2022 04:00 PM     Lab Results   Component Value Date/Time    LABANAE  12/01/2021 12:00 PM     No anaerobes isolated- preliminary Culture yielded light growth of gram positive bacilli most consistent with Cutibacterium species. Clinical correlation required. Urinalysis:      Lab Results   Component Value Date/Time    NITRU NEGATIVE 01/04/2023 02:15 PM    WBCUA 5-9 01/04/2023 02:15 PM    WBCUA 2-4 02/22/2012 01:00 PM    BACTERIA NONE SEEN 01/04/2023 02:15 PM    RBCUA 3-5 01/04/2023 02:15 PM    BLOODU SMALL 01/04/2023 02:15 PM    SPECGRAV 1.025 09/18/2020 12:00 PM    GLUCOSEU NEGATIVE 01/04/2023 02:15 PM       Radiology:(All radiographs, tracings, PFTs, and imaging are personally viewed and interpreted unless otherwise noted). XR CHEST PORTABLE   Final Result   1. Poor inflation the lungs. Mild cardiomegaly. Bilateral shoulder prostheses. 2. Right subclavian line has been inserted with tip in right atrium. 3. Moderate atelectasis/pneumonia right parahilar region and both lung bases. No effusion seen. **This report has been created using voice recognition software. It may contain minor errors which are inherent in voice recognition technology. **      Final report electronically signed by Dr. Valeriano Robertson on 1/4/2023 4:42 PM      CT FACIAL BONES WO CONTRAST   Final Result   1. Enlargement of the left submandibular gland measuring up to 2.5 cm.   2. Asymmetric fullness of the left pharyngeal soft tissues with mass effect on the subglottic airway   3. Left mastoid effusions and otitis interna            **This report has been created using voice recognition software. It may contain minor errors which are inherent in voice recognition technology. **      Final report electronically signed by Dr. Pedro Orr on 1/4/2023 3:08 PM      XR CHEST PORTABLE   Final Result   Residual patchy airspace infiltrates left lower lobe. **This report has been created using voice recognition software. It may contain minor errors which are inherent in voice recognition technology. **      Final report electronically signed by Dr. Pedro Orr on 1/4/2023 12:41 PM        CT FACIAL BONES WO CONTRAST    Result Date: 1/4/2023  PROCEDURE: CT FACIAL BONES WO CONTRAST CLINICAL INFORMATION: Submandibular cellulitis swelling, possible erysipelas, ruling out submandibular phlegmon . TECHNIQUE: 3 mm multiplanar images of the facial bones in bone and soft tissue windows noncontrast All CT scans at this facility use dose modulation, iterative reconstruction, and/or weight-based dosing when appropriate to reduce radiation dose to as low as reasonably achievable. COMPARISON: No prior study. 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 Soft tissue windows demonstrate either enlargement of the left submandibular gland or obscuration by inflammation. Appearance is more suggestive of inflammatory enlargement of the gland. Evaluation is limited without contrast. This measures 2.5 cm. Solid  attenuation coefficients Asymmetric fullness of the left pharyngeal soft tissues underlying inflammation or mass is a concern. There are left-sided mastoid effusions which is new when compared to CT brain dated 8/20/2019 there is also fluid in the left internal auditory canal.     1. Enlargement of the left submandibular gland measuring up to 2.5 cm. 2. Asymmetric fullness of the left pharyngeal soft tissues with mass effect on the subglottic airway 3. Left mastoid effusions and otitis interna **This report has been created using voice recognition software. It may contain minor errors which are inherent in voice recognition technology. ** Final report electronically signed by Dr. Betty Cordon on 1/4/2023 3:08 PM    XR CHEST PORTABLE    Result Date: 1/4/2023  PROCEDURE: XR CHEST PORTABLE CLINICAL INFORMATION: central line placement COMPARISON: 1/4/2023 TECHNIQUE: A single mobile view of the chest was obtained. 1. Poor inflation the lungs. Mild cardiomegaly. Bilateral shoulder prostheses.  2. Right subclavian line has been inserted with tip in right atrium. 3. Moderate atelectasis/pneumonia right parahilar region and both lung bases. No effusion seen. **This report has been created using voice recognition software. It may contain minor errors which are inherent in voice recognition technology. ** Final report electronically signed by Dr. Nighat Porras on 1/4/2023 4:42 PM    XR CHEST PORTABLE    Result Date: 1/4/2023  PROCEDURE: XR CHEST PORTABLE CLINICAL INFORMATION: weakness . TECHNIQUE: Portable upright COMPARISON: 12/3/2022 FINDINGS: Patient is rotated. Mandible appears detail the upper apices. Heart size is within normal limits. Mediastinum is not widened. Residual patchy infiltrates left base slightly improved compared to the prior exam. No effusions are seen. Vessels are not congested. Chronic increased lung markings suggesting mild parenchymal scarring bilateral shoulder replacements. Residual patchy airspace infiltrates left lower lobe. **This report has been created using voice recognition software. It may contain minor errors which are inherent in voice recognition technology. ** Final report electronically signed by Dr. Crystal Aaron on 1/4/2023 12:41 PM      EKG: normal sinus rhythm, no blocks or conduction defects, no ischemic changes    CONSULTS:-  [] Cardiology  [] Nephrology  [] Hemo onco   [] GI   [] ID  [] Endocrine  [] Pulmo      [] Neuro    [] Psych   [] Urology  [x] ENT   [] Kate Adams   []Ortho    []CV surg        [] Palliative  [] Hospice [] Pain management   []    []TCU   [] PT/OT  OTHERS:-    CODE STATUS:-  [] Full resuscitation [] DNR-Comfort Care-Arrest  [] DNR-Comfort Care   [x] Limited Resuscitation   [x] No ET intubation   [x] No CPR   [x] No shock for non-perfusing rhythm    Total critical care time caring for this patient with life threatening, unstable organ failure, including direct patient contact, management of life support systems, review of data including imaging and labs, discussions with other team members and physicians at least 28  Min so far today, excluding procedures. Electronically signed by Adam Corrales DO on 1/4/2023 at 7:13 PM  Marek Chan.

## 2023-01-04 NOTE — ED PROVIDER NOTES
[address] ENCOUNTER        PATIENT NAME: Vaishali Schuster  MRN: [id number]  : 1950  ESTEVEZ: 2023  PROVIDER: Taras Nichole MD    [address] [place] [address]       Chief Complaint   Patient presents with    Jaw Pain     Left lower       Patient is seen and evaluated in a timely fashion. Nurses Notes are reviewed and I agree except as noted in the HPI. HISTORY OF PRESENT ILLNESS   Vaishali Schuster is a 71 y.o. female who presents to Emergency Department with Jaw Pain (Left lower)       Patient presents to ED for evaluation of submandibular redness in the last 2 days duration. Her brother reports this was not aware when he saw the patient on 2022. Patient complains pain from the submental area. Patient has a slightly difficulty opening the mouth. No fever or chills. Patient also has increasing weakness. Patient stays home with her  who will have a back surgery in 2 days. Patient now could barely stand up because of diffuse weakness. Of note on the baseline, patient can be transported from bed to chair. Normally patient does not ambulate. Patient has no chest pain. No abdominal pain. No nausea or vomiting. No urinary symptoms. This HPI was provided by patient's brother. 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 SURGICAL HISTORY      has a past surgical history that includes brain surgery;  Total hip arthroplasty; Total knee arthroplasty; Shoulder Arthroplasty; Cholecystectomy; Hysterectomy; back surgery; Revision total hip arthroplasty (3/13/2013); other surgical history (2014); Appendectomy; pituitary surgery (); other surgical history (Left, 10/26/15); and Tooth Extraction (2018). CURRENT MEDICATIONS       Previous Medications    ACETAMINOPHEN (TYLENOL) 325 MG TABLET    Take 650 mg by mouth every 6 hours as needed for Pain 1 or 2 tabs q6h prn pain    BUMETANIDE (BUMEX) 1 MG TABLET    TAKE 1 TABLET BY MOUTH DAILY    CARVEDILOL (COREG) 12.5 MG TABLET    TAKE 1 TABLET BY MOUTH TWICE DAILY    CHLORHEXIDINE (HIBICLENS) 4 % EXTERNAL LIQUID    Wash hair with small amount twice weekly. DIPHENHYDRAMINE-APAP, SLEEP, (TYLENOL PM EXTRA STRENGTH)  MG TABLET    Take 1 tablet by mouth nightly    HYDROCORTISONE (CORTEF) 20 MG TABLET    Take 20 mg by mouth every morning     HYDROCORTISONE (CORTEF) 5 MG TABLET    Take 10 mg by mouth nightly     LEVOTHYROXINE (SYNTHROID) 125 MCG TABLET    TAKE 1 TABLET BY MOUTH DAILY    MICONAZOLE (MICOTIN) 2 % POWDER    Apply topically 2 times daily PRN for excoriation    OXYBUTYNIN (DITROPAN XL) 15 MG EXTENDED RELEASE TABLET    TAKE 1 TABLET BY MOUTH DAILY    PANTOPRAZOLE (PROTONIX) 40 MG TABLET    TAKE 1 TABLET BY MOUTH EVERY NIGHT    RIVAROXABAN (XARELTO) 15 MG TABS TABLET    TAKE 1 TABLET BY MOUTH DAILY WITH SUPPER    SIMVASTATIN (ZOCOR) 20 MG TABLET    Take 1 tablet by mouth nightly    TERBINAFINE (LAMISIL) 250 MG TABLET    Take 1 tablet by mouth daily       ALLERGIES     is allergic to pregabalin and tape [adhesive tape]. FAMILY HISTORY     She indicated that her mother is . She indicated that her father is . She indicated that the status of her maternal grandmother is unknown.   family history includes High Blood Pressure in her mother; Stroke (age of onset: 72) in her maternal grandmother.     SOCIAL HISTORY      reports that she has never smoked. She has never used smokeless tobacco. She reports that she does not drink alcohol and does not use drugs. PHYSICAL EXAM      height is 5' 3\" (1.6 m) and weight is 165 lb (74.8 kg). Her oral temperature is 98.2 °F (36.8 °C). Her blood pressure is 95/40 (abnormal) and her pulse is 59. Her respiration is 15 and oxygen saturation is 98%. Physical Exam  Vitals and nursing note reviewed. Constitutional:       Appearance: She is well-developed. She is ill-appearing. She is not diaphoretic. HENT:      Head: Normocephalic and atraumatic. Comments: Submandibular erythema, tenderness, and swelling, with clear demarcation, suspicious for erysipelas. Nose: Nose normal.      Mouth/Throat:      Comments: Submandibular swelling, erythema, tenderness, and warmth, greater to left side. Slight trismus. Patent airway. Eyes:      General: No scleral icterus. Right eye: No discharge. Left eye: No discharge. Conjunctiva/sclera: Conjunctivae normal.      Pupils: Pupils are equal, round, and reactive to light. Neck:      Vascular: No JVD. Trachea: No tracheal deviation. Cardiovascular:      Rate and Rhythm: Normal rate and regular rhythm. Heart sounds: Normal heart sounds. No murmur heard. No friction rub. No gallop. Pulmonary:      Effort: Pulmonary effort is normal. No respiratory distress. Breath sounds: Normal breath sounds. No stridor. No wheezing or rales. Chest:      Chest wall: No tenderness. Abdominal:      General: Bowel sounds are normal. There is no distension. Palpations: Abdomen is soft. There is no mass. Tenderness: There is no abdominal tenderness. There is no guarding or rebound. Hernia: No hernia is present. Musculoskeletal:         General: No tenderness or deformity. Cervical back: Normal range of motion and neck supple. Right lower leg: Edema present. Left lower leg: Edema present.       Comments: Bilateral lower extremity chronic wounds and 2+ edema. Lymphadenopathy:      Cervical: No cervical adenopathy. Skin:     General: Skin is warm and dry. Capillary Refill: Capillary refill takes less than 2 seconds. Coloration: Skin is not pale. Findings: No erythema or rash. Neurological:      Mental Status: She is alert and oriented to person, place, and time. Cranial Nerves: No cranial nerve deficit. Sensory: No sensory deficit. Motor: No abnormal muscle tone. Coordination: Coordination normal.      Deep Tendon Reflexes: Reflexes normal.   Psychiatric:         Behavior: Behavior normal.         Thought Content: Thought content normal.         Judgment: Judgment normal.         FORMAL DIAGNOSTIC RESULTS     RADIOLOGY: Interpretation per the Radiologist below, if available at the time of this note (none if blank):    XR CHEST PORTABLE   Final Result   1. Poor inflation the lungs. Mild cardiomegaly. Bilateral shoulder prostheses. 2. Right subclavian line has been inserted with tip in right atrium. 3. Moderate atelectasis/pneumonia right parahilar region and both lung bases. No effusion seen. **This report has been created using voice recognition software. It may contain minor errors which are inherent in voice recognition technology. **      Final report electronically signed by Dr. Dinora Roger on 1/4/2023 4:42 PM      CT FACIAL BONES WO CONTRAST   Final Result   1. Enlargement of the left submandibular gland measuring up to 2.5 cm.   2. Asymmetric fullness of the left pharyngeal soft tissues with mass effect on the subglottic airway   3. Left mastoid effusions and otitis interna            **This report has been created using voice recognition software. It may contain minor errors which are inherent in voice recognition technology. **      Final report electronically signed by Dr. Abbey Munguia on 1/4/2023 3:08 PM      XR CHEST PORTABLE   Final Result   Residual patchy airspace infiltrates left lower lobe. **This report has been created using voice recognition software. It may contain minor errors which are inherent in voice recognition technology. **      Final report electronically signed by Dr. Jeri Davis on 1/4/2023 12:41 PM          LABS: (none if blank)  Labs Reviewed   CBC WITH AUTO DIFFERENTIAL - Abnormal; Notable for the following components:       Result Value    WBC 25.0 (*)     Hematocrit 47.3 (*)     MCHC 30.9 (*)     RDW-CV 16.8 (*)     RDW-SD 58.0 (*)     Segs Absolute 20.1 (*)     Immature Grans (Abs) 0.27 (*)     All other components within normal limits   COMPREHENSIVE METABOLIC PANEL - Abnormal; Notable for the following components:    Creatinine 1.9 (*)     Sodium 151 (*)     Potassium 3.3 (*)     Albumin 3.2 (*)     ALT 10 (*)     All other components within normal limits   BRAIN NATRIURETIC PEPTIDE - Abnormal; Notable for the following components:    Pro-.3 (*)     All other components within normal limits   TROPONIN - Abnormal; Notable for the following components:    Troponin T 0.051 (*)     All other components within normal limits   LACTATE, SEPSIS - Abnormal; Notable for the following components:    Lactic Acid, Sepsis 2.0 (*)     All other components within normal limits   CK - Abnormal; Notable for the following components:     Total CK 15 (*)     All other components within normal limits   GLOMERULAR FILTRATION RATE, ESTIMATED - Abnormal; Notable for the following components:    Est, Glom Filt Rate 28 (*)     All other components within normal limits   OSMOLALITY - Abnormal; Notable for the following components:    Osmolality Calc 301.7 (*)     All other components within normal limits   URINE WITH REFLEXED MICRO - Abnormal; Notable for the following components:    Blood, Urine SMALL (*)     Leukocyte Esterase, Urine TRACE (*)     Character, Urine CLOUDY (*)     All other components within normal limits   CULTURE, BLOOD 1   CULTURE, BLOOD 2 LACTATE, SEPSIS   ANION GAP   SCAN OF BLOOD SMEAR   TROPONIN   TROPONIN   CBC WITH AUTO DIFFERENTIAL   BASIC METABOLIC PANEL   MAGNESIUM       (Any cultures that may have been sent were not resulted at the time of this patient visit)    [facility] ([facility]) and ED COURSE:     [facility] Summary:     Patient was borderline hypotensive on arrival, patient received normal saline bolus 30 ml/kg, empirically patient received IV Rocephin for presumed cellulitis/erysipelas around submandibular area. Septic work-ups and imaging studies including CT face were done. ED work-ups reveal patient has submandibular cellulitis and left submandibular gland phlegmon, troponin elevation secondary to JESSE and on demand ischemia, JESSE likely due to prerenal dehydration and poor oral intake and right lung pneumonia. ENT was consulted by ED, and CT reports/images were reviewed by ENT. It looks like that left submandibular is infected or inflamed. She has chronically elevated WBC (didnt look into why yet). ENT will consult when she gets admitted. Antibiotics may be tricky with her JESSE. Levaquin instead of Unasyn given in ED. ETN suggests some other things (warm compress, massage, sialagogues) once admitted. If the pain is worse after eating/drinking or there is pus coming out of the duct opening, that nails down that gland. CXR shows possible right lung pneumonia. Initial plan was hospitalist admission, however BP kept down dropping despite 30 ml/kg normal saline bolus. Levophed drip is started. Right subclavian CVC is placed by resident physician under my direct supervision. Discussed and admitted to ICU. ED Course as of 01/04/23 Aleja Thompson Jan 04, 2023   1204 Patient is seen and evaluated.   [GLEN]   1204 WBC(!): 25.0 [GLEN]   1521 Troponin T(!): 0.051 [GLEN]   1521 Est, Glom Filt Rate(!): 28 [GLEN]   1522 Lactic Acid, Sepsis(!): 2.0 [GLEN]   1522 Creatinine(!): 1.9 [GLEN]   1522 Potassium(!): 3.3 [GLEN]   1522 CT FACIAL BONES WO CONTRAST  IMPRESSION:  1. Enlargement of the left submandibular gland measuring up to 2.5 cm.  2. Asymmetric fullness of the left pharyngeal soft tissues with mass effect on the subglottic airway  3. Left mastoid effusions and otitis interna [GLEN]   1600 WBC(!): 25.0 [GLEN]   1600 Troponin T(!): 0.051 [GLEN]   1600 Osmolality Calc(!): 301.7 [GLEN]   1600 CT FACIAL BONES WO CONTRAST  IMPRESSION:  1. Enlargement of the left submandibular gland measuring up to 2.5 cm.  2. Asymmetric fullness of the left pharyngeal soft tissues with mass effect on the subglottic airway  3. Left mastoid effusions and otitis interna [GLEN]   1700 BP(!): 100/58 [GLEN]      ED Course User Index  [GLEN] Agustín Pena MD       1) Number and Complexity of Problems            Problem List This Visit:       Jaw Pain (Left lower)          Differential Diagnosis includes (but not limited to):  Cellulitis, erysipelas        Diagnoses Considered but I have low suspicion of:   Rey angina             Pertinent Comorbid Conditions:    Immobility    2)  Data Reviewed (none if left blank)          My Independent interpretations:     EKG:     Ordered. Pending.     Imaging: Images and radiology report are reviewed    Labs:      Pertinent lab results are reviewed.                  Decision Rules/Clinical Scores utilized:  None            External Documentation Reviewed:         Previous patient encounter documents & history available on EMR was reviewed. Yes             See Formal Diagnostic Results above for the lab and radiology tests and orders.    3)  Treatment and Disposition         ED Reassessment:  Stable ED stay         Case discussed with consulting clinician:  ICU         Shared Decision-Making was performed and disposition discussed with the        Patient/Family and questions answered          Social determinants of health impacting treatment or disposition: lives at home with her  who will have back surgery in two days.          Code Status:  DNRCCA    Vitals:    01/04/23 1617 01/04/23 1624 01/04/23 1721 01/04/23 1731   BP: (!) 92/45 91/60 (!) 85/54 85/60   Pulse: 58  67 59   Resp: 17  23 15   Temp:       TempSrc:       SpO2: 100%  100% 98%   Weight:       Height:           ED Medications administered this visit:  (None if blank)  Medications   0.9 % sodium chloride infusion ( IntraVENous New Bag 1/4/23 1326)   norepinephrine (LEVOPHED) 16 mg in dextrose 5% 250 mL infusion (11 mcg/min IntraVENous Rate/Dose Change 1/4/23 1750)   cefTRIAXone (ROCEPHIN) 1,000 mg in dextrose 5 % 50 mL IVPB mini-bag (0 mg IntraVENous Stopped 1/4/23 1420)   0.9 % sodium chloride bolus (0 mLs IntraVENous Stopped 1/4/23 1554)   Dexamethasone Sodium Phosphate injection 10 mg (10 mg IntraVENous Given 1/4/23 1717)       PROCEDURES:   Right subclavian CVC by ED resident     CRITICAL CARE:   CRITICAL CARE: There was a high probability of clinically significant/life threatening deterioration in this patient's condition which required my urgent intervention.  Total critical care time was 30 minutes.  This excludes any time for separately reportable procedures.       FINAL IMPRESSION      1. Cellulitis of submandibular region    2. Submandibular gland infection    3. JESSE (acute kidney injury) (HCC)    4. Troponin level elevated    5. Shock (HCC)          DISPOSITION/PLAN   DISPOSITION Admitted 01/04/2023 04:57:58 PM      OUTPATIENT FOLLOW UP THE PATIENT:  No follow-up provider specified.  DISCHARGE MEDICATIONS:(None if blank)  New Prescriptions    No medications on file         MD Agustín Garrison MD  01/04/23 [id number]

## 2023-01-04 NOTE — PROCEDURES
Central Line    Date/Time: 1/4/2023 4:58 PM  Performed by: Divine Rojas MD  Authorized by: Asim Quinn MD     Consent:     Consent obtained:  Verbal    Consent given by:  Patient    Risks, benefits, and alternatives were discussed: yes      Risks discussed:  Arterial puncture, incorrect placement, bleeding, pneumothorax and infection    Alternatives discussed:  No treatment  Universal protocol:     Procedure explained and questions answered to patient or proxy's satisfaction: yes      Test results available: yes      Imaging studies available: yes      Site/side marked: yes      Immediately prior to procedure, a time out was called: yes      Patient identity confirmed:  Verbally with patient and arm band  Pre-procedure details:     Indication(s): central venous access and insufficient peripheral access      Hand hygiene: Hand hygiene performed prior to insertion      Sterile barrier technique:  All elements of maximal sterile technique followed      Skin preparation:  Chlorhexidine    Skin preparation agent: Skin preparation agent completely dried prior to procedure    Anesthesia:     Anesthesia method:  Local infiltration    Local anesthetic:  Lidocaine 1% w/o epi  Procedure details:     Location:  R subclavian    Site selection rationale:  Right face/neck erysepilas    Patient position:  Reverse Trendelenburg    Procedural supplies:  Triple lumen    Catheter size:  7 Fr    Landmarks identified: yes      Ultrasound guidance: yes      Ultrasound guidance timing: prior to insertion and real time      Sterile ultrasound techniques: Sterile gel and sterile probe covers were used      Number of attempts:  1    Successful placement: yes    Post-procedure details:     Post-procedure:  Dressing applied and line sutured    Assessment:  Blood return through all ports, no pneumothorax on x-ray, placement verified by x-ray and free fluid flow    Procedure completion:  Tolerated well, no immediate complications

## 2023-01-04 NOTE — ED NOTES
Levophed started at this time via ultrasound IV until central line access.         Dereck Owusu RN  01/04/23 [id number]

## 2023-01-04 NOTE — ED NOTES
Patient resting in bed. Respirations easy and unlabored. No distress noted. Call light within reach. Brother at bedside.         You Ochoa RN  01/04/23 2212

## 2023-01-04 NOTE — ED TRIAGE NOTES
Pt presents to the ER with c/o left lower jaw/chin pain and redness. Family states they first noticed it yesterday. Pt was recently discharged from hospital. Family is concerned for infection, denies injury. Pt is on 2L at home. Pt is alert and oriented, respirations even and unlabored.  VSS

## 2023-01-04 NOTE — TELEPHONE ENCOUNTER
Althea Huntley with [facility] called regarding patient. Althea Huntley was out to see patient today. Per Althea Huntley patient has swelling and redness in left lower neck. Althea Huntley is sending patient to ER by squad due to family being unable to transfer patient to a wheelchair. Althea Huntley is also sending over an order for a medical social worker through [facility] to assist family with patient being admitted to nursing home. Patient was unable to be admitted to [address] per Althea Huntley due to insurance.

## 2023-01-05 LAB
ANION GAP SERPL CALCULATED.3IONS-SCNC: 19 MEQ/L (ref 8–16)
ANION GAP SERPL CALCULATED.3IONS-SCNC: 20 MEQ/L (ref 8–16)
ANISOCYTOSIS: PRESENT
ATYPICAL LYMPHOCYTES: ABNORMAL %
BASOPHILIA: ABNORMAL
BASOPHILS # BLD: 0.3 %
BASOPHILS ABSOLUTE: 0.1 THOU/MM3 (ref 0–0.1)
BUN BLDV-MCNC: 17 MG/DL (ref 7–22)
BUN BLDV-MCNC: 17 MG/DL (ref 7–22)
CALCIUM IONIZED: 1.04 MMOL/L (ref 1.12–1.32)
CALCIUM SERPL-MCNC: 7.9 MG/DL (ref 8.5–10.5)
CALCIUM SERPL-MCNC: 8 MG/DL (ref 8.5–10.5)
CHLORIDE BLD-SCNC: 108 MEQ/L (ref 98–111)
CHLORIDE BLD-SCNC: 108 MEQ/L (ref 98–111)
CO2: 19 MEQ/L (ref 23–33)
CO2: 20 MEQ/L (ref 23–33)
CREAT SERPL-MCNC: 1.1 MG/DL (ref 0.4–1.2)
CREAT SERPL-MCNC: 1.3 MG/DL (ref 0.4–1.2)
DIFFERENTIAL TYPE: ABNORMAL
EKG ATRIAL RATE: 43 BPM
EKG ATRIAL RATE: 57 BPM
EKG P AXIS: -9 DEGREES
EKG P AXIS: 46 DEGREES
EKG P-R INTERVAL: 130 MS
EKG P-R INTERVAL: 140 MS
EKG Q-T INTERVAL: 450 MS
EKG Q-T INTERVAL: 490 MS
EKG QRS DURATION: 88 MS
EKG QRS DURATION: 88 MS
EKG QTC CALCULATION (BAZETT): 414 MS
EKG QTC CALCULATION (BAZETT): 438 MS
EKG R AXIS: -35 DEGREES
EKG R AXIS: -37 DEGREES
EKG T AXIS: 20 DEGREES
EKG T AXIS: 22 DEGREES
EKG VENTRICULAR RATE: 43 BPM
EKG VENTRICULAR RATE: 57 BPM
EOSINOPHIL # BLD: 0 %
EOSINOPHILS ABSOLUTE: 0 THOU/MM3 (ref 0–0.4)
ERYTHROCYTE [DISTWIDTH] IN BLOOD BY AUTOMATED COUNT: 16.3 % (ref 11.5–14.5)
ERYTHROCYTE [DISTWIDTH] IN BLOOD BY AUTOMATED COUNT: 56.7 FL (ref 35–45)
GFR SERPL CREATININE-BSD FRML MDRD: 44 ML/MIN/1.73M2
GFR SERPL CREATININE-BSD FRML MDRD: 54 ML/MIN/1.73M2
GLUCOSE BLD-MCNC: 151 MG/DL (ref 70–108)
GLUCOSE BLD-MCNC: 158 MG/DL (ref 70–108)
HCT VFR BLD CALC: 38.2 % (ref 37–47)
HEMOGLOBIN: 12.2 GM/DL (ref 12–16)
IMMATURE GRANS (ABS): 1.04 THOU/MM3 (ref 0–0.07)
IMMATURE GRANULOCYTES: 2.5 %
LACTIC ACID, SEPSIS: 1 MMOL/L (ref 0.5–1.9)
LYMPHOCYTES # BLD: 5 %
LYMPHOCYTES ABSOLUTE: 2.1 THOU/MM3 (ref 1–4.8)
MAGNESIUM: 1.7 MG/DL (ref 1.6–2.4)
MCH RBC QN AUTO: 30.2 PG (ref 26–33)
MCHC RBC AUTO-ENTMCNC: 31.9 GM/DL (ref 32.2–35.5)
MCV RBC AUTO: 94.6 FL (ref 81–99)
MONOCYTES # BLD: 0.5 %
MONOCYTES ABSOLUTE: 0.2 THOU/MM3 (ref 0.4–1.3)
MRSA SCREEN RT-PCR: POSITIVE
NUCLEATED RED BLOOD CELLS: 0 /100 WBC
PATHOLOGIST REVIEW: ABNORMAL
PHOSPHORUS: 2.8 MG/DL (ref 2.4–4.7)
PLATELET # BLD: 353 THOU/MM3 (ref 130–400)
PLATELET ESTIMATE: ADEQUATE
PMV BLD AUTO: 10.2 FL (ref 9.4–12.4)
POTASSIUM SERPL-SCNC: 3.6 MEQ/L (ref 3.5–5.2)
POTASSIUM SERPL-SCNC: 4.1 MEQ/L (ref 3.5–5.2)
RBC # BLD: 4.04 MILL/MM3 (ref 4.2–5.4)
SCAN OF BLOOD SMEAR: NORMAL
SEG NEUTROPHILS: 91.7 %
SEGMENTED NEUTROPHILS ABSOLUTE COUNT: 38.3 THOU/MM3 (ref 1.8–7.7)
SODIUM BLD-SCNC: 147 MEQ/L (ref 135–145)
SODIUM BLD-SCNC: 147 MEQ/L (ref 135–145)
TROPONIN T: 0.01 NG/ML
WBC # BLD: 41.8 THOU/MM3 (ref 4.8–10.8)

## 2023-01-05 PROCEDURE — 99291 CRITICAL CARE FIRST HOUR: CPT | Performed by: INTERNAL MEDICINE

## 2023-01-05 PROCEDURE — 84100 ASSAY OF PHOSPHORUS: CPT

## 2023-01-05 PROCEDURE — 99222 1ST HOSP IP/OBS MODERATE 55: CPT | Performed by: PHYSICIAN ASSISTANT

## 2023-01-05 PROCEDURE — 82330 ASSAY OF CALCIUM: CPT

## 2023-01-05 PROCEDURE — 6360000002 HC RX W HCPCS: Performed by: STUDENT IN AN ORGANIZED HEALTH CARE EDUCATION/TRAINING PROGRAM

## 2023-01-05 PROCEDURE — 2580000003 HC RX 258: Performed by: STUDENT IN AN ORGANIZED HEALTH CARE EDUCATION/TRAINING PROGRAM

## 2023-01-05 PROCEDURE — 2500000003 HC RX 250 WO HCPCS: Performed by: EMERGENCY MEDICINE

## 2023-01-05 PROCEDURE — 93005 ELECTROCARDIOGRAM TRACING: CPT | Performed by: STUDENT IN AN ORGANIZED HEALTH CARE EDUCATION/TRAINING PROGRAM

## 2023-01-05 PROCEDURE — 83605 ASSAY OF LACTIC ACID: CPT

## 2023-01-05 PROCEDURE — 80048 BASIC METABOLIC PNL TOTAL CA: CPT

## 2023-01-05 PROCEDURE — 6370000000 HC RX 637 (ALT 250 FOR IP): Performed by: PHYSICIAN ASSISTANT

## 2023-01-05 PROCEDURE — 36415 COLL VENOUS BLD VENIPUNCTURE: CPT

## 2023-01-05 PROCEDURE — 92610 EVALUATE SWALLOWING FUNCTION: CPT

## 2023-01-05 PROCEDURE — 84484 ASSAY OF TROPONIN QUANT: CPT

## 2023-01-05 PROCEDURE — C9113 INJ PANTOPRAZOLE SODIUM, VIA: HCPCS | Performed by: STUDENT IN AN ORGANIZED HEALTH CARE EDUCATION/TRAINING PROGRAM

## 2023-01-05 PROCEDURE — 83735 ASSAY OF MAGNESIUM: CPT

## 2023-01-05 PROCEDURE — 93010 ELECTROCARDIOGRAM REPORT: CPT | Performed by: NUCLEAR MEDICINE

## 2023-01-05 PROCEDURE — 2000000000 HC ICU R&B

## 2023-01-05 PROCEDURE — 85025 COMPLETE CBC W/AUTO DIFF WBC: CPT

## 2023-01-05 RX ORDER — ENOXAPARIN SODIUM 100 MG/ML
1 INJECTION SUBCUTANEOUS 2 TIMES DAILY
Status: COMPLETED | OUTPATIENT
Start: 2023-01-05 | End: 2023-01-05

## 2023-01-05 RX ORDER — ENOXAPARIN SODIUM 100 MG/ML
1 INJECTION SUBCUTANEOUS 2 TIMES DAILY
Status: DISCONTINUED | OUTPATIENT
Start: 2023-01-05 | End: 2023-01-05

## 2023-01-05 RX ORDER — CALCIUM GLUCONATE 20 MG/ML
2000 INJECTION, SOLUTION INTRAVENOUS ONCE
Status: COMPLETED | OUTPATIENT
Start: 2023-01-05 | End: 2023-01-05

## 2023-01-05 RX ADMIN — ENOXAPARIN SODIUM 70 MG: 100 INJECTION SUBCUTANEOUS at 20:48

## 2023-01-05 RX ADMIN — SODIUM CHLORIDE 3000 MG: 900 INJECTION INTRAVENOUS at 09:59

## 2023-01-05 RX ADMIN — SODIUM CHLORIDE, PRESERVATIVE FREE 10 ML: 5 INJECTION INTRAVENOUS at 20:48

## 2023-01-05 RX ADMIN — SALINE NASAL SPRAY 2 SPRAY: 1.5 SOLUTION NASAL at 18:46

## 2023-01-05 RX ADMIN — DEXAMETHASONE SODIUM PHOSPHATE 4 MG: 4 INJECTION, SOLUTION INTRA-ARTICULAR; INTRALESIONAL; INTRAMUSCULAR; INTRAVENOUS; SOFT TISSUE at 11:28

## 2023-01-05 RX ADMIN — SODIUM CHLORIDE: 4.5 INJECTION, SOLUTION INTRAVENOUS at 13:45

## 2023-01-05 RX ADMIN — POTASSIUM CHLORIDE 10 MEQ: 7.46 INJECTION, SOLUTION INTRAVENOUS at 00:33

## 2023-01-05 RX ADMIN — SODIUM CHLORIDE: 4.5 INJECTION, SOLUTION INTRAVENOUS at 06:14

## 2023-01-05 RX ADMIN — CALCIUM GLUCONATE 2000 MG: 20 INJECTION, SOLUTION INTRAVENOUS at 09:01

## 2023-01-05 RX ADMIN — SODIUM CHLORIDE 3000 MG: 900 INJECTION INTRAVENOUS at 22:20

## 2023-01-05 RX ADMIN — SODIUM CHLORIDE 3000 MG: 900 INJECTION INTRAVENOUS at 16:20

## 2023-01-05 RX ADMIN — PANTOPRAZOLE SODIUM 40 MG: 40 INJECTION, POWDER, FOR SOLUTION INTRAVENOUS at 08:19

## 2023-01-05 RX ADMIN — SODIUM CHLORIDE: 4.5 INJECTION, SOLUTION INTRAVENOUS at 22:18

## 2023-01-05 RX ADMIN — DEXAMETHASONE SODIUM PHOSPHATE 4 MG: 4 INJECTION, SOLUTION INTRA-ARTICULAR; INTRALESIONAL; INTRAMUSCULAR; INTRAVENOUS; SOFT TISSUE at 16:38

## 2023-01-05 RX ADMIN — Medication 6 MCG/MIN: at 14:16

## 2023-01-05 RX ADMIN — DEXAMETHASONE SODIUM PHOSPHATE 4 MG: 4 INJECTION, SOLUTION INTRA-ARTICULAR; INTRALESIONAL; INTRAMUSCULAR; INTRAVENOUS; SOFT TISSUE at 04:50

## 2023-01-05 NOTE — CONSULTS
Department of Otolaryngology  Consult Note    Reason for Consult:  submandibular swelling  Requesting Physician:  left submandibular swelling    CHIEF COMPLAINT:  Submandibular swelling    History Obtained From:  family member - brother, electronic medical record    HISTORY OF PRESENT ILLNESS:                The patient is a 71 y.o. female with PMH of CVA, Afib, hypopituitarism, CHF, diabetes insipidus, CKD, GERD who presented to [facility] ER on 1/4/23 for evaluation of submandibular swelling. The patient first developed some swelling along the left jaw line on Monday evening/Tuesday morning earlier this week. The family was concerned because they noticed an increase in weakness about 1-2 days before the swelling. The patient's  is not present at the time of my assessment, but her brother is available to provide recent history. The patient is chronically on thickened liquids and she has had limited PO intake the last few days reportedly. Patient's family has noticed some decreased range of motion of the jaw. The patient was recently started on supplemental oxygen (1-2L/min reportedly) since having COVID-19 near the end of November. Patient remains afebrile.     Past Medical History:        Diagnosis Date    Asthma     uses albuterol 1-2x per year    Atrial thrombosis     on OAC    Bursitis     right shoulder -s/p steroid injections    Cancer (HCC)     Chronic diastolic heart failure (HCC)     CVA (cerebral infarction) 6/14/15    Mult. 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 Arne Severs    Hypothyroidism     Meningioma [facility])     3 separate meningiomas, s/p gamma knife (1/2012) , Dr. Gelacio Gomes at [facility] MRSA nasal colonization 6/2013    Obesity (BMI 30-39. 9)     Osteoarthritis     PAF (paroxysmal atrial fibrillation) (HCC)     Panhypopituitarism (HCC)     status post resection for macroadenoma in the 1970's    Pneumonia 11/2018    Stroke [facility]) 1988    due to radiation -bleeding CVA - residual left upper and lower extremity weakness    Stroke [facility]) October 2015    Urinary incontinence        Past Surgical History:        Procedure Laterality Date    [address] due to pituitary mass, drained and s/p radiation    CHOLECYSTECTOMY      HYSTERECTOMY (CERVIX STATUS UNKNOWN)      total done for DUB    OTHER SURGICAL HISTORY  9/30/2014    LAPAROSCOPIC RUPTURED APPENDECTOMY    OTHER SURGICAL HISTORY Left 10/26/15    Evacuation and Debridement of Left Lower Leg Hematoma - Dr. Trae Alamo  3/13/2013    left    SHOULDER ARTHROPLASTY      right    TOOTH EXTRACTION  03/19/2018    TOTAL HIP ARTHROPLASTY      bilateral    TOTAL KNEE ARTHROPLASTY      right        Current Medications:   Current Facility-Administered Medications: calcium replacement protocol, , Other, RX Placeholder  ampicillin-sulbactam (UNASYN) 3,000 mg in sodium chloride 0.9 % 100 mL IVPB (mini-bag), 3,000 mg, IntraVENous, Q6H  enoxaparin (LOVENOX) injection 70 mg, 1 mg/kg, SubCUTAneous, BID  [START ON 1/6/2023] rivaroxaban (XARELTO) tablet 15 mg, 15 mg, Oral, Daily  sodium chloride (OCEAN, BABY AYR) 0.65 % nasal spray 2 spray, 2 spray, Each Nostril, Q12H  norepinephrine (LEVOPHED) 16 mg in dextrose 5% 250 mL infusion, 1-100 mcg/min, IntraVENous, Continuous  sodium chloride flush 0.9 % injection 5-40 mL, 5-40 mL, IntraVENous, 2 times per day  sodium chloride flush 0.9 % injection 5-40 mL, 5-40 mL, IntraVENous, PRN  0.9 % sodium chloride infusion, , IntraVENous, PRN  ondansetron (ZOFRAN-ODT) disintegrating tablet 4 mg, 4 mg, Oral, Q8H PRN **OR** ondansetron [facility]) injection 4 mg, 4 mg, IntraVENous, Q6H PRN  polyethylene glycol (GLYCOLAX) packet 17 g, 17 g, Oral, Daily PRN  acetaminophen (TYLENOL) tablet 650 mg, 650 mg, Oral, Q6H PRN **OR** acetaminophen (TYLENOL) suppository 650 mg, 650 mg, Rectal, Q6H PRN  0.45 % sodium chloride infusion, , IntraVENous, Continuous  dexamethasone (DECADRON) injection 4 mg, 4 mg, IntraVENous, Q6H  levothyroxine (SYNTHROID) tablet 125 mcg, 125 mcg, Oral, Daily  [Held by provider] pantoprazole (PROTONIX) tablet 40 mg, 40 mg, Oral, QAM AC  atorvastatin (LIPITOR) tablet 10 mg, 10 mg, Oral, Daily  pantoprazole (PROTONIX) injection 40 mg, 40 mg, IntraVENous, QAM AC  vasopressin 20 Units in dextrose 5 % 100 mL infusion, 0.01-0.03 Units/min, IntraVENous, Continuous    Allergies: Allergies   Allergen Reactions    Pregabalin      Other reaction(s): dizziness    Tape [Adhesive Tape] Dermatitis        Social History:    TOBACCO:   reports that she has never smoked. She has never used smokeless tobacco.  ETOH:   reports no history of alcohol use. DRUGS:   reports no history of drug use. Family History:       Problem Relation Age of Onset    High Blood Pressure Mother     Stroke Maternal Grandmother 72       REVIEW OF SYSTEMS:    Review of systems not obtained due to patient factors - mental status    PHYSICAL EXAM:    VITALS:  BP (!) 93/38   Pulse 55   Temp 98.1 °F (36.7 °C) (Oral)   Resp 20   Ht 5' 3\" (1.6 m)   Wt 167 lb 5.3 oz (75.9 kg)   SpO2 96%   BMI 29.64 kg/m²     This is a 71 y.o. female. Patient is chronically ill appearing without evidence of acute distress. She does follow simple commands, but limitedly vocalizes during exam    Head:   Normocephalic, atraumatic. No obvious masses or lesions noted. Ears:  External ears: Normal: no scars, lesions or masses. Mastoid process: No erythema noted. No tenderness to palpation. Nose:    External nose: Appears midline. No obvious deformity or masses.   Septum:  normal. No septal hematoma. No perforation. Mucosa:  scabbed, inflamed  Turbinates: normal and pink            Discharge:  bloody scabs/crusts bilaterally    Mouth/Throat:  Lips, tongue and oral cavity: Normal. No masses or lesions noted   Dentition: Fair appearing, no obviously infected appearing dentition  Oral mucosa: dry  Oropharynx: normal-appearing mucosa  Hard and soft palates: symmetrical and intact. Salivary glands: palpable enlargement of the left submandibular gland. This appears mildly tender to palpation. No fluctuance noted. No edema of the floor of mouth is noted with palpation. No purulence is expressed with bimanual palpation. No obvious trismus is noted  Uvula: midline, no obvious lesions     Neck: Trachea midline. Mild amount of edema extending from the left submandibular area into the upper neck. Mild erythema of the neck mostly in the submandibular region. No other palpable masses/lesions of the neck are noted  Eyes: Evidence of impairment of EOMs, appears consistent with history of CN6 injury  Lungs: Normal effort of breathing, not obviously distressed.     DATA:    CBC with Differential:    Lab Results   Component Value Date/Time    WBC 41.8 01/05/2023 04:30 AM    RBC 4.04 01/05/2023 04:30 AM    RBC 4.70 05/12/2022 03:26 PM    HGB 12.2 01/05/2023 04:30 AM    HCT 38.2 01/05/2023 04:30 AM     01/05/2023 04:30 AM    MCV 94.6 01/05/2023 04:30 AM    MCH 30.2 01/05/2023 04:30 AM    MCHC 31.9 01/05/2023 04:30 AM    RDW 14.1 05/12/2022 03:26 PM    NRBC 0 01/05/2023 04:30 AM    SEGSPCT 91.7 01/05/2023 04:30 AM    METASPCT 1 04/13/2017 05:50 AM    LYMPHOPCT 16.8 05/12/2022 03:26 PM    MONOPCT 0.5 01/05/2023 04:30 AM    EOSPCT 1.2 05/12/2022 03:26 PM    BASOPCT 0.6 05/12/2022 03:26 PM    MONOSABS 0.2 01/05/2023 04:30 AM    LYMPHSABS 2.1 01/05/2023 04:30 AM    EOSABS 0.0 01/05/2023 04:30 AM    BASOSABS 0.1 01/05/2023 04:30 AM    DIFFTYPE see below 01/05/2023 04:30 AM     BMP:    Lab Results   Component Value Date/Time     01/05/2023 04:30 AM    K 3.6 01/05/2023 04:30 AM    K 4.3 12/07/2022 06:15 AM     01/05/2023 04:30 AM    CO2 19 01/05/2023 04:30 AM    BUN 17 01/05/2023 04:30 AM    LABALBU 3.2 01/04/2023 01:30 PM    CREATININE 1.1 01/05/2023 04:30 AM    CALCIUM 7.9 01/05/2023 04:30 AM    LABGLOM 54 01/05/2023 04:30 AM    GLUCOSE 158 01/05/2023 04:30 AM    GLUCOSE 85 05/12/2022 03:26 PM     Radiology Review:  CT facial bones wo contrast 1/4/23-  FINDINGS: Prior right parietal temporal craniotomy with absence of bone at the right temporal lobe. Soft tissue windows demonstrate either enlargement of the left submandibular gland or obscuration by inflammation. Appearance is more suggestive of inflammatory enlargement of the gland. Evaluation is limited without contrast. This measures 2.5 cm. Solid    attenuation coefficients   Asymmetric fullness of the left pharyngeal soft tissues underlying inflammation or mass is a concern. There are left-sided mastoid effusions which is new when compared to CT brain dated 8/20/2019 there is also fluid in the left internal auditory canal.           Impression   1. Enlargement of the left submandibular gland measuring up to 2.5 cm.   2. Asymmetric fullness of the left pharyngeal soft tissues with mass effect on the subglottic airway   3. Left mastoid effusions and otitis interna       IMPRESSION/RECOMMENDATIONS:      Left sialadenitis, leukocytosis, septic shock, epistaxis, supplemental oxygen use    -Continue Unasyn at this time if patient continues to tolerate given her CKD  -Frequent oral hydration and hygiene  -Apply moist heat for 15 minutes followed by gentle massage (over submandibular gland pushing up on the gland from under the jawline, this is to milk the gland. Do this every 4 hours while awake. -Use sialogogues (increases saliva production) - Discussed used of lemon swabs with patient's nurse  -Nasal mucosa very dry and appears to have recent bleeding. Start nasal saline sprays to moisturize mucosa and hopefully prevent epistaxis  -ENT will continue to follow    Electronically signed by OFELIA Karimi on 1/5/2023 at 4:29 PM

## 2023-01-05 NOTE — PROGRESS NOTES
[address] ICU 4D  Clinical Swallow Evaluation      SLP Individual Minutes  Time In: 7609  Time Out: 1739  Minutes: 23  Timed Code Treatment Minutes: 0 Minutes       Date: 2023  Patient Name: Gretchen Johansen      CSN: [id number]   : 1950  (71 y.o.)  Gender: female   Referring Physician:  Dr. Belgica Fuentes  Diagnosis: Septic shock [facility])    History of Present Illness/Injury: Patient admitted to [facility] with above dx. Per H&P, \"Patient is a 80-year-old female with a past medical history of adrenal insufficiency, hypothyroidism, paroxysmal atrial fibrillation, history of pituitary tumor, tension, stroke, and chronic diastolic heart failure admitted for left lower back pain. Patient states that for the last few days she has been having submandibular swelling and redness in the last few days. Patient has difficulty opening her mouth and has not been eating or drinking. She was recently admitted for the last month. Does have physical deconditioning and is wheelchair-bound. Denies any fever at home. \"     CT Facial   1. Enlargement of the left submandibular gland measuring up to 2.5 cm.   2. Asymmetric fullness of the left pharyngeal soft tissues with mass effect on the subglottic airway   3.  Left mastoid effusions and otitis interna       Patient also with hx of dysphagia; most recent MBS completed 22 recommending minced and moist diet with moderately thick liquids   Past Medical History:   Diagnosis Date    Asthma     uses albuterol 1-2x per year    Atrial thrombosis     on OAC    Bursitis     right shoulder -s/p steroid injections    Cancer (HCC)     Chronic diastolic heart failure (HCC)     CVA (cerebral infarction) 6/14/15    Mult. small acute infarctions- Left temporal, internal capsule, basal ganglia    Diabetes insipidus ([address] 1970's    borderline since 's    GERD (gastroesophageal reflux disease)     History of diabetes insipidus     History of meningioma of the brain     Hyperlipidemia     Hypertension     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 [address]     Dr. Kacie Mooney    Hypothyroidism     Meningioma [facility])     3 separate meningiomas, s/p gamma knife (1/2012) , Dr. Lissette Yates at [facility]    MRSA nasal colonization 6/2013    Obesity (BMI 30-39. 9)     Osteoarthritis     PAF (paroxysmal atrial fibrillation) ([facility]     Panhypopituitarism ([facility]     status post resection for macroadenoma in the 1970's    Pneumonia 11/2018    Stroke [facility]) 1988    due to radiation -bleeding CVA - residual left upper and lower extremity weakness    Stroke [facility]) October 2015    Urinary incontinence        SUBJECTIVE:  Patient seen at bedside; alert and pleasant. Patient's brother Manolo Spain also entering room; reporting \"yes she was doing the thickened liquids at the nursing home but she didn't eat or drink much since COVID. Her  Glenroy Soda also snuck in some water because she just really didn't like the thickened liquids. \" RN Vicki Ward approved ST attempt to complete clinical swallow evaluation     OBJECTIVE:    Pain:  No pain reported. Current Diet: NPO    Respiratory Status:  Room Air    Behavioral Observation:  Alert    CRANIAL NERVE ASSESSMENT   CN V (Trigeminal) Closes and Opens Mandible WFL    Rotary Jaw Movement WFL      CN VII (Facial) Cheeks Hold Food out of Sulci WFL    Opens, Closes/Seals, Protrudes, Retracts Lips WFL    General Appearance WFL    Sensation Not Tested      CN X (Vagus - Pharyngeal) Raises Back of Tongue WFL      CN XI (Accessory) Lifts Soft Palate WFL      CN XII (Hypoglossal) Elevates Tongue Up and Back WFL    Protrusion   WFL    Lateralizes Tongue WFL    Sensation Not Tested      Other Observations Dentition WFL    Vocal Quality Low vocal intensity     Cough WFL     Patient Evaluated Using:  Moderately Thick Liquids and Puree    Oral Phase:  Impaired:  Impaired Mastication, Impaired Oral Initiation, and reduced oral opening     Pharyngeal Phase: Impaired:  MBS completed 11/26/22 indicating moderate pharyngeal dysphagia     Signs and Symptoms of Laryngeal Penetration/Aspiration: No signs/symptoms of aspiration evident in this evaluation, but cannot rule out silent aspiration with puree textures and moderately thick liquids     Impressions: Patient presents with moderate oral dysphagia with inability to fully discern potential presence of pharyngeal phase deficits without formal instrumentation. Recent MBS completed 11/26/22 recommending minced and moist diet with moderately thick liquids; patient discharged to [facility] to follow. Brother Brendan reporting, \"\"yes she was doing the thickened liquids at the nursing home but she didn't eat or drink much since COVID. Her  Aniyah Nash also snuck in some water because she just really didn't like the thickened liquids. I was always worried about her dehydration. \" Patient consumed PO trials of puree and moderately thick liquids via spoon and cup; appropriate oral control/slow overall although functional for modified consistencies, no s/s of aspiration. Advanced PO trials not completed at this time at bedside d/t current medical need for ICU hospitalization, ongoing weaning of sedation medications and evidence of audible aspiration of thin and mildly thick liquids per most recent MBS completed 11/26/22. ST recommending puree diet with moderately thick liquids. Patient would benefit from ongoing skilled ST services to target dysphagia. Patient's brother Peter Marcial reporting, \"increasing mumbling since COVID-19. \" This ST familiar with patient d/t recent hospital admission and hx of speech therapy interventions; patient appears to be approaching cognitive/speech baseline. Will continue to monitor speech/language/cognition baseline throughout dysphagia interventions.      *post evaluation, patient without respiratory distress upon leaving room; RN Joe Godfrey notified re: clinical findings and recommendations from the assessment; verbal receptiveness noted   Dr. Tomas Osorio and medical team updated      RECOMMENDATIONS/ASSESSMENT:  Instrumental Evaluation: Instrumental evaluation not indicated at this time. Diet Recommendations:  Puree diet with moderately thick liquids   Strategies:  Full Upright Position, Small Bite/Sip, Multiple Swallow, Medications Crushed with Puree, Limit Distractions, Monitor for Fatigue, and 1:1 assistance with meals    Rehabilitation Potential: fair  Discharge Recommendations: SNF    EDUCATION:  Learner: Patient and brother Sasha Yu  Education:  Reviewed results and recommendations of this evaluation, Reviewed diet and strategies, Reviewed signs, symptoms and risks of aspiration, Reviewed ST goals and Plan of Care, Reviewed recommendations for follow-up, and Education Related to Potential Risks and Complications Due to Impairment/Illness/Injury  Evaluation of Education: [employer] understanding, Demonstrates with assistance, and Needs further instruction    PLAN:  Skilled SLP intervention on acute care 3-5 x per week or until goals met and/or pt plateaus in function. Specific interventions for next session may include: dysphagia tx. PATIENT GOAL:    Did not state. Will further assess during treatment. SHORT TERM GOALS:  Short Term Goals  Time Frame for Short Term Goals: 2 weeks  Goal 1: Patient will consume puree diet with moderatly thick liquids without s/s of aspiration in order to safely maintain adequate hydration and nutrition  Goal 2: Patient will complete pharyngeal exercises x15 in order to improve overall airway protection and pharyngeal constriction. Goal 3: Complete repeat insturmental evaluation when clincially appropriate in order to determine readiness for diet advancement.  (most recent MBS 11/26/22)  Goal 4: Patient and family will demonstrate understanding of ST dyspahgia recommendations, ST liquids/diet recommendations, s/s of aspiration and risks of aspiration to ensure safety of PO intake  Goal 5: Monitor speech/language/cognitive baseline and complete evaluation as indiciated    LONG TERM GOALS:  [facility]

## 2023-01-05 NOTE — CARE COORDINATION
01/05/23 1030   Readmission Assessment   Number of Days since last admission? 8-30 days   Previous Disposition Home with Family   Who is being Interviewed Patient   What was the patient's/caregiver's perception as to why they think they needed to return back to the hospital? Other (Comment)  (jaw pain, weakness)   Did you visit your Primary Care Physician after you left the hospital, before you returned this time? No   Why weren't you able to visit your PCP? Other (Comment)  (readmitted, did have documented phone conversations with office)   Did you see a specialist, such as Cardiac, Pulmonary, Orthopedic Physician, etc. after you left the hospital? No   Who advised the patient to return to the hospital? [facility] Staff   Does the patient report anything that got in the way of taking their medications? No   In our efforts to provide the best possible care to you and others like you, can you think of anything that we could have done to help you after you left the hospital the first time, so that you might not have needed to return so soon?  Other (Comment)  (Denies)

## 2023-01-05 NOTE — PROGRESS NOTES
Pt was alone and in bed at the time of the visit. She was looking weak and could barely talk. Prayer was appreciated.     01/05/23 1533   Encounter Summary   Encounter Overview/Reason  Initial Encounter   Service Provided For: Patient   Referral/Consult From: [facility] Family members   Last Encounter  01/05/23   Complexity of Encounter Low   Spiritual/Emotional needs   Type Spiritual Support

## 2023-01-05 NOTE — PLAN OF CARE
Problem: Discharge Planning  Goal: Discharge to home or other facility with appropriate resources  Outcome: Progressing  Flowsheets (Taken 1/5/2023 0755)  Discharge to home or other facility with appropriate resources:   Identify barriers to discharge with patient and caregiver   Arrange for needed discharge resources and transportation as appropriate   Identify discharge learning needs (meds, wound care, etc)   Arrange for interpreters to assist at discharge as needed   Refer to discharge planning if patient needs post-hospital services based on physician order or complex needs related to functional status, cognitive ability or social support system     Problem: Infection - Adult  Goal: Isolation precautions  Description: Isolation precautions  Outcome: Progressing  Note: Contact precautions     Problem: Chronic Conditions and Co-morbidities  Goal: Patient's chronic conditions and co-morbidity symptoms are monitored and maintained or improved  Outcome: Progressing  Flowsheets (Taken 1/5/2023 0755)  Care Plan - Patient's Chronic Conditions and Co-Morbidity Symptoms are Monitored and Maintained or Improved:   Monitor and assess patient's chronic conditions and comorbid symptoms for stability, deterioration, or improvement   Collaborate with multidisciplinary team to address chronic and comorbid conditions and prevent exacerbation or deterioration   Update acute care plan with appropriate goals if chronic or comorbid symptoms are exacerbated and prevent overall improvement and discharge      Care plan reviewed with patient. Patient verbalizes understanding of the plan of care and contribute to goal setting.

## 2023-01-05 NOTE — PROCEDURES
EKG performed at 2252.     Technician Note:  This order was placed by Dr. Pena to be done STAT in the ED at 18:45.  It was not performed for unknown reasons.  Because of this, when the patient was admitted to the ICU (4D07), I went to the unit and performed the EKG.

## 2023-01-05 NOTE — PROGRESS NOTES
CRITICAL CARE Progress Note      [facility]    Unit/Bed:4D-07/007-A  YOB: 1950  MRN: [id number]   Acct: [de-identified]   PCP: Howard Salinas MD    Date of Service: Pt seen/examined on 01/05/23  and Admitted to Inpatient with expected LOS greater than two midnights due to medical therapy. Chief Complaint: Left lower jaw pain    Assessment and Plan:-  Septic Shock POA: Likely secondary to #2; received 30 cc/kg bolus in ER however maps were still less than 60. Currently on Levophed drip. Lactic acidosis resolved. We will start patient on Unasyn for antibiotics (started on 1/4-). Wean pressors to maintain MAP greater than 65. Continue IV fluids. Levophed requirements improving. Leukocytosis worsened however likely secondary to steroids. Patient has remained afebrile with improving pressor requirements. Blood cultures no growth to date. Periapical Abscess with enlargement of the left submandibular gland: No concern for airway compromise at this time. ENT consulted and recommend warm compresses and antibiotics. Patient will need to see dentist after discharge for the periapical abscess. Likely will improve with antibiotics. Patient on Unasyn. ENT following. Hypernatremia: Likely secondary to dehydration and poor oral intake. 151 on admission. Patient 2.6 L free water deficit. Continue IV fluids. Monitor with BMP. Elevated troponin: Likely demand; denies chest pain at this time. EKG reviewed and showed no ischemia at this time. Repeat troponin equivocal  Leukocytosis: Worsening; patient has remained afebrile; Secondary to #1 and steroids. Patient has been receiving and patient is on chronic steroids. Monitor with CBC  Adrenal Insufficiency: Patient has history of pituitary tumor removal 50 years ago and since then has been on steroids. Will do stress dose steroids of 4 mg Decadron every 6 hours x 4 doses. Will consider Solucortef tomorrow.    Hypothyroidism: Resume home Synthroid  Paroxysmal atrial fibrillation: In normal sinus rhythm per last EKG. Hold Coreg at this time due to patient being on pressors. On Xarelto for anticoagulation at home. History of COVID infection: Patient was recently admitted to the hospital in early December for pneumonia secondary to COVID-19 infection. Patient had completed course of antibiotics at that time. History of stroke: Prior history of stroke in 81 and in 2015. Xarelto on hold at this time due to patient being n.p.o. Physical deconditioning/debility: Multiple, comorbid conditions. Wheelchair-bound at home   Obesity with BMI 29: Noted  Code Status: Patient is limited code per previous admission. JESSE Stage 3: Resolved likely secondary to #1 and dehydration; creatinine on arrival at 1.9 with baseline of 0.9. Avoid nephrotoxic medications at this time. IV fluids. Monitor strict I's and O's. Mild Hypokalemia: Resolved related to poor oral intake; replete and recheck      History Of Present Illness:      Patient is a 46-year-old female with a past medical history of adrenal insufficiency, hypothyroidism, paroxysmal atrial fibrillation, history of pituitary tumor, tension, stroke, and chronic diastolic heart failure admitted for left lower back pain. Patient states that for the last few days she has been having submandibular swelling and redness in the last few days. Patient has difficulty opening her mouth and has not been eating or drinking. She was recently admitted for the last month. Does have physical deconditioning and is wheelchair-bound. Denies any fever at home. Subjective:   Improvement on pressor requirement. Patient more alert. Speech has seen and recommend puree diet.      Past Medical History:        Diagnosis Date    Asthma     uses albuterol 1-2x per year    Atrial thrombosis     on [address]    Bursitis     right shoulder -s/p steroid injections    Cancer (HCC)     Chronic diastolic heart failure ([facility] [address]     CVA (cerebral infarction) 6/14/15    Mult. small acute infarctions- Left temporal, internal capsule, basal ganglia    Diabetes insipidus ([facility] [address] 1970's    borderline since 1970's    GERD (gastroesophageal reflux disease)     History of diabetes insipidus     History of meningioma of the brain     Hyperlipidemia     Hypertension     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 ([facility] [address]     Dr. Shanelle Anders    Hypothyroidism     Meningioma [facility])     3 separate meningiomas, s/p gamma knife (1/2012) , Dr. Chau Gil at [facility]    MRSA nasal colonization 6/2013    Obesity (BMI 30-39. 9)     Osteoarthritis     PAF (paroxysmal atrial fibrillation) (HCC)     Panhypopituitarism (HCC)     status post resection for macroadenoma in the 1970's    Pneumonia 11/2018    Stroke [facility]) 1988    due to radiation -bleeding CVA - residual left upper and lower extremity weakness    Stroke [facility]) October 2015    Urinary incontinence        Past Surgical History:        Procedure Laterality Date    [address] due to pituitary mass, drained and s/p radiation    CHOLECYSTECTOMY      HYSTERECTOMY (CERVIX STATUS UNKNOWN)      total done for DUB    OTHER SURGICAL HISTORY  9/30/2014    LAPAROSCOPIC RUPTURED APPENDECTOMY    OTHER SURGICAL HISTORY Left 10/26/15    Evacuation and Debridement of Left Lower Leg Hematoma - Dr. Sandra Martin  3/13/2013    left    SHOULDER ARTHROPLASTY      right    TOOTH EXTRACTION  03/19/2018    TOTAL HIP ARTHROPLASTY      bilateral    TOTAL KNEE ARTHROPLASTY      right        Home Medications:   No current facility-administered medications on file prior to encounter.      Current Outpatient Medications on File Prior to Encounter   Medication Sig Dispense Refill    terbinafine (LAMISIL) 250 MG tablet Take 1 tablet by mouth daily 90 tablet 1    rivaroxaban (XARELTO) 15 MG TABS tablet TAKE 1 TABLET BY MOUTH DAILY WITH SUPPER 90 tablet 3    oxybutynin (DITROPAN XL) 15 MG extended release tablet TAKE 1 TABLET BY MOUTH DAILY 90 tablet 1    carvedilol (COREG) 12.5 MG tablet TAKE 1 TABLET BY MOUTH TWICE DAILY 180 tablet 3    levothyroxine (SYNTHROID) 125 MCG tablet TAKE 1 TABLET BY MOUTH DAILY 90 tablet 1    pantoprazole (PROTONIX) 40 MG tablet TAKE 1 TABLET BY MOUTH EVERY NIGHT 90 tablet 3    bumetanide (BUMEX) 1 MG tablet TAKE 1 TABLET BY MOUTH DAILY 90 tablet 1    chlorhexidine (HIBICLENS) 4 % external liquid Wash hair with small amount twice weekly. 1 Bottle 2    miconazole (MICOTIN) 2 % powder Apply topically 2 times daily PRN for excoriation 45 g 1    hydrocortisone (CORTEF) 20 MG tablet Take 20 mg by mouth every morning       hydrocortisone (CORTEF) 5 MG tablet Take 10 mg by mouth nightly       diphenhydrAMINE-APAP, sleep, (TYLENOL PM EXTRA STRENGTH)  MG tablet Take 1 tablet by mouth nightly      acetaminophen (TYLENOL) 325 MG tablet Take 650 mg by mouth every 6 hours as needed for Pain 1 or 2 tabs q6h prn pain      simvastatin (ZOCOR) 20 MG tablet Take 1 tablet by mouth nightly 1 tablet 0       Allergies:    Pregabalin and Tape [adhesive tape]    Social History:    reports that she has never smoked. She has never used smokeless tobacco. She reports that she does not drink alcohol and does not use drugs. Family History:       Problem Relation Age of Onset    High Blood Pressure Mother     Stroke Maternal Grandmother 72       Diet:  ADULT DIET; Dysphagia - Pureed; Moderately Thick (Honey)    Review of systems:   Pertinent positives as noted in the HPI. All other systems reviewed and negative.     PHYSICAL EXAMINATION:  Patient Vitals for the past 8 hrs:   BP Temp Temp src Pulse Resp SpO2   01/05/23 1501 (!) 99/40 -- -- 56 21 96 %   01/05/23 1500 (!) 99/40 -- -- 56 21 96 %   01/05/23 1440 (!) 102/38 -- -- 55 17 --   01/05/23 1430 (!) 92/31 -- -- 59 (!) 34 --   01/05/23 1416 (!) 101/38 -- -- 64 (!) 34 --   01/05/23 1400 (!) 94/38 -- -- 66 24 94 %   01/05/23 1330 (!) 100/54 98.1 °F (36.7 °C) Oral 60 18 95 %   01/05/23 1300 115/65 -- -- 58 15 97 %   01/05/23 1230 (!) 107/40 -- -- 54 20 96 %   01/05/23 1200 108/64 -- -- 54 10 96 %   01/05/23 1130 (!) 99/52 -- -- 58 21 --   01/05/23 1100 (!) 105/54 -- -- 54 12 --   01/05/23 1030 -- -- -- 58 19 --   01/05/23 1000 136/65 -- -- 54 14 --   01/05/23 0930 (!) 126/49 -- -- 59 27 --   01/05/23 0900 (!) 126/50 -- -- 58 12 --   01/05/23 0830 (!) 106/56 -- -- 61 18 --   01/05/23 0800 (!) 100/47 -- -- 63 12 --   01/05/23 0730 (!) 109/39 97.9 °F (36.6 °C) Oral 57 13 --       I/O last 3 completed shifts: In: 945.9 [I.V.:543; IV Piggyback:402.9]  Out: -    Wt Readings from Last 3 Encounters:   01/05/23 167 lb 5.3 oz (75.9 kg)   12/09/22 163 lb 1.6 oz (74 kg)   12/16/21 193 lb (87.5 kg)      Body mass index is 29.64 kg/m². General:   Chronically ill-appearing white female  HEENT:  normocephalic and atraumatic. No scleral icterus. PERR; submandibular erythema tenderness and swelling. Airway  Neck: supple. No Thyromegaly. Lungs: clear to auscultation. No retractions; no stridor present  Cardiac: RRR. No JVD. Abdomen: soft. Nontender. Extremities:  No clubbing, cyanosis, or edema x 4. Vasculature: capillary refill < 3 seconds. Palpable dorsalis pedis pulses. Skin:  warm and dry. Psych:  Alert and oriented x3. Affect appropriate  Lymph:  No supraclavicular adenopathy. Neurologic:  No focal deficit. No seizures. Data: (All radiographs, tracings, PFTs, and imaging are personally viewed and interpreted unless otherwise noted).    Sodium 147, potassium 3.6, chloride 108, CO2 19, BUN 17, creatinine 1.1, anion gap 20, GFR 54, magnesium 1.7, phosphorus 2.8  3.2, alk phos 120, ALT 10, AST 18, bilirubin 0.6  WBC 41.8, hemoglobin 12.2, platelet 006  Cultures showing no growth to date  Initial lactic acid at 2.0, repeat lactic acid at 1  Initial troponin I 0.051, repeat troponin 0.030 and 0.012  CURRENT PARENTERAL VASOACTIVE / INOTROPIC AGENTS:  [] None Vasopressors:  [x] Norepinephrine  [] Dopamine  [] Phenylephrine  [] Vasopressin  [] Epinephrine  [] Other: Sedation:  [x] No Sedation  [] Propofol gtt-    [] BZD gtt -    [] Opiate gtt  -    [] Dexmedetomidine  [] Paralytics Antihypertensives gtt  [] Ca Channel Antagonist:  [] Beta-blocker:  [] Nitroglycerin  [] Nitroprusside     SUPPORT DEVICES:  Additional Respiratory Assessments  Heart Rate: 56  Resp: 21  SpO2: 96 %  Oxygen Delivery - O2 Flow Rate (L/min): 2 L/min  ABGs:   Lab Results   Component Value Date/Time    PH 6.0 11/30/2021 12:09 PM    PCO2 39 04/05/2017 06:37 PM    PO2 88 04/05/2017 06:37 PM    HCO3 29 04/05/2017 06:37 PM    O2SAT 97 04/05/2017 06:37 PM     Lab Results   Component Value Date/Time    IFIO2 2 04/05/2017 06:37 PM    MODE CPAP+PS 04/05/2017 11:10 AM    SETPEEP 6 04/05/2017 11:10 AM         CENTRAL LINES/CHEMOPORT/TUNNEL CATH:-    [] No   [x] Yes   (Date 1/4/2022 )           If yes -    [] Right IJ   [] Left IJ [] Right Femoral [] Left Femoral     [] Right Subclavian [x] Left Subclavian  [] Peripheral IV access  [] Arterial Line (Specify Site)    GROVES CATHETER:-   [] No  [] Yes  (Date  )     ICU PROPHYLAXIS/THERAPY:   Stress ulcer:  [x] PPI Agent  [] H2RA [] Sucralfate [] Other:   VTE:     [x] Enoxaparin    [] Warfarin [] NOAC [] PCD Device:Bilat LE   [] Heparin: [] Subcut / [] IV     LABS/RADIOLOGY:-  Recent Labs     01/04/23  1330 01/05/23  0430   WBC 25.0* 41.8*   HGB 14.6 12.2   HCT 47.3* 38.2    353       Recent Labs     01/04/23  1330 01/05/23  0035 01/05/23  0430   * 147* 147*   K 3.3* 4.1 3.6    108 108   CO2 32 20* 19*   BUN 20 17 17   CREATININE 1.9* 1.3* 1.1   CALCIUM 8.8 8.0* 7.9*   PHOS  --   --  2.8       Recent Labs     01/04/23  1330   AST 18   ALT 10*   BILITOT 0.6   ALKPHOS 120       No results for input(s): INR in the last 72 hours.   Recent Labs     01/04/23  1330   CKTOTAL 15*       No results for input(s): PROCAL in the last 72 hours. No results for input(s): LACTA in the last 72 hours. Microbiology:    Blood culture #1:   Lab Results   Component Value Date/Time    BC No growth 24 hours. 01/04/2023 01:20 PM     Blood culture #2:No results found for: Lloyd Norwood  Organism:  Lab Results   Component Value Date/Time    ORG Staphylococcus aureus 12/17/2018 04:04 PM         Lab Results   Component Value Date/Time    LABGRAM  02/16/2022 04:00 PM     No segmented neutrophils observed. Rare epithelial cells observed. No organisms observed. MRSA culture only:No results found for: [facility]  Urine culture:   Lab Results   Component Value Date/Time    LABURIN No growth-preliminary  No growth   02/09/2017 12:35 PM     Respiratory culture: No results found for: CULTRESP  Aerobic and Anaerobic :  Lab Results   Component Value Date/Time    LABAERO No growth-preliminary No growth 02/16/2022 04:00 PM     Lab Results   Component Value Date/Time    LABANAE  12/01/2021 12:00 PM     No anaerobes isolated- preliminary Culture yielded light growth of gram positive bacilli most consistent with Cutibacterium species. Clinical correlation required. Urinalysis:      Lab Results   Component Value Date/Time    NITRU NEGATIVE 01/04/2023 02:15 PM    WBCUA 5-9 01/04/2023 02:15 PM    WBCUA 2-4 02/22/2012 01:00 PM    BACTERIA NONE SEEN 01/04/2023 02:15 PM    RBCUA 3-5 01/04/2023 02:15 PM    BLOODU SMALL 01/04/2023 02:15 PM    SPECGRAV 1.025 09/18/2020 12:00 PM    GLUCOSEU NEGATIVE 01/04/2023 02:15 PM       Radiology:(All radiographs, tracings, PFTs, and imaging are personally viewed and interpreted unless otherwise noted). XR CHEST PORTABLE   Final Result   1. Poor inflation the lungs. Mild cardiomegaly. Bilateral shoulder prostheses. 2. Right subclavian line has been inserted with tip in right atrium. 3. Moderate atelectasis/pneumonia right parahilar region and both lung bases. No effusion seen.             **This report has been created using voice recognition software. It may contain minor errors which are inherent in voice recognition technology. **      Final report electronically signed by Dr. Khris Johns on 1/4/2023 4:42 PM      CT FACIAL BONES WO CONTRAST   Final Result   1. Enlargement of the left submandibular gland measuring up to 2.5 cm.   2. Asymmetric fullness of the left pharyngeal soft tissues with mass effect on the subglottic airway   3. Left mastoid effusions and otitis interna            **This report has been created using voice recognition software. It may contain minor errors which are inherent in voice recognition technology. **      Final report electronically signed by Dr. Oj Rebolledo on 1/4/2023 3:08 PM      XR CHEST PORTABLE   Final Result   Residual patchy airspace infiltrates left lower lobe. **This report has been created using voice recognition software. It may contain minor errors which are inherent in voice recognition technology. **      Final report electronically signed by Dr. Oj Rebolledo on 1/4/2023 12:41 PM        CT FACIAL BONES WO CONTRAST    Result Date: 1/4/2023  PROCEDURE: CT FACIAL BONES WO CONTRAST CLINICAL INFORMATION: Submandibular cellulitis swelling, possible erysipelas, ruling out submandibular phlegmon . TECHNIQUE: 3 mm multiplanar images of the facial bones in bone and soft tissue windows noncontrast All CT scans at this facility use dose modulation, iterative reconstruction, and/or weight-based dosing when appropriate to reduce radiation dose to as low as reasonably achievable. COMPARISON: No prior study. 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 Soft tissue windows demonstrate either enlargement of the left submandibular gland or obscuration by inflammation. Appearance is more suggestive of inflammatory enlargement of the gland. Evaluation is limited without contrast. This measures 2.5 cm.  Solid attenuation coefficients Asymmetric fullness of the left pharyngeal soft tissues underlying inflammation or mass is a concern. There are left-sided mastoid effusions which is new when compared to CT brain dated 8/20/2019 there is also fluid in the left internal auditory canal.     1. Enlargement of the left submandibular gland measuring up to 2.5 cm. 2. Asymmetric fullness of the left pharyngeal soft tissues with mass effect on the subglottic airway 3. Left mastoid effusions and otitis interna **This report has been created using voice recognition software. It may contain minor errors which are inherent in voice recognition technology. ** Final report electronically signed by Dr. Pedro Orr on 1/4/2023 3:08 PM    XR CHEST PORTABLE    Result Date: 1/4/2023  PROCEDURE: XR CHEST PORTABLE CLINICAL INFORMATION: central line placement COMPARISON: 1/4/2023 TECHNIQUE: A single mobile view of the chest was obtained. 1. Poor inflation the lungs. Mild cardiomegaly. Bilateral shoulder prostheses. 2. Right subclavian line has been inserted with tip in right atrium. 3. Moderate atelectasis/pneumonia right parahilar region and both lung bases. No effusion seen. **This report has been created using voice recognition software. It may contain minor errors which are inherent in voice recognition technology. ** Final report electronically signed by Dr. Valeriano Robertson on 1/4/2023 4:42 PM    XR CHEST PORTABLE    Result Date: 1/4/2023  PROCEDURE: XR CHEST PORTABLE CLINICAL INFORMATION: weakness . TECHNIQUE: Portable upright COMPARISON: 12/3/2022 FINDINGS: Patient is rotated. Mandible appears detail the upper apices. Heart size is within normal limits. Mediastinum is not widened. Residual patchy infiltrates left base slightly improved compared to the prior exam. No effusions are seen. Vessels are not congested. Chronic increased lung markings suggesting mild parenchymal scarring bilateral shoulder replacements.      Residual patchy airspace infiltrates left lower lobe. **This report has been created using voice recognition software. It may contain minor errors which are inherent in voice recognition technology. ** Final report electronically signed by Dr. Abbey Munguia on 1/4/2023 12:41 PM      EKG: normal sinus rhythm, no blocks or conduction defects, no ischemic changes    CONSULTS:-  [] Cardiology  [] Nephrology  [] Hemo onco   [] GI   [] ID  [] Endocrine  [] Pulmo      [] Neuro    [] Psych   [] Urology  [x] ENT   [] Lori Pinedale   []Ortho    []CV surg        [] Palliative  [] Hospice [] Pain management   []    []TCU   [] PT/OT  OTHERS:-    CODE STATUS:-  [] Full resuscitation [] DNR-Comfort Care-Arrest  [] DNR-Comfort Care   [x] Limited Resuscitation   [x] No ET intubation   [x] No CPR   [x] No shock for non-perfusing rhythm    Total critical care time caring for this patient with life threatening, unstable organ failure, including direct patient contact, management of life support systems, review of data including imaging and labs, discussions with other team members and physicians at least 28  Min so far today, excluding procedures. Electronically signed by Miguel Salazar DO on 1/5/2023 at 3:27 PM  CRITICAL CARE SPECIALIST.

## 2023-01-05 NOTE — PROGRESS NOTES
Pharmacy Note - Renal Dosing    Ampicillin-sulbactam 3,000 mg Q6H ordered for treatment of SSTI. Per [facility] Renal Dose Adjustment Policy, ampicillin sulbactam will be changed to 3000 mg Q12H due to crcl <30mL/min. Estimated Creatinine Clearance: Estimated Creatinine Clearance: 26 mL/min (A) (based on SCr of 1.9 mg/dL (H)). Dialysis Status, JESSE, CKD: JESSE    BMI: Body mass index is 29.23 kg/m². Rationale for Adjustment: Agent is renally eliminated. Pharmacy will continue to monitor renal function and adjust dose as necessary. Please call with any questions.     Thank you,  Jenni Hemphill, PharmD 1/4/2023 8:44 PM

## 2023-01-05 NOTE — PROGRESS NOTES
Pharmacist Review and Automatic Dose Adjustment of Prophylactic Enoxaparin    The reviewing pharmacist has made an adjustment to the ordered enoxaparin dose or converted to UFH per the approved [facility] protocol and table as identified below. Terence Riggs is a 71 y.o. female. Recent Labs     01/04/23  1330 01/05/23  0035 01/05/23  0430   CREATININE 1.9* 1.3* 1.1     Estimated Creatinine Clearance: 46 mL/min (based on SCr of 1.1 mg/dL). Recent Labs     01/04/23  1330 01/05/23  0430   HGB 14.6 12.2   HCT 47.3* 38.2    353     No results for input(s): INR in the last 72 hours. Height:   Ht Readings from Last 1 Encounters:   01/04/23 5' 3\" (1.6 m)     Weight:  Wt Readings from Last 1 Encounters:   01/05/23 167 lb 5.3 oz (75.9 kg)         Plan: Based upon the patient's weight and renal function, the ordered enoxaparin dose of 70 mg daily has been converted to 70 mg BID.     Thank you,  Francisco Maciel, PharmD, BCPS, BCCCP  1/5/2023 3:42 PM

## 2023-01-05 NOTE — CARE COORDINATION
Case Management Assessment  Initial Evaluation    Date/Time of Evaluation: 1/5/2023 10:43 AM  Assessment Completed by: Carly Knight RN    If patient is discharged prior to next notation, then this note serves as note for discharge by case management. Patient Name: Vaishali Schuster                   YOB: 1950  Diagnosis: Cellulitis of submandibular region [K12.2]  Shock ([facility] [R57.9]  Troponin level elevated [R77.8]  JESSE (acute kidney injury) ([facility] [N17.9]  Submandibular gland infection [K11.20]  Septic shock ([facility] [A41.9, R65.21]  Pneumonia of right lung due to infectious organism, unspecified part of lung [J18.9]                   Date / Time: 1/4/2023 10:22 AM  Location: [address] [place] [address]     Patient Admission Status: Inpatient   Readmission Risk (Low < 19, Mod (19-27), High > 27): Readmission Risk Score: 19.9    Current PCP: Rebecca Smith MD  PCP verified by CM? Yes    Chart Reviewed: Yes      History Provided by: Patient  Patient Orientation: Alert and Oriented    Patient Cognition: Other (see comment) (lethargic but answers appropriately)    Hospitalization in the last 30 days (Readmission):  Yes    If yes, Readmission Assessment in  Navigator will be completed. Advance Directives:      Code Status: Limited   Patient's Primary Decision Maker is: Named in [address] [place] [address]      Discharge Planning:    Patient lives with: Spouse/Significant Other Type of Home: House  Primary Care Giver: Self (Reports  assisting when needed.)  Patient Support Systems include: Spouse/Significant Other   Current Financial resources: Medicare  Current community resources: ECF/Home Care  Current services prior to admission: Home Care, Durable Medical Equipment            Current DME: Wheelchair, Walker            Type of Home Care services:  Nursing Services    ADLS  Prior functional level: Assistance with the following:, Housework, Shopping, Cooking, Bathing  Current functional level:  Other (see comment) (await therapy recommendations)    Family can provide assistance at DC: Other (comment) (depends on needs.)  Would you like Case Management to discuss the discharge plan with any other family members/significant others, and if so, who? Yes (SW consulted as she is current with [place])  Plans to Return to Present Housing: Unknown at present  Other Identified Issues/Barriers to RETURNING to current housing: unknown  Potential Assistance needed at discharge: Edi Gonzalez            Potential DME:    Patient expects to discharge to: Unknown  Plan for transportation at discharge: Other (see comment) (possible transport.)    Financial    Payor: [employer] ADVANTAGE / Plan: [employer] ADVANT[employer] PREFERRED PPO / Product Type: *No Product type* /     Does insurance require precert for SNF: Yes    Potential assistance Purchasing Medications: No  Meds-to-Beds request: Yes      [facility] [id number] - [place], [address]  [facility] [address]  Phone: [phone] Fax: [phone]    [facility] [address], [place] - [address] [place], [place] [address] [place] [address] [phone] Bernice Habermann [phone]  [address] [place] [address]  Phone: [phone] Fax: [phone]      Notes:    Factors facilitating achievement of predicted outcomes: Family support, Cooperative, Pleasant, and Has needed Durable Medical Equipment at home    Barriers to discharge: Limited safety awareness and Decreased endurance    Additional Case Management Notes: Presented with jaw pain and weakness. Admitted with sepsis. Intensivist following. ENT consult for left submandibular swelling. 0.45% @ 125ml/hr. Levo @ 8mcg. Vaso off. IV unasyn. IV decadron. IV protonix. Orthostatics. Daily wts. I/O. Procedure:   1/4 CT facial bones: 1.  Enlargement of the left submandibular gland measuring up to 2.5 cm. 2. Asymmetric fullness of the left pharyngeal soft tissues with mass effect on the subglottic airway 3. Left mastoid effusions and otitis interna   1/4 CXR: Residual patchy airspace infiltrates left lower lobe. The Plan for Transition of Care is related to the following treatment goals of Cellulitis of submandibular region [K12.2]  Shock ([address] [R57.9]  Troponin level elevated [R77.8]  JESSE (acute kidney injury) ([address] [N17.9]  Submandibular gland infection [K11.20]  Septic shock ([address] [A41.9, R65.21]  Pneumonia of right lung due to infectious organism, unspecified part of lung [J18.9]    Patient Goals/Plan/Treatment Preferences: Spoke with Vinod Darling, she is from home with her . She has a walker and w/c. She is current with [facility]. She denies needs. Did see documentation in [facility] that  had been reaching out to PCP in regards to possible ECF for rehab. SW was consulted. Will request PT/OT in collaborative rounds. Transportation/Food Security/Housekeeping Addressed: No issues identified.      Vitaliy Moss RN  Case Management Department

## 2023-01-05 NOTE — CARE COORDINATION
1/5/23, 12:00 PM EST    DISCHARGE PLANNING EVALUATION     Order received, pt [facility],  requesting [facility]. Formal assessment completed by Paula Greer last admission in December 2022. LENNIE spoke with , states pt was in [facility] recently and discharged home with [facility].  states due to pt not eating or drinking at home due to jaw pain pt has become more deconditioned and would like to see pt go to [facility]. SW discussed back up plan,  states he does not want SNF however, he did agree to look at [facility] list when he arrives later this evening. SW left SNF list in pts room and explained to pt and her brother of husbands plan. SW called [facility] and left message. LENNIE updated Fely NORIEGA, therapy consulted.

## 2023-01-05 NOTE — PROGRESS NOTES
Pharmacy Note - Renal Dosing    Unasyn 3000 mg q12h ordered for treatment of SSTI. Per [facility] Renal Dose Adjustment Policy, Unasyn will be changed to 3000 mg q6h. Estimated Creatinine Clearance: Estimated Creatinine Clearance: 46 mL/min (based on SCr of 1.1 mg/dL). Dialysis Status, JESSE, CKD: JESSE - resolving    BMI: Body mass index is 29.64 kg/m². Rationale for Adjustment: Agent is renally eliminated. Pharmacy will continue to monitor renal function and adjust dose as necessary. Please call with any questions.     Thank you,  Marissa Anderson, PharmD, BCPS, BCCCP  1/5/2023 3:39 PM

## 2023-01-06 PROBLEM — E43 SEVERE MALNUTRITION (HCC): Status: ACTIVE | Noted: 2023-01-06

## 2023-01-06 LAB
AEROBIC CULTURE: NORMAL
ANION GAP SERPL CALCULATED.3IONS-SCNC: 14 MEQ/L (ref 8–16)
AVERAGE GLUCOSE: 102 MG/DL (ref 70–126)
BASOPHILS # BLD: 0.2 %
BASOPHILS ABSOLUTE: 0.1 THOU/MM3 (ref 0–0.1)
BUN BLDV-MCNC: 11 MG/DL (ref 7–22)
CALCIUM SERPL-MCNC: 7.8 MG/DL (ref 8.5–10.5)
CHLORIDE BLD-SCNC: 108 MEQ/L (ref 98–111)
CO2: 22 MEQ/L (ref 23–33)
CREAT SERPL-MCNC: 0.7 MG/DL (ref 0.4–1.2)
EOSINOPHIL # BLD: 0 %
EOSINOPHILS ABSOLUTE: 0 THOU/MM3 (ref 0–0.4)
ERYTHROCYTE [DISTWIDTH] IN BLOOD BY AUTOMATED COUNT: 16.1 % (ref 11.5–14.5)
ERYTHROCYTE [DISTWIDTH] IN BLOOD BY AUTOMATED COUNT: 53.4 FL (ref 35–45)
GFR SERPL CREATININE-BSD FRML MDRD: > 60 ML/MIN/1.73M2
GLUCOSE BLD-MCNC: 133 MG/DL (ref 70–108)
HBA1C MFR BLD: 5.4 % (ref 4.4–6.4)
HCT VFR BLD CALC: 37 % (ref 37–47)
HEMOGLOBIN: 12.3 GM/DL (ref 12–16)
IMMATURE GRANS (ABS): 0.4 THOU/MM3 (ref 0–0.07)
IMMATURE GRANULOCYTES: 1.2 %
LYMPHOCYTES # BLD: 4.4 %
LYMPHOCYTES ABSOLUTE: 1.5 THOU/MM3 (ref 1–4.8)
MAGNESIUM: 1.6 MG/DL (ref 1.6–2.4)
MCH RBC QN AUTO: 30.3 PG (ref 26–33)
MCHC RBC AUTO-ENTMCNC: 33.2 GM/DL (ref 32.2–35.5)
MCV RBC AUTO: 91.1 FL (ref 81–99)
MONOCYTES # BLD: 1.3 %
MONOCYTES ABSOLUTE: 0.4 THOU/MM3 (ref 0.4–1.3)
NUCLEATED RED BLOOD CELLS: 0 /100 WBC
PLATELET # BLD: 223 THOU/MM3 (ref 130–400)
PMV BLD AUTO: 10.4 FL (ref 9.4–12.4)
POTASSIUM SERPL-SCNC: 3.2 MEQ/L (ref 3.5–5.2)
PROCALCITONIN: 2.7 NG/ML (ref 0.01–0.09)
RBC # BLD: 4.06 MILL/MM3 (ref 4.2–5.4)
SEG NEUTROPHILS: 92.9 %
SEGMENTED NEUTROPHILS ABSOLUTE COUNT: 31.9 THOU/MM3 (ref 1.8–7.7)
SODIUM BLD-SCNC: 144 MEQ/L (ref 135–145)
WBC # BLD: 34.3 THOU/MM3 (ref 4.8–10.8)

## 2023-01-06 PROCEDURE — 97167 OT EVAL HIGH COMPLEX 60 MIN: CPT

## 2023-01-06 PROCEDURE — 97163 PT EVAL HIGH COMPLEX 45 MIN: CPT

## 2023-01-06 PROCEDURE — 97530 THERAPEUTIC ACTIVITIES: CPT

## 2023-01-06 PROCEDURE — 97110 THERAPEUTIC EXERCISES: CPT

## 2023-01-06 PROCEDURE — 6360000002 HC RX W HCPCS: Performed by: STUDENT IN AN ORGANIZED HEALTH CARE EDUCATION/TRAINING PROGRAM

## 2023-01-06 PROCEDURE — 36592 COLLECT BLOOD FROM PICC: CPT

## 2023-01-06 PROCEDURE — 83735 ASSAY OF MAGNESIUM: CPT

## 2023-01-06 PROCEDURE — 2580000003 HC RX 258: Performed by: STUDENT IN AN ORGANIZED HEALTH CARE EDUCATION/TRAINING PROGRAM

## 2023-01-06 PROCEDURE — 84145 PROCALCITONIN (PCT): CPT

## 2023-01-06 PROCEDURE — 97535 SELF CARE MNGMENT TRAINING: CPT

## 2023-01-06 PROCEDURE — 2580000003 HC RX 258: Performed by: NURSE PRACTITIONER

## 2023-01-06 PROCEDURE — 99223 1ST HOSP IP/OBS HIGH 75: CPT | Performed by: STUDENT IN AN ORGANIZED HEALTH CARE EDUCATION/TRAINING PROGRAM

## 2023-01-06 PROCEDURE — 99231 SBSQ HOSP IP/OBS SF/LOW 25: CPT | Performed by: PHYSICIAN ASSISTANT

## 2023-01-06 PROCEDURE — 6370000000 HC RX 637 (ALT 250 FOR IP): Performed by: STUDENT IN AN ORGANIZED HEALTH CARE EDUCATION/TRAINING PROGRAM

## 2023-01-06 PROCEDURE — 36415 COLL VENOUS BLD VENIPUNCTURE: CPT

## 2023-01-06 PROCEDURE — 85025 COMPLETE CBC W/AUTO DIFF WBC: CPT

## 2023-01-06 PROCEDURE — 99291 CRITICAL CARE FIRST HOUR: CPT | Performed by: INTERNAL MEDICINE

## 2023-01-06 PROCEDURE — 83036 HEMOGLOBIN GLYCOSYLATED A1C: CPT

## 2023-01-06 PROCEDURE — 6360000002 HC RX W HCPCS

## 2023-01-06 PROCEDURE — 2000000000 HC ICU R&B

## 2023-01-06 PROCEDURE — C9113 INJ PANTOPRAZOLE SODIUM, VIA: HCPCS | Performed by: STUDENT IN AN ORGANIZED HEALTH CARE EDUCATION/TRAINING PROGRAM

## 2023-01-06 PROCEDURE — 80048 BASIC METABOLIC PNL TOTAL CA: CPT

## 2023-01-06 RX ORDER — SODIUM CHLORIDE, SODIUM LACTATE, POTASSIUM CHLORIDE, CALCIUM CHLORIDE 600; 310; 30; 20 MG/100ML; MG/100ML; MG/100ML; MG/100ML
INJECTION, SOLUTION INTRAVENOUS CONTINUOUS
Status: DISCONTINUED | OUTPATIENT
Start: 2023-01-06 | End: 2023-01-07

## 2023-01-06 RX ORDER — POTASSIUM CHLORIDE 29.8 MG/ML
20 INJECTION INTRAVENOUS PRN
Status: DISCONTINUED | OUTPATIENT
Start: 2023-01-06 | End: 2023-01-17 | Stop reason: HOSPADM

## 2023-01-06 RX ORDER — POTASSIUM CHLORIDE 7.45 MG/ML
10 INJECTION INTRAVENOUS PRN
Status: DISCONTINUED | OUTPATIENT
Start: 2023-01-06 | End: 2023-01-17 | Stop reason: HOSPADM

## 2023-01-06 RX ADMIN — SODIUM CHLORIDE, PRESERVATIVE FREE 10 ML: 5 INJECTION INTRAVENOUS at 19:54

## 2023-01-06 RX ADMIN — HYDROCORTISONE SODIUM SUCCINATE 50 MG: 100 INJECTION, POWDER, FOR SOLUTION INTRAMUSCULAR; INTRAVENOUS at 15:13

## 2023-01-06 RX ADMIN — SODIUM CHLORIDE 3000 MG: 900 INJECTION INTRAVENOUS at 16:23

## 2023-01-06 RX ADMIN — SALINE NASAL SPRAY 2 SPRAY: 1.5 SOLUTION NASAL at 19:54

## 2023-01-06 RX ADMIN — SODIUM CHLORIDE 3000 MG: 900 INJECTION INTRAVENOUS at 22:21

## 2023-01-06 RX ADMIN — SODIUM CHLORIDE 3000 MG: 900 INJECTION INTRAVENOUS at 10:52

## 2023-01-06 RX ADMIN — POTASSIUM CHLORIDE 20 MEQ: 29.8 INJECTION, SOLUTION INTRAVENOUS at 17:10

## 2023-01-06 RX ADMIN — POTASSIUM CHLORIDE 20 MEQ: 29.8 INJECTION, SOLUTION INTRAVENOUS at 16:21

## 2023-01-06 RX ADMIN — SODIUM CHLORIDE, POTASSIUM CHLORIDE, SODIUM LACTATE AND CALCIUM CHLORIDE: 600; 310; 30; 20 INJECTION, SOLUTION INTRAVENOUS at 06:29

## 2023-01-06 RX ADMIN — HYDROCORTISONE SODIUM SUCCINATE 50 MG: 100 INJECTION, POWDER, FOR SOLUTION INTRAMUSCULAR; INTRAVENOUS at 19:54

## 2023-01-06 RX ADMIN — SODIUM CHLORIDE, PRESERVATIVE FREE 10 ML: 5 INJECTION INTRAVENOUS at 08:54

## 2023-01-06 RX ADMIN — SODIUM CHLORIDE, POTASSIUM CHLORIDE, SODIUM LACTATE AND CALCIUM CHLORIDE: 600; 310; 30; 20 INJECTION, SOLUTION INTRAVENOUS at 13:56

## 2023-01-06 RX ADMIN — LEVOTHYROXINE SODIUM 125 MCG: 0.12 TABLET ORAL at 08:54

## 2023-01-06 RX ADMIN — SODIUM CHLORIDE 3000 MG: 900 INJECTION INTRAVENOUS at 05:06

## 2023-01-06 RX ADMIN — ATORVASTATIN CALCIUM 10 MG: 10 TABLET, FILM COATED ORAL at 08:54

## 2023-01-06 RX ADMIN — PANTOPRAZOLE SODIUM 40 MG: 40 INJECTION, POWDER, FOR SOLUTION INTRAVENOUS at 05:08

## 2023-01-06 RX ADMIN — SALINE NASAL SPRAY 2 SPRAY: 1.5 SOLUTION NASAL at 05:07

## 2023-01-06 RX ADMIN — SODIUM CHLORIDE, POTASSIUM CHLORIDE, SODIUM LACTATE AND CALCIUM CHLORIDE: 600; 310; 30; 20 INJECTION, SOLUTION INTRAVENOUS at 20:06

## 2023-01-06 ASSESSMENT — PAIN SCALES - GENERAL: PAINLEVEL_OUTOF10: 0

## 2023-01-06 NOTE — PROGRESS NOTES
[place] COORDINATOR CONSULT    Referral Type: internal    Patient Name: Ramy Stern      MRN: [id number]    : 1950  (71 y.o.)  Gender: female   Race:White (non-)     Payor Source: Payor: [employer] ADVANTAGE / Plan: [employer] ADVANT[employer] PREFERRED PPO / Product Type: *No Product type* /   Secondary Payor Source:      Isolation Status: Contact    Lives With: Spouse  Type of Home: House  Home Layout: One level  Home Access: Stairs to enter with rails  Entrance Stairs - Number of Steps: 2        Additional Comments: Prior to  admission, pt was living at home with .  was assisting with stand pivot transfers to/from w/c, pt nonambulatory. Since that admission, pt has been at [facility] for rehab.  currently in [facility] following back surgery. What is treatment plan? Disciplines Required upon Admission to Inpatient Rehabilitation: Physical Therapy, Occupational Therapy, and Speech Therapy  Post operative: No  Fall: No  Dialysis: No  Diet: ADULT DIET; Dysphagia - Pureed; Moderately Thick (Honey)  ADULT ORAL NUTRITION SUPPLEMENT; Breakfast, Dinner; Frozen Oral Supplement  Discussed patient with  and PM&R provider: Await medical work up completion.

## 2023-01-06 NOTE — PROGRESS NOTES
Comprehensive Nutrition Assessment    Type and Reason for Visit:  Initial, Positive Nutrition Screen, Wound    Nutrition Recommendations/Plan:   Continue diet as per SLP recommendations - Pureed with moderately thickened liquids  Begin Magic Cup ONS BID  Recommend MVI     Malnutrition Assessment:  Malnutrition Status:  Severe malnutrition (01/06/23 1429)    Context:  Acute Illness     Findings of the 6 clinical characteristics of malnutrition:  Energy Intake:  50% or less of estimated energy requirements for 5 or more days (poor po 1-2 weeks pta per pt.; 25% since admit (2d))  Weight Loss:  Greater than 5% over 1 month (16# or 9% in 1 month)     Body Fat Loss:  No significant body fat loss     Muscle Mass Loss:  No significant muscle mass loss    Fluid Accumulation:  Mild     Strength:  Not Performed    Nutrition Assessment:     Pt. severely malnourished AEB criteria listed above. At risk for further nutritional compromise r/t admit with cellulitis of submandibular jaw, septic shock, periapical abscess with enlargement of submandibular gland, dehydraion, poor po and weight loss, wounds, LOS day 2 and underlying medical condition chronic steroids d/t pituitary tumor removal, CVA, GERD, HTN, Hx meningioma of brain, At Fib, covid 11/23/22. Nutrition Related Findings:    Pt. Report/Treatments/Miscellaneous: pt. Seen, very [place], weak, reports poor appetite and weight loss, discussed ONS with current diet Rx; SLP following; UO 850ml  GI Status: BM x1 1/5  Pertinent Labs: glucose 133  BUN 11  creatinine 0.7  K+ 3.2  A1c 5.4%  Pertinent Meds: ATB, levophed    Wound Type: Pressure Injury, Stage II (buttocks; skin tears; DTI coccyx)       Current Nutrition Intake & Therapies:    Average Meal Intake: 1-25%     ADULT DIET; Dysphagia - Pureed;  Moderately Thick (Honey)  ADULT ORAL NUTRITION SUPPLEMENT; Breakfast, Dinner; Frozen Oral Supplement    Anthropometric Measures:  Height: 5' 3\" (160 cm)  Ideal Body Weight (IBW): 115 lbs (52 kg)    Admission Body Weight: 165 lb (74.8 kg) (1/4 stated)  Current Body Weight: 172 lb (78 kg) (1/6 with +1-2 edema),   IBW. Weight Source: Bed Scale  Current BMI (kg/m2): 30.5  Usual Body Weight: 188 lb (85.3 kg) (12/4/22 bedside scale; 193# 12/16/21)  % Weight Change (Calculated): -8.5   BMI Categories: Obese Class 1 (BMI 30.0-34. 9)    Estimated Daily Nutrient Needs:  Energy Requirements Based On: Kcal/kg  Weight Used for Energy Requirements: Current (78kgm)  Energy (kcal/day): 3548-6552 (15-18/kgm)  Weight Used for Protein Requirements: Ideal (52kgm)  Protein (g/day): 62-78gms (1.2-1.5/kgm IBW)     Fluid (ml/day): per Physician    Nutrition Diagnosis:   Severe malnutrition, In context of acute illness or injury related to inadequate protein-energy intake as evidenced by Criteria as identified in malnutrition assessment    Nutrition Interventions:   Food and/or Nutrient Delivery: Continue Current Diet, Start Oral Nutrition Supplement  Nutrition Education/Counseling: Education initiated (1/6 spoke with pt. re: ONS option while on thckened liquids and encourage po at best effort)  Coordination of Nutrition Care: Continue to monitor while inpatient, Interdisciplinary Rounds       Goals:     Goals: Meet at least 75% of estimated needs, by next RD assessment       Nutrition Monitoring and Evaluation:      Food/Nutrient Intake Outcomes: Food and Nutrient Intake, Supplement Intake, Diet Advancement/Tolerance  Physical Signs/Symptoms Outcomes: Biochemical Data, Chewing or Swallowing, GI Status, Fluid Status or Edema, Hemodynamic Status, Nutrition Focused Physical Findings, Skin, Weight    Discharge Planning:     Too soon to determine     [address], [place], [place]  Contact: [phone]

## 2023-01-06 NOTE — PLAN OF CARE
Problem: Discharge Planning  Goal: Discharge to home or other facility with appropriate resources  1/6/2023 1022 by Cheri Canela RN  Outcome: Progressing  Discharge to home or other facility with appropriate resources:   Identify barriers to discharge with patient and caregiver   Arrange for needed discharge resources and transportation as appropriate   Identify discharge learning needs (meds, wound care, etc)   Arrange for interpreters to assist at discharge as needed   Refer to discharge planning if patient needs post-hospital services based on physician order or complex needs related to functional status, cognitive ability or social support system  Note: Patient continues to meet ICU requirements. Patient is hypotensive and remains on a small amount of Levophed. Being evaluated for inpatient rehab. Patient is currently home with  with [facility]. Maybe need SNF at discharge. Problem: Infection - Adult  Goal: Isolation precautions  Description: Isolation precautions  1/6/2023 1022 by Cheri Canela, RN  Outcome: Progressing  Note: Patient admitted for treatment of sepsis. Source appears to be a periapical abscess. ENT consulted but no plans for [place]. Patient will need to see dentist at discharge. Being treated with antibiotics. Patient is also in Contact Precautions for MRSA in the nares.         Problem: Chronic Conditions and Co-morbidities  Goal: Patient's chronic conditions and co-morbidity symptoms are monitored and maintained or improved  1/6/2023 1022 by Cheri Canela RN  Outcome: Progressing  Care Plan - Patient's Chronic Conditions and Co-Morbidity Symptoms are Monitored and Maintained or Improved:   Monitor and assess patient's chronic conditions and comorbid symptoms for stability, deterioration, or improvement   Collaborate with multidisciplinary team to address chronic and comorbid conditions and prevent exacerbation or deterioration   Update acute care plan with appropriate goals if chronic or comorbid symptoms are exacerbated and prevent overall improvement and discharge  Note: Patient has had a previous stroke and has deficits on the left side. Is bed and chair bound at home. Physical therapy working with patient. Care plan reviewed with patient. Patient verbalizes understanding of the plan of care and contributes to goal setting.

## 2023-01-06 NOTE — PROGRESS NOTES
[address] THERAPY  [facility] ICU 4D  EVALUATION    Time:   Time In: 1004  Time Out: 1041  Timed Code Treatment Minutes: 23 Minutes  Minutes: 37          Date: 2023  Patient Name: Brian Lee,   Gender: female      MRN: [id number]  : 1950  (71 y.o.)  Referring Practitioner: Jennifer Pavon DO  Diagnosis: septic shock  Additional Pertinent Hx: Per H&P: Patient is a 78-year-old female with a past medical history of adrenal insufficiency, hypothyroidism, paroxysmal atrial fibrillation, history of pituitary tumor, tension, stroke, and chronic diastolic heart failure admitted for left lower back pain. Patient states that for the last few days she has been having submandibular swelling and redness in the last few days. Patient has difficulty opening her mouth and has not been eating or drinking. She was recently admitted for the last month. Pt found to have septic shock due to Periapical Abscess with enlargement of the left submandibular gland    Restrictions/Precautions:  Restrictions/Precautions: General Precautions, Fall Risk  Position Activity Restriction  Other position/activity restrictions: h/o CVA with L sided weakness    Subjective  Chart Reviewed: Yes, Orders, Progress Notes, History and Physical, Previous Admission  Patient assessed for rehabilitation services?: Yes  Family / Caregiver Present: No    Subjective: Pt seated upright in bed upon arrival, agreeable to OT session. Pain: Pt c/o generalized pain (chronic) although does not quantify. Vitals: Blood Pressure: 107/40 on arrival, requiring pressor support (levo); Upon sitting at EOB, BP 70/35 with pt asymptomatic, denied dizziness. Retaken after ~2 minutes and BP was 94/49.    Oxygen: 96% on RA  Heart Rate: 48 bpm    Social/Functional History:  Lives With: Spouse  Type of Home: House  Home Layout: One level  Home Access: Stairs to enter with rails  Entrance Stairs - Number of Steps: 2  Home Equipment: Walker, rolling, [employer], Lift chair           ADL Assistance: Needs assistance  Ambulation Assistance: Non-ambulatory  Transfer Assistance: Needs assistance    Active : No     Additional Comments: Prior to 11/22 admission, pt was living at home with .  was assisting with stand pivot transfers to/from w/c, pt nonambulatory. Since that admission, pt has been at [facility] for rehab.  currently in [facility] following back surgery. VISION:Corrected    HEARING:   [place], R ear better than L    COGNITION: Decreased Recall and Impaired Memory    RANGE OF MOTION:  Right Upper Extremity: Impaired - decreased at shoulders; distally WFL  Left Upper Extremity:  Impaired - grossly decreased at all joints due to previous CVA    STRENGTH:  Bilateral Upper Extremity:  Impaired - grossly deconditioned in BUEs, L weaker than R which is her baseline due to CVA    ADL:   Grooming: Minimal Assistance. Brushing teeth while seated at EOB. Lower Extremity Dressing: Dependent. socks  Toileting: Dependent. Hygiene, incont of BM. Increased time to complete due to pressure sores . BALANCE:  Sitting Balance:  Contact Guard Assistance. Initially, able to progress to SBA. Pt tolerates sitting at EOB X15 minutes. Rounded shoulders and downward head position noted that pt demoes difficulty correcting despite max cues. Platform step utilized due to pt's short stature and height of ICU bed. BED MOBILITY:  Rolling to Left: Maximum Assistance, X 1, with head of bed flat, with rail, with verbal cues , with increased time for completion    Rolling to Right: Maximum Assistance, X 1, with head of bed flat, with rail, with verbal cues , with increased time for completion . Unable to bend at L knee or reach across body with LUE to facilitiate roll.    Supine to Sit: Maximum Assistance, X 1, with head of bed raised, with rail, with verbal cues , with increased time for completion    Sit to Supine: Maximum Assistance, X 2, with head of bed raised, with verbal cues , with increased time for completion    Scooting: Maximum Assistance ; advancing hips forward to EOB. TRANSFERS:  OTR to further assess as able/appropriate. Fatigues with sitting at EOB, low BP requiring pressor support, not appropriate to attempt standing yet. FUNCTIONAL MOBILITY:  N/A    Activity Tolerance:  Patient tolerance of  treatment: fair. Assessment:  Assessment: Pt presents requiring increased assistance for ADLs, transfers, and functional mobility compared to PLOF. Pt will continue to benefit from OT services to improve independence with these tasks, in addition to overall strength/endurance to facilitate return to PLOF. Performance deficits / Impairments: Decreased ADL status, Decreased ROM, Decreased strength, Decreased endurance, Decreased balance, Decreased posture  Prognosis: Fair  REQUIRES OT FOLLOW-UP: Yes  Decision Making: High Complexity    Treatment Initiated: Treatment and education initiated within context of evaluation. Evaluation time included review of current medical information, gathering information related to past medical, social and functional history, completion of standardized testing, formal and informal observation of tasks, assessment of data and development of plan of care and goals. Treatment time included skilled education and facilitation of tasks to increase safety and independence with ADL's for improved functional independence and quality of life.     Discharge Recommendations:  [address] (with  assisting with transfers/ADLs at baseline, anticipate  will be unable to assist pt X3 months (minimum) due to back surgery)    Patient Education:     Patient Education  Education Given To: Patient  Education Provided: Role of Therapy, Plan of Care (sitting balance at EOB)  Education Method: Demonstration, Verbal  Barriers to Learning: None  Education Outcome: Verbalized understanding, Demonstrated understanding    Equipment Recommendations:  Equipment Needed: No  Other: defer to next level of care    Plan:  Times Per Week: 3-5x  Current Treatment Recommendations: Strengthening, ROM, Balance training, Endurance training, Patient/Caregiver education & training, Equipment evaluation, education, & procurement, Positioning, Self-Care / ADL. See long-term goal time frame for expected duration of plan of care. If no long-term goals established, a short length of stay is anticipated. Goals:     Short Term Goals  Time Frame for Short Term Goals: by discharge  Short Term Goal 1: Pt will tolerate sitting at EOB X20 minutes with supervision in prep for ADL completion. Short Term Goal 2: Pt will complete BADL tasks, UB with SBA and LB with max assistance, to increase independence with self care tasks. Short Term Goal 3: Pt will tolerate further assessment of stand pivot transfers by OTR when appropriate. Following session, patient left in safe position with all fall risk precautions in place.

## 2023-01-06 NOTE — PLAN OF CARE
Problem: Discharge Planning  Goal: Discharge to home or other facility with appropriate resources  Outcome: Progressing  Flowsheets (Taken 1/6/2023 0647)  Discharge to home or other facility with appropriate resources:   Identify barriers to discharge with patient and caregiver   Arrange for needed discharge resources and transportation as appropriate   Identify discharge learning needs (meds, wound care, etc)   Arrange for interpreters to assist at discharge as needed   Refer to discharge planning if patient needs post-hospital services based on physician order or complex needs related to functional status, cognitive ability or social support system     Problem: Infection - Adult  Goal: Isolation precautions  Description: Isolation precautions  Outcome: Progressing  Note: Contact precautions     Problem: Chronic Conditions and Co-morbidities  Goal: Patient's chronic conditions and co-morbidity symptoms are monitored and maintained or improved  Outcome: Progressing  Flowsheets (Taken 1/6/2023 0647)  Care Plan - Patient's Chronic Conditions and Co-Morbidity Symptoms are Monitored and Maintained or Improved:   Monitor and assess patient's chronic conditions and comorbid symptoms for stability, deterioration, or improvement   Collaborate with multidisciplinary team to address chronic and comorbid conditions and prevent exacerbation or deterioration   Update acute care plan with appropriate goals if chronic or comorbid symptoms are exacerbated and prevent overall improvement and discharge    Care plan reviewed with patient and family.  Patient and family verbalize understanding of the plan of care and contribute to goal setting.

## 2023-01-06 NOTE — PROGRESS NOTES
CRITICAL CARE PROGRESS NOTE      Patient:  Shirley Pang    Unit/Bed:4D-07/007-A  YOB: 1950  MRN: [id number]   PCP: Rosalinda Flanagan MD  Date of Admission: 1/4/2023  Chief Complaint:- Left lower jaw pain    Assessment and Plan:    Septic Shock POA, improved: Initial WBC 25.0, LA 2.0. Likely secondary to submandibular abscess; s/p 30 cc/kg bolus in ER however required Levophed for MAP<60.  Lactic acidosis resolved. BCx (1/04) NGTD. Continue Unasyn (1/4 - ). Leukocytosis worsened however likely secondary to steroids.  Patient has remained afebrile with improving pressor requirements. Wean pressors to maintain MAP greater than 65.  Continue IVFs.   Periapical Abscess with enlargement of the left submandibular gland: No concern for airway compromise at this time.  Discussed w/ ENT; recommend warm compresses and antibiotics.  Patient will need to see dentist after discharge for the periapical abscess.  Likely will improve with antibiotics.  Patient on Unasyn.  ENT following.  Adrenal Insufficiency: Patient has history of pituitary tumor removal 50 years ago and since then has been on chronic steroids.  Stress dose steroids of 4 mg Decadron every 6 hours x 4 doses completed. Start Solu-Cortef 50mg IV q6hr today.   Mild Hyperglycemia: A1c  5.4 (1/06/2023). Likely secondary to chronic steroid use. Continue to monitor. Consider low dose SSI if blood sugars consistently > 200.  Elevated troponin, improved: Likely demand; denies chest pain at this time.  EKG reviewed and showed no ischemia at this time. Repeat troponin equivocal. Expect to improve w/ treatment of sepsis. downtrend as of 1/05/2023. Continue on tele.  Leukocytosis, improved: patient has remained afebrile; likely secondary to sepsis and steroids.  Patient has been receiving and patient is on chronic steroids.  Trend CBC  Mild Hypokalemia POA, improved; related to poor oral intake; replete and recheck  Mild Hypernatremia POA, resolved: Likely  secondary to dehydration and poor oral intake. 151 on admission. Patient 2.6 L free water deficit. Continue IV fluids. Trend BMP  JESSE, resolved: likely secondary to sepsis and dehydration. initial Cr 1.9; baseline 0.9. Avoid nephrotoxic medications at this time. IV fluids. Monitor strict I's and O's. HFpEF: TTE (12/2028) EF 55% w/ dilated LA and overal normal LV function and anatomy. Held Coreg and Bumex at this time due to hypotension on pressors. Paroxysmal atrial fibrillation: [id number]: 6, HAS_BLED: 3. In normal sinus rhythm per last EKG. Held Coreg at this time due to patient being on pressors. Continued home Xarelto for anticoagulation. Trend BMP; maintain K>4.0, Mg>2.0. Replace electrolytes as needed. Continue Tele. HTN: Coreg and Bumex held at this time for sepsis on pressors  HLD: continued home statin  Hypothyroidism: Resume home Synthroid  GERD: continued home Protonix  Hx COVID: Patient was recently admitted to the hospital in early December for pneumonia secondary to COVID-19 infection. Patient had completed course of antibiotics at that time. Hx CVA: Prior history of stroke in 1988 (residual left sided deficits) and in 2015. Continued home Xarelto  Physical deconditioning/debility: Multiple, comorbid conditions. Wheelchair-bound at home. SLP/PT/OT evaluation. PM&R consult for rehab per family request.  Obesity with BMI 29: Noted  Code Status: Patient is limited code per previous admission. INITIAL H AND P AND ICU COURSE:  Patient is a 80yo F w/ significant PMH Atrial thrombosis, CHF, CVA, PAF, HTN, HLD, asthma, GERD, DMII, adrenal insufficiency, hypothyroidism, hx meningioma x3 admitted to [facility] on 1/04/2023 for Left Lower Jaw Pain. Reported noted submandibular swelling and redness that is painful for few days before. Endorsed difficulty opening mouth  and decreased oral intake of both liquids and solids.  Admitted 11/2022 for Acute Hypoxic Resp Failure 2/2 LLL PNA where she was discharged on 2L O2 NC after completion of abx treatment. In ED, hypotensive but othrewise vitals stable. Labs hypernatremic w/ minor JESSE. Leukocytosis WBC 25.0. UA negative for UTI, blood cultures drawn. EKG sinus eladia w/o acute ischemic changes from prior. CT facial bones noted enlargement of left submandibular gland measuring 2.5cm w/ assymetric left pharyngeal soft tissues and mass effect on subglottic airway. CXR notable for poor lung inflation, mod atelectasis/PNA in R parahilar region and bilat lung bases. S/p IVF bolus; patient did not improve w/ fluids. Admitted to ICU for pressor support. Started on Unasyn. Weaned down pressor support w/ increasing alertness and improved speech. 1/06/20203: continued weaning of pressors. Patient started diet yesterday; tolerates well. Continued abx treatment. Family requested PM&R consult for rehab evaluation. Past Medical History:  Atrial thrombosis (on Xarelto), CHF, CVA (1988-residual left sided weakness, 2015), PAF (6/2015), HTN, HLD, asthma,GERD,  adrenal insufficiency (chronic steroids), hypothyroidism, hx meningioma x3 (s/p gamma knife (1/2012 w/ Dr. Hayde Dang at [facility] Family History:  CVA (maternal grandmother). Social History:  denied alcohol, tobacco, recreational drug use. ROS   General: No fevers, chills. Pain controlled. Fatigue, headache. HEENT: No headache, no vision changes, no hearing changes, no trauma. CV: No palpitations, no chest pain, no tachycarida  RESP: No respiratory distress, no cough, no wheeze, SOB. GI: No abdominal pain, no nausea, no vomiting, no diarrhea  : No dysuria, no hematuria, no incontinence  Neuro: No seizures, no focal deficits, no weakness, no confusion   Psych: No psychosis, no SI/HI, Depression, Anxiety.       Scheduled Meds:   calcium replacement protocol   Other RX Placeholder    ampicillin-sulbactam  3,000 mg IntraVENous Q6H    rivaroxaban  15 mg Oral Daily    sodium chloride  2 spray Each Nostril Q12H sodium chloride flush  5-40 mL IntraVENous 2 times per day    levothyroxine  125 mcg Oral Daily    [Held by provider] pantoprazole  40 mg Oral QAM AC    atorvastatin  10 mg Oral Daily    pantoprazole  40 mg IntraVENous QAM AC     Continuous Infusions:   lactated ringers 150 mL/hr at 01/06/23 0905    norepinephrine 6 mcg/min (01/06/23 0905)    sodium chloride         PHYSICAL EXAMINATION:  T:  97.5. P:  59. RR:  14. B/P:  101 / 85. FiO2:  0. O2 Sat:  94.  I/O:  4433.9 / 850  Body mass index is 30.58 kg/m². GCS:   15  General:   chronically ill-appearing, in no acute distress. HEENT:  normocephalic and atraumatic. No scleral icterus. PERR  Neck: supple. No Thyromegaly. Lungs: clear to auscultation. No retractions  Cardiac: RRR. No JVD. Abdomen: soft. Nontender. Extremities:  No clubbing, cyanosis, or edema x 4. Vasculature: capillary refill < 3 seconds. Palpable dorsalis pedis pulses. Skin:  warm and dry. Psych:  Alert and oriented x3. Affect appropriate  Lymph:  No supraclavicular adenopathy. Neurologic:  No focal deficit. No seizures. Data: (All radiographs, tracings, PFTs, and imaging are personally viewed and interpreted unless otherwise noted). , K 3.2, Cl 108, CO2 22, BUN 11, Cr 0.7, Ca 7.8  WBC 34.3, H/H 12.3 / 37.0, Platelets 531  Telemetry shows: NSR  Micro: BCx (1/04) NGTD. MRSA (+)  CT Facial Bones wo Contrast 1/04/2023: enlargement of left submandibular gland measuring 2.5cm. assymetric left pharyngeal soft tissues w/ mass effect on subglottic airway. Left mastoid effusion and otitis interna. CXR 1/04/2023: poor lung inflation, mod atelectasis/PNA in R parahilar region and bilat lung bases. Seen with multidisciplinary ICU team.  Meets Continued ICU Level Care Criteria:    [x] Yes   [] No - Transfer Planned to listed location:  [] HOSPITALIST CONTACTED-      Case and plan discussed with Dr. Fu Headings.         Electronically signed by DO ANNABELLE Askew CARE SPECIALIST

## 2023-01-06 NOTE — PROGRESS NOTES
Department of Otolaryngology  Progress Note      SUBJECTIVE:  Patient remains admitted to the ICU. She reportedly did well overnight. The patient thinks that her pain is slightly improved. WBC improved to to 34.3 today from 41.8 yesterday. She remains afebrile. No other acute concerns or episodes reported by patient's family. REVIEW OF SYSTEMS:    Review of systems not obtained due to patient factors - mental status    OBJECTIVE      Physical  VITALS:  /85   Pulse 59   Temp 97.5 °F (36.4 °C) (Axillary)   Resp 14   Ht 5' 3\" (1.6 m)   Wt 172 lb 9.9 oz (78.3 kg)   SpO2 94%   BMI 30.58 kg/m²     This is a 71 y.o. female. Patient is chronically ill appearing without evidence of acute distress. She does follow simple commands, but limitedly vocalizes during exam    Head:   Normocephalic, atraumatic. No obvious masses or lesions noted. Mouth/Throat:  Lips, tongue and oral cavity: Normal. No masses or lesions noted   Dentition: fair, no malocclusion. No obviously infected or concerning teeth noted  Oral mucosa:  Mildly dry appearing, but overall improved compared to yesterday. Oropharynx: normal-appearing mucosa  Hard and soft palates: symmetrical and intact. Salivary glands: Stable induration of the left submandibular gland. The erythema overlying the area appears mildly improved. The gland seems less tender to firm palpation than yesterday. No purulence expressible with bimanual palpation. No floor of mouth edema. No trismus noted  Uvula: midline, no obvious lesions     Neck: Trachea midline. Stable edema of the left submandibular gland, but no significant edema in tissue surrounding gland. Mild improvement in previous neck erythema  Lungs: Normal effort of breathing, not obviously distressed.     Data  CBC:   Lab Results   Component Value Date/Time    WBC 34.3 01/06/2023 05:05 AM    RBC 4.06 01/06/2023 05:05 AM    RBC 4.70 05/12/2022 03:26 PM    HGB 12.3 01/06/2023 05:05 AM    HCT 37.0 01/06/2023 05:05 AM    MCV 91.1 01/06/2023 05:05 AM    MCH 30.3 01/06/2023 05:05 AM    MCHC 33.2 01/06/2023 05:05 AM    RDW 14.1 05/12/2022 03:26 PM     01/06/2023 05:05 AM    MPV 10.4 01/06/2023 05:05 AM     BMP:    Lab Results   Component Value Date/Time     01/06/2023 05:05 AM    K 3.2 01/06/2023 05:05 AM    K 4.3 12/07/2022 06:15 AM     01/06/2023 05:05 AM    CO2 22 01/06/2023 05:05 AM    BUN 11 01/06/2023 05:05 AM    LABALBU 3.2 01/04/2023 01:30 PM    CREATININE 0.7 01/06/2023 05:05 AM    CALCIUM 7.8 01/06/2023 05:05 AM    LABGLOM >60 01/06/2023 05:05 AM    GLUCOSE 133 01/06/2023 05:05 AM    GLUCOSE 85 05/12/2022 03:26 PM       Inpatient Medications  Current Facility-Administered Medications: lactated ringers infusion, , IntraVENous, Continuous  hydrocortisone sodium succinate PF (SOLU-CORTEF) injection 50 mg, 50 mg, IntraVENous, Q6H  calcium replacement protocol, , Other, RX Placeholder  ampicillin-sulbactam (UNASYN) 3,000 mg in sodium chloride 0.9 % 100 mL IVPB (mini-bag), 3,000 mg, IntraVENous, Q6H  rivaroxaban (XARELTO) tablet 15 mg, 15 mg, Oral, Daily  sodium chloride (OCEAN, BABY AYR) 0.65 % nasal spray 2 spray, 2 spray, Each Nostril, Q12H  norepinephrine (LEVOPHED) 16 mg in dextrose 5% 250 mL infusion, 1-100 mcg/min, IntraVENous, Continuous  sodium chloride flush 0.9 % injection 5-40 mL, 5-40 mL, IntraVENous, 2 times per day  sodium chloride flush 0.9 % injection 5-40 mL, 5-40 mL, IntraVENous, PRN  0.9 % sodium chloride infusion, , IntraVENous, PRN  ondansetron (ZOFRAN-ODT) disintegrating tablet 4 mg, 4 mg, Oral, Q8H PRN **OR** ondansetron (ZOFRAN) injection 4 mg, 4 mg, IntraVENous, Q6H PRN  polyethylene glycol (GLYCOLAX) packet 17 g, 17 g, Oral, Daily PRN  acetaminophen (TYLENOL) tablet 650 mg, 650 mg, Oral, Q6H PRN **OR** acetaminophen (TYLENOL) suppository 650 mg, 650 mg, Rectal, Q6H PRN  levothyroxine (SYNTHROID) tablet 125 mcg, 125 mcg, Oral, Daily  [Held by provider] pantoprazole (PROTONIX) tablet 40 mg, 40 mg, Oral, QAM AC  atorvastatin (LIPITOR) tablet 10 mg, 10 mg, Oral, Daily  pantoprazole (PROTONIX) injection 40 mg, 40 mg, IntraVENous, QAM AC    ASSESSMENT AND PLAN    Left sialadenitis, leukocytosis, septic shock, epistaxis, supplemental oxygen use    -Continue Unasyn as WBC improved, less erythema of the neck  -Continue to apply moist heat for 15 minutes followed by gentle massage (over submandibular gland pushing up on the gland from under the jawline, this is to milk the gland. Do this every 4 hours while awake.   -Continue frequent sialogogue use, as often as tolerated. Patient's family will also help with using the lemon swab  -Nasal mucosa very dry and appears to have recent bleeding. Start nasal saline sprays to moisturize mucosa and hopefully prevent epistaxis  -ENT will continue to follow    Electronically signed by OFELIA Jimenez on 1/6/2023 at 1:05 PM

## 2023-01-06 NOTE — PROGRESS NOTES
[address] [address]  INPATIENT PHYSICAL THERAPY  EVALUATION  [facility] ICU 4D - 4D-07/007-A    Time In: 1444  Time Out: 1300  Timed Code Treatment Minutes: 23 Minutes  Minutes: 33          Date: 2023  Patient Name: Ron Hilton,  Gender:  female        MRN: [id number]  : 1950  (71 y.o.)      Referring Practitioner: Cathy Giang DO  Diagnosis: Cellulitis of submadibular region  Additional Pertinent Hx: Per H&P: Patient is a 67-year-old female with a past medical history of adrenal insufficiency, hypothyroidism, paroxysmal atrial fibrillation, history of pituitary tumor, tension, stroke, and chronic diastolic heart failure admitted for left lower back pain. Patient states that for the last few days she has been having submandibular swelling and redness in the last few days. Patient has difficulty opening her mouth and has not been eating or drinking. She was recently admitted for the last month. Pt found to have septic shock due to Periapical Abscess with enlargement of the left submandibular gland. Restrictions/Precautions:  Restrictions/Precautions: General Precautions, Fall Risk  Position Activity Restriction  Other position/activity restrictions: h/o CVA with L sided weakness    Subjective:  Chart Reviewed: Yes  Patient assessed for rehabilitation services?: Yes  Family / Caregiver Present: No  Subjective: RN approved session, pt is supine in bed, pleasantly confused, agreeable to PT. Pt unable to provide recent history or PLOF information.     General:  Overall Orientation Status: Impaired  Orientation Level: Oriented to place, Oriented to person, Disoriented to time  Vision: Within Functional Limits  Hearing: Exceptions to [facility]  Hearing Exceptions: Hard of hearing/hearing concerns       Pain: denies    Vitals: Blood Pressure: 105/43 in bed, 107/57 sitting EOB after ~5 minutes, 93/57 after ~10 minutes    Social/Functional History:    Lives With: Spouse  Type of Home: House  Home Layout: One level  Home Access: Stairs to enter with rails  Entrance Stairs - Number of Steps: 2  Home Equipment: Charna Alejandra, rolling, Wheelchair-manual, Lift chair         ADL Assistance: Needs assistance     Ambulation Assistance: Non-ambulatory  Transfer Assistance: Needs assistance    Active : No     Additional Comments: Prior to 11/22 admission, pt was living at home with .  was assisting with stand pivot transfers to/from w/c, pt nonambulatory. Since that admission, pt has been at [facility] for rehab.  currently in [facility] following back surgery. OBJECTIVE:  Range of Motion:  Right Lower Extremity: WFL  Left Lower Extremity: Impaired - knee flexion to 90 degrees, DF lacks at least 20 degrees; hypertonic from previous CVA in 2015    Strength:  Right Lower Extremity: Impaired - min A for heel slides and hip abd/add  Left Lower Extremity: Impaired - hip 1/5, DF 2-/5 within available range, quad 3/5 (able to perform LAQ sitting EOB)    Balance:  Static Sitting Balance:  Stand By Assistance, X 1  **Pt sits EOB x 10 minutes prior to c/o fatigue, verbal cues for upright posture and gaze (unable to maintain upright gaze due to fatigue), pt performs B scap retraction and LAQ sitting EOB, reaches within PIPPA with R UE, unable to perform L shoulder flexion from previous CVA    Bed Mobility:  Rolling to Right: Maximum Assistance, X 1, with head of bed flat, with rail, with verbal cues    Supine to Sit: Maximum Assistance, X 1, with head of bed raised, with rail, with verbal cues , with increased time for completion  Sit to Supine: Maximum Assistance, X 1, with head of bed flat, with rail, with verbal cues , with increased time for completion   Scooting: Maximum Assistance, X 1    Exercise:  Patient was guided in 1 set(s) 10 reps of exercise to both lower extremities. Ankle pumps, Heelslides, Hip abduction/adduction, and Long arc quads.   Min A for R heel slides and hip abd/add, max A for L heel slides, hip abd/add, L heel cord stretches 2 x 30 seconds. Exercises were completed for increased independence with functional mobility. Functional Outcome Measures: Completed  AM-PAC Inpatient Mobility without Stair Climbing Raw Score : 7  AM-PAC Inpatient without Stair Climbing T-Scale Score : 28.66    ASSESSMENT:  Activity Tolerance:  Patient tolerance of  treatment: good. Treatment Initiated: Treatment and education initiated within context of evaluation. Evaluation time included review of current medical information, gathering information related to past medical, social and functional history, completion of standardized testing, formal and informal observation of tasks, assessment of data and development of plan of care and goals. Treatment time included skilled education and facilitation of tasks to increase safety and independence with functional mobility for improved independence and quality of life. Assessment: Body Structures, Functions, Activity Limitations Requiring Skilled Therapeutic Intervention: Decreased functional mobility , Decreased cognition, Decreased safe awareness, Decreased strength, Decreased endurance, Decreased posture, Decreased balance, Decreased coordination, Decreased ROM  Assessment: Gretchen Johansen is a 71 y.o. female that presents with cellulitis. Pt demonstrates a decrease in baseline by way of bed mobility, transfers and ambulation secondary to decreased activity tolerance, strength, fatigue, and balance deficits. Pt will benefit from skilled PT services throughout admission and beyond hospital discharge for improvements in functional mobility and in order to decrease fall risk and return pt to OF. Therapy Prognosis: Good    Requires PT Follow-Up: Yes    Discharge Recommendations:  Discharge Recommendations: [address]    Patient Education:      .     Patient Education  Education Given To: Patient  Education Provided: Role of Therapy, Plan of Care, Orientation  Education Method: Verbal  Barriers to Learning: Cognition  Education Outcome: Continued education needed       Equipment Recommendations:  Equipment Needed: No    Plan:  Current Treatment Recommendations: Strengthening, ROM, Balance training, Functional mobility training, Neuromuscular re-education, Safety education & training, Positioning, Patient/Caregiver education & training, Therapeutic activities  General Plan:  (3-5x GM)    Goals:  Patient Goals : to get better  Short Term Goals  Time Frame for Short Term Goals: by discharge  Short Term Goal 1: Pt to transfer supine <--> sit min A to enable pt to get in/out of bed. Short Term Goal 2: Pt to sit EOB x 20 minutes to improve upright tolerance in prep for transfer training. Short Term Goal 3: PT to assess stand pivot transfers. Long Term Goals  Time Frame for Long Term Goals : NA due to short length of stay. Following session, patient left in safe position with all fall risk precautions in place.

## 2023-01-06 NOTE — CONSULTS
Physical Medicine & Rehabilitation   Consult Note      Admitting Physician: Leonel Fitzgerald MD    Primary Care Provider: Rebecca Smith MD     Reason for Consult:  Assess for rehab needs     History of Present Illness:  Vaishali Schuster is a 71 y.o. 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 Patient presented to [facility] ED on 1/4/2022 with reports of jaw pain and submandibular redness for 2 days. On presentation, patient noted to have slight difficulty opening mouth. Family also reporting increased weakness. At baseline, patient is nonambulatory but is able to be transported from bed to chair. On evaluation in ED, patient to be hypotensive. Empirically started on Rocephin for presumed cellulitis/erysipelas around submandibular area. Patient also noted to have elevated troponin, JESSE, and right lung pneumonia. Due to ongoing hypotension despite saline bolus, patient was ultimately admitted to the ICU for septic shock secondary to periapical abscess with enlargement of left submandibular gland. [facility] ENT consulted. Recommended continued Unasyn along with moist heat and gentle massage. Elevated troponin on presentation throughout to be secondary to demand. EKG reportedly without signs of ischemia. Due to history of adrenal insufficiency 2/2 pituitary tumor, patient given stress dose of steroids. Of note, patient was recently hospitalized for COVID PNA and was discharged to SNF on 12/9/2022. On day of consultation, patient seen without family at bedside. Confirms that she was recently in skilled nursing facility but is unable to recall when she discharged home or how long she was home prior to readmission. Prior to admission, patient was nonambulatory utilizing wheelchair for mobility. Has been assisted with transfers to and from bed and commode. Patient's  also assisted with all ADLs. Patient's  recently admitted and underwent back surgery today. Patient states that her daughter is currently in town from [place] but will only be staying for a week. Patient with history of CVA with resultant left-sided weakness. Current Rehabilitation Assessments:  PT 01/06/2023:  Range of Motion:  Right Lower Extremity: WFL  Left Lower Extremity: Impaired - knee flexion to 90 degrees, DF lacks at least 20 degrees; hypertonic from previous CVA in 2015     Strength:  Right Lower Extremity: Impaired - min A for heel slides and hip abd/add  Left Lower Extremity: Impaired - hip 1/5, DF 2-/5 within available range, quad 3/5 (able to perform LAQ sitting EOB)     Balance:  Static Sitting Balance:  Stand By Assistance, X 1  **Pt sits EOB x 10 minutes prior to c/o fatigue, verbal cues for upright posture and gaze (unable to maintain upright gaze due to fatigue), pt performs B scap retraction and LAQ sitting EOB, reaches within PIPPA with R UE, unable to perform L shoulder flexion from previous CVA     Bed Mobility:  Rolling to Right: Maximum Assistance, X 1, with head of bed flat, with rail, with verbal cues    Supine to Sit: Maximum Assistance, X 1, with head of bed raised, with rail, with verbal cues , with increased time for completion  Sit to Supine: Maximum Assistance, X 1, with head of bed flat, with rail, with verbal cues , with increased time for completion   Scooting: Maximum Assistance, X 1     Exercise:  Patient was guided in 1 set(s) 10 reps of exercise to both lower extremities. Ankle pumps, Heelslides, Hip abduction/adduction, and Long arc quads. Min A for R heel slides and hip abd/add, max A for L heel slides, hip abd/add, L heel cord stretches 2 x 30 seconds. Exercises were completed for increased independence with functional mobility.      Functional Outcome Measures: Completed  AM-PAC Inpatient Mobility without Stair Climbing Raw Score : 7  AM-PAC Inpatient without Stair Climbing T-Scale Score : 28.66     OT 01/06/2023:  RANGE OF MOTION:  Right Upper Extremity: Impaired - decreased at shoulders; distally WFL  Left Upper Extremity:  Impaired - grossly decreased at all joints due to previous CVA     STRENGTH:  Bilateral Upper Extremity:  Impaired - grossly deconditioned in BUEs, L weaker than R which is her baseline due to CVA     ADL:   Grooming: Minimal Assistance. Brushing teeth while seated at EOB. Lower Extremity Dressing: Dependent. socks  Toileting: Dependent. Hygiene, incont of BM. Increased time to complete due to pressure sores . BALANCE:  Sitting Balance:  Contact Guard Assistance. Initially, able to progress to SBA. Pt tolerates sitting at EOB X15 minutes. Rounded shoulders and downward head position noted that pt demoes difficulty correcting despite max cues. Platform step utilized due to pt's short stature and height of ICU bed. BED MOBILITY:  Rolling to Left: Maximum Assistance, X 1, with head of bed flat, with rail, with verbal cues , with increased time for completion    Rolling to Right: Maximum Assistance, X 1, with head of bed flat, with rail, with verbal cues , with increased time for completion . Unable to bend at L knee or reach across body with LUE to facilitiate roll. Supine to Sit: Maximum Assistance, X 1, with head of bed raised, with rail, with verbal cues , with increased time for completion    Sit to Supine: Maximum Assistance, X 2, with head of bed raised, with verbal cues , with increased time for completion    Scooting: Maximum Assistance ; advancing hips forward to EOB. TRANSFERS:  OTR to further assess as able/appropriate. Fatigues with sitting at EOB, low BP requiring pressor support, not appropriate to attempt standing yet. FUNCTIONAL MOBILITY:  N/A     Activity Tolerance:  Patient tolerance of  treatment: fair.             SLP 01/05/2023:  Impressions: Patient presents with moderate oral dysphagia with inability to fully discern potential presence of pharyngeal phase deficits without formal instrumentation. Recent MBS completed 11/26/22 recommending minced and moist diet with moderately thick liquids; patient discharged to [facility] to follow. Brother Brendan reporting, \"\"yes she was doing the thickened liquids at the nursing home but she didn't eat or drink much since COVID. Her  Ina Kasper also snuck in some water because she just really didn't like the thickened liquids. I was always worried about her dehydration. \" Patient consumed PO trials of puree and moderately thick liquids via spoon and cup; appropriate oral control/slow overall although functional for modified consistencies, no s/s of aspiration. Advanced PO trials not completed at this time at bedside d/t current medical need for ICU hospitalization, ongoing weaning of sedation medications and evidence of audible aspiration of thin and mildly thick liquids per most recent MBS completed 11/26/22. ST recommending puree diet with moderately thick liquids. Patient would benefit from ongoing skilled ST services to target dysphagia. Patient's brother Ciro Saenz reporting, \"increasing mumbling since COVID-19. \" This ST familiar with patient d/t recent hospital admission and hx of speech therapy interventions; patient appears to be approaching cognitive/speech baseline. Will continue to monitor speech/language/cognition baseline throughout dysphagia interventions. *post evaluation, patient without respiratory distress upon leaving room; EITAN Franco notified re: clinical findings and recommendations from the assessment; verbal receptiveness noted   Dr. Jazmyn Dey and medical team updated        RECOMMENDATIONS/ASSESSMENT:  Instrumental Evaluation: Instrumental evaluation not indicated at this time.   Diet Recommendations:  Puree diet with moderately thick liquids   Strategies:  Full Upright Position, Small Bite/Sip, Multiple Swallow, Medications Crushed with Puree, Limit Distractions, Monitor for Fatigue, and 1:1 assistance with meals    Rehabilitation Potential: fair  Discharge Recommendations: SNF    Past Medical History:        Diagnosis Date    Asthma     uses albuterol 1-2x per year    Atrial thrombosis     on [facility]    Bursitis     right shoulder -s/p steroid injections    Cancer ([facility]     Chronic diastolic heart failure (HCC)     CVA (cerebral infarction) 6/14/15    Mult. small acute infarctions- Left temporal, internal capsule, basal ganglia    Diabetes insipidus ([address] 1970's    borderline since 1970's    GERD (gastroesophageal reflux disease)     History of diabetes insipidus     History of meningioma of the brain     Hyperlipidemia     Hypertension     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 ([facility] [address]     Dr. Julee Sanches    Hypothyroidism     Meningioma [facility])     3 separate meningiomas, s/p gamma knife (1/2012) , Dr. Angela Avalos at [facility]    MRSA nasal colonization 6/2013    Obesity (BMI 30-39. 9)     Osteoarthritis     PAF (paroxysmal atrial fibrillation) (HCC)     Panhypopituitarism (HCC)     status post resection for macroadenoma in the 1970's    Pneumonia 11/2018    Stroke [facility]) 1988    due to radiation -bleeding CVA - residual left upper and lower extremity weakness    Stroke [facility]) October 2015    Urinary incontinence        Past Surgical History:        Procedure Laterality Date    [address] due to pituitary mass, drained and s/p radiation    CHOLECYSTECTOMY      HYSTERECTOMY (CERVIX STATUS UNKNOWN)      total done for DUB    OTHER SURGICAL HISTORY  9/30/2014    LAPAROSCOPIC RUPTURED APPENDECTOMY    OTHER SURGICAL HISTORY Left 10/26/15    Evacuation and Debridement of Left Lower Leg Hematoma - Dr. Hussain Gonzales  3/13/2013    left    SHOULDER ARTHROPLASTY      right    TOOTH EXTRACTION  03/19/2018    TOTAL HIP ARTHROPLASTY      bilateral    TOTAL KNEE ARTHROPLASTY      right        Allergies:    Allergies   Allergen Reactions    Pregabalin      Other reaction(s): dizziness    Tape [Adhesive Tape] Dermatitis        Current Medications:   Current Facility-Administered Medications: lactated ringers infusion, , IntraVENous, Continuous  hydrocortisone sodium succinate PF (SOLU-CORTEF) injection 50 mg, 50 mg, IntraVENous, Q6H  potassium chloride 20 mEq/50 mL IVPB (Central Line), 20 mEq, IntraVENous, PRN **OR** potassium chloride 10 mEq/100 mL IVPB (Peripheral Line), 10 mEq, IntraVENous, PRN  calcium replacement protocol, , Other, RX Placeholder  ampicillin-sulbactam (UNASYN) 3,000 mg in sodium chloride 0.9 % 100 mL IVPB (mini-bag), 3,000 mg, IntraVENous, Q6H  rivaroxaban (XARELTO) tablet 15 mg, 15 mg, Oral, Daily  sodium chloride (OCEAN, BABY AYR) 0.65 % nasal spray 2 spray, 2 spray, Each Nostril, Q12H  norepinephrine (LEVOPHED) 16 mg in dextrose 5% 250 mL infusion, 1-100 mcg/min, IntraVENous, Continuous  sodium chloride flush 0.9 % injection 5-40 mL, 5-40 mL, IntraVENous, 2 times per day  sodium chloride flush 0.9 % injection 5-40 mL, 5-40 mL, IntraVENous, PRN  0.9 % sodium chloride infusion, , IntraVENous, PRN  ondansetron (ZOFRAN-ODT) disintegrating tablet 4 mg, 4 mg, Oral, Q8H PRN **OR** ondansetron (ZOFRAN) injection 4 mg, 4 mg, IntraVENous, Q6H PRN  polyethylene glycol (GLYCOLAX) packet 17 g, 17 g, Oral, Daily PRN  acetaminophen (TYLENOL) tablet 650 mg, 650 mg, Oral, Q6H PRN **OR** acetaminophen (TYLENOL) suppository 650 mg, 650 mg, Rectal, Q6H PRN  levothyroxine (SYNTHROID) tablet 125 mcg, 125 mcg, Oral, Daily  [Held by provider] pantoprazole (PROTONIX) tablet 40 mg, 40 mg, Oral, QAM AC  atorvastatin (LIPITOR) tablet 10 mg, 10 mg, Oral, Daily  pantoprazole (PROTONIX) injection 40 mg, 40 mg, IntraVENous, QAM AC    Social History:  Social History     Socioeconomic History    Marital status:      Spouse name: Not on file    Number of children: 2    Years of  education: Not on file    Highest education level: Not on file   Occupational History    Not on file   Tobacco Use    Smoking status: Never    Smokeless tobacco: Never   Vaping Use    Vaping Use: Never used   Substance and Sexual Activity    Alcohol use: No    Drug use: No    Sexual activity: Yes     Partners: Male   Other Topics Concern    Not on file   Social History Narrative    Not on file     Social Determinants of Health     Financial Resource Strain: Not on file   Food Insecurity: Not on file   Transportation Needs: Not on file   Physical Activity: Not on file   Stress: Not on file   Social Connections: Not on file   Intimate Partner Violence: Not on file   Housing Stability: Not on file     Lives With: Spouse  Type of Home: [address]: One level  Home Access: Stairs to enter with [address] - Number of Steps: 2  Home Equipment: Walker, rolling, Wheelchair-manual, Lift chair      ADL Assistance: Needs assistance  Ambulation Assistance: Non-ambulatory  Transfer Assistance: Needs assistance     Active : No  Additional Comments: Prior to 11/22 admission, pt was living at home with .  was assisting with stand pivot transfers to/from w/c, pt nonambulatory. Since that admission, pt has been at [facility] for rehab.  currently in [facility] following back surgery.          Family History:       Problem Relation Age of Onset    High Blood Pressure Mother     Stroke Maternal Grandmother 72       Review of Systems:  CONSTITUTIONAL:  negative for  fevers and chills  EYES:  positive for  glasses; negative for visual disturbance  HEENT:  positive for  swelling  RESPIRATORY:  negative for  dyspnea and wheezing  CARDIOVASCULAR:  negative for  chest pain, palpitations  GASTROINTESTINAL:  negative for nausea, vomiting, and diarrhea  GENITOURINARY:  negative for dysuria  SKIN:  positive for scattered bruising  HEMATOLOGIC/LYMPHATIC:  positive for easy bruising and swelling/edema  MUSCULOSKELETAL:  positive for  decreased range of motion and muscle weakness  NEUROLOGICAL:  positive for gait problems and weakness  BEHAVIOR/PSYCH:  negative for anxiety  System review otherwise negative    Physical Exam:  BP (!) 134/39   Pulse (!) 47   Temp 97.6 °F (36.4 °C) (Oral)   Resp 27   Ht 5' 3\" (1.6 m)   Wt 172 lb 9.9 oz (78.3 kg)   SpO2 96%   BMI 30.58 kg/m²   Patient is awake and alert  Orientation: oriented to  person, place, time  Mood: within normal limits  Affect: calm  General appearance: Patient is well nourished, well developed, well groomed and in no acute distress    Memory:   abnormal - unable to provide detailed history  Attention/Concentration: follows content of conversation  Language:  normal    Cranial Nerves:  cranial nerves II-XII are grossly intact  ROM:  abnormal   Motor Exam: Generalized weakness, 4/5 throughout RUE, 3/5 throughout LUE, unable to lift bilateral legs off bed.  Unable to dorsiflex left ankle against gravity  Clonus at left ankle   Muscle bulk: within normal limits  Gait: not assessed     Heart: well perfused extremities  Lungs: breathing comfortably on room air without any increased WOB  Skin: Scattered bruising and skin tears in different stages of healing to BUE and BLE        Diagnostics:  Recent Results (from the past 24 hour(s))   CBC with Auto Differential    Collection Time: 01/06/23  5:05 AM   Result Value Ref Range    WBC 34.3 (HH) 4.8 - 10.8 thou/mm3    RBC 4.06 (L) 4.20 - 5.40 mill/mm3    Hemoglobin 12.3 12.0 - 16.0 gm/dl    Hematocrit 37.0 37.0 - 47.0 %    MCV 91.1 81.0 - 99.0 fL    MCH 30.3 26.0 - 33.0 pg    MCHC 33.2 32.2 - 35.5 gm/dl    RDW-CV 16.1 (H) 11.5 - 14.5 %    RDW-SD 53.4 (H) 35.0 - 45.0 fL    Platelets 394 857 - 478 thou/mm3    MPV 10.4 9.4 - 12.4 fL    Seg Neutrophils 92.9 %    Lymphocytes 4.4 %    Monocytes 1.3 %    Eosinophils 0.0 %    Basophils 0.2 %    Immature Granulocytes 1.2 %    Segs Absolute 31.9 (H) 1.8 - 7.7 thou/mm3    Lymphocytes Absolute 1.5 1.0 - 4.8 thou/mm3    Monocytes Absolute 0.4 0.4 - 1.3 thou/mm3    Eosinophils Absolute 0.0 0.0 - 0.4 thou/mm3    Basophils Absolute 0.1 0.0 - 0.1 thou/mm3    Immature Grans (Abs) 0.40 (H) 0.00 - 0.07 thou/mm3    nRBC 0 /100 wbc   Magnesium    Collection Time: 01/06/23  5:05 AM   Result Value Ref Range    Magnesium 1.6 1.6 - 2.4 mg/dL   Procalcitonin    Collection Time: 01/06/23  5:05 AM   Result Value Ref Range    Procalcitonin 2.70 (H) 0.01 - 0.09 ng/mL   Hemoglobin A1C    Collection Time: 01/06/23  5:05 AM   Result Value Ref Range    Hemoglobin A1C 5.4 4.4 - 6.4 %    AVERAGE GLUCOSE 102 70 - 126 mg/dL   Basic Metabolic Panel    Collection Time: 01/06/23  5:05 AM   Result Value Ref Range    Sodium 144 135 - 145 meq/L    Potassium 3.2 (L) 3.5 - 5.2 meq/L    Chloride 108 98 - 111 meq/L    CO2 22 (L) 23 - 33 meq/L    Glucose 133 (H) 70 - 108 mg/dL    BUN 11 7 - 22 mg/dL    Creatinine 0.7 0.4 - 1.2 mg/dL    Calcium 7.8 (L) 8.5 - 10.5 mg/dL   Anion Gap    Collection Time: 01/06/23  5:05 AM   Result Value Ref Range    Anion Gap 14.0 8.0 - 16.0 meq/L   Glomerular Filtration Rate, Estimated    Collection Time: 01/06/23  5:05 AM   Result Value Ref Range    Est, Glom Filt Rate >60 >60 ml/min/1.73m2       Radiology Review:   CT Facial Bone 1/4/2023:   Impression:       1. Enlargement of the left submandibular gland measuring up to 2.5 cm.   2. Asymmetric fullness of the left pharyngeal soft tissues with mass effect on the subglottic airway   3. Left mastoid effusions and otitis interna        Impression:  Septic Shock  Periapical Abcess with enlargement of left submandibular gland  Hypernatremia  Leukocytosis  Adrenal insufficiency  Hypothyroidism  A-fib  History of CVA   Obesity  Impaired ADLs and mobility  Dysphagia    Recommendations:  Functional Impairments: mobility, transfers, ADLs, swallow  Patient would benefit from ongoing therapies to address impairments listed above.   At baseline, patient is nonambulatory. Requires assistance with transfers and all ADLs at home.  is primary caregiver. Patient does state that she has a daughter who lives out of state. Per patient's report, daughter is only in town for 1 week.  underwent back surgery today and is reportedly also hospitalized following this. I was planning to reach out to patient's daughter, however, no contact information in chart. The only contact information for family is patient's  who underwent surgery today. I did not attempt to make contact due to this. At this time, I do not feel like inpatient rehab would be a good fit. I am uncertain about patients ability to tolerate aggressive 3h/d therapies. Also concerns due to patient's significant functional impairments, limited support at home given 's recent surgery, and limitations due to insurance. At this time, I suspect the patient will require will require more than the short window of time allotted in the acute inpatient rehab setting to achieve functional goals necessary to return home. Therefore patient would be best suited for subacute rehab to allow for more time for healing and functional progress. Will plan to have someone form our team follow up on Monday to confirm information listed above as there was no family available at time of consultation. At that time, will reassess patient functional progress. It was my pleasure to evaluate Yahirtray Loud today. Please call with questions.     Olga Joy, DO

## 2023-01-06 NOTE — CARE COORDINATION
1/6/23, 2:56 PM EST    DISCHARGE ON GOING EVALUATION    [facility] day: 2  Location: 4D-07/007-A Reason for admit: Cellulitis of submandibular region [K12.2]  Shock (HCC) [R57.9]  Troponin level elevated [R77.8]  JESSE (acute kidney injury) (HCC) [N17.9]  Submandibular gland infection [K11.20]  Septic shock (HCC) [A41.9, R65.21]  Pneumonia of right lung due to infectious organism, unspecified part of lung [J18.9]   Procedure:   1/4 CT facial bones: 1. Enlargement of the left submandibular gland measuring up to 2.5 cm. 2. Asymmetric fullness of the left pharyngeal soft tissues with mass effect on the subglottic airway 3. Left mastoid effusions and otitis interna   1/4 CVC Right subclavian    Barriers to Discharge: ENT saw patient; recommended heat to area followed by massage to milk the gland Q4H. Started on nasal saline sprays to moisturize mucosa. Physiatry consulted; to gaurav for possible IPR. Weaning pressors.     Afebrile. SB 50's. On room air. Ox4. Follows commands. PT/OT. Intensivist and ENT following. +MRSA nares. Telemetry, CVC, external urinary catheter. IVF, levo @ 6 mcg/min, IV unasyn, lipitor, IV solucortef 50 mg Q6H, synthroid, IV protonix, xarelto, Electrolyte replacement protocols. K+ 3.2, procal 2.7, wbc down to 34.3.     PCP: Rosalinda Flanagan MD  Readmission Risk Score: 20.2%  Patient Goals/Plan/Treatment Preferences: From home w/ and current with [facility]. Has walker and wheelchair.  having back surgery today.  requested IP Rehab at [facility]; Physiatry to eval. Backup plan for SNF. SW on case. Will require precert.

## 2023-01-07 LAB
ANION GAP SERPL CALCULATED.3IONS-SCNC: 10 MEQ/L (ref 8–16)
BASOPHILS # BLD: 0.1 %
BASOPHILS ABSOLUTE: 0 THOU/MM3 (ref 0–0.1)
BUN BLDV-MCNC: 8 MG/DL (ref 7–22)
CALCIUM IONIZED: 1.17 MMOL/L (ref 1.12–1.32)
CALCIUM SERPL-MCNC: 7.8 MG/DL (ref 8.5–10.5)
CHLORIDE BLD-SCNC: 112 MEQ/L (ref 98–111)
CO2: 24 MEQ/L (ref 23–33)
CREAT SERPL-MCNC: 0.6 MG/DL (ref 0.4–1.2)
EOSINOPHIL # BLD: 0 %
EOSINOPHILS ABSOLUTE: 0 THOU/MM3 (ref 0–0.4)
ERYTHROCYTE [DISTWIDTH] IN BLOOD BY AUTOMATED COUNT: 16.6 % (ref 11.5–14.5)
ERYTHROCYTE [DISTWIDTH] IN BLOOD BY AUTOMATED COUNT: 55.6 FL (ref 35–45)
GFR SERPL CREATININE-BSD FRML MDRD: > 60 ML/MIN/1.73M2
GLUCOSE BLD-MCNC: 147 MG/DL (ref 70–108)
HCT VFR BLD CALC: 31.5 % (ref 37–47)
HEMOGLOBIN: 10.5 GM/DL (ref 12–16)
IMMATURE GRANS (ABS): 0.44 THOU/MM3 (ref 0–0.07)
IMMATURE GRANULOCYTES: 1.5 %
LYMPHOCYTES # BLD: 4.8 %
LYMPHOCYTES ABSOLUTE: 1.4 THOU/MM3 (ref 1–4.8)
MAGNESIUM: 1.5 MG/DL (ref 1.6–2.4)
MCH RBC QN AUTO: 30.7 PG (ref 26–33)
MCHC RBC AUTO-ENTMCNC: 33.3 GM/DL (ref 32.2–35.5)
MCV RBC AUTO: 92.1 FL (ref 81–99)
MONOCYTES # BLD: 2.6 %
MONOCYTES ABSOLUTE: 0.7 THOU/MM3 (ref 0.4–1.3)
NUCLEATED RED BLOOD CELLS: 0 /100 WBC
PLATELET # BLD: 294 THOU/MM3 (ref 130–400)
PLATELET ESTIMATE: ADEQUATE
PMV BLD AUTO: 10.6 FL (ref 9.4–12.4)
POTASSIUM SERPL-SCNC: 3.3 MEQ/L (ref 3.5–5.2)
RBC # BLD: 3.42 MILL/MM3 (ref 4.2–5.4)
SCAN OF BLOOD SMEAR: NORMAL
SEG NEUTROPHILS: 91 %
SEGMENTED NEUTROPHILS ABSOLUTE COUNT: 26.1 THOU/MM3 (ref 1.8–7.7)
SODIUM BLD-SCNC: 146 MEQ/L (ref 135–145)
WBC # BLD: 28.7 THOU/MM3 (ref 4.8–10.8)

## 2023-01-07 PROCEDURE — 83735 ASSAY OF MAGNESIUM: CPT

## 2023-01-07 PROCEDURE — 80048 BASIC METABOLIC PNL TOTAL CA: CPT

## 2023-01-07 PROCEDURE — 6360000002 HC RX W HCPCS

## 2023-01-07 PROCEDURE — C9113 INJ PANTOPRAZOLE SODIUM, VIA: HCPCS | Performed by: STUDENT IN AN ORGANIZED HEALTH CARE EDUCATION/TRAINING PROGRAM

## 2023-01-07 PROCEDURE — 2000000000 HC ICU R&B

## 2023-01-07 PROCEDURE — 2580000003 HC RX 258: Performed by: STUDENT IN AN ORGANIZED HEALTH CARE EDUCATION/TRAINING PROGRAM

## 2023-01-07 PROCEDURE — 85025 COMPLETE CBC W/AUTO DIFF WBC: CPT

## 2023-01-07 PROCEDURE — 36415 COLL VENOUS BLD VENIPUNCTURE: CPT

## 2023-01-07 PROCEDURE — 82330 ASSAY OF CALCIUM: CPT

## 2023-01-07 PROCEDURE — 2500000003 HC RX 250 WO HCPCS: Performed by: EMERGENCY MEDICINE

## 2023-01-07 PROCEDURE — 99233 SBSQ HOSP IP/OBS HIGH 50: CPT | Performed by: INTERNAL MEDICINE

## 2023-01-07 PROCEDURE — 6360000002 HC RX W HCPCS: Performed by: STUDENT IN AN ORGANIZED HEALTH CARE EDUCATION/TRAINING PROGRAM

## 2023-01-07 PROCEDURE — 36592 COLLECT BLOOD FROM PICC: CPT

## 2023-01-07 PROCEDURE — 6370000000 HC RX 637 (ALT 250 FOR IP): Performed by: STUDENT IN AN ORGANIZED HEALTH CARE EDUCATION/TRAINING PROGRAM

## 2023-01-07 RX ORDER — MAGNESIUM SULFATE IN WATER 40 MG/ML
2000 INJECTION, SOLUTION INTRAVENOUS PRN
Status: DISCONTINUED | OUTPATIENT
Start: 2023-01-07 | End: 2023-01-17 | Stop reason: HOSPADM

## 2023-01-07 RX ADMIN — SALINE NASAL SPRAY 2 SPRAY: 1.5 SOLUTION NASAL at 04:18

## 2023-01-07 RX ADMIN — HYDROCORTISONE SODIUM SUCCINATE 50 MG: 100 INJECTION, POWDER, FOR SOLUTION INTRAMUSCULAR; INTRAVENOUS at 19:48

## 2023-01-07 RX ADMIN — ATORVASTATIN CALCIUM 10 MG: 10 TABLET, FILM COATED ORAL at 08:11

## 2023-01-07 RX ADMIN — HYDROCORTISONE SODIUM SUCCINATE 50 MG: 100 INJECTION, POWDER, FOR SOLUTION INTRAMUSCULAR; INTRAVENOUS at 02:34

## 2023-01-07 RX ADMIN — PANTOPRAZOLE SODIUM 40 MG: 40 INJECTION, POWDER, FOR SOLUTION INTRAVENOUS at 04:09

## 2023-01-07 RX ADMIN — SODIUM CHLORIDE 3000 MG: 900 INJECTION INTRAVENOUS at 21:46

## 2023-01-07 RX ADMIN — LEVOTHYROXINE SODIUM 125 MCG: 0.12 TABLET ORAL at 08:11

## 2023-01-07 RX ADMIN — POTASSIUM CHLORIDE 20 MEQ: 29.8 INJECTION, SOLUTION INTRAVENOUS at 06:42

## 2023-01-07 RX ADMIN — MAGNESIUM SULFATE HEPTAHYDRATE 2000 MG: 40 INJECTION, SOLUTION INTRAVENOUS at 08:21

## 2023-01-07 RX ADMIN — SODIUM CHLORIDE 3000 MG: 900 INJECTION INTRAVENOUS at 10:27

## 2023-01-07 RX ADMIN — HYDROCORTISONE SODIUM SUCCINATE 50 MG: 100 INJECTION, POWDER, FOR SOLUTION INTRAMUSCULAR; INTRAVENOUS at 08:06

## 2023-01-07 RX ADMIN — SODIUM CHLORIDE, PRESERVATIVE FREE 10 ML: 5 INJECTION INTRAVENOUS at 08:07

## 2023-01-07 RX ADMIN — HYDROCORTISONE SODIUM SUCCINATE 50 MG: 100 INJECTION, POWDER, FOR SOLUTION INTRAMUSCULAR; INTRAVENOUS at 13:25

## 2023-01-07 RX ADMIN — SODIUM CHLORIDE, PRESERVATIVE FREE 10 ML: 5 INJECTION INTRAVENOUS at 19:48

## 2023-01-07 RX ADMIN — SODIUM CHLORIDE 3000 MG: 900 INJECTION INTRAVENOUS at 15:59

## 2023-01-07 RX ADMIN — Medication 3 MCG/MIN: at 19:56

## 2023-01-07 RX ADMIN — SODIUM CHLORIDE 3000 MG: 900 INJECTION INTRAVENOUS at 04:16

## 2023-01-07 RX ADMIN — POTASSIUM CHLORIDE 20 MEQ: 29.8 INJECTION, SOLUTION INTRAVENOUS at 05:34

## 2023-01-07 RX ADMIN — RIVAROXABAN 15 MG: 15 TABLET, FILM COATED ORAL at 18:14

## 2023-01-07 NOTE — PROGRESS NOTES
Department of Otolaryngology  Progress Note    Chief Complaint:  Submandibular swelling and erythema    SUBJECTIVE:  No acute issues overnight.  Still on low rate Levophed.  Neck swelling much improved, patient states no pain.  WBC down to 28.7.  Afebrile.    A complete multi-organ review of systems was performed using a new patient questionnaire, and reviewed by me.  ENT:  negative except as noted in HPI  CONSTITUTIONAL:  negative except as noted in HPI  EYES:  negative except as noted in HPI  RESPIRATORY:  negative except as noted in HPI  CARDIOVASCULAR:  negative except as noted in HPI  GASTROINTESTINAL:  negative except as noted in HPI  GENITOURINARY:  negative except as noted in HPI  MUSCULOSKELETAL:  negative except as noted in HPI  SKIN:  negative except as noted in HPI  ENDOCRINE/METABOLIC: negative except as noted in HPI  HEMATOLOGIC/LYMPHATIC:  negative except as noted in HPI  ALLERGY/IMMUN: negative except as noted in HPI  NEUROLOGICAL:  negative except as noted in HPI  BEHAVIOR/PSYCH:  negative except as noted in HPI    OBJECTIVE      Physical  VITALS:  BP (!) 142/51   Pulse (!) 40   Temp 97.8 °F (36.6 °C) (Oral)   Resp 13   Ht 5' 3\" (1.6 m)   Wt 172 lb 9.9 oz (78.3 kg)   SpO2 96%   BMI 30.58 kg/m²     Alert, mostly oriented and cooperative elderly and chronically ill-appearing adult female.  No distress.  No stridor.  Voice is appropriate for age and gender.  Minimal anterior neck and submandibular erythema.  Left submandibular gland is firm, mildly tender.  No pus from submandibular duct opening.      Data  CBC:   Lab Results   Component Value Date/Time    WBC 28.7 01/07/2023 04:20 AM    RBC 3.42 01/07/2023 04:20 AM    RBC 4.70 05/12/2022 03:26 PM    HGB 10.5 01/07/2023 04:20 AM    HCT 31.5 01/07/2023 04:20 AM    MCV 92.1 01/07/2023 04:20 AM    MCH 30.7 01/07/2023 04:20 AM    MCHC 33.3 01/07/2023 04:20 AM    RDW 14.1 05/12/2022 03:26 PM     01/07/2023 04:20 AM    MPV 10.6 01/07/2023 04:20  AM     CMP:    Lab Results   Component Value Date/Time     01/07/2023 04:20 AM    K 3.3 01/07/2023 04:20 AM    K 4.3 12/07/2022 06:15 AM     01/07/2023 04:20 AM    CO2 24 01/07/2023 04:20 AM    BUN 8 01/07/2023 04:20 AM    CREATININE 0.6 01/07/2023 04:20 AM    LABGLOM >60 01/07/2023 04:20 AM    GLUCOSE 147 01/07/2023 04:20 AM    GLUCOSE 85 05/12/2022 03:26 PM    PROT 6.1 01/04/2023 01:30 PM    LABALBU 3.2 01/04/2023 01:30 PM    CALCIUM 7.8 01/07/2023 04:20 AM    BILITOT 0.6 01/04/2023 01:30 PM    BILITOT Negative 11/30/2021 12:09 PM    ALKPHOS 120 01/04/2023 01:30 PM    AST 18 01/04/2023 01:30 PM    ALT 10 01/04/2023 01:30 PM       Inpatient Medications  Current Facility-Administered Medications: magnesium sulfate 2000 mg in 50 mL IVPB premix, 2,000 mg, IntraVENous, PRN  lactated ringers infusion, , IntraVENous, Continuous  hydrocortisone sodium succinate PF (SOLU-CORTEF) injection 50 mg, 50 mg, IntraVENous, Q6H  potassium chloride 20 mEq/50 mL IVPB (Central Line), 20 mEq, IntraVENous, PRN **OR** potassium chloride 10 mEq/100 mL IVPB (Peripheral Line), 10 mEq, IntraVENous, PRN  calcium replacement protocol, , Other, RX Placeholder  ampicillin-sulbactam (UNASYN) 3,000 mg in sodium chloride 0.9 % 100 mL IVPB (mini-bag), 3,000 mg, IntraVENous, Q6H  rivaroxaban (XARELTO) tablet 15 mg, 15 mg, Oral, Daily  sodium chloride (OCEAN, BABY AYR) 0.65 % nasal spray 2 spray, 2 spray, Each Nostril, Q12H  norepinephrine (LEVOPHED) 16 mg in dextrose 5% 250 mL infusion, 1-100 mcg/min, IntraVENous, Continuous  sodium chloride flush 0.9 % injection 5-40 mL, 5-40 mL, IntraVENous, 2 times per day  sodium chloride flush 0.9 % injection 5-40 mL, 5-40 mL, IntraVENous, PRN  0.9 % sodium chloride infusion, , IntraVENous, PRN  ondansetron (ZOFRAN-ODT) disintegrating tablet 4 mg, 4 mg, Oral, Q8H PRN **OR** ondansetron (ZOFRAN) injection 4 mg, 4 mg, IntraVENous, Q6H PRN  polyethylene glycol (GLYCOLAX) packet 17 g, 17 g, Oral, Daily PRN  acetaminophen (TYLENOL) tablet 650 mg, 650 mg, Oral, Q6H PRN **OR** acetaminophen (TYLENOL) suppository 650 mg, 650 mg, Rectal, Q6H PRN  levothyroxine (SYNTHROID) tablet 125 mcg, 125 mcg, Oral, Daily  [Held by provider] pantoprazole (PROTONIX) tablet 40 mg, 40 mg, Oral, QAM AC  atorvastatin (LIPITOR) tablet 10 mg, 10 mg, Oral, Daily  pantoprazole (PROTONIX) injection 40 mg, 40 mg, IntraVENous, QAM AC    ASSESSMENT AND PLAN    Left submandibular sialadenitis with cellulitis  - Continue sialogogues (sour stuff - lemon juice, lemon wedges), warm compresses, massage. Frequent oral hygiene and moisture.   This is imperative to clearing the inflammation from the gland.  - Continue IV Unasyn      Electronically signed by NIDIA Ochoa CNP on 1/7/2023 at 8:10 AM

## 2023-01-07 NOTE — PROGRESS NOTES
CRITICAL CARE PROGRESS NOTE      Patient:  Vaishnavi Puckett    Unit/Bed:4D-07/007-A  YOB: 1950  MRN: [id number]   PCP: Mari Calderon MD  Date of Admission: 1/4/2023  Chief Complaint:- Left lower jaw pain    Assessment and Plan:    Septic Shock POA, improved: Initial WBC 25.0, LA 2.0. Likely secondary to submandibular abscess; s/p 30 cc/kg bolus in ER. Patient still remainson Levophed. Lactic acidosis resolved. BCx (1/04) NGTD. Continue Unasyn (1/4/23 - ). Leukocytosis improving, likely secondary to steroids. Patient has remained afebrile with improving pressor requirements. Wean pressors to maintain MAP greater than 65. Continue IVFs. Periapical Abscess with enlargement of the left submandibular gland: No concern for airway compromise at this time. Patient on Unasyn. ENT following. Hypotension: Continue to wean Levophed as tolerated. Adrenal Insufficiency: Patient has history of pituitary tumor removal 50 years ago and since then has been on chronic steroids. Continue Solu-Cortef 50mg IV q6hr. Mild Hyperglycemia: A1c 5.4 (1/06/2023). Likely secondary to chronic steroid use. Continue to monitor. Consider low dose SSI if blood sugars consistently > 200. Leukocytosis, improved: WBC 28.7 today, improved from prior 34.3 yesterday. Patient has remained afebrile; likely secondary to sepsis and steroids. Patient has been receiving and patient is on chronic steroids. Trend CBC  Mild Hypokalemia POA; related to poor oral intake; replete and recheck  Mild Hypernatremia POA: Likely secondary to dehydration and poor oral intake. 151 on admission. Patient 2.6 L free water deficit. Continue IV fluids. Trend BMP  Mild hypocalcemia: Replete and recheck. JESSE, resolved: likely secondary to sepsis and dehydration. initial Cr 1.9; baseline 0.9. Avoid nephrotoxic medications at this time. IV fluids. Monitor strict I's and O's.   HFpEF: TTE (12/2028) EF 55% w/ dilated LA and overal normal LV function and anatomy. Held Coreg and Bumex at this time due to hypotension on pressors. Paroxysmal atrial fibrillation: [id number]-TICX: 6, HAS_BLED: 3. In normal sinus rhythm per last EKG. Held Coreg at this time due to patient being on pressors. Continued home Xarelto for anticoagulation. Trend BMP; maintain K>4.0, Mg>2.0. Replace electrolytes as needed. Continue Tele. HTN: Coreg and Bumex held at this time for sepsis on pressors. HLD: continued home statin  Hypothyroidism: Resume home Synthroid  GERD: continued home Protonix  Hx COVID: Patient was recently admitted to the hospital in early December for pneumonia secondary to COVID-19 infection. Patient had completed course of antibiotics at that time. Hx CVA: Prior history of stroke in 1988 (residual left sided deficits) and in 2015. Continued home Xarelto  Physical deconditioning/debility: Multiple, comorbid conditions. Wheelchair-bound at home. SLP/PT/OT evaluation. PM&R consult for rehab per family request.  Obesity with BMI 29: Noted  Code Status: Patient is limited code per previous admission. INITIAL H AND P AND ICU COURSE:  Patient is a 80yo F w/ significant PMH Atrial thrombosis, CHF, CVA, PAF, HTN, HLD, asthma, GERD, DMII, adrenal insufficiency, hypothyroidism, hx meningioma x3 admitted to [facility] on 1/04/2023 for Left Lower Jaw Pain. Reported noted submandibular swelling and redness that is painful for few days before. Endorsed difficulty opening mouth  and decreased oral intake of both liquids and solids. Admitted 11/2022 for Acute Hypoxic Resp Failure 2/2 LLL PNA where she was discharged on 2L O2 NC after completion of abx treatment. In ED, hypotensive but othrewise vitals stable. Labs hypernatremic w/ minor JESSE. Leukocytosis WBC 25.0. UA negative for UTI, blood cultures drawn. EKG sinus eladia w/o acute ischemic changes from prior.  CT facial bones noted enlargement of left submandibular gland measuring 2.5cm w/ assymetric left pharyngeal soft tissues and mass effect on subglottic airway. CXR notable for poor lung inflation, mod atelectasis/PNA in R parahilar region and bilat lung bases. S/p IVF bolus; patient did not improve w/ fluids. Admitted to ICU for pressor support. Started on Unasyn. Weaned down pressor support w/ increasing alertness and improved speech. 1/06/20203: continued weaning of pressors. Patient started diet yesterday; tolerates well. Continued abx treatment. Family requested PM&R consult for rehab evaluation. 1/7/23: Patient still on Levophed. Continue Unasyn. Continue working with PT/OT. Evaluated by PM&R today. Past Medical History:  Atrial thrombosis (on Xarelto), CHF, CVA (1988-residual left sided weakness, 2015), PAF (6/2015), HTN, HLD, asthma,GERD,  adrenal insufficiency (chronic steroids), hypothyroidism, hx meningioma x3 (s/p gamma knife (1/2012 w/ Dr. Geni Helton at [facility]). Family History:  CVA (maternal grandmother). Social History:  denied alcohol, tobacco, recreational drug use. ROS   General: No fevers, chills. Pain controlled. Fatigue, headache. HEENT: No headache, no vision changes, no hearing changes, no trauma. CV: No palpitations, no chest pain, no tachycarida  RESP: No respiratory distress, no cough, no wheeze, SOB. GI: No abdominal pain, no nausea, no vomiting, no diarrhea  : No dysuria, no hematuria, no incontinence  Neuro: No seizures, no focal deficits, no weakness, no confusion   Psych: No psychosis, no SI/HI, Depression, Anxiety.       Scheduled Meds:   hydrocortisone sodium succinate PF  50 mg IntraVENous Q6H    calcium replacement protocol   Other RX Placeholder    ampicillin-sulbactam  3,000 mg IntraVENous Q6H    rivaroxaban  15 mg Oral Daily    sodium chloride  2 spray Each Nostril Q12H    sodium chloride flush  5-40 mL IntraVENous 2 times per day    levothyroxine  125 mcg Oral Daily    [Held by provider] pantoprazole  40 mg Oral QAM AC    atorvastatin  10 mg Oral Daily    pantoprazole  40 mg IntraVENous QAM AC     Continuous Infusions:   norepinephrine 3 mcg/min (01/07/23 1641)    sodium chloride         PHYSICAL EXAMINATION:  T:  97.5. P:  57. RR:  21. B/P:  118/ 46. FiO2:  0. O2 Sat:  96.  I/O:  4433.9 / 850  Body mass index is 30.58 kg/m². GCS:   15  General:   chronically ill-appearing, in no acute distress. HEENT:  normocephalic and atraumatic. No scleral icterus. PERR  Neck: supple. No Thyromegaly. Lungs: clear to auscultation. No retractions  Cardiac: RRR. No JVD. Abdomen: soft. Nontender. Extremities:  No clubbing, cyanosis, or edema x 4. Vasculature: capillary refill < 3 seconds. Palpable dorsalis pedis pulses. Skin:  warm and dry. Psych:  Alert and oriented x3. Affect appropriate  Lymph:  No supraclavicular adenopathy. Neurologic:  No focal deficit. No seizures. Data: (All radiographs, tracings, PFTs, and imaging are personally viewed and interpreted unless otherwise noted). 1/7/23: Sodium 146 potassium 3.3 chloride 112 CO2 24 BUN 8 creatinine 0.6 GFR > 60 magnesium 1.5 glucose 147 calcium 7.8  1/7/23: WBC 28.7 hemoglobin 10.5 hematocrit 31.5 platelet 108  Telemetry shows: NSR  Micro: BCx (1/04) NGTD. MRSA (+)  CT Facial Bones wo Contrast 1/04/2023: enlargement of left submandibular gland measuring 2.5cm. assymetric left pharyngeal soft tissues w/ mass effect on subglottic airway. Left mastoid effusion and otitis interna. CXR 1/04/2023: poor lung inflation, mod atelectasis/PNA in R parahilar region and bilat lung bases. Seen with multidisciplinary ICU team.  Meets Continued ICU Level Care Criteria:    [x] Yes   [] No - Transfer Planned to listed location:  [] HOSPITALIST CONTACTED-      Case and plan discussed with Dr. Jose Alejo.       Electronically signed by Sandy Heimlich, DO

## 2023-01-08 DIAGNOSIS — E03.9 ACQUIRED HYPOTHYROIDISM: ICD-10-CM

## 2023-01-08 PROBLEM — K11.20 SUBMANDIBULAR GLAND INFECTION: Status: ACTIVE | Noted: 2023-01-08

## 2023-01-08 LAB
ANION GAP SERPL CALCULATED.3IONS-SCNC: 11 MEQ/L (ref 8–16)
ANION GAP SERPL CALCULATED.3IONS-SCNC: 8 MEQ/L (ref 8–16)
BASOPHILS # BLD: 0.2 %
BASOPHILS ABSOLUTE: 0 THOU/MM3 (ref 0–0.1)
BUN BLDV-MCNC: 9 MG/DL (ref 7–22)
BUN BLDV-MCNC: 9 MG/DL (ref 7–22)
CALCIUM SERPL-MCNC: 7.6 MG/DL (ref 8.5–10.5)
CALCIUM SERPL-MCNC: 8 MG/DL (ref 8.5–10.5)
CHLORIDE BLD-SCNC: 113 MEQ/L (ref 98–111)
CHLORIDE BLD-SCNC: 116 MEQ/L (ref 98–111)
CO2: 24 MEQ/L (ref 23–33)
CO2: 25 MEQ/L (ref 23–33)
CREAT SERPL-MCNC: 0.7 MG/DL (ref 0.4–1.2)
CREAT SERPL-MCNC: 0.8 MG/DL (ref 0.4–1.2)
EOSINOPHIL # BLD: 0 %
EOSINOPHILS ABSOLUTE: 0 THOU/MM3 (ref 0–0.4)
ERYTHROCYTE [DISTWIDTH] IN BLOOD BY AUTOMATED COUNT: 17.1 % (ref 11.5–14.5)
ERYTHROCYTE [DISTWIDTH] IN BLOOD BY AUTOMATED COUNT: 57.9 FL (ref 35–45)
GFR SERPL CREATININE-BSD FRML MDRD: > 60 ML/MIN/1.73M2
GFR SERPL CREATININE-BSD FRML MDRD: > 60 ML/MIN/1.73M2
GLUCOSE BLD-MCNC: 140 MG/DL (ref 70–108)
GLUCOSE BLD-MCNC: 142 MG/DL (ref 70–108)
HCT VFR BLD CALC: 31.8 % (ref 37–47)
HEMOGLOBIN: 10.3 GM/DL (ref 12–16)
IMMATURE GRANS (ABS): 0.54 THOU/MM3 (ref 0–0.07)
IMMATURE GRANULOCYTES: 2.6 %
LYMPHOCYTES # BLD: 6.6 %
LYMPHOCYTES ABSOLUTE: 1.4 THOU/MM3 (ref 1–4.8)
MAGNESIUM: 2.5 MG/DL (ref 1.6–2.4)
MCH RBC QN AUTO: 30.1 PG (ref 26–33)
MCHC RBC AUTO-ENTMCNC: 32.4 GM/DL (ref 32.2–35.5)
MCV RBC AUTO: 93 FL (ref 81–99)
MONOCYTES # BLD: 3.5 %
MONOCYTES ABSOLUTE: 0.7 THOU/MM3 (ref 0.4–1.3)
NUCLEATED RED BLOOD CELLS: 0 /100 WBC
PLATELET # BLD: 291 THOU/MM3 (ref 130–400)
PLATELET ESTIMATE: ADEQUATE
PMV BLD AUTO: 10.5 FL (ref 9.4–12.4)
POTASSIUM SERPL-SCNC: 3.3 MEQ/L (ref 3.5–5.2)
POTASSIUM SERPL-SCNC: 4 MEQ/L (ref 3.5–5.2)
RBC # BLD: 3.42 MILL/MM3 (ref 4.2–5.4)
SCAN OF BLOOD SMEAR: NORMAL
SEG NEUTROPHILS: 87.1 %
SEGMENTED NEUTROPHILS ABSOLUTE COUNT: 18.3 THOU/MM3 (ref 1.8–7.7)
SODIUM BLD-SCNC: 148 MEQ/L (ref 135–145)
SODIUM BLD-SCNC: 149 MEQ/L (ref 135–145)
WBC # BLD: 21 THOU/MM3 (ref 4.8–10.8)

## 2023-01-08 PROCEDURE — 6370000000 HC RX 637 (ALT 250 FOR IP): Performed by: STUDENT IN AN ORGANIZED HEALTH CARE EDUCATION/TRAINING PROGRAM

## 2023-01-08 PROCEDURE — 83735 ASSAY OF MAGNESIUM: CPT

## 2023-01-08 PROCEDURE — 6360000002 HC RX W HCPCS: Performed by: STUDENT IN AN ORGANIZED HEALTH CARE EDUCATION/TRAINING PROGRAM

## 2023-01-08 PROCEDURE — 36415 COLL VENOUS BLD VENIPUNCTURE: CPT

## 2023-01-08 PROCEDURE — 85025 COMPLETE CBC W/AUTO DIFF WBC: CPT

## 2023-01-08 PROCEDURE — 6370000000 HC RX 637 (ALT 250 FOR IP)

## 2023-01-08 PROCEDURE — 99291 CRITICAL CARE FIRST HOUR: CPT | Performed by: INTERNAL MEDICINE

## 2023-01-08 PROCEDURE — 51701 INSERT BLADDER CATHETER: CPT

## 2023-01-08 PROCEDURE — C9113 INJ PANTOPRAZOLE SODIUM, VIA: HCPCS | Performed by: STUDENT IN AN ORGANIZED HEALTH CARE EDUCATION/TRAINING PROGRAM

## 2023-01-08 PROCEDURE — 2580000003 HC RX 258: Performed by: STUDENT IN AN ORGANIZED HEALTH CARE EDUCATION/TRAINING PROGRAM

## 2023-01-08 PROCEDURE — 99231 SBSQ HOSP IP/OBS SF/LOW 25: CPT | Performed by: NURSE PRACTITIONER

## 2023-01-08 PROCEDURE — 2000000000 HC ICU R&B

## 2023-01-08 PROCEDURE — 6360000002 HC RX W HCPCS

## 2023-01-08 PROCEDURE — 80048 BASIC METABOLIC PNL TOTAL CA: CPT

## 2023-01-08 PROCEDURE — 2580000003 HC RX 258

## 2023-01-08 RX ORDER — SODIUM CHLORIDE 450 MG/100ML
INJECTION, SOLUTION INTRAVENOUS CONTINUOUS
Status: DISCONTINUED | OUTPATIENT
Start: 2023-01-08 | End: 2023-01-09

## 2023-01-08 RX ORDER — MIDODRINE HYDROCHLORIDE 10 MG/1
10 TABLET ORAL
Status: DISCONTINUED | OUTPATIENT
Start: 2023-01-08 | End: 2023-01-08

## 2023-01-08 RX ORDER — MIDODRINE HYDROCHLORIDE 10 MG/1
10 TABLET ORAL EVERY 8 HOURS
Status: DISCONTINUED | OUTPATIENT
Start: 2023-01-09 | End: 2023-01-11

## 2023-01-08 RX ADMIN — SODIUM CHLORIDE, PRESERVATIVE FREE 10 ML: 5 INJECTION INTRAVENOUS at 07:15

## 2023-01-08 RX ADMIN — MIDODRINE HYDROCHLORIDE 10 MG: 10 TABLET ORAL at 09:01

## 2023-01-08 RX ADMIN — SODIUM CHLORIDE 3000 MG: 900 INJECTION INTRAVENOUS at 16:41

## 2023-01-08 RX ADMIN — PANTOPRAZOLE SODIUM 40 MG: 40 INJECTION, POWDER, FOR SOLUTION INTRAVENOUS at 09:00

## 2023-01-08 RX ADMIN — SODIUM CHLORIDE 3000 MG: 900 INJECTION INTRAVENOUS at 08:01

## 2023-01-08 RX ADMIN — SODIUM CHLORIDE: 4.5 INJECTION, SOLUTION INTRAVENOUS at 07:58

## 2023-01-08 RX ADMIN — HYDROCORTISONE SODIUM SUCCINATE 50 MG: 100 INJECTION, POWDER, FOR SOLUTION INTRAMUSCULAR; INTRAVENOUS at 07:15

## 2023-01-08 RX ADMIN — POTASSIUM CHLORIDE 20 MEQ: 29.8 INJECTION, SOLUTION INTRAVENOUS at 08:00

## 2023-01-08 RX ADMIN — SODIUM CHLORIDE 3000 MG: 900 INJECTION INTRAVENOUS at 22:59

## 2023-01-08 RX ADMIN — MIDODRINE HYDROCHLORIDE 10 MG: 10 TABLET ORAL at 16:42

## 2023-01-08 RX ADMIN — SALINE NASAL SPRAY 2 SPRAY: 1.5 SOLUTION NASAL at 16:40

## 2023-01-08 RX ADMIN — HYDROCORTISONE SODIUM SUCCINATE 50 MG: 100 INJECTION, POWDER, FOR SOLUTION INTRAMUSCULAR; INTRAVENOUS at 20:52

## 2023-01-08 RX ADMIN — SALINE NASAL SPRAY 2 SPRAY: 1.5 SOLUTION NASAL at 06:43

## 2023-01-08 RX ADMIN — ATORVASTATIN CALCIUM 10 MG: 10 TABLET, FILM COATED ORAL at 09:00

## 2023-01-08 RX ADMIN — RIVAROXABAN 15 MG: 15 TABLET, FILM COATED ORAL at 16:42

## 2023-01-08 RX ADMIN — HYDROCORTISONE SODIUM SUCCINATE 50 MG: 100 INJECTION, POWDER, FOR SOLUTION INTRAMUSCULAR; INTRAVENOUS at 02:24

## 2023-01-08 RX ADMIN — SODIUM CHLORIDE 3000 MG: 900 INJECTION INTRAVENOUS at 03:55

## 2023-01-08 RX ADMIN — HYDROCORTISONE SODIUM SUCCINATE 50 MG: 100 INJECTION, POWDER, FOR SOLUTION INTRAMUSCULAR; INTRAVENOUS at 16:40

## 2023-01-08 RX ADMIN — MIDODRINE HYDROCHLORIDE 10 MG: 10 TABLET ORAL at 11:46

## 2023-01-08 RX ADMIN — ONDANSETRON 4 MG: 2 INJECTION INTRAMUSCULAR; INTRAVENOUS at 07:15

## 2023-01-08 RX ADMIN — POTASSIUM CHLORIDE 20 MEQ: 29.8 INJECTION, SOLUTION INTRAVENOUS at 06:45

## 2023-01-08 RX ADMIN — LEVOTHYROXINE SODIUM 125 MCG: 0.12 TABLET ORAL at 09:00

## 2023-01-08 RX ADMIN — SODIUM CHLORIDE, PRESERVATIVE FREE 10 ML: 5 INJECTION INTRAVENOUS at 20:52

## 2023-01-08 NOTE — PLAN OF CARE
Problem: Discharge Planning  Goal: Discharge to home or other facility with appropriate resources  Outcome: Progressing  Flowsheets (Taken 1/8/2023 0547)  Discharge to home or other facility with appropriate resources:   Identify barriers to discharge with patient and caregiver   Identify discharge learning needs (meds, wound care, etc)   Refer to discharge planning if patient needs post-hospital services based on physician order or complex needs related to functional status, cognitive ability or social support system   Arrange for needed discharge resources and transportation as appropriate   Arrange for interpreters to assist at discharge as needed     Problem: Chronic Conditions and Co-morbidities  Goal: Patient's chronic conditions and co-morbidity symptoms are monitored and maintained or improved  Outcome: Progressing  Flowsheets (Taken 1/8/2023 0547)  Care Plan - Patient's Chronic Conditions and Co-Morbidity Symptoms are Monitored and Maintained or Improved:   Monitor and assess patient's chronic conditions and comorbid symptoms for stability, deterioration, or improvement   Collaborate with multidisciplinary team to address chronic and comorbid conditions and prevent exacerbation or deterioration   Update acute care plan with appropriate goals if chronic or comorbid symptoms are exacerbated and prevent overall improvement and discharge     Problem: Nutrition Deficit:  Goal: Optimize nutritional status  Outcome: Progressing  Flowsheets (Taken 1/8/2023 0547)  Nutrient intake appropriate for improving, restoring, or maintaining nutritional needs:   Assess nutritional status and recommend course of action   Monitor oral intake, labs, and treatment plans   Recommend, monitor, and adjust tube feedings and TPN/PPN based on assessed needs   Recommend appropriate diets, oral nutritional supplements, and vitamin/mineral supplements   Order, calculate, and assess calorie counts as needed   Provide specific nutrition education to patient or family as appropriate

## 2023-01-08 NOTE — PROGRESS NOTES
CRITICAL CARE PROGRESS NOTE      Patient:  Judy Ozuna    Unit/Bed:4D-07/007-A  YOB: 1950  MRN: [id number]   PCP: Anil Salmeron MD  Date of Admission: 1/4/2023  Chief Complaint:- Left Lower Jaw Pain    Assessment and Plan:    Septic Shock POA, improved: Initial WBC 25.0, LA 2.0. Likely secondary to submandibular abscess; s/p 30 cc/kg bolus in ER. Lactic acidosis resolved. BCx (1/04) NGTD. Patient still remainson Levophed. Continue Unasyn (1/4/23 - ). Leukocytosis improving, likely secondary to steroids. Patient has remained afebrile with improving pressor requirements. Start Midodrine 10mg TID. Wean pressors to maintain MAP greater than 65. Continue IVFs. Periapical Abscess with enlargement of the left submandibular gland: No concern for airway compromise at this time. Patient on Unasyn. ENT following. Adrenal Insufficiency: Patient has history of pituitary tumor removal 50 years ago and since then has been on chronic steroids. Continue Solu-Cortef 50mg IV q6hr. Mild Hyperglycemia: A1c 5.4 (1/06/2023). Likely secondary to chronic steroid use. Continue to monitor. Consider low dose SSI if blood sugars consistently > 200. Leukocytosis, improved: WBC 28.7 today, improved from prior 34.3 yesterday. Patient has remained afebrile; likely secondary to sepsis and steroids. Patient has been receiving and patient is on chronic steroids. Trend CBC  Mild Hypokalemia POA; related to poor oral intake; replete and recheck  Mild Hypernatremia POA: Likely secondary to dehydration and poor oral intake. 151 on admission. Patient 2.6 L free water deficit. Continue IV fluids; half-NS. Trend BMP  Mild hypocalcemia: Replete and recheck. JESSE, resolved: likely secondary to sepsis and dehydration. initial Cr 1.9; baseline 0.9. Avoid nephrotoxic medications at this time. IV fluids. Monitor strict I's and O's. HFpEF: TTE (12/2018) EF 55% w/ dilated LA and overal normal LV function and anatomy.  Held Coreg and Bumex at this time due to hypotension on pressors. Paroxysmal atrial fibrillation: [id number]-HDUI: 6, HAS-BLED: 3. In normal sinus rhythm per last EKG. Held Coreg at this time due to patient being on pressors. Continued home Xarelto for anticoagulation. Trend BMP; maintain K>4.0, Mg>2.0. Replace electrolytes as needed. Continue Tele. HTN: Coreg and Bumex held at this time for sepsis on pressors. HLD: continued home statin  Hypothyroidism: Resume home Synthroid  GERD: held home Protonix  Hx COVID: Patient was recently admitted to the hospital in early December for pneumonia secondary to COVID-19 infection. Patient had completed course of antibiotics at that time. Hx CVA: Prior history of stroke in 1988 (residual left sided deficits) and in 2015. Continued home Xarelto  Physical deconditioning/debility: Multiple, comorbid conditions. Wheelchair-bound at home. SLP/PT/OT evaluation. PM&R consult for rehab per family request.  Obesity with BMI 29: Noted  Code Status: Patient is limited code per previous admission. INITIAL H AND P AND ICU COURSE:  Patient is a 80yo F w/ significant PMH Atrial thrombosis, CHF, CVA, PAF, HTN, HLD, asthma, GERD, DMII, adrenal insufficiency, hypothyroidism, hx meningioma x3 admitted to [facility] on 1/04/2023 for Left Lower Jaw Pain. Reported noted submandibular swelling and redness that is painful for few days before. Endorsed difficulty opening mouth  and decreased oral intake of both liquids and solids. Admitted 11/2022 for Acute Hypoxic Resp Failure 2/2 LLL PNA where she was discharged on 2L O2 NC after completion of abx treatment. In ED, hypotensive but othrewise vitals stable. Labs hypernatremic w/ minor JESSE. Leukocytosis WBC 25.0. UA negative for UTI, blood cultures drawn. EKG sinus eladia w/o acute ischemic changes from prior.  CT facial bones noted enlargement of left submandibular gland measuring 2.5cm w/ assymetric left pharyngeal soft tissues and mass effect on subglottic airway. CXR notable for poor lung inflation, mod atelectasis/PNA in R parahilar region and bilat lung bases. S/p IVF bolus; patient did not improve w/ fluids. Admitted to ICU for pressor support. Treated w/ Unasyn. Weaned down pressor support w/ increasing alertness and improved speech. Patient started diet 1/06; tolerated well. Family requested PM&R consult for rehab evaluation. 1/7/23: Patient still on Levophed. Continue Unasyn. Continue working with PT/OT. Evaluated by PM&R today. 1/8/2023: Weaning down Levophed to 3mg/hr. Continue Unasyn. Continue PT/OT. Past Medical History:  Atrial thrombosis (on Xarelto), CHF, CVA (1988-residual left sided weakness, 2015), PAF (6/2015), HTN, HLD, asthma,GERD,  adrenal insufficiency (chronic steroids), hypothyroidism, hx meningioma x3 (s/p gamma knife (1/2012 w/ Dr. Gregorio Valdes at [facility]). Family History:  CVA (maternal grandmother). Social History:  denied alcohol, tobacco, recreational drug use. ROS   General: No fevers, chills. Pain controlled. Fatigue, headache. HEENT: No headache, no vision changes, no hearing changes, no trauma. CV: No palpitations, no chest pain, no tachycarida  RESP: No respiratory distress, no cough, no wheeze. SOB  GI: No abdominal pain, no nausea, no vomiting, no diarrhea  : No dysuria, no hematuria, no incontinence  Neuro: No seizures, no focal deficits, no weakness, no confusion   Psych: No psychosis, no SI/HI, Depression, Anxiety.     Scheduled Meds:   hydrocortisone sodium succinate PF  50 mg IntraVENous Q6H    calcium replacement protocol   Other RX Placeholder    ampicillin-sulbactam  3,000 mg IntraVENous Q6H    rivaroxaban  15 mg Oral Daily    sodium chloride  2 spray Each Nostril Q12H    sodium chloride flush  5-40 mL IntraVENous 2 times per day    levothyroxine  125 mcg Oral Daily    [Held by provider] pantoprazole  40 mg Oral QAM AC    atorvastatin  10 mg Oral Daily    pantoprazole  40 mg IntraVENous QAM AC     Continuous Infusions:   norepinephrine 3 mcg/min (01/08/23 0550)    sodium chloride         PHYSICAL EXAMINATION:  T:  98.1.  P:  46. RR:  18. B/P:  146 / 58.  FiO2:  0. O2 Sat:  96.  I/O:  1435.3 / 390  Body mass index is 30.58 kg/m².   GCS:   15  General:   chronically ill-appearing, in no acute distress.  HEENT:  normocephalic and atraumatic.  No scleral icterus. PERR  Neck: supple.  No Thyromegaly.  Lungs: clear to auscultation.  No retractions  Cardiac: RRR.  No JVD.  Abdomen: soft.  Nontender.  Extremities:  No clubbing, cyanosis, or edema x 4.    Vasculature: capillary refill < 3 seconds. Palpable dorsalis pedis pulses.  Skin:  warm and dry.  Psych:  Alert and oriented x3.  Affect appropriate  Lymph:  No supraclavicular adenopathy.  Neurologic:  No focal deficit. No seizures.    Data: (All radiographs, tracings, PFTs, and imaging are personally viewed and interpreted unless otherwise noted).   , K 3.3, Cl 113, CO2 25, BUN 9.0, Cr 0.8, Ca 8.0  WBC 21.0, H/H 10.3 / 31.8, Platelets 291  Telemetry shows: NSR  Micro: BCx (1/04) NGTD. MRSA (+)  CT Facial Bones wo Contrast 1/04/2023: enlargement of left submandibular gland measuring 2.5cm. assymetric left pharyngeal soft tissues w/ mass effect on subglottic airway. Left mastoid effusion and otitis interna.  CXR 1/04/2023: poor lung inflation, mod atelectasis/PNA in R parahilar region and bilat lung bases.          Seen with multidisciplinary ICU team.  Meets Continued ICU Level Care Criteria:    [x] Yes   [] No - Transfer Planned to listed location:  [] HOSPITALIST CONTACTED-      Case and plan discussed with Dr. Roberts.        Electronically signed by Heron Oconnor DO  CRITICAL CARE SPECIALIST

## 2023-01-08 NOTE — PROGRESS NOTES
Department of Otolaryngology  Progress Note    Chief Complaint:  Neck swelling and redness    SUBJECTIVE:  No acute issues overnight. WBC 21.0 today. Afebrile. Remains on low rate Levophed; plan to start midodrine today. Patient reports no pain in the neck at this time. Nursing reports very poor oral hygiene on admission; significantly improved with nursing care. A complete multi-organ review of systems was performed using a new patient questionnaire, and reviewed by me. ENT:  negative except as noted in HPI  CONSTITUTIONAL:  negative except as noted in HPI  EYES:  negative except as noted in HPI  RESPIRATORY:  negative except as noted in HPI  CARDIOVASCULAR:  negative except as noted in HPI  GASTROINTESTINAL:  negative except as noted in HPI  GENITOURINARY:  negative except as noted in HPI  MUSCULOSKELETAL:  negative except as noted in HPI  SKIN:  negative except as noted in HPI  ENDOCRINE/METABOLIC: negative except as noted in HPI  HEMATOLOGIC/LYMPHATIC:  negative except as noted in HPI  ALLERGY/IMMUN: negative except as noted in HPI  NEUROLOGICAL:  negative except as noted in HPI  BEHAVIOR/PSYCH:  negative except as noted in HPI    OBJECTIVE      Physical  VITALS:  BP (!) 115/48   Pulse (!) 44   Temp 98 °F (36.7 °C) (Oral)   Resp 25   Ht 5' 3\" (1.6 m)   Wt 190 lb 11.2 oz (86.5 kg)   SpO2 95%   BMI 33.78 kg/m²     Alert, oriented and cooperative elderly adult female. Chronically ill-appearing. No distress. No stridor. Voice is appropriate. Left submandibular area with no erythema. Left submandibular gland firm, enlarged and non-tender - continues to decrease in size. Unable to express any material from duct. No cervical lymphadenopathy.     Data  CBC with Differential:    Lab Results   Component Value Date/Time    WBC 21.0 01/08/2023 04:50 AM    RBC 3.42 01/08/2023 04:50 AM    RBC 4.70 05/12/2022 03:26 PM    HGB 10.3 01/08/2023 04:50 AM    HCT 31.8 01/08/2023 04:50 AM     01/08/2023 04:50 AM    MCV 93.0 01/08/2023 04:50 AM    MCH 30.1 01/08/2023 04:50 AM    MCHC 32.4 01/08/2023 04:50 AM    RDW 14.1 05/12/2022 03:26 PM    NRBC 0 01/08/2023 04:50 AM    SEGSPCT 87.1 01/08/2023 04:50 AM    METASPCT 1 04/13/2017 05:50 AM    LYMPHOPCT 16.8 05/12/2022 03:26 PM    MONOPCT 3.5 01/08/2023 04:50 AM    EOSPCT 1.2 05/12/2022 03:26 PM    BASOPCT 0.6 05/12/2022 03:26 PM    MONOSABS 0.7 01/08/2023 04:50 AM    LYMPHSABS 1.4 01/08/2023 04:50 AM    EOSABS 0.0 01/08/2023 04:50 AM    BASOSABS 0.0 01/08/2023 04:50 AM    DIFFTYPE see below 01/05/2023 04:30 AM       Inpatient Medications  Current Facility-Administered Medications: midodrine (PROAMATINE) tablet 10 mg, 10 mg, Oral, TID WC  0.45 % sodium chloride infusion, , IntraVENous, Continuous  magnesium sulfate 2000 mg in 50 mL IVPB premix, 2,000 mg, IntraVENous, PRN  hydrocortisone sodium succinate PF (SOLU-CORTEF) injection 50 mg, 50 mg, IntraVENous, Q6H  potassium chloride 20 mEq/50 mL IVPB (Central Line), 20 mEq, IntraVENous, PRN **OR** potassium chloride 10 mEq/100 mL IVPB (Peripheral Line), 10 mEq, IntraVENous, PRN  calcium replacement protocol, , Other, RX Placeholder  ampicillin-sulbactam (UNASYN) 3,000 mg in sodium chloride 0.9 % 100 mL IVPB (mini-bag), 3,000 mg, IntraVENous, Q6H  rivaroxaban (XARELTO) tablet 15 mg, 15 mg, Oral, Daily  sodium chloride (OCEAN, BABY AYR) 0.65 % nasal spray 2 spray, 2 spray, Each Nostril, Q12H  norepinephrine (LEVOPHED) 16 mg in dextrose 5% 250 mL infusion, 1-100 mcg/min, IntraVENous, Continuous  sodium chloride flush 0.9 % injection 5-40 mL, 5-40 mL, IntraVENous, 2 times per day  sodium chloride flush 0.9 % injection 5-40 mL, 5-40 mL, IntraVENous, PRN  0.9 % sodium chloride infusion, , IntraVENous, PRN  ondansetron (ZOFRAN-ODT) disintegrating tablet 4 mg, 4 mg, Oral, Q8H PRN **OR** ondansetron (ZOFRAN) injection 4 mg, 4 mg, IntraVENous, Q6H PRN  polyethylene glycol (GLYCOLAX) packet 17 g, 17 g, Oral, Daily PRN  acetaminophen (TYLENOL) tablet 650 mg, 650 mg, Oral, Q6H PRN **OR** acetaminophen (TYLENOL) suppository 650 mg, 650 mg, Rectal, Q6H PRN  levothyroxine (SYNTHROID) tablet 125 mcg, 125 mcg, Oral, Daily  [Held by provider] pantoprazole (PROTONIX) tablet 40 mg, 40 mg, Oral, QAM AC  atorvastatin (LIPITOR) tablet 10 mg, 10 mg, Oral, Daily  pantoprazole (PROTONIX) injection 40 mg, 40 mg, IntraVENous, QAM AC    ASSESSMENT AND PLAN    Left submandibular sialadenitis  - Continue IV Unasyn  - Continue sialogogues (lemon juice packets, lemon juice on swabs, lemon wedges, sour candy), warm compresses and massage. Also frequent oral hydration and hygiene. This is imperative to decreasing swelling of the salivary gland.   - Okay for transfer out of ICU once off pressors and appropriate from medical standpoint otherwise per primary team    Management of patient's care was collaborated with Dr Lesley uHmphrey today.       Electronically signed by NIDIA Mathew CNP on 1/8/2023 at 1:25 PM

## 2023-01-09 LAB
ALBUMIN SERPL-MCNC: 2.5 G/DL (ref 3.5–5.1)
ALP BLD-CCNC: 70 U/L (ref 38–126)
ALT SERPL-CCNC: 25 U/L (ref 11–66)
ANION GAP SERPL CALCULATED.3IONS-SCNC: 11 MEQ/L (ref 8–16)
AST SERPL-CCNC: 36 U/L (ref 5–40)
BASOPHILS # BLD: 0.2 %
BASOPHILS ABSOLUTE: 0 THOU/MM3 (ref 0–0.1)
BILIRUB SERPL-MCNC: 0.2 MG/DL (ref 0.3–1.2)
BILIRUBIN DIRECT: < 0.2 MG/DL (ref 0–0.3)
BLOOD CULTURE, ROUTINE: NORMAL
BLOOD CULTURE, ROUTINE: NORMAL
BUN BLDV-MCNC: 10 MG/DL (ref 7–22)
CALCIUM SERPL-MCNC: 7.6 MG/DL (ref 8.5–10.5)
CHLORIDE BLD-SCNC: 116 MEQ/L (ref 98–111)
CO2: 23 MEQ/L (ref 23–33)
CREAT SERPL-MCNC: 0.7 MG/DL (ref 0.4–1.2)
EOSINOPHIL # BLD: 0 %
EOSINOPHILS ABSOLUTE: 0 THOU/MM3 (ref 0–0.4)
ERYTHROCYTE [DISTWIDTH] IN BLOOD BY AUTOMATED COUNT: 17.6 % (ref 11.5–14.5)
ERYTHROCYTE [DISTWIDTH] IN BLOOD BY AUTOMATED COUNT: 60.6 FL (ref 35–45)
GFR SERPL CREATININE-BSD FRML MDRD: > 60 ML/MIN/1.73M2
GLUCOSE BLD-MCNC: 123 MG/DL (ref 70–108)
HCT VFR BLD CALC: 30.3 % (ref 37–47)
HEMOGLOBIN: 9.8 GM/DL (ref 12–16)
IMMATURE GRANS (ABS): 0.42 THOU/MM3 (ref 0–0.07)
IMMATURE GRANULOCYTES: 2.4 %
LV EF: 60 %
LVEF MODALITY: NORMAL
LYMPHOCYTES # BLD: 8.8 %
LYMPHOCYTES ABSOLUTE: 1.5 THOU/MM3 (ref 1–4.8)
MAGNESIUM: 2.2 MG/DL (ref 1.6–2.4)
MCH RBC QN AUTO: 30.7 PG (ref 26–33)
MCHC RBC AUTO-ENTMCNC: 32.3 GM/DL (ref 32.2–35.5)
MCV RBC AUTO: 95 FL (ref 81–99)
MONOCYTES # BLD: 3 %
MONOCYTES ABSOLUTE: 0.5 THOU/MM3 (ref 0.4–1.3)
NUCLEATED RED BLOOD CELLS: 0 /100 WBC
PLATELET # BLD: 228 THOU/MM3 (ref 130–400)
PMV BLD AUTO: 10.5 FL (ref 9.4–12.4)
POTASSIUM SERPL-SCNC: 3.9 MEQ/L (ref 3.5–5.2)
RBC # BLD: 3.19 MILL/MM3 (ref 4.2–5.4)
SEG NEUTROPHILS: 85.6 %
SEGMENTED NEUTROPHILS ABSOLUTE COUNT: 15 THOU/MM3 (ref 1.8–7.7)
SODIUM BLD-SCNC: 146 MEQ/L (ref 135–145)
SODIUM BLD-SCNC: 150 MEQ/L (ref 135–145)
TOTAL PROTEIN: 4 G/DL (ref 6.1–8)
WBC # BLD: 17.5 THOU/MM3 (ref 4.8–10.8)

## 2023-01-09 PROCEDURE — C9113 INJ PANTOPRAZOLE SODIUM, VIA: HCPCS | Performed by: STUDENT IN AN ORGANIZED HEALTH CARE EDUCATION/TRAINING PROGRAM

## 2023-01-09 PROCEDURE — 2000000000 HC ICU R&B

## 2023-01-09 PROCEDURE — 6370000000 HC RX 637 (ALT 250 FOR IP): Performed by: STUDENT IN AN ORGANIZED HEALTH CARE EDUCATION/TRAINING PROGRAM

## 2023-01-09 PROCEDURE — 82248 BILIRUBIN DIRECT: CPT

## 2023-01-09 PROCEDURE — 51798 US URINE CAPACITY MEASURE: CPT

## 2023-01-09 PROCEDURE — 97530 THERAPEUTIC ACTIVITIES: CPT

## 2023-01-09 PROCEDURE — 6360000002 HC RX W HCPCS

## 2023-01-09 PROCEDURE — 2580000003 HC RX 258: Performed by: STUDENT IN AN ORGANIZED HEALTH CARE EDUCATION/TRAINING PROGRAM

## 2023-01-09 PROCEDURE — 6360000002 HC RX W HCPCS: Performed by: STUDENT IN AN ORGANIZED HEALTH CARE EDUCATION/TRAINING PROGRAM

## 2023-01-09 PROCEDURE — 36415 COLL VENOUS BLD VENIPUNCTURE: CPT

## 2023-01-09 PROCEDURE — 93306 TTE W/DOPPLER COMPLETE: CPT

## 2023-01-09 PROCEDURE — 99291 CRITICAL CARE FIRST HOUR: CPT | Performed by: INTERNAL MEDICINE

## 2023-01-09 PROCEDURE — 84295 ASSAY OF SERUM SODIUM: CPT

## 2023-01-09 PROCEDURE — 85025 COMPLETE CBC W/AUTO DIFF WBC: CPT

## 2023-01-09 PROCEDURE — 2580000003 HC RX 258

## 2023-01-09 PROCEDURE — 92526 ORAL FUNCTION THERAPY: CPT

## 2023-01-09 PROCEDURE — 99231 SBSQ HOSP IP/OBS SF/LOW 25: CPT | Performed by: PHYSICIAN ASSISTANT

## 2023-01-09 PROCEDURE — 6370000000 HC RX 637 (ALT 250 FOR IP): Performed by: NURSE PRACTITIONER

## 2023-01-09 PROCEDURE — 80053 COMPREHEN METABOLIC PANEL: CPT

## 2023-01-09 PROCEDURE — 83735 ASSAY OF MAGNESIUM: CPT

## 2023-01-09 RX ORDER — LEVOTHYROXINE SODIUM 0.12 MG/1
125 TABLET ORAL DAILY
Qty: 90 TABLET | Refills: 1 | Status: SHIPPED | OUTPATIENT
Start: 2023-01-09

## 2023-01-09 RX ADMIN — MIDODRINE HYDROCHLORIDE 10 MG: 10 TABLET ORAL at 00:17

## 2023-01-09 RX ADMIN — RIVAROXABAN 15 MG: 15 TABLET, FILM COATED ORAL at 17:46

## 2023-01-09 RX ADMIN — HYDROCORTISONE SODIUM SUCCINATE 25 MG: 100 INJECTION, POWDER, FOR SOLUTION INTRAMUSCULAR; INTRAVENOUS at 08:25

## 2023-01-09 RX ADMIN — SODIUM CHLORIDE 3000 MG: 900 INJECTION INTRAVENOUS at 13:30

## 2023-01-09 RX ADMIN — HYDROCORTISONE SODIUM SUCCINATE 50 MG: 100 INJECTION, POWDER, FOR SOLUTION INTRAMUSCULAR; INTRAVENOUS at 03:03

## 2023-01-09 RX ADMIN — MIDODRINE HYDROCHLORIDE 10 MG: 10 TABLET ORAL at 08:25

## 2023-01-09 RX ADMIN — SODIUM CHLORIDE 3000 MG: 900 INJECTION INTRAVENOUS at 06:18

## 2023-01-09 RX ADMIN — SODIUM CHLORIDE, PRESERVATIVE FREE 10 ML: 5 INJECTION INTRAVENOUS at 20:32

## 2023-01-09 RX ADMIN — SODIUM CHLORIDE: 4.5 INJECTION, SOLUTION INTRAVENOUS at 07:27

## 2023-01-09 RX ADMIN — HYDROCORTISONE SODIUM SUCCINATE 100 MG: 100 INJECTION, POWDER, FOR SOLUTION INTRAMUSCULAR; INTRAVENOUS at 16:38

## 2023-01-09 RX ADMIN — ATORVASTATIN CALCIUM 10 MG: 10 TABLET, FILM COATED ORAL at 08:25

## 2023-01-09 RX ADMIN — LEVOTHYROXINE SODIUM 125 MCG: 0.12 TABLET ORAL at 08:25

## 2023-01-09 RX ADMIN — SODIUM CHLORIDE 3000 MG: 900 INJECTION INTRAVENOUS at 17:47

## 2023-01-09 RX ADMIN — SALINE NASAL SPRAY 2 SPRAY: 1.5 SOLUTION NASAL at 06:12

## 2023-01-09 RX ADMIN — PANTOPRAZOLE SODIUM 40 MG: 40 INJECTION, POWDER, FOR SOLUTION INTRAVENOUS at 06:14

## 2023-01-09 RX ADMIN — MIDODRINE HYDROCHLORIDE 10 MG: 10 TABLET ORAL at 16:28

## 2023-01-09 RX ADMIN — RIVAROXABAN 15 MG: 15 TABLET, FILM COATED ORAL at 18:55

## 2023-01-09 RX ADMIN — SODIUM CHLORIDE, PRESERVATIVE FREE 10 ML: 5 INJECTION INTRAVENOUS at 08:25

## 2023-01-09 NOTE — PROGRESS NOTES
[facility]  STRZ ICU 4D  Occupational Therapy  Daily Note  Time:   Time In: 1059  Time Out: 1127  Timed Code Treatment Minutes: 28 Minutes  Minutes: 28      **co-tx with PT due to patient's medical complexity, level of assist requiring two skilled therapists, and ability to tolerate two separate sessions. After discussion with PT at end of sessions; determined patient able to participate in OT and PT separately to gain max benefit to progress toward PLOF. Date: 2023  Patient Name: Vaishnavi Puckett,   Gender: female      Room: -A  MRN: [id number]  : 1950  (71 y.o.)  Referring Practitioner: Raman Esquivel DO  Diagnosis: septic shock  Additional Pertinent Hx: Per H&P: Patient is a 78-year-old female with a past medical history of adrenal insufficiency, hypothyroidism, paroxysmal atrial fibrillation, history of pituitary tumor, tension, stroke, and chronic diastolic heart failure admitted for left lower back pain. Patient states that for the last few days she has been having submandibular swelling and redness in the last few days. Patient has difficulty opening her mouth and has not been eating or drinking. She was recently admitted for the last month. Pt found to have septic shock due to Periapical Abscess with enlargement of the left submandibular gland    Restrictions/Precautions:  Restrictions/Precautions: General Precautions, Fall Risk  Position Activity Restriction  Other position/activity restrictions: h/o CVA with L sided weakness     SUBJECTIVE: RN approved session, patient supine in bed upon OT arrival and agreeable to tx and to sit EOB. Patient A & O x 3. Patient states she completed walking short distances to her w/c to self propel to BR and completed mainly stand pivot transfers. Reports her spouse helped her a lot.      PAIN:  no report of pain    Vitals:  patient on continuous ICU vitals monitor and [facility] t/o tx    COGNITION: Slow Processing, Decreased Recall, Decreased Insight, Impaired Memory, Decreased Problem Solving, and Difficulty Following Commands    ADL:   Footwear Management: Maximum Assistance. To don/doff slipper socks  Toileting: Maximum Assistance. In standing for BM toilet hygiene with use of 2 w/w for support and MOD-MAX A x 2 for standing upright with flexed posture and patient fatigued during task. Ramin Byrd BALANCE:  Sitting Balance:  Contact Guard Assistance. Seated EOB  Standing Balance: Moderate Assistance, Maximum Assistance, X 2. With use of 2 w/w for support and cues to stand upright    Patient sat EOB approx 8-10 min with (F+) dyn sitting balance. Demo limited mobility in L shldr during functional reaching EOB    BED MOBILITY:  Supine to Sit: Maximum Assistance, X 2 cues for sequencing and tech  Sit to Supine: Maximum Assistance, X 2      TRANSFERS:  Sit to Stand: Moderate Assistance, Maximum Assistance, X 2. Cues for hand placement and sequencing x 2 trials with EOB elevated during second stand and feet on platform. Patient stood approx 10 sec on first standing and then 1 min during toilet hygiene. Required rest breaks b/t each stand. FUNCTIONAL MOBILITY:  OTR to assess when appropriate    ASSESSMENT:     Activity Tolerance:  Patient tolerance of  treatment: fair.        Discharge Recommendations: Continue to assess pending progress, Subacute/skilled nursing facility, ECF with OT, 24 hour assistance or supervision, Not safe to return home at this time, and Patient would benefit from continued OT at discharge  Equipment Recommendations: Equipment Needed: No  Other: defer to next level of care  Plan: Times Per Week: 3-5x  Current Treatment Recommendations: Strengthening, ROM, Balance training, Endurance training, Patient/Caregiver education & training, Equipment evaluation, education, & procurement, Positioning, Self-Care / ADL    Patient Education  Patient Education: Role of OT, Plan of Care, ADL's, Precautions, Reviewed Prior Education, Importance of Increasing Activity, and Assistive Device Safety    Goals  Short Term Goals  Time Frame for Short Term Goals: by discharge  Short Term Goal 1: Pt will tolerate sitting at EOB X20 minutes with supervision in prep for ADL completion. Short Term Goal 2: Pt will complete BADL tasks, UB with SBA and LB with max assistance, to increase independence with self care tasks. Short Term Goal 3: Pt will tolerate further assessment of stand pivot transfers by OTR when appropriate. Revised Short-Term Goals  Short Term Goals  Time Frame for Short Term Goals: by discharge  Short Term Goal 1: Pt will tolerate sitting at EOB X20 minutes with supervision in prep for ADL completion. Short Term Goal 2: Pt will complete BADL tasks, UB with SBA and LB with max assistance, to increase independence with self care tasks. Short Term Goal 3: Pt will tolerate further assessment of stand pivot transfers by OTR when appropriate. Short Term Goal 4: patient will tolerate 2 min static standing with sit to stand and maintain upright posture with MIN A to increase ease with toileting. Following session, patient left in safe position with all fall risk precautions in place.

## 2023-01-09 NOTE — PROGRESS NOTES
CRITICAL CARE PROGRESS NOTE      Patient:  Vaishnavi Puckett    Unit/Bed:4D-07/007-A  YOB: 1950  MRN: [id number]   PCP: Mari Calderon MD  Date of Admission: 1/4/2023  Chief Complaint:- Left Lower Jaw Pain    Assessment and Plan:    Septic Shock POA, improved: qSOFA 5. Initial WBC 25.0, LA 2.0. Likely secondary to submandibular abscess; s/p 30 cc/kg bolus in ER. Lactic acidosis resolved. BCx (1/04) NGTD. Required Levophed for BP. Continue Unasyn (1/4/23 - ). Patient has remained afebrile, remains on Levophed. Continue Midodrine 10mg TID. Wean pressors to maintain MAP > 65. Periapical Abscess with enlargement of the left submandibular gland: No concern for airway compromise at this time. Patient on Unasyn (1/04 - 1/10). ENT following. Adrenal Insufficiency: Patient has hx pituitary tumor removal 50 years ago and since then has been on chronic steroids. Increase Solu-Cortef to 100mg IV q8hr. Mild Hypernatremia POA: likely chronic steroid use. Initial 151. Initial 2.6 L free water deficit; now net positive 9.5L. low urine output. Obtain Ur Na, Ur Osm, Serum Osm. Stopped IVFs. Trend BMP. Consider restarting Bumex for diuresis pending work-up. Mild Hyperglycemia: A1c 5.4 (1/06/2023). Likely secondary to chronic steroid use. Continue to monitor. Consider low dose SSI if blood sugars consistently > 200. Leukocytosis, improved: WBC 17.5 today, improved from initial 34. 3. hx chronic steroid use. Patient has remained afebrile; likely secondary to sepsis and steroids. Titrate down Solucortef; 25mg q6hr. Trend CBC  Mild hypocalcemia: likely secondary to volume overload and JESSE. Trend BMP and replace as needed. HFpEF: TTE (12/2018) EF 55% w/ dilated LA and overal normal LV function and anatomy. Held Coreg and Bumex at this time due to hypotension on pressors. Paroxysmal Afib w/ RVR, in SR: [id number]: 6, HAS-BLED: 3. In normal sinus rhythm per last EKG. Held Coreg at this time due to bradycardia.  Continued home Xarelto for anticoagulation. Trend BMP; maintain K>4.0, Mg>2.0. Replace electrolytes as needed. Continue Tele. HTN: Coreg and Bumex held at this time for bradycardia and sepsis on pressors. HLD: continued home statin  Hypothyroidism: Resume home Synthroid  GERD: held home Protonix  Hx COVID: Patient was recently admitted to the hospital in early December for pneumonia secondary to COVID-19 infection. Patient had completed course of antibiotics at that time. Hx CVA: Prior history of stroke in 1988 (residual left sided deficits) and in 2015. Continued home Xarelto  Severe Protein Calorie Malnutrition: hx of poor oral intake and limited ADLs, wheel-chair bound. Diet consult for nutrition; supplements. Social work   Physical deconditioning/debility: Multiple, comorbid conditions. Wheelchair-bound at home. SLP/PT/OT evaluation. PM&R consult for rehab per family request.  Obesity with BMI 29: Noted  Code Status: Patient is limited code per previous admission. JESSE, resolved: likely secondary to sepsis and dehydration. initial Cr 1.9; baseline 0.9. Avoid nephrotoxic medications at this time. Resolved w/ IVFs. Monitor strict I/Os. Mild Hypokalemia POA, resolved; related to poor oral intake; replete and recheck    INITIAL H AND P AND ICU COURSE:  Patient is a 78yo F w/ significant PMH Atrial thrombosis, CHF, CVA, PAF, HTN, HLD, asthma, GERD, DMII, adrenal insufficiency, hypothyroidism, hx meningioma x3 admitted to [facility] on 1/04/2023 for Left Lower Jaw Pain. Reported noted submandibular swelling and redness that is painful for few days before. Endorsed difficulty opening mouth  and decreased oral intake of both liquids and solids. Admitted 11/2022 for Acute Hypoxic Resp Failure 2/2 LLL PNA where she was discharged on 2L O2 NC after completion of abx treatment. In ED, hypotensive but othrewise vitals stable. Labs hypernatremic w/ minor JESSE. Leukocytosis WBC 25.0. UA negative for UTI, blood cultures drawn.  EKG sinus eladia w/o acute ischemic changes from prior. CT facial bones noted enlargement of left submandibular gland measuring 2.5cm w/ assymetric left pharyngeal soft tissues and mass effect on subglottic airway. CXR notable for poor lung inflation, mod atelectasis/PNA in R parahilar region and bilat lung bases. S/p IVF bolus; patient did not improve w/ fluids. Admitted to ICU for pressor support. Treated w/ Unasyn. Weaned down pressor support w/ increasing alertness and improved speech. Patient started diet 1/06; tolerated well. Family requested PM&R consult for rehab evaluation as  who care for her at home is hospitalized after a surgery. 1/8/2023: Weaned down Levophed to 3mg/hr. Started Midodrine 10mg TID. Continued Unasyn. Continue PT/OT.     1/9/2023: Patient off Levophed; BP borderline on Midodrine 10mg TID. Levophed was required to be started again. Sodium continues to trend up, likely due to SoluCortef. Past Medical History:  Atrial thrombosis (on Xarelto), CHF, CVA (1988-residual left sided weakness, 2015), PAF (6/2015), HTN, HLD, asthma,GERD,  adrenal insufficiency (chronic steroids), hypothyroidism, hx meningioma x3 (s/p gamma knife (1/2012 w/ Dr. Melissa Miner at [address] [place] [address]), pituitary tumor s/p resection. Family History:  CVA (maternal grandmother). Social History:  denied alcohol, tobacco, recreational drug use. ROS   General: No fevers, chills. Pain controlled. Fatigue, headache. HEENT: No headache, no vision changes, no hearing changes, no trauma. CV: No palpitations, no chest pain, no tachycarida  RESP: No respiratory distress, no cough, no wheeze. SOB  GI: No abdominal pain, no nausea, no vomiting, no diarrhea  : No dysuria, no hematuria, no incontinence  Neuro: No seizures, no focal deficits, no weakness, no confusion   Psych: No psychosis, no SI/HI, Depression, Anxiety.     Scheduled Meds:   ampicillin-sulbactam  3,000 mg IntraVENous Q6H    hydrocortisone sodium succinate PF  25 mg IntraVENous Q6H    midodrine  10 mg Oral q8h    calcium replacement protocol   Other RX Placeholder    rivaroxaban  15 mg Oral Daily    sodium chloride  2 spray Each Nostril Q12H    sodium chloride flush  5-40 mL IntraVENous 2 times per day    levothyroxine  125 mcg Oral Daily    [Held by provider] pantoprazole  40 mg Oral QAM AC    atorvastatin  10 mg Oral Daily    pantoprazole  40 mg IntraVENous QAM AC     Continuous Infusions:   norepinephrine Stopped (01/08/23 2130)    sodium chloride         PHYSICAL EXAMINATION:  T:  97.6.  P:  39. RR:  18. B/P:  112 / 40. FiO2:  0. O2 Sat:  96.    I/O:  3606.9 / 750, 1435 / 390, 1308.7 / 275  Weights:   Body mass index is 33.78 kg/m². GCS:   14  General:   chronically ill-appearing, in no acute distress. HEENT:  normocephalic and atraumatic. No scleral icterus. PERR. hard of hearing. Neck: supple. No Thyromegaly. Lungs: clear to auscultation. No retractions  Cardiac: bradycardic, normal rhythm. No JVD. Abdomen: soft. Nontender. Extremities:  No clubbing, cyanosis. Pitting edema +2 of all 4 extremities. Vasculature: capillary refill < 3 seconds. Palpable dorsalis pedis pulses. Skin:  warm and dry. Multiple bruises on BUE. Psych:  Alert and oriented x3. Affect appropriate  Lymph:  No supraclavicular adenopathy. Neurologic:  No focal deficit. No seizures. Data: (All radiographs, tracings, PFTs, and imaging are personally viewed and interpreted unless otherwise noted). , K 3.9, Cl 116, CO2 23, BUN 10, Cr 0.7, Ca 7.6  WBC 17.5, H/H 9.8 / 30.3, Platelets 935  Telemetry shows: NSR  Micro: BCx (1/04) NGTD. MRSA (+)  CT Facial Bones wo Contrast 1/04/2023: enlargement of left submandibular gland measuring 2.5cm. assymetric left pharyngeal soft tissues w/ mass effect on subglottic airway. Left mastoid effusion and otitis interna. CXR 1/04/2023: poor lung inflation, mod atelectasis/PNA in R parahilar region and bilat lung bases.         Seen with multidisciplinary ICU team.  Meets Continued ICU Level Care Criteria:    [x] Yes   [] No - Transfer Planned to listed location:  [] HOSPITALIST CONTACTED-      Case and plan discussed with Dr. Alejandrina Machado. Electronically signed by Rekha Villagomez DO  CRITICAL CARE SPECIALIST  Patient seen by me including key components of medical care. Case discussed with resident physician. Pressor requirements continue to fluctuate. By increasing steroids, hope to resolve pressor requirements. Italicized font, if present,  represents changes to the note made by me. CC time 35 minutes. Time was discontiguous. Time does not include procedure. Time does include my direct assessment of the patient and coordination of care. Time represents more than 50% of the time involved with patient care by the [address] [place] [address] team.  Electronically signed by Greg Matute.  Alejandrina Machado MD.

## 2023-01-09 NOTE — PROGRESS NOTES
Physician Progress Note      PATIENT:               Kirill Medellin  CSN #:                  [id number]  :                       1950  ADMIT DATE:       2023 10:22 AM  DISCH DATE:  RESPONDING  PROVIDER #:        GREGG VALLADARES          QUERY TEXT:    Pt admitted with Septic shock. Noted documentation of Malnutrition Status:    Severe malnutrition (23 1903)  by dietician consultant. If possible,   please document in progress notes and discharge summary:    The medical record reflects the following:  Risk Factors: Per dietitian, pt meets ASPEN criteria for Severe  malnutrition. Clinical Indicators: Pt. severely malnourished AEB criteria listed above. At   risk for further nutritional compromise r/t admit with cellulitis of   submandibular jaw, septic shock, periapical abscess with enlargement of   submandibular gland, dehydraion, poor po and weight loss, wounds, LOS day 2   and underlying medical condition chronic steroids d/t pituitary tumor removal,   CVA, GERD, HTN, Hx meningioma of brain, At Fib, covid 22. Thank you. Please call if you have any questions. P) [phone]. Signed   by Camden Vargas RN Clinical , CRCR  Treatment: Dietician consult and Start Oral Nutrition Supplement  Options provided:  -- Severe Protein Calorie Malnutrition confirmed  -- Severe Protein Calorie Malnutrition ruled out  -- Other - I will add my own diagnosis  -- Disagree - Not applicable / Not valid  -- Disagree - Clinically unable to determine / Unknown  -- Refer to Clinical Documentation Reviewer    PROVIDER RESPONSE TEXT:    The diagnosis of Severe Protein Calorie Malnutrition was confirmed.     Query created by: Emerald Anguiano on 2023 12:15 PM      Electronically signed by:  Candida Dillon 2023 1:38 PM

## 2023-01-09 NOTE — PROGRESS NOTES
Department of Otolaryngology  Progress Note    Chief Complaint:  Neck swelling    SUBJECTIVE:  Patient reports that she is continuing to notice improvement in the swelling. WBC down to 17.5 today from 21.0 yesterday. She remains afebrile. Patient was transitioned to Midodrine, but BP trended down and required resumption of Levophed.     REVIEW OF SYSTEMS:    A complete multi-organ review of systems was performed and reviewed by me.  ENT:  negative except as noted in HPI  CONSTITUTIONAL:  negative except as noted in HPI  EYES:  negative except as noted in HPI  RESPIRATORY:  negative except as noted in HPI  CARDIOVASCULAR:  negative except as noted in HPI  GASTROINTESTINAL:  negative except as noted in HPI  GENITOURINARY:  negative except as noted in HPI  MUSCULOSKELETAL:  negative except as noted in HPI  SKIN:  negative except as noted in HPI  ENDOCRINE/METABOLIC: negative except as noted in HPI  HEMATOLOGIC/LYMPHATIC:  negative except as noted in HPI  ALLERGY/IMMUN: negative except as noted in HPI  NEUROLOGICAL:  negative except as noted in HPI  BEHAVIOR/PSYCH:  negative except as noted in HPI    OBJECTIVE      Physical  VITALS:  BP (!) 116/40   Pulse (!) 45   Temp 97.6 °F (36.4 °C) (Oral)   Resp 15   Ht 5' 3\" (1.6 m)   Wt 190 lb 11.2 oz (86.5 kg)   SpO2 96%   BMI 33.78 kg/m²     This is a 72 y.o. female. Patient is alert and seems more orientated and responsive to questions compared to last week. Patient appears chronically ill. Mood is happy with normal affect. Not obviously hearing impaired. Significant improvement in the oral cavity hygiene and hydration compared to my initial evaluation. Improvement in the induration of the left submandibular gland, gradually softer and softer. The gland is minimally tender to palpation. No purulence is expressible. The jaw appears to have normal ROM. No erythema, fluctuance, crepitus of the neck is noted.     Data  CBC:   Lab Results   Component Value Date/Time    WBC  17.5 01/09/2023 04:21 AM    RBC 3.19 01/09/2023 04:21 AM    RBC 4.70 05/12/2022 03:26 PM    HGB 9.8 01/09/2023 04:21 AM    HCT 30.3 01/09/2023 04:21 AM    MCV 95.0 01/09/2023 04:21 AM    MCH 30.7 01/09/2023 04:21 AM    MCHC 32.3 01/09/2023 04:21 AM    RDW 14.1 05/12/2022 03:26 PM     01/09/2023 04:21 AM    MPV 10.5 01/09/2023 04:21 AM     BMP:    Lab Results   Component Value Date/Time     01/09/2023 11:37 AM    K 3.9 01/09/2023 04:21 AM    K 4.3 12/07/2022 06:15 AM     01/09/2023 04:21 AM    CO2 23 01/09/2023 04:21 AM    BUN 10 01/09/2023 04:21 AM    LABALBU 2.5 01/09/2023 04:21 AM    CREATININE 0.7 01/09/2023 04:21 AM    CALCIUM 7.6 01/09/2023 04:21 AM    LABGLOM >60 01/09/2023 04:21 AM    GLUCOSE 123 01/09/2023 04:21 AM    GLUCOSE 85 05/12/2022 03:26 PM       Inpatient Medications  Current Facility-Administered Medications: ampicillin-sulbactam (UNASYN) 3,000 mg in sodium chloride 0.9 % 100 mL IVPB (mini-bag), 3,000 mg, IntraVENous, Q6H  hydrocortisone sodium succinate PF (SOLU-CORTEF) injection 100 mg, 100 mg, IntraVENous, Q8H  midodrine (PROAMATINE) tablet 10 mg, 10 mg, Oral, q8h  magnesium sulfate 2000 mg in 50 mL IVPB premix, 2,000 mg, IntraVENous, PRN  potassium chloride 20 mEq/50 mL IVPB (Central Line), 20 mEq, IntraVENous, PRN **OR** potassium chloride 10 mEq/100 mL IVPB (Peripheral Line), 10 mEq, IntraVENous, PRN  calcium replacement protocol, , Other, RX Placeholder  rivaroxaban (XARELTO) tablet 15 mg, 15 mg, Oral, Daily  sodium chloride (OCEAN, BABY AYR) 0.65 % nasal spray 2 spray, 2 spray, Each Nostril, Q12H  norepinephrine (LEVOPHED) 16 mg in dextrose 5% 250 mL infusion, 1-100 mcg/min, IntraVENous, Continuous  sodium chloride flush 0.9 % injection 5-40 mL, 5-40 mL, IntraVENous, 2 times per day  sodium chloride flush 0.9 % injection 5-40 mL, 5-40 mL, IntraVENous, PRN  0.9 % sodium chloride infusion, , IntraVENous, PRN  ondansetron (ZOFRAN-ODT) disintegrating tablet 4 mg, 4 mg, Oral, Q8H PRN **OR** ondansetron (ZOFRAN) injection 4 mg, 4 mg, IntraVENous, Q6H PRN  polyethylene glycol (GLYCOLAX) packet 17 g, 17 g, Oral, Daily PRN  acetaminophen (TYLENOL) tablet 650 mg, 650 mg, Oral, Q6H PRN **OR** acetaminophen (TYLENOL) suppository 650 mg, 650 mg, Rectal, Q6H PRN  levothyroxine (SYNTHROID) tablet 125 mcg, 125 mcg, Oral, Daily  [Held by provider] pantoprazole (PROTONIX) tablet 40 mg, 40 mg, Oral, QAM AC  atorvastatin (LIPITOR) tablet 10 mg, 10 mg, Oral, Daily  pantoprazole (PROTONIX) injection 40 mg, 40 mg, IntraVENous, QAM AC    ASSESSMENT AND PLAN    Left sialadenitis, leukocytosis, septic shock, epistaxis, supplemental oxygen use    -Left submandibular infection continues to gradually improve  -Continue Unasyn, less induration of the gland and resolution of the erythema  - Continue sialogogues (lemon juice packets, lemon juice on swabs, lemon wedges, sour candy), warm compresses and massage. Also frequent oral hydration and hygiene.   This is imperative to decreasing swelling of the salivary gland.   - Okay for transfer out of ICU once off pressors and appropriate from medical standpoint otherwise per primary team    Electronically signed by OFELIA Cox on 1/9/2023 at 1:46 PM

## 2023-01-09 NOTE — CARE COORDINATION
1/9/23, 3:16 PM EST    DISCHARGE ON GOING EVALUATION    Bryan Gtz day: 5  Location: -07/007-A Reason for admit: Cellulitis of submandibular region [K12.2]  Shock ([address] [R57.9]  Troponin level elevated [R77.8]  JESSE (acute kidney injury) ([facility] [N17.9]  Submandibular gland infection [K11.20]  Septic shock ([address] [A41.9, R65.21]  Pneumonia of right lung due to infectious organism, unspecified part of lung [J18.9]   Procedure:   1/4 CT facial bones: 1. Enlargement of the left submandibular gland measuring up to 2.5 cm. 2. Asymmetric fullness of the left pharyngeal soft tissues with mass effect on the subglottic airway 3. Left mastoid effusions and otitis interna   1/4 CVC Right subclavian    Barriers to Discharge: Weaned off levophed last evening. Started on minced/moist diet with moderately thick liquids. Afebrile. SB 40's. On room air. Oriented to person and time. Follows commands. SLP/PT/OT. Intensivist and ENT following. +MRSA nares. Telemetry, CVC, external urinary catheter. IV unasyn, lipitor, IV solucortef 100 mg Q8H, midodrine, synthroid, IV protonix, xarelto, Electrolyte replacement protocols. PCP: Carla Melo MD  Readmission Risk Score: 23.5%  Patient Goals/Plan/Treatment Preferences: From home w/ and current with [facility]. Has walker and wheelchair.  had back surgery. IPR declined patient and recommends SNF. SW updated. Plan for SNF; will require precert.

## 2023-01-09 NOTE — PROGRESS NOTES
[address] [address]  INPATIENT PHYSICAL THERAPY  DAILY NOTE  [facility] ICU 4D - 4D-07/007-A    Time In: 1059  Time Out: 1127  Timed Code Treatment Minutes: 28 Minutes  Minutes: 28      Corx with OT due to pt in ICU and unsure if pt had enough endurance to tolerate 2 sessions. Plan to see separate next session. Date: 2023  Patient Name: Neo Quinn,  Gender:  female        MRN: [id number]  : 1950  (76 y.o.)     Referring Practitioner: Awa Leon DO  Diagnosis: Cellulitis of submadibular region  Additional Pertinent Hx: Per H&P: Patient is a 76-year-old female with a past medical history of adrenal insufficiency, hypothyroidism, paroxysmal atrial fibrillation, history of pituitary tumor, tension, stroke, and chronic diastolic heart failure admitted for left lower back pain. Patient states that for the last few days she has been having submandibular swelling and redness in the last few days. Patient has difficulty opening her mouth and has not been eating or drinking. She was recently admitted for the last month. Pt found to have septic shock due to Periapical Abscess with enlargement of the left submandibular gland. Prior Level of Function:  Lives With: Spouse  Type of Home: House  Home Layout: One level  Home Access: Stairs to enter with rails  Entrance Stairs - Number of Steps: 2  Home Equipment: Deidra Zurdo, rolling, Wheelchair-manual, Lift chair        ADL Assistance: Needs assistance  Ambulation Assistance: Non-ambulatory  Transfer Assistance: Needs assistance  Active : No  Additional Comments: Prior to  admission, pt was living at home with .  was assisting with stand pivot transfers to/from w/c, pt nonambulatory. Since that admission, pt has been at [facility] for rehab.  currently in [facility] following back surgery.     Restrictions/Precautions:  Restrictions/Precautions: General Precautions, Fall Risk  Position Activity Restriction  Other position/activity restrictions: h/o CVA with L sided weakness     SUBJECTIVE: pleasant and cooperative, pt oriented to place and person    PAIN: no c/o pain    Vitals:  on room air, O2 sat 96%    OBJECTIVE:  Bed Mobility:  Rolling to Left: Maximum Assistance, X 2   Rolling to Right: Maximum Assistance, X 2   Supine to Sit: Maximum Assistance, X 2  Sit to Supine: Maximum Assistance, X 2   Scooting: Maximum Assistance, X 2  HOB up 30 degrees, cues to assist for log roll technique  Transfers:  Sit to Stand: Moderate Assistance, x1, maxAx1  Stand to Sit:Moderate Assistance, x1, maxAx1  BUE support on walker with cues for sequencing, fwd flexed posture, used platform, cues to inc knee flexion for improved postion prior to standing with pt unable to reach 90degrees with LLE more limited than RLE  Ambulation:  Not Tested  Pt unable to take steps    Balance:  Static Sitting Balance:  Contact Guard Assistance, to SBA at EOB with platform at feet, cues to inc knee flexion with pt limited per pt as PTA  Static Standing Balance: Moderate Assistance, X 1, maxAx1, at walker, cues for erect posture, tolerated around 10 sec first trial and around 1 min second trial, WBOS, pt unable to take steps        Functional Outcome Measures: Completed  AM-PAC Inpatient Mobility without Stair Climbing Raw Score : 5  AM-PAC Inpatient without Stair Climbing T-Scale Score : 23.59    ASSESSMENT:  Assessment: Patient progressing toward established goals.  and initiated transfer with walker and 2 assist today, pt fatigues quickly  Activity Tolerance:  Patient tolerance of  treatment: fair. -     Equipment Recommendations:Equipment Needed: No  Discharge Recommendations: Continue to assess pending progress, Subacte/Skilled Nursing Facility, and Patient would benefit from continued PT at discharge  Plan: Current Treatment Recommendations: Strengthening, ROM, Balance training, Functional mobility training, Neuromuscular re-education, Safety education & training, Positioning, Patient/Caregiver education & training, Therapeutic activities, Transfer training  General Plan:  (3-5x GM)    Patient Education  Patient Education: Bed Mobility, Transfers    Goals:  Patient Goals : to get better  Short Term Goals  Time Frame for Short Term Goals: by discharge  Short Term Goal 1: Pt to transfer supine <--> sit min A to enable pt to get in/out of bed. Short Term Goal 2: Pt to sit EOB x 20 minutes to improve upright tolerance in prep for transfer training. Short Term Goal 3: sit to std with modA to get in/out of chairs  Short Term Goal 4: PT to assess gait  Long Term Goals  Time Frame for Long Term Goals : NA due to short length of stay. Following session, patient left in safe position with all fall risk precautions in place.

## 2023-01-09 NOTE — PROGRESS NOTES
[address] THERAPY  [facility] ICU 4D  DAILY NOTE    TIME   SLP Individual Minutes  Time In: 3759  Time Out: 5781  Minutes: 8  Timed Code Treatment Minutes: 0 Minutes       Date: 2023  Patient Name: Vaishnavi Puckett      CSN: [id number]   : 1950  (71 y.o.)  Gender: female   Referring Physician:    Referring Physician:  Dr. Bridgett Serrano with repeat order received by Alexandra Gardner DO 2023  Diagnosis: Septic shock ([address] Cellulitis of submandibular region  Precautions: Aspiration risk, fall risk  Current Diet: Puree Diet with Moderately Thick Liquids  Swallowing Strategies:  Full Upright Position, Small Bite/Sip, Multiple Swallow, Medications Crushed with Puree, Limit Distractions, Monitor for Fatigue, and 1:1 assistance with meals  Date of Last MBS/FEES:  MBS 2022    Pain:  No pain reported. Subjective:  Patient seen with RN Lucy Low permission. Patient alert and cooperative throughout ST session. No family present. Short-Term Goals:  SHORT TERM GOAL #1:  Goal 1: Patient will consume puree diet with moderatly thick liquids without s/s of aspiration in order to safely maintain adequate hydration and nutrition  INTERVENTIONS: Patient completed PO trials of puree (pudding) and advanced trials of minced and moist (pudding with crushed kacye cracker) with moderately thick liquids via cup and straw. Patient oral phase characterized by adequate, though slow mastication; functional for with all PO trials consumed. No overt s/s of penetration/aspiration endorsed at bedside, however, certainly cannot r/o airway invasion events without formal instrumentation; most recent MBS recommending minced and moist diet with moderately thick liquids. ST recommending diet upgrade to minced and moist diet with moderately thick liquids. Patient would benefit from ongoing skilled ST services to target dysphagia.        SHORT TERM GOAL #2:  Goal 2: Patient will complete pharyngeal exercises x15 in order to improve overall airway protection and pharyngeal constriction. INTERVENTIONS:DNT due to focus on additional STGs. SHORT TERM GOAL #3:  Goal 3: Complete repeat insturmental evaluation when clincially appropriate in order to determine readiness for diet advancement. (most recent MBS 11/26/22)  INTERVENTIONS:DNT due to focus on additional STGs. SHORT TERM GOAL #4:  Goal 4: Patient and family will demonstrate understanding of ST dyspahgia recommendations, ST liquids/diet recommendations, s/s of aspiration and risks of aspiration to ensure safety of PO intake  INTERVENTIONS: Patient with verbal receptiveness noted with recommendations; no family present. SHORT TERM GOAL #5:  Goal 5: Monitor speech/language/cognitive baseline and complete evaluation as indiciated  INTERVENTIONS: RN with no concerns at this time. Long-Term Goals:  No LTGs established due to short ELOS. EDUCATION:  Learner: Patient  Education:  Reviewed diet and strategies, Reviewed signs, symptoms and risks of aspiration, Demonstrated how to thicken liquids appropriately, Reviewed ST goals and Plan of Care, Reviewed recommendations for follow-up, Education Related to Potential Risks and Complications Due to Impairment/Illness/Injury, Education Related to Prevention of Recurrence of Impairment/Illness/Injury, Education Related to [employer] and Wellness, and Home Safety Education  Evaluation of Education: Demonstrates with assistance, Needs further instruction, and Family not present    ASSESSMENT/PLAN:  Activity Tolerance:  Patient tolerance of  treatment: fair. Assessment/Plan: Patient progressing toward established goals. Continues to require skilled care of licensed speech pathologist to progress toward achievement of established goals and plan of care. .     Plan for Next Session: dietary analysis  Discharge Recommendations: [facility]     Baby CanÃ³vanas, M.S., [facility]

## 2023-01-10 LAB
ALBUMIN SERPL-MCNC: 3.3 G/DL (ref 3.5–5.1)
ALP BLD-CCNC: 68 U/L (ref 38–126)
ALT SERPL-CCNC: 29 U/L (ref 11–66)
ANION GAP SERPL CALCULATED.3IONS-SCNC: 9 MEQ/L (ref 8–16)
AST SERPL-CCNC: 37 U/L (ref 5–40)
BASOPHILS # BLD: 0.2 %
BASOPHILS ABSOLUTE: 0 THOU/MM3 (ref 0–0.1)
BILIRUB SERPL-MCNC: 0.4 MG/DL (ref 0.3–1.2)
BILIRUBIN DIRECT: < 0.2 MG/DL (ref 0–0.3)
BUN BLDV-MCNC: 14 MG/DL (ref 7–22)
CALCIUM IONIZED: 1.16 MMOL/L (ref 1.12–1.32)
CALCIUM SERPL-MCNC: 8 MG/DL (ref 8.5–10.5)
CHLORIDE BLD-SCNC: 113 MEQ/L (ref 98–111)
CO2: 26 MEQ/L (ref 23–33)
CREAT SERPL-MCNC: 0.8 MG/DL (ref 0.4–1.2)
EOSINOPHIL # BLD: 0 %
EOSINOPHILS ABSOLUTE: 0 THOU/MM3 (ref 0–0.4)
ERYTHROCYTE [DISTWIDTH] IN BLOOD BY AUTOMATED COUNT: 17.6 % (ref 11.5–14.5)
ERYTHROCYTE [DISTWIDTH] IN BLOOD BY AUTOMATED COUNT: 61.2 FL (ref 35–45)
GFR SERPL CREATININE-BSD FRML MDRD: > 60 ML/MIN/1.73M2
GLUCOSE BLD-MCNC: 131 MG/DL (ref 70–108)
HCT VFR BLD CALC: 30.9 % (ref 37–47)
HEMOGLOBIN: 9.8 GM/DL (ref 12–16)
IMMATURE GRANS (ABS): 0.48 THOU/MM3 (ref 0–0.07)
IMMATURE GRANULOCYTES: 3.1 %
LYMPHOCYTES # BLD: 8.1 %
LYMPHOCYTES ABSOLUTE: 1.2 THOU/MM3 (ref 1–4.8)
MAGNESIUM: 2.3 MG/DL (ref 1.6–2.4)
MCH RBC QN AUTO: 30 PG (ref 26–33)
MCHC RBC AUTO-ENTMCNC: 31.7 GM/DL (ref 32.2–35.5)
MCV RBC AUTO: 94.5 FL (ref 81–99)
MONOCYTES # BLD: 2.2 %
MONOCYTES ABSOLUTE: 0.3 THOU/MM3 (ref 0.4–1.3)
NUCLEATED RED BLOOD CELLS: 0 /100 WBC
OSMOLALITY URINE: 707 MOSMOL/KG (ref 250–750)
OSMOLALITY: 311 MOSMOL/KG (ref 275–295)
PHOSPHORUS: 2.6 MG/DL (ref 2.4–4.7)
PLATELET # BLD: 240 THOU/MM3 (ref 130–400)
PMV BLD AUTO: 10.4 FL (ref 9.4–12.4)
POTASSIUM SERPL-SCNC: 3.9 MEQ/L (ref 3.5–5.2)
RBC # BLD: 3.27 MILL/MM3 (ref 4.2–5.4)
SEG NEUTROPHILS: 86.4 %
SEGMENTED NEUTROPHILS ABSOLUTE COUNT: 13.2 THOU/MM3 (ref 1.8–7.7)
SODIUM BLD-SCNC: 148 MEQ/L (ref 135–145)
SODIUM URINE: < 20 MEQ/L
TOTAL PROTEIN: 5.1 G/DL (ref 6.1–8)
WBC # BLD: 15.3 THOU/MM3 (ref 4.8–10.8)

## 2023-01-10 PROCEDURE — 6370000000 HC RX 637 (ALT 250 FOR IP): Performed by: NURSE PRACTITIONER

## 2023-01-10 PROCEDURE — 6360000002 HC RX W HCPCS

## 2023-01-10 PROCEDURE — 2580000003 HC RX 258: Performed by: STUDENT IN AN ORGANIZED HEALTH CARE EDUCATION/TRAINING PROGRAM

## 2023-01-10 PROCEDURE — 2580000003 HC RX 258

## 2023-01-10 PROCEDURE — 82330 ASSAY OF CALCIUM: CPT

## 2023-01-10 PROCEDURE — 6360000002 HC RX W HCPCS: Performed by: STUDENT IN AN ORGANIZED HEALTH CARE EDUCATION/TRAINING PROGRAM

## 2023-01-10 PROCEDURE — 36415 COLL VENOUS BLD VENIPUNCTURE: CPT

## 2023-01-10 PROCEDURE — C9113 INJ PANTOPRAZOLE SODIUM, VIA: HCPCS | Performed by: STUDENT IN AN ORGANIZED HEALTH CARE EDUCATION/TRAINING PROGRAM

## 2023-01-10 PROCEDURE — P9047 ALBUMIN (HUMAN), 25%, 50ML: HCPCS | Performed by: FAMILY MEDICINE

## 2023-01-10 PROCEDURE — 6370000000 HC RX 637 (ALT 250 FOR IP): Performed by: STUDENT IN AN ORGANIZED HEALTH CARE EDUCATION/TRAINING PROGRAM

## 2023-01-10 PROCEDURE — 83930 ASSAY OF BLOOD OSMOLALITY: CPT

## 2023-01-10 PROCEDURE — 83735 ASSAY OF MAGNESIUM: CPT

## 2023-01-10 PROCEDURE — 99233 SBSQ HOSP IP/OBS HIGH 50: CPT | Performed by: INTERNAL MEDICINE

## 2023-01-10 PROCEDURE — 84300 ASSAY OF URINE SODIUM: CPT

## 2023-01-10 PROCEDURE — 83935 ASSAY OF URINE OSMOLALITY: CPT

## 2023-01-10 PROCEDURE — 82248 BILIRUBIN DIRECT: CPT

## 2023-01-10 PROCEDURE — 80053 COMPREHEN METABOLIC PANEL: CPT

## 2023-01-10 PROCEDURE — 85025 COMPLETE CBC W/AUTO DIFF WBC: CPT

## 2023-01-10 PROCEDURE — 84100 ASSAY OF PHOSPHORUS: CPT

## 2023-01-10 PROCEDURE — 6360000002 HC RX W HCPCS: Performed by: FAMILY MEDICINE

## 2023-01-10 PROCEDURE — 2060000000 HC ICU INTERMEDIATE R&B

## 2023-01-10 RX ORDER — ALBUMIN (HUMAN) 12.5 G/50ML
25 SOLUTION INTRAVENOUS ONCE
Status: COMPLETED | OUTPATIENT
Start: 2023-01-10 | End: 2023-01-10

## 2023-01-10 RX ORDER — FUROSEMIDE 10 MG/ML
40 INJECTION INTRAMUSCULAR; INTRAVENOUS ONCE
Status: COMPLETED | OUTPATIENT
Start: 2023-01-10 | End: 2023-01-10

## 2023-01-10 RX ADMIN — SODIUM CHLORIDE, PRESERVATIVE FREE 10 ML: 5 INJECTION INTRAVENOUS at 09:21

## 2023-01-10 RX ADMIN — SODIUM CHLORIDE, PRESERVATIVE FREE 10 ML: 5 INJECTION INTRAVENOUS at 21:46

## 2023-01-10 RX ADMIN — SODIUM CHLORIDE 3000 MG: 900 INJECTION INTRAVENOUS at 06:18

## 2023-01-10 RX ADMIN — SODIUM CHLORIDE 3000 MG: 900 INJECTION INTRAVENOUS at 00:20

## 2023-01-10 RX ADMIN — HYDROCORTISONE SODIUM SUCCINATE 100 MG: 100 INJECTION, POWDER, FOR SOLUTION INTRAMUSCULAR; INTRAVENOUS at 09:20

## 2023-01-10 RX ADMIN — SODIUM CHLORIDE 3000 MG: 900 INJECTION INTRAVENOUS at 13:23

## 2023-01-10 RX ADMIN — HYDROCORTISONE SODIUM SUCCINATE 100 MG: 100 INJECTION, POWDER, FOR SOLUTION INTRAMUSCULAR; INTRAVENOUS at 17:04

## 2023-01-10 RX ADMIN — SODIUM CHLORIDE 3000 MG: 900 INJECTION INTRAVENOUS at 18:40

## 2023-01-10 RX ADMIN — MIDODRINE HYDROCHLORIDE 10 MG: 10 TABLET ORAL at 17:04

## 2023-01-10 RX ADMIN — LEVOTHYROXINE SODIUM 125 MCG: 0.12 TABLET ORAL at 09:10

## 2023-01-10 RX ADMIN — ALBUMIN (HUMAN) 25 G: 0.25 INJECTION, SOLUTION INTRAVENOUS at 00:44

## 2023-01-10 RX ADMIN — FUROSEMIDE 40 MG: 10 INJECTION, SOLUTION INTRAMUSCULAR; INTRAVENOUS at 03:05

## 2023-01-10 RX ADMIN — ATORVASTATIN CALCIUM 10 MG: 10 TABLET, FILM COATED ORAL at 09:10

## 2023-01-10 RX ADMIN — HYDROCORTISONE SODIUM SUCCINATE 100 MG: 100 INJECTION, POWDER, FOR SOLUTION INTRAMUSCULAR; INTRAVENOUS at 00:37

## 2023-01-10 RX ADMIN — SALINE NASAL SPRAY 2 SPRAY: 1.5 SOLUTION NASAL at 06:19

## 2023-01-10 RX ADMIN — PANTOPRAZOLE SODIUM 40 MG: 40 INJECTION, POWDER, FOR SOLUTION INTRAVENOUS at 06:19

## 2023-01-10 RX ADMIN — RIVAROXABAN 15 MG: 15 TABLET, FILM COATED ORAL at 18:41

## 2023-01-10 RX ADMIN — MIDODRINE HYDROCHLORIDE 10 MG: 10 TABLET ORAL at 00:38

## 2023-01-10 ASSESSMENT — PAIN SCALES - GENERAL: PAINLEVEL_OUTOF10: 0

## 2023-01-10 NOTE — PROGRESS NOTES
CRITICAL CARE PROGRESS NOTE      Patient:  Juanpablo Perez    Unit/Bed:4D-08/008-A  YOB: 1950  MRN: [id number]   PCP: Kylee Sinha MD  Date of Admission: 1/4/2023  Chief Complaint:- Left Lower Jaw Pain    Assessment and Plan:    Periapical Abscess with enlargement of the left submandibular gland: No concern for airway compromise at this time. Patient on Unasyn (1/04 - 1/10). ENT following. Adrenal Insufficiency: Patient has hx pituitary tumor removal 50 years ago and since then has been on chronic steroids. Continue Solu-Cortef 100mg IV q8hr. Mild Hypernatremia POA: likely chronic steroid use. Initial 151. Initial 2.6 L free water deficit; now net positive 9.5L. low urine output. Obtain Ur Na, Ur Osm, Serum Osm. Stopped IVFs. Trend BMP. Consider restarting Bumex for diuresis pending work-up. Mild Hyperglycemia: A1c 5.4 (1/06/2023). Likely secondary to chronic steroid use. Continue to monitor. Consider low dose SSI if blood sugars consistently > 200. Leukocytosis, improved: WBC 17.5 today, improved from initial 34. 3. hx chronic steroid use. Patient has remained afebrile; likely secondary to sepsis and steroids. Trend CBC  Mild hypocalcemia: likely secondary to volume overload and JESSE. Trend BMP and replace as needed. HFpEF: TTE (1/9/2023) EF 60%, no LV wall nor valvular abnormalities. Mildly dilated LA. Held Coreg and Bumex at this time due to bradycardia. Paroxysmal Afib w/ RVR, in SR: [id number]: 6, HAS-BLED: 3. In normal sinus rhythm per last EKG. Held Coreg at this time due to bradycardia. Continued home Xarelto for anticoagulation. Trend BMP; maintain K>4.0, Mg>2.0. Replace electrolytes as needed. Continue Tele. HTN: Coreg and Bumex held at this time for bradycardia and sepsis on pressors.   HLD: continued home statin  Hypothyroidism: Resume home Synthroid  GERD: held home Protonix  Hx COVID: Patient was recently admitted to the hospital in early December for pneumonia secondary to COVID-19 infection. Patient had completed course of antibiotics at that time. Hx CVA: Prior history of stroke in 1988 (residual left sided deficits) and in 2015. Continued home Xarelto  Severe Protein Calorie Malnutrition: hx of poor oral intake and limited ADLs, wheel-chair bound. Diet consult for nutrition; supplements. Social work   Physical deconditioning/debility: Multiple, comorbid conditions. Wheelchair-bound at home. SLP/PT/OT evaluation. PM&R consult for rehab per family request.  Obesity with BMI 29: Noted  Code Status: Patient is limited code per previous admission. Septic Shock POA, resolved: SOFA 5. Initial WBC 25.0, LA 2.0. Likely secondary to submandibular abscess; s/p 30 cc/kg bolus in ER. Lactic acidosis resolved. BCx (1/04) NGTD. Required Levophed for BP. Continue Unasyn (1/4/23 - ). Patient has remained afebrile, remains on Levophed. Titrate down Midodrine 10mg TID. Wean pressors to maintain MAP > 65. JESSE, resolved: likely secondary to sepsis and dehydration. initial Cr 1.9; baseline 0.9. Avoid nephrotoxic medications at this time. Resolved w/ IVFs. Monitor strict I/Os. Mild Hypokalemia POA, resolved; related to poor oral intake; replete and recheck    INITIAL H AND P AND ICU COURSE:  Patient is a 80yo F w/ significant PMH Atrial thrombosis, CHF, CVA, PAF, HTN, HLD, asthma, GERD, DMII, adrenal insufficiency, hypothyroidism, hx meningioma x3 admitted to [facility] on 1/04/2023 for Left Lower Jaw Pain. Reported noted submandibular swelling and redness that is painful for few days before. Endorsed difficulty opening mouth  and decreased oral intake of both liquids and solids. Admitted 11/2022 for Acute Hypoxic Resp Failure 2/2 LLL PNA where she was discharged on 2L O2 NC after completion of abx treatment. In ED, hypotensive but othrewise vitals stable. Labs hypernatremic w/ minor JESSE. Leukocytosis WBC 25.0. UA negative for UTI, blood cultures drawn.  EKG sinus eladia w/o acute ischemic changes from prior. CT facial bones noted enlargement of left submandibular gland measuring 2.5cm w/ assymetric left pharyngeal soft tissues and mass effect on subglottic airway. CXR notable for poor lung inflation, mod atelectasis/PNA in R parahilar region and bilat lung bases. S/p IVF bolus; patient did not improve w/ fluids. Admitted to ICU for pressor support. Treated w/ Unasyn. Weaned down pressor support w/ increasing alertness and improved speech. Patient started diet 1/06; tolerated well. Family requested PM&R consult for rehab evaluation as  who care for her at home is hospitalized after a surgery. Started Midodrine 10mg TID 1/8/2023.     1/9/2023: Patient off Levophed; BP borderline on Midodrine 10mg TID. Levophed was required to be started again. Sodium continues to trend up, likely due to SoluCortef. 1/10/2023: patient off Levophed; BP much better after increased steroid dose. Mariusz diet. Decreased submandibular swelling. Plan for transfer out of ICU today. Past Medical History:  Atrial thrombosis (on Xarelto), CHF, CVA (1988-residual left sided weakness, 2015), PAF (6/2015), HTN, HLD, asthma,GERD,  adrenal insufficiency (chronic steroids), hypothyroidism, hx meningioma x3 (s/p gamma knife (1/2012 w/ Dr. Jackie Peters at [facility]), pituitary tumor s/p resection. Family History:  CVA (maternal grandmother). Social History:  denied alcohol, tobacco, recreational drug use. ROS   General: No fevers, chills. Pain controlled. Fatigue, headache. HEENT: No headache, no vision changes, no hearing changes, no trauma. CV: No palpitations, no chest pain, no tachycarida  RESP: No respiratory distress, no cough, no wheeze. SOB  GI: No abdominal pain, no nausea, no vomiting, no diarrhea  : No dysuria, no hematuria, no incontinence  Neuro: No seizures, no focal deficits, no weakness, no confusion   Psych: No psychosis, no SI/HI, Depression, Anxiety.     Scheduled Meds:   ampicillin-sulbactam  3,000 mg IntraVENous Q6H    hydrocortisone sodium succinate PF  100 mg IntraVENous Q8H    midodrine  10 mg Oral q8h    calcium replacement protocol   Other RX Placeholder    rivaroxaban  15 mg Oral Daily    sodium chloride  2 spray Each Nostril Q12H    sodium chloride flush  5-40 mL IntraVENous 2 times per day    levothyroxine  125 mcg Oral Daily    [Held by provider] pantoprazole  40 mg Oral QAM AC    atorvastatin  10 mg Oral Daily    pantoprazole  40 mg IntraVENous QAM AC     Continuous Infusions:   sodium chloride         PHYSICAL EXAMINATION:  T:  97.5.  P:  35. RR:  10. B/P:  161 / 46. FiO2:  0. O2 Sat:  96.    I/O:  1435 / 390, 1308.7 / 275, 1074 / 400  Weights: 165lbs POA, 190 (18), 188 (110)  Body mass index is 33.47 kg/m². GCS:   14  General:   chronically ill-appearing, in no acute distress. HEENT:  normocephalic and atraumatic. No scleral icterus. PERR. hard of hearing. Neck: supple. No Thyromegaly. Swelling of submandibular region. Lungs: clear to auscultation. No retractions  Cardiac: bradycardic, normal rhythm. No JVD. Abdomen: soft. Nontender. Extremities:  No clubbing, cyanosis. Pitting edema +2 of all 4 extremities. Vasculature: capillary refill < 3 seconds. Palpable dorsalis pedis pulses. Skin:  warm and dry. Multiple bruises on BUE. Psych:  Alert and oriented x3. Affect appropriate  Lymph:  No supraclavicular adenopathy. Neurologic:  No focal deficit. No seizures. Data: (All radiographs, tracings, PFTs, and imaging are personally viewed and interpreted unless otherwise noted). , K 3.9, Cl 113, CO2 26, BUN 14, Cr 0.8, Ca 8.0  WBC 15.3, H/H 9.8 / 30.9, Platelets 355  Telemetry shows: NSR  Micro: BCx (1/04) NGTD. MRSA (+)  CT Facial Bones wo Contrast 1/04/2023: enlargement of left submandibular gland measuring 2.5cm. assymetric left pharyngeal soft tissues w/ mass effect on subglottic airway. Left mastoid effusion and otitis interna.   CXR 1/04/2023: poor lung inflation, mod atelectasis/PNA in R parahilar region and bilat lung bases. TTE 1/09/2023: EF 60%, no LV wall nor valvular abnormalities. Mildly dilated LA. Seen with multidisciplinary ICU team.  Meets Continued ICU Level Care Criteria:    [] Yes   [x] No - Transfer Planned to listed location: step-down  [x] HOSPITALIST CONTACTED- DR Campa    Case and plan discussed with Dr. Rivera Saeed. Electronically signed by Elsy Danielson DO  CRITICAL CARE SPECIALIST   Patient seen by me including key components of medical care. Case discussed with resident physician. Ok to transfer to medical floor. Italicized font, if present,  represents changes to the note made by me. CC time 25 minutes. Time was discontiguous. Time does not include procedure. Time does include my direct assessment of the patient and coordination of care. Time represents more than 50% of the time involved with patient care by the [address] [place] [address] team.  Electronically signed by Maurice Christianson.  Rivera Saeed MD.

## 2023-01-10 NOTE — PLAN OF CARE
ICU transferred received. 70 yo F with history of chronic adrenal insufficiency, CHF, PAF with RVR. Admitted for septic shock due to periapical abscess with enlargement of the left submandibular gland. Has no complaints on evaluations. Sitting up in bed, tolerating breakfast. No issues with swallowing reported. Chronic sinus bradycardia- HR 40s when awake, 30s when asleep. Asymptomatic. On Tele. Currently on SoluCortef 100 mg q8h and Unasyn 3g IV q6h. Started on midodrine 10 mg q8h. Will hold off on midodrine dose with SBP in 130s. If BP remains stable today will start steroid taper to home dose- Cortef 20 mg every morning and 10 mg nightly. Continue Unasyn. Encourage PO intake and continue PTOT. Did not qualify for IPR per ICU provider. SW working on placement to SNF.     Burt Saleem MD  PGY3, Internal Medicine

## 2023-01-10 NOTE — CARE COORDINATION
1/10/23, 11:58 AM EST    DISCHARGE ON GOING EVALUATION    Bryan Gtz day: 6  Location: -08/008-A Reason for admit: Cellulitis of submandibular region [K12.2]  Shock ([address] [R57.9]  Troponin level elevated [R77.8]  JESSE (acute kidney injury) ([facility] [address] [N17.9]  Submandibular gland infection [K11.20]  Septic shock ([facility] [address] [A41.9, R65.21]  Pneumonia of right lung due to infectious organism, unspecified part of lung [J18.9]   Procedure:   1/4 CT facial bones: 1. Enlargement of the left submandibular gland measuring up to 2.5 cm. 2. Asymmetric fullness of the left pharyngeal soft tissues with mass effect on the subglottic airway 3. Left mastoid effusions and otitis interna   1/4 CVC Right subclavian  1/9 Echo with EF 60%    Barriers to Discharge: Chronic SB 40's when awake and 30's when asleep. Asymptomatic. Order to remove CVC. Order to transfer to stepdown; awaiting bed assignment. Afebrile. SB 40's. On room air. Oriented to person and time. Follows commands. SLP/PT/OT. Intensivist and ENT following. +MRSA nares. Telemetry, CVC, external urinary catheter. IV unasyn, lipitor, IV solucortef 100 mg Q8H, midodrine, synthroid, IV protonix, xarelto, Electrolyte replacement protocols. PCP: Gustavus Scheuermann, MD  Readmission Risk Score: 24.1%  Patient Goals/Plan/Treatment Preferences: From home w/ and current with [facility]. Has walker and wheelchair.  had back surgery. IPR declined patient and recommends SNF. SW updated. Plan for SNF; will require precert.

## 2023-01-10 NOTE — CARE COORDINATION
1/10/23, 3:09 PM EST    DISCHARGE PLANNING EVALUATION    Discussed with patient's , Olinda Buckner who is on 7K-14 about [facility] going to an ECF because she will not qualify for IPR. His choices would be [facility], [address], [employer]. Patient has been to [facility] in [place] before and will not be returning. Spoke with HCF, [facility] is not in network but [facility] and [facility] [employer] are both in network. Need to discuss with  if this is okay. [facility] is not in network with insurance. Update 3:39 PM- Spoke to patient's  and daughter, provided them with a list of in-network facilities. They would like to look at the star ratings and regroup tomorrow morning with a decision.  The in-network facilities are:     [facility]

## 2023-01-10 NOTE — PROGRESS NOTES
Patient transferred from ICU. 2 nurse skin completed by Michael AU RN and Luis ATKINS RN.     Stage 2 on buttocks.  DTI on buttocks.  Scattered abrasions and bruising.  Bilateral heels red but blanchable.   Right shin skin tear.   Chronic discoloration on bilateral lower legs.

## 2023-01-10 NOTE — PROGRESS NOTES
Prayer and encouragement    01/10/23 1550   Encounter Summary   Service Provided For: Patient and family together   Referral/Consult From: [facility]   Last Encounter  01/10/23   Complexity of Encounter Low   Spiritual/Emotional needs   Type Spiritual Support

## 2023-01-10 NOTE — PROGRESS NOTES
Department of Otolaryngology  Progress Note    Chief Complaint:  Neck swelling and redness    SUBJECTIVE:  No acute issues overnight. WBC 15.3 yesterday. Afebrile. Moved to 4K. Patient currently eating breakfast without any issues. States no pain. A complete multi-organ review of systems was performed using a new patient questionnaire, and reviewed by me. ENT:  negative except as noted in HPI  CONSTITUTIONAL:  negative except as noted in HPI  EYES:  negative except as noted in HPI  RESPIRATORY:  negative except as noted in HPI  CARDIOVASCULAR:  negative except as noted in HPI  GASTROINTESTINAL:  negative except as noted in HPI  GENITOURINARY:  negative except as noted in HPI  MUSCULOSKELETAL:  negative except as noted in HPI  SKIN:  negative except as noted in HPI  ENDOCRINE/METABOLIC: negative except as noted in HPI  HEMATOLOGIC/LYMPHATIC:  negative except as noted in HPI  ALLERGY/IMMUN: negative except as noted in HPI  NEUROLOGICAL:  negative except as noted in HPI  BEHAVIOR/PSYCH:  negative except as noted in HPI    OBJECTIVE      Physical  VITALS:  /65   Pulse (!) 49   Temp 97.5 °F (36.4 °C) (Oral)   Resp 14   Ht 5' 3\" (1.6 m)   Wt 188 lb 15 oz (85.7 kg)   SpO2 97%   BMI 33.47 kg/m²     Alert, oriented and cooperative elderly adult female. Very pleasant. No distress. No stridor. Voice is appropriate. Left submandibular area without erythema or any visible swelling. Left submandibular gland is still slightly enlarged and non-tender, but significantly improved. Unable to express any material from duct. No cervical lymphadenopathy.     Data  CBC with Differential:    Lab Results   Component Value Date/Time    WBC 15.3 01/10/2023 04:34 AM    RBC 3.27 01/10/2023 04:34 AM    RBC 4.70 05/12/2022 03:26 PM    HGB 9.8 01/10/2023 04:34 AM    HCT 30.9 01/10/2023 04:34 AM     01/10/2023 04:34 AM    MCV 94.5 01/10/2023 04:34 AM    MCH 30.0 01/10/2023 04:34 AM    MCHC 31.7 01/10/2023 04:34 AM    RDW 14.1 05/12/2022 03:26 PM    NRBC 0 01/10/2023 04:34 AM    SEGSPCT 86.4 01/10/2023 04:34 AM    METASPCT 1 04/13/2017 05:50 AM    LYMPHOPCT 16.8 05/12/2022 03:26 PM    MONOPCT 2.2 01/10/2023 04:34 AM    EOSPCT 1.2 05/12/2022 03:26 PM    BASOPCT 0.6 05/12/2022 03:26 PM    MONOSABS 0.3 01/10/2023 04:34 AM    LYMPHSABS 1.2 01/10/2023 04:34 AM    EOSABS 0.0 01/10/2023 04:34 AM    BASOSABS 0.0 01/10/2023 04:34 AM    DIFFTYPE see below 01/05/2023 04:30 AM       Inpatient Medications  Current Facility-Administered Medications: ampicillin-sulbactam (UNASYN) 3,000 mg in sodium chloride 0.9 % 100 mL IVPB (mini-bag), 3,000 mg, IntraVENous, Q6H  hydrocortisone sodium succinate PF (SOLU-CORTEF) injection 100 mg, 100 mg, IntraVENous, Q8H  midodrine (PROAMATINE) tablet 10 mg, 10 mg, Oral, q8h  magnesium sulfate 2000 mg in 50 mL IVPB premix, 2,000 mg, IntraVENous, PRN  potassium chloride 20 mEq/50 mL IVPB (Central Line), 20 mEq, IntraVENous, PRN **OR** potassium chloride 10 mEq/100 mL IVPB (Peripheral Line), 10 mEq, IntraVENous, PRN  calcium replacement protocol, , Other, RX Placeholder  rivaroxaban (XARELTO) tablet 15 mg, 15 mg, Oral, Daily  sodium chloride (OCEAN, BABY AYR) 0.65 % nasal spray 2 spray, 2 spray, Each Nostril, Q12H  sodium chloride flush 0.9 % injection 5-40 mL, 5-40 mL, IntraVENous, 2 times per day  sodium chloride flush 0.9 % injection 5-40 mL, 5-40 mL, IntraVENous, PRN  0.9 % sodium chloride infusion, , IntraVENous, PRN  ondansetron (ZOFRAN-ODT) disintegrating tablet 4 mg, 4 mg, Oral, Q8H PRN **OR** ondansetron (ZOFRAN) injection 4 mg, 4 mg, IntraVENous, Q6H PRN  polyethylene glycol (GLYCOLAX) packet 17 g, 17 g, Oral, Daily PRN  acetaminophen (TYLENOL) tablet 650 mg, 650 mg, Oral, Q6H PRN **OR** acetaminophen (TYLENOL) suppository 650 mg, 650 mg, Rectal, Q6H PRN  levothyroxine (SYNTHROID) tablet 125 mcg, 125 mcg, Oral, Daily  [Held by provider] pantoprazole (PROTONIX) tablet 40 mg, 40 mg, Oral, QAM AC  atorvastatin (LIPITOR) tablet 10 mg, 10 mg, Oral, Daily  pantoprazole (PROTONIX) injection 40 mg, 40 mg, IntraVENous, QAM AC    ASSESSMENT AND PLAN    Left submandibular sialadenitis  - Continue IV Unasyn for another 24 hours, then could transition to oral antibiotic. Preference for Augmentin 875mg BID for another 7 days. - Continue sialogogues (lemon juice packets, lemon juice on swabs, lemon wedges, sour candy), warm compresses and massage. Also frequent oral hydration and hygiene. This is imperative to decreasing swelling of the salivary gland. - She will need to continue with the above recommendations on discharge to prevent recurrence.     Electronically signed by NIDIA Hendrix CNP on 1/10/2023 at 12:04 PM

## 2023-01-11 PROCEDURE — 97535 SELF CARE MNGMENT TRAINING: CPT

## 2023-01-11 PROCEDURE — 6370000000 HC RX 637 (ALT 250 FOR IP): Performed by: STUDENT IN AN ORGANIZED HEALTH CARE EDUCATION/TRAINING PROGRAM

## 2023-01-11 PROCEDURE — 6370000000 HC RX 637 (ALT 250 FOR IP): Performed by: PHYSICIAN ASSISTANT

## 2023-01-11 PROCEDURE — 2580000003 HC RX 258: Performed by: STUDENT IN AN ORGANIZED HEALTH CARE EDUCATION/TRAINING PROGRAM

## 2023-01-11 PROCEDURE — 6370000000 HC RX 637 (ALT 250 FOR IP): Performed by: NURSE PRACTITIONER

## 2023-01-11 PROCEDURE — 6360000002 HC RX W HCPCS: Performed by: STUDENT IN AN ORGANIZED HEALTH CARE EDUCATION/TRAINING PROGRAM

## 2023-01-11 PROCEDURE — 2060000000 HC ICU INTERMEDIATE R&B

## 2023-01-11 PROCEDURE — 2580000003 HC RX 258

## 2023-01-11 PROCEDURE — 99233 SBSQ HOSP IP/OBS HIGH 50: CPT | Performed by: STUDENT IN AN ORGANIZED HEALTH CARE EDUCATION/TRAINING PROGRAM

## 2023-01-11 PROCEDURE — 6360000002 HC RX W HCPCS

## 2023-01-11 PROCEDURE — C9113 INJ PANTOPRAZOLE SODIUM, VIA: HCPCS | Performed by: STUDENT IN AN ORGANIZED HEALTH CARE EDUCATION/TRAINING PROGRAM

## 2023-01-11 RX ORDER — MIDODRINE HYDROCHLORIDE 5 MG/1
5 TABLET ORAL EVERY 8 HOURS
Status: DISCONTINUED | OUTPATIENT
Start: 2023-01-11 | End: 2023-01-12

## 2023-01-11 RX ADMIN — SODIUM CHLORIDE, PRESERVATIVE FREE 10 ML: 5 INJECTION INTRAVENOUS at 09:00

## 2023-01-11 RX ADMIN — RIVAROXABAN 15 MG: 15 TABLET, FILM COATED ORAL at 17:51

## 2023-01-11 RX ADMIN — SODIUM CHLORIDE 3000 MG: 900 INJECTION INTRAVENOUS at 06:00

## 2023-01-11 RX ADMIN — HYDROCORTISONE SODIUM SUCCINATE 50 MG: 100 INJECTION, POWDER, FOR SOLUTION INTRAMUSCULAR; INTRAVENOUS at 15:33

## 2023-01-11 RX ADMIN — SODIUM CHLORIDE, PRESERVATIVE FREE 10 ML: 5 INJECTION INTRAVENOUS at 20:51

## 2023-01-11 RX ADMIN — HYDROCORTISONE SODIUM SUCCINATE 100 MG: 100 INJECTION, POWDER, FOR SOLUTION INTRAMUSCULAR; INTRAVENOUS at 09:04

## 2023-01-11 RX ADMIN — MIDODRINE HYDROCHLORIDE 10 MG: 10 TABLET ORAL at 09:01

## 2023-01-11 RX ADMIN — SODIUM CHLORIDE 3000 MG: 900 INJECTION INTRAVENOUS at 00:21

## 2023-01-11 RX ADMIN — ATORVASTATIN CALCIUM 10 MG: 10 TABLET, FILM COATED ORAL at 09:03

## 2023-01-11 RX ADMIN — MIDODRINE HYDROCHLORIDE 5 MG: 10 TABLET ORAL at 16:59

## 2023-01-11 RX ADMIN — SODIUM CHLORIDE 3000 MG: 900 INJECTION INTRAVENOUS at 17:25

## 2023-01-11 RX ADMIN — SALINE NASAL SPRAY 2 SPRAY: 1.5 SOLUTION NASAL at 17:00

## 2023-01-11 RX ADMIN — HYDROCORTISONE SODIUM SUCCINATE 100 MG: 100 INJECTION, POWDER, FOR SOLUTION INTRAMUSCULAR; INTRAVENOUS at 00:16

## 2023-01-11 RX ADMIN — PANTOPRAZOLE SODIUM 40 MG: 40 INJECTION, POWDER, FOR SOLUTION INTRAVENOUS at 06:33

## 2023-01-11 RX ADMIN — MIDODRINE HYDROCHLORIDE 10 MG: 10 TABLET ORAL at 00:16

## 2023-01-11 RX ADMIN — LEVOTHYROXINE SODIUM 125 MCG: 0.12 TABLET ORAL at 09:03

## 2023-01-11 RX ADMIN — SODIUM CHLORIDE 3000 MG: 900 INJECTION INTRAVENOUS at 12:49

## 2023-01-11 ASSESSMENT — PAIN SCALES - GENERAL
PAINLEVEL_OUTOF10: 0

## 2023-01-11 NOTE — PROGRESS NOTES
Hospitalist Progress Note      Patient:  Shirley Pang    Unit/Bed:4K-28/028-A  YOB: 1950  MRN: [id number]   Acct: [id number]   PCP: Rosalinda Flanagan MD  Date of Admission: 1/4/2023    Assessment/Plan:    Periapical abscess with enlargement of the left submandibular gland  No plans for surgical intervention  Continue Unasyn  Encourage sialogogues  ENT following.   Adrenal insufficiency with adrenal crisis  Improving with control of infection as above  Wean both midodrine and solu-cortef starting today.   Mild hypernatremia  Chronic in nature.   Likely due to chronic steroid use.   HFpEF, not in acute exacerbation  Coreg and bumex held due to bradycardia and hypotension.  Will continue to hold for now pending weaning of midodrine and solucortef.    AF with RVR, resolved  Back in sinus rhythm.   Coreg held due to bradycardia/hypotension.   On xarelto  HTN  Holding anti-HTN meds while on midodrine  HLD  On statin  hypothyroidism  On synthroid  Severe protein calorie malnutrition  Dietary consult  Physical debility with deconditioning  PT/OT  CM following.  Planning SNF at discharge    Disposition: Pending DC to SNF once BP is stable off midodrine and lower dose solu-cortef.  Still requiring IV abx today, but can transition to PO at discharge.     Chief Complaint: left lower jaw pain.     Hospital Course:    Patient is a 71yo F w/ significant PMH Atrial thrombosis, CHF, CVA, PAF, HTN, HLD, asthma, GERD, DMII, adrenal insufficiency, hypothyroidism, hx meningioma x3 admitted to [facility] on 1/04/2023 for Left Lower Jaw Pain. Reported noted submandibular swelling and redness that is painful for few days before. Endorsed difficulty opening mouth  and decreased oral intake of both liquids and solids. Admitted 11/2022 for Acute Hypoxic Resp Failure 2/2 LLL PNA where she was discharged on 2L O2 NC after completion of abx treatment.     In ED, hypotensive but othrewise  vitals stable. Labs hypernatremic w/ minor JESSE. Leukocytosis WBC 25.0. UA negative for UTI, blood cultures drawn. EKG sinus eladia w/o acute ischemic changes from prior. CT facial bones noted enlargement of left submandibular gland measuring 2.5cm w/ assymetric left pharyngeal soft tissues and mass effect on subglottic airway. CXR notable for poor lung inflation, mod atelectasis/PNA in R parahilar region and bilat lung bases. S/p IVF bolus; patient did not improve w/ fluids. Admitted to ICU for pressor support. Treated w/ Unasyn. Weaned down pressor support w/ increasing alertness and improved speech. Patient started diet 1/06; tolerated well. Family requested PM&R consult for rehab evaluation as  who care for her at home is hospitalized after a surgery. Started Midodrine 10mg TID 1/8/2023.     1/9/2023: Patient off Levophed; BP borderline on Midodrine 10mg TID. Levophed was required to be started again. Sodium continues to trend up, likely due to SoluCortef. 1/10/2023: patient off Levophed; BP much better after increased steroid dose. Mariusz diet. Decreased submandibular swelling. Plan for transfer out of ICU today. Subjective (past 24 hours):   Doing ok today. Denies fevers or chills. Still has some mild submandibular swelling. Past medical history, family history, social history and allergies reviewed again and is unchanged since admission. ROS (12 point review of systems completed. Pertinent positives noted.  Otherwise ROS is negative)     Medications:  Reviewed    Infusion Medications    sodium chloride       Scheduled Medications    midodrine  5 mg Oral q8h    hydrocortisone sodium succinate PF  50 mg IntraVENous Q8H    ampicillin-sulbactam  3,000 mg IntraVENous Q6H    calcium replacement protocol   Other RX Placeholder    rivaroxaban  15 mg Oral Daily    sodium chloride  2 spray Each Nostril Q12H    sodium chloride flush  5-40 mL IntraVENous 2 times per day    levothyroxine  125 mcg Oral Daily    [Held by provider] pantoprazole  40 mg Oral QAM AC    atorvastatin  10 mg Oral Daily    pantoprazole  40 mg IntraVENous QAM AC     PRN Meds: magnesium sulfate, potassium chloride **OR** potassium chloride, sodium chloride flush, sodium chloride, ondansetron **OR** ondansetron, polyethylene glycol, acetaminophen **OR** acetaminophen      Intake/Output Summary (Last 24 hours) at 1/11/2023 1406  Last data filed at 1/11/2023 1219  Gross per 24 hour   Intake 990.42 ml   Output 100 ml   Net 890.42 ml       Diet:  ADULT ORAL NUTRITION SUPPLEMENT; Breakfast, Dinner; Frozen Oral Supplement  ADULT DIET; Dysphagia - Minced and Moist; Moderately Thick (Honey)    Exam:  BP (!) 109/53   Pulse 59   Temp 98.7 °F (37.1 °C) (Oral)   Resp 18   Ht 5' 3\" (1.6 m)   Wt 190 lb 3.2 oz (86.3 kg)   SpO2 (!) 64%   BMI 33.69 kg/m²   General appearance: No apparent distress, appears stated age and cooperative. HEENT: Pupils equal, round, and reactive to light. Conjunctivae/corneas clear. Neck: Mild swelling of the left submandibular region. Respiratory:  Normal respiratory effort. Clear to auscultation, bilaterally without Rales/Wheezes/Rhonchi. Cardiovascular: Regular rate and rhythm with normal S1/S2 without murmurs, rubs or gallops. Abdomen: Soft, non-tender, non-distended with normal bowel sounds. Musculoskeletal: Diffuse anasarca. Skin: Multiple skin excoriations and areas of ecchymosis. Neurologic:  Neurovascularly intact without any focal sensory/motor deficits.  Cranial nerves: II-XII intact, grossly non-focal.  Psychiatric: Alert and oriented, thought content appropriate, normal insight  Capillary Refill: Brisk,< 3 seconds   Peripheral Pulses: +2 palpable, equal bilaterally     Labs:   Recent Labs     01/09/23  0421 01/10/23  0434   WBC 17.5* 15.3*   HGB 9.8* 9.8*   HCT 30.3* 30.9*    240     Recent Labs     01/08/23  1700 01/09/23  0421 01/09/23  1137 01/10/23  0434   * 150* 146* 148*   K 4.0 3.9  --  3.9   * 116*  --  113*   CO2 24 23  --  26   BUN 9 10  --  14   CREATININE 0.7 0.7  --  0.8   CALCIUM 7.6* 7.6*  --  8.0*   PHOS  --   --   --  2.6     Recent Labs     01/09/23  0421 01/10/23  0434   AST 36 37   ALT 25 29   BILIDIR <0.2 <0.2   BILITOT 0.2* 0.4   ALKPHOS 70 68     No results for input(s): INR in the last 72 hours. No results for input(s): Verlena Igor in the last 72 hours. Microbiology:    Blood culture #1:   Lab Results   Component Value Date/Time    [facility]  01/04/2023 01:20 PM     No growth 24 hours. No growth 48 hours. No growth at 5 days       Blood culture #2:No results found for: BLOODCULT2    Organism:  Lab Results   Component Value Date/Time    ORG Staphylococcus aureus 12/17/2018 04:04 PM         Lab Results   Component Value Date/Time    LABGRAM  02/16/2022 04:00 PM     No segmented neutrophils observed. Rare epithelial cells observed. No organisms observed. MRSA culture only:No results found for: [facility]    Urine culture:   Lab Results   Component Value Date/Time    LABURIN No growth-preliminary  No growth   02/09/2017 12:35 PM       Respiratory culture: No results found for: CULTRESP    Aerobic and Anaerobic :  Lab Results   Component Value Date/Time    LABAERO No Acinetobacter species isolated. 01/04/2023 10:00 PM     Lab Results   Component Value Date/Time    LABANAE  12/01/2021 12:00 PM     No anaerobes isolated- preliminary Culture yielded light growth of gram positive bacilli most consistent with Cutibacterium species. Clinical correlation required. Urinalysis:      Lab Results   Component Value Date/Time    NITRU NEGATIVE 01/04/2023 02:15 PM    WBCUA 5-9 01/04/2023 02:15 PM    WBCUA 2-4 02/22/2012 01:00 PM    BACTERIA NONE SEEN 01/04/2023 02:15 PM    RBCUA 3-5 01/04/2023 02:15 PM    BLOODU SMALL 01/04/2023 02:15 PM    SPECGRAV 1.025 09/18/2020 12:00 PM    GLUCOSEU NEGATIVE 01/04/2023 02:15 PM       Radiology:  XR CHEST PORTABLE   Final Result   1. Poor inflation the lungs. Mild cardiomegaly. Bilateral shoulder prostheses. 2. Right subclavian line has been inserted with tip in right atrium. 3. Moderate atelectasis/pneumonia right parahilar region and both lung bases. No effusion seen. **This report has been created using voice recognition software. It may contain minor errors which are inherent in voice recognition technology. **      Final report electronically signed by Dr. Shadi Silva on 1/4/2023 4:42 PM      CT FACIAL BONES WO CONTRAST   Final Result   1. Enlargement of the left submandibular gland measuring up to 2.5 cm.   2. Asymmetric fullness of the left pharyngeal soft tissues with mass effect on the subglottic airway   3. Left mastoid effusions and otitis interna            **This report has been created using voice recognition software. It may contain minor errors which are inherent in voice recognition technology. **      Final report electronically signed by Dr. Betty Cordon on 1/4/2023 3:08 PM      XR CHEST PORTABLE   Final Result   Residual patchy airspace infiltrates left lower lobe. **This report has been created using voice recognition software. It may contain minor errors which are inherent in voice recognition technology. **      Final report electronically signed by Dr. Betty Cordon on 1/4/2023 12:41 PM        CT FACIAL BONES WO CONTRAST    Result Date: 1/4/2023  PROCEDURE: CT FACIAL BONES WO CONTRAST CLINICAL INFORMATION: Submandibular cellulitis swelling, possible erysipelas, ruling out submandibular phlegmon . TECHNIQUE: 3 mm multiplanar images of the facial bones in bone and soft tissue windows noncontrast All CT scans at this facility use dose modulation, iterative reconstruction, and/or weight-based dosing when appropriate to reduce radiation dose to as low as reasonably achievable. COMPARISON: No prior study. 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 Soft tissue windows demonstrate either enlargement of the left submandibular gland or obscuration by inflammation. Appearance is more suggestive of inflammatory enlargement of the gland. Evaluation is limited without contrast. This measures 2.5 cm. Solid  attenuation coefficients Asymmetric fullness of the left pharyngeal soft tissues underlying inflammation or mass is a concern. There are left-sided mastoid effusions which is new when compared to CT brain dated 8/20/2019 there is also fluid in the left internal auditory canal.     1. Enlargement of the left submandibular gland measuring up to 2.5 cm. 2. Asymmetric fullness of the left pharyngeal soft tissues with mass effect on the subglottic airway 3. Left mastoid effusions and otitis interna **This report has been created using voice recognition software. It may contain minor errors which are inherent in voice recognition technology. ** Final report electronically signed by Dr. Marcello Cortes on 1/4/2023 3:08 PM    XR CHEST PORTABLE    Result Date: 1/4/2023  PROCEDURE: XR CHEST PORTABLE CLINICAL INFORMATION: central line placement COMPARISON: 1/4/2023 TECHNIQUE: A single mobile view of the chest was obtained. 1. Poor inflation the lungs. Mild cardiomegaly. Bilateral shoulder prostheses. 2. Right subclavian line has been inserted with tip in right atrium. 3. Moderate atelectasis/pneumonia right parahilar region and both lung bases. No effusion seen. **This report has been created using voice recognition software. It may contain minor errors which are inherent in voice recognition technology. ** Final report electronically signed by Dr. Deborah Guevara on 1/4/2023 4:42 PM    XR CHEST PORTABLE    Result Date: 1/4/2023  PROCEDURE: XR CHEST PORTABLE CLINICAL INFORMATION: weakness . TECHNIQUE: Portable upright COMPARISON: 12/3/2022 FINDINGS: Patient is rotated. Mandible appears detail the upper apices. Heart size is within normal limits. Mediastinum is not widened. Residual patchy infiltrates left base slightly improved compared to the prior exam. No effusions are seen. Vessels are not congested. Chronic increased lung markings suggesting mild parenchymal scarring bilateral shoulder replacements. Residual patchy airspace infiltrates left lower lobe. **This report has been created using voice recognition software. It may contain minor errors which are inherent in voice recognition technology. ** Final report electronically signed by Dr. Bhavna Reinoso on 1/4/2023 12:41 PM      Electronically signed by Meldon Nissen, DO on 1/11/2023 at 2:06 PM

## 2023-01-11 NOTE — CARE COORDINATION
1/11/23, 4:26 PM EST    DISCHARGE ON GOING EVALUATION    Bryan Gtz day: 7  Location: -28/028-A Reason for admit: Cellulitis of submandibular region [K12.2]  Shock ([address] [R57.9]  Troponin level elevated [R77.8]  JESSE (acute kidney injury) ([facility] [address] [N17.9]  Submandibular gland infection [K11.20]  Septic shock ([facility] [address] [A41.9, R65.21]  Pneumonia of right lung due to infectious organism, unspecified part of lung [J18.9]   Procedure:   1/4 CT facial bones: 1. Enlargement of the left submandibular gland measuring up to 2.5 cm. 2. Asymmetric fullness of the left pharyngeal soft tissues with mass effect on the subglottic airway 3. Left mastoid effusions and otitis interna   1/4 CVC Right subclavian - 1/10 removed  1/9 Echo with EF 60%    Barriers to Discharge: Afebrile. SB 50's. On room air. Oriented x4. Follows commands. SLP/PT/OT. Intensivist and ENT following. +MRSA nares. Telemetry, external urinary catheter. IV unasyn, lipitor, IV solucortef 50 mg Q8H, midodrine, synthroid, IV protonix, xarelto, Electrolyte replacement protocols. PCP: Gissel Elise MD  Readmission Risk Score: 24.1%  Patient Goals/Plan/Treatment Preferences: From home w/ and [place] [facility] [place] [facility]. Has walker and wheelchair.  had back surgery. IPR declined patient and recommends SNF. SW working on placement for SNF; will require precert. Plan for PO ATB at discharge.

## 2023-01-11 NOTE — PROGRESS NOTES
[facility] ICU STEPDOWN TELEMETRY 4K  Occupational Therapy  Daily Note  Time:   Time In: 1059  Time Out: 1152  Timed Code Treatment Minutes: 48 Minutes  Minutes: 53          Date: 2023  Patient Name: Alexandra Hernández,   Gender: female      Room: [facility]28/028-A  MRN: [id number]  : 1950  (71 y.o.)  Referring Practitioner: Julieth Infante DO  Diagnosis: septic shock  Additional Pertinent Hx: Per H&P: Patient is a 36-year-old female with a past medical history of adrenal insufficiency, hypothyroidism, paroxysmal atrial fibrillation, history of pituitary tumor, tension, stroke, and chronic diastolic heart failure admitted for left lower back pain. Patient states that for the last few days she has been having submandibular swelling and redness in the last few days. Patient has difficulty opening her mouth and has not been eating or drinking. She was recently admitted for the last month. Pt found to have septic shock due to Periapical Abscess with enlargement of the left submandibular gland    Restrictions/Precautions:  Restrictions/Precautions: General Precautions, Fall Risk  Position Activity Restriction  Other position/activity restrictions: h/o CVA with L sided weakness     SUBJECTIVE: nurse approved Tx. Pt agreeable to Tx. Nurse assisted with bed mobility and transfers this day. Blanching and skin tears noted, nurse present and performed skin check. PAIN: not reported    Vitals: Vitals not assessed per clinical judgement, see nursing flowsheet    COGNITION: Decreased Insight    ADL:   Bathing: Maximum Assistance. For UB and LB EOB sponge bath. Pt tolerated over x20 min EOB sitting supported with use of bed rail. Upper Extremity Dressing: Maximum Assistance. To don and doff gown and sock wear   Toileting: Maximum Assistance. X2 sit and stand at EOB with use of ayo stedy and foot placement,  and bed mobility MAX x1  for BM and urine hygiene. [place]     BALANCE:  Sitting Balance:  Stand By Assistance. EOB supported with RUE and use of bed rail. Max A x1 for repostioing on EOB with pt started to slip forward after x20min of sitting. Standing Balance: Maximum Assistance, X 2, with cues for safety, with verbal cues , with increased time for completion. And use of ayo stedy. Attempted multiple  trials with use of 2ww and L foot block from elevated bed and hand placement cuing. Pt demo'd extreme BLE fatigue and poor standing tolerance. Use of ayo stedy to complete stand with max A x2 and foot placement to aid in BM hygiene. BED MOBILITY:  Rolling to Left: Maximum Assistance, X 1, with head of bed flat, with rail, with verbal cues , with increased time for completion    Rolling to Right: Maximum Assistance, X 1, with head of bed flat, with rail, with verbal cues , with increased time for completion    Supine to Sit: Maximum Assistance, X 1, with head of bed raised, with rail, with verbal cues , with increased time for completion    Sit to Supine: Maximum Assistance, X 1, with head of bed flat, without rail, with verbal cues , with increased time for completion    Scooting: Maximum Assistance, X 1, with head of bed flat, with increased time for completion to be pulled back into bed and repositioned     TRANSFERS:  Sit to Stand:  Maximum Assistance, X 2, with [place], with increased time for completion, cues for hand placement, with verbal cues. And with 2ww from elevated bed max x2  Stand to Sit: Maximum Assistance, X 2, with Toth Pluck, with increased time for completion, cues for hand placement, with verbal cues.  And with 2ww from lowered bed max x2  \   ASSESSMENT:     Activity Tolerance:  Patient tolerance of  treatment: . Good       Discharge Recommendations: ECF with OT  Equipment Recommendations: Equipment Needed: No  Other: defer to next level of care  Plan: Times Per Week: 3-5x  Current Treatment Recommendations: Strengthening, ROM, Balance training, Endurance training, Patient/Caregiver education & training, Equipment evaluation, education, & procurement, Positioning, Self-Care / ADL    Patient Education  Patient Education: Role of OT, Plan of Care, and ADL's    Goals  Short Term Goals  Time Frame for Short Term Goals: by discharge  Short Term Goal 1: Pt will tolerate sitting at EOB X20 minutes with supervision in prep for ADL completion. Short Term Goal 2: Pt will complete BADL tasks, UB with SBA and LB with max assistance, to increase independence with self care tasks. Short Term Goal 3: Pt will tolerate further assessment of stand pivot transfers by OTR when appropriate. Short Term Goal 4: patient will tolerate 2 min static standing with sit to stand and maintain upright posture with MIN A to increase ease with toileting. Following session, patient left in safe position with all fall risk precautions in place.

## 2023-01-11 NOTE — CARE COORDINATION
1/11/23, 1:06 PM EST  DISCHARGE PLANNING EVALUATION    SW spoke with spouse who reported that he would like referrals ro [facility] as first choice and [facility] as second. SW called Luz Marina Giang at [facility] and left a voicemail asking for a call back. 1/11/23, 3:29 PM EST  DISCHARGE PLANNING EVALUATION    SW called Luz Marina Giang at [facility] and left a voicemail asking for a call back regarding a referral.     LENNIE spoke with Luz Marina Giang at [facility] and he reported that he is in network with [facility] [employer] and not Medicare. SW called Kyra and left voicemail asking for a return call. 1/11/23, 4:40 PM EST  DISCHARGE PLANNING EVALUATION    SW received a call from Betzy at [facility] and made referral.     Leeanna Brown went to spouse and updated him on [facility] not being in network with patients insurance. SW updated him on referral to [facility].

## 2023-01-11 NOTE — PROGRESS NOTES
Spoke to bedside RN. Patient not appropriate for virtual admission due to confusion per bedside RN. Spouse on file, but he is also currently admitted at hospital and unable to complete patient's admission. Patient likely discharging ECF.

## 2023-01-12 LAB
ALBUMIN SERPL-MCNC: 2.6 G/DL (ref 3.5–5.1)
ALP BLD-CCNC: 66 U/L (ref 38–126)
ALT SERPL-CCNC: 55 U/L (ref 11–66)
ANION GAP SERPL CALCULATED.3IONS-SCNC: 10 MEQ/L (ref 8–16)
AST SERPL-CCNC: 68 U/L (ref 5–40)
BASOPHILS # BLD: 0.3 %
BASOPHILS ABSOLUTE: 0 THOU/MM3 (ref 0–0.1)
BILIRUB SERPL-MCNC: 0.4 MG/DL (ref 0.3–1.2)
BILIRUBIN DIRECT: < 0.2 MG/DL (ref 0–0.3)
BUN BLDV-MCNC: 15 MG/DL (ref 7–22)
CALCIUM IONIZED: 1.13 MMOL/L (ref 1.12–1.32)
CALCIUM SERPL-MCNC: 8.1 MG/DL (ref 8.5–10.5)
CHLORIDE BLD-SCNC: 114 MEQ/L (ref 98–111)
CO2: 26 MEQ/L (ref 23–33)
CREAT SERPL-MCNC: 0.7 MG/DL (ref 0.4–1.2)
EOSINOPHIL # BLD: 0.1 %
EOSINOPHILS ABSOLUTE: 0 THOU/MM3 (ref 0–0.4)
ERYTHROCYTE [DISTWIDTH] IN BLOOD BY AUTOMATED COUNT: 17.6 % (ref 11.5–14.5)
ERYTHROCYTE [DISTWIDTH] IN BLOOD BY AUTOMATED COUNT: 61.3 FL (ref 35–45)
GFR SERPL CREATININE-BSD FRML MDRD: > 60 ML/MIN/1.73M2
GLUCOSE BLD-MCNC: 101 MG/DL (ref 70–108)
HCT VFR BLD CALC: 33.8 % (ref 37–47)
HEMOGLOBIN: 10.3 GM/DL (ref 12–16)
IMMATURE GRANS (ABS): 0.62 THOU/MM3 (ref 0–0.07)
IMMATURE GRANULOCYTES: 4.5 %
LYMPHOCYTES # BLD: 11.9 %
LYMPHOCYTES ABSOLUTE: 1.7 THOU/MM3 (ref 1–4.8)
MAGNESIUM: 2.3 MG/DL (ref 1.6–2.4)
MCH RBC QN AUTO: 29.9 PG (ref 26–33)
MCHC RBC AUTO-ENTMCNC: 30.5 GM/DL (ref 32.2–35.5)
MCV RBC AUTO: 98 FL (ref 81–99)
MONOCYTES # BLD: 4.3 %
MONOCYTES ABSOLUTE: 0.6 THOU/MM3 (ref 0.4–1.3)
NUCLEATED RED BLOOD CELLS: 0 /100 WBC
PHOSPHORUS: 2.6 MG/DL (ref 2.4–4.7)
PLATELET # BLD: 272 THOU/MM3 (ref 130–400)
PMV BLD AUTO: 10.1 FL (ref 9.4–12.4)
POTASSIUM SERPL-SCNC: 3.5 MEQ/L (ref 3.5–5.2)
RBC # BLD: 3.45 MILL/MM3 (ref 4.2–5.4)
SEG NEUTROPHILS: 78.9 %
SEGMENTED NEUTROPHILS ABSOLUTE COUNT: 11 THOU/MM3 (ref 1.8–7.7)
SODIUM BLD-SCNC: 150 MEQ/L (ref 135–145)
TOTAL PROTEIN: 4.6 G/DL (ref 6.1–8)
WBC # BLD: 13.9 THOU/MM3 (ref 4.8–10.8)

## 2023-01-12 PROCEDURE — 80053 COMPREHEN METABOLIC PANEL: CPT

## 2023-01-12 PROCEDURE — 82248 BILIRUBIN DIRECT: CPT

## 2023-01-12 PROCEDURE — 6360000002 HC RX W HCPCS: Performed by: STUDENT IN AN ORGANIZED HEALTH CARE EDUCATION/TRAINING PROGRAM

## 2023-01-12 PROCEDURE — 2580000003 HC RX 258: Performed by: STUDENT IN AN ORGANIZED HEALTH CARE EDUCATION/TRAINING PROGRAM

## 2023-01-12 PROCEDURE — 6360000002 HC RX W HCPCS

## 2023-01-12 PROCEDURE — 6370000000 HC RX 637 (ALT 250 FOR IP): Performed by: STUDENT IN AN ORGANIZED HEALTH CARE EDUCATION/TRAINING PROGRAM

## 2023-01-12 PROCEDURE — 83735 ASSAY OF MAGNESIUM: CPT

## 2023-01-12 PROCEDURE — C9113 INJ PANTOPRAZOLE SODIUM, VIA: HCPCS | Performed by: STUDENT IN AN ORGANIZED HEALTH CARE EDUCATION/TRAINING PROGRAM

## 2023-01-12 PROCEDURE — 2580000003 HC RX 258

## 2023-01-12 PROCEDURE — 36415 COLL VENOUS BLD VENIPUNCTURE: CPT

## 2023-01-12 PROCEDURE — 84100 ASSAY OF PHOSPHORUS: CPT

## 2023-01-12 PROCEDURE — 85025 COMPLETE CBC W/AUTO DIFF WBC: CPT

## 2023-01-12 PROCEDURE — 1200000000 HC SEMI PRIVATE

## 2023-01-12 PROCEDURE — 82330 ASSAY OF CALCIUM: CPT

## 2023-01-12 PROCEDURE — 99231 SBSQ HOSP IP/OBS SF/LOW 25: CPT | Performed by: PHYSICIAN ASSISTANT

## 2023-01-12 PROCEDURE — 99233 SBSQ HOSP IP/OBS HIGH 50: CPT | Performed by: STUDENT IN AN ORGANIZED HEALTH CARE EDUCATION/TRAINING PROGRAM

## 2023-01-12 RX ORDER — DEXTROSE MONOHYDRATE 50 MG/ML
INJECTION, SOLUTION INTRAVENOUS CONTINUOUS
Status: ACTIVE | OUTPATIENT
Start: 2023-01-12 | End: 2023-01-13

## 2023-01-12 RX ADMIN — LEVOTHYROXINE SODIUM 125 MCG: 0.12 TABLET ORAL at 08:55

## 2023-01-12 RX ADMIN — SODIUM CHLORIDE, PRESERVATIVE FREE 10 ML: 5 INJECTION INTRAVENOUS at 20:35

## 2023-01-12 RX ADMIN — ATORVASTATIN CALCIUM 10 MG: 10 TABLET, FILM COATED ORAL at 08:55

## 2023-01-12 RX ADMIN — PANTOPRAZOLE SODIUM 40 MG: 40 INJECTION, POWDER, FOR SOLUTION INTRAVENOUS at 06:34

## 2023-01-12 RX ADMIN — SODIUM CHLORIDE, PRESERVATIVE FREE 10 ML: 5 INJECTION INTRAVENOUS at 08:54

## 2023-01-12 RX ADMIN — RIVAROXABAN 15 MG: 15 TABLET, FILM COATED ORAL at 17:16

## 2023-01-12 RX ADMIN — SODIUM CHLORIDE 3000 MG: 900 INJECTION INTRAVENOUS at 17:19

## 2023-01-12 RX ADMIN — SALINE NASAL SPRAY 2 SPRAY: 1.5 SOLUTION NASAL at 06:58

## 2023-01-12 RX ADMIN — SALINE NASAL SPRAY 2 SPRAY: 1.5 SOLUTION NASAL at 17:16

## 2023-01-12 RX ADMIN — SODIUM CHLORIDE 3000 MG: 900 INJECTION INTRAVENOUS at 06:33

## 2023-01-12 RX ADMIN — DEXTROSE MONOHYDRATE: 50 INJECTION, SOLUTION INTRAVENOUS at 20:34

## 2023-01-12 RX ADMIN — SODIUM CHLORIDE 3000 MG: 900 INJECTION INTRAVENOUS at 00:05

## 2023-01-12 RX ADMIN — MIDODRINE HYDROCHLORIDE 5 MG: 10 TABLET ORAL at 00:01

## 2023-01-12 RX ADMIN — SODIUM CHLORIDE 3000 MG: 900 INJECTION INTRAVENOUS at 14:20

## 2023-01-12 RX ADMIN — HYDROCORTISONE SODIUM SUCCINATE 50 MG: 100 INJECTION, POWDER, FOR SOLUTION INTRAMUSCULAR; INTRAVENOUS at 08:55

## 2023-01-12 RX ADMIN — DEXTROSE MONOHYDRATE: 50 INJECTION, SOLUTION INTRAVENOUS at 10:11

## 2023-01-12 RX ADMIN — HYDROCORTISONE SODIUM SUCCINATE 50 MG: 100 INJECTION, POWDER, FOR SOLUTION INTRAMUSCULAR; INTRAVENOUS at 00:00

## 2023-01-12 ASSESSMENT — PAIN SCALES - GENERAL: PAINLEVEL_OUTOF10: 0

## 2023-01-12 NOTE — PROGRESS NOTES
Hospitalist Progress Note      Patient:  Emmanuel Melchor    Unit/Bed:4K-28/028-A  YOB: 1950  MRN: [id number]   Acct: [de-identified]   PCP: Carla Melo MD  Date of Admission: 1/4/2023    Assessment/Plan:    Periapical abscess with enlargement of the left submandibular gland  No plans for surgical intervention  Continue Unasyn with plans to transition to PO augmentin tomorrow. Encourage sialogogues  ENT following. Adrenal insufficiency with adrenal crisis  Improving with control of infection as above  DC midodrine. Currently weaning solucortef. Mild hypernatremia  Chronic in nature. Likely due to chronic steroid use. Will start D5w today for free water replacement. HFpEF, not in acute exacerbation  Coreg and bumex held due to bradycardia and hypotension. Will continue to hold for now pending weaning of midodrine and solucortef. AF with RVR, resolved  Back in sinus rhythm. Coreg held due to bradycardia/hypotension. Will resume coreg tomorrow if BP stable. On xarelto  HTN  Holding anti-HTN meds. DC midodrine today and assess response. Weaning steroids. HLD  On statin  hypothyroidism  On synthroid  Severe protein calorie malnutrition  Dietary consult  Physical debility with deconditioning  PT/OT  CM following. Planning SNF at discharge    Disposition: Pending DC to SNF once BP is stable off midodrine and lower dose solu-cortef. Still requiring IV abx today, but can transition to PO at discharge. Chief Complaint: left lower jaw pain. Hospital Course:    Patient is a 78yo F w/ significant PMH Atrial thrombosis, CHF, CVA, PAF, HTN, HLD, asthma, GERD, DMII, adrenal insufficiency, hypothyroidism, hx meningioma x3 admitted to [facility] on 1/04/2023 for Left Lower Jaw Pain. Reported noted submandibular swelling and redness that is painful for few days before.  Endorsed difficulty opening mouth  and decreased oral intake of both liquids and solids. Admitted 11/2022 for Acute Hypoxic Resp Failure 2/2 LLL PNA where she was discharged on 2L O2 NC after completion of abx treatment. In ED, hypotensive but othrewise vitals stable. Labs hypernatremic w/ minor JESSE. Leukocytosis WBC 25.0. UA negative for UTI, blood cultures drawn. EKG sinus eladia w/o acute ischemic changes from prior. CT facial bones noted enlargement of left submandibular gland measuring 2.5cm w/ assymetric left pharyngeal soft tissues and mass effect on subglottic airway. CXR notable for poor lung inflation, mod atelectasis/PNA in R parahilar region and bilat lung bases. S/p IVF bolus; patient did not improve w/ fluids. Admitted to ICU for pressor support. Treated w/ Unasyn. Weaned down pressor support w/ increasing alertness and improved speech. Patient started diet 1/06; tolerated well. Family requested PM&R consult for rehab evaluation as  who care for her at home is hospitalized after a surgery. Started Midodrine 10mg TID 1/8/2023.     1/9/2023: Patient off Levophed; BP borderline on Midodrine 10mg TID. Levophed was required to be started again. Sodium continues to trend up, likely due to SoluCortef. 1/10/2023: patient off Levophed; BP much better after increased steroid dose. Mariusz diet. Decreased submandibular swelling. Plan for transfer out of ICU today. 1/11: started weaing midodrine and solu-cortef. Patient tolerating well. Remains on unasyn. Subjective (past 24 hours):   Doing well. Sitting up in bed eating breakfast.  Denies nausea/vomiting. Minimal submandibular swelling. Denies fevers/chills. Past medical history, family history, social history and allergies reviewed again and is unchanged since admission. ROS (12 point review of systems completed. Pertinent positives noted.  Otherwise ROS is negative)     Medications:  Reviewed    Infusion Medications    dextrose      sodium chloride       Scheduled Medications    hydrocortisone sodium succinate PF  50 mg IntraVENous Q8H    ampicillin-sulbactam  3,000 mg IntraVENous Q6H    calcium replacement protocol   Other RX Placeholder    rivaroxaban  15 mg Oral Daily    sodium chloride  2 spray Each Nostril Q12H    sodium chloride flush  5-40 mL IntraVENous 2 times per day    levothyroxine  125 mcg Oral Daily    [Held by provider] pantoprazole  40 mg Oral QAM AC    atorvastatin  10 mg Oral Daily    pantoprazole  40 mg IntraVENous QAM AC     PRN Meds: magnesium sulfate, potassium chloride **OR** potassium chloride, sodium chloride flush, sodium chloride, ondansetron **OR** ondansetron, polyethylene glycol, acetaminophen **OR** acetaminophen      Intake/Output Summary (Last 24 hours) at 1/12/2023 0920  Last data filed at 1/12/2023 0640  Gross per 24 hour   Intake 736.49 ml   Output 425 ml   Net 311.49 ml         Diet:  ADULT ORAL NUTRITION SUPPLEMENT; Breakfast, Dinner; Frozen Oral Supplement  ADULT DIET; Dysphagia - Minced and Moist; Moderately Thick (Honey)    Exam:  BP (!) 147/66   Pulse 50   Temp 98.1 °F (36.7 °C) (Axillary)   Resp 18   Ht 5' 3\" (1.6 m)   Wt 194 lb 3.2 oz (88.1 kg)   SpO2 95%   BMI 34.40 kg/m²   General appearance: No apparent distress, appears stated age and cooperative. HEENT: Pupils equal, round, and reactive to light. Conjunctivae/corneas clear. Neck: Mild swelling of the left submandibular region. Respiratory:  Normal respiratory effort. Clear to auscultation, bilaterally without Rales/Wheezes/Rhonchi. Cardiovascular: Regular rate and rhythm with normal S1/S2 without murmurs, rubs or gallops. Abdomen: Soft, non-tender, non-distended with normal bowel sounds. Musculoskeletal: Diffuse anasarca. Skin: Multiple skin excoriations and areas of ecchymosis. Neurologic:  Neurovascularly intact without any focal sensory/motor deficits.  Cranial nerves: II-XII intact, grossly non-focal.  Psychiatric: Alert and oriented, thought content appropriate, normal insight  Capillary Refill: Brisk,< 3 seconds   Peripheral Pulses: +2 palpable, equal bilaterally     Labs:   Recent Labs     01/10/23  0434 01/12/23  0656   WBC 15.3* 13.9*   HGB 9.8* 10.3*   HCT 30.9* 33.8*    272       Recent Labs     01/09/23  1137 01/10/23  0434 01/12/23  0656   * 148* 150*   K  --  3.9 3.5   CL  --  113* 114*   CO2  --  26 26   BUN  --  14 15   CREATININE  --  0.8 0.7   CALCIUM  --  8.0* 8.1*   PHOS  --  2.6 2.6       Recent Labs     01/10/23  0434 01/12/23  0656   AST 37 68*   ALT 29 55   BILIDIR <0.2 <0.2   BILITOT 0.4 0.4   ALKPHOS 68 66       No results for input(s): INR in the last 72 hours. No results for input(s): [place] Dec in the last 72 hours. Microbiology:    Blood culture #1:   Lab Results   Component Value Date/Time    [facility]  01/04/2023 01:20 PM     No growth 24 hours. No growth 48 hours. No growth at 5 days       Blood culture #2:No results found for: BLOODCULT2    Organism:  Lab Results   Component Value Date/Time    ORG Staphylococcus aureus 12/17/2018 04:04 PM         Lab Results   Component Value Date/Time    LABGRAM  02/16/2022 04:00 PM     No segmented neutrophils observed. Rare epithelial cells observed. No organisms observed. MRSA culture only:No results found for: [facility]    Urine culture:   Lab Results   Component Value Date/Time    LABURIN No growth-preliminary  No growth   02/09/2017 12:35 PM       Respiratory culture: No results found for: CULTRESP    Aerobic and Anaerobic :  Lab Results   Component Value Date/Time    LABAERO No Acinetobacter species isolated. 01/04/2023 10:00 PM     Lab Results   Component Value Date/Time    LABANAE  12/01/2021 12:00 PM     No anaerobes isolated- preliminary Culture yielded light growth of gram positive bacilli most consistent with Cutibacterium species. Clinical correlation required.        Urinalysis:      Lab Results   Component Value Date/Time    NITRU NEGATIVE 01/04/2023 02:15 PM    WBCUA 5-9 01/04/2023 02:15 PM    WBCUA 2-4 02/22/2012 01:00 PM    BACTERIA NONE SEEN 01/04/2023 02:15 PM    RBCUA 3-5 01/04/2023 02:15 PM    BLOODU SMALL 01/04/2023 02:15 PM    SPECGRAV 1.025 09/18/2020 12:00 PM    GLUCOSEU NEGATIVE 01/04/2023 02:15 PM       Radiology:  XR CHEST PORTABLE   Final Result   1. Poor inflation the lungs. Mild cardiomegaly. Bilateral shoulder prostheses. 2. Right subclavian line has been inserted with tip in right atrium. 3. Moderate atelectasis/pneumonia right parahilar region and both lung bases. No effusion seen. **This report has been created using voice recognition software. It may contain minor errors which are inherent in voice recognition technology. **      Final report electronically signed by Dr. James Espinal on 1/4/2023 4:42 PM      CT FACIAL BONES WO CONTRAST   Final Result   1. Enlargement of the left submandibular gland measuring up to 2.5 cm.   2. Asymmetric fullness of the left pharyngeal soft tissues with mass effect on the subglottic airway   3. Left mastoid effusions and otitis interna            **This report has been created using voice recognition software. It may contain minor errors which are inherent in voice recognition technology. **      Final report electronically signed by Dr. Anay Holt on 1/4/2023 3:08 PM      XR CHEST PORTABLE   Final Result   Residual patchy airspace infiltrates left lower lobe. **This report has been created using voice recognition software. It may contain minor errors which are inherent in voice recognition technology. **      Final report electronically signed by Dr. Anay Holt on 1/4/2023 12:41 PM        CT FACIAL BONES WO CONTRAST    Result Date: 1/4/2023  PROCEDURE: CT FACIAL BONES WO CONTRAST CLINICAL INFORMATION: Submandibular cellulitis swelling, possible erysipelas, ruling out submandibular phlegmon .  TECHNIQUE: 3 mm multiplanar images of the facial bones in bone and soft tissue windows noncontrast All CT scans at this facility use dose modulation, iterative reconstruction, and/or weight-based dosing when appropriate to reduce radiation dose to as low as reasonably achievable. COMPARISON: No prior study. 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 Soft tissue windows demonstrate either enlargement of the left submandibular gland or obscuration by inflammation. Appearance is more suggestive of inflammatory enlargement of the gland. Evaluation is limited without contrast. This measures 2.5 cm. Solid  attenuation coefficients Asymmetric fullness of the left pharyngeal soft tissues underlying inflammation or mass is a concern. There are left-sided mastoid effusions which is new when compared to CT brain dated 8/20/2019 there is also fluid in the left internal auditory canal.     1. Enlargement of the left submandibular gland measuring up to 2.5 cm. 2. Asymmetric fullness of the left pharyngeal soft tissues with mass effect on the subglottic airway 3. Left mastoid effusions and otitis interna **This report has been created using voice recognition software. It may contain minor errors which are inherent in voice recognition technology. ** Final report electronically signed by Dr. Sydney Rogel on 1/4/2023 3:08 PM    XR CHEST PORTABLE    Result Date: 1/4/2023  PROCEDURE: XR CHEST PORTABLE CLINICAL INFORMATION: central line placement COMPARISON: 1/4/2023 TECHNIQUE: A single mobile view of the chest was obtained. 1. Poor inflation the lungs. Mild cardiomegaly. Bilateral shoulder prostheses. 2. Right subclavian line has been inserted with tip in right atrium. 3. Moderate atelectasis/pneumonia right parahilar region and both lung bases. No effusion seen. **This report has been created using voice recognition software. It may contain minor errors which are inherent in voice recognition technology. ** Final report electronically signed by Dr. Shannan Holguin on 1/4/2023 4:42 PM    XR CHEST PORTABLE    Result Date: 1/4/2023  PROCEDURE: XR CHEST PORTABLE CLINICAL INFORMATION: weakness . TECHNIQUE: Portable upright COMPARISON: 12/3/2022 FINDINGS: Patient is rotated. Mandible appears detail the upper apices. Heart size is within normal limits. Mediastinum is not widened. Residual patchy infiltrates left base slightly improved compared to the prior exam. No effusions are seen. Vessels are not congested. Chronic increased lung markings suggesting mild parenchymal scarring bilateral shoulder replacements. Residual patchy airspace infiltrates left lower lobe. **This report has been created using voice recognition software. It may contain minor errors which are inherent in voice recognition technology. ** Final report electronically signed by Dr. Pedro Orr on 1/4/2023 12:41 PM      Electronically signed by Sunshine Oliva DO on 1/12/2023 at 9:47 AM

## 2023-01-12 NOTE — PROGRESS NOTES
Department of Otolaryngology  Progress Note    Chief Complaint:  Neck swelling    SUBJECTIVE:  No acute events reported overnight. Patient has tolerated PO intake without issues. She denies any pain in her neck/submandibular region. Patient's BP's have improved, but was still requiring solucortef. Primary is weaning the solucortef and hoping to DC once BP is stable off midodrine and low dose solucortef. Leukocytosis continues to improve, down to 13.9 today from 15.3 yesterday. Patient remains afebrile    REVIEW OF SYSTEMS:    A complete multi-organ review of systems was performed and reviewed by me. ENT:  negative except as noted in HPI  CONSTITUTIONAL:  negative except as noted in HPI  EYES:  negative except as noted in HPI  RESPIRATORY:  negative except as noted in HPI  CARDIOVASCULAR:  negative except as noted in HPI  GASTROINTESTINAL:  negative except as noted in HPI  GENITOURINARY:  negative except as noted in HPI  MUSCULOSKELETAL:  negative except as noted in HPI  SKIN:  negative except as noted in HPI  ENDOCRINE/METABOLIC: negative except as noted in HPI  HEMATOLOGIC/LYMPHATIC:  negative except as noted in HPI  ALLERGY/IMMUN: negative except as noted in HPI  NEUROLOGICAL:  negative except as noted in HPI  BEHAVIOR/PSYCH:  negative except as noted in HPI    OBJECTIVE      Physical  VITALS:  /60   Pulse 64   Temp 97.5 °F (36.4 °C) (Oral)   Resp 18   Ht 5' 3\" (1.6 m)   Wt 194 lb 3.2 oz (88.1 kg)   SpO2 99%   BMI 34.40 kg/m²     Patient is 67-year-old female resting comfortably in hospital bed upon my arrival.  She is pleasant and cooperative with examination, but does display some mild confusion (overall mentation seems improved compared to initial consult on 1/5/23). Left submandibular gland is mildly firm with palpation, but overall seems to be gradually improving compared to previous examination. No overlying erythema.   Patient denies any tenderness with palpation of left submandibular gland.  Right submandibular is normal to palpation. No floor of mouth edema is noted. Oral hygiene and hydration appears to gradually improve as well. No palpable cervical lymphadenopathy. Patient's breathing is unlabored without evidence of respiratory distress.   No trismus noted    Data  CBC with Differential:    Lab Results   Component Value Date/Time    WBC 13.9 01/12/2023 06:56 AM    RBC 3.45 01/12/2023 06:56 AM    RBC 4.70 05/12/2022 03:26 PM    HGB 10.3 01/12/2023 06:56 AM    HCT 33.8 01/12/2023 06:56 AM     01/12/2023 06:56 AM    MCV 98.0 01/12/2023 06:56 AM    MCH 29.9 01/12/2023 06:56 AM    MCHC 30.5 01/12/2023 06:56 AM    RDW 14.1 05/12/2022 03:26 PM    NRBC 0 01/12/2023 06:56 AM    SEGSPCT 78.9 01/12/2023 06:56 AM    METASPCT 1 04/13/2017 05:50 AM    LYMPHOPCT 16.8 05/12/2022 03:26 PM    MONOPCT 4.3 01/12/2023 06:56 AM    EOSPCT 1.2 05/12/2022 03:26 PM    BASOPCT 0.6 05/12/2022 03:26 PM    MONOSABS 0.6 01/12/2023 06:56 AM    LYMPHSABS 1.7 01/12/2023 06:56 AM    EOSABS 0.0 01/12/2023 06:56 AM    BASOSABS 0.0 01/12/2023 06:56 AM    DIFFTYPE see below 01/05/2023 04:30 AM     BMP:    Lab Results   Component Value Date/Time     01/12/2023 06:56 AM    K 3.5 01/12/2023 06:56 AM    K 4.3 12/07/2022 06:15 AM     01/12/2023 06:56 AM    CO2 26 01/12/2023 06:56 AM    BUN 15 01/12/2023 06:56 AM    LABALBU 2.6 01/12/2023 06:56 AM    CREATININE 0.7 01/12/2023 06:56 AM    CALCIUM 8.1 01/12/2023 06:56 AM    LABGLOM >60 01/12/2023 06:56 AM    GLUCOSE 101 01/12/2023 06:56 AM    GLUCOSE 85 05/12/2022 03:26 PM       Inpatient Medications  Current Facility-Administered Medications: dextrose 5 % solution, , IntraVENous, Continuous  hydrocortisone sodium succinate PF (SOLU-CORTEF) injection 50 mg, 50 mg, IntraVENous, Q8H  ampicillin-sulbactam (UNASYN) 3,000 mg in sodium chloride 0.9 % 100 mL IVPB (mini-bag), 3,000 mg, IntraVENous, Q6H  magnesium sulfate 2000 mg in 50 mL IVPB premix, 2,000 mg, IntraVENous, PRN  potassium chloride 20 mEq/50 mL IVPB (Central Line), 20 mEq, IntraVENous, PRN **OR** potassium chloride 10 mEq/100 mL IVPB (Peripheral Line), 10 mEq, IntraVENous, PRN  calcium replacement protocol, , Other, RX Placeholder  rivaroxaban (XARELTO) tablet 15 mg, 15 mg, Oral, Daily  sodium chloride (OCEAN, BABY AYR) 0.65 % nasal spray 2 spray, 2 spray, Each Nostril, Q12H  sodium chloride flush 0.9 % injection 5-40 mL, 5-40 mL, IntraVENous, 2 times per day  sodium chloride flush 0.9 % injection 5-40 mL, 5-40 mL, IntraVENous, PRN  0.9 % sodium chloride infusion, , IntraVENous, PRN  ondansetron (ZOFRAN-ODT) disintegrating tablet 4 mg, 4 mg, Oral, Q8H PRN **OR** ondansetron (ZOFRAN) injection 4 mg, 4 mg, IntraVENous, Q6H PRN  polyethylene glycol (GLYCOLAX) packet 17 g, 17 g, Oral, Daily PRN  acetaminophen (TYLENOL) tablet 650 mg, 650 mg, Oral, Q6H PRN **OR** acetaminophen (TYLENOL) suppository 650 mg, 650 mg, Rectal, Q6H PRN  levothyroxine (SYNTHROID) tablet 125 mcg, 125 mcg, Oral, Daily  [Held by provider] pantoprazole (PROTONIX) tablet 40 mg, 40 mg, Oral, QAM AC  atorvastatin (LIPITOR) tablet 10 mg, 10 mg, Oral, Daily  pantoprazole (PROTONIX) injection 40 mg, 40 mg, IntraVENous, QAM AC    ASSESSMENT AND PLAN    Left submandibular sialadenitis    -Patient remains on IV Unasyn, but will likely be transition to p.o. antibiotics tomorrow per primary. ENT agreeable with this. Would prefer Augmentin 875-125 mg twice daily for 7 days at discharge  - Continue sialogogues (lemon juice packets, lemon juice on swabs, lemon wedges, sour candy), warm compresses and massage. Also frequent oral hydration and hygiene.   This is imperative to decreasing swelling of the salivary gland.   -Sialogogues and warm compresses will need to be continued at discharge in addition to PO hydration to hopefully prevent recurrence   -Patient is ok for discharge from sialadenitis standpoint    Electronically signed by OFELIA St on 1/12/2023 at 4:23 PM

## 2023-01-12 NOTE — CARE COORDINATION
1/12/23, 11:59 AM EST  DISCHARGE PLANNING EVALUATION    LENNIE called Arron Obregon at [facility] and she reported that she is reviewing patient information and will let LENNIE know. 1/12/23, 1:56 PM EST  DISCHARGE PLANNING EVALUATION    LENNIE called and left voicemail for Arron Obregon at [facility] following up on referral made yesterday. 1/12/23, 2:05 PM EST  DISCHARGE PLANNING EVALUATION    LENNIE received a voicemail from Arron Obregon and she reported that she is waiting on patient's financials to be cleared and will keep LENNIE posted.

## 2023-01-12 NOTE — PLAN OF CARE
Problem: Discharge Planning  Goal: Discharge to home or other facility with appropriate resources  Outcome: Progressing  Flowsheets (Taken 1/11/2023 2138)  Discharge to home or other facility with appropriate resources:   Identify barriers to discharge with patient and caregiver   Identify discharge learning needs (meds, wound care, etc)     Problem: Infection - Adult  Goal: Isolation precautions  Description: Isolation precautions  Outcome: Progressing  Note: Ensure isolation precautions are in place. Problem: Chronic Conditions and Co-morbidities  Goal: Patient's chronic conditions and co-morbidity symptoms are monitored and maintained or improved  Outcome: Progressing  Flowsheets (Taken 1/11/2023 2138)  Care Plan - Patient's Chronic Conditions and Co-Morbidity Symptoms are Monitored and Maintained or Improved:   Monitor and assess patient's chronic conditions and comorbid symptoms for stability, deterioration, or improvement   Collaborate with multidisciplinary team to address chronic and comorbid conditions and prevent exacerbation or deterioration     Problem: Nutrition Deficit:  Goal: Optimize nutritional status  Outcome: Progressing  Flowsheets (Taken 1/12/2023 0127)  Nutrient intake appropriate for improving, restoring, or maintaining nutritional needs: Monitor oral intake, labs, and treatment plans     Problem: Skin/Tissue Integrity  Goal: Absence of new skin breakdown  Description: 1. Monitor for areas of redness and/or skin breakdown  2. Assess vascular access sites hourly  3. Every 4-6 hours minimum:  Change oxygen saturation probe site  4. Every 4-6 hours:  If on nasal continuous positive airway pressure, respiratory therapy assess nares and determine need for appliance change or resting period. Outcome: Progressing  Note: Assess and monitor for new areas of redness and skin breakdown. Care plan reviewed with patient.   Patient verbalized understanding of the plan of care and contributed to goal setting.

## 2023-01-12 NOTE — PROGRESS NOTES
Comprehensive Nutrition Assessment    Type and Reason for Visit:  Reassess    Nutrition Recommendations/Plan:   Continue diet as ordered per SLP and encourage PO intake at best efforts as tolerated. ONS ordered: magic cups BID   Monitor nutrition related lab values, PO intake, weights, medications, GI status and provide further nutrition recommendations as necessary. Malnutrition Assessment:  Malnutrition Status:  Severe malnutrition (01/06/23 1429)    Context:  Acute Illness     Findings of the 6 clinical characteristics of malnutrition:  Energy Intake:  50% or less of estimated energy requirements for 5 or more days (poor po 1-2 weeks pta per pt.; 25% since admit (2d))  Weight Loss:  Greater than 5% over 1 month (16# or 9% in 1 month)     Body Fat Loss:  No significant body fat loss     Muscle Mass Loss:  No significant muscle mass loss    Fluid Accumulation:  Mild     Strength:  Not Performed    Nutrition Assessment:     Pt. severely malnourished AEB criteria listed above. At risk for further nutritional compromise r/t admit with cellulitis of submandibular jaw, septic shock, periapical abscess with enlargement of submandibular gland, dehydraion, poor po and weight loss, wounds, and underlying medical condition chronic steroids d/t pituitary tumor removal, CVA, GERD, HTN, Hx meningioma of brain, At Fib, covid 11/23/22. Nutrition Related Findings:    Pt. Report/Treatments/Miscellaneous: upon assessment pt had consumed most of breakfast meal tray~ pt reports \"so-so\" appetite. LOS day 8. Pt reports she does like magic cups but would them to only be sent twice a day at this time. Planning SNF at discharge. No plans for surgical intervention for periapical abscess- ENT following. Hypernatremic today~ starting D5W.    GI Status: last BM 1/12  Pertinent Labs: Na 150, K 3.5, BUN 15, creatinine 0.7, magnesium 2.3, glucose 101, phos 2.6, hgb 10.3  Pertinent Meds: unasyn, solu-cortef, protonix      Wound Type: Pressure Injury, Stage II (buttocks; skin tears; DTI coccyx)       Current Nutrition Intake & Therapies:    Average Meal Intake:  (limited PO documentation~ variable intake throughout 8 day LOS)     ADULT ORAL NUTRITION SUPPLEMENT; Breakfast, Dinner; Frozen Oral Supplement  ADULT DIET; Dysphagia - Minced and Moist; Moderately Thick (Honey)    Anthropometric Measures:  Height: 5' 3\" (160 cm)  Ideal Body Weight (IBW): 115 lbs (52 kg)    Admission Body Weight: 165 lb (74.8 kg) (1/4 stated)  Current Body Weight: 194 lb 3.2 oz (88.1 kg) (1/12: generalized +1),   IBW. Weight Source: Bed Scale  Current BMI (kg/m2): 34.4  Usual Body Weight: 188 lb (85.3 kg) (12/4/22 bedside scale; 193# 12/16/21)  % Weight Change (Calculated): -8.5                    BMI Categories: Obese Class 1 (BMI 30.0-34. 9)    Estimated Daily Nutrient Needs:  Energy Requirements Based On: Kcal/kg  Weight Used for Energy Requirements: Current (78kgm)  Energy (kcal/day): 4506-7945 (15-18/kgm)  Weight Used for Protein Requirements: Ideal (52kgm)  Protein (g/day): 62-78gms (1.2-1.5/kgm IBW)     Fluid (ml/day): per Physician    Nutrition Diagnosis:   Severe malnutrition, In context of acute illness or injury related to inadequate protein-energy intake as evidenced by Criteria as identified in malnutrition assessment    Nutrition Interventions:   Food and/or Nutrient Delivery: Continue Current Diet, Continue Oral Nutrition Supplement  Nutrition Education/Counseling:  (Encouraged PO intake at best efforts and use of ONS)  Coordination of Nutrition Care: Continue to monitor while inpatient, Interdisciplinary Rounds, Speech Therapy       Goals:     Goals: PO intake 75% or greater, by next RD assessment       Nutrition Monitoring and Evaluation:      Food/Nutrient Intake Outcomes: Food and Nutrient Intake, Supplement Intake, Diet Advancement/Tolerance  Physical Signs/Symptoms Outcomes: Biochemical Data, Chewing or Swallowing, GI Status, Fluid Status or Edema, Hemodynamic Status, Nutrition Focused Physical Findings, Skin, Weight    Discharge Planning:     Too soon to determine     Vin Bowen RD  Contact: [phone]

## 2023-01-12 NOTE — PLAN OF CARE
Problem: Discharge Planning  Goal: Discharge to home or other facility with appropriate resources  1/12/2023 1403 by Milton Burnett RN  Outcome: Progressing  Flowsheets (Taken 1/12/2023 1403)  Discharge to home or other facility with appropriate resources:   Identify barriers to discharge with patient and caregiver   Arrange for needed discharge resources and transportation as appropriate   Identify discharge learning needs (meds, wound care, etc)     Problem: Infection - Adult  Goal: Isolation precautions  Description: Isolation precautions  1/12/2023 1403 by Milton Burnett RN  Outcome: Progressing     Problem: Chronic Conditions and Co-morbidities  Goal: Patient's chronic conditions and co-morbidity symptoms are monitored and maintained or improved  1/12/2023 1403 by Milton Burnett RN  Outcome: Progressing  Flowsheets (Taken 1/12/2023 1403)  Care Plan - Patient's Chronic Conditions and Co-Morbidity Symptoms are Monitored and Maintained or Improved:   Monitor and assess patient's chronic conditions and comorbid symptoms for stability, deterioration, or improvement   Collaborate with multidisciplinary team to address chronic and comorbid conditions and prevent exacerbation or deterioration     Problem: Nutrition Deficit:  Goal: Optimize nutritional status  1/12/2023 1403 by Milton Burnett RN  Outcome: Progressing  Flowsheets (Taken 1/12/2023 1403)  Nutrient intake appropriate for improving, restoring, or maintaining nutritional needs:   Assess nutritional status and recommend course of action   Monitor oral intake, labs, and treatment plans   Recommend appropriate diets, oral nutritional supplements, and vitamin/mineral supplements     Problem: Skin/Tissue Integrity  Goal: Absence of new skin breakdown  Description: 1. Monitor for areas of redness and/or skin breakdown  2. Assess vascular access sites hourly  3. Every 4-6 hours minimum:  Change oxygen saturation probe site  4.   Every 4-6 hours:  If on nasal continuous positive airway pressure, respiratory therapy assess nares and determine need for appliance change or resting period.   1/12/2023 1403 by Lucrecia Ward RN  Outcome: Progressing

## 2023-01-13 LAB
ALBUMIN SERPL-MCNC: 2.5 G/DL (ref 3.5–5.1)
ALP BLD-CCNC: 64 U/L (ref 38–126)
ALT SERPL-CCNC: 66 U/L (ref 11–66)
ANION GAP SERPL CALCULATED.3IONS-SCNC: 9 MEQ/L (ref 8–16)
AST SERPL-CCNC: 54 U/L (ref 5–40)
BASOPHILS # BLD: 0.3 %
BASOPHILS ABSOLUTE: 0 THOU/MM3 (ref 0–0.1)
BILIRUB SERPL-MCNC: 0.3 MG/DL (ref 0.3–1.2)
BILIRUBIN DIRECT: < 0.2 MG/DL (ref 0–0.3)
BUN BLDV-MCNC: 13 MG/DL (ref 7–22)
CALCIUM IONIZED: 0.89 MMOL/L (ref 1.12–1.32)
CALCIUM SERPL-MCNC: 7.6 MG/DL (ref 8.5–10.5)
CHLORIDE BLD-SCNC: 110 MEQ/L (ref 98–111)
CO2: 25 MEQ/L (ref 23–33)
CREAT SERPL-MCNC: 0.7 MG/DL (ref 0.4–1.2)
EOSINOPHIL # BLD: 1.9 %
EOSINOPHILS ABSOLUTE: 0.2 THOU/MM3 (ref 0–0.4)
ERYTHROCYTE [DISTWIDTH] IN BLOOD BY AUTOMATED COUNT: 17.8 % (ref 11.5–14.5)
ERYTHROCYTE [DISTWIDTH] IN BLOOD BY AUTOMATED COUNT: 63.6 FL (ref 35–45)
GFR SERPL CREATININE-BSD FRML MDRD: > 60 ML/MIN/1.73M2
GLUCOSE BLD-MCNC: 91 MG/DL (ref 70–108)
HCT VFR BLD CALC: 32.6 % (ref 37–47)
HEMOGLOBIN: 9.9 GM/DL (ref 12–16)
IMMATURE GRANS (ABS): 0.43 THOU/MM3 (ref 0–0.07)
IMMATURE GRANULOCYTES: 3.6 %
LYMPHOCYTES # BLD: 24.9 %
LYMPHOCYTES ABSOLUTE: 3 THOU/MM3 (ref 1–4.8)
MAGNESIUM: 2.3 MG/DL (ref 1.6–2.4)
MCH RBC QN AUTO: 30.2 PG (ref 26–33)
MCHC RBC AUTO-ENTMCNC: 30.4 GM/DL (ref 32.2–35.5)
MCV RBC AUTO: 99.4 FL (ref 81–99)
MONOCYTES # BLD: 4.4 %
MONOCYTES ABSOLUTE: 0.5 THOU/MM3 (ref 0.4–1.3)
NUCLEATED RED BLOOD CELLS: 0 /100 WBC
PHOSPHORUS: 2.5 MG/DL (ref 2.4–4.7)
PLATELET # BLD: 245 THOU/MM3 (ref 130–400)
PMV BLD AUTO: 10.5 FL (ref 9.4–12.4)
POTASSIUM SERPL-SCNC: 2.9 MEQ/L (ref 3.5–5.2)
POTASSIUM SERPL-SCNC: 3 MEQ/L (ref 3.5–5.2)
RBC # BLD: 3.28 MILL/MM3 (ref 4.2–5.4)
SEG NEUTROPHILS: 64.9 %
SEGMENTED NEUTROPHILS ABSOLUTE COUNT: 7.8 THOU/MM3 (ref 1.8–7.7)
SODIUM BLD-SCNC: 144 MEQ/L (ref 135–145)
TOTAL PROTEIN: 3.9 G/DL (ref 6.1–8)
WBC # BLD: 12 THOU/MM3 (ref 4.8–10.8)

## 2023-01-13 PROCEDURE — 6360000002 HC RX W HCPCS: Performed by: STUDENT IN AN ORGANIZED HEALTH CARE EDUCATION/TRAINING PROGRAM

## 2023-01-13 PROCEDURE — 97530 THERAPEUTIC ACTIVITIES: CPT

## 2023-01-13 PROCEDURE — 6370000000 HC RX 637 (ALT 250 FOR IP): Performed by: STUDENT IN AN ORGANIZED HEALTH CARE EDUCATION/TRAINING PROGRAM

## 2023-01-13 PROCEDURE — 83735 ASSAY OF MAGNESIUM: CPT

## 2023-01-13 PROCEDURE — 36415 COLL VENOUS BLD VENIPUNCTURE: CPT

## 2023-01-13 PROCEDURE — 97535 SELF CARE MNGMENT TRAINING: CPT

## 2023-01-13 PROCEDURE — 2580000003 HC RX 258: Performed by: STUDENT IN AN ORGANIZED HEALTH CARE EDUCATION/TRAINING PROGRAM

## 2023-01-13 PROCEDURE — 84132 ASSAY OF SERUM POTASSIUM: CPT

## 2023-01-13 PROCEDURE — 84100 ASSAY OF PHOSPHORUS: CPT

## 2023-01-13 PROCEDURE — 2580000003 HC RX 258

## 2023-01-13 PROCEDURE — 82248 BILIRUBIN DIRECT: CPT

## 2023-01-13 PROCEDURE — 80053 COMPREHEN METABOLIC PANEL: CPT

## 2023-01-13 PROCEDURE — 99233 SBSQ HOSP IP/OBS HIGH 50: CPT | Performed by: STUDENT IN AN ORGANIZED HEALTH CARE EDUCATION/TRAINING PROGRAM

## 2023-01-13 PROCEDURE — 85025 COMPLETE CBC W/AUTO DIFF WBC: CPT

## 2023-01-13 PROCEDURE — 99231 SBSQ HOSP IP/OBS SF/LOW 25: CPT | Performed by: PHYSICIAN ASSISTANT

## 2023-01-13 PROCEDURE — 97110 THERAPEUTIC EXERCISES: CPT

## 2023-01-13 PROCEDURE — 1200000000 HC SEMI PRIVATE

## 2023-01-13 PROCEDURE — 6360000002 HC RX W HCPCS

## 2023-01-13 PROCEDURE — 82330 ASSAY OF CALCIUM: CPT

## 2023-01-13 PROCEDURE — C9113 INJ PANTOPRAZOLE SODIUM, VIA: HCPCS | Performed by: STUDENT IN AN ORGANIZED HEALTH CARE EDUCATION/TRAINING PROGRAM

## 2023-01-13 RX ORDER — AMOXICILLIN AND CLAVULANATE POTASSIUM 875; 125 MG/1; MG/1
1 TABLET, FILM COATED ORAL EVERY 12 HOURS SCHEDULED
Status: DISCONTINUED | OUTPATIENT
Start: 2023-01-13 | End: 2023-01-17 | Stop reason: HOSPADM

## 2023-01-13 RX ADMIN — DEXTROSE MONOHYDRATE: 50 INJECTION, SOLUTION INTRAVENOUS at 04:43

## 2023-01-13 RX ADMIN — AMOXICILLIN AND CLAVULANATE POTASSIUM 1 TABLET: 875; 125 TABLET, FILM COATED ORAL at 21:09

## 2023-01-13 RX ADMIN — RIVAROXABAN 15 MG: 15 TABLET, FILM COATED ORAL at 18:06

## 2023-01-13 RX ADMIN — SODIUM CHLORIDE: 9 INJECTION, SOLUTION INTRAVENOUS at 04:45

## 2023-01-13 RX ADMIN — SODIUM CHLORIDE, PRESERVATIVE FREE 10 ML: 5 INJECTION INTRAVENOUS at 09:34

## 2023-01-13 RX ADMIN — ATORVASTATIN CALCIUM 10 MG: 10 TABLET, FILM COATED ORAL at 09:34

## 2023-01-13 RX ADMIN — PANTOPRAZOLE SODIUM 40 MG: 40 INJECTION, POWDER, FOR SOLUTION INTRAVENOUS at 06:19

## 2023-01-13 RX ADMIN — HYDROCORTISONE SODIUM SUCCINATE 50 MG: 100 INJECTION, POWDER, FOR SOLUTION INTRAMUSCULAR; INTRAVENOUS at 09:34

## 2023-01-13 RX ADMIN — SALINE NASAL SPRAY 2 SPRAY: 1.5 SOLUTION NASAL at 18:06

## 2023-01-13 RX ADMIN — LEVOTHYROXINE SODIUM 125 MCG: 0.12 TABLET ORAL at 09:34

## 2023-01-13 RX ADMIN — SODIUM CHLORIDE 3000 MG: 900 INJECTION INTRAVENOUS at 06:17

## 2023-01-13 RX ADMIN — CALCIUM GLUCONATE 4000 MG: 94 INJECTION, SOLUTION INTRAVENOUS at 15:03

## 2023-01-13 RX ADMIN — SODIUM CHLORIDE 3000 MG: 900 INJECTION INTRAVENOUS at 00:47

## 2023-01-13 RX ADMIN — SALINE NASAL SPRAY 2 SPRAY: 1.5 SOLUTION NASAL at 06:20

## 2023-01-13 RX ADMIN — HYDROCORTISONE SODIUM SUCCINATE 50 MG: 100 INJECTION, POWDER, FOR SOLUTION INTRAMUSCULAR; INTRAVENOUS at 21:10

## 2023-01-13 RX ADMIN — POTASSIUM CHLORIDE 10 MEQ: 7.46 INJECTION, SOLUTION INTRAVENOUS at 23:38

## 2023-01-13 RX ADMIN — POTASSIUM CHLORIDE 10 MEQ: 7.46 INJECTION, SOLUTION INTRAVENOUS at 22:34

## 2023-01-13 RX ADMIN — SODIUM CHLORIDE, PRESERVATIVE FREE 10 ML: 5 INJECTION INTRAVENOUS at 21:10

## 2023-01-13 RX ADMIN — POTASSIUM CHLORIDE 10 MEQ: 7.46 INJECTION, SOLUTION INTRAVENOUS at 21:32

## 2023-01-13 RX ADMIN — AMOXICILLIN AND CLAVULANATE POTASSIUM 1 TABLET: 875; 125 TABLET, FILM COATED ORAL at 12:34

## 2023-01-13 NOTE — CARE COORDINATION
1/13/23, 3:39 PM EST    DISCHARGE PLANNING EVALUATION     Spoke with Juan Waldron at [facility] and they accepted patient but have not been able to start pre-cert.  is aware of plan.   Packet on chart will need a COVID test.

## 2023-01-13 NOTE — PROGRESS NOTES
Hospitalist Progress Note      Patient:  Emmanuel Melchor    Unit/Bed:4K-28/028-A  YOB: 1950  MRN: [id number]   Acct: [de-identified]   PCP: Carla Melo MD  Date of Admission: 1/4/2023    Assessment/Plan:    Periapical abscess with enlargement of the left submandibular gland  No plans for surgical intervention  Transition to Augmentin today for an additional 7 days. Stop unasyn. Encourage sialogogues  ENT following. Adrenal insufficiency with adrenal crisis  Improving with control of infection as above  Stable off midodrine. Down to BID solu-cortef today. Will continue to wean as able back down to baseline 20mg qAM, 10mg qPM  Mild hypernatremia  Chronic in nature. Likely due to chronic steroid use and bumex. Resolved with D5w yesterday. HFpEF, not in acute exacerbation  Coreg and bumex held due to bradycardia and hypotension. May resume bumex tomorrow if volume status is stable. AF with RVR, resolved  Back in sinus rhythm. Coreg held due to bradycardia/hypotension. Due to persistent bradycardia, will need to continue to hold coreg. HR down to 49 overnight and 44 yesterday. On xarelto  HTN  Holding anti-HTN meds. Off midodrine and weaning solu-cortef. HLD  On statin  hypothyroidism  On synthroid  Severe protein calorie malnutrition  Dietary consult  Physical debility with deconditioning  PT/OT  CM following. Planning SNF at discharge    Disposition: Pending DC to SNF once BP is stable off midodrine and lower dose solu-cortef. Has been transitioned to PO abx. Aggressively decreasing solu-cortef. May transition to PO tomorrow. Chief Complaint: left lower jaw pain. Hospital Course:    Patient is a 78yo F w/ significant PMH Atrial thrombosis, CHF, CVA, PAF, HTN, HLD, asthma, GERD, DMII, adrenal insufficiency, hypothyroidism, hx meningioma x3 admitted to [facility] on 1/04/2023 for Left Lower Jaw Pain.  Reported noted submandibular swelling and redness that is painful for few days before. Endorsed difficulty opening mouth  and decreased oral intake of both liquids and solids. Admitted 11/2022 for Acute Hypoxic Resp Failure 2/2 LLL PNA where she was discharged on 2L O2 NC after completion of abx treatment. In ED, hypotensive but othrewise vitals stable. Labs hypernatremic w/ minor JESSE. Leukocytosis WBC 25.0. UA negative for UTI, blood cultures drawn. EKG sinus eladia w/o acute ischemic changes from prior. CT facial bones noted enlargement of left submandibular gland measuring 2.5cm w/ assymetric left pharyngeal soft tissues and mass effect on subglottic airway. CXR notable for poor lung inflation, mod atelectasis/PNA in R parahilar region and bilat lung bases. S/p IVF bolus; patient did not improve w/ fluids. Admitted to ICU for pressor support. Treated w/ Unasyn. Weaned down pressor support w/ increasing alertness and improved speech. Patient started diet 1/06; tolerated well. Family requested PM&R consult for rehab evaluation as  who care for her at home is hospitalized after a surgery. Started Midodrine 10mg TID 1/8/2023.     1/9/2023: Patient off Levophed; BP borderline on Midodrine 10mg TID. Levophed was required to be started again. Sodium continues to trend up, likely due to SoluCortef. 1/10/2023: patient off Levophed; BP much better after increased steroid dose. Mariusz diet. Decreased submandibular swelling. Plan for transfer out of ICU today. 1/11: started weaing midodrine and solu-cortef. Patient tolerating well. Remains on unasyn. 1/12: no complaints. Tolerating midodrine and solu-cortef weaning. Subjective (past 24 hours): No complaints today. Denies chest pain, dysphagia, fevers or chills. Denies any significant jaw swelling. Past medical history, family history, social history and allergies reviewed again and is unchanged since admission.     ROS (12 point review of systems completed. Pertinent positives noted. Otherwise ROS is negative)     Medications:  Reviewed    Infusion Medications    dextrose 100 mL/hr at 01/13/23 0606    sodium chloride 10 mL/hr at 01/13/23 0606     Scheduled Medications    calcium replacement protocol   Other RX Placeholder    hydrocortisone sodium succinate PF  50 mg IntraVENous BID    ampicillin-sulbactam  3,000 mg IntraVENous Q6H    rivaroxaban  15 mg Oral Daily    sodium chloride  2 spray Each Nostril Q12H    sodium chloride flush  5-40 mL IntraVENous 2 times per day    levothyroxine  125 mcg Oral Daily    [Held by provider] pantoprazole  40 mg Oral QAM AC    atorvastatin  10 mg Oral Daily    pantoprazole  40 mg IntraVENous QAM AC     PRN Meds: magnesium sulfate, potassium chloride **OR** potassium chloride, sodium chloride flush, sodium chloride, ondansetron **OR** ondansetron, polyethylene glycol, acetaminophen **OR** acetaminophen      Intake/Output Summary (Last 24 hours) at 1/13/2023 3605  Last data filed at 1/13/2023 0606  Gross per 24 hour   Intake 2582.46 ml   Output 1145 ml   Net 1437.46 ml         Diet:  ADULT ORAL NUTRITION SUPPLEMENT; Breakfast, Dinner; Frozen Oral Supplement  ADULT DIET; Dysphagia - Minced and Moist; Moderately Thick (Honey)    Exam:  /70   Pulse 52   Temp 98.2 °F (36.8 °C) (Oral)   Resp 18   Ht 5' 3\" (1.6 m)   Wt 196 lb 8 oz (89.1 kg)   SpO2 96%   BMI 34.81 kg/m²   General appearance: No apparent distress, appears stated age and cooperative. HEENT: Pupils equal, round, and reactive to light. Conjunctivae/corneas clear. Neck: Minimal swelling of the left submandibular region. Respiratory:  Normal respiratory effort. Clear to auscultation, bilaterally without Rales/Wheezes/Rhonchi. Cardiovascular: Regular rate and rhythm with normal S1/S2 without murmurs, rubs or gallops. Abdomen: Soft, non-tender, non-distended with normal bowel sounds. Musculoskeletal: Diffuse anasarca.    Skin: Multiple skin excoriations and areas of ecchymosis. Neurologic:  Neurovascularly intact without any focal sensory/motor deficits. Cranial nerves: II-XII intact, grossly non-focal.  Psychiatric: Alert and oriented, thought content appropriate, normal insight  Capillary Refill: Brisk,< 3 seconds   Peripheral Pulses: +2 palpable, equal bilaterally     Labs:   Recent Labs     01/12/23 0656 01/13/23 0625   WBC 13.9* 12.0*   HGB 10.3* 9.9*   HCT 33.8* 32.6*    245       Recent Labs     01/12/23  0656 01/13/23 0625   * 144   K 3.5 2.9*   * 110   CO2 26 25   BUN 15 13   CREATININE 0.7 0.7   CALCIUM 8.1* 7.6*   PHOS 2.6 2.5       Recent Labs     01/12/23 0656 01/13/23 0625   AST 68* 54*   ALT 55 66   BILIDIR <0.2 <0.2   BILITOT 0.4 0.3   ALKPHOS 66 64       No results for input(s): INR in the last 72 hours. No results for input(s): Tyshawn Lager in the last 72 hours. Microbiology:    Blood culture #1:   Lab Results   Component Value Date/Time    [facility]  01/04/2023 01:20 PM     No growth 24 hours. No growth 48 hours. No growth at 5 days       Blood culture #2:No results found for: BLOODCULT2    Organism:  Lab Results   Component Value Date/Time    ORG Staphylococcus aureus 12/17/2018 04:04 PM         Lab Results   Component Value Date/Time    LABGRAM  02/16/2022 04:00 PM     No segmented neutrophils observed. Rare epithelial cells observed. No organisms observed. MRSA culture only:No results found for: [facility]    Urine culture:   Lab Results   Component Value Date/Time    LABURIN No growth-preliminary  No growth   02/09/2017 12:35 PM       Respiratory culture: No results found for: CULTRESP    Aerobic and Anaerobic :  Lab Results   Component Value Date/Time    LABAERO No Acinetobacter species isolated.  01/04/2023 10:00 PM     Lab Results   Component Value Date/Time    LABANAE  12/01/2021 12:00 PM     No anaerobes isolated- preliminary Culture yielded light growth of gram positive bacilli most consistent with Cutibacterium species. Clinical correlation required. Urinalysis:      Lab Results   Component Value Date/Time    NITRU NEGATIVE 01/04/2023 02:15 PM    WBCUA 5-9 01/04/2023 02:15 PM    WBCUA 2-4 02/22/2012 01:00 PM    BACTERIA NONE SEEN 01/04/2023 02:15 PM    RBCUA 3-5 01/04/2023 02:15 PM    BLOODU SMALL 01/04/2023 02:15 PM    SPECGRAV 1.025 09/18/2020 12:00 PM    GLUCOSEU NEGATIVE 01/04/2023 02:15 PM       Radiology:  XR CHEST PORTABLE   Final Result   1. Poor inflation the lungs. Mild cardiomegaly. Bilateral shoulder prostheses. 2. Right subclavian line has been inserted with tip in right atrium. 3. Moderate atelectasis/pneumonia right parahilar region and both lung bases. No effusion seen. **This report has been created using voice recognition software. It may contain minor errors which are inherent in voice recognition technology. **      Final report electronically signed by Dr. Shayan Graff on 1/4/2023 4:42 PM      CT FACIAL BONES WO CONTRAST   Final Result   1. Enlargement of the left submandibular gland measuring up to 2.5 cm.   2. Asymmetric fullness of the left pharyngeal soft tissues with mass effect on the subglottic airway   3. Left mastoid effusions and otitis interna            **This report has been created using voice recognition software. It may contain minor errors which are inherent in voice recognition technology. **      Final report electronically signed by Dr. Vasquez Given on 1/4/2023 3:08 PM      XR CHEST PORTABLE   Final Result   Residual patchy airspace infiltrates left lower lobe. **This report has been created using voice recognition software. It may contain minor errors which are inherent in voice recognition technology. **      Final report electronically signed by Dr. Vasquez Given on 1/4/2023 12:41 PM        CT FACIAL BONES WO CONTRAST    Result Date: 1/4/2023  PROCEDURE: CT FACIAL BONES WO CONTRAST CLINICAL INFORMATION: Submandibular cellulitis swelling, possible erysipelas, ruling out submandibular phlegmon . TECHNIQUE: 3 mm multiplanar images of the facial bones in bone and soft tissue windows noncontrast All CT scans at this facility use dose modulation, iterative reconstruction, and/or weight-based dosing when appropriate to reduce radiation dose to as low as reasonably achievable. COMPARISON: No prior study. 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 Soft tissue windows demonstrate either enlargement of the left submandibular gland or obscuration by inflammation. Appearance is more suggestive of inflammatory enlargement of the gland. Evaluation is limited without contrast. This measures 2.5 cm. Solid  attenuation coefficients Asymmetric fullness of the left pharyngeal soft tissues underlying inflammation or mass is a concern. There are left-sided mastoid effusions which is new when compared to CT brain dated 8/20/2019 there is also fluid in the left internal auditory canal.     1. Enlargement of the left submandibular gland measuring up to 2.5 cm. 2. Asymmetric fullness of the left pharyngeal soft tissues with mass effect on the subglottic airway 3. Left mastoid effusions and otitis interna **This report has been created using voice recognition software. It may contain minor errors which are inherent in voice recognition technology. ** Final report electronically signed by Dr. Sydney Rogel on 1/4/2023 3:08 PM    XR CHEST PORTABLE    Result Date: 1/4/2023  PROCEDURE: XR CHEST PORTABLE CLINICAL INFORMATION: central line placement COMPARISON: 1/4/2023 TECHNIQUE: A single mobile view of the chest was obtained. 1. Poor inflation the lungs. Mild cardiomegaly. Bilateral shoulder prostheses. 2. Right subclavian line has been inserted with tip in right atrium. 3. Moderate atelectasis/pneumonia right parahilar region and both lung bases. No effusion seen.  **This report has been created using voice recognition software. It may contain minor errors which are inherent in voice recognition technology. ** Final report electronically signed by Dr. Shelly Fernandez on 1/4/2023 4:42 PM    XR CHEST PORTABLE    Result Date: 1/4/2023  PROCEDURE: XR CHEST PORTABLE CLINICAL INFORMATION: weakness . TECHNIQUE: Portable upright COMPARISON: 12/3/2022 FINDINGS: Patient is rotated. Mandible appears detail the upper apices. Heart size is within normal limits. Mediastinum is not widened. Residual patchy infiltrates left base slightly improved compared to the prior exam. No effusions are seen. Vessels are not congested. Chronic increased lung markings suggesting mild parenchymal scarring bilateral shoulder replacements. Residual patchy airspace infiltrates left lower lobe. **This report has been created using voice recognition software. It may contain minor errors which are inherent in voice recognition technology. ** Final report electronically signed by Dr. Benjamín Gill on 1/4/2023 12:41 PM      Electronically signed by Jemima Connelly DO on 1/13/2023 at 8:52 AM

## 2023-01-13 NOTE — PROGRESS NOTES
Department of Otolaryngology  Progress Note    Chief Complaint:  neck swelling    SUBJECTIVE:  No acute events reports overnight. Patient continues to do well. She denies any pain in her submandibular region. BP's have been been remaining relatively stable overnight. Leukocytosis continues to improve. WBC down to 12.0 today from 13.9 yesterday and she remains afebrile. Calcium down to 7.6 and iCal 0.89 this morning. She denies numbness/tingling. No other symptoms or concerns reported at this time. REVIEW OF SYSTEMS:    A complete multi-organ review of systems was performed and reviewed by me. ENT:  negative except as noted in HPI  CONSTITUTIONAL:  negative except as noted in HPI  EYES:  negative except as noted in HPI  RESPIRATORY:  negative except as noted in HPI  CARDIOVASCULAR:  negative except as noted in HPI  GASTROINTESTINAL:  negative except as noted in HPI  GENITOURINARY:  negative except as noted in HPI  MUSCULOSKELETAL:  negative except as noted in HPI  SKIN:  negative except as noted in HPI  ENDOCRINE/METABOLIC: negative except as noted in HPI  HEMATOLOGIC/LYMPHATIC:  negative except as noted in HPI  ALLERGY/IMMUN: negative except as noted in HPI  NEUROLOGICAL:  negative except as noted in HPI  BEHAVIOR/PSYCH:  negative except as noted in HPI    OBJECTIVE      Physical  VITALS:  /62   Pulse 52   Temp 97.5 °F (36.4 °C) (Oral)   Resp 18   Ht 5' 3\" (1.6 m)   Wt 196 lb 8 oz (89.1 kg)   SpO2 99%   BMI 34.81 kg/m²     Patient is 52-year-old female resting comfortably in hospital bed upon my arrival.  She is pleasant and cooperative with examination, but does display some mild confusion (overall mentation seems improved compared to initial consult on 1/5/23). No overlying erythema of the neck is noted. Patient's left submandibular gland remains mildly enlarged and firm with palpation, but continues to gradually improve. No tenderness with palpation is noted or reported.   Right submandibular area is normal with palpation. No floor of mouth edema is noted. Continued improvement in oral hydration and hygiene. Patient's breathing is unlabored without evidence of respiratory distress or stridor.   No trismus is noted    Data  CBC with Differential:    Lab Results   Component Value Date/Time    WBC 12.0 01/13/2023 06:25 AM    RBC 3.28 01/13/2023 06:25 AM    RBC 4.70 05/12/2022 03:26 PM    HGB 9.9 01/13/2023 06:25 AM    HCT 32.6 01/13/2023 06:25 AM     01/13/2023 06:25 AM    MCV 99.4 01/13/2023 06:25 AM    MCH 30.2 01/13/2023 06:25 AM    MCHC 30.4 01/13/2023 06:25 AM    RDW 14.1 05/12/2022 03:26 PM    NRBC 0 01/13/2023 06:25 AM    SEGSPCT 64.9 01/13/2023 06:25 AM    METASPCT 1 04/13/2017 05:50 AM    LYMPHOPCT 16.8 05/12/2022 03:26 PM    MONOPCT 4.4 01/13/2023 06:25 AM    EOSPCT 1.2 05/12/2022 03:26 PM    BASOPCT 0.6 05/12/2022 03:26 PM    MONOSABS 0.5 01/13/2023 06:25 AM    LYMPHSABS 3.0 01/13/2023 06:25 AM    EOSABS 0.2 01/13/2023 06:25 AM    BASOSABS 0.0 01/13/2023 06:25 AM    DIFFTYPE see below 01/05/2023 04:30 AM     BMP:    Lab Results   Component Value Date/Time     01/13/2023 06:25 AM    K 2.9 01/13/2023 06:25 AM    K 4.3 12/07/2022 06:15 AM     01/13/2023 06:25 AM    CO2 25 01/13/2023 06:25 AM    BUN 13 01/13/2023 06:25 AM    LABALBU 2.5 01/13/2023 06:25 AM    CREATININE 0.7 01/13/2023 06:25 AM    CALCIUM 7.6 01/13/2023 06:25 AM    LABGLOM >60 01/13/2023 06:25 AM    GLUCOSE 91 01/13/2023 06:25 AM    GLUCOSE 85 05/12/2022 03:26 PM       Inpatient Medications  Current Facility-Administered Medications: calcium replacement protocol, , Other, RX Placeholder  dextrose 5 % solution, , IntraVENous, Continuous  hydrocortisone sodium succinate PF (SOLU-CORTEF) injection 50 mg, 50 mg, IntraVENous, BID  ampicillin-sulbactam (UNASYN) 3,000 mg in sodium chloride 0.9 % 100 mL IVPB (mini-bag), 3,000 mg, IntraVENous, Q6H  magnesium sulfate 2000 mg in 50 mL IVPB premix, 2,000 mg, IntraVENous, PRN  potassium chloride 20 mEq/50 mL IVPB (Central Line), 20 mEq, IntraVENous, PRN **OR** potassium chloride 10 mEq/100 mL IVPB (Peripheral Line), 10 mEq, IntraVENous, PRN  calcium replacement protocol, , Other, RX Placeholder  rivaroxaban (XARELTO) tablet 15 mg, 15 mg, Oral, Daily  sodium chloride (OCEAN, BABY AYR) 0.65 % nasal spray 2 spray, 2 spray, Each Nostril, Q12H  sodium chloride flush 0.9 % injection 5-40 mL, 5-40 mL, IntraVENous, 2 times per day  sodium chloride flush 0.9 % injection 5-40 mL, 5-40 mL, IntraVENous, PRN  0.9 % sodium chloride infusion, , IntraVENous, PRN  ondansetron (ZOFRAN-ODT) disintegrating tablet 4 mg, 4 mg, Oral, Q8H PRN **OR** ondansetron (ZOFRAN) injection 4 mg, 4 mg, IntraVENous, Q6H PRN  polyethylene glycol (GLYCOLAX) packet 17 g, 17 g, Oral, Daily PRN  acetaminophen (TYLENOL) tablet 650 mg, 650 mg, Oral, Q6H PRN **OR** acetaminophen (TYLENOL) suppository 650 mg, 650 mg, Rectal, Q6H PRN  levothyroxine (SYNTHROID) tablet 125 mcg, 125 mcg, Oral, Daily  [Held by provider] pantoprazole (PROTONIX) tablet 40 mg, 40 mg, Oral, QAM AC  atorvastatin (LIPITOR) tablet 10 mg, 10 mg, Oral, Daily  pantoprazole (PROTONIX) injection 40 mg, 40 mg, IntraVENous, QAM AC    ASSESSMENT AND PLAN    Left submandibular sialadenitis    -Patient is ok to transition to PO Augmentin, infection continues to improve  - Continue sialogogues (lemon juice packets, lemon juice on swabs, lemon wedges, sour candy), warm compresses and massage. Also frequent oral hydration and hygiene. This is imperative to decreasing swelling of the salivary gland.   -Sialogogues and warm compresses should be continued at the time of discharge until at least the completion of oral antibiotics. Continuing to do these intermittently may also prevent recurrence  -Encourage PO hydration  -Patient is ok for discharge from sialadenitis standpoint. Patient scheduled for follow up with ENT on 1/27/23 at 10AM.  ENT signing off.  Please contact ENT with worsened symptoms of infection or other concerns    Electronically signed by OFELIA St on 1/13/2023 at 7:49 AM

## 2023-01-13 NOTE — PROGRESS NOTES
[facility] ICU STEPDOWN TELEMETRY 4K  Occupational Therapy  Daily Note  Time:   Time In: 825  Time Out:   Timed Code Treatment Minutes: 32 Minutes  Minutes: 27          Date: 2023  Patient Name: Latoya Dick,   Gender: female      Room: [facility]028-A  MRN: [id number]  : 1950  (71 y.o.)  Referring Practitioner: Anirudh Craven DO  Diagnosis: septic shock  Additional Pertinent Hx: Per H&P: Patient is a 71-year-old female with a past medical history of adrenal insufficiency, hypothyroidism, paroxysmal atrial fibrillation, history of pituitary tumor, tension, stroke, and chronic diastolic heart failure admitted for left lower back pain. Patient states that for the last few days she has been having submandibular swelling and redness in the last few days. Patient has difficulty opening her mouth and has not been eating or drinking. She was recently admitted for the last month. Pt found to have septic shock due to Periapical Abscess with enlargement of the left submandibular gland    Restrictions/Precautions:  Restrictions/Precautions: General Precautions, Fall Risk  Position Activity Restriction  Other position/activity restrictions: h/o CVA with L sided weakness    SUBJECTIVE: Pt seated upright in bed upon arrival, agreeable to OT session. General Comment: Pt noted to have increased edema at BLE and LUE with seeping- elevated BLE at end of session and applied chux pads under BLE and LUE. PAIN: No c/o. Vitals: Nurse checked vitals prior to session    COGNITION: Slow Processing, Decreased Recall, Decreased Insight, Inattention, Decreased Problem Solving, and Decreased Safety Awareness    ADL:   Bathing: Maximum Assistance, X 2. For sponge bathing rolling side to side in bed d/t large loose incontinence of BM. Lower Extremity Dressing: Maximum Assistance, X 2. Donning brief rolling side to side in bed. Footwear Management: Dependent. For brace and socks. Toileting: Dependent. For hygiene after pt found to be incontinent at start of session rolling side to side in bed. Pt is currently reliant of external cath. BALANCE:  Sitting Balance:  Contact Guard Assistance, X 1. EOB  Standing Balance: Maximum Assistance, X 2. To sustain full standing position within Shahab Phillip- pt demo's significant flexed forward posture    BED MOBILITY:  Rolling to Left: Maximum Assistance, X 1   Rolling to Right: Maximum Assistance, X 1   Supine to Sit: Maximum Assistance, X 1 Raising trunk to seated position. ModA guiding BLE towards EOB  Scooting: Maximum Assistance, X 1 EOB- pt demo's poor force production through LUE    TRANSFERS:  Sit to Stand: Moderate Assistance, X 2, with Shahab Phillip. From min elevated EOB  Stand to Sit: Moderate Assistance, X 2, with Shahab Phillip. To chair  Comment: Titus for hand placement at [address] [place] [address]:  Dependent from EOB to chair with use of Shahab Phillip. ASSESSMENT:     Activity Tolerance:  Patient tolerance of  treatment: fair. Discharge Recommendations: Subacute/skilled nursing facility  Equipment Recommendations: Equipment Needed: No  Other: defer to next level of care  Plan: Times Per Week: 3-5x  Current Treatment Recommendations: Strengthening, ROM, Balance training, Endurance training, Patient/Caregiver education & training, Equipment evaluation, education, & procurement, Positioning, Self-Care / ADL    Patient Education  Patient Education: ADL's, Hemibody Awareness/Attention, Importance of Increasing Activity, and Sitting balance, Safety with transfers, Standing posture. Goals  Short Term Goals  Time Frame for Short Term Goals: by discharge  Short Term Goal 1: Pt will tolerate sitting at EOB X20 minutes with supervision in prep for ADL completion. Short Term Goal 2: Pt will complete BADL tasks, UB with SBA and LB with max assistance, to increase independence with self care tasks.   Short Term Goal 3: Pt will tolerate further assessment of stand pivot transfers by OTR when appropriate. Short Term Goal 4: patient will tolerate 2 min static standing with sit to stand and maintain upright posture with MIN A to increase ease with toileting. Following session, patient left in safe position with all fall risk precautions in place.

## 2023-01-13 NOTE — PLAN OF CARE
Problem: Discharge Planning  Goal: Discharge to home or other facility with appropriate resources  Outcome: Progressing  Flowsheets (Taken 1/12/2023 1403 by Reji Llanos, EITAN)  Discharge to home or other facility with appropriate resources:   Identify barriers to discharge with patient and caregiver   Arrange for needed discharge resources and transportation as appropriate   Identify discharge learning needs (meds, wound care, etc)     Problem: Infection - Adult  Goal: Isolation precautions  Description: Isolation precautions  Outcome: Progressing     Problem: Chronic Conditions and Co-morbidities  Goal: Patient's chronic conditions and co-morbidity symptoms are monitored and maintained or improved  Outcome: Progressing  Flowsheets (Taken 1/12/2023 1403 by Reji Llanos RN)  Care Plan - Patient's Chronic Conditions and Co-Morbidity Symptoms are Monitored and Maintained or Improved:   Monitor and assess patient's chronic conditions and comorbid symptoms for stability, deterioration, or improvement   Collaborate with multidisciplinary team to address chronic and comorbid conditions and prevent exacerbation or deterioration     Problem: Nutrition Deficit:  Goal: Optimize nutritional status  Outcome: Progressing  Flowsheets (Taken 1/12/2023 1403 by Reji Llanos RN)  Nutrient intake appropriate for improving, restoring, or maintaining nutritional needs:   Assess nutritional status and recommend course of action   Monitor oral intake, labs, and treatment plans   Recommend appropriate diets, oral nutritional supplements, and vitamin/mineral supplements     Problem: Skin/Tissue Integrity  Goal: Absence of new skin breakdown  Description: 1. Monitor for areas of redness and/or skin breakdown  2. Assess vascular access sites hourly  3. Every 4-6 hours minimum:  Change oxygen saturation probe site  4.   Every 4-6 hours:  If on nasal continuous positive airway pressure, respiratory therapy assess nares and determine need for appliance change or resting period.   Outcome: Progressing

## 2023-01-13 NOTE — PROGRESS NOTES
[address] [address]  INPATIENT PHYSICAL THERAPY  DAILY NOTE  [facility] ICU STEPDOWN TELEMETRY 4K - 4K-28/028-A    Time In: 1025  Time Out: 1050  Timed Code Treatment Minutes: 25 Minutes  Minutes: 25          Date: 2023  Patient Name: Ramy Stern,  Gender:  female        MRN: [id number]  : 1950  (76 y.o.)     Referring Practitioner: Dom Cramer DO  Diagnosis: Cellulitis of submadibular region  Additional Pertinent Hx: Per H&P: Patient is a 61-year-old female with a past medical history of adrenal insufficiency, hypothyroidism, paroxysmal atrial fibrillation, history of pituitary tumor, tension, stroke, and chronic diastolic heart failure admitted for left lower back pain. Patient states that for the last few days she has been having submandibular swelling and redness in the last few days. Patient has difficulty opening her mouth and has not been eating or drinking. She was recently admitted for the last month. Pt found to have septic shock due to Periapical Abscess with enlargement of the left submandibular gland. Prior Level of Function:  Lives With: Spouse  Type of Home: House  Home Layout: One level  Home Access: Stairs to enter with rails  Entrance Stairs - Number of Steps: 2  Home Equipment: Mardee Shallow, rolling, Wheelchair-manual, Lift chair        ADL Assistance: Needs assistance  Ambulation Assistance: Non-ambulatory  Transfer Assistance: Needs assistance  Active : No  Additional Comments: Prior to  admission, pt was living at home with .  was assisting with stand pivot transfers to/from w/c, pt nonambulatory. Since that admission, pt has been at [facility] for rehab.  currently in [facility] following back surgery.     Restrictions/Precautions:  Restrictions/Precautions: General Precautions, Fall Risk  Position Activity Restriction  Other position/activity restrictions: h/o CVA with L sided weakness     SUBJECTIVE: RN approved session, pt is seated in recliner, agreeable to PT.    PAIN: denies    Vitals: Vitals not assessed per clinical judgement, see nursing flowsheet    OBJECTIVE:  Bed Mobility:  Not Tested    Transfers:  Sit to Stand: Maximum Assistance, X 2  Stand to Sit:Maximum Assistance, X 2  **Multiple attempts to transfer with one assist in ayo stedy, pt unable to clear buttocks from bed, pt able to complete stand ~50% fully with max A x 2, bears weight <10 seconds; assist to place L hand on ayo stedy due to tone, and decreased L knee flexion due to tone from h/o CVA    Exercise:  Patient was guided in 1 set(s) 10 reps of exercise to both lower extremities. Ankle pumps, Hip abduction/adduction, Seated marches, and Long arc quads. Min A for R hip abd/add, mod A for L hip abd/add. L heel cord stretches 2 x 30 seconds. Exercises were completed for increased independence with functional mobility. Functional Outcome Measures: Completed  AM-PAC Inpatient Mobility without Stair Climbing Raw Score : 7  AM-PAC Inpatient without Stair Climbing T-Scale Score : 28.66    ASSESSMENT:  Assessment: Patient progressing toward established goals. Activity Tolerance:  Patient tolerance of  treatment: good. Equipment Recommendations:Equipment Needed: No  Discharge Recommendations: Subacte/Skilled Nursing Facility  Plan: Current Treatment Recommendations: Strengthening, ROM, Balance training, Functional mobility training, Neuromuscular re-education, Safety education & training, Positioning, Patient/Caregiver education & training, Therapeutic activities, Transfer training  General Plan:  (3-5x GM)    Patient Education  Patient Education: Transfers    Goals:  Patient Goals : to get better  Short Term Goals  Time Frame for Short Term Goals: by discharge  Short Term Goal 1: Pt to transfer supine <--> sit min A to enable pt to get in/out of bed. Short Term Goal 2: Pt to sit EOB x 20 minutes to improve upright tolerance in prep for transfer training.   Short Term Goal 3: sit to std with modA to get in/out of chairs  Short Term Goal 4: PT to assess gait  Long Term Goals  Time Frame for Long Term Goals : NA due to short length of stay. Following session, patient left in safe position with all fall risk precautions in place.

## 2023-01-13 NOTE — CARE COORDINATION
1/13/23, 11:16 AM EST    DISCHARGE ON GOING EVALUATION    Bryan Gtz day: 9  Location: -28/028-A Reason for admit: Cellulitis of submandibular region [K12.2]  Shock ([address] [R57.9]  Troponin level elevated [R77.8]  JESSE (acute kidney injury) ([facility] [address] [N17.9]  Submandibular gland infection [K11.20]  Septic shock ([facility] [address] [A41.9, R65.21]  Pneumonia of right lung due to infectious organism, unspecified part of lung [J18.9]   Procedure:   1/4 CT facial bones: 1. Enlargement of the left submandibular gland measuring up to 2.5 cm. 2. Asymmetric fullness of the left pharyngeal soft tissues with mass effect on the subglottic airway 3. Left mastoid effusions and otitis interna   1/4 CVC Right subclavian - 1/10 removed  1/9 Echo with EF 60%    Barriers to Discharge: Changed to oral ATB. Decreased steroid dose. Afebrile. SB 50's. On room air. Oriented x4. Follows commands. SLP/PT/OT. Intensivist and ENT following. +MRSA nares. Telemetry, external urinary catheter. Po augmentin, lipitor, IV solucortef 50 mg bid, synthroid, midodrine, IV protonix, xarelto, Electrolyte replacement protocols. K+ 2.9, Ical 0.89, alb 2.5, ast 54, wbc 12, hgb 9.9. PCP: Alden Martínez MD  Readmission Risk Score: 23.2%  Patient Goals/Plan/Treatment Preferences: From home w/ and current with [facility]. Plan for [facility] SNF if accepted; will require precert. SW on case.

## 2023-01-13 NOTE — DISCHARGE INSTR - COC
Continuity of Care Form    Patient Name: Lucyann Severe   :  1950  MRN:  [id number]    Admit date:  2023  Discharge date:  2023      Code Status Order: Limited   Advance Directives:     Admitting Physician:  Elisabet Villanueva MD  PCP: Terrell Gandhi MD    Discharging Nurse: [facility] Unit/Room#: 4K-28/028-A  Discharging Unit Phone Number: [phone]    Emergency Contact:   Extended Emergency Contact Information  Primary Emergency Contact: Francois Johnson  Address: [address]           [address] Phone: [phone]  Relation: Spouse    Past Surgical History:  Past Surgical History:   Procedure Laterality Date    [address] due to pituitary mass, drained and s/p radiation    CHOLECYSTECTOMY      HYSTERECTOMY (CERVIX STATUS UNKNOWN)      total done for DUB    OTHER SURGICAL HISTORY  2014    LAPAROSCOPIC RUPTURED APPENDECTOMY    OTHER SURGICAL HISTORY Left 10/26/15    Evacuation and Debridement of Left Lower Leg Hematoma - Dr. Denny Shannon  3/13/2013    left    SHOULDER ARTHROPLASTY      right    TOOTH EXTRACTION  2018    TOTAL HIP ARTHROPLASTY      bilateral    TOTAL KNEE ARTHROPLASTY      right        Immunization History:   Immunization History   Administered Date(s) Administered    Influenza A (F1S9-41) Vaccine PF IM 2009    Influenza Virus Vaccine 10/16/2014, 2016    Influenza Whole 2016    Influenza, FLUAD, (age 72 y+), Adjuvanted, 0.5mL 2021    Influenza, High Dose (Fluzone 65 yrs and older) 2018    Pneumococcal Conjugate 13-valent ([id number]) 2016    Pneumococcal Polysaccharide ([id number]) 2017    Tdap (Boostrix, Adacel) 2015, 2017    Zoster Live (Zostavax) 2015       Active Problems:  Patient Active Problem List   Diagnosis Code    Asthma J45.909    Hyperlipidemia E78.5    Osteoarthritis M19.90    GERD (gastroesophageal reflux disease) K21.9    Meningioma ([facility]) D32.9    Mechanical loosening of prosthetic joint ([facility]) T84.039A    Hyperglycemia R73.9    SIRS (systemic inflammatory response syndrome) ([facility]) R65.10    Leukocytosis D72.829    Right sided weakness R53.1    Cerebrovascular disease D19.6    Metabolic acidosis T63.94    Sinus bradycardia R00.1    Generalized weakness R53.1    Hx of subdural hematoma Z86.79    Cerebral infarction ([facility]) I63.9    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, 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    Hypercoagulable state ([facility]) D68.59    Fatigue R53.83    Atrial thrombosis I51.3    Microcytic anemia D50.9    Hypokalemia E87.6    Altered mental status R41.82    Postoperative hypothyroidism E89.0    Hypernatremia A07.4    Metabolic encephalopathy C18.86    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 5XAA    JESSE (acute kidney injury) ([facility] [address] N17.9    Hyperkalemia E87.5    Gastroenteritis due to norovirus Z61.42    Acute diastolic heart failure (HCC) I50.31    History of stroke with residual effects I69.30    Chronic venous stasis dermatitis of both lower extremities I87.2    Normocytic anemia D64.9    Chronic diastolic congestive heart failure (HCC) I50.32    CKD (chronic kidney disease), stage IV (HCC) N18.4    Confusion R41.0    Chronic kidney disease (CKD), stage III (moderate) (HCC) N18.30    Obesity (BMI 30.0-34. 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.0    Hypothyroidism E03.9    Scalp lesion L98.9    Non-compliance F/u advised Z91.199    Verruca vulgaris B07.9    COVID-19 U07.1    Septic shock ([facility]) A41.9, R65.21    Cellulitis of submandibular region K12.2    Severe malnutrition ([facility]) E43    Submandibular gland infection K11.20       Isolation/Infection:   Isolation            Contact          Patient Infection Status       Infection Onset Added Last Indicated Last Indicated By Review Planned Expiration Resolved Resolved By    MRSA 01/04/23 01/05/23 01/04/23 MRSA by PCR        PCR 1/2023    Resolved    COVID-19 11/23/22 11/23/22 11/23/22 COVID-19 & Influenza Combo   12/06/22 Poonam López RN    + 11/23    COVID-19 (Rule Out) 11/23/22 11/23/22 11/23/22 COVID-19 & Influenza Combo (Ordered)   11/23/22 Rule-Out Test Resulted    VRE  01/10/17 01/10/17 Poonam López RN   01/02/19 Stefani Ball RN    Urine 1/17    MRSA  12/14/12 12/14/12 Kortney Kovacs, EITAN   06/16/15 Kitty Bar RN    + rt leg 12-11-12            Nurse Assessment:  Last Vital Signs: BP (!) 116/59   Pulse 61   Temp 98.3 °F (36.8 °C) (Oral)   Resp 18   Ht 5' 3\" (1.6 m)   Wt 196 lb 8 oz (89.1 kg)   SpO2 97%   BMI 34.81 kg/m²     Last documented pain score (0-10 scale): Pain Level: 0  Last Weight:   Wt Readings from Last 1 Encounters:   01/13/23 196 lb 8 oz (89.1 kg)     Mental Status:  alert, coherent, logical, thought processes intact, and able to concentrate and follow conversation    IV Access:  - None    Nursing Mobility/ADLs:  Walking   Dependent  Transfer  Dependent  Bathing  Assisted  Dressing  Assisted  Toileting  Assisted  Feeding  Assisted  Med Admin  Assisted  Med Delivery    crushed    Wound Care Documentation and Therapy:  Wound 11/23/22 Pretibial Proximal;Right (Active)   Wound Etiology Skin Tear 01/13/23 1227   Dressing Status Clean;Dry; Intact 01/13/23 1227   Wound Cleansed Not Cleansed 01/13/23 1227   Dressing/Treatment Dry dressing 01/13/23 1227   Wound Assessment Other (Comment) 01/13/23 1227   Drainage Amount Large 01/13/23 0345   Drainage Description Serosanguinous 01/13/23 0345   Odor None 01/13/23 0345   Jennifer-wound Assessment Intact;Dry/flaky 01/13/23 1227   Number of days: 50       Wound 11/23/22 Elbow Anterior; Left skin tearing d/t tight coban placed (Active)   Wound Etiology Skin Tear 01/13/23 1227   Dressing Status Other (Comment) 01/05/23 0730   Wound Cleansed Soap and water 01/10/23 0000   Dressing/Treatment Open to air 01/13/23 1227   Wound Assessment Pink/red 01/13/23 1227   Drainage Amount Small 01/13/23 1227   Drainage Description Serosanguinous 01/13/23 1227   Odor None 01/13/23 1227   Jennifer-wound Assessment Fragile 01/13/23 1227   Number of days: 50       Wound 11/24/22 Coccyx (Active)   Wound Etiology Deep tissue/Injury 01/13/23 1227   Dressing Status Clean;Dry; Intact 01/13/23 1227   Wound Cleansed Not Cleansed 01/13/23 1227   Dressing/Treatment Zinc paste;Open to air 01/13/23 1227   Wound Assessment Purple/maroon;Non-blanchable erythema 01/13/23 1227   Drainage Amount None 01/13/23 1227   Odor None 01/12/23 0845   Jennifer-wound Assessment Blanchable erythema;Fragile 01/13/23 1227   Number of days: 50       Wound 01/04/23 Buttocks Right (Active)   Wound Etiology Pressure Stage 2 01/13/23 1227   Dressing Status Other (Comment) 01/08/23 0800   Wound Cleansed Not Cleansed 01/13/23 1227   Dressing/Treatment Open to air;Zinc paste 01/13/23 1227   Wound Assessment Pink/red;Dry 01/13/23 1227   Drainage Amount Scant 01/12/23 0845   Drainage Description Serous 01/12/23 0845   Odor None 01/13/23 1227   Jennifer-wound Assessment Blanchable erythema;Fragile 01/13/23 1227   Number of days: 8       Wound 01/04/23 Buttocks Left (Active)   Wound Etiology Pressure Stage 2 01/13/23 1227   Dressing Status Other (Comment) 01/08/23 0800   Wound Cleansed Not Cleansed 01/13/23 1227   Dressing/Treatment Open to air;Zinc paste 01/13/23 1227   Wound Assessment Pink/red;Dry 01/13/23 1227   Drainage Amount Scant 01/12/23 0845   Drainage Description Serous 01/12/23 0845   Odor None 01/13/23 1227   Jennifer-wound Assessment Blanchable erythema;Fragile 01/13/23 1227   Number of days: 8       Wound 01/12/23 Groin Anterior;Mid;Right (Active)   Wound Etiology Pressure Stage 2 01/13/23 1227   Dressing Status Dry; Intact; Clean 01/13/23 1227   Wound Cleansed Cleansed with saline 01/13/23 0100   Dressing/Treatment Dry dressing 01/13/23 1227   Wound Assessment Bleeding;Pink/red;Slough 01/13/23 0100   Drainage Amount Small 01/13/23 0100   Drainage Description Sanguinous 01/13/23 0100   Jennifer-wound Assessment Blanchable erythema;Fragile 01/13/23 0100   Number of days: 0        Elimination:  Continence: Bowel: No  Bladder: No  Urinary Catheter: None   Colostomy/Ileostomy/Ileal Conduit: No       Date of Last BM: 1/16/2023      Intake/Output Summary (Last 24 hours) at 1/13/2023 1549  Last data filed at 1/13/2023 1521  Gross per 24 hour   Intake 2702.46 ml   Output 1120 ml   Net 1582.46 ml     I/O last 3 completed shifts: In: 2672.8 [P.O.:200; I.V.:1978.7; IV Piggyback:494.1]  Out: 1570 [Urine:1570]    Safety Concerns: At Risk for Falls    Impairments/Disabilities:      None    Nutrition Therapy:  Current Nutrition Therapy:   - Oral Diet:  minced and moist, honey thickened liquids, no ice    Routes of Feeding: Oral  Liquids: Honey Thick Liquids  Daily Fluid Restriction: no  Last Modified Barium Swallow with Video (Video Swallowing Test): not done    Treatments at the Time of Hospital Discharge:   Respiratory Treatments: n/a  Oxygen Therapy:  is not on home oxygen therapy.   Ventilator:    - No ventilator support    Rehab Therapies: Physical Therapy, Occupational Therapy, and Speech/Language Therapy  Weight Bearing Status/Restrictions: No weight bearing restrictions  Other Medical Equipment (for information only, NOT a DME order):  walker  Other Treatments: n/a      Patient's personal belongings (please select all that are sent with patient):  Glasses, Hearing Aides Nick altamirano    RN SIGNATURE:  Electronically signed by Catrachito Doherty RN on 1/17/23 at 11:07 AM EST    CASE MANAGEMENT/SOCIAL WORK SECTION    Inpatient Status Date: 01/04/2023    Readmission Risk Assessment Score:  Readmission Risk              Risk of Unplanned Readmission:  29           Discharging to Facility/ Agency   Name: [facility]  Address: [address] [place], [address] [place]  Phone: [phone]  Fax: [phone]    Dialysis Facility (if applicable)   Name:  Address:  Dialysis Schedule:  Phone:  Fax:    / signature: Electronically signed by Refugio Soulier, LSW on 1/13/23 at 3:50 PM EST    PHYSICIAN SECTION    Prognosis: Fair    Condition at Discharge: Stable    Rehab Potential (if transferring to Rehab): Fair    Recommended Labs or Other Treatments After Discharge: Coreg held due to bradycardia. Needs to complete a course of augmentin. Physician Certification: I certify the above information and transfer of Lizett Trimble  is necessary for the continuing treatment of the diagnosis listed and that she requires [facility] for greater 30 days.      Update Admission H&P: No change in H&P    PHYSICIAN SIGNATURE:  Electronically signed by Chrissie Mullins DO on 1/17/23 at 10:13 AM EST

## 2023-01-13 NOTE — PLAN OF CARE
Problem: Discharge Planning  Goal: Discharge to home or other facility with appropriate resources  1/12/2023 2132 by Altamese Sever, RN  Outcome: Bozena Cox (Taken 1/12/2023 1403 by Lucrecia Ward RN)  Discharge to home or other facility with appropriate resources:   Identify barriers to discharge with patient and caregiver   Arrange for needed discharge resources and transportation as appropriate   Identify discharge learning needs (meds, wound care, etc)  Note: Discharge planning in progress. 1/12/2023 1403 by Lucrecia Ward RN  Outcome: Progressing  Flowsheets (Taken 1/12/2023 1403)  Discharge to home or other facility with appropriate resources:   Identify barriers to discharge with patient and caregiver   Arrange for needed discharge resources and transportation as appropriate   Identify discharge learning needs (meds, wound care, etc)     Problem: Infection - Adult  Goal: Isolation precautions  Description: Isolation precautions  1/12/2023 2132 by Altamese Sever, RN  Outcome: Progressing  Note: Contact isolation education reinforced.    1/12/2023 1403 by Lucrecia Ward RN  Outcome: Progressing     Problem: Chronic Conditions and Co-morbidities  Goal: Patient's chronic conditions and co-morbidity symptoms are monitored and maintained or improved  1/12/2023 2132 by Altamese Sever, RN  Outcome: Progressing  Flowsheets (Taken 1/12/2023 1403 by Lucrecia Ward RN)  Care Plan - Patient's Chronic Conditions and Co-Morbidity Symptoms are Monitored and Maintained or Improved:   Monitor and assess patient's chronic conditions and comorbid symptoms for stability, deterioration, or improvement   Collaborate with multidisciplinary team to address chronic and comorbid conditions and prevent exacerbation or deterioration  1/12/2023 1403 by Lucrecia Ward RN  Outcome: Progressing  Flowsheets (Taken 1/12/2023 1403)  Care Plan - Patient's Chronic Conditions and Co-Morbidity Symptoms are Monitored and Maintained or Improved: Monitor and assess patient's chronic conditions and comorbid symptoms for stability, deterioration, or improvement   Collaborate with multidisciplinary team to address chronic and comorbid conditions and prevent exacerbation or deterioration     Problem: Nutrition Deficit:  Goal: Optimize nutritional status  1/12/2023 2132 by Zack Roberto RN  Outcome: Progressing  Flowsheets (Taken 1/12/2023 1403 by Silvana Yang RN)  Nutrient intake appropriate for improving, restoring, or maintaining nutritional needs:   Assess nutritional status and recommend course of action   Monitor oral intake, labs, and treatment plans   Recommend appropriate diets, oral nutritional supplements, and vitamin/mineral supplements  1/12/2023 1403 by Silvana Yang RN  Outcome: Progressing  Flowsheets (Taken 1/12/2023 1403)  Nutrient intake appropriate for improving, restoring, or maintaining nutritional needs:   Assess nutritional status and recommend course of action   Monitor oral intake, labs, and treatment plans   Recommend appropriate diets, oral nutritional supplements, and vitamin/mineral supplements  1/12/2023 1144 by Vin Bowen RD  Outcome: Progressing  Flowsheets (Taken 1/12/2023 0127 by Melva Duckworth RN)  Nutrient intake appropriate for improving, restoring, or maintaining nutritional needs: Monitor oral intake, labs, and treatment plans     Problem: Skin/Tissue Integrity  Goal: Absence of new skin breakdown  Description: 1. Monitor for areas of redness and/or skin breakdown  2. Assess vascular access sites hourly  3. Every 4-6 hours minimum:  Change oxygen saturation probe site  4. Every 4-6 hours:  If on nasal continuous positive airway pressure, respiratory therapy assess nares and determine need for appliance change or resting period. 1/12/2023 2132 by Zack Roberto RN  Outcome: Progressing  Note: Skin breakdown present, see assessment documentation.    1/12/2023 1403 by Silvana Yang RN  Outcome: Progressing

## 2023-01-14 PROCEDURE — 6360000002 HC RX W HCPCS: Performed by: STUDENT IN AN ORGANIZED HEALTH CARE EDUCATION/TRAINING PROGRAM

## 2023-01-14 PROCEDURE — 2580000003 HC RX 258: Performed by: STUDENT IN AN ORGANIZED HEALTH CARE EDUCATION/TRAINING PROGRAM

## 2023-01-14 PROCEDURE — 6360000002 HC RX W HCPCS

## 2023-01-14 PROCEDURE — 6370000000 HC RX 637 (ALT 250 FOR IP): Performed by: STUDENT IN AN ORGANIZED HEALTH CARE EDUCATION/TRAINING PROGRAM

## 2023-01-14 PROCEDURE — 99233 SBSQ HOSP IP/OBS HIGH 50: CPT | Performed by: STUDENT IN AN ORGANIZED HEALTH CARE EDUCATION/TRAINING PROGRAM

## 2023-01-14 PROCEDURE — 1200000000 HC SEMI PRIVATE

## 2023-01-14 PROCEDURE — C9113 INJ PANTOPRAZOLE SODIUM, VIA: HCPCS | Performed by: STUDENT IN AN ORGANIZED HEALTH CARE EDUCATION/TRAINING PROGRAM

## 2023-01-14 PROCEDURE — 2500000003 HC RX 250 WO HCPCS: Performed by: STUDENT IN AN ORGANIZED HEALTH CARE EDUCATION/TRAINING PROGRAM

## 2023-01-14 RX ORDER — BUMETANIDE 1 MG/1
1 TABLET ORAL DAILY
Status: DISCONTINUED | OUTPATIENT
Start: 2023-01-14 | End: 2023-01-17 | Stop reason: HOSPADM

## 2023-01-14 RX ADMIN — POTASSIUM CHLORIDE 10 MEQ: 7.46 INJECTION, SOLUTION INTRAVENOUS at 00:47

## 2023-01-14 RX ADMIN — POTASSIUM CHLORIDE 10 MEQ: 7.46 INJECTION, SOLUTION INTRAVENOUS at 02:00

## 2023-01-14 RX ADMIN — MICONAZOLE NITRATE: 2 POWDER TOPICAL at 20:46

## 2023-01-14 RX ADMIN — BUMETANIDE 1 MG: 1 TABLET ORAL at 10:30

## 2023-01-14 RX ADMIN — SODIUM CHLORIDE, PRESERVATIVE FREE 10 ML: 5 INJECTION INTRAVENOUS at 20:41

## 2023-01-14 RX ADMIN — HYDROCORTISONE 25 MG: 5 TABLET ORAL at 20:41

## 2023-01-14 RX ADMIN — SALINE NASAL SPRAY 2 SPRAY: 1.5 SOLUTION NASAL at 05:10

## 2023-01-14 RX ADMIN — AMOXICILLIN AND CLAVULANATE POTASSIUM 1 TABLET: 875; 125 TABLET, FILM COATED ORAL at 20:41

## 2023-01-14 RX ADMIN — ATORVASTATIN CALCIUM 10 MG: 10 TABLET, FILM COATED ORAL at 07:47

## 2023-01-14 RX ADMIN — LEVOTHYROXINE SODIUM 125 MCG: 0.12 TABLET ORAL at 07:46

## 2023-01-14 RX ADMIN — RIVAROXABAN 15 MG: 15 TABLET, FILM COATED ORAL at 17:43

## 2023-01-14 RX ADMIN — POTASSIUM CHLORIDE 10 MEQ: 7.46 INJECTION, SOLUTION INTRAVENOUS at 03:06

## 2023-01-14 RX ADMIN — SODIUM CHLORIDE, PRESERVATIVE FREE 10 ML: 5 INJECTION INTRAVENOUS at 07:47

## 2023-01-14 RX ADMIN — HYDROCORTISONE SODIUM SUCCINATE 25 MG: 100 INJECTION, POWDER, FOR SOLUTION INTRAMUSCULAR; INTRAVENOUS at 07:47

## 2023-01-14 RX ADMIN — AMOXICILLIN AND CLAVULANATE POTASSIUM 1 TABLET: 875; 125 TABLET, FILM COATED ORAL at 07:46

## 2023-01-14 RX ADMIN — SALINE NASAL SPRAY 2 SPRAY: 1.5 SOLUTION NASAL at 17:43

## 2023-01-14 RX ADMIN — PANTOPRAZOLE SODIUM 40 MG: 40 INJECTION, POWDER, FOR SOLUTION INTRAVENOUS at 05:10

## 2023-01-14 ASSESSMENT — PAIN SCALES - GENERAL
PAINLEVEL_OUTOF10: 0
PAINLEVEL_OUTOF10: 0

## 2023-01-14 NOTE — PLAN OF CARE
Problem: Discharge Planning  Goal: Discharge to home or other facility with appropriate resources  1/13/2023 2304 by Janelle Wong RN  Outcome: Progressing  Flowsheets (Taken 1/13/2023 2304)  Discharge to home or other facility with appropriate resources:   Identify barriers to discharge with patient and caregiver   Arrange for needed discharge resources and transportation as appropriate   Identify discharge learning needs (meds, wound care, etc)     Problem: Infection - Adult  Goal: Isolation precautions  Description: Isolation precautions  1/13/2023 2304 by Janelle Wong RN  Outcome: Progressing  Note: Patient in contact isolation for MRSA.      Problem: Infection - Adult  Goal: Absence of infection during hospitalization  Outcome: Progressing  Flowsheets (Taken 1/13/2023 2304)  Absence of infection during hospitalization:   Assess and monitor for signs and symptoms of infection   Monitor lab/diagnostic results   Monitor all insertion sites i.e., indwelling lines, tubes and drains   Instruct and encourage patient and family to use good hand hygiene technique   Identify and instruct in appropriate isolation precautions for identified infection/condition   Administer medications as ordered     Problem: Chronic Conditions and Co-morbidities  Goal: Patient's chronic conditions and co-morbidity symptoms are monitored and maintained or improved  1/13/2023 2304 by Janelle Wong RN  Outcome: Progressing  Flowsheets (Taken 1/13/2023 2304)  Care Plan - Patient's Chronic Conditions and Co-Morbidity Symptoms are Monitored and Maintained or Improved:   Monitor and assess patient's chronic conditions and comorbid symptoms for stability, deterioration, or improvement   Collaborate with multidisciplinary team to address chronic and comorbid conditions and prevent exacerbation or deterioration   Update acute care plan with appropriate goals if chronic or comorbid symptoms are exacerbated and prevent overall improvement and discharge     Problem: Nutrition Deficit:  Goal: Optimize nutritional status  1/13/2023 2304 by Dania Drake RN  Outcome: Progressing  Flowsheets (Taken 1/13/2023 2304)  Nutrient intake appropriate for improving, restoring, or maintaining nutritional needs:   Assess nutritional status and recommend course of action   Monitor oral intake, labs, and treatment plans   Recommend appropriate diets, oral nutritional supplements, and vitamin/mineral supplements     Problem: Skin/Tissue Integrity  Goal: Absence of new skin breakdown  Description: 1. Monitor for areas of redness and/or skin breakdown  2. Assess vascular access sites hourly  3. Every 4-6 hours minimum:  Change oxygen saturation probe site  4. Every 4-6 hours:  If on nasal continuous positive airway pressure, respiratory therapy assess nares and determine need for appliance change or resting period. 1/13/2023 2304 by Dania Drake RN  Outcome: Progressing  Note: No new skin breakdown this shift. Problem: ABCDS Injury Assessment  Goal: Absence of physical injury  Outcome: Progressing  Flowsheets (Taken 1/13/2023 2304)  Absence of Physical Injury: Implement safety measures based on patient assessment  Note: Bed alarm in use this shift.      Problem: Pain  Goal: Verbalizes/displays adequate comfort level or baseline comfort level  Outcome: Progressing  Flowsheets (Taken 1/13/2023 2304)  Verbalizes/displays adequate comfort level or baseline comfort level:   Encourage patient to monitor pain and request assistance   Assess pain using appropriate pain scale   Administer analgesics based on type and severity of pain and evaluate response   Implement non-pharmacological measures as appropriate and evaluate response     Problem: Skin/Tissue Integrity - Adult  Goal: Skin integrity remains intact  Outcome: Progressing  Flowsheets (Taken 1/13/2023 2304)  Skin Integrity Remains Intact:   Monitor for areas of redness and/or skin breakdown Assess vascular access sites hourly     Problem: Musculoskeletal - Adult  Goal: Return mobility to safest level of function  Outcome: Progressing  Flowsheets (Taken 1/13/2023 2304)  Return Mobility to Safest Level of Function:   Assess patient stability and activity tolerance for standing, transferring and ambulating with or without assistive devices   Obtain physical therapy/occupational therapy consults as needed     Problem: Metabolic/Fluid and Electrolytes - Adult  Goal: Electrolytes maintained within normal limits  Outcome: Progressing  Flowsheets (Taken 1/13/2023 2302)  Electrolytes maintained within normal limits:   Monitor labs and assess patient for signs and symptoms of electrolyte imbalances   Administer electrolyte replacement as ordered   Monitor response to electrolyte replacements, including repeat lab results as appropriate   Care plan reviewed with patient. Patient verbalizes understanding of the plan of care and contribute to goal setting.

## 2023-01-14 NOTE — PLAN OF CARE
Problem: Discharge Planning  Goal: Discharge to home or other facility with appropriate resources  1/14/2023 1532 by Yogesh Beckwith RN  Outcome: Progressing  Flowsheets (Taken 1/14/2023 1532)  Discharge to home or other facility with appropriate resources: Identify barriers to discharge with patient and caregiver     Problem: Infection - Adult  Goal: Isolation precautions  Description: Isolation precautions  1/14/2023 1532 by Yogesh Beckwith RN  Outcome: Progressing     Problem: Infection - Adult  Goal: Absence of infection during hospitalization  1/14/2023 1532 by Yogesh Beckwith RN  Outcome: Progressing  Flowsheets (Taken 1/14/2023 1532)  Absence of infection during hospitalization:   Assess and monitor for signs and symptoms of infection   Administer medications as ordered   Instruct and encourage patient and family to use good hand hygiene technique     Problem: Chronic Conditions and Co-morbidities  Goal: Patient's chronic conditions and co-morbidity symptoms are monitored and maintained or improved  1/14/2023 1532 by Yogesh Beckwith RN  Outcome: Progressing  Flowsheets (Taken 1/14/2023 1532)  Care Plan - Patient's Chronic Conditions and Co-Morbidity Symptoms are Monitored and Maintained or Improved: Monitor and assess patient's chronic conditions and comorbid symptoms for stability, deterioration, or improvement     Problem: Nutrition Deficit:  Goal: Optimize nutritional status  1/14/2023 1532 by Yogesh Beckwith RN  Outcome: Progressing  Flowsheets (Taken 1/14/2023 1532)  Nutrient intake appropriate for improving, restoring, or maintaining nutritional needs:   Assess nutritional status and recommend course of action   Recommend appropriate diets, oral nutritional supplements, and vitamin/mineral supplements     Problem: Skin/Tissue Integrity  Goal: Absence of new skin breakdown  Description: 1. Monitor for areas of redness and/or skin breakdown  2.   Assess vascular access sites hourly  3. Every 4-6 hours minimum:  Change oxygen saturation probe site  4. Every 4-6 hours:  If on nasal continuous positive airway pressure, respiratory therapy assess nares and determine need for appliance change or resting period.   1/14/2023 1532 by Marlo Jenkins RN  Outcome: Progressing     Problem: ABCDS Injury Assessment  Goal: Absence of physical injury  1/14/2023 1532 by Marlo Jenkins RN  Outcome: Progressing  Flowsheets (Taken 1/14/2023 1532)  Absence of Physical Injury: Implement safety measures based on patient assessment     Problem: Pain  Goal: Verbalizes/displays adequate comfort level or baseline comfort level  1/14/2023 1532 by Marlo Jenkins RN  Outcome: Progressing  Flowsheets (Taken 1/14/2023 1532)  Verbalizes/displays adequate comfort level or baseline comfort level:   Encourage patient to monitor pain and request assistance   Assess pain using appropriate pain scale     Problem: Skin/Tissue Integrity - Adult  Goal: Skin integrity remains intact  1/14/2023 1532 by Marlo Jenkins RN  Outcome: Progressing  Flowsheets (Taken 1/14/2023 1532)  Skin Integrity Remains Intact: Monitor for areas of redness and/or skin breakdown     Problem: Musculoskeletal - Adult  Goal: Return mobility to safest level of function  1/14/2023 1532 by Marlo Jenkins RN  Outcome: Progressing  Flowsheets (Taken 1/14/2023 1532)  Return Mobility to Safest Level of Function:   Ensure adequate protection for wounds/incisions during mobilization   Assist with transfers and ambulation using safe patient handling equipment as needed     Problem: Metabolic/Fluid and Electrolytes - Adult  Goal: Electrolytes maintained within normal limits  1/14/2023 1532 by Marlo Jenkins RN  Outcome: Progressing  Flowsheets (Taken 1/14/2023 1532)  Electrolytes maintained within normal limits: Monitor labs and assess patient for signs and symptoms of electrolyte imbalances    Care plan reviewed with patient and family. Patient and family verbalize understanding of the plan of care and contribute to goal setting.

## 2023-01-14 NOTE — PROGRESS NOTES
Notified Dr. Margaret Whelan of phlebotomy unable to draw patients ordered lab from this morning. Unsuccessful x 5. No new orders at this time.

## 2023-01-14 NOTE — PLAN OF CARE
Problem: Discharge Planning  Goal: Discharge to home or other facility with appropriate resources  1/14/2023 1036 by Narinder Curtis RN  Outcome: Progressing  Flowsheets (Taken 1/14/2023 0886)  Discharge to home or other facility with appropriate resources: Identify barriers to discharge with patient and caregiver     Problem: Infection - Adult  Goal: Isolation precautions  Description: Isolation precautions  1/14/2023 1036 by Narinder Curtis RN  Outcome: Progressing     Problem: Infection - Adult  Goal: Absence of infection during hospitalization  1/14/2023 1036 by Narinder Curtis RN  Outcome: Progressing  Flowsheets (Taken 1/14/2023 0744)  Absence of infection during hospitalization: Assess and monitor for signs and symptoms of infection     Problem: Chronic Conditions and Co-morbidities  Goal: Patient's chronic conditions and co-morbidity symptoms are monitored and maintained or improved  1/14/2023 1036 by Narinder Curtis RN  Outcome: Progressing  Flowsheets (Taken 1/14/2023 0744)  Care Plan - Patient's Chronic Conditions and Co-Morbidity Symptoms are Monitored and Maintained or Improved: Monitor and assess patient's chronic conditions and comorbid symptoms for stability, deterioration, or improvement     Problem: Nutrition Deficit:  Goal: Optimize nutritional status  1/14/2023 1036 by Narinder Curtis RN  Outcome: Progressing     Problem: Skin/Tissue Integrity  Goal: Absence of new skin breakdown  Description: 1. Monitor for areas of redness and/or skin breakdown  2. Assess vascular access sites hourly  3. Every 4-6 hours minimum:  Change oxygen saturation probe site  4. Every 4-6 hours:  If on nasal continuous positive airway pressure, respiratory therapy assess nares and determine need for appliance change or resting period.   1/14/2023 1036 by Narinder Curtis RN  Outcome: Progressing     Problem: ABCDS Injury Assessment  Goal: Absence of physical injury  1/14/2023 1036 by Narinder Curtis RN  Outcome: Progressing     Problem: Pain  Goal: Verbalizes/displays adequate comfort level or baseline comfort level  1/14/2023 1036 by Aldair Powers RN  Outcome: Progressing     Problem: Skin/Tissue Integrity - Adult  Goal: Skin integrity remains intact  1/14/2023 1036 by Aldair Powers RN  Outcome: Progressing  Flowsheets (Taken 1/14/2023 0744)  Skin Integrity Remains Intact: Monitor for areas of redness and/or skin breakdown     Problem: Musculoskeletal - Adult  Goal: Return mobility to safest level of function  1/14/2023 1036 by Aldair Powers RN  Outcome: Progressing  Flowsheets (Taken 1/14/2023 0744)  Return Mobility to Safest Level of Function: Assess patient stability and activity tolerance for standing, transferring and ambulating with or without assistive devices     Problem: Metabolic/Fluid and Electrolytes - Adult  Goal: Electrolytes maintained within normal limits  1/14/2023 1036 by Aldair Powers RN  Outcome: Progressing  Flowsheets (Taken 1/14/2023 0744)  Electrolytes maintained within normal limits:   Monitor labs and assess patient for signs and symptoms of electrolyte imbalances   Administer electrolyte replacement as ordered   Monitor response to electrolyte replacements, including repeat lab results as appropriate     Care plan reviewed with patient. Patient verbalize understanding of the plan of care and contribute to goal setting.

## 2023-01-14 NOTE — PROGRESS NOTES
Hospitalist Progress Note      Patient:  Yen Velez    Unit/Bed:4K-28/028-A  YOB: 1950  MRN: [id number]   Acct: [de-identified]   PCP: Jeremías Encinas MD  Date of Admission: 1/4/2023    Assessment/Plan:    Periapical abscess with enlargement of the left submandibular gland  Transition to Augmentin on 1/13 for an additional 7 days. Previously on unasyn. Encourage sialogogues  ENT following. Adrenal insufficiency with adrenal crisis  Improving with control of infection as above  Stable off midodrine. Decrease solucortef dose again today. Will continue to wean as able back down to baseline 20mg qAM, 10mg qPM  Mild hypernatremia  Chronic in nature. Likely due to chronic steroid use and bumex. Resolved with D5w/increased free water. HFpEF, not in acute exacerbation  Coreg and bumex held due to bradycardia and hypotension. Ok to resume bumex today given chronic hypervolemia with third spacing. AF with RVR, resolved  Back in sinus rhythm. Coreg held due to bradycardia/hypotension. On xarelto  HTN  Holding anti-HTN meds. Off midodrine and weaning solu-cortef. HLD  On statin  hypothyroidism  On synthroid  Severe protein calorie malnutrition  Dietary consult  Physical debility with deconditioning  PT/OT  CM following. Planning SNF at discharge    Disposition: Pending DC to SNF once BP is stable off midodrine and lower dose solu-cortef. Has been transitioned to PO abx. Aggressively decreasing solu-cortef. Transitioned to PO today. Chief Complaint: left lower jaw pain. Hospital Course:    Patient is a 78yo F w/ significant PMH Atrial thrombosis, CHF, CVA, PAF, HTN, HLD, asthma, GERD, DMII, adrenal insufficiency, hypothyroidism, hx meningioma x3 admitted to [facility] on 1/04/2023 for Left Lower Jaw Pain. Reported noted submandibular swelling and redness that is painful for few days before.  Endorsed difficulty opening mouth  and decreased oral intake of both liquids and solids. Admitted 11/2022 for Acute Hypoxic Resp Failure 2/2 LLL PNA where she was discharged on 2L O2 NC after completion of abx treatment. In ED, hypotensive but othrewise vitals stable. Labs hypernatremic w/ minor JESSE. Leukocytosis WBC 25.0. UA negative for UTI, blood cultures drawn. EKG sinus eladia w/o acute ischemic changes from prior. CT facial bones noted enlargement of left submandibular gland measuring 2.5cm w/ assymetric left pharyngeal soft tissues and mass effect on subglottic airway. CXR notable for poor lung inflation, mod atelectasis/PNA in R parahilar region and bilat lung bases. S/p IVF bolus; patient did not improve w/ fluids. Admitted to ICU for pressor support. Treated w/ Unasyn. Weaned down pressor support w/ increasing alertness and improved speech. Patient started diet 1/06; tolerated well. Family requested PM&R consult for rehab evaluation as  who care for her at home is hospitalized after a surgery. Started Midodrine 10mg TID 1/8/2023.     1/9/2023: Patient off Levophed; BP borderline on Midodrine 10mg TID. Levophed was required to be started again. Sodium continues to trend up, likely due to SoluCortef. 1/10/2023: patient off Levophed; BP much better after increased steroid dose. Mariusz diet. Decreased submandibular swelling. Plan for transfer out of ICU today. 1/11: started weaing midodrine and solu-cortef. Patient tolerating well. Remains on unasyn. 1/12: no complaints. Tolerating midodrine and solu-cortef weaning. 1/13: off midodrine. Weaning solucortef. SNF pre-cert. Subjective (past 24 hours):   Doing well. No fevers or chills. BP stable overnight. Voices no complaints. Past medical history, family history, social history and allergies reviewed again and is unchanged since admission. ROS (12 point review of systems completed. Pertinent positives noted.  Otherwise ROS is negative)     Medications: Reviewed    Infusion Medications    sodium chloride 10 mL/hr at 01/13/23 0606     Scheduled Medications    hydrocortisone sodium succinate PF  25 mg IntraVENous BID    calcium replacement protocol   Other RX Placeholder    amoxicillin-clavulanate  1 tablet Oral 2 times per day    rivaroxaban  15 mg Oral Daily    sodium chloride  2 spray Each Nostril Q12H    sodium chloride flush  5-40 mL IntraVENous 2 times per day    levothyroxine  125 mcg Oral Daily    [Held by provider] pantoprazole  40 mg Oral QAM AC    atorvastatin  10 mg Oral Daily    pantoprazole  40 mg IntraVENous QAM AC     PRN Meds: magnesium sulfate, potassium chloride **OR** potassium chloride, sodium chloride flush, sodium chloride, ondansetron **OR** ondansetron, polyethylene glycol, acetaminophen **OR** acetaminophen      Intake/Output Summary (Last 24 hours) at 1/14/2023 4459  Last data filed at 1/14/2023 0401  Gross per 24 hour   Intake 120 ml   Output 950 ml   Net -830 ml         Diet:  ADULT ORAL NUTRITION SUPPLEMENT; Breakfast, Dinner; Frozen Oral Supplement  ADULT DIET; Dysphagia - Minced and Moist; Moderately Thick (Honey)    Exam:  /65   Pulse 52   Temp 97.6 °F (36.4 °C) (Oral)   Resp 18   Ht 5' 3\" (1.6 m)   Wt 204 lb (92.5 kg)   SpO2 97%   BMI 36.14 kg/m²   General appearance: No apparent distress, appears stated age and cooperative. HEENT: Pupils equal, round, and reactive to light. Conjunctivae/corneas clear. Neck: Minimal swelling of the left submandibular region. Respiratory:  Normal respiratory effort. Clear to auscultation, bilaterally without Rales/Wheezes/Rhonchi. Cardiovascular: Regular rate and rhythm with normal S1/S2 without murmurs, rubs or gallops. Abdomen: Soft, non-tender, non-distended with normal bowel sounds. Musculoskeletal: Diffuse anasarca. Skin: Multiple skin excoriations and areas of ecchymosis. Neurologic:  Neurovascularly intact without any focal sensory/motor deficits.  Cranial nerves: II-XII intact, grossly non-focal.  Psychiatric: Alert and oriented, thought content appropriate, normal insight  Capillary Refill: Brisk,< 3 seconds   Peripheral Pulses: +2 palpable, equal bilaterally     Labs:   Recent Labs     01/12/23  0656 01/13/23 0625   WBC 13.9* 12.0*   HGB 10.3* 9.9*   HCT 33.8* 32.6*    245       Recent Labs     01/12/23  0656 01/13/23 0625 01/13/23 2040   * 144  --    K 3.5 2.9* 3.0*   * 110  --    CO2 26 25  --    BUN 15 13  --    CREATININE 0.7 0.7  --    CALCIUM 8.1* 7.6*  --    PHOS 2.6 2.5  --        Recent Labs     01/12/23 0656 01/13/23 0625   AST 68* 54*   ALT 55 66   BILIDIR <0.2 <0.2   BILITOT 0.4 0.3   ALKPHOS 66 64       No results for input(s): INR in the last 72 hours. No results for input(s): Luis Eduardo Jerusalem in the last 72 hours. Microbiology:    Blood culture #1:   Lab Results   Component Value Date/Time    [facility]  01/04/2023 01:20 PM     No growth 24 hours. No growth 48 hours. No growth at 5 days       Blood culture #2:No results found for: BLOODCULT2    Organism:  Lab Results   Component Value Date/Time    ORG Staphylococcus aureus 12/17/2018 04:04 PM         Lab Results   Component Value Date/Time    LABGRAM  02/16/2022 04:00 PM     No segmented neutrophils observed. Rare epithelial cells observed. No organisms observed. MRSA culture only:No results found for: [facility]    Urine culture:   Lab Results   Component Value Date/Time    LABURIN No growth-preliminary  No growth   02/09/2017 12:35 PM       Respiratory culture: No results found for: CULTRESP    Aerobic and Anaerobic :  Lab Results   Component Value Date/Time    LABAERO No Acinetobacter species isolated. 01/04/2023 10:00 PM     Lab Results   Component Value Date/Time    LABANAE  12/01/2021 12:00 PM     No anaerobes isolated- preliminary Culture yielded light growth of gram positive bacilli most consistent with Cutibacterium species. Clinical correlation required.        Urinalysis:      Lab Results   Component Value Date/Time    NITRU NEGATIVE 01/04/2023 02:15 PM    WBCUA 5-9 01/04/2023 02:15 PM    WBCUA 2-4 02/22/2012 01:00 PM    BACTERIA NONE SEEN 01/04/2023 02:15 PM    RBCUA 3-5 01/04/2023 02:15 PM    BLOODU SMALL 01/04/2023 02:15 PM    SPECGRAV 1.025 09/18/2020 12:00 PM    GLUCOSEU NEGATIVE 01/04/2023 02:15 PM       Radiology:  XR CHEST PORTABLE   Final Result   1. Poor inflation the lungs. Mild cardiomegaly. Bilateral shoulder prostheses. 2. Right subclavian line has been inserted with tip in right atrium. 3. Moderate atelectasis/pneumonia right parahilar region and both lung bases. No effusion seen. **This report has been created using voice recognition software. It may contain minor errors which are inherent in voice recognition technology. **      Final report electronically signed by Dr. Sergio Anthony on 1/4/2023 4:42 PM      CT FACIAL BONES WO CONTRAST   Final Result   1. Enlargement of the left submandibular gland measuring up to 2.5 cm.   2. Asymmetric fullness of the left pharyngeal soft tissues with mass effect on the subglottic airway   3. Left mastoid effusions and otitis interna            **This report has been created using voice recognition software. It may contain minor errors which are inherent in voice recognition technology. **      Final report electronically signed by Dr. Mathew Drew on 1/4/2023 3:08 PM      XR CHEST PORTABLE   Final Result   Residual patchy airspace infiltrates left lower lobe. **This report has been created using voice recognition software. It may contain minor errors which are inherent in voice recognition technology. **      Final report electronically signed by Dr. Mathew Drew on 1/4/2023 12:41 PM        CT FACIAL BONES WO CONTRAST    Result Date: 1/4/2023  PROCEDURE: CT FACIAL BONES WO CONTRAST CLINICAL INFORMATION: Submandibular cellulitis swelling, possible erysipelas, ruling out submandibular phlegmon .  TECHNIQUE: 3 mm multiplanar images of the facial bones in bone and soft tissue windows noncontrast All CT scans at this facility use dose modulation, iterative reconstruction, and/or weight-based dosing when appropriate to reduce radiation dose to as low as reasonably achievable. COMPARISON: No prior study. 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 Soft tissue windows demonstrate either enlargement of the left submandibular gland or obscuration by inflammation. Appearance is more suggestive of inflammatory enlargement of the gland. Evaluation is limited without contrast. This measures 2.5 cm. Solid  attenuation coefficients Asymmetric fullness of the left pharyngeal soft tissues underlying inflammation or mass is a concern. There are left-sided mastoid effusions which is new when compared to CT brain dated 8/20/2019 there is also fluid in the left internal auditory canal.     1. Enlargement of the left submandibular gland measuring up to 2.5 cm. 2. Asymmetric fullness of the left pharyngeal soft tissues with mass effect on the subglottic airway 3. Left mastoid effusions and otitis interna **This report has been created using voice recognition software.  It may contain minor errors which are inherent in voice recognition technology.** Final report electronically signed by Dr. Kip Resendez on 1/4/2023 3:08 PM    XR CHEST PORTABLE    Result Date: 1/4/2023  PROCEDURE: XR CHEST PORTABLE CLINICAL INFORMATION: central line placement COMPARISON: 1/4/2023 TECHNIQUE: A single mobile view of the chest was obtained.     1. Poor inflation the lungs. Mild cardiomegaly. Bilateral shoulder prostheses. 2. Right subclavian line has been inserted with tip in right atrium. 3. Moderate atelectasis/pneumonia right parahilar region and both lung bases. No effusion seen. **This report has been created using voice recognition software.  It may contain minor errors which are inherent in voice  recognition technology. ** Final report electronically signed by Dr. Valeriano Robertson on 1/4/2023 4:42 PM    XR CHEST PORTABLE    Result Date: 1/4/2023  PROCEDURE: XR CHEST PORTABLE CLINICAL INFORMATION: weakness . TECHNIQUE: Portable upright COMPARISON: 12/3/2022 FINDINGS: Patient is rotated. Mandible appears detail the upper apices. Heart size is within normal limits. Mediastinum is not widened. Residual patchy infiltrates left base slightly improved compared to the prior exam. No effusions are seen. Vessels are not congested. Chronic increased lung markings suggesting mild parenchymal scarring bilateral shoulder replacements. Residual patchy airspace infiltrates left lower lobe. **This report has been created using voice recognition software. It may contain minor errors which are inherent in voice recognition technology. ** Final report electronically signed by Dr. Pedro Orr on 1/4/2023 12:41 PM      Electronically signed by Sunshine Oliva DO on 1/14/2023 at 8:12 AM

## 2023-01-15 LAB
ANION GAP SERPL CALCULATED.3IONS-SCNC: 7 MEQ/L (ref 8–16)
BASOPHILS # BLD: 0.1 %
BASOPHILS ABSOLUTE: 0 THOU/MM3 (ref 0–0.1)
BUN BLDV-MCNC: 9 MG/DL (ref 7–22)
CALCIUM SERPL-MCNC: 8.1 MG/DL (ref 8.5–10.5)
CHLORIDE BLD-SCNC: 119 MEQ/L (ref 98–111)
CO2: 29 MEQ/L (ref 23–33)
CREAT SERPL-MCNC: 0.6 MG/DL (ref 0.4–1.2)
EOSINOPHIL # BLD: 1.3 %
EOSINOPHILS ABSOLUTE: 0.2 THOU/MM3 (ref 0–0.4)
ERYTHROCYTE [DISTWIDTH] IN BLOOD BY AUTOMATED COUNT: 18.5 % (ref 11.5–14.5)
ERYTHROCYTE [DISTWIDTH] IN BLOOD BY AUTOMATED COUNT: 63 FL (ref 35–45)
GFR SERPL CREATININE-BSD FRML MDRD: > 60 ML/MIN/1.73M2
GLUCOSE BLD-MCNC: 76 MG/DL (ref 70–108)
HCT VFR BLD CALC: 32.4 % (ref 37–47)
HEMOGLOBIN: 9.9 GM/DL (ref 12–16)
IMMATURE GRANS (ABS): 0.14 THOU/MM3 (ref 0–0.07)
IMMATURE GRANULOCYTES: 1.1 %
LYMPHOCYTES # BLD: 18.4 %
LYMPHOCYTES ABSOLUTE: 2.4 THOU/MM3 (ref 1–4.8)
MCH RBC QN AUTO: 29.6 PG (ref 26–33)
MCHC RBC AUTO-ENTMCNC: 30.6 GM/DL (ref 32.2–35.5)
MCV RBC AUTO: 97 FL (ref 81–99)
MONOCYTES # BLD: 4.7 %
MONOCYTES ABSOLUTE: 0.6 THOU/MM3 (ref 0.4–1.3)
NUCLEATED RED BLOOD CELLS: 0 /100 WBC
PLATELET # BLD: 259 THOU/MM3 (ref 130–400)
PMV BLD AUTO: 10.3 FL (ref 9.4–12.4)
POTASSIUM SERPL-SCNC: 3.2 MEQ/L (ref 3.5–5.2)
RBC # BLD: 3.34 MILL/MM3 (ref 4.2–5.4)
SEG NEUTROPHILS: 74.4 %
SEGMENTED NEUTROPHILS ABSOLUTE COUNT: 9.8 THOU/MM3 (ref 1.8–7.7)
SODIUM BLD-SCNC: 155 MEQ/L (ref 135–145)
WBC # BLD: 13.2 THOU/MM3 (ref 4.8–10.8)

## 2023-01-15 PROCEDURE — 2580000003 HC RX 258: Performed by: STUDENT IN AN ORGANIZED HEALTH CARE EDUCATION/TRAINING PROGRAM

## 2023-01-15 PROCEDURE — 36415 COLL VENOUS BLD VENIPUNCTURE: CPT

## 2023-01-15 PROCEDURE — 80048 BASIC METABOLIC PNL TOTAL CA: CPT

## 2023-01-15 PROCEDURE — C9113 INJ PANTOPRAZOLE SODIUM, VIA: HCPCS | Performed by: STUDENT IN AN ORGANIZED HEALTH CARE EDUCATION/TRAINING PROGRAM

## 2023-01-15 PROCEDURE — 85025 COMPLETE CBC W/AUTO DIFF WBC: CPT

## 2023-01-15 PROCEDURE — 6370000000 HC RX 637 (ALT 250 FOR IP): Performed by: STUDENT IN AN ORGANIZED HEALTH CARE EDUCATION/TRAINING PROGRAM

## 2023-01-15 PROCEDURE — 6360000002 HC RX W HCPCS: Performed by: STUDENT IN AN ORGANIZED HEALTH CARE EDUCATION/TRAINING PROGRAM

## 2023-01-15 PROCEDURE — 1200000000 HC SEMI PRIVATE

## 2023-01-15 PROCEDURE — 99232 SBSQ HOSP IP/OBS MODERATE 35: CPT | Performed by: STUDENT IN AN ORGANIZED HEALTH CARE EDUCATION/TRAINING PROGRAM

## 2023-01-15 PROCEDURE — 6360000002 HC RX W HCPCS

## 2023-01-15 RX ORDER — HYDROCORTISONE 10 MG/1
20 TABLET ORAL DAILY
Status: DISCONTINUED | OUTPATIENT
Start: 2023-01-15 | End: 2023-01-17 | Stop reason: HOSPADM

## 2023-01-15 RX ORDER — DEXTROSE MONOHYDRATE 50 MG/ML
INJECTION, SOLUTION INTRAVENOUS CONTINUOUS
Status: ACTIVE | OUTPATIENT
Start: 2023-01-15 | End: 2023-01-17

## 2023-01-15 RX ORDER — HYDROCORTISONE 10 MG/1
10 TABLET ORAL
Status: DISCONTINUED | OUTPATIENT
Start: 2023-01-15 | End: 2023-01-17 | Stop reason: HOSPADM

## 2023-01-15 RX ADMIN — AMOXICILLIN AND CLAVULANATE POTASSIUM 1 TABLET: 875; 125 TABLET, FILM COATED ORAL at 20:16

## 2023-01-15 RX ADMIN — LEVOTHYROXINE SODIUM 125 MCG: 0.12 TABLET ORAL at 08:24

## 2023-01-15 RX ADMIN — SODIUM CHLORIDE, PRESERVATIVE FREE 10 ML: 5 INJECTION INTRAVENOUS at 08:24

## 2023-01-15 RX ADMIN — POTASSIUM CHLORIDE 10 MEQ: 7.46 INJECTION, SOLUTION INTRAVENOUS at 20:18

## 2023-01-15 RX ADMIN — SODIUM CHLORIDE, PRESERVATIVE FREE 10 ML: 5 INJECTION INTRAVENOUS at 20:16

## 2023-01-15 RX ADMIN — AMOXICILLIN AND CLAVULANATE POTASSIUM 1 TABLET: 875; 125 TABLET, FILM COATED ORAL at 08:24

## 2023-01-15 RX ADMIN — ATORVASTATIN CALCIUM 10 MG: 10 TABLET, FILM COATED ORAL at 08:24

## 2023-01-15 RX ADMIN — RIVAROXABAN 15 MG: 15 TABLET, FILM COATED ORAL at 17:53

## 2023-01-15 RX ADMIN — POTASSIUM CHLORIDE 10 MEQ: 7.46 INJECTION, SOLUTION INTRAVENOUS at 21:34

## 2023-01-15 RX ADMIN — BUMETANIDE 1 MG: 1 TABLET ORAL at 08:24

## 2023-01-15 RX ADMIN — POTASSIUM CHLORIDE 10 MEQ: 7.46 INJECTION, SOLUTION INTRAVENOUS at 15:04

## 2023-01-15 RX ADMIN — DEXTROSE MONOHYDRATE: 50 INJECTION, SOLUTION INTRAVENOUS at 10:58

## 2023-01-15 RX ADMIN — PANTOPRAZOLE SODIUM 40 MG: 40 INJECTION, POWDER, FOR SOLUTION INTRAVENOUS at 04:33

## 2023-01-15 RX ADMIN — HYDROCORTISONE 20 MG: 10 TABLET ORAL at 08:24

## 2023-01-15 RX ADMIN — MICONAZOLE NITRATE: 2 POWDER TOPICAL at 20:16

## 2023-01-15 RX ADMIN — HYDROCORTISONE 10 MG: 10 TABLET ORAL at 17:54

## 2023-01-15 RX ADMIN — MICONAZOLE NITRATE: 2 POWDER TOPICAL at 08:25

## 2023-01-15 RX ADMIN — SALINE NASAL SPRAY 2 SPRAY: 1.5 SOLUTION NASAL at 17:54

## 2023-01-15 RX ADMIN — DEXTROSE MONOHYDRATE: 50 INJECTION, SOLUTION INTRAVENOUS at 21:35

## 2023-01-15 RX ADMIN — POTASSIUM CHLORIDE 10 MEQ: 7.46 INJECTION, SOLUTION INTRAVENOUS at 23:39

## 2023-01-15 RX ADMIN — SODIUM CHLORIDE: 9 INJECTION, SOLUTION INTRAVENOUS at 20:17

## 2023-01-15 ASSESSMENT — PAIN SCALES - GENERAL: PAINLEVEL_OUTOF10: 0

## 2023-01-15 NOTE — PLAN OF CARE
Problem: Discharge Planning  Goal: Discharge to home or other facility with appropriate resources  Outcome: Progressing  Flowsheets (Taken 1/15/2023 1220)  Discharge to home or other facility with appropriate resources:   Identify barriers to discharge with patient and caregiver   Identify discharge learning needs (meds, wound care, etc)     Problem: Infection - Adult  Goal: Isolation precautions  Description: Isolation precautions  Outcome: Progressing     Problem: Infection - Adult  Goal: Absence of infection during hospitalization  Outcome: Progressing  Flowsheets (Taken 1/15/2023 1220)  Absence of infection during hospitalization:   Assess and monitor for signs and symptoms of infection   Monitor all insertion sites i.e., indwelling lines, tubes and drains   Administer medications as ordered     Problem: Chronic Conditions and Co-morbidities  Goal: Patient's chronic conditions and co-morbidity symptoms are monitored and maintained or improved  Outcome: Progressing  Flowsheets (Taken 1/15/2023 1220)  Care Plan - Patient's Chronic Conditions and Co-Morbidity Symptoms are Monitored and Maintained or Improved: Monitor and assess patient's chronic conditions and comorbid symptoms for stability, deterioration, or improvement     Problem: Nutrition Deficit:  Goal: Optimize nutritional status  Outcome: Progressing  Flowsheets (Taken 1/15/2023 1220)  Nutrient intake appropriate for improving, restoring, or maintaining nutritional needs:   Assess nutritional status and recommend course of action   Monitor oral intake, labs, and treatment plans   Recommend appropriate diets, oral nutritional supplements, and vitamin/mineral supplements     Problem: Skin/Tissue Integrity  Goal: Absence of new skin breakdown  Description: 1. Monitor for areas of redness and/or skin breakdown  2. Assess vascular access sites hourly  3. Every 4-6 hours minimum:  Change oxygen saturation probe site  4.   Every 4-6 hours:  If on nasal continuous positive airway pressure, respiratory therapy assess nares and determine need for appliance change or resting period. Outcome: Progressing     Problem: ABCDS Injury Assessment  Goal: Absence of physical injury  Outcome: Progressing  Flowsheets (Taken 1/15/2023 1220)  Absence of Physical Injury: Implement safety measures based on patient assessment     Problem: Pain  Goal: Verbalizes/displays adequate comfort level or baseline comfort level  Outcome: Progressing  Flowsheets (Taken 1/15/2023 1220)  Verbalizes/displays adequate comfort level or baseline comfort level:   Encourage patient to monitor pain and request assistance   Assess pain using appropriate pain scale   Administer analgesics based on type and severity of pain and evaluate response   Implement non-pharmacological measures as appropriate and evaluate response     Problem: Skin/Tissue Integrity - Adult  Goal: Skin integrity remains intact  Outcome: Progressing  Flowsheets (Taken 1/15/2023 1220)  Skin Integrity Remains Intact: Monitor for areas of redness and/or skin breakdown     Problem: Musculoskeletal - Adult  Goal: Return mobility to safest level of function  Outcome: Progressing  Flowsheets (Taken 1/15/2023 1220)  Return Mobility to Safest Level of Function:   Assess patient stability and activity tolerance for standing, transferring and ambulating with or without assistive devices   Ensure adequate protection for wounds/incisions during mobilization     Problem: Metabolic/Fluid and Electrolytes - Adult  Goal: Electrolytes maintained within normal limits  Outcome: Progressing  Flowsheets (Taken 1/15/2023 1220)  Electrolytes maintained within normal limits:   Monitor labs and assess patient for signs and symptoms of electrolyte imbalances   Administer electrolyte replacement as ordered      Care plan reviewed with patient. Patient verbalized understanding of the plan of care and contribute to goal setting.

## 2023-01-15 NOTE — PROGRESS NOTES
Hospitalist Progress Note      Patient:  Radha Olivares    Unit/Bed:5K-17/017-A  YOB: 1950  MRN: [id number]   Acct: [de-identified]   PCP: Justin Lal MD  Date of Admission: 1/4/2023    Assessment/Plan:    Periapical abscess with enlargement of the left submandibular gland  Transition to Augmentin on 1/13 for an additional 7 days. Previously on unasyn. Encourage sialogogues  ENT following. Adrenal insufficiency with adrenal crisis  Improving with control of infection as above  BP stable. Start maintenance steroid dose today. Mild hypernatremia  Chronic in nature. Likely due to chronic steroid use and bumex. Resolved with D5w/increased free water. Back on home bumex. HFpEF, not in acute exacerbation  Coreg held due to bradycardia. Bumex resumed. AF with RVR, resolved  Back in sinus rhythm. Coreg held due to bradycardia/hypotension. On xarelto  HTN  Holding anti-HTN meds. Off midodrine and back to baseline steroid dose today. Will resume Htn meds tomorrow if BP stable. HLD  On statin  hypothyroidism  On synthroid  Severe protein calorie malnutrition  Dietary consult  Physical debility with deconditioning  PT/OT  CM following. Planning SNF at discharge    Disposition: Medically stable for DC pending pre-cert to SNF. Chief Complaint: left lower jaw pain. Hospital Course:    Patient is a 78yo F w/ significant PMH Atrial thrombosis, CHF, CVA, PAF, HTN, HLD, asthma, GERD, DMII, adrenal insufficiency, hypothyroidism, hx meningioma x3 admitted to [facility] on 1/04/2023 for Left Lower Jaw Pain. Reported noted submandibular swelling and redness that is painful for few days before. Endorsed difficulty opening mouth  and decreased oral intake of both liquids and solids. Admitted 11/2022 for Acute Hypoxic Resp Failure 2/2 LLL PNA where she was discharged on 2L O2 NC after completion of abx treatment.      In ED, hypotensive but othrewise vitals stable. Labs hypernatremic w/ minor JESSE. Leukocytosis WBC 25.0. UA negative for UTI, blood cultures drawn. EKG sinus eladia w/o acute ischemic changes from prior. CT facial bones noted enlargement of left submandibular gland measuring 2.5cm w/ assymetric left pharyngeal soft tissues and mass effect on subglottic airway. CXR notable for poor lung inflation, mod atelectasis/PNA in R parahilar region and bilat lung bases. S/p IVF bolus; patient did not improve w/ fluids. Admitted to ICU for pressor support. Treated w/ Unasyn. Weaned down pressor support w/ increasing alertness and improved speech. Patient started diet 1/06; tolerated well. Family requested PM&R consult for rehab evaluation as  who care for her at home is hospitalized after a surgery. Started Midodrine 10mg TID 1/8/2023.     1/9/2023: Patient off Levophed; BP borderline on Midodrine 10mg TID. Levophed was required to be started again. Sodium continues to trend up, likely due to SoluCortef. 1/10/2023: patient off Levophed; BP much better after increased steroid dose. Mariusz diet. Decreased submandibular swelling. Plan for transfer out of ICU today. 1/11: started weaing midodrine and solu-cortef. Patient tolerating well. Remains on unasyn. 1/12: no complaints. Tolerating midodrine and solu-cortef weaning. 1/13: off midodrine. Weaning solucortef. SNF pre-cert. 1/14: Weaning solucortef. Transition to PO    Subjective (past 24 hours):   No events overnight. Patient without complaints toady. Past medical history, family history, social history and allergies reviewed again and is unchanged since admission. ROS (12 point review of systems completed. Pertinent positives noted.  Otherwise ROS is negative)     Medications:  Reviewed    Infusion Medications    sodium chloride 10 mL/hr at 01/13/23 0606     Scheduled Medications    hydrocortisone  20 mg Oral Daily    hydrocortisone  10 mg Oral Dinner bumetanide  1 mg Oral Daily    miconazole   Topical BID    calcium replacement protocol   Other RX Placeholder    amoxicillin-clavulanate  1 tablet Oral 2 times per day    rivaroxaban  15 mg Oral Daily    sodium chloride  2 spray Each Nostril Q12H    sodium chloride flush  5-40 mL IntraVENous 2 times per day    levothyroxine  125 mcg Oral Daily    [Held by provider] pantoprazole  40 mg Oral QAM AC    atorvastatin  10 mg Oral Daily    pantoprazole  40 mg IntraVENous QAM AC     PRN Meds: magnesium sulfate, potassium chloride **OR** potassium chloride, sodium chloride flush, sodium chloride, ondansetron **OR** ondansetron, polyethylene glycol, acetaminophen **OR** acetaminophen      Intake/Output Summary (Last 24 hours) at 1/15/2023 1034  Last data filed at 1/15/2023 0411  Gross per 24 hour   Intake 295 ml   Output 2100 ml   Net -1805 ml         Diet:  ADULT ORAL NUTRITION SUPPLEMENT; Breakfast, Dinner; Frozen Oral Supplement  ADULT DIET; Dysphagia - Minced and Moist; Moderately Thick (Honey)    Exam:  /63   Pulse 54   Temp 97.4 °F (36.3 °C) (Oral)   Resp 18   Ht 5' 3\" (1.6 m)   Wt 183 lb 4.8 oz (83.1 kg)   SpO2 95%   BMI 32.47 kg/m²   General appearance: No apparent distress, appears stated age and cooperative. HEENT: Pupils equal, round, and reactive to light. Conjunctivae/corneas clear. Neck: Minimal swelling of the left submandibular region. Respiratory:  Normal respiratory effort. Clear to auscultation, bilaterally without Rales/Wheezes/Rhonchi. Cardiovascular: Regular rate and rhythm with normal S1/S2 without murmurs, rubs or gallops. Abdomen: Soft, non-tender, non-distended with normal bowel sounds. Musculoskeletal: Diffuse anasarca. Skin: Multiple skin excoriations and areas of ecchymosis. Neurologic:  Neurovascularly intact without any focal sensory/motor deficits.  Cranial nerves: II-XII intact, grossly non-focal.  Psychiatric: Alert and oriented, thought content appropriate, normal insight  Capillary Refill: Brisk,< 3 seconds   Peripheral Pulses: +2 palpable, equal bilaterally     Labs:   Recent Labs     01/13/23  0625 01/15/23  0807   WBC 12.0* 13.2*   HGB 9.9* 9.9*   HCT 32.6* 32.4*    259       Recent Labs     01/13/23  0625 01/13/23  2040 01/15/23  0807     --  155*   K 2.9* 3.0* 3.2*     --  119*   CO2 25  --  29   BUN 13  --  9   CREATININE 0.7  --  0.6   CALCIUM 7.6*  --  8.1*   PHOS 2.5  --   --        Recent Labs     01/13/23  0625   AST 54*   ALT 66   BILIDIR <0.2   BILITOT 0.3   ALKPHOS 64       No results for input(s): INR in the last 72 hours. No results for input(s): Luther Seed in the last 72 hours. Microbiology:    Blood culture #1:   Lab Results   Component Value Date/Time    [facility]  01/04/2023 01:20 PM     No growth 24 hours. No growth 48 hours. No growth at 5 days       Blood culture #2:No results found for: BLOODCULT2    Organism:  Lab Results   Component Value Date/Time    ORG Staphylococcus aureus 12/17/2018 04:04 PM         Lab Results   Component Value Date/Time    LABGRAM  02/16/2022 04:00 PM     No segmented neutrophils observed. Rare epithelial cells observed. No organisms observed. MRSA culture only:No results found for: [address] [place] [address]    Urine culture:   Lab Results   Component Value Date/Time    LABURIN No growth-preliminary  No growth   02/09/2017 12:35 PM       Respiratory culture: No results found for: CULTRESP    Aerobic and Anaerobic :  Lab Results   Component Value Date/Time    LABAERO No Acinetobacter species isolated. 01/04/2023 10:00 PM     Lab Results   Component Value Date/Time    LABANAE  12/01/2021 12:00 PM     No anaerobes isolated- preliminary Culture yielded light growth of gram positive bacilli most consistent with Cutibacterium species. Clinical correlation required.        Urinalysis:      Lab Results   Component Value Date/Time    NITRU NEGATIVE 01/04/2023 02:15 PM    WBCUA 5-9 01/04/2023 02:15 PM    WBCUA 2-4 02/22/2012 01:00 PM    BACTERIA NONE SEEN 01/04/2023 02:15 PM    RBCUA 3-5 01/04/2023 02:15 PM    BLOODU SMALL 01/04/2023 02:15 PM    SPECGRAV 1.025 09/18/2020 12:00 PM    GLUCOSEU NEGATIVE 01/04/2023 02:15 PM       Radiology:  XR CHEST PORTABLE   Final Result   1. Poor inflation the lungs. Mild cardiomegaly. Bilateral shoulder prostheses. 2. Right subclavian line has been inserted with tip in right atrium. 3. Moderate atelectasis/pneumonia right parahilar region and both lung bases. No effusion seen. **This report has been created using voice recognition software. It may contain minor errors which are inherent in voice recognition technology. **      Final report electronically signed by Dr. Evelia Whatley on 1/4/2023 4:42 PM      CT FACIAL BONES WO CONTRAST   Final Result   1. Enlargement of the left submandibular gland measuring up to 2.5 cm.   2. Asymmetric fullness of the left pharyngeal soft tissues with mass effect on the subglottic airway   3. Left mastoid effusions and otitis interna            **This report has been created using voice recognition software. It may contain minor errors which are inherent in voice recognition technology. **      Final report electronically signed by Dr. Marco Dorado on 1/4/2023 3:08 PM      XR CHEST PORTABLE   Final Result   Residual patchy airspace infiltrates left lower lobe. **This report has been created using voice recognition software. It may contain minor errors which are inherent in voice recognition technology. **      Final report electronically signed by Dr. Marco Dorado on 1/4/2023 12:41 PM        CT FACIAL BONES WO CONTRAST    Result Date: 1/4/2023  PROCEDURE: CT FACIAL BONES WO CONTRAST CLINICAL INFORMATION: Submandibular cellulitis swelling, possible erysipelas, ruling out submandibular phlegmon .  TECHNIQUE: 3 mm multiplanar images of the facial bones in bone and soft tissue windows noncontrast All CT scans at this facility use dose modulation, iterative reconstruction, and/or weight-based dosing when appropriate to reduce radiation dose to as low as reasonably achievable. COMPARISON: No prior study. 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 Soft tissue windows demonstrate either enlargement of the left submandibular gland or obscuration by inflammation. Appearance is more suggestive of inflammatory enlargement of the gland. Evaluation is limited without contrast. This measures 2.5 cm. Solid  attenuation coefficients Asymmetric fullness of the left pharyngeal soft tissues underlying inflammation or mass is a concern. There are left-sided mastoid effusions which is new when compared to CT brain dated 8/20/2019 there is also fluid in the left internal auditory canal.     1. Enlargement of the left submandibular gland measuring up to 2.5 cm. 2. Asymmetric fullness of the left pharyngeal soft tissues with mass effect on the subglottic airway 3. Left mastoid effusions and otitis interna **This report has been created using voice recognition software. It may contain minor errors which are inherent in voice recognition technology. ** Final report electronically signed by Dr. Hortencia Guerin on 1/4/2023 3:08 PM    XR CHEST PORTABLE    Result Date: 1/4/2023  PROCEDURE: XR CHEST PORTABLE CLINICAL INFORMATION: central line placement COMPARISON: 1/4/2023 TECHNIQUE: A single mobile view of the chest was obtained. 1. Poor inflation the lungs. Mild cardiomegaly. Bilateral shoulder prostheses. 2. Right subclavian line has been inserted with tip in right atrium. 3. Moderate atelectasis/pneumonia right parahilar region and both lung bases. No effusion seen. **This report has been created using voice recognition software. It may contain minor errors which are inherent in voice recognition technology. ** Final report electronically signed by Dr. Balbina Rucker on 1/4/2023 4:42 PM    XR CHEST PORTABLE    Result Date: 1/4/2023  PROCEDURE: XR CHEST PORTABLE CLINICAL INFORMATION: weakness . TECHNIQUE: Portable upright COMPARISON: 12/3/2022 FINDINGS: Patient is rotated. Mandible appears detail the upper apices. Heart size is within normal limits. Mediastinum is not widened. Residual patchy infiltrates left base slightly improved compared to the prior exam. No effusions are seen. Vessels are not congested. Chronic increased lung markings suggesting mild parenchymal scarring bilateral shoulder replacements. Residual patchy airspace infiltrates left lower lobe. **This report has been created using voice recognition software. It may contain minor errors which are inherent in voice recognition technology. ** Final report electronically signed by Dr. Sage Strickland on 1/4/2023 12:41 PM      Electronically signed by Toi Mane DO on 1/15/2023 at 10:34 AM

## 2023-01-16 LAB
ANION GAP SERPL CALCULATED.3IONS-SCNC: 7 MEQ/L (ref 8–16)
BASOPHILS # BLD: 0.1 %
BASOPHILS ABSOLUTE: 0 THOU/MM3 (ref 0–0.1)
BUN BLDV-MCNC: 7 MG/DL (ref 7–22)
CALCIUM SERPL-MCNC: 7.8 MG/DL (ref 8.5–10.5)
CHLORIDE BLD-SCNC: 112 MEQ/L (ref 98–111)
CO2: 30 MEQ/L (ref 23–33)
CREAT SERPL-MCNC: 0.6 MG/DL (ref 0.4–1.2)
EOSINOPHIL # BLD: 3.1 %
EOSINOPHILS ABSOLUTE: 0.4 THOU/MM3 (ref 0–0.4)
ERYTHROCYTE [DISTWIDTH] IN BLOOD BY AUTOMATED COUNT: 18.6 % (ref 11.5–14.5)
ERYTHROCYTE [DISTWIDTH] IN BLOOD BY AUTOMATED COUNT: 64.4 FL (ref 35–45)
GFR SERPL CREATININE-BSD FRML MDRD: > 60 ML/MIN/1.73M2
GLUCOSE BLD-MCNC: 80 MG/DL (ref 70–108)
HCT VFR BLD CALC: 34.1 % (ref 37–47)
HEMOGLOBIN: 10.5 GM/DL (ref 12–16)
IMMATURE GRANS (ABS): 0.1 THOU/MM3 (ref 0–0.07)
IMMATURE GRANULOCYTES: 0.8 %
LYMPHOCYTES # BLD: 22.8 %
LYMPHOCYTES ABSOLUTE: 2.8 THOU/MM3 (ref 1–4.8)
MCH RBC QN AUTO: 30 PG (ref 26–33)
MCHC RBC AUTO-ENTMCNC: 30.8 GM/DL (ref 32.2–35.5)
MCV RBC AUTO: 97.4 FL (ref 81–99)
MONOCYTES # BLD: 5.5 %
MONOCYTES ABSOLUTE: 0.7 THOU/MM3 (ref 0.4–1.3)
NUCLEATED RED BLOOD CELLS: 0 /100 WBC
PLATELET # BLD: 233 THOU/MM3 (ref 130–400)
PMV BLD AUTO: 10.2 FL (ref 9.4–12.4)
POTASSIUM SERPL-SCNC: 3.6 MEQ/L (ref 3.5–5.2)
RBC # BLD: 3.5 MILL/MM3 (ref 4.2–5.4)
SEG NEUTROPHILS: 67.7 %
SEGMENTED NEUTROPHILS ABSOLUTE COUNT: 8.4 THOU/MM3 (ref 1.8–7.7)
SODIUM BLD-SCNC: 149 MEQ/L (ref 135–145)
WBC # BLD: 12.4 THOU/MM3 (ref 4.8–10.8)

## 2023-01-16 PROCEDURE — 80048 BASIC METABOLIC PNL TOTAL CA: CPT

## 2023-01-16 PROCEDURE — 6370000000 HC RX 637 (ALT 250 FOR IP): Performed by: STUDENT IN AN ORGANIZED HEALTH CARE EDUCATION/TRAINING PROGRAM

## 2023-01-16 PROCEDURE — 1200000000 HC SEMI PRIVATE

## 2023-01-16 PROCEDURE — 2580000003 HC RX 258: Performed by: STUDENT IN AN ORGANIZED HEALTH CARE EDUCATION/TRAINING PROGRAM

## 2023-01-16 PROCEDURE — 97535 SELF CARE MNGMENT TRAINING: CPT

## 2023-01-16 PROCEDURE — 92526 ORAL FUNCTION THERAPY: CPT

## 2023-01-16 PROCEDURE — 97530 THERAPEUTIC ACTIVITIES: CPT

## 2023-01-16 PROCEDURE — 85025 COMPLETE CBC W/AUTO DIFF WBC: CPT

## 2023-01-16 PROCEDURE — 99232 SBSQ HOSP IP/OBS MODERATE 35: CPT | Performed by: STUDENT IN AN ORGANIZED HEALTH CARE EDUCATION/TRAINING PROGRAM

## 2023-01-16 PROCEDURE — 2500000003 HC RX 250 WO HCPCS: Performed by: STUDENT IN AN ORGANIZED HEALTH CARE EDUCATION/TRAINING PROGRAM

## 2023-01-16 PROCEDURE — 36415 COLL VENOUS BLD VENIPUNCTURE: CPT

## 2023-01-16 RX ADMIN — SALINE NASAL SPRAY 2 SPRAY: 1.5 SOLUTION NASAL at 17:15

## 2023-01-16 RX ADMIN — HYDROCORTISONE 10 MG: 10 TABLET ORAL at 17:14

## 2023-01-16 RX ADMIN — AMOXICILLIN AND CLAVULANATE POTASSIUM 1 TABLET: 875; 125 TABLET, FILM COATED ORAL at 09:55

## 2023-01-16 RX ADMIN — HYDROCORTISONE 20 MG: 10 TABLET ORAL at 09:55

## 2023-01-16 RX ADMIN — SODIUM CHLORIDE, PRESERVATIVE FREE 10 ML: 5 INJECTION INTRAVENOUS at 09:56

## 2023-01-16 RX ADMIN — AMOXICILLIN AND CLAVULANATE POTASSIUM 1 TABLET: 875; 125 TABLET, FILM COATED ORAL at 22:27

## 2023-01-16 RX ADMIN — SALINE NASAL SPRAY 2 SPRAY: 1.5 SOLUTION NASAL at 06:29

## 2023-01-16 RX ADMIN — DEXTROSE MONOHYDRATE: 50 INJECTION, SOLUTION INTRAVENOUS at 17:13

## 2023-01-16 RX ADMIN — MICONAZOLE NITRATE: 2 POWDER TOPICAL at 12:34

## 2023-01-16 RX ADMIN — PANTOPRAZOLE SODIUM 40 MG: 40 TABLET, DELAYED RELEASE ORAL at 06:29

## 2023-01-16 RX ADMIN — ATORVASTATIN CALCIUM 10 MG: 10 TABLET, FILM COATED ORAL at 09:56

## 2023-01-16 RX ADMIN — LEVOTHYROXINE SODIUM 125 MCG: 0.12 TABLET ORAL at 09:56

## 2023-01-16 RX ADMIN — RIVAROXABAN 15 MG: 15 TABLET, FILM COATED ORAL at 17:14

## 2023-01-16 RX ADMIN — MICONAZOLE NITRATE: 2 POWDER TOPICAL at 22:50

## 2023-01-16 NOTE — PROGRESS NOTES
Hospitalist Progress Note      Patient:  Ron Hilton    Unit/Bed:5K-17/017-A  YOB: 1950  MRN: [id number]   Acct: [de-identified]   PCP: Izabela Junior MD  Date of Admission: 1/4/2023    Assessment/Plan:    Periapical abscess with enlargement of the left submandibular gland  Transition to Augmentin on 1/13 for an additional 7 days. Previously on unasyn. Encourage sialogogues  Seen by ENT  Adrenal insufficiency with adrenal crisis  Improving with control of infection as above  BP stable. Start maintenance steroid dose today. Mild hypernatremia  Chronic in nature. Likely due to chronic steroid use and bumex. Resolved with D5w/increased free water. Bumex on hold  HFpEF, not in acute exacerbation  Coreg held due to bradycardia. Bumex on hold due to hypernatremia. AF with RVR, resolved  Back in sinus rhythm. Coreg held due to bradycardia/hypotension. On xarelto  HTN  Holding anti-HTN meds. Off midodrine and back to baseline steroid dose today. BP stable at 121/58. Ok to resume HTN meds if starts to climb. HLD  On statin  hypothyroidism  On synthroid  Severe protein calorie malnutrition  Dietary consult  Physical debility with deconditioning  PT/OT  CM following. Planning SNF at discharge    Disposition: Medically stable for DC pending pre-cert to SNF. Chief Complaint: left lower jaw pain. Hospital Course:    Patient is a 78yo F w/ significant PMH Atrial thrombosis, CHF, CVA, PAF, HTN, HLD, asthma, GERD, DMII, adrenal insufficiency, hypothyroidism, hx meningioma x3 admitted to [facility] on 1/04/2023 for Left Lower Jaw Pain. Reported noted submandibular swelling and redness that is painful for few days before. Endorsed difficulty opening mouth  and decreased oral intake of both liquids and solids. Admitted 11/2022 for Acute Hypoxic Resp Failure 2/2 LLL PNA where she was discharged on 2L O2 NC after completion of abx treatment. In ED, hypotensive but othrewise vitals stable. Labs hypernatremic w/ minor JESSE. Leukocytosis WBC 25.0. UA negative for UTI, blood cultures drawn. EKG sinus eladia w/o acute ischemic changes from prior. CT facial bones noted enlargement of left submandibular gland measuring 2.5cm w/ assymetric left pharyngeal soft tissues and mass effect on subglottic airway. CXR notable for poor lung inflation, mod atelectasis/PNA in R parahilar region and bilat lung bases. S/p IVF bolus; patient did not improve w/ fluids. Admitted to ICU for pressor support. Treated w/ Unasyn. Weaned down pressor support w/ increasing alertness and improved speech. Patient started diet 1/06; tolerated well. Family requested PM&R consult for rehab evaluation as  who care for her at home is hospitalized after a surgery. Started Midodrine 10mg TID 1/8/2023.     1/9/2023: Patient off Levophed; BP borderline on Midodrine 10mg TID. Levophed was required to be started again. Sodium continues to trend up, likely due to SoluCortef. 1/10/2023: patient off Levophed; BP much better after increased steroid dose. Mariusz diet. Decreased submandibular swelling. Plan for transfer out of ICU today. 1/11: started weaing midodrine and solu-cortef. Patient tolerating well. Remains on unasyn. 1/12: no complaints. Tolerating midodrine and solu-cortef weaning. 1/13: off midodrine. Weaning solucortef. SNF pre-cert. 1/14: Weaning solucortef. Transition to PO    1/15: questionable IV infiltration to the LUE. Remains edematous. No pain. Subjective (past 24 hours):   Left arm is swollen. No pain. Otherwise feels well. No fevers or chills. Past medical history, family history, social history and allergies reviewed again and is unchanged since admission. ROS (12 point review of systems completed. Pertinent positives noted.  Otherwise ROS is negative)     Medications:  Reviewed    Infusion Medications    dextrose 100 mL/hr at 01/16/23 0701    sodium chloride 25 mL/hr at 01/16/23 0701     Scheduled Medications    hydrocortisone  20 mg Oral Daily    hydrocortisone  10 mg Oral Dinner    [Held by provider] bumetanide  1 mg Oral Daily    miconazole   Topical BID    calcium replacement protocol   Other RX Placeholder    amoxicillin-clavulanate  1 tablet Oral 2 times per day    rivaroxaban  15 mg Oral Daily    sodium chloride  2 spray Each Nostril Q12H    sodium chloride flush  5-40 mL IntraVENous 2 times per day    levothyroxine  125 mcg Oral Daily    pantoprazole  40 mg Oral QAM AC    atorvastatin  10 mg Oral Daily     PRN Meds: magnesium sulfate, potassium chloride **OR** potassium chloride, sodium chloride flush, sodium chloride, ondansetron **OR** ondansetron, polyethylene glycol, acetaminophen **OR** acetaminophen      Intake/Output Summary (Last 24 hours) at 1/16/2023 1208  Last data filed at 1/16/2023 0701  Gross per 24 hour   Intake 3369.06 ml   Output 2675 ml   Net 694.06 ml         Diet:  ADULT ORAL NUTRITION SUPPLEMENT; Breakfast, Dinner; Frozen Oral Supplement  ADULT DIET; Dysphagia - Minced and Moist; Moderately Thick (Honey)    Exam:  BP (!) 121/58   Pulse 73   Temp 97.7 °F (36.5 °C) (Axillary)   Resp 20   Ht 5' 3\" (1.6 m)   Wt 183 lb 4.8 oz (83.1 kg)   SpO2 98%   BMI 32.47 kg/m²   General appearance: No apparent distress, appears stated age and cooperative. HEENT: Pupils equal, round, and reactive to light. Conjunctivae/corneas clear. Neck: Minimal swelling of the left submandibular region. Respiratory:  Normal respiratory effort. Clear to auscultation, bilaterally without Rales/Wheezes/Rhonchi. Cardiovascular: Regular rate and rhythm with normal S1/S2 without murmurs, rubs or gallops. Abdomen: Soft, non-tender, non-distended with normal bowel sounds. Musculoskeletal: Diffuse anasarca. LUE more edematous than noted previously. Skin: Multiple skin excoriations and areas of ecchymosis.    Neurologic: Neurovascularly intact without any focal sensory/motor deficits. Cranial nerves: II-XII intact, grossly non-focal.  Psychiatric: Alert and oriented, thought content appropriate, normal insight  Capillary Refill: Brisk,< 3 seconds   Peripheral Pulses: +2 palpable, equal bilaterally     Labs:   Recent Labs     01/15/23  0807 01/16/23  0603   WBC 13.2* 12.4*   HGB 9.9* 10.5*   HCT 32.4* 34.1*    233       Recent Labs     01/13/23  2040 01/15/23  0807 01/16/23  0603   NA  --  155* 149*   K 3.0* 3.2* 3.6   CL  --  119* 112*   CO2  --  29 30   BUN  --  9 7   CREATININE  --  0.6 0.6   CALCIUM  --  8.1* 7.8*       No results for input(s): AST, ALT, BILIDIR, BILITOT, ALKPHOS in the last 72 hours. No results for input(s): INR in the last 72 hours. No results for input(s): Zelalem Beery in the last 72 hours. Microbiology:    Blood culture #1:   Lab Results   Component Value Date/Time    [facility]  01/04/2023 01:20 PM     No growth 24 hours. No growth 48 hours. No growth at 5 days       Blood culture #2:No results found for: BLOODCULT2    Organism:  Lab Results   Component Value Date/Time    ORG Staphylococcus aureus 12/17/2018 04:04 PM         Lab Results   Component Value Date/Time    LABGRAM  02/16/2022 04:00 PM     No segmented neutrophils observed. Rare epithelial cells observed. No organisms observed. MRSA culture only:No results found for: [facility]    Urine culture:   Lab Results   Component Value Date/Time    LABURIN No growth-preliminary  No growth   02/09/2017 12:35 PM       Respiratory culture: No results found for: CULTRESP    Aerobic and Anaerobic :  Lab Results   Component Value Date/Time    LABAERO No Acinetobacter species isolated. 01/04/2023 10:00 PM     Lab Results   Component Value Date/Time    LABANAE  12/01/2021 12:00 PM     No anaerobes isolated- preliminary Culture yielded light growth of gram positive bacilli most consistent with Cutibacterium species. Clinical correlation required.       Urinalysis:      Lab Results   Component Value Date/Time    NITRU NEGATIVE 01/04/2023 02:15 PM    WBCUA 5-9 01/04/2023 02:15 PM    WBCUA 2-4 02/22/2012 01:00 PM    BACTERIA NONE SEEN 01/04/2023 02:15 PM    RBCUA 3-5 01/04/2023 02:15 PM    BLOODU SMALL 01/04/2023 02:15 PM    SPECGRAV 1.025 09/18/2020 12:00 PM    GLUCOSEU NEGATIVE 01/04/2023 02:15 PM       Radiology:  XR CHEST PORTABLE   Final Result   1. Poor inflation the lungs. Mild cardiomegaly. Bilateral shoulder prostheses.   2. Right subclavian line has been inserted with tip in right atrium.   3. Moderate atelectasis/pneumonia right parahilar region and both lung bases. No effusion seen.            **This report has been created using voice recognition software.  It may contain minor errors which are inherent in voice recognition technology.**      Final report electronically signed by Dr. Jose Antonio Grover on 1/4/2023 4:42 PM      CT FACIAL BONES WO CONTRAST   Final Result   1. Enlargement of the left submandibular gland measuring up to 2.5 cm.   2. Asymmetric fullness of the left pharyngeal soft tissues with mass effect on the subglottic airway   3. Left mastoid effusions and otitis interna            **This report has been created using voice recognition software.  It may contain minor errors which are inherent in voice recognition technology.**      Final report electronically signed by Dr. Kip Resendez on 1/4/2023 3:08 PM      XR CHEST PORTABLE   Final Result   Residual patchy airspace infiltrates left lower lobe.            **This report has been created using voice recognition software.  It may contain minor errors which are inherent in voice recognition technology.**      Final report electronically signed by Dr. Kip Resendez on 1/4/2023 12:41 PM        CT FACIAL BONES WO CONTRAST    Result Date: 1/4/2023  PROCEDURE: CT FACIAL BONES WO CONTRAST CLINICAL INFORMATION: Submandibular cellulitis swelling, possible erysipelas, ruling out submandibular  phlegmon . TECHNIQUE: 3 mm multiplanar images of the facial bones in bone and soft tissue windows noncontrast All CT scans at this facility use dose modulation, iterative reconstruction, and/or weight-based dosing when appropriate to reduce radiation dose to as low as reasonably achievable. COMPARISON: No prior study. 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 Soft tissue windows demonstrate either enlargement of the left submandibular gland or obscuration by inflammation. Appearance is more suggestive of inflammatory enlargement of the gland. Evaluation is limited without contrast. This measures 2.5 cm. Solid  attenuation coefficients Asymmetric fullness of the left pharyngeal soft tissues underlying inflammation or mass is a concern. There are left-sided mastoid effusions which is new when compared to CT brain dated 8/20/2019 there is also fluid in the left internal auditory canal.     1. Enlargement of the left submandibular gland measuring up to 2.5 cm. 2. Asymmetric fullness of the left pharyngeal soft tissues with mass effect on the subglottic airway 3. Left mastoid effusions and otitis interna **This report has been created using voice recognition software. It may contain minor errors which are inherent in voice recognition technology. ** Final report electronically signed by Dr. Vinicius Ferguson on 1/4/2023 3:08 PM    XR CHEST PORTABLE    Result Date: 1/4/2023  PROCEDURE: XR CHEST PORTABLE CLINICAL INFORMATION: central line placement COMPARISON: 1/4/2023 TECHNIQUE: A single mobile view of the chest was obtained. 1. Poor inflation the lungs. Mild cardiomegaly. Bilateral shoulder prostheses. 2. Right subclavian line has been inserted with tip in right atrium. 3. Moderate atelectasis/pneumonia right parahilar region and both lung bases. No effusion seen. **This report has been created using voice recognition software.   It may contain minor errors which are inherent in voice recognition technology. ** Final report electronically signed by Dr. Dinora Roger on 1/4/2023 4:42 PM    XR CHEST PORTABLE    Result Date: 1/4/2023  PROCEDURE: XR CHEST PORTABLE CLINICAL INFORMATION: weakness . TECHNIQUE: Portable upright COMPARISON: 12/3/2022 FINDINGS: Patient is rotated. Mandible appears detail the upper apices. Heart size is within normal limits. Mediastinum is not widened. Residual patchy infiltrates left base slightly improved compared to the prior exam. No effusions are seen. Vessels are not congested. Chronic increased lung markings suggesting mild parenchymal scarring bilateral shoulder replacements. Residual patchy airspace infiltrates left lower lobe. **This report has been created using voice recognition software. It may contain minor errors which are inherent in voice recognition technology. ** Final report electronically signed by Dr. Abbey Munguia on 1/4/2023 12:41 PM      Electronically signed by Colt Alfaro DO on 1/16/2023 at 12:08 PM

## 2023-01-16 NOTE — PLAN OF CARE
Problem: Discharge Planning  Goal: Discharge to home or other facility with appropriate resources  1/15/2023 2259 by Yasmeen Dietz RN  Outcome: Progressing  1/15/2023 1220 by Robbin Shaver RN  Outcome: Progressing  Flowsheets (Taken 1/15/2023 1220)  Discharge to home or other facility with appropriate resources:   Identify barriers to discharge with patient and caregiver   Identify discharge learning needs (meds, wound care, etc)     Problem: Infection - Adult  Goal: Isolation precautions  Description: Isolation precautions  1/15/2023 2259 by Yasmeen Dietz RN  Outcome: Progressing  1/15/2023 1220 by Robbin Shaver RN  Outcome: Progressing  Goal: Absence of infection during hospitalization  1/15/2023 2259 by Yasmeen Dietz RN  Outcome: Progressing  1/15/2023 1220 by Robbin Shaver RN  Outcome: Progressing  Flowsheets (Taken 1/15/2023 1220)  Absence of infection during hospitalization:   Assess and monitor for signs and symptoms of infection   Monitor all insertion sites i.e., indwelling lines, tubes and drains   Administer medications as ordered     Problem: Chronic Conditions and Co-morbidities  Goal: Patient's chronic conditions and co-morbidity symptoms are monitored and maintained or improved  1/15/2023 2259 by Yasmeen Dietz RN  Outcome: Progressing  1/15/2023 1220 by Robbin Shaver RN  Outcome: Progressing  Flowsheets (Taken 1/15/2023 1220)  Care Plan - Patient's Chronic Conditions and Co-Morbidity Symptoms are Monitored and Maintained or Improved: Monitor and assess patient's chronic conditions and comorbid symptoms for stability, deterioration, or improvement     Problem: Nutrition Deficit:  Goal: Optimize nutritional status  1/15/2023 2259 by Yasmeen Dietz RN  Outcome: Progressing  1/15/2023 1220 by Robbin Shaver RN  Outcome: Progressing  Flowsheets (Taken 1/15/2023 1220)  Nutrient intake appropriate for improving, restoring, or maintaining nutritional needs:   Assess nutritional status and recommend course of action   Monitor oral intake, labs, and treatment plans   Recommend appropriate diets, oral nutritional supplements, and vitamin/mineral supplements     Problem: Skin/Tissue Integrity  Goal: Absence of new skin breakdown  Description: 1. Monitor for areas of redness and/or skin breakdown  2. Assess vascular access sites hourly  3. Every 4-6 hours minimum:  Change oxygen saturation probe site  4. Every 4-6 hours:  If on nasal continuous positive airway pressure, respiratory therapy assess nares and determine need for appliance change or resting period.   1/15/2023 2259 by Stefanie Ly RN  Outcome: Progressing  1/15/2023 1220 by Boo Chu RN  Outcome: Progressing     Problem: ABCDS Injury Assessment  Goal: Absence of physical injury  1/15/2023 2259 by Stefanie Ly RN  Outcome: Progressing  1/15/2023 1220 by Boo Chu RN  Outcome: Progressing  Flowsheets (Taken 1/15/2023 1220)  Absence of Physical Injury: Implement safety measures based on patient assessment     Problem: Pain  Goal: Verbalizes/displays adequate comfort level or baseline comfort level  1/15/2023 2259 by Stefanie Ly RN  Outcome: Progressing  1/15/2023 1220 by Boo Chu RN  Outcome: Progressing  Flowsheets (Taken 1/15/2023 1220)  Verbalizes/displays adequate comfort level or baseline comfort level:   Encourage patient to monitor pain and request assistance   Assess pain using appropriate pain scale   Administer analgesics based on type and severity of pain and evaluate response   Implement non-pharmacological measures as appropriate and evaluate response     Problem: Skin/Tissue Integrity - Adult  Goal: Skin integrity remains intact  1/15/2023 2259 by Stefanie Ly RN  Outcome: Progressing  1/15/2023 1220 by Boo Chu RN  Outcome: Progressing  Flowsheets (Taken 1/15/2023 1220)  Skin Integrity Remains Intact: Monitor for areas of redness and/or skin breakdown     Problem: Musculoskeletal - Adult  Goal: Return mobility to safest level of function  1/15/2023 2259 by Jennifer Gibson RN  Outcome: Progressing  1/15/2023 1220 by Lashon Chanel RN  Outcome: Progressing  Flowsheets (Taken 1/15/2023 1220)  Return Mobility to Safest Level of Function:   Assess patient stability and activity tolerance for standing, transferring and ambulating with or without assistive devices   Ensure adequate protection for wounds/incisions during mobilization     Problem: Metabolic/Fluid and Electrolytes - Adult  Goal: Electrolytes maintained within normal limits  1/15/2023 2259 by Jennifer Gibson RN  Outcome: Progressing  1/15/2023 1220 by Lashon Chanel RN  Outcome: Progressing  Flowsheets (Taken 1/15/2023 1220)  Electrolytes maintained within normal limits:   Monitor labs and assess patient for signs and symptoms of electrolyte imbalances   Administer electrolyte replacement as ordered

## 2023-01-16 NOTE — PROGRESS NOTES
[address] [place] [id number]  INPATIENT SPEECH THERAPY  [facility]  DAILY NOTE    TIME   SLP Individual Minutes  Time In: 06  Time Out: 3665  Minutes: 18  Timed Code Treatment Minutes: 0 Minutes       Date: 2023  Patient Name: Neo Quinn      CSN: [id number]   : 1950  (71 y.o.)  Gender: female   Referring Physician:  Dr. Amy Alexis with repeat order received by Sylvain Castro DO 2023  Diagnosis: Septic shock ([address] Cellulitis of submandibular region  Precautions: Aspiration risk, fall risk  Current Diet:  Puree Diet with Moderately Thick Liquids  Swallowing Strategies:  Full Upright Position, Small Bite/Sip, Multiple Swallow, Medications Crushed with Puree, Limit Distractions, Monitor for Fatigue, and 1:1 assistance with meals  Date of Last MBS/FEES:  MBS 2022    Pain:  No pain reported. Subjective: Patient seen lying in her bed- cooperative with therapy    Short-Term Goals:  SHORT TERM GOAL #1:  Goal 1: Patient will consume puree diet with moderatly thick liquids without s/s of aspiration in order to safely maintain adequate hydration and nutritionGOAL MET:   REVISE GOAL 1: Goal 1: Patient will consume puree diet with moderatly thick liquids without s/s of aspiration in order to safely maintain adequate hydration and nutrition  INTERVENTIONS: Patient reports they had given her regular water this date- water bottle empty when ST arrived- ST filled and thickened to moderately thick- removed straw- ST educated patient re: need for moderately thickened liquids at this time and to avoid use of straw-patient verbalized understanding    SHORT TERM GOAL #2:  Goal 2: Patient will complete pharyngeal exercises x15 in order to improve overall airway protection and pharyngeal constriction.   INTERVENTIONS: effortful swallows x4  CTAR x10 second hold x10 repetitions- poor strength  Lingual protrusion with tongue tip back and hold x10- fair ROM    SHORT TERM GOAL #3:  Goal 3: Complete repeat insturmental evaluation when clincially appropriate in order to determine readiness for diet advancement. (most recent MBS 11/26/22)  INTERVENTIONS: patient not appropriate at this time    SHORT TERM GOAL #4:  Goal 4: Patient and family will demonstrate understanding of ST dyspahgia recommendations, ST liquids/diet recommendations, s/s of aspiration and risks of aspiration to ensure safety of PO intake  INTERVENTIONS:ST educated patient re: recommendations for moderately thickened liquids- removing straw    SHORT TERM GOAL #5:  Goal 5: Monitor speech/language/cognitive baseline and complete evaluation as indiciated  INTERVENTIONS: dnt secondary to addressing other goals this date      Long-Term Goals:       LONG TERM GOAL #1: no LTG secondary to short ELOS              EDUCATION:  Learner: Patient  Education:  Reviewed diet and strategies  Evaluation of Education: Verbalizes understanding    ASSESSMENT/PLAN:  Activity Tolerance:  Patient tolerance of  treatment: good. Assessment/Plan: Patient progressing toward established goals. Continues to require skilled care of licensed speech pathologist to progress toward achievement of established goals and plan of care. .     Plan for Next Session: recommend continued speech therapy to address dysphagia, continued moderately thickened liquids  Discharge Recommendations: Continue to Assess Pending Progress     Kolton Sparks M.S. Libby Seek

## 2023-01-16 NOTE — PROGRESS NOTES
[facility]  INPATIENT PHYSICAL THERAPY  DAILY NOTE  [facility] ONC MED 5K - 5K-17/017-A    Time In: 2722  Time Out: 2592  Timed Code Treatment Minutes: 25 Minutes  Minutes: 25          Date: 2023  Patient Name: Ramy Stern,  Gender:  female        MRN: [id number]  : 1950  (76 y.o.)     Referring Practitioner: Dom Cramer DO  Diagnosis: Cellulitis of submadibular region  Additional Pertinent Hx: Per H&P: Patient is a 78-year-old female with a past medical history of adrenal insufficiency, hypothyroidism, paroxysmal atrial fibrillation, history of pituitary tumor, tension, stroke, and chronic diastolic heart failure admitted for left lower back pain. Patient states that for the last few days she has been having submandibular swelling and redness in the last few days. Patient has difficulty opening her mouth and has not been eating or drinking. She was recently admitted for the last month. Pt found to have septic shock due to Periapical Abscess with enlargement of the left submandibular gland. Prior Level of Function:  Lives With: Spouse  Type of Home: House  Home Layout: One level  Home Access: Stairs to enter with rails  Entrance Stairs - Number of Steps: 2  Home Equipment: Mardee Shallow, rolling, Wheelchair-manual, Lift chair        ADL Assistance: Needs assistance  Ambulation Assistance: Non-ambulatory  Transfer Assistance: Needs assistance  Active : No  Additional Comments: Prior to  admission, pt was living at home with .  was assisting with stand pivot transfers to/from w/c, pt nonambulatory. Since that admission, pt has been at [facility] for rehab.  currently in [facility] following back surgery. Restrictions/Precautions:  Restrictions/Precautions: General Precautions, Fall Risk  Position Activity Restriction  Other position/activity restrictions: h/o CVA with L sided weakness     SUBJECTIVE: RN approved session, pt is supine in bed, agreeable to PT.     PAIN: denies    Vitals: Vitals not assessed per clinical judgement, see nursing flowsheet    OBJECTIVE:  Bed Mobility:  Rolling to Left: Maximum Assistance, X 1, with head of bed raised, with rail, with verbal cues , with increased time for completion   Supine to Sit: Moderate Assistance, X 2, with head of bed raised, with rail, with verbal cues , with increased time for completion  Scooting: Maximum Assistance, X 1    Transfers:  Sit to Stand: Maximum Assistance, X 2, with verbal cues  Stand to Sit:Maximum Assistance, X 2, with verbal cues  Stand Pivot:Maximum Assistance, X 2, with increased time for completion, with verbal cues  **Pt transfers bed to recliner, stand pivot without AD, towards L side, max A x 2, cues for posture    Balance:  Static Sitting Balance:  Contact Guard Assistance, Minimal Assistance, X 1 due to occasional posterior lean  Static Standing Balance: Maximum Assistance, X 2  Dynamic Standing Balance: Maximum Assistance, X 2    Functional Outcome Measures: Completed  AM-PAC Inpatient Mobility without Stair Climbing Raw Score : 7  AM-PAC Inpatient without Stair Climbing T-Scale Score : 28.66    ASSESSMENT:  Assessment: Patient progressing toward established goals.  Activity Tolerance:  Patient tolerance of  treatment: good.      Equipment Recommendations:Equipment Needed: No  Discharge Recommendations: Subacte/Skilled Nursing Facility  Plan: Current Treatment Recommendations: Strengthening, ROM, Balance training, Functional mobility training, Neuromuscular re-education, Safety education & training, Positioning, Patient/Caregiver education & training, Therapeutic activities, Transfer training  General Plan:  (3-5x GM)    Patient Education  Patient Education: Bed Mobility, Transfers    Goals:  Patient Goals : to get better  Short Term Goals  Time Frame for Short Term Goals: by discharge  Short Term Goal 1: Pt to transfer supine <--> sit min A to enable pt to get in/out of bed.  Short Term Goal 2: Pt to  sit EOB x 20 minutes to improve upright tolerance in prep for transfer training. Short Term Goal 3: sit to std with modA to get in/out of chairs  Short Term Goal 4: Pt to transfer stand pivot mod A for increased functional mobility. Long Term Goals  Time Frame for Long Term Goals : NA due to short length of stay. Following session, patient left in safe position with all fall risk precautions in place.

## 2023-01-16 NOTE — CARE COORDINATION
1/16/23, 2:02 PM EST    DISCHARGE PLANNING EVALUATION       Spoke with Reena Redman at [facility] and pre-cert has not been started as they can not get into the insurance portal to start it. They are working on it on their end. She is aware updated notes are on Epic.

## 2023-01-16 NOTE — PROGRESS NOTES
[address] [facility]  Occupational Therapy  Daily Note  Time:   Time In: 1000  Time Out: 1055  Timed Code Treatment Minutes: 54 Minutes  Minutes: 55          Date: 2023  Patient Name: Trey Dupont,   Gender: female      Room: [facility]17/017-A  MRN: [id number]  : 1950  (76 y.o.)  Referring Practitioner: Angie Gramajo DO  Diagnosis: septic shock  Additional Pertinent Hx: Per H&P: Patient is a 61-year-old female with a past medical history of adrenal insufficiency, hypothyroidism, paroxysmal atrial fibrillation, history of pituitary tumor, tension, stroke, and chronic diastolic heart failure admitted for left lower back pain. Patient states that for the last few days she has been having submandibular swelling and redness in the last few days. Patient has difficulty opening her mouth and has not been eating or drinking. She was recently admitted for the last month. Pt found to have septic shock due to Periapical Abscess with enlargement of the left submandibular gland    Restrictions/Precautions:  Restrictions/Precautions: General Precautions, Fall Risk  Position Activity Restriction  Other position/activity restrictions: h/o CVA with L sided weakness     SUBJECTIVE: Pleasant and cooperative   RN approved session. Pt had just received her medications from her nurse. PAIN: None reported. Vitals: Vitals not assessed per clinical judgement, see nursing flowsheet    COGNITION: Decreased Problem Solving    ADL:   Bathing: Moderate Assistance. Pt had help to wash her perineum. She also had help washing her feet and calves. Upper Extremity Dressing: Minimal Assistance. Don/doffing a hospital gown  Footwear Management: Maximum Assistance. Destin slipper socks. BALANCE:  Sitting Balance:  Contact Guard Assistance. While having help with washing her back or scooting forward in the chair  Standing Balance: Moderate Assistance.  While holding onto the [facility]; pt had difficulty with standing straight- leaned over the Rachel Spare while standing    BED MOBILITY:  Rolling to Right: Minimal Assistance, X 2, with rail    Sit to Supine: Moderate Assistance help needed for lifting up her legs into the bed    TRANSFERS:  Sit to Stand: Moderate Assistance, X 2, with Rachel Spare. Pt had 1 trial with assist of 1 but was unable to stand erect. Assist of 2 to complete transfer out of chair  Stand to Sit: Moderate Assistance, X 2, with Rachel Spare. To the edge of bed    FUNCTIONAL MOBILITY:  Not able to demonstrate. ADDITIONAL ACTIVITIES:  Edema control was reviewed with pt and pillows were placed under her legs and arms. Discussed lymphedema with pt and she reported not having been treated for this in the past.   Pt has had her LUE wrapped with ACE wraps, she states. ASSESSMENT:     Activity Tolerance:  Patient tolerance of  treatment: fair. Pt sat forward in the chair for 5 minutes while doing some of her bathing and preparing to transfer. She stood for 15 seconds while holding onto Rachel Spare during first trial.  She stood for 1 minute while having help with cleaning her perineum during the bathing. Discharge Recommendations: Subacute/skilled nursing facility  Equipment Recommendations: Equipment Needed: No  Other: defer to next level of care  Plan: Times Per Week: 3-5x  Current Treatment Recommendations: Strengthening, ROM, Balance training, Endurance training, Patient/Caregiver education & training, Equipment evaluation, education, & procurement, Positioning, Self-Care / ADL    Patient Education  Patient Education: Reviewed Prior Education and edema control     Goals  Short Term Goals  Time Frame for Short Term Goals: by discharge  Short Term Goal 1: Pt will tolerate sitting at EOB X20 minutes with supervision in prep for ADL completion. Short Term Goal 2: Pt will complete BADL tasks, UB with SBA and LB with max assistance, to increase independence with self care tasks.   Short Term Goal 3: Pt will tolerate further assessment of stand pivot transfers by OTR when appropriate. Short Term Goal 4: patient will tolerate 2 min static standing with sit to stand and maintain upright posture with MIN A to increase ease with toileting. Following session, patient left in safe position with all fall risk precautions in place.

## 2023-01-17 VITALS
HEART RATE: 55 BPM | HEIGHT: 63 IN | WEIGHT: 183.3 LBS | DIASTOLIC BLOOD PRESSURE: 56 MMHG | SYSTOLIC BLOOD PRESSURE: 129 MMHG | BODY MASS INDEX: 32.48 KG/M2 | OXYGEN SATURATION: 97 % | RESPIRATION RATE: 16 BRPM | TEMPERATURE: 98 F

## 2023-01-17 LAB
ANION GAP SERPL CALCULATED.3IONS-SCNC: 8 MEQ/L (ref 8–16)
BASOPHILS # BLD: 0.1 %
BASOPHILS ABSOLUTE: 0 THOU/MM3 (ref 0–0.1)
BUN BLDV-MCNC: 6 MG/DL (ref 7–22)
CALCIUM SERPL-MCNC: 7.7 MG/DL (ref 8.5–10.5)
CHLORIDE BLD-SCNC: 110 MEQ/L (ref 98–111)
CO2: 26 MEQ/L (ref 23–33)
CREAT SERPL-MCNC: 0.7 MG/DL (ref 0.4–1.2)
EOSINOPHIL # BLD: 3.1 %
EOSINOPHILS ABSOLUTE: 0.4 THOU/MM3 (ref 0–0.4)
ERYTHROCYTE [DISTWIDTH] IN BLOOD BY AUTOMATED COUNT: 18.5 % (ref 11.5–14.5)
ERYTHROCYTE [DISTWIDTH] IN BLOOD BY AUTOMATED COUNT: 64.9 FL (ref 35–45)
GFR SERPL CREATININE-BSD FRML MDRD: > 60 ML/MIN/1.73M2
GLUCOSE BLD-MCNC: 75 MG/DL (ref 70–108)
HCT VFR BLD CALC: 30 % (ref 37–47)
HEMOGLOBIN: 9 GM/DL (ref 12–16)
IMMATURE GRANS (ABS): 0.06 THOU/MM3 (ref 0–0.07)
IMMATURE GRANULOCYTES: 0.5 %
LYMPHOCYTES # BLD: 23.6 %
LYMPHOCYTES ABSOLUTE: 2.7 THOU/MM3 (ref 1–4.8)
MCH RBC QN AUTO: 29.6 PG (ref 26–33)
MCHC RBC AUTO-ENTMCNC: 30 GM/DL (ref 32.2–35.5)
MCV RBC AUTO: 98.7 FL (ref 81–99)
MONOCYTES # BLD: 4.9 %
MONOCYTES ABSOLUTE: 0.6 THOU/MM3 (ref 0.4–1.3)
NUCLEATED RED BLOOD CELLS: 0 /100 WBC
PLATELET # BLD: 204 THOU/MM3 (ref 130–400)
PMV BLD AUTO: 10.2 FL (ref 9.4–12.4)
POTASSIUM SERPL-SCNC: 3.3 MEQ/L (ref 3.5–5.2)
RBC # BLD: 3.04 MILL/MM3 (ref 4.2–5.4)
SEG NEUTROPHILS: 67.8 %
SEGMENTED NEUTROPHILS ABSOLUTE COUNT: 7.7 THOU/MM3 (ref 1.8–7.7)
SODIUM BLD-SCNC: 144 MEQ/L (ref 135–145)
WBC # BLD: 11.4 THOU/MM3 (ref 4.8–10.8)

## 2023-01-17 PROCEDURE — 6370000000 HC RX 637 (ALT 250 FOR IP): Performed by: STUDENT IN AN ORGANIZED HEALTH CARE EDUCATION/TRAINING PROGRAM

## 2023-01-17 PROCEDURE — 80048 BASIC METABOLIC PNL TOTAL CA: CPT

## 2023-01-17 PROCEDURE — 99239 HOSP IP/OBS DSCHRG MGMT >30: CPT | Performed by: STUDENT IN AN ORGANIZED HEALTH CARE EDUCATION/TRAINING PROGRAM

## 2023-01-17 PROCEDURE — 36415 COLL VENOUS BLD VENIPUNCTURE: CPT

## 2023-01-17 PROCEDURE — 85025 COMPLETE CBC W/AUTO DIFF WBC: CPT

## 2023-01-17 PROCEDURE — 92526 ORAL FUNCTION THERAPY: CPT

## 2023-01-17 RX ORDER — BUMETANIDE 1 MG/1
0.5 TABLET ORAL DAILY
Qty: 90 TABLET | Refills: 1 | Status: ON HOLD | DISCHARGE
Start: 2023-01-17

## 2023-01-17 RX ORDER — AMOXICILLIN AND CLAVULANATE POTASSIUM 875; 125 MG/1; MG/1
1 TABLET, FILM COATED ORAL EVERY 12 HOURS SCHEDULED
Qty: 5 TABLET | Refills: 0 | DISCHARGE
Start: 2023-01-17 | End: 2023-01-20

## 2023-01-17 RX ADMIN — HYDROCORTISONE 20 MG: 10 TABLET ORAL at 09:24

## 2023-01-17 RX ADMIN — PANTOPRAZOLE SODIUM 40 MG: 40 TABLET, DELAYED RELEASE ORAL at 06:22

## 2023-01-17 RX ADMIN — SALINE NASAL SPRAY 2 SPRAY: 1.5 SOLUTION NASAL at 18:01

## 2023-01-17 RX ADMIN — HYDROCORTISONE 10 MG: 10 TABLET ORAL at 18:01

## 2023-01-17 RX ADMIN — LEVOTHYROXINE SODIUM 125 MCG: 0.12 TABLET ORAL at 09:24

## 2023-01-17 RX ADMIN — SALINE NASAL SPRAY 2 SPRAY: 1.5 SOLUTION NASAL at 06:22

## 2023-01-17 RX ADMIN — RIVAROXABAN 15 MG: 15 TABLET, FILM COATED ORAL at 18:01

## 2023-01-17 RX ADMIN — AMOXICILLIN AND CLAVULANATE POTASSIUM 1 TABLET: 875; 125 TABLET, FILM COATED ORAL at 09:24

## 2023-01-17 RX ADMIN — ATORVASTATIN CALCIUM 10 MG: 10 TABLET, FILM COATED ORAL at 09:24

## 2023-01-17 RX ADMIN — MICONAZOLE NITRATE: 2 POWDER TOPICAL at 09:25

## 2023-01-17 NOTE — CARE COORDINATION
1/17/23, 8:59 AM EST    DISCHARGE PLANNING EVALUATION    Call to [facility] with [facility], [address] discussed medical [employer] currently being waived, she reports they still have to submit on on their end to ensure payment/coverage, reports she will be discussing with  this morning as they are unable to get in portal. Call to [facility] with [employer] [phone], confirms [employer] current waived, she will contact [facility]ina at [facility] to discuss further and get back with .     11:36 AM  Call from [facility] with [facility], they can accept pt, pt does not need new COVID test since positive test was within 90 days, unless pt is having symptoms. 11:36 AM  Call to [facility], reports no transport currently until midnight. They will call around to see if any other companies are able to transport. LENNIE faxed transport paperwork to [facility]. Notified nurse transport problems at this time. [facility] did call nurse, transport set for 6-6:30pm.  Nurse reports she did talk with pt and  about plans to discharge today and transport time. LENNIE faxed AVS to [facility]. 1/17/23, 11:35 AM EST    Patient goals/plan/ treatment preferences discussed by  and . Patient goals/plan/ treatment preferences reviewed with patient/ family. Patient/ family verbalize understanding of discharge plan and are in agreement with goal/plan/treatment preferences. Understanding was demonstrated using the teach back method. AVS provided by RN at time of discharge, which includes all necessary medical information pertaining to the patients current course of illness, treatment, post-discharge goals of care, and treatment preferences.      Services At/After Discharge: [facility] ([facility]) and In ambulance [facility] ([facility] Ambulance       IMM Letter  IMM Letter given to Patient/Family/Significant other/Guardian/POA/by[de-identified] Copy deliverd to patient by mgr Geovany Ortega  IMM Letter date given[de-identified] 01/17/23  IMM Letter time given[de-identified] 5250

## 2023-01-17 NOTE — PLAN OF CARE
Problem: Discharge Planning  Goal: Discharge to home or other facility with appropriate resources  Outcome: Progressing  Flowsheets (Taken 1/17/2023 0322)  Discharge to home or other facility with appropriate resources:   Identify barriers to discharge with patient and caregiver   Arrange for needed discharge resources and transportation as appropriate   Identify discharge learning needs (meds, wound care, etc)   Arrange for interpreters to assist at discharge as needed     Problem: Infection - Adult  Goal: Isolation precautions  Description: Isolation precautions  Outcome: Progressing     Problem: Chronic Conditions and Co-morbidities  Goal: Patient's chronic conditions and co-morbidity symptoms are monitored and maintained or improved  Outcome: Progressing  Flowsheets (Taken 1/17/2023 0322)  Care Plan - Patient's Chronic Conditions and Co-Morbidity Symptoms are Monitored and Maintained or Improved: Monitor and assess patient's chronic conditions and comorbid symptoms for stability, deterioration, or improvement     Problem: Nutrition Deficit:  Goal: Optimize nutritional status  Outcome: Progressing  Flowsheets (Taken 1/17/2023 0322)  Nutrient intake appropriate for improving, restoring, or maintaining nutritional needs:   Assess nutritional status and recommend course of action   Monitor oral intake, labs, and treatment plans   Recommend appropriate diets, oral nutritional supplements, and vitamin/mineral supplements     Problem: Skin/Tissue Integrity  Goal: Absence of new skin breakdown  Description: 1. Monitor for areas of redness and/or skin breakdown  2. Assess vascular access sites hourly  3. Every 4-6 hours minimum:  Change oxygen saturation probe site  4. Every 4-6 hours:  If on nasal continuous positive airway pressure, respiratory therapy assess nares and determine need for appliance change or resting period.   Outcome: Progressing     Problem: ABCDS Injury Assessment  Goal: Absence of physical injury  Outcome: Progressing  Flowsheets (Taken 1/17/2023 0322)  Absence of Physical Injury: Implement safety measures based on patient assessment     Problem: Pain  Goal: Verbalizes/displays adequate comfort level or baseline comfort level  Outcome: Progressing  Flowsheets (Taken 1/17/2023 0322)  Verbalizes/displays adequate comfort level or baseline comfort level:   Encourage patient to monitor pain and request assistance   Assess pain using appropriate pain scale   Administer analgesics based on type and severity of pain and evaluate response   Implement non-pharmacological measures as appropriate and evaluate response   Consider cultural and social influences on pain and pain management     Problem: Skin/Tissue Integrity - Adult  Goal: Skin integrity remains intact  Outcome: Progressing  Flowsheets (Taken 1/17/2023 0322)  Skin Integrity Remains Intact:   Monitor for areas of redness and/or skin breakdown   Assess vascular access sites hourly     Problem: Musculoskeletal - Adult  Goal: Return mobility to safest level of function  Outcome: Progressing  Flowsheets (Taken 1/17/2023 0322)  Return Mobility to Safest Level of Function:   Assess patient stability and activity tolerance for standing, transferring and ambulating with or without assistive devices   Assist with transfers and ambulation using safe patient handling equipment as needed   Ensure adequate protection for wounds/incisions during mobilization     Problem: Metabolic/Fluid and Electrolytes - Adult  Goal: Electrolytes maintained within normal limits  Outcome: Progressing  Flowsheets (Taken 1/17/2023 0322)  Electrolytes maintained within normal limits: Monitor labs and assess patient for signs and symptoms of electrolyte imbalances   Care plan reviewed with patient. Patient verbalize understanding of the plan of care and contribute to goal setting.

## 2023-01-17 NOTE — PLAN OF CARE
Problem: Discharge Planning  Goal: Discharge to home or other facility with appropriate resources  1/17/2023 1209 by Mely Angela RN  Outcome: Adequate for Discharge  Flowsheets (Taken 1/17/2023 0820)  Discharge to home or other facility with appropriate resources:   Identify barriers to discharge with patient and caregiver   Arrange for needed discharge resources and transportation as appropriate   Identify discharge learning needs (meds, wound care, etc)     Problem: Infection - Adult  Goal: Isolation precautions  Description: Isolation precautions  1/17/2023 1209 by Mely Angela RN  Outcome: Adequate for Discharge     Problem: Infection - Adult  Goal: Absence of infection during hospitalization  1/17/2023 1209 by Mely Angela RN  Outcome: Adequate for Discharge  Flowsheets (Taken 1/17/2023 0820)  Absence of infection during hospitalization:   Assess and monitor for signs and symptoms of infection   Monitor lab/diagnostic results   Administer medications as ordered   Instruct and encourage patient and family to use good hand hygiene technique     Problem: Chronic Conditions and Co-morbidities  Goal: Patient's chronic conditions and co-morbidity symptoms are monitored and maintained or improved  1/17/2023 1209 by Mely Angela RN  Outcome: Adequate for Discharge  Flowsheets (Taken 1/17/2023 0820)  Care Plan - Patient's Chronic Conditions and Co-Morbidity Symptoms are Monitored and Maintained or Improved: Monitor and assess patient's chronic conditions and comorbid symptoms for stability, deterioration, or improvement     Problem: Nutrition Deficit:  Goal: Optimize nutritional status  1/17/2023 1209 by Mely Angela RN  Outcome: Adequate for Discharge     Problem: Skin/Tissue Integrity  Goal: Absence of new skin breakdown  Description: 1. Monitor for areas of redness and/or skin breakdown  2. Assess vascular access sites hourly  3.   Every 4-6 hours minimum:  Change oxygen saturation probe site  4. Every 4-6 hours:  If on nasal continuous positive airway pressure, respiratory therapy assess nares and determine need for appliance change or resting period.   1/17/2023 1209 by Ina Sarabia RN  Outcome: Adequate for Discharge     Problem: ABCDS Injury Assessment  Goal: Absence of physical injury  1/17/2023 1209 by Ina Sarabia RN  Outcome: Adequate for Discharge     Problem: Pain  Goal: Verbalizes/displays adequate comfort level or baseline comfort level  1/17/2023 1209 by Ina Sarabia RN  Outcome: Adequate for Discharge  Flowsheets (Taken 1/17/2023 0900)  Verbalizes/displays adequate comfort level or baseline comfort level:   Encourage patient to monitor pain and request assistance   Assess pain using appropriate pain scale   Administer analgesics based on type and severity of pain and evaluate response   Implement non-pharmacological measures as appropriate and evaluate response     Problem: Skin/Tissue Integrity - Adult  Goal: Skin integrity remains intact  1/17/2023 1209 by Ina Sarabia RN  Outcome: Adequate for Discharge  Flowsheets (Taken 1/17/2023 0820)  Skin Integrity Remains Intact: Monitor for areas of redness and/or skin breakdown     Problem: Musculoskeletal - Adult  Goal: Return mobility to safest level of function  1/17/2023 1209 by Ina Sarabia RN  Outcome: Adequate for Discharge  Flowsheets (Taken 1/17/2023 0820)  Return Mobility to Safest Level of Function:   Assess patient stability and activity tolerance for standing, transferring and ambulating with or without assistive devices   Assist with transfers and ambulation using safe patient handling equipment as needed     Problem: Metabolic/Fluid and Electrolytes - Adult  Goal: Electrolytes maintained within normal limits  1/17/2023 1209 by Ina Sarabia RN  Outcome: Adequate for Discharge  Flowsheets (Taken 1/17/2023 0820)  Electrolytes maintained within normal limits: Monitor labs and assess patient for signs and symptoms of electrolyte imbalances     Problem: Neurosensory - Adult  Goal: Achieves stable or improved neurological status  Outcome: Adequate for Discharge  Flowsheets (Taken 1/17/2023 0820)  Achieves stable or improved neurological status: Assess for and report changes in neurological status     Problem: Respiratory - Adult  Goal: Achieves optimal ventilation and oxygenation  Outcome: Adequate for Discharge  Flowsheets (Taken 1/17/2023 0820)  Achieves optimal ventilation and oxygenation:   Assess for changes in respiratory status   Assess for changes in mentation and behavior   Assess the need for suctioning and aspirate as needed   Assess and instruct to report shortness of breath or any respiratory difficulty     Problem: Cardiovascular - Adult  Goal: Maintains optimal cardiac output and hemodynamic stability  Outcome: Adequate for Discharge     Problem: Genitourinary - Adult  Goal: Absence of urinary retention  Outcome: Adequate for Discharge   Care plan reviewed with patient. Patient verbalizes understanding of the plan of care and contribute to goal setting.

## 2023-01-17 NOTE — DISCHARGE SUMMARY
Hospitalist Discharge Summary        Patient: Sam Saucedo  YOB: 1950  MRN: [id number]   Acct: [de-identified]    Primary Care Physician: Howard Salinas MD    Admit date  1/4/2023    Discharge date:  1/17/2023 10:13 AM    Chief Complaint on presentation :-    Left lower jaw pain    Discharge Assessment and Plan:-   Periapical abscess with enlargement of the left submandibular gland  Treated with zosyn and transition to augmentin  Needs to continue sialogogues indefinitely  Evaluated by ENT. No surgical intervention warranted  Adrenal insufficiency with adrenal crisis  Required stress dose steroids and vasopressors. Weaned off midodrine and back to baseline/chronic solu-cortef dose. Mild hypernatremia  Reduce bumex to 0.5mg daily  AF with RVR, resolved  Back in sinus rhythm after treatment of infection  Coreg held due to bradycardia. HR dropping into the 40's. Can resume if HR stabilizes. .   Continue xarelto  Chronic condition:  HTN  HFpEF  Hypothyroidism  Severe protein calorie malnutrition  Physical debility with deconditioning    Initial H and P and Hospital course:-  Patient is a very pleasant 18-year-old female with medical history of adrenal insufficiency who presented to the hospital with complaints of left lower jaw pain and was found to have a periapical abscess. She was treated with IV antibiotics and evaluated by ENT with no plans for any surgical intervention. She developed adrenal crisis requiring vasopressors and stress dose corticosteroids. She was successfully weaned off of vasopressors to midodrine which was ultimately DC'd several days prior to discharge. Her stress dose steroids were tapered over several days back to her baseline requirements which she tolerated well. Her stay was complicated by an episode atrial fibrillation with RVR which has since resolved.   Her home Coreg was held due to intermittent fairly persistent bradycardia heart rates regularly in the 50s and dropping into the 40s. She was continued on her home Xarelto. Due to persistent hypernatremia requiring D5W/increased free water, the patient's Bumex dose was reduced to 0.5 mg daily. She did have IV infiltration of her left upper extremity with increased edema which was improving prior to discharge. Physical Exam:-  Vitals:   Patient Vitals for the past 24 hrs:   BP Temp Temp src Pulse Resp SpO2   01/17/23 0900 (!) 129/56 98 °F (36.7 °C) Axillary 55 16 97 %   01/17/23 0333 130/66 98.4 °F (36.9 °C) Oral 56 16 97 %   01/16/23 2215 136/70 97.5 °F (36.4 °C) Oral 64 16 99 %   01/16/23 1715 131/61 97.7 °F (36.5 °C) Oral 72 16 97 %   01/16/23 1232 (!) 132/55 98.4 °F (36.9 °C) Axillary 63 16 99 %     Weight:   Weight: 183 lb 4.8 oz (83.1 kg)   24 hour intake/output:     Intake/Output Summary (Last 24 hours) at 1/17/2023 1013  Last data filed at 1/17/2023 6716  Gross per 24 hour   Intake 200 ml   Output 1500 ml   Net -1300 ml       General appearance: No apparent distress, appears stated age and cooperative. HEENT: Pupils equal, round, and reactive to light. Conjunctivae/corneas clear. Neck: Minimal swelling of the left submandibular region. Respiratory:  Normal respiratory effort. Clear to auscultation, bilaterally without Rales/Wheezes/Rhonchi. Cardiovascular: Regular rate and rhythm with normal S1/S2 without murmurs, rubs or gallops. Abdomen: Soft, non-tender, non-distended with normal bowel sounds. Musculoskeletal: Diffuse anasarca. LUE more edematous than noted previously. Skin: Multiple skin excoriations and areas of ecchymosis. Neurologic:  Neurovascularly intact without any focal sensory/motor deficits.  Cranial nerves: II-XII intact, grossly non-focal.  Psychiatric: Alert and oriented, thought content appropriate, normal insight  Capillary Refill: Brisk,< 3 seconds   Peripheral Pulses: +2 palpable, equal bilaterally       Discharge Medications:-      Medication List        START taking these medications      amoxicillin-clavulanate 875-125 MG per tablet  Commonly known as: AUGMENTIN  Take 1 tablet by mouth every 12 hours for 5 doses            CHANGE how you take these medications      bumetanide 1 MG tablet  Commonly known as: BUMEX  Take 0.5 tablets by mouth daily  What changed: how much to take            CONTINUE taking these medications      acetaminophen 325 MG tablet  Commonly known as: TYLENOL     chlorhexidine 4 % external liquid  Commonly known as: HIBICLENS  Wash hair with small amount twice weekly. diphenhydrAMINE-APAP (sleep)  MG tablet  Commonly known as: TYLENOL PM EXTRA STRENGTH     * hydrocortisone 20 MG tablet  Commonly known as: CORTEF     * hydrocortisone 5 MG tablet  Commonly known as: CORTEF     levothyroxine 125 MCG tablet  Commonly known as: SYNTHROID  TAKE 1 TABLET BY MOUTH DAILY     miconazole 2 % powder  Commonly known as: MICOTIN  Apply topically 2 times daily PRN for excoriation     oxybutynin 15 MG extended release tablet  Commonly known as: DITROPAN XL  TAKE 1 TABLET BY MOUTH DAILY     pantoprazole 40 MG tablet  Commonly known as: PROTONIX  TAKE 1 TABLET BY MOUTH EVERY NIGHT     rivaroxaban 15 MG Tabs tablet  Commonly known as: Xarelto  TAKE 1 TABLET BY MOUTH DAILY WITH SUPPER     simvastatin 20 MG tablet  Commonly known as: ZOCOR  Take 1 tablet by mouth nightly     terbinafine 250 MG tablet  Commonly known as: LamISIL  Take 1 tablet by mouth daily           * This list has 2 medication(s) that are the same as other medications prescribed for you. Read the directions carefully, and ask your doctor or other care provider to review them with you.                 STOP taking these medications      carvedilol 12.5 MG tablet  Commonly known as: COREG               Where to Get Your Medications        These medications were sent to [facility] [address] [id number] - [place], [address], [facility] [address] Phone: [phone]   levothyroxine 125 MCG tablet       Information about where to get these medications is not yet available    Ask your nurse or doctor about these medications  amoxicillin-clavulanate 875-125 MG per tablet  bumetanide 1 MG tablet          Labs :-  Recent Results (from the past 72 hour(s))   CBC with Auto Differential    Collection Time: 01/15/23  8:07 AM   Result Value Ref Range    WBC 13.2 (H) 4.8 - 10.8 thou/mm3    RBC 3.34 (L) 4.20 - 5.40 mill/mm3    Hemoglobin 9.9 (L) 12.0 - 16.0 gm/dl    Hematocrit 32.4 (L) 37.0 - 47.0 %    MCV 97.0 81.0 - 99.0 fL    MCH 29.6 26.0 - 33.0 pg    MCHC 30.6 (L) 32.2 - 35.5 gm/dl    RDW-CV 18.5 (H) 11.5 - 14.5 %    RDW-SD 63.0 (H) 35.0 - 45.0 fL    Platelets 314 948 - 240 thou/mm3    MPV 10.3 9.4 - 12.4 fL    Seg Neutrophils 74.4 %    Lymphocytes 18.4 %    Monocytes 4.7 %    Eosinophils 1.3 %    Basophils 0.1 %    Immature Granulocytes 1.1 %    Segs Absolute 9.8 (H) 1.8 - 7.7 thou/mm3    Lymphocytes Absolute 2.4 1.0 - 4.8 thou/mm3    Monocytes Absolute 0.6 0.4 - 1.3 thou/mm3    Eosinophils Absolute 0.2 0.0 - 0.4 thou/mm3    Basophils Absolute 0.0 0.0 - 0.1 thou/mm3    Immature Grans (Abs) 0.14 (H) 0.00 - 0.07 thou/mm3    nRBC 0 /100 wbc   Basic Metabolic Panel    Collection Time: 01/15/23  8:07 AM   Result Value Ref Range    Sodium 155 (H) 135 - 145 meq/L    Potassium 3.2 (L) 3.5 - 5.2 meq/L    Chloride 119 (H) 98 - 111 meq/L    CO2 29 23 - 33 meq/L    Glucose 76 70 - 108 mg/dL    BUN 9 7 - 22 mg/dL    Creatinine 0.6 0.4 - 1.2 mg/dL    Calcium 8.1 (L) 8.5 - 10.5 mg/dL   Anion Gap    Collection Time: 01/15/23  8:07 AM   Result Value Ref Range    Anion Gap 7.0 (L) 8.0 - 16.0 meq/L   Glomerular Filtration Rate, Estimated    Collection Time: 01/15/23  8:07 AM   Result Value Ref Range    Est, Glom Filt Rate >60 >60 ml/min/1.73m2   CBC with Auto Differential    Collection Time: 01/16/23  6:03 AM   Result Value Ref Range    WBC 12.4 (H) 4.8 - 10.8 thou/mm3    RBC 3.50 (L) 4.20 - 5.40 mill/mm3    Hemoglobin 10.5 (L) 12.0 - 16.0 gm/dl    Hematocrit 34.1 (L) 37.0 - 47.0 %    MCV 97.4 81.0 - 99.0 fL    MCH 30.0 26.0 - 33.0 pg    MCHC 30.8 (L) 32.2 - 35.5 gm/dl    RDW-CV 18.6 (H) 11.5 - 14.5 %    RDW-SD 64.4 (H) 35.0 - 45.0 fL    Platelets 968 827 - 609 thou/mm3    MPV 10.2 9.4 - 12.4 fL    Seg Neutrophils 67.7 %    Lymphocytes 22.8 %    Monocytes 5.5 %    Eosinophils 3.1 %    Basophils 0.1 %    Immature Granulocytes 0.8 %    Segs Absolute 8.4 (H) 1.8 - 7.7 thou/mm3    Lymphocytes Absolute 2.8 1.0 - 4.8 thou/mm3    Monocytes Absolute 0.7 0.4 - 1.3 thou/mm3    Eosinophils Absolute 0.4 0.0 - 0.4 thou/mm3    Basophils Absolute 0.0 0.0 - 0.1 thou/mm3    Immature Grans (Abs) 0.10 (H) 0.00 - 0.07 thou/mm3    nRBC 0 /100 wbc   Basic Metabolic Panel    Collection Time: 01/16/23  6:03 AM   Result Value Ref Range    Sodium 149 (H) 135 - 145 meq/L    Potassium 3.6 3.5 - 5.2 meq/L    Chloride 112 (H) 98 - 111 meq/L    CO2 30 23 - 33 meq/L    Glucose 80 70 - 108 mg/dL    BUN 7 7 - 22 mg/dL    Creatinine 0.6 0.4 - 1.2 mg/dL    Calcium 7.8 (L) 8.5 - 10.5 mg/dL   Anion Gap    Collection Time: 01/16/23  6:03 AM   Result Value Ref Range    Anion Gap 7.0 (L) 8.0 - 16.0 meq/L   Glomerular Filtration Rate, Estimated    Collection Time: 01/16/23  6:03 AM   Result Value Ref Range    Est, Glom Filt Rate >60 >60 ml/min/1.73m2   CBC with Auto Differential    Collection Time: 01/17/23  5:52 AM   Result Value Ref Range    WBC 11.4 (H) 4.8 - 10.8 thou/mm3    RBC 3.04 (L) 4.20 - 5.40 mill/mm3    Hemoglobin 9.0 (L) 12.0 - 16.0 gm/dl    Hematocrit 30.0 (L) 37.0 - 47.0 %    MCV 98.7 81.0 - 99.0 fL    MCH 29.6 26.0 - 33.0 pg    MCHC 30.0 (L) 32.2 - 35.5 gm/dl    RDW-CV 18.5 (H) 11.5 - 14.5 %    RDW-SD 64.9 (H) 35.0 - 45.0 fL    Platelets 937 632 - 891 thou/mm3    MPV 10.2 9.4 - 12.4 fL    Seg Neutrophils 67.8 %    Lymphocytes 23.6 %    Monocytes 4.9 %    Eosinophils 3.1 %    Basophils 0.1 %    Immature Granulocytes 0.5 %    Segs Absolute 7.7 1.8 - 7.7 thou/mm3    Lymphocytes Absolute 2.7 1.0 - 4.8 thou/mm3    Monocytes Absolute 0.6 0.4 - 1.3 thou/mm3    Eosinophils Absolute 0.4 0.0 - 0.4 thou/mm3    Basophils Absolute 0.0 0.0 - 0.1 thou/mm3    Immature Grans (Abs) 0.06 0.00 - 0.07 thou/mm3    nRBC 0 /100 wbc   Basic Metabolic Panel    Collection Time: 01/17/23  5:52 AM   Result Value Ref Range    Sodium 144 135 - 145 meq/L    Potassium 3.3 (L) 3.5 - 5.2 meq/L    Chloride 110 98 - 111 meq/L    CO2 26 23 - 33 meq/L    Glucose 75 70 - 108 mg/dL    BUN 6 (L) 7 - 22 mg/dL    Creatinine 0.7 0.4 - 1.2 mg/dL    Calcium 7.7 (L) 8.5 - 10.5 mg/dL   Anion Gap    Collection Time: 01/17/23  5:52 AM   Result Value Ref Range    Anion Gap 8.0 8.0 - 16.0 meq/L   Glomerular Filtration Rate, Estimated    Collection Time: 01/17/23  5:52 AM   Result Value Ref Range    Est, Glom Filt Rate >60 >60 ml/min/1.73m2        Microbiology:    Blood culture #1:   Lab Results   Component Value Date/Time    [facility]  01/04/2023 01:20 PM     No growth 24 hours. No growth 48 hours. No growth at 5 days       Blood culture #2:No results found for: BLOODCULT2    Organism:    Lab Results   Component Value Date/Time    LABGRAM  02/16/2022 04:00 PM     No segmented neutrophils observed. Rare epithelial cells observed. No organisms observed. MRSA culture only:No results found for: [facility]    Urine culture:   Lab Results   Component Value Date/Time    LABURIN No growth-preliminary  No growth   02/09/2017 12:35 PM     Lab Results   Component Value Date/Time    ORG Staphylococcus aureus 12/17/2018 04:04 PM        Respiratory culture: No results found for: CULTRESP    Aerobic and Anaerobic :  Lab Results   Component Value Date/Time    LABAERO No Acinetobacter species isolated.  01/04/2023 10:00 PM     Lab Results   Component Value Date/Time    LABANAE  12/01/2021 12:00 PM     No anaerobes isolated- preliminary Culture yielded light growth of gram positive bacilli most consistent with Cutibacterium species. Clinical correlation required. Urinalysis:      Lab Results   Component Value Date/Time    NITRU NEGATIVE 01/04/2023 02:15 PM    WBCUA 5-9 01/04/2023 02:15 PM    WBCUA 2-4 02/22/2012 01:00 PM    BACTERIA NONE SEEN 01/04/2023 02:15 PM    RBCUA 3-5 01/04/2023 02:15 PM    BLOODU SMALL 01/04/2023 02:15 PM    SPECGRAV 1.025 09/18/2020 12:00 PM    GLUCOSEU NEGATIVE 01/04/2023 02:15 PM       Radiology:-  CT FACIAL BONES WO CONTRAST    Result Date: 1/4/2023  PROCEDURE: CT FACIAL BONES WO CONTRAST CLINICAL INFORMATION: Submandibular cellulitis swelling, possible erysipelas, ruling out submandibular phlegmon . TECHNIQUE: 3 mm multiplanar images of the facial bones in bone and soft tissue windows noncontrast All CT scans at this facility use dose modulation, iterative reconstruction, and/or weight-based dosing when appropriate to reduce radiation dose to as low as reasonably achievable. COMPARISON: No prior study. 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 Soft tissue windows demonstrate either enlargement of the left submandibular gland or obscuration by inflammation. Appearance is more suggestive of inflammatory enlargement of the gland. Evaluation is limited without contrast. This measures 2.5 cm. Solid  attenuation coefficients Asymmetric fullness of the left pharyngeal soft tissues underlying inflammation or mass is a concern. There are left-sided mastoid effusions which is new when compared to CT brain dated 8/20/2019 there is also fluid in the left internal auditory canal.     1. Enlargement of the left submandibular gland measuring up to 2.5 cm. 2. Asymmetric fullness of the left pharyngeal soft tissues with mass effect on the subglottic airway 3. Left mastoid effusions and otitis interna **This report has been created using voice recognition software.   It may contain minor errors which are inherent in voice recognition technology. ** Final report electronically signed by Dr. Marcello Cortes on 1/4/2023 3:08 PM    XR CHEST PORTABLE    Result Date: 1/4/2023  PROCEDURE: XR CHEST PORTABLE CLINICAL INFORMATION: central line placement COMPARISON: 1/4/2023 TECHNIQUE: A single mobile view of the chest was obtained. 1. Poor inflation the lungs. Mild cardiomegaly. Bilateral shoulder prostheses. 2. Right subclavian line has been inserted with tip in right atrium. 3. Moderate atelectasis/pneumonia right parahilar region and both lung bases. No effusion seen. **This report has been created using voice recognition software. It may contain minor errors which are inherent in voice recognition technology. ** Final report electronically signed by Dr. Deborah Guevara on 1/4/2023 4:42 PM    XR CHEST PORTABLE    Result Date: 1/4/2023  PROCEDURE: XR CHEST PORTABLE CLINICAL INFORMATION: weakness . TECHNIQUE: Portable upright COMPARISON: 12/3/2022 FINDINGS: Patient is rotated. Mandible appears detail the upper apices. Heart size is within normal limits. Mediastinum is not widened. Residual patchy infiltrates left base slightly improved compared to the prior exam. No effusions are seen. Vessels are not congested. Chronic increased lung markings suggesting mild parenchymal scarring bilateral shoulder replacements. Residual patchy airspace infiltrates left lower lobe. **This report has been created using voice recognition software. It may contain minor errors which are inherent in voice recognition technology. ** Final report electronically signed by Dr. Marcello Cortes on 1/4/2023 12:41 PM       Follow-up scheduled after discharge :-    in 5-7 days with Lanny Toussaint MD      Consultations during this hospital stay:-  [] NONE [] Cardiology  [] Nephrology  [] Hemo onco   [] GI   [] ID  [] Endocrine  [] Pulm    [] Neuro    [] Psych   [] Urology  [x] ENT   [] G SURGERY   []Ortho    []CV surg    [] Palliative  [] Hospice [] Pain management   [] Social services   []TCU   [] PT/OT  OTHERS:-    Disposition: SNF  Condition at Discharge: Stable    Time Spent:- 40 minutes    Electronically signed by Reji Hawkins DO on 1/17/23 at 10:13 AM EST   Discharging Hospitalist 8/18-8/23 secondary OCP

## 2023-01-17 NOTE — PROGRESS NOTES
This RN called report to [facility], report given to [employer]. No further questions asked. AVS, DNR CCA, and medication report faxed to [facility] a second time as they said they did not receive the first fax. STEF to come between 1830 and 1900. IV access removed.

## 2023-01-17 NOTE — PROGRESS NOTES
[address] [address]  INPATIENT SPEECH THERAPY  [facility]  DAILY NOTE    TIME   SLP Individual Minutes  Time In: 2497  Time Out: 5886  Minutes: 15  Timed Code Treatment Minutes: 0 Minutes       Date: 2023  Patient Name: Brian Lee      CSN: [id number]   : 1950  (71 y.o.)  Gender: female   Referring Physician:  Dr. Chago Simmons with repeat order received by Rock Hodgkins, DO 2023  Diagnosis: Septic shock ([address] Cellulitis of submandibular region  Precautions: Aspiration risk, fall risk  Current Diet:  Minced and moist Diet with Moderately Thick Liquids - advanced 23  Swallowing Strategies:  Full Upright Position, Small Bite/Sip, Multiple Swallow, Medications Crushed with Puree, Limit Distractions, Monitor for Fatigue, and 1:1 assistance with meals  Date of Last MBS/FEES:  MBS 2022    Pain:  No pain reported. Subjective: RN Juan F approved ST attempt; patient seen with breakfast meal for therapy services this date. No family present. Short-Term Goals:  SHORT TERM GOAL #1:  Goal 1: Patient will consume minced and moist diet with moderatly thick liquids without s/s of aspiration in order to safely maintain adequate hydration and nutrition  INTERVENTIONS: Patient with thin liquid ice water pitcher at bedside tray table- ST removed ice water pitcher and provided rational of removal to support ongoing medical need for moderately thick liquids. ST explained as well no ice, even if thickened d/t ice altering consistency of liquid placing patient at higher risk for aspiration if consumed. ST filled and thickened water pitcher to moderately thick- removed straw- ST educated patient re: need for moderately thickened liquids at this time and to avoid use of straw-patient verbalized understanding. Additional PO trials completed with breakfast meal consisting of oat meal, pureed peaches, yogurt and moderately thick liquids. Patient required set up assistance only.  Patient independently pouring moderately thick liquid milk into oatmeal and consumed multiple PO trials; no s/s of aspiration. ST recommending continued minced and moist diet with moderately thick liquids. *post treatment, patient without respiratory distress upon leaving room; RN Juan F notified re: clinical findings and recommendations from the treatment; verbal receptiveness noted     RN Juan F updated. SHORT TERM GOAL #2:  Goal 2: Patient will complete pharyngeal exercises x15 in order to improve overall airway protection and pharyngeal constriction. INTERVENTIONS: DNT secondary to focus on other goals     SHORT TERM GOAL #3:  Goal 3: Complete repeat insturmental evaluation when clincially appropriate in order to determine readiness for diet advancement. (most recent MBS 11/26/22)  INTERVENTIONS: patient not appropriate at this time    SHORT TERM GOAL #4:  Goal 4: Patient and family will demonstrate understanding of ST dyspahgia recommendations, ST liquids/diet recommendations, s/s of aspiration and risks of aspiration to ensure safety of PO intake  INTERVENTIONS:ST educated patient re: recommendations for moderately thickened liquids- removing straw    SHORT TERM GOAL #5:  Goal 5: Monitor speech/language/cognitive baseline and complete evaluation as indiciated. GOAL MET. INTERVENTIONS: Patient well known to this  d/t previous admission; suspect patient to be at or nearing cognitive baseline. Recommend ECF at discharge as this is patient's baseline. Long-Term Goals: No LTGs due to short ELOS                     EDUCATION:  Learner: Patient  Education:  Reviewed diet and strategies  Evaluation of Education: Verbalizes understanding    ASSESSMENT/PLAN:  Activity Tolerance:  Patient tolerance of  treatment: good. Assessment/Plan: Patient progressing toward established goals. Continues to require skilled care of licensed speech pathologist to progress toward achievement of established goals and plan of care. .     Plan for Next Session: recommend continued speech therapy to address dysphagia, continued moderately thickened liquids  Discharge Recommendations: ECF       SINCERE Akins 23

## 2023-01-18 NOTE — PROGRESS NOTES
[facility], paperwork sent with [facility]. No further questions, IV access removed, patient belongings transferred with patient.

## 2023-01-19 ENCOUNTER — TELEPHONE (OUTPATIENT)
Dept: FAMILY MEDICINE CLINIC | Age: 73
End: 2023-01-19

## 2023-01-19 NOTE — TELEPHONE ENCOUNTER
Care Transitions Initial Follow Up Call    Outreach made within 2 business days of discharge: Yes    Patient: Juanpablo Perez Patient : 1950   MRN: [id number]  Reason for Admission: There are no discharge diagnoses documented for the most recent discharge. Discharge Date: 23       Spoke with: spoke with pt      Discharge department/facility: [facility]    TCM Interactive Patient Contact:  Was patient able to fill all prescriptions: Yes  Was patient instructed to bring all medications to the follow-up visit: Yes  Is patient taking all medications as directed in the discharge summary?  Yes  Does patient understand their discharge instructions: Yes  Does patient have questions or concerns that need addressed prior to 7-14 day follow up office visit: no    Scheduled appointment with PCP within 7-14 days    Follow Up  Future Appointments   Date Time Provider Shantal Akhtar   2023 10:00 AM Norris Granados [address] [facility]   2023  2:00 PM Adriel Jenkins MD [address] [place] [address]

## 2023-01-21 ENCOUNTER — APPOINTMENT (OUTPATIENT)
Dept: GENERAL RADIOLOGY | Age: 73
DRG: 868 | End: 2023-01-21
Payer: MEDICARE

## 2023-01-21 ENCOUNTER — HOSPITAL ENCOUNTER (INPATIENT)
Age: 73
LOS: 15 days | Discharge: NURSING, REHAB OR LONG-TERM CARE | DRG: 868 | End: 2023-02-06
Attending: STUDENT IN AN ORGANIZED HEALTH CARE EDUCATION/TRAINING PROGRAM | Admitting: STUDENT IN AN ORGANIZED HEALTH CARE EDUCATION/TRAINING PROGRAM
Payer: MEDICARE

## 2023-01-21 DIAGNOSIS — D72.829 LEUKOCYTOSIS, UNSPECIFIED TYPE: ICD-10-CM

## 2023-01-21 DIAGNOSIS — E87.6 HYPOKALEMIA: ICD-10-CM

## 2023-01-21 DIAGNOSIS — L03.114 CELLULITIS OF LEFT UPPER EXTREMITY: Primary | ICD-10-CM

## 2023-01-21 LAB
ALBUMIN SERPL BCG-MCNC: 2.8 G/DL (ref 3.5–5.1)
ALP SERPL-CCNC: 95 U/L (ref 38–126)
ALT SERPL W/O P-5'-P-CCNC: 17 U/L (ref 11–66)
ANION GAP SERPL CALC-SCNC: 9 MEQ/L (ref 8–16)
AST SERPL-CCNC: 11 U/L (ref 5–40)
BASOPHILS ABSOLUTE: 0 THOU/MM3 (ref 0–0.1)
BASOPHILS NFR BLD AUTO: 0.1 %
BILIRUB SERPL-MCNC: 0.8 MG/DL (ref 0.3–1.2)
BUN SERPL-MCNC: 6 MG/DL (ref 7–22)
CALCIUM SERPL-MCNC: 8.1 MG/DL (ref 8.5–10.5)
CHLORIDE SERPL-SCNC: 110 MEQ/L (ref 98–111)
CO2 SERPL-SCNC: 27 MEQ/L (ref 23–33)
CREAT SERPL-MCNC: 0.8 MG/DL (ref 0.4–1.2)
DEPRECATED RDW RBC AUTO: 63.2 FL (ref 35–45)
EOSINOPHIL NFR BLD AUTO: 0.4 %
EOSINOPHILS ABSOLUTE: 0.1 THOU/MM3 (ref 0–0.4)
ERYTHROCYTE [DISTWIDTH] IN BLOOD BY AUTOMATED COUNT: 17.9 % (ref 11.5–14.5)
GFR SERPL CREATININE-BSD FRML MDRD: > 60 ML/MIN/1.73M2
GLUCOSE SERPL-MCNC: 97 MG/DL (ref 70–108)
HCT VFR BLD AUTO: 32.1 % (ref 37–47)
HGB BLD-MCNC: 10.2 GM/DL (ref 12–16)
IMM GRANULOCYTES # BLD AUTO: 0.07 THOU/MM3 (ref 0–0.07)
IMM GRANULOCYTES NFR BLD AUTO: 0.4 %
LACTIC ACID, SEPSIS: 1.1 MMOL/L (ref 0.5–1.9)
LYMPHOCYTES ABSOLUTE: 2 THOU/MM3 (ref 1–4.8)
LYMPHOCYTES NFR BLD AUTO: 12.7 %
MCH RBC QN AUTO: 30.7 PG (ref 26–33)
MCHC RBC AUTO-ENTMCNC: 31.8 GM/DL (ref 32.2–35.5)
MCV RBC AUTO: 96.7 FL (ref 81–99)
MONOCYTES ABSOLUTE: 0.5 THOU/MM3 (ref 0.4–1.3)
MONOCYTES NFR BLD AUTO: 3.1 %
NEUTROPHILS NFR BLD AUTO: 83.3 %
NRBC BLD AUTO-RTO: 0 /100 WBC
OSMOLALITY SERPL CALC.SUM OF ELEC: 288.1 MOSMOL/KG (ref 275–300)
PLATELET # BLD AUTO: 213 THOU/MM3 (ref 130–400)
PMV BLD AUTO: 9.8 FL (ref 9.4–12.4)
POTASSIUM SERPL-SCNC: 3.3 MEQ/L (ref 3.5–5.2)
PROCALCITONIN SERPL IA-MCNC: 0.28 NG/ML (ref 0.01–0.09)
PROT SERPL-MCNC: 5.1 G/DL (ref 6.1–8)
RBC # BLD AUTO: 3.32 MILL/MM3 (ref 4.2–5.4)
REASON FOR REJECTION: NORMAL
REJECTED TEST: NORMAL
SEGMENTED NEUTROPHILS ABSOLUTE COUNT: 13.2 THOU/MM3 (ref 1.8–7.7)
SODIUM SERPL-SCNC: 146 MEQ/L (ref 135–145)
WBC # BLD AUTO: 15.8 THOU/MM3 (ref 4.8–10.8)

## 2023-01-21 PROCEDURE — 85025 COMPLETE CBC W/AUTO DIFF WBC: CPT

## 2023-01-21 PROCEDURE — 83605 ASSAY OF LACTIC ACID: CPT

## 2023-01-21 PROCEDURE — 73080 X-RAY EXAM OF ELBOW: CPT

## 2023-01-21 PROCEDURE — 87040 BLOOD CULTURE FOR BACTERIA: CPT

## 2023-01-21 PROCEDURE — 96375 TX/PRO/DX INJ NEW DRUG ADDON: CPT

## 2023-01-21 PROCEDURE — 36415 COLL VENOUS BLD VENIPUNCTURE: CPT

## 2023-01-21 PROCEDURE — 2580000003 HC RX 258: Performed by: STUDENT IN AN ORGANIZED HEALTH CARE EDUCATION/TRAINING PROGRAM

## 2023-01-21 PROCEDURE — 80053 COMPREHEN METABOLIC PANEL: CPT

## 2023-01-21 PROCEDURE — 84439 ASSAY OF FREE THYROXINE: CPT

## 2023-01-21 PROCEDURE — 73130 X-RAY EXAM OF HAND: CPT

## 2023-01-21 PROCEDURE — 2500000003 HC RX 250 WO HCPCS: Performed by: STUDENT IN AN ORGANIZED HEALTH CARE EDUCATION/TRAINING PROGRAM

## 2023-01-21 PROCEDURE — 71045 X-RAY EXAM CHEST 1 VIEW: CPT

## 2023-01-21 PROCEDURE — 96368 THER/DIAG CONCURRENT INF: CPT

## 2023-01-21 PROCEDURE — 6370000000 HC RX 637 (ALT 250 FOR IP): Performed by: STUDENT IN AN ORGANIZED HEALTH CARE EDUCATION/TRAINING PROGRAM

## 2023-01-21 PROCEDURE — 84145 PROCALCITONIN (PCT): CPT

## 2023-01-21 PROCEDURE — 96365 THER/PROPH/DIAG IV INF INIT: CPT

## 2023-01-21 PROCEDURE — 6360000002 HC RX W HCPCS: Performed by: STUDENT IN AN ORGANIZED HEALTH CARE EDUCATION/TRAINING PROGRAM

## 2023-01-21 PROCEDURE — 73090 X-RAY EXAM OF FOREARM: CPT

## 2023-01-21 PROCEDURE — 84443 ASSAY THYROID STIM HORMONE: CPT

## 2023-01-21 PROCEDURE — 02HV33Z INSERTION OF INFUSION DEVICE INTO SUPERIOR VENA CAVA, PERCUTANEOUS APPROACH: ICD-10-PCS | Performed by: EMERGENCY MEDICINE

## 2023-01-21 PROCEDURE — 99285 EMERGENCY DEPT VISIT HI MDM: CPT

## 2023-01-21 RX ORDER — CLINDAMYCIN PHOSPHATE 600 MG/50ML
600 INJECTION INTRAVENOUS ONCE
Status: COMPLETED | OUTPATIENT
Start: 2023-01-21 | End: 2023-01-22

## 2023-01-21 RX ORDER — 0.9 % SODIUM CHLORIDE 0.9 %
1000 INTRAVENOUS SOLUTION INTRAVENOUS ONCE
Status: COMPLETED | OUTPATIENT
Start: 2023-01-21 | End: 2023-01-22

## 2023-01-21 RX ORDER — ACETAMINOPHEN 325 MG/1
650 TABLET ORAL ONCE
Status: COMPLETED | OUTPATIENT
Start: 2023-01-21 | End: 2023-01-21

## 2023-01-21 RX ORDER — MORPHINE SULFATE 4 MG/ML
4 INJECTION, SOLUTION INTRAMUSCULAR; INTRAVENOUS ONCE
Status: COMPLETED | OUTPATIENT
Start: 2023-01-21 | End: 2023-01-21

## 2023-01-21 RX ADMIN — MORPHINE SULFATE 4 MG: 4 INJECTION, SOLUTION INTRAMUSCULAR; INTRAVENOUS at 23:29

## 2023-01-21 RX ADMIN — ACETAMINOPHEN 650 MG: 325 TABLET ORAL at 23:28

## 2023-01-21 RX ADMIN — PIPERACILLIN AND TAZOBACTAM 4500 MG: 4; .5 INJECTION, POWDER, FOR SOLUTION INTRAVENOUS at 23:36

## 2023-01-21 RX ADMIN — CLINDAMYCIN PHOSPHATE 600 MG: 600 INJECTION, SOLUTION INTRAVENOUS at 23:31

## 2023-01-21 RX ADMIN — SODIUM CHLORIDE 1000 ML: 9 INJECTION, SOLUTION INTRAVENOUS at 23:29

## 2023-01-21 ASSESSMENT — PAIN SCALES - GENERAL
PAINLEVEL_OUTOF10: 9
PAINLEVEL_OUTOF10: 9

## 2023-01-21 ASSESSMENT — PAIN DESCRIPTION - LOCATION: LOCATION: ARM

## 2023-01-21 ASSESSMENT — PAIN DESCRIPTION - ORIENTATION: ORIENTATION: LEFT

## 2023-01-21 ASSESSMENT — PAIN - FUNCTIONAL ASSESSMENT: PAIN_FUNCTIONAL_ASSESSMENT: 0-10

## 2023-01-22 ENCOUNTER — APPOINTMENT (OUTPATIENT)
Dept: CT IMAGING | Age: 73
DRG: 868 | End: 2023-01-22
Payer: MEDICARE

## 2023-01-22 PROBLEM — L03.90 CELLULITIS: Status: ACTIVE | Noted: 2023-01-22

## 2023-01-22 LAB
ANION GAP SERPL CALC-SCNC: 11 MEQ/L (ref 8–16)
BACTERIA URNS QL MICRO: ABNORMAL /HPF
BILIRUB UR QL STRIP.AUTO: NEGATIVE
BUN SERPL-MCNC: 5 MG/DL (ref 7–22)
CALCIUM SERPL-MCNC: 7.9 MG/DL (ref 8.5–10.5)
CASTS #/AREA URNS LPF: ABNORMAL /LPF
CASTS 2: ABNORMAL /LPF
CHARACTER UR: CLEAR
CHLORIDE SERPL-SCNC: 113 MEQ/L (ref 98–111)
CO2 SERPL-SCNC: 25 MEQ/L (ref 23–33)
COLOR: YELLOW
CREAT SERPL-MCNC: 0.9 MG/DL (ref 0.4–1.2)
CRYSTALS URNS MICRO: ABNORMAL
DEPRECATED RDW RBC AUTO: 63.5 FL (ref 35–45)
EKG ATRIAL RATE: 61 BPM
EKG P AXIS: 31 DEGREES
EKG P-R INTERVAL: 136 MS
EKG Q-T INTERVAL: 406 MS
EKG QRS DURATION: 84 MS
EKG QTC CALCULATION (BAZETT): 408 MS
EKG R AXIS: -32 DEGREES
EKG T AXIS: -8 DEGREES
EKG VENTRICULAR RATE: 61 BPM
EPITHELIAL CELLS, UA: ABNORMAL /HPF
ERYTHROCYTE [DISTWIDTH] IN BLOOD BY AUTOMATED COUNT: 17.9 % (ref 11.5–14.5)
GFR SERPL CREATININE-BSD FRML MDRD: > 60 ML/MIN/1.73M2
GLUCOSE SERPL-MCNC: 89 MG/DL (ref 70–108)
GLUCOSE UR QL STRIP.AUTO: NEGATIVE MG/DL
HCT VFR BLD AUTO: 31.1 % (ref 37–47)
HGB BLD-MCNC: 9.8 GM/DL (ref 12–16)
HGB UR QL STRIP.AUTO: ABNORMAL
KETONES UR QL STRIP.AUTO: NEGATIVE
MCH RBC QN AUTO: 30.7 PG (ref 26–33)
MCHC RBC AUTO-ENTMCNC: 31.5 GM/DL (ref 32.2–35.5)
MCV RBC AUTO: 97.5 FL (ref 81–99)
MISCELLANEOUS 2: ABNORMAL
MRSA DNA SPEC QL NAA+PROBE: NEGATIVE
NITRITE UR QL STRIP: NEGATIVE
PH UR STRIP.AUTO: 7.5 [PH] (ref 5–9)
PLATELET # BLD AUTO: 192 THOU/MM3 (ref 130–400)
PMV BLD AUTO: 9.8 FL (ref 9.4–12.4)
POTASSIUM SERPL-SCNC: 2.8 MEQ/L (ref 3.5–5.2)
POTASSIUM SERPL-SCNC: 3.9 MEQ/L (ref 3.5–5.2)
PROT UR STRIP.AUTO-MCNC: NEGATIVE MG/DL
RBC # BLD AUTO: 3.19 MILL/MM3 (ref 4.2–5.4)
RBC URINE: ABNORMAL /HPF
RENAL EPI CELLS #/AREA URNS HPF: ABNORMAL /[HPF]
SODIUM SERPL-SCNC: 149 MEQ/L (ref 135–145)
SODIUM SERPL-SCNC: 150 MEQ/L (ref 135–145)
SP GR UR REFRACT.AUTO: 1 (ref 1–1.03)
T4 FREE SERPL-MCNC: 1.31 NG/DL (ref 0.93–1.76)
TSH SERPL DL<=0.005 MIU/L-ACNC: 0.05 UIU/ML (ref 0.4–4.2)
UROBILINOGEN, URINE: 0.2 EU/DL (ref 0–1)
WBC # BLD AUTO: 13.7 THOU/MM3 (ref 4.8–10.8)
WBC #/AREA URNS HPF: ABNORMAL /HPF
WBC #/AREA URNS HPF: NEGATIVE /[HPF]
YEAST LIKE FUNGI URNS QL MICRO: ABNORMAL

## 2023-01-22 PROCEDURE — 1200000003 HC TELEMETRY R&B

## 2023-01-22 PROCEDURE — 6360000002 HC RX W HCPCS: Performed by: PHYSICIAN ASSISTANT

## 2023-01-22 PROCEDURE — 2580000003 HC RX 258

## 2023-01-22 PROCEDURE — 73201 CT UPPER EXTREMITY W/DYE: CPT

## 2023-01-22 PROCEDURE — 36415 COLL VENOUS BLD VENIPUNCTURE: CPT

## 2023-01-22 PROCEDURE — 2580000003 HC RX 258: Performed by: PHYSICIAN ASSISTANT

## 2023-01-22 PROCEDURE — 93005 ELECTROCARDIOGRAM TRACING: CPT

## 2023-01-22 PROCEDURE — 85027 COMPLETE CBC AUTOMATED: CPT

## 2023-01-22 PROCEDURE — 6370000000 HC RX 637 (ALT 250 FOR IP)

## 2023-01-22 PROCEDURE — 84132 ASSAY OF SERUM POTASSIUM: CPT

## 2023-01-22 PROCEDURE — 84295 ASSAY OF SERUM SODIUM: CPT

## 2023-01-22 PROCEDURE — 99223 1ST HOSP IP/OBS HIGH 75: CPT

## 2023-01-22 PROCEDURE — 80048 BASIC METABOLIC PNL TOTAL CA: CPT

## 2023-01-22 PROCEDURE — 02HV33Z INSERTION OF INFUSION DEVICE INTO SUPERIOR VENA CAVA, PERCUTANEOUS APPROACH: ICD-10-PCS | Performed by: EMERGENCY MEDICINE

## 2023-01-22 PROCEDURE — 1200000000 HC SEMI PRIVATE

## 2023-01-22 PROCEDURE — 93010 ELECTROCARDIOGRAM REPORT: CPT | Performed by: INTERNAL MEDICINE

## 2023-01-22 PROCEDURE — 2580000003 HC RX 258: Performed by: STUDENT IN AN ORGANIZED HEALTH CARE EDUCATION/TRAINING PROGRAM

## 2023-01-22 PROCEDURE — 6360000004 HC RX CONTRAST MEDICATION

## 2023-01-22 PROCEDURE — 6360000002 HC RX W HCPCS

## 2023-01-22 PROCEDURE — 81001 URINALYSIS AUTO W/SCOPE: CPT

## 2023-01-22 PROCEDURE — 87641 MR-STAPH DNA AMP PROBE: CPT

## 2023-01-22 PROCEDURE — 6360000002 HC RX W HCPCS: Performed by: STUDENT IN AN ORGANIZED HEALTH CARE EDUCATION/TRAINING PROGRAM

## 2023-01-22 RX ORDER — TERBINAFINE HYDROCHLORIDE 250 MG/1
250 TABLET ORAL DAILY
Status: DISCONTINUED | OUTPATIENT
Start: 2023-01-22 | End: 2023-02-06 | Stop reason: HOSPADM

## 2023-01-22 RX ORDER — SODIUM CHLORIDE 9 MG/ML
INJECTION, SOLUTION INTRAVENOUS PRN
Status: DISCONTINUED | OUTPATIENT
Start: 2023-01-22 | End: 2023-02-06 | Stop reason: HOSPADM

## 2023-01-22 RX ORDER — ONDANSETRON 2 MG/ML
4 INJECTION INTRAMUSCULAR; INTRAVENOUS EVERY 6 HOURS PRN
Status: DISCONTINUED | OUTPATIENT
Start: 2023-01-22 | End: 2023-02-06 | Stop reason: HOSPADM

## 2023-01-22 RX ORDER — SODIUM CHLORIDE 0.9 % (FLUSH) 0.9 %
5-40 SYRINGE (ML) INJECTION PRN
Status: DISCONTINUED | OUTPATIENT
Start: 2023-01-22 | End: 2023-02-06 | Stop reason: HOSPADM

## 2023-01-22 RX ORDER — HYDROCORTISONE 10 MG/1
20 TABLET ORAL EVERY MORNING
Status: DISCONTINUED | OUTPATIENT
Start: 2023-01-22 | End: 2023-02-06 | Stop reason: HOSPADM

## 2023-01-22 RX ORDER — POLYETHYLENE GLYCOL 3350 17 G/17G
17 POWDER, FOR SOLUTION ORAL DAILY PRN
Status: DISCONTINUED | OUTPATIENT
Start: 2023-01-22 | End: 2023-02-06 | Stop reason: HOSPADM

## 2023-01-22 RX ORDER — MORPHINE SULFATE 2 MG/ML
2 INJECTION, SOLUTION INTRAMUSCULAR; INTRAVENOUS EVERY 4 HOURS PRN
Status: DISCONTINUED | OUTPATIENT
Start: 2023-01-22 | End: 2023-01-25

## 2023-01-22 RX ORDER — POTASSIUM CHLORIDE 20 MEQ/1
40 TABLET, EXTENDED RELEASE ORAL PRN
Status: DISCONTINUED | OUTPATIENT
Start: 2023-01-22 | End: 2023-02-06 | Stop reason: HOSPADM

## 2023-01-22 RX ORDER — LINEZOLID 2 MG/ML
600 INJECTION, SOLUTION INTRAVENOUS EVERY 12 HOURS
Status: DISCONTINUED | OUTPATIENT
Start: 2023-01-22 | End: 2023-01-25

## 2023-01-22 RX ORDER — BUMETANIDE 0.5 MG/1
0.5 TABLET ORAL DAILY
Status: DISCONTINUED | OUTPATIENT
Start: 2023-01-22 | End: 2023-02-06 | Stop reason: HOSPADM

## 2023-01-22 RX ORDER — SODIUM CHLORIDE 0.9 % (FLUSH) 0.9 %
5-40 SYRINGE (ML) INJECTION EVERY 12 HOURS SCHEDULED
Status: DISCONTINUED | OUTPATIENT
Start: 2023-01-22 | End: 2023-02-06 | Stop reason: HOSPADM

## 2023-01-22 RX ORDER — MAGNESIUM SULFATE IN WATER 40 MG/ML
2000 INJECTION, SOLUTION INTRAVENOUS PRN
Status: DISCONTINUED | OUTPATIENT
Start: 2023-01-22 | End: 2023-02-06 | Stop reason: HOSPADM

## 2023-01-22 RX ORDER — ACETAMINOPHEN 325 MG/1
650 TABLET ORAL EVERY 6 HOURS PRN
Status: DISCONTINUED | OUTPATIENT
Start: 2023-01-22 | End: 2023-02-06 | Stop reason: HOSPADM

## 2023-01-22 RX ORDER — ATORVASTATIN CALCIUM 20 MG/1
20 TABLET, FILM COATED ORAL DAILY
Status: DISCONTINUED | OUTPATIENT
Start: 2023-01-22 | End: 2023-02-06 | Stop reason: HOSPADM

## 2023-01-22 RX ORDER — ONDANSETRON 4 MG/1
4 TABLET, ORALLY DISINTEGRATING ORAL EVERY 8 HOURS PRN
Status: DISCONTINUED | OUTPATIENT
Start: 2023-01-22 | End: 2023-02-06 | Stop reason: HOSPADM

## 2023-01-22 RX ORDER — POTASSIUM CHLORIDE 7.45 MG/ML
10 INJECTION INTRAVENOUS PRN
Status: DISCONTINUED | OUTPATIENT
Start: 2023-01-22 | End: 2023-02-06 | Stop reason: HOSPADM

## 2023-01-22 RX ORDER — ACETAMINOPHEN 650 MG/1
650 SUPPOSITORY RECTAL EVERY 6 HOURS PRN
Status: DISCONTINUED | OUTPATIENT
Start: 2023-01-22 | End: 2023-02-06 | Stop reason: HOSPADM

## 2023-01-22 RX ORDER — LEVOTHYROXINE SODIUM 0.12 MG/1
125 TABLET ORAL DAILY
Status: DISCONTINUED | OUTPATIENT
Start: 2023-01-22 | End: 2023-02-06 | Stop reason: HOSPADM

## 2023-01-22 RX ORDER — HYDROCORTISONE 10 MG/1
10 TABLET ORAL NIGHTLY
Status: DISCONTINUED | OUTPATIENT
Start: 2023-01-22 | End: 2023-02-06 | Stop reason: HOSPADM

## 2023-01-22 RX ORDER — PANTOPRAZOLE SODIUM 40 MG/1
40 TABLET, DELAYED RELEASE ORAL
Status: DISCONTINUED | OUTPATIENT
Start: 2023-01-22 | End: 2023-02-06 | Stop reason: HOSPADM

## 2023-01-22 RX ADMIN — CEFAZOLIN 2000 MG: 10 INJECTION, POWDER, FOR SOLUTION INTRAVENOUS at 07:54

## 2023-01-22 RX ADMIN — POTASSIUM CHLORIDE 10 MEQ: 7.46 INJECTION, SOLUTION INTRAVENOUS at 20:17

## 2023-01-22 RX ADMIN — MORPHINE SULFATE 2 MG: 2 INJECTION, SOLUTION INTRAMUSCULAR; INTRAVENOUS at 07:07

## 2023-01-22 RX ADMIN — OXYBUTYNIN CHLORIDE 15 MG: 10 TABLET, EXTENDED RELEASE ORAL at 09:01

## 2023-01-22 RX ADMIN — BUMETANIDE 0.5 MG: 0.5 TABLET ORAL at 09:01

## 2023-01-22 RX ADMIN — IOPAMIDOL 80 ML: 755 INJECTION, SOLUTION INTRAVENOUS at 05:42

## 2023-01-22 RX ADMIN — POTASSIUM CHLORIDE 10 MEQ: 7.46 INJECTION, SOLUTION INTRAVENOUS at 21:25

## 2023-01-22 RX ADMIN — LINEZOLID 600 MG: 600 INJECTION, SOLUTION INTRAVENOUS at 17:01

## 2023-01-22 RX ADMIN — POTASSIUM CHLORIDE 10 MEQ: 7.46 INJECTION, SOLUTION INTRAVENOUS at 19:15

## 2023-01-22 RX ADMIN — CEFAZOLIN 2000 MG: 10 INJECTION, POWDER, FOR SOLUTION INTRAVENOUS at 15:38

## 2023-01-22 RX ADMIN — LEVOTHYROXINE SODIUM 125 MCG: 0.12 TABLET ORAL at 09:01

## 2023-01-22 RX ADMIN — TERBINAFINE TABLETS 250 MG 250 MG: 250 TABLET ORAL at 09:01

## 2023-01-22 RX ADMIN — PIPERACILLIN AND TAZOBACTAM 3375 MG: 3; .375 INJECTION, POWDER, FOR SOLUTION INTRAVENOUS at 17:00

## 2023-01-22 RX ADMIN — VANCOMYCIN HYDROCHLORIDE 1750 MG: 5 INJECTION, POWDER, LYOPHILIZED, FOR SOLUTION INTRAVENOUS at 00:19

## 2023-01-22 RX ADMIN — HYDROCORTISONE 20 MG: 10 TABLET ORAL at 09:01

## 2023-01-22 RX ADMIN — HYDROCORTISONE 10 MG: 10 TABLET ORAL at 21:02

## 2023-01-22 RX ADMIN — POTASSIUM CHLORIDE 10 MEQ: 7.46 INJECTION, SOLUTION INTRAVENOUS at 17:47

## 2023-01-22 RX ADMIN — ATORVASTATIN CALCIUM 20 MG: 20 TABLET, FILM COATED ORAL at 09:01

## 2023-01-22 RX ADMIN — POTASSIUM CHLORIDE 10 MEQ: 7.46 INJECTION, SOLUTION INTRAVENOUS at 15:38

## 2023-01-22 RX ADMIN — SODIUM CHLORIDE, PRESERVATIVE FREE 20 ML: 5 INJECTION INTRAVENOUS at 09:01

## 2023-01-22 RX ADMIN — SODIUM CHLORIDE, PRESERVATIVE FREE 10 ML: 5 INJECTION INTRAVENOUS at 21:02

## 2023-01-22 RX ADMIN — POTASSIUM CHLORIDE 10 MEQ: 7.46 INJECTION, SOLUTION INTRAVENOUS at 16:33

## 2023-01-22 ASSESSMENT — PAIN SCALES - GENERAL: PAINLEVEL_OUTOF10: 8

## 2023-01-22 ASSESSMENT — ENCOUNTER SYMPTOMS
COUGH: 0
NAUSEA: 0
SHORTNESS OF BREATH: 0
WHEEZING: 0
COLOR CHANGE: 1
DIARRHEA: 0
VOMITING: 0
ABDOMINAL PAIN: 0

## 2023-01-22 ASSESSMENT — PAIN DESCRIPTION - LOCATION: LOCATION: BACK

## 2023-01-22 ASSESSMENT — PAIN DESCRIPTION - DESCRIPTORS: DESCRIPTORS: ACHING

## 2023-01-22 NOTE — ED NOTES
ED to inpatient nurses report    Chief Complaint   Patient presents with    Arm Swelling      Present to ED from nursing home  LOC: alert and orientated to name and place  Vital signs   Vitals:    01/21/23 1959 01/21/23 2125 01/21/23 2245 01/22/23 0000   BP: 136/62 (!) 144/52 135/77 (!) 145/58   Pulse: 86 83 77 83   Resp: 20 21 21 17   Temp: 97.8 °F (36.6 °C)      TempSrc: Oral      SpO2: 97% 96% 96% 95%   Weight: 180 lb (81.6 kg)      Height: 5' 3\" (1.6 m)         Oxygen Baseline 0    Current needs required 0   LDAs:    Mobility: Fully dependent  Pending ED orders: None  Present condition: Stable      C-SSRS Risk of Suicide: No Risk  Swallow Screening    Preferred Language: [place]     Electronically signed by Juan Pablo Tirado, RN on 1/22/2023 at 12:47 AM       Juan Pablo Tirado RN  01/22/23 0249

## 2023-01-22 NOTE — ED NOTES
Patient transported to [id number]  by cart in stable condition. IV infusing and line is patent.         Gwen Tavares, EMT-P  01/22/23 2713

## 2023-01-22 NOTE — ED TRIAGE NOTES
Pt presents to ED via EMS from [facility] where pt recently went for rehab after being discharged from hospital. Pt presents with chief complaint of left arm swelling. Pt states it has been worsening over the past 3 days and is painful. Pt's left arm red, warm, and swollen. Pt reports having full sensation.

## 2023-01-22 NOTE — ED PROVIDER NOTES
[address] [facility]\"" [address] [address] EMERGENCY DEPT        Pt Name: Aman Fishman  MRN: [id number]  [place] 1950  Date of evaluation: 1/21/2023  Treating Resident Physician: Naresh Guerrero MD  Supervising Physician: Dr. Diana Kolb MD    [address] [place] [address]       Chief Complaint   Patient presents with    Arm Swelling           HISTORY OF PRESENT ILLNESS & REVIEW OF SYSTEMS   HPI    History obtained from patient and son at bedside. Aman Fishman is a 71 y.o. female who presents to the emergency department for evaluation of arm swelling. Patient was admitted from January fourth to January 17 for lower jaw pain and infection. She was discharged to a nursing home and has been getting therapy recently. Patient states that about 3 days ago she began having some left arm pain and swelling that has progressively gotten worse. States that it is really painful and hurts when she moves. Denies any falls or trauma. Denies any headache fever chills. Denies any cough chest pain shortness of breath abdominal pain nausea vomiting diarrhea constipation leg swelling. Denies any numbness or tingling. States she has a history of diabetes. States she is on Xarelto and is compliant. Patient denies any new Headache, Fever, Chills, Cough, Chest pain, Shortness of breath, Abdominal pain, Nausea, Vomiting, Diarrhea, Constipation, and Leg swelling. The patient has no other acute complaints at this time. Review of systems as above.           PAST MEDICAL AND SURGICAL HISTORY     Past Medical History:   Diagnosis Date    Asthma     uses albuterol 1-2x per year    Atrial thrombosis     on [address]    Bursitis     right shoulder -s/p steroid injections    Cancer (HCC)     Chronic diastolic heart failure (HCC)     CVA (cerebral infarction) 6/14/15    Mult. 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 Meagan Mancera    Hypothyroidism     Meningioma [facility])     3 separate meningiomas, s/p gamma knife (1/2012) , Dr. Bridget Griffith at [facility]    MRSA nasal colonization 6/2013    Obesity (BMI 30-39. 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 in the 1970's    Pneumonia 11/2018    Stroke [facility]) 1988    due to radiation -bleeding CVA - residual left upper and lower extremity weakness    Stroke [facility]) October 2015    Urinary incontinence      Past Surgical History:   Procedure Laterality Date    [address] due to pituitary mass, drained and s/p radiation    CHOLECYSTECTOMY      HYSTERECTOMY (CERVIX STATUS UNKNOWN)      total done for DUB    OTHER SURGICAL HISTORY  9/30/2014    LAPAROSCOPIC RUPTURED APPENDECTOMY    OTHER SURGICAL HISTORY Left 10/26/15    Evacuation and Debridement of Left Lower Leg Hematoma - Dr. Teresa Rosario  3/13/2013    left    SHOULDER ARTHROPLASTY      right    TOOTH EXTRACTION  03/19/2018    TOTAL HIP ARTHROPLASTY      bilateral    TOTAL KNEE ARTHROPLASTY      right      Patient Active Problem List   Diagnosis Code    Asthma J45.909    Hyperlipidemia E78.5    Osteoarthritis M19.90    GERD (gastroesophageal reflux disease) K21.9    Meningioma ([facility]) D32.9    Mechanical loosening of prosthetic joint ([facility]) T84.039A    Hyperglycemia R73.9    SIRS (systemic inflammatory response syndrome) ([facility]) R65.10    Leukocytosis D72.829    Right sided weakness R53.1    Cerebrovascular disease O06.9    Metabolic acidosis V77.83    Sinus bradycardia R00.1    Generalized weakness R53.1    Hx of subdural hematoma Z86.79    Cerebral infarction ([facility]) I63.9 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 ([facility] E23.0, D49.7    Hypercoagulable state ([address] D68.59    Fatigue R53.83    Atrial thrombosis I51.3    Microcytic anemia D50.9    Hypokalemia E87.6    Altered mental status R41.82    Postoperative hypothyroidism E89.0    Hypernatremia Z80.5    Metabolic encephalopathy F54.22    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) ([facility]) E23.0    Hypersomnia G47.10    Long term (current) use of systemic steroids Z79.52    Essential hypertension I10    Diabetes insipidus ([facility]) E23.2    Somnolence R40.0    PAF (paroxysmal atrial fibrillation) ([facility]) I48.0    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 5XAA    JESSE (acute kidney injury) ([address] N17.9    Hyperkalemia E87.5    Gastroenteritis due to norovirus X31.31    Acute diastolic heart failure ([facility]) I50.31    History of stroke with residual effects I69.30    Chronic venous stasis dermatitis of both lower extremities I87.2    Normocytic anemia D64.9    Chronic diastolic congestive heart failure (HCC) I50.32    CKD (chronic kidney disease), stage IV (HCC) N18.4    Confusion R41.0    Chronic kidney disease (CKD), stage III (moderate) ([facility]) N18.30    Obesity (BMI 30.0-34. 9) E66.9    Community acquired pneumonia of right lung J18.9    Acute renal failure superimposed on stage 3 chronic kidney disease (HCC) N17.9, N18.30    Chronic anticoagulation Z79.01    Hypoalbuminemia E88.09    Impaired mobility Z74.09    Bilateral leg edema +2- better now trace R60.0    CKD (chronic kidney disease) stage 3, GFR 30-59 ml/min ([facility]) N18.30    Fluid overload E87.70    Panhypopituitarism ([facility]) E23.0    Hypothyroidism E03.9    Scalp lesion L98.9    Non-compliance F/u advised Z91.199    Verruca vulgaris B07.9    COVID-19 U07.1    Septic shock ([facility]) A41.9, R65.21    Cellulitis of submandibular region K12.2    Severe malnutrition ([facility] E43 Submandibular gland infection K11.20    Cellulitis L03.90         MEDICATIONS     Current Facility-Administered Medications:     vancomycin (VANCOCIN) 1,750 mg in dextrose 5 % 500 mL IVPB, 20 mg/kg, IntraVENous, Once, Abbie Ceron MD, Last Rate: 250 mL/hr at 01/22/23 0019, 1,750 mg at 01/22/23 0019    Current Outpatient Medications:     bumetanide (BUMEX) 1 MG tablet, Take 0.5 tablets by mouth daily, Disp: 90 tablet, Rfl: 1    levothyroxine (SYNTHROID) 125 MCG tablet, TAKE 1 TABLET BY MOUTH DAILY, Disp: 90 tablet, Rfl: 1    terbinafine (LAMISIL) 250 MG tablet, Take 1 tablet by mouth daily, Disp: 90 tablet, Rfl: 1    rivaroxaban (XARELTO) 15 MG TABS tablet, TAKE 1 TABLET BY MOUTH DAILY WITH SUPPER, Disp: 90 tablet, Rfl: 3    oxybutynin (DITROPAN XL) 15 MG extended release tablet, TAKE 1 TABLET BY MOUTH DAILY, Disp: 90 tablet, Rfl: 1    pantoprazole (PROTONIX) 40 MG tablet, TAKE 1 TABLET BY MOUTH EVERY NIGHT, Disp: 90 tablet, Rfl: 3    chlorhexidine (HIBICLENS) 4 % external liquid, Wash hair with small amount twice weekly. , Disp: 1 Bottle, Rfl: 2    miconazole (MICOTIN) 2 % powder, Apply topically 2 times daily PRN for excoriation, Disp: 45 g, Rfl: 1    hydrocortisone (CORTEF) 20 MG tablet, Take 20 mg by mouth every morning , Disp: , Rfl:     hydrocortisone (CORTEF) 5 MG tablet, Take 10 mg by mouth nightly , Disp: , Rfl:     diphenhydrAMINE-APAP, sleep, (TYLENOL PM EXTRA STRENGTH)  MG tablet, Take 1 tablet by mouth nightly, Disp: , Rfl:     acetaminophen (TYLENOL) 325 MG tablet, Take 650 mg by mouth every 6 hours as needed for Pain 1 or 2 tabs q6h prn pain, Disp: , Rfl:     simvastatin (ZOCOR) 20 MG tablet, Take 1 tablet by mouth nightly, Disp: 1 tablet, Rfl: 0  Previous Medications    ACETAMINOPHEN (TYLENOL) 325 MG TABLET    Take 650 mg by mouth every 6 hours as needed for Pain 1 or 2 tabs q6h prn pain    BUMETANIDE (BUMEX) 1 MG TABLET    Take 0.5 tablets by mouth daily    CHLORHEXIDINE (HIBICLENS) 4 % EXTERNAL LIQUID    Wash hair with small amount twice weekly. DIPHENHYDRAMINE-APAP, SLEEP, (TYLENOL PM EXTRA STRENGTH)  MG TABLET    Take 1 tablet by mouth nightly    HYDROCORTISONE (CORTEF) 20 MG TABLET    Take 20 mg by mouth every morning     HYDROCORTISONE (CORTEF) 5 MG TABLET    Take 10 mg by mouth nightly     LEVOTHYROXINE (SYNTHROID) 125 MCG TABLET    TAKE 1 TABLET BY MOUTH DAILY    MICONAZOLE (MICOTIN) 2 % POWDER    Apply topically 2 times daily PRN for excoriation    OXYBUTYNIN (DITROPAN XL) 15 MG EXTENDED RELEASE TABLET    TAKE 1 TABLET BY MOUTH DAILY    PANTOPRAZOLE (PROTONIX) 40 MG TABLET    TAKE 1 TABLET BY MOUTH EVERY NIGHT    RIVAROXABAN (XARELTO) 15 MG TABS TABLET    TAKE 1 TABLET BY MOUTH DAILY WITH SUPPER    SIMVASTATIN (ZOCOR) 20 MG TABLET    Take 1 tablet by mouth nightly    TERBINAFINE (LAMISIL) 250 MG TABLET    Take 1 tablet by mouth daily         SOCIAL HISTORY     Social History     Social History Narrative    Not on file     Social History     Tobacco Use    Smoking status: Never    Smokeless tobacco: Never   Vaping Use    Vaping Use: Never used   Substance Use Topics    Alcohol use: No    Drug use: No         ALLERGIES     Allergies   Allergen Reactions    Pregabalin      Other reaction(s): dizziness    Tape [Adhesive Tape] Dermatitis         FAMILY HISTORY     Family History   Problem Relation Age of Onset    High Blood Pressure Mother     Stroke Maternal Grandmother 72         PREVIOUS RECORDS   Previous records reviewed:  Patient was seen on January 4, 2023 for cellulitis of submandibular region . PHYSICAL EXAM     ED Triage Vitals [01/21/23 1959]   BP Temp Temp Source Heart Rate Resp SpO2 Height Weight   136/62 97.8 °F (36.6 °C) Oral 86 20 97 % 5' 3\" (1.6 m) 180 lb (81.6 kg)     Initial vital signs and nursing assessment reviewed and vitals are/show: Afebrile, Normotensive, Normocardic, and Normal RR. Pulsoximetry is normal per my interpretation.     Additional Vital Signs:  Vitals:    01/22/23 0045   BP: (!) 125/50   Pulse: 75   Resp: 20   Temp:    SpO2: 98%       Physical Exam  Constitutional:       General: She is not in acute distress. Appearance: Normal appearance. She is obese. She is not ill-appearing, toxic-appearing or diaphoretic. HENT:      Head: Normocephalic and atraumatic. Right Ear: External ear normal.      Left Ear: External ear normal.   Eyes:      General: No scleral icterus. Right eye: No discharge. Left eye: No discharge. Cardiovascular:      Rate and Rhythm: Normal rate and regular rhythm. Pulmonary:      Effort: Pulmonary effort is normal. No respiratory distress. Breath sounds: Normal breath sounds. No stridor. No wheezing, rhonchi or rales. Chest:      Chest wall: No tenderness. Abdominal:      General: Abdomen is flat. There is no distension. Palpations: Abdomen is soft. Tenderness: There is no abdominal tenderness. There is no guarding or rebound. Musculoskeletal:      Cervical back: Neck supple. Right lower leg: No edema. Left lower leg: No edema. Comments: Left upper extremity swelling from the hand to elbow with significant erythema, warmth, tenderness to palpation. There appears to be some weeping and small bulla. No palpable crepitus. Left radial pulse intact. Range of motion limited at the elbow and fingers due to swelling and pain. Cap refill less than 2 all 5 digits. Sensation intact all 5 digits. Scattered bruises to skin. Skin:     General: Skin is warm and dry. Neurological:      Mental Status: She is alert and oriented to person, place, and time. Mental status is at baseline. Psychiatric:         Mood and Affect: Mood normal.         Behavior: Behavior normal.         Thought Content:  Thought content normal.         Judgment: Judgment normal.           MEDICAL DECISION MAKING/ED COURSE   Initial Assessment:     71 y.o. female with a past medical history of asthma hyperlipidemia GERD meningioma CVA HTN DM paroxysmal atrial fibrillation on Xarelto CHF CKD, adrenal insufficiency presenting to the ED for arm swelling and pain    Differential diagnoses include but not limited to: Cellulitis abscess necrotizing fasciitis DVT fracture dislocation osteomyelitis compartment syndrome    Plan:         Labs  Imaging  IV fluids  Antibiotics  Morphine  CVC placement due to inability to obtain vascular access  Admission      Brief Summary of Patient Presentation:    Patient is a 71 y.o. female with a past medical history of asthma hyperlipidemia GERD meningioma CVA HTN DM paroxysmal atrial fibrillation on Xarelto CHF CKD, adrenal insufficiency who was seen and evaluated in the emergency department for skin and soft tissue infection. She had significant swelling erythema tenderness to palpation and warmth to her left upper extremity with some small bulla. She was neurovascularly intact. There was no crepitus. We obtained x-ray imaging which showed no gas formation or fracture. Vital signs were stable. Given her significant skin findings is concerning for severe skin and soft tissue infection so I wrote for Zosyn and vancomycin and clindamycin given her recent admission. We are unable to obtain vascular access through multiple times even with ultrasound by nursing staff so risk benefits and alternatives of CVC placement were discussed and patient consented for procedure. This was performed under ultrasound guidance by my colleague Dr. Maranda Lopez, see her procedure note. Patient tolerated procedure well. Labs revealed a slightly elevated WBC as well as a slightly elevated procalcitonin. Lactic acid was normal.  Given how significant her exam findings were we thought patient would benefit from admission. At this point does not seem as though she needs a surgical consult. Low concern for compartment syndrome. I discussed with the admitting service who agreed with the plan.   Patient admitted. The patient was seen and examined. Appropriate diagnostic testing was performed and results reviewed with the patient. My independent review and interpretation of data, imaging, labs and diagnostic evaluations are as above and in the ED Course. Previous patient encounter documents & history available on EMR was reviewed  Case discussed with consulting clinician:  Dr Jacinta Magdaleno was performed and disposition discussed with the Patient/Family and questions answered. MDM  Number of Diagnoses or Management Options  Cellulitis of left upper extremity: new, needed workup  Hypokalemia: new, no workup     Amount and/or Complexity of Data Reviewed  Clinical lab tests: ordered and reviewed  Tests in the radiology section of CPT®: ordered and reviewed  Tests in the medicine section of CPT®: ordered and reviewed  Obtain history from someone other than the patient: yes  Review and summarize past medical records: yes  Discuss the patient with other providers: yes    Patient Progress  Patient progress: stable  /  ED Course as of 01/22/23 0103   Sat Jan 21, 2023 2115 RN attempted to obtain IV access multiple times with US; unsuccessful. I discussed need for CVC with patient and son at bedside, patient verbalized understanding and consented for procedure. [CR]   2233 XR: IMPRESSION:  No acute bony abnormality. [CR]   2234 XR: IMPRESSION:  No acute osseous abnormality. [CR]   9729 CVC placed by Dr Kye Saucedo & Dr Tanvi Conrad; CXR to confirm [CR]   Sun Jan 22, 2023   0000 XR: IMPRESSION:  No acute bony abnormality. [CR]   0000 CXR:IMPRESSION:  Decrease in pulmonary interstitial densities compared to x-ray 1/4/2023.  [CR]   0012 Sent message to Dr Hemant Ortiz for admission [CR]   0023 UA shows moderate blood but no signs of infection. [CR]      ED Course User Index  [CR] Andrew Ramos MD       ED COURSE:  ED Course as of 01/22/23 0103   Sat Jan 21, 2023 2115 RN attempted to obtain IV access multiple times with US; unsuccessful. I discussed need for CVC with patient and son at bedside, patient verbalized understanding and consented for procedure. [CR]   2233 XR: IMPRESSION:  No acute bony abnormality. [CR]   2234 XR: IMPRESSION:  No acute osseous abnormality. [CR]   8330 CVC placed by Dr Vasiliy Schulz & Dr Azucena Carranza; CXR to confirm [CR]   Sun Jan 22, 2023   0000 XR: IMPRESSION:  No acute bony abnormality. [CR]   0000 CXR:IMPRESSION:  Decrease in pulmonary interstitial densities compared to x-ray 1/4/2023. [CR]   0012 Sent message to Dr Inocencio Maldonado for admission [CR]   0023 UA shows moderate blood but no signs of infection. [CR]      ED Course User Index  [CR] Darron Campbell MD                 ED Medications administered this visit:  (None if blank)  Medications   vancomycin (VANCOCIN) 1,750 mg in dextrose 5 % 500 mL IVPB (1,750 mg IntraVENous New Bag 1/22/23 0019)   0.9 % sodium chloride bolus (0 mLs IntraVENous Stopped 1/22/23 0030)   clindamycin (CLEOCIN) 600 mg in dextrose 5 % 50 mL IVPB (0 mg IntraVENous Stopped 1/22/23 0001)   acetaminophen (TYLENOL) tablet 650 mg (650 mg Oral Given 1/21/23 2328)   morphine injection 4 mg (4 mg IntraVENous Given 1/21/23 2329)   piperacillin-tazobactam (ZOSYN) 4,500 mg in dextrose 5 % 100 mL IVPB (mini-bag) (0 mg IntraVENous Stopped 1/22/23 0006)         PROCEDURES: (None if blank)  Procedures:     CRITICAL CARE: (None if blank)      DISCHARGE PRESCRIPTIONS: (None if blank)  New Prescriptions    No medications on file       FORMAL DIAGNOSTIC RESULTS     RADIOLOGY: Interpretation per the Radiologist below, if available at the time of this note (none if blank):    XR CHEST PORTABLE   Final Result   Decrease in pulmonary interstitial densities compared to x-ray 1/4/2023. This document has been electronically signed by: Kat Kuhn MD on    01/21/2023 11:52 PM      XR RADIUS ULNA LEFT (2 VIEWS)   Final Result   No acute bony abnormality.       This document has been electronically signed by: Mary Ellen Zamora MD on    01/21/2023 11:51 PM      XR HAND LEFT (MIN 3 VIEWS)   Final Result   No acute osseous abnormality. This document has been electronically signed by: Mary Ellen Zamora MD on    01/21/2023 10:30 PM      XR ELBOW LEFT (MIN 3 VIEWS)   Final Result   No acute bony abnormality. This document has been electronically signed by: Mary Ellen Zamora MD on    01/21/2023 10:28 PM      CT HUMERUS LEFT W CONTRAST    (Results Pending)   CT RADIUS ULNA LEFT W CONTRAST    (Results Pending)   CT HAND LEFT W CONTRAST    (Results Pending)       LABS: (none if blank)  Labs Reviewed   CBC WITH AUTO DIFFERENTIAL - Abnormal; Notable for the following components:       Result Value    WBC 15.8 (*)     RBC 3.32 (*)     Hemoglobin 10.2 (*)     Hematocrit 32.1 (*)     MCHC 31.8 (*)     RDW-CV 17.9 (*)     RDW-SD 63.2 (*)     Segs Absolute 13.2 (*)     All other components within normal limits   COMPREHENSIVE METABOLIC PANEL - Abnormal; Notable for the following components:    BUN 6 (*)     Sodium 146 (*)     Potassium 3.3 (*)     Calcium 8.1 (*)     Total Protein 5.1 (*)     Albumin 2.8 (*)     All other components within normal limits   PROCALCITONIN - Abnormal; Notable for the following components:    Procalcitonin 0.28 (*)     All other components within normal limits   URINE WITH REFLEXED MICRO - Abnormal; Notable for the following components:    Blood, Urine MODERATE (*)     All other components within normal limits   CULTURE, BLOOD 1   CULTURE, BLOOD 1   LACTATE, SEPSIS   SPECIMEN REJECTION   ANION GAP   GLOMERULAR FILTRATION RATE, ESTIMATED   OSMOLALITY               MEDICATION CHANGES     New Prescriptions    No medications on file       FINAL IMPRESSION      1. Cellulitis of left upper extremity    2. Hypokalemia          FINAL DISPOSITION     Final diagnoses:   Cellulitis of left upper extremity   Hypokalemia     Condition: condition: stable  Dispo:  Admit to med/surg floor      This transcription was electronically signed. Parts of this transcriptions may have been dictated by use of voice recognition software and electronically transcribed, and parts may have been transcribed with the assistance of an ED scribe. The transcription may contain errors not detected in proofreading. Please refer to my supervising physician's documentation if my documentation differs.     Electronically Signed: Naresh Guerrero MD, 01/22/23, 1:03 AM         Naresh Guerrero MD  Resident  01/22/23 7590

## 2023-01-22 NOTE — ED NOTES
Pt cleaned and changed. Purewick placed. Medicated per MAR. Denies further needs at this time. Call light within reach.      Leeanna Louise RN  01/22/23 2004

## 2023-01-22 NOTE — ED NOTES
Multiple RNs attempting IV access; unsuccessful. Provider notified.      Jamel Urena RN  01/21/23 2452

## 2023-01-22 NOTE — H&P
Hospitalist History & Physical    Patient:  Lennox Woodard    Unit/Bed:08/008A  YOB: 1950  MRN: [id number]   Acct: [de-identified]   PCP: Talat Carpio MD  Code Status: Prior    Date of Service: Pt seen/examined on 01/22/23 and admitted to Inpatient with expected LOS greater than two midnights due to medical therapy. Chief Complaint: Left arm swelling    Assessment/Plan:    Left upper extremity cellulitis, possibly related to recent IV infiltration: Upon chart review it was noted that the patient had an IV that infiltrated on 1/15 per provider's note and was receiving Unasyn during that stay. Patient reports no pain from LUE at this time, but was noted to have pain upon arrival to ED; She was able to perform hand grasp and move arm up and down limited. There is serosanguinous drainage without odor noted from the LUE. Need to rule out abscess and necrotizing fasciitis due to severity of swelling that began shortly after ending outpatient course of Augmentin. X-ray (1/21/23) of LUE showed subcutaneous edema. Already received Vanc and zosyn in ED. Procalcitonin 2.8; WBC 15.8; Segs 13.2  Soft tissue CT of LUE pending; Consider further antibiotics pending results; Ancef if not necrotizing fasciitis  Blood cultures pending  Neurovascular checks of LUE q4h  Consider Ortho consult    Paroxysmal A-fib; rate controlled: Currently HR is 66; already received daily dose of xarelto at [facility]. ITIB2HGEY score: 4  Continue home medications  Hold xarelto at this time in anticipation of possible intervention of LUE; restart when medically appropriate  Telemetry     HTN: Continue home medications    Mild hypernatremia: 146; Patient received 1 L bolus  Daily weights  Repeat BMP in am    HFpEF: Echo 1/9/23 EF 50%  Strict I/O   daily weights    History of adrenal insufficiency without crisis: Continue home cortef therapy.     Chronic normocytic Anemia: HGB appears to be at baseline 9-10    Hypothyroidism: TSH 0.047 and free t4 1.31;  Continue home medication    Severe protein calorie malnutrition: Encourage oral intake; Needs nectar thickened liquids and minced and moist diet per last SLP evaluation    Physical debility with deconditioning: Pt came from SNF will likely need PT/OT reconsult prior to D/C. Appreciate input from Social work. History of Present Illness:  Camilla Mendoza is a 71 y.o. female with PMHx of paroxysmal A-fib, HTN, HFpEF, hypothyroidism, severe protein calorie malnutrition, and physical debility with deconditioning who presented to [facility] with chief complaint of left upper extremity swelling and pain. Patient was recently discharged on 1/17/23 for submandibular cellulitis and discharged to SNF. She endorses having no recollection of injuring her left arm, but stated that the arm started to swell just 3 days ago. Around that time she had stopped her outpatient course of antibiotics. At first when the swelling started she stated that she did not think much about it as it was not bothering her. Then today her arm was quite tender to touch and just throbbed all day, hence the admission to [facility]. Currently she is not having any pain, but that was after administration of Morphine while in the ED. She denies any dizziness, chest pain or shortness of breath. She denies any sick contacts. She does not endorse any tingling or numbness that is unusual for her. Review of Systems: Review of Systems   Constitutional:  Positive for fatigue. Negative for chills. Respiratory:  Negative for cough, shortness of breath and wheezing. Cardiovascular:  Negative for chest pain and leg swelling. Gastrointestinal:  Negative for abdominal pain, diarrhea, nausea and vomiting. Genitourinary:  Negative for dysuria. Skin:  Positive for color change, rash and wound. Neurological:  Negative for dizziness, tremors, light-headedness and headaches.        Past Medical History:        Diagnosis Date    Asthma     uses albuterol 1-2x per year    Atrial thrombosis     on [address]    Bursitis     right shoulder -s/p steroid injections    Cancer (HCC)     Chronic diastolic heart failure (HCC)     CVA (cerebral infarction) 6/14/15    Mult. small acute infarctions- Left temporal, internal capsule, basal ganglia    Diabetes insipidus ([facility] [address] 1970's    borderline since 1970's    GERD (gastroesophageal reflux disease)     History of diabetes insipidus     History of meningioma of the brain     Hyperlipidemia     Hypertension     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 ([facility] [address]     Dr. Verona Siddiqi    Hypothyroidism     Meningioma [facility])     3 separate meningiomas, s/p gamma knife (1/2012) , Dr. Pretty Mars at [facility]    MRSA nasal colonization 6/2013    Obesity (BMI 30-39. 9)     Osteoarthritis     PAF (paroxysmal atrial fibrillation) (HCC)     Panhypopituitarism (HCC)     status post resection for macroadenoma in the 1970's    Pneumonia 11/2018    Stroke [facility]) 1988    due to radiation -bleeding CVA - residual left upper and lower extremity weakness    Stroke [facility]) October 2015    Urinary incontinence        Past Surgical History:        Procedure Laterality Date    [address] due to pituitary mass, drained and s/p radiation    CHOLECYSTECTOMY      HYSTERECTOMY (CERVIX STATUS UNKNOWN)      total done for DUB    OTHER SURGICAL HISTORY  9/30/2014    LAPAROSCOPIC RUPTURED APPENDECTOMY    OTHER SURGICAL HISTORY Left 10/26/15    Evacuation and Debridement of Left Lower Leg Hematoma - Dr. Midny Suarez  3/13/2013    left    SHOULDER ARTHROPLASTY      right    TOOTH EXTRACTION  03/19/2018    TOTAL HIP ARTHROPLASTY      bilateral    TOTAL KNEE ARTHROPLASTY      right        Home Medications:   No current facility-administered medications on file prior to encounter.      Current Outpatient Medications on File Prior to Encounter   Medication Sig Dispense Refill    bumetanide (BUMEX) 1 MG tablet Take 0.5 tablets by mouth daily 90 tablet 1    levothyroxine (SYNTHROID) 125 MCG tablet TAKE 1 TABLET BY MOUTH DAILY 90 tablet 1    terbinafine (LAMISIL) 250 MG tablet Take 1 tablet by mouth daily 90 tablet 1    rivaroxaban (XARELTO) 15 MG TABS tablet TAKE 1 TABLET BY MOUTH DAILY WITH SUPPER 90 tablet 3    oxybutynin (DITROPAN XL) 15 MG extended release tablet TAKE 1 TABLET BY MOUTH DAILY 90 tablet 1    pantoprazole (PROTONIX) 40 MG tablet TAKE 1 TABLET BY MOUTH EVERY NIGHT 90 tablet 3    chlorhexidine (HIBICLENS) 4 % external liquid Wash hair with small amount twice weekly. 1 Bottle 2    miconazole (MICOTIN) 2 % powder Apply topically 2 times daily PRN for excoriation 45 g 1    hydrocortisone (CORTEF) 20 MG tablet Take 20 mg by mouth every morning       hydrocortisone (CORTEF) 5 MG tablet Take 10 mg by mouth nightly       diphenhydrAMINE-APAP, sleep, (TYLENOL PM EXTRA STRENGTH)  MG tablet Take 1 tablet by mouth nightly      acetaminophen (TYLENOL) 325 MG tablet Take 650 mg by mouth every 6 hours as needed for Pain 1 or 2 tabs q6h prn pain      simvastatin (ZOCOR) 20 MG tablet Take 1 tablet by mouth nightly 1 tablet 0       Allergies:    Pregabalin and Tape [adhesive tape]    Social History:    reports that she has never smoked. She has never used smokeless tobacco. She reports that she does not drink alcohol and does not use drugs. Family History:       Problem Relation Age of Onset    High Blood Pressure Mother     Stroke Maternal Grandmother 72       Diet:  NPO      Physical Exam:  BP (!) 145/58   Pulse 83   Temp 97.8 °F (36.6 °C) (Oral)   Resp 17   Ht 5' 3\" (1.6 m)   Wt 180 lb (81.6 kg)   SpO2 95%   BMI 31.89 kg/m²   General:   Chronically ill, very pleasant. HEENT:  normocephalic and atraumatic. No scleral icterus. PERR. Neck: supple. No JVD. No thyromegaly. Lungs: clear to auscultation. No retractions  Cardiac: RRR without murmur. Abdomen: soft. Nontender. Bowel sounds positive. Extremities:  No clubbing, or cyanosis. +4 pitting edema to LUE. Serosanguinous drainage and erythema noted to LUE    Vasculature: capillary refill < 3 seconds. Palpable LE pulses bilaterally. Skin:  warm and dry. No rashes or lesions. Psych:  Alert and oriented x3. Affect appropriate  Lymph:  No supraclavicular adenopathy. Neurologic:  No focal deficit. No seizures. Data: (All radiographs, tracings, PFTs, and imaging are personally viewed and interpreted unless otherwise noted)  Labs:   Recent Labs     01/21/23  2318   WBC 15.8*   HGB 10.2*   HCT 32.1*        Recent Labs     01/21/23  2327   *   K 3.3*      CO2 27   BUN 6*   CREATININE 0.8   CALCIUM 8.1*     Recent Labs     01/21/23 2327   AST 11   ALT 17   BILITOT 0.8   ALKPHOS 95     No results for input(s): INR in the last 72 hours. No results for input(s): Marlene Richard in the last 72 hours. Urinalysis:   Lab Results   Component Value Date/Time    NITRU NEGATIVE 01/22/2023 12:02 AM    WBCUA 0-2 01/22/2023 12:02 AM    WBCUA 2-4 02/22/2012 01:00 PM    BACTERIA NONE SEEN 01/22/2023 12:02 AM    RBCUA 5-10 01/22/2023 12:02 AM    BLOODU MODERATE 01/22/2023 12:02 AM    SPECGRAV 1.025 09/18/2020 12:00 PM    GLUCOSEU NEGATIVE 01/22/2023 12:02 AM       EKG: normal sinus rhythm, no blocks or conduction defects, no ischemic changes    Radiology:  XR CHEST PORTABLE   Final Result   Decrease in pulmonary interstitial densities compared to x-ray 1/4/2023. This document has been electronically signed by: Ollie Viveros MD on    01/21/2023 11:52 PM      XR RADIUS ULNA LEFT (2 VIEWS)   Final Result   No acute bony abnormality. This document has been electronically signed by: Ollie Viveros MD on    01/21/2023 11:51 PM      XR HAND LEFT (MIN 3 VIEWS)   Final Result   No acute osseous abnormality.       This document has been electronically signed by: Ollie Viveros MD on    01/21/2023 10:30 PM      XR ELBOW LEFT (MIN 3 VIEWS)   Final Result   No acute bony abnormality. This document has been electronically signed by: Triny Bradley MD on    01/21/2023 10:28 PM      CT HUMERUS LEFT W CONTRAST    (Results Pending)   CT RADIUS ULNA LEFT W CONTRAST    (Results Pending)   CT HAND LEFT W CONTRAST    (Results Pending)     XR ELBOW LEFT (MIN 3 VIEWS)    Result Date: 1/21/2023  3 view right elbow Comparison: None Findings: No acute fractures. Normal alignment. No significant arthritic change or erosions. No joint effusion. No radiopaque foreign body. Diffuse edema. No acute bony abnormality. This document has been electronically signed by: Triny Bradley MD on 01/21/2023 10:28 PM    XR RADIUS ULNA LEFT (2 VIEWS)    Result Date: 1/21/2023  2 view left forearm Comparison: None Findings: No fractures or dislocations. No joint effusion. No significant arthritic change. No radiopaque foreign body. Extensive soft tissue edema. No acute bony abnormality. This document has been electronically signed by: Triny Bradley MD on 01/21/2023 11:51 PM    XR HAND LEFT (MIN 3 VIEWS)    Result Date: 1/21/2023  3 view left hand Comparison: None Findings: Osteopenia. No acute fractures. No significant loss of joint space or osteophytes. No erosions. No radiopaque foreign body. Diffuse edema. No acute osseous abnormality. This document has been electronically signed by: Triny Bradley MD on 01/21/2023 10:30 PM    XR CHEST PORTABLE    Result Date: 1/21/2023  1 view chest x-ray Comparison: CR/SR - XR CHEST PORTABLE - 01/04/2023 04:26 PM EST Findings: Decrease in pulmonary interstitial densities compared to x-ray 1/4/2023. Right internal iliac catheter terminates in the right atrium. Status post bilateral hip replacement. No acute fractures. Decrease in pulmonary interstitial densities compared to x-ray 1/4/2023.  This document has been electronically signed by: Triny Bradley MD on 01/21/2023 11:52 PM        Tele:   [x] yes [] no      Thank you Gilberto Engle MD for the opportunity to be involved in this patient's care.     Electronically signed by NIDIA Eastman CNP on 1/22/2023 at 12:55 AM

## 2023-01-22 NOTE — ED NOTES
Patient resting in bed. Respirations easy and unlabored. No distress noted. Call light within reach.        Margarete Cheadle, RN  01/22/23 7506

## 2023-01-22 NOTE — CARE COORDINATION
01/22/23 5722   Readmission Assessment   Number of Days since last admission? 1-7 days   Previous Disposition SNF   Who is being Breanna Joy   (chart, patient presents from SNF)   What was the patient's/caregiver's perception as to why they think they needed to return back to the hospital? Other (Comment)  (left arm swelling)   Did you visit your Primary Care Physician after you left the hospital, before you returned this time? No   Why weren't you able to visit your PCP? Other (Comment)  (re-admit from SNF)   Did you see a specialist, such as Cardiac, Pulmonary, Orthopedic Physician, etc. after you left the hospital? Yes   Who advised the patient to return to the hospital? Skilled Unit   Does the patient report anything that got in the way of taking their medications? No   In our efforts to provide the best possible care to you and others like you, can you think of anything that we could have done to help you after you left the hospital the first time, so that you might not have needed to return so soon?  Other (Comment)  (Patient was ready for discharge.)

## 2023-01-22 NOTE — PROGRESS NOTES
Hospitalist Progress Note      Patient:  Yen Melgar    Unit/Bed:5K-15/015-A  YOB: 1950  MRN: [id number]   Acct: [de-identified]   PCP: Elda Osorio MD  Date of Admission: 1/21/2023    Assessment/Plan:    Left upper extremity cellulitis, possibly related to recent IV infiltration: Patient had an IV infiltrated on 1/15 per provider's note and was receiving Unasyn during that stay. X-ray of left hand, elbow, radius and ulna on 1/21 revealed no acute bony abnormalities and no osseous abnormalities. CT left hand, radius, ulna, and humerus on 1/22 revealed diffuse soft tissue swelling, skin thickening and subcutaneous edema / inflammation of left upper arm, left forearm and left hand, correlate clinically for cellulitis. No abscess seen. Blood cultures pending. Completed 1 dose of zosyn, 1 dose of vancomycin, and 1 dose of clindamycin. Discussed with ID who has been consulted, start Zyvox / Zosyn, stop Ancef. Continue morphine and tylenol for pain management. Leukocytosis: Secondary to #1. WBC 15.8 in ED on 1/21. Recheck CBC Q24 hours. Patient afebrile and denies fever/chills. Paroxysmal A-fib; rate controlled: continue home medications, continue tele  Hypernatremia: Sodium increasing from 146 to 149. Patient received 1L bolus on 1/21. Continue daily weights, Begin BMP Q24 hours. History of adrenal insufficiency without crisis: Continue home cortef therapy. BP has been stable. Chronic normocytic Anemia: Hgb decreased from 10.2 to 9.8. Begin CBC Q24 hours. No acute bleeding. Will transfuse if Hgb <7. Hypothyroidism: TSH 0.047 and free T4 1.31 on 1/21. Continue home medications  Severe protein calorie malnutrition: Encourage oral intake; needs nectar thickened liquids and minced and moist diet per last SLP evaluation. Physical debility with deconditioning: Pt came from SNF. Consult PT/OT.  CXR on 1/21 revealed decrease in pulmonary interstitial densities compared to x-ray on 1/4/23    Chief Complaint: Left arm swelling    Initial H and P:-    \"Shirley Christianson is a 71 y.o. female with PMHx of paroxysmal A-fib, HTN, HFpEF, hypothyroidism, severe protein calorie malnutrition, and physical debility with deconditioning who presented to [facility] with chief complaint of left upper extremity swelling and pain. Patient was recently discharged on 1/17/23 for submandibular cellulitis and discharged to SNF. She endorses having no recollection of injuring her left arm, but stated that the arm started to swell just 3 days ago. Around that time she had stopped her outpatient course of antibiotics. At first when the swelling started she stated that she did not think much about it as it was not bothering her. Then today her arm was quite tender to touch and just throbbed all day, hence the admission to [facility]. Currently she is not having any pain, but that was after administration of Morphine while in the ED. She denies any dizziness, chest pain or shortness of breath. She denies any sick contacts. She does not endorse any tingling or numbness that is unusual for her. \"    Subjective (past 24 hours):   1/22: Patient is sitting upright in bed. She endorses 9/10 sharp pain of LUE from finger tips to mid upper arm. She states this is improved from yesterday. She states her arm was \"bubbling\" yesterday but denies drainage today. She denies changes to right upper extremity, or bilateral lower extremities. She denies fever/chills, chest pain, abdominal pain, shortness of breath, palpitations, nausea or vomiting. Patient is unable to recall when her last bowel movement was. Past medical history, family history, social history and allergies reviewed again and is unchanged since admission. ROS (All review of systems completed. Pertinent positives noted.  Otherwise All other systems reviewed and negative.)     Medications:  Reviewed    Infusion Medications    sodium chloride Scheduled Medications    bumetanide  0.5 mg Oral Daily    hydrocortisone  20 mg Oral QAM    hydrocortisone  10 mg Oral Nightly    levothyroxine  125 mcg Oral Daily    oxybutynin  15 mg Oral Daily    pantoprazole  40 mg Oral QAM AC    atorvastatin  20 mg Oral Daily    terbinafine  250 mg Oral Daily    sodium chloride flush  5-40 mL IntraVENous 2 times per day    ceFAZolin  2,000 mg IntraVENous Q8H     PRN Meds: sodium chloride flush, sodium chloride, ondansetron **OR** ondansetron, polyethylene glycol, acetaminophen **OR** acetaminophen, potassium chloride **OR** potassium alternative oral replacement **OR** potassium chloride, magnesium sulfate, morphine      Intake/Output Summary (Last 24 hours) at 1/22/2023 1321  Last data filed at 1/22/2023 0901  Gross per 24 hour   Intake 20 ml   Output --   Net 20 ml       Diet:  Diet NPO    Physical Exam:  BP (!) 143/77   Pulse 89   Temp 97.7 °F (36.5 °C) (Oral)   Resp 18   Ht 5' 3\" (1.6 m)   Wt 180 lb (81.6 kg)   SpO2 97%   BMI 31.89 kg/m²   General appearance: Speaking softly throughout examination. No apparent distress, appears stated age and cooperative. HEENT: Pupils equal, round, and reactive to light. Conjunctivae/corneas clear. Neck: Supple, with full range of motion. No jugular venous distention. Trachea midline. Respiratory:  Normal respiratory effort. Clear to auscultation, bilaterally without Rales/Wheezes/Rhonchi. Cardiovascular: Regular rate and rhythm with normal S1/S2 without murmurs, rubs or gallops. Abdomen: Soft, non-tender, non-distended with normal bowel sounds. Musculoskeletal: 2+ active range of motion of bilateral upper extremities. Left upper extremity strength and range of motion diminished. Skin: Diffuse bruising throughout right upper extremity and bilateral lower extremities. Patient states this is her baseline. Extreme erythema and edema of left upper extremity up to mid upper arm. Sporadic hematomas of left upper extremity.  No discharge of left upper extremity. Neurologic:  Neurovascularly intact without any focal sensory deficits. Cranial nerves: II-XII intact, grossly non-focal.  Psychiatric: Alert and oriented, thought content appropriate, normal insight  Capillary Refill: Brisk,< 3 seconds   Peripheral Pulses: +2 palpable, equal bilaterally     Labs:   Recent Labs     01/21/23 2318   WBC 15.8*   HGB 10.2*   HCT 32.1*        Recent Labs     01/21/23 2327   *   K 3.3*      CO2 27   BUN 6*   CREATININE 0.8   CALCIUM 8.1*     Recent Labs     01/21/23 2327   AST 11   ALT 17   BILITOT 0.8   ALKPHOS 95     No results for input(s): INR in the last 72 hours. No results for input(s): Henry Palin in the last 72 hours. Microbiology:    Blood culture #1:   Lab Results   Component Value Date/Time    [facility]  01/04/2023 01:20 PM     No growth 24 hours. No growth 48 hours. No growth at 5 days       Blood culture #2:No results found for: BLOODCULT2    Organism:  Lab Results   Component Value Date/Time    ORG Staphylococcus aureus 12/17/2018 04:04 PM         Lab Results   Component Value Date/Time    LABGRAM  02/16/2022 04:00 PM     No segmented neutrophils observed. Rare epithelial cells observed. No organisms observed. MRSA culture only:No results found for: [facility]    Urine culture:   Lab Results   Component Value Date/Time    LABURIN No growth-preliminary  No growth   02/09/2017 12:35 PM       Respiratory culture: No results found for: CULTRESP    Aerobic and Anaerobic :  Lab Results   Component Value Date/Time    LABAERO No Acinetobacter species isolated. 01/04/2023 10:00 PM     Lab Results   Component Value Date/Time    LABANAE  12/01/2021 12:00 PM     No anaerobes isolated- preliminary Culture yielded light growth of gram positive bacilli most consistent with Cutibacterium species. Clinical correlation required.        Urinalysis:      Lab Results   Component Value Date/Time    NITRU NEGATIVE 01/22/2023 12:02 AM 45 Jayashree Kaye Thâalbi 0-2 01/22/2023 12:02 AM    WBCUA 2-4 02/22/2012 01:00 PM    BACTERIA NONE SEEN 01/22/2023 12:02 AM    RBCUA 5-10 01/22/2023 12:02 AM    BLOODU MODERATE 01/22/2023 12:02 AM    SPECGRAV 1.025 09/18/2020 12:00 PM    GLUCOSEU NEGATIVE 01/22/2023 12:02 AM       Radiology:  CT HAND LEFT W CONTRAST   Final Result      Lateral portion of the left upper extremity is partly off the    field-of-view. Status post left shoulder hemiarthroplasty and total left hip replacement. Metallic streak artifact limits the evaluation of the adjacent structures. Diffuse soft tissue swelling, skin thickening and subcutaneous    edema/inflammation of the left upper arm, left forearm and left hand. Correlate clinically for cellulitis. No abscess is seen. This document has been electronically signed by: Omid Puckett MD on    01/22/2023 06:43 AM      All CTs at this facility use dose modulation techniques and iterative    reconstructions, and/or weight-based dosing   when appropriate to reduce radiation to a low as reasonably achievable. CT RADIUS ULNA LEFT W CONTRAST   Final Result      Lateral portion of the left upper extremity is partly off the    field-of-view. Status post left shoulder hemiarthroplasty and total left hip replacement. Metallic streak artifact limits the evaluation of the adjacent structures. Diffuse soft tissue swelling, skin thickening and subcutaneous    edema/inflammation of the left upper arm, left forearm and left hand. Correlate clinically for cellulitis. No abscess is seen. This document has been electronically signed by: Omid Puckett MD on    01/22/2023 06:26 AM      All CTs at this facility use dose modulation techniques and iterative    reconstructions, and/or weight-based dosing   when appropriate to reduce radiation to a low as reasonably achievable.       CT HUMERUS LEFT W CONTRAST   Final Result      Lateral portion of the left upper extremity is partly off the field-of-view. Status post left shoulder hemiarthroplasty and total left hip replacement. Metallic streak artifact limits the evaluation of the adjacent structures. Diffuse soft tissue swelling, skin thickening and subcutaneous    edema/inflammation of the left upper arm, left forearm and left hand. Correlate clinically for cellulitis. No abscess is seen. This document has been electronically signed by: Danni Cooper MD on    01/22/2023 06:42 AM      All CTs at this facility use dose modulation techniques and iterative    reconstructions, and/or weight-based dosing   when appropriate to reduce radiation to a low as reasonably achievable. XR CHEST PORTABLE   Final Result   Decrease in pulmonary interstitial densities compared to x-ray 1/4/2023. This document has been electronically signed by: Ramiro Fowler MD on    01/21/2023 11:52 PM      XR RADIUS ULNA LEFT (2 VIEWS)   Final Result   No acute bony abnormality. This document has been electronically signed by: Ramiro Fowler MD on    01/21/2023 11:51 PM      XR HAND LEFT (MIN 3 VIEWS)   Final Result   No acute osseous abnormality. This document has been electronically signed by: Ramiro Fowler MD on    01/21/2023 10:30 PM      XR ELBOW LEFT (MIN 3 VIEWS)   Final Result   No acute bony abnormality. This document has been electronically signed by: Ramiro Fowler MD on    01/21/2023 10:28 PM        XR ELBOW LEFT (MIN 3 VIEWS)    Result Date: 1/21/2023  3 view right elbow Comparison: None Findings: No acute fractures. Normal alignment. No significant arthritic change or erosions. No joint effusion. No radiopaque foreign body. Diffuse edema. No acute bony abnormality. This document has been electronically signed by: Ramiro Fowler MD on 01/21/2023 10:28 PM    XR RADIUS ULNA LEFT (2 VIEWS)    Result Date: 1/21/2023  2 view left forearm Comparison: None Findings: No fractures or dislocations. No joint effusion.  No significant arthritic change. No radiopaque foreign body. Extensive soft tissue edema. No acute bony abnormality. This document has been electronically signed by: Kat Kuhn MD on 01/21/2023 11:51 PM    XR HAND LEFT (MIN 3 VIEWS)    Result Date: 1/21/2023  3 view left hand Comparison: None Findings: Osteopenia. No acute fractures. No significant loss of joint space or osteophytes. No erosions. No radiopaque foreign body. Diffuse edema. No acute osseous abnormality. This document has been electronically signed by: Kat Kuhn MD on 01/21/2023 10:30 PM    CT HUMERUS LEFT W CONTRAST    Result Date: 1/22/2023  CT left upper extremity with contrast COMPARISON: No prior FINDINGS: The lateral portion of the left upper extremity is partly off the field-of-view. The patient is status post left shoulder hemiarthroplasty and total left hip replacement. Metallic streak artifact limits the evaluation of the adjacent structures. There is diffuse soft tissue swelling, skin thickening and subcutaneous edema/inflammation of the left upper arm, left forearm and left hand. No abscess is seen. No fracture or dislocation is seen. Lateral portion of the left upper extremity is partly off the field-of-view. Status post left shoulder hemiarthroplasty and total left hip replacement. Metallic streak artifact limits the evaluation of the adjacent structures. Diffuse soft tissue swelling, skin thickening and subcutaneous edema/inflammation of the left upper arm, left forearm and left hand. Correlate clinically for cellulitis. No abscess is seen. This document has been electronically signed by: Los Perez MD on 01/22/2023 06:42 AM All CTs at this facility use dose modulation techniques and iterative reconstructions, and/or weight-based dosing when appropriate to reduce radiation to a low as reasonably achievable.     CT RADIUS ULNA LEFT W CONTRAST    Result Date: 1/22/2023  CT left upper extremity with contrast COMPARISON: No prior FINDINGS: The lateral portion of the left upper extremity is partly off the field-of-view. The patient is status post left shoulder hemiarthroplasty and total left hip replacement. Metallic streak artifact limits the evaluation of the adjacent structures. There is diffuse soft tissue swelling, skin thickening and subcutaneous edema/inflammation of the left upper arm, left forearm and left hand. No abscess is seen. No fracture or dislocation is seen. Lateral portion of the left upper extremity is partly off the field-of-view. Status post left shoulder hemiarthroplasty and total left hip replacement. Metallic streak artifact limits the evaluation of the adjacent structures. Diffuse soft tissue swelling, skin thickening and subcutaneous edema/inflammation of the left upper arm, left forearm and left hand. Correlate clinically for cellulitis. No abscess is seen. This document has been electronically signed by: Pramod Giron MD on 01/22/2023 06:26 AM All CTs at this facility use dose modulation techniques and iterative reconstructions, and/or weight-based dosing when appropriate to reduce radiation to a low as reasonably achievable. CT HAND LEFT W CONTRAST    Result Date: 1/22/2023  CT left upper extremity with contrast COMPARISON: No prior FINDINGS: The lateral portion of the left upper extremity is partly off the field-of-view. The patient is status post left shoulder hemiarthroplasty and total left hip replacement. Metallic streak artifact limits the evaluation of the adjacent structures. There is diffuse soft tissue swelling, skin thickening and subcutaneous edema/inflammation of the left upper arm, left forearm and left hand. No abscess is seen. No fracture or dislocation is seen. Lateral portion of the left upper extremity is partly off the field-of-view. Status post left shoulder hemiarthroplasty and total left hip replacement. Metallic streak artifact limits the evaluation of the adjacent structures.  Diffuse soft tissue swelling, skin thickening and subcutaneous edema/inflammation of the left upper arm, left forearm and left hand. Correlate clinically for cellulitis. No abscess is seen. This document has been electronically signed by: Grady Bob MD on 01/22/2023 06:43 AM All CTs at this facility use dose modulation techniques and iterative reconstructions, and/or weight-based dosing when appropriate to reduce radiation to a low as reasonably achievable. XR CHEST PORTABLE    Result Date: 1/21/2023  1 view chest x-ray Comparison: CR/SR - XR CHEST PORTABLE - 01/04/2023 04:26 PM EST Findings: Decrease in pulmonary interstitial densities compared to x-ray 1/4/2023. Right internal iliac catheter terminates in the right atrium. Status post bilateral hip replacement. No acute fractures. Decrease in pulmonary interstitial densities compared to x-ray 1/4/2023.  This document has been electronically signed by: Paula Murray MD on 01/21/2023 11:52 PM      Electronically signed by Germain Floyd PA-C on 1/22/2023 at 1:21 PM

## 2023-01-22 NOTE — ED NOTES
Patient resting in bed. Respirations easy and unlabored. No distress noted. Call light within reach.        Cortez Voss RN  01/21/23 2129

## 2023-01-22 NOTE — CARE COORDINATION
1/22/23, 9:11 AM EST      DISCHARGE PLANNING EVALUATION    Connie Saini  Admitted: 1/21/2023  Hospital Day: 0    Location: 5-15/015-A Reason for admit: Hypokalemia [E87.6]  Cellulitis [L03.90]  Cellulitis of left upper extremity [L03.114]    Past Medical History:   Diagnosis Date    Asthma     uses albuterol 1-2x per year    Atrial thrombosis     on [address]    Bursitis     right shoulder -s/p steroid injections    Cancer ([address]     Chronic diastolic heart failure (HCC)     CVA (cerebral infarction) 6/14/15    Mult. 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 Bernadine Marlow    Hypothyroidism     Meningioma [facility])     3 separate meningiomas, s/p gamma knife (1/2012) , Dr. Joanie Adames at [facility]    MRSA nasal colonization 6/2013    Obesity (BMI 30-39. 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 Ancef q 8 hr., Cleocin, Zosyn and Vancomycin x 1 dose, prn pain medications and Zofran, Magnesium and Potassium replacement protocol, BMP and CBC in a.m., NPO, daily weights, SCD's, telemetry up with assistance.      PCP: Janet Ayon MD    Readmission Risk              Risk of Unplanned Readmission:  27         Patient's Healthcare Decision Maker: Named in [address] [place] [address]    Patient Goals/Plan/Treatment Preferences: Mandy Villalba is from [facility]. SS consulted for her return. Transportation/Food Security/Housekeeping Addressed: No issues identified.

## 2023-01-22 NOTE — PROGRESS NOTES
Pharmacy Note - Extended Infusion Beta-Lactam Adjustment    Piperacillin/Tazobactam  4375 mg once  for treatment of Skin and soft tissue infection. Per [facility] Extended Infusion Beta-Lactam Policy, piperacillin/tazobactam will be changed to  4500 mg once. Please call with any questions.     Thank you,    Chela Lewis, [facility]

## 2023-01-22 NOTE — PROGRESS NOTES
Patient's son was in room, asked this RN about patient's missing phone. Stated that the night shift nurse passed along that patient came up with  but no phone. Call placed to ED, they did not find a phone. Call placed to campus police who stated they also did not have a cell phone turned in. Voicemail left with patient relations department. 200: Spoke with daughter Tracie Abdi and she stated that the patient's phone is  black flip phone.

## 2023-01-22 NOTE — PROCEDURES
Central Line    Date/Time: 1/22/2023 7:29 AM  Performed by: Jean Archuleta MD  Authorized by: Heber Isbell PA-C     Consent:     Consent obtained:  Written    Consent given by:  Patient    Risks, benefits, and alternatives were discussed: yes    Universal protocol:     Procedure explained and questions answered to patient or proxy's satisfaction: yes      Relevant documents present and verified: yes      Patient identity confirmed:  Hospital-assigned identification number and arm band  Pre-procedure details:     Indication(s): central venous access and insufficient peripheral access      Hand hygiene: Hand hygiene performed prior to insertion      Sterile barrier technique:  All elements of maximal sterile technique followed      Skin preparation:  Chlorhexidine and chlorhexidine with alcohol    Skin preparation agent: Skin preparation agent completely dried prior to procedure    Sedation:     Sedation type:  None  Anesthesia:     Anesthesia method:  Local infiltration    Local anesthetic:  Lidocaine 1% w/o epi  Procedure details:     Location:  R internal jugular    Patient position:  Reverse Trendelenburg    Catheter size:  7 Fr    Landmarks identified: yes      Ultrasound guidance: yes      Ultrasound guidance timing: real time      Sterile ultrasound techniques: Sterile gel and sterile probe covers were used      Number of attempts:  1    Successful placement: yes    Post-procedure details:     Post-procedure:  Dressing applied and line sutured    Assessment:  Blood return through all ports, no pneumothorax on x-ray, free fluid flow and placement verified by x-ray    Procedure completion:  Tolerated well, no immediate complications

## 2023-01-23 LAB
ANION GAP SERPL CALC-SCNC: 9 MEQ/L (ref 8–16)
BASOPHILS ABSOLUTE: 0 THOU/MM3 (ref 0–0.1)
BASOPHILS NFR BLD AUTO: 0.2 %
BUN SERPL-MCNC: 5 MG/DL (ref 7–22)
CALCIUM SERPL-MCNC: 7.9 MG/DL (ref 8.5–10.5)
CHLORIDE SERPL-SCNC: 120 MEQ/L (ref 98–111)
CO2 SERPL-SCNC: 24 MEQ/L (ref 23–33)
CREAT SERPL-MCNC: 0.8 MG/DL (ref 0.4–1.2)
DEPRECATED RDW RBC AUTO: 63.7 FL (ref 35–45)
EOSINOPHIL NFR BLD AUTO: 0.4 %
EOSINOPHILS ABSOLUTE: 0 THOU/MM3 (ref 0–0.4)
ERYTHROCYTE [DISTWIDTH] IN BLOOD BY AUTOMATED COUNT: 17.9 % (ref 11.5–14.5)
GFR SERPL CREATININE-BSD FRML MDRD: > 60 ML/MIN/1.73M2
GLUCOSE SERPL-MCNC: 89 MG/DL (ref 70–108)
HCT VFR BLD AUTO: 28.1 % (ref 37–47)
HCT VFR BLD AUTO: 28.2 % (ref 37–47)
HGB BLD-MCNC: 8.6 GM/DL (ref 12–16)
HGB BLD-MCNC: 8.8 GM/DL (ref 12–16)
IMM GRANULOCYTES # BLD AUTO: 0.05 THOU/MM3 (ref 0–0.07)
IMM GRANULOCYTES NFR BLD AUTO: 0.5 %
LYMPHOCYTES ABSOLUTE: 1 THOU/MM3 (ref 1–4.8)
LYMPHOCYTES NFR BLD AUTO: 9.7 %
MCH RBC QN AUTO: 30.3 PG (ref 26–33)
MCHC RBC AUTO-ENTMCNC: 31.2 GM/DL (ref 32.2–35.5)
MCV RBC AUTO: 97.2 FL (ref 81–99)
MONOCYTES ABSOLUTE: 0.3 THOU/MM3 (ref 0.4–1.3)
MONOCYTES NFR BLD AUTO: 3.1 %
NEUTROPHILS NFR BLD AUTO: 86.1 %
NRBC BLD AUTO-RTO: 0 /100 WBC
PLATELET # BLD AUTO: 182 THOU/MM3 (ref 130–400)
PMV BLD AUTO: 9.9 FL (ref 9.4–12.4)
POTASSIUM SERPL-SCNC: 3.7 MEQ/L (ref 3.5–5.2)
RBC # BLD AUTO: 2.9 MILL/MM3 (ref 4.2–5.4)
SEGMENTED NEUTROPHILS ABSOLUTE COUNT: 9.1 THOU/MM3 (ref 1.8–7.7)
SODIUM SERPL-SCNC: 142 MEQ/L (ref 135–145)
SODIUM SERPL-SCNC: 146 MEQ/L (ref 135–145)
SODIUM SERPL-SCNC: 148 MEQ/L (ref 135–145)
SODIUM SERPL-SCNC: 153 MEQ/L (ref 135–145)
WBC # BLD AUTO: 10.6 THOU/MM3 (ref 4.8–10.8)

## 2023-01-23 PROCEDURE — 84295 ASSAY OF SERUM SODIUM: CPT

## 2023-01-23 PROCEDURE — 80048 BASIC METABOLIC PNL TOTAL CA: CPT

## 2023-01-23 PROCEDURE — 36415 COLL VENOUS BLD VENIPUNCTURE: CPT

## 2023-01-23 PROCEDURE — 2580000003 HC RX 258: Performed by: PHYSICIAN ASSISTANT

## 2023-01-23 PROCEDURE — 85014 HEMATOCRIT: CPT

## 2023-01-23 PROCEDURE — 97162 PT EVAL MOD COMPLEX 30 MIN: CPT

## 2023-01-23 PROCEDURE — 6370000000 HC RX 637 (ALT 250 FOR IP)

## 2023-01-23 PROCEDURE — 99233 SBSQ HOSP IP/OBS HIGH 50: CPT | Performed by: PHYSICIAN ASSISTANT

## 2023-01-23 PROCEDURE — 97530 THERAPEUTIC ACTIVITIES: CPT

## 2023-01-23 PROCEDURE — 85025 COMPLETE CBC W/AUTO DIFF WBC: CPT

## 2023-01-23 PROCEDURE — 6360000002 HC RX W HCPCS: Performed by: PHYSICIAN ASSISTANT

## 2023-01-23 PROCEDURE — 85018 HEMOGLOBIN: CPT

## 2023-01-23 PROCEDURE — 2580000003 HC RX 258

## 2023-01-23 PROCEDURE — 1200000000 HC SEMI PRIVATE

## 2023-01-23 PROCEDURE — 1200000003 HC TELEMETRY R&B

## 2023-01-23 RX ORDER — DEXTROSE MONOHYDRATE 50 MG/ML
INJECTION, SOLUTION INTRAVENOUS CONTINUOUS
Status: DISCONTINUED | OUTPATIENT
Start: 2023-01-23 | End: 2023-01-23

## 2023-01-23 RX ADMIN — DEXTROSE MONOHYDRATE: 50 INJECTION, SOLUTION INTRAVENOUS at 05:34

## 2023-01-23 RX ADMIN — PIPERACILLIN AND TAZOBACTAM 3375 MG: 3; .375 INJECTION, POWDER, FOR SOLUTION INTRAVENOUS at 01:14

## 2023-01-23 RX ADMIN — PIPERACILLIN AND TAZOBACTAM 3375 MG: 3; .375 INJECTION, POWDER, FOR SOLUTION INTRAVENOUS at 17:58

## 2023-01-23 RX ADMIN — HYDROCORTISONE 20 MG: 10 TABLET ORAL at 10:32

## 2023-01-23 RX ADMIN — ATORVASTATIN CALCIUM 20 MG: 20 TABLET, FILM COATED ORAL at 10:32

## 2023-01-23 RX ADMIN — LINEZOLID 600 MG: 600 INJECTION, SOLUTION INTRAVENOUS at 05:22

## 2023-01-23 RX ADMIN — PANTOPRAZOLE SODIUM 40 MG: 40 TABLET, DELAYED RELEASE ORAL at 07:03

## 2023-01-23 RX ADMIN — LINEZOLID 600 MG: 600 INJECTION, SOLUTION INTRAVENOUS at 18:01

## 2023-01-23 RX ADMIN — TERBINAFINE TABLETS 250 MG 250 MG: 250 TABLET ORAL at 10:33

## 2023-01-23 RX ADMIN — HYDROCORTISONE 10 MG: 10 TABLET ORAL at 20:49

## 2023-01-23 RX ADMIN — LEVOTHYROXINE SODIUM 125 MCG: 0.12 TABLET ORAL at 10:32

## 2023-01-23 RX ADMIN — SODIUM CHLORIDE, PRESERVATIVE FREE 10 ML: 5 INJECTION INTRAVENOUS at 10:34

## 2023-01-23 RX ADMIN — PIPERACILLIN AND TAZOBACTAM 3375 MG: 3; .375 INJECTION, POWDER, FOR SOLUTION INTRAVENOUS at 10:31

## 2023-01-23 RX ADMIN — OXYBUTYNIN CHLORIDE 15 MG: 10 TABLET, EXTENDED RELEASE ORAL at 10:32

## 2023-01-23 ASSESSMENT — PAIN SCALES - GENERAL: PAINLEVEL_OUTOF10: 3

## 2023-01-23 ASSESSMENT — PAIN DESCRIPTION - ORIENTATION: ORIENTATION: LEFT

## 2023-01-23 ASSESSMENT — PAIN DESCRIPTION - LOCATION: LOCATION: ARM

## 2023-01-23 ASSESSMENT — PAIN DESCRIPTION - DESCRIPTORS: DESCRIPTORS: ACHING

## 2023-01-23 ASSESSMENT — PAIN DESCRIPTION - FREQUENCY: FREQUENCY: INTERMITTENT

## 2023-01-23 ASSESSMENT — PAIN - FUNCTIONAL ASSESSMENT: PAIN_FUNCTIONAL_ASSESSMENT: ACTIVITIES ARE NOT PREVENTED

## 2023-01-23 ASSESSMENT — PAIN DESCRIPTION - ONSET: ONSET: ON-GOING

## 2023-01-23 ASSESSMENT — PAIN DESCRIPTION - PAIN TYPE: TYPE: ACUTE PAIN

## 2023-01-23 NOTE — PLAN OF CARE
Problem: Discharge Planning  Goal: Discharge to home or other facility with appropriate resources  Outcome: Progressing   SW consult received.  See SW note 1/23/23

## 2023-01-23 NOTE — PROGRESS NOTES
Hospitalist Progress Note      Patient:  Sabi Peters    Unit/Bed:5K-15/015-A  YOB: 1950  MRN: [id number]   Acct: [de-identified]   PCP: Nasrin Neely MD  Date of Admission: 1/21/2023    Assessment/Plan:    Left upper extremity cellulitis, possibly related to recent IV infiltration: Patient had an IV infiltrated on 1/15 per provider's note and was receiving Unasyn during that stay. X-ray of left hand, elbow, radius and ulna on 1/21 revealed no acute bony abnormalities and no osseous abnormalities. CT left hand, radius, ulna, and humerus on 1/22 revealed diffuse soft tissue swelling, skin thickening and subcutaneous edema / inflammation of left upper arm, left forearm and left hand, correlate clinically for cellulitis. No abscess seen. Blood cultures reveal no growth at 24 hours. Completed 1 dose of zosyn, 1 dose of vancomycin, and 1 dose of clindamycin. ID consulted recommended continuing Zyvox / Zosyn. Continue morphine and Tylenol for pain management. Culture left arm if discharge occurs. Elevation as much as possible. Leukocytosis, resolved: Secondary to #1. WBC decreased to 10.6 from 13.7. Recheck CBC Q24 hours. Patient afebrile and denies fever/chills. Paroxysmal A-fib; rate controlled: continue home medications, continue tele  Hypernatremia, worsening: Sodium increasing from 148 to 153. Continue IV dextrose 5%. Continue daily weights, Q4 hour Na checks. Dysphagia: resumed previous diet per SLP recommendations, will have them reevaluate. History of adrenal insufficiency without crisis: Continue home cortef therapy. BP has been stable. Chronic normocytic Anemia, worsening: Hgb decreased from 9.8 to 8.8. Continue CBC Q24 hours. No acute bleeding. Will transfuse if Hgb <7. Hypothyroidism: TSH 0.047 and free T4 1.31 on 1/21. Continue home medications  Severe protein calorie malnutrition: Consult SLP.  Encourage oral intake; needs nectar thickened liquids and minced and moist diet per last SLP evaluation. Physical debility with deconditioning: Pt came from SNF. Consult PT/OT. CXR on 1/21 revealed decrease in pulmonary interstitial densities compared to x-ray on 1/4/23    Chief Complaint: Left arm swelling    Initial H and P:-    \"Shirley Gallegos is a 71 y.o. female with PMHx of paroxysmal A-fib, HTN, HFpEF, hypothyroidism, severe protein calorie malnutrition, and physical debility with deconditioning who presented to [facility] with chief complaint of left upper extremity swelling and pain. Patient was recently discharged on 1/17/23 for submandibular cellulitis and discharged to SNF. She endorses having no recollection of injuring her left arm, but stated that the arm started to swell just 3 days ago. Around that time she had stopped her outpatient course of antibiotics. At first when the swelling started she stated that she did not think much about it as it was not bothering her. Then today her arm was quite tender to touch and just throbbed all day, hence the admission to [facility]. Currently she is not having any pain, but that was after administration of Morphine while in the ED. She denies any dizziness, chest pain or shortness of breath. She denies any sick contacts. She does not endorse any tingling or numbness that is unusual for her. \"     1/22: Patient is sitting upright in bed. She endorses 9/10 sharp pain of LUE from finger tips to mid upper arm. She states this is improved from yesterday. She states her arm was \"bubbling\" yesterday but denies drainage today. She denies changes to right upper extremity, or bilateral lower extremities. She denies fever/chills, chest pain, abdominal pain, shortness of breath, palpitations, nausea or vomiting. Patient is unable to recall when her last bowel movement was. Subjective (past 24 hours):   1/23: Patient laying in bed. She notes overall she is feeling better today, however clinically arm appears worse.  She endorses pain of left arm but states this has improved, she states has increased range of motion and strength in left arm from yesterday. She notes discharge of left arm with movement. She denies fever / chills, shortness of breath, chest pain, nausea, vomiting, abdominal pain, lightheadedness, headache. Past medical history, family history, social history and allergies reviewed again and is unchanged since admission. ROS (All review of systems completed. Pertinent positives noted. Otherwise All other systems reviewed and negative.)     Medications:  Reviewed    Infusion Medications    sodium chloride       Scheduled Medications    [Held by provider] bumetanide  0.5 mg Oral Daily    hydrocortisone  20 mg Oral QAM    hydrocortisone  10 mg Oral Nightly    levothyroxine  125 mcg Oral Daily    oxybutynin  15 mg Oral Daily    pantoprazole  40 mg Oral QAM AC    atorvastatin  20 mg Oral Daily    terbinafine  250 mg Oral Daily    sodium chloride flush  5-40 mL IntraVENous 2 times per day    piperacillin-tazobactam  3,375 mg IntraVENous Q8H    linezolid  600 mg IntraVENous Q12H     PRN Meds: sodium chloride flush, sodium chloride, ondansetron **OR** ondansetron, polyethylene glycol, acetaminophen **OR** acetaminophen, potassium chloride **OR** potassium alternative oral replacement **OR** potassium chloride, magnesium sulfate, morphine      Intake/Output Summary (Last 24 hours) at 1/23/2023 1109  Last data filed at 1/22/2023 2109  Gross per 24 hour   Intake --   Output 1800 ml   Net -1800 ml       Diet:  ADULT DIET; Dysphagia - Minced and Moist; Mildly Thick (Nectar)    Physical Exam:  /76   Pulse 72   Temp 97.7 °F (36.5 °C) (Oral)   Resp 22   Ht 5' 3\" (1.6 m)   Wt 180 lb (81.6 kg)   SpO2 100%   BMI 31.89 kg/m²   General appearance: Speaking softly throughout examination. No apparent distress, appears stated age and cooperative. HEENT: Pupils equal, round, and reactive to light.  Conjunctivae/corneas clear.  Neck: Supple, with full range of motion. No jugular venous distention. Trachea midline. Respiratory:  Normal respiratory effort. Clear to auscultation, bilaterally without Rales/Wheezes/Rhonchi. Cardiovascular: Regular rate and rhythm with normal S1/S2 without murmurs, rubs or gallops. Abdomen: Soft, non-tender, non-distended with normal bowel sounds. Musculoskeletal: Diminished active and passive range of motion of bilateral upper extremities. Active range of motion of left upper extremity improved from yesterday. Left upper extremity strength diminished. Skin: Diffuse bruising throughout right upper extremity and bilateral lower extremities. Patient states this is her baseline. Extreme erythema and edema of left upper extremity up to mid upper arm. Sporadic hematomas of left upper extremity. No discharge of left upper extremity. Neurologic:  Neurovascularly intact without any focal sensory deficits. Cranial nerves: II-XII intact, grossly non-focal.  Psychiatric: Alert and oriented, thought content appropriate, normal insight  Capillary Refill: Brisk,< 3 seconds   Peripheral Pulses: +2 palpable, equal bilaterally     Labs:   Recent Labs     01/21/23  2318 01/22/23  1440 01/23/23  0354   WBC 15.8* 13.7* 10.6   HGB 10.2* 9.8* 8.8*   HCT 32.1* 31.1* 28.2*    192 182     Recent Labs     01/21/23  2327 01/22/23  1440 01/22/23  1840 01/22/23  2255 01/23/23  0354   * 149* 150* 148* 153*   K 3.3* 2.8*  --  3.9 3.7    113*  --   --  120*   CO2 27 25  --   --  24   BUN 6* 5*  --   --  5*   CREATININE 0.8 0.9  --   --  0.8   CALCIUM 8.1* 7.9*  --   --  7.9*     Recent Labs     01/21/23 2327   AST 11   ALT 17   BILITOT 0.8   ALKPHOS 95     No results for input(s): INR in the last 72 hours. No results for input(s): [place] in the last 72 hours. Microbiology:    Blood culture #1:   Lab Results   Component Value Date/Time    BC No growth 24 hours.  01/21/2023 09:01 PM       Blood culture #2:No results found for: BLOODCULT2    Organism:  Lab Results   Component Value Date/Time    ORG Staphylococcus aureus 12/17/2018 04:04 PM         Lab Results   Component Value Date/Time    LABGRAM  02/16/2022 04:00 PM     No segmented neutrophils observed. Rare epithelial cells observed. No organisms observed. MRSA culture only:No results found for: [facility]    Urine culture:   Lab Results   Component Value Date/Time    LABURIN No growth-preliminary  No growth   02/09/2017 12:35 PM       Respiratory culture: No results found for: CULTRESP    Aerobic and Anaerobic :  Lab Results   Component Value Date/Time    LABAERO No Acinetobacter species isolated. 01/04/2023 10:00 PM     Lab Results   Component Value Date/Time    LABANAE  12/01/2021 12:00 PM     No anaerobes isolated- preliminary Culture yielded light growth of gram positive bacilli most consistent with Cutibacterium species. Clinical correlation required. Urinalysis:      Lab Results   Component Value Date/Time    NITRU NEGATIVE 01/22/2023 12:02 AM    WBCUA 0-2 01/22/2023 12:02 AM    WBCUA 2-4 02/22/2012 01:00 PM    BACTERIA NONE SEEN 01/22/2023 12:02 AM    RBCUA 5-10 01/22/2023 12:02 AM    BLOODU MODERATE 01/22/2023 12:02 AM    SPECGRAV 1.025 09/18/2020 12:00 PM    GLUCOSEU NEGATIVE 01/22/2023 12:02 AM       Radiology:  CT HAND LEFT W CONTRAST   Final Result      Lateral portion of the left upper extremity is partly off the    field-of-view. Status post left shoulder hemiarthroplasty and total left hip replacement. Metallic streak artifact limits the evaluation of the adjacent structures. Diffuse soft tissue swelling, skin thickening and subcutaneous    edema/inflammation of the left upper arm, left forearm and left hand. Correlate clinically for cellulitis. No abscess is seen.       This document has been electronically signed by: Mirna Kunz MD on    01/22/2023 06:43 AM      All CTs at this facility use dose modulation techniques and iterative    reconstructions, and/or weight-based dosing   when appropriate to reduce radiation to a low as reasonably achievable.      CT RADIUS ULNA LEFT W CONTRAST   Final Result      Lateral portion of the left upper extremity is partly off the    field-of-view.      Status post left shoulder hemiarthroplasty and total left hip replacement.    Metallic streak artifact limits the evaluation of the adjacent structures.      Diffuse soft tissue swelling, skin thickening and subcutaneous    edema/inflammation of the left upper arm, left forearm and left hand.    Correlate clinically for cellulitis. No abscess is seen.      This document has been electronically signed by: Js Edwards MD on    01/22/2023 06:26 AM      All CTs at this facility use dose modulation techniques and iterative    reconstructions, and/or weight-based dosing   when appropriate to reduce radiation to a low as reasonably achievable.      CT HUMERUS LEFT W CONTRAST   Final Result      Lateral portion of the left upper extremity is partly off the    field-of-view.      Status post left shoulder hemiarthroplasty and total left hip replacement.    Metallic streak artifact limits the evaluation of the adjacent structures.      Diffuse soft tissue swelling, skin thickening and subcutaneous    edema/inflammation of the left upper arm, left forearm and left hand.    Correlate clinically for cellulitis. No abscess is seen.      This document has been electronically signed by: Js Edwards MD on    01/22/2023 06:42 AM      All CTs at this facility use dose modulation techniques and iterative    reconstructions, and/or weight-based dosing   when appropriate to reduce radiation to a low as reasonably achievable.      XR CHEST PORTABLE   Final Result   Decrease in pulmonary interstitial densities compared to x-ray 1/4/2023.      This document has been electronically signed by: Mel Morin MD on    01/21/2023 11:52 PM      XR RADIUS ULNA LEFT (2  VIEWS)   Final Result   No acute bony abnormality. This document has been electronically signed by: Rishabh Lopez MD on    01/21/2023 11:51 PM      XR HAND LEFT (MIN 3 VIEWS)   Final Result   No acute osseous abnormality. This document has been electronically signed by: Rishabh Lopez MD on    01/21/2023 10:30 PM      XR ELBOW LEFT (MIN 3 VIEWS)   Final Result   No acute bony abnormality. This document has been electronically signed by: Rishabh Lopez MD on    01/21/2023 10:28 PM        XR ELBOW LEFT (MIN 3 VIEWS)    Result Date: 1/21/2023  3 view right elbow Comparison: None Findings: No acute fractures. Normal alignment. No significant arthritic change or erosions. No joint effusion. No radiopaque foreign body. Diffuse edema. No acute bony abnormality. This document has been electronically signed by: Rishabh Lopez MD on 01/21/2023 10:28 PM    XR RADIUS ULNA LEFT (2 VIEWS)    Result Date: 1/21/2023  2 view left forearm Comparison: None Findings: No fractures or dislocations. No joint effusion. No significant arthritic change. No radiopaque foreign body. Extensive soft tissue edema. No acute bony abnormality. This document has been electronically signed by: Rishabh Lopez MD on 01/21/2023 11:51 PM    XR HAND LEFT (MIN 3 VIEWS)    Result Date: 1/21/2023  3 view left hand Comparison: None Findings: Osteopenia. No acute fractures. No significant loss of joint space or osteophytes. No erosions. No radiopaque foreign body. Diffuse edema. No acute osseous abnormality. This document has been electronically signed by: Rishabh Lopez MD on 01/21/2023 10:30 PM    CT HUMERUS LEFT W CONTRAST    Result Date: 1/22/2023  CT left upper extremity with contrast COMPARISON: No prior FINDINGS: The lateral portion of the left upper extremity is partly off the field-of-view. The patient is status post left shoulder hemiarthroplasty and total left hip replacement.  Metallic streak artifact limits the evaluation of the adjacent structures. There is diffuse soft tissue swelling, skin thickening and subcutaneous edema/inflammation of the left upper arm, left forearm and left hand. No abscess is seen. No fracture or dislocation is seen. Lateral portion of the left upper extremity is partly off the field-of-view. Status post left shoulder hemiarthroplasty and total left hip replacement. Metallic streak artifact limits the evaluation of the adjacent structures. Diffuse soft tissue swelling, skin thickening and subcutaneous edema/inflammation of the left upper arm, left forearm and left hand. Correlate clinically for cellulitis. No abscess is seen. This document has been electronically signed by: Tyler Gee MD on 01/22/2023 06:42 AM All CTs at this facility use dose modulation techniques and iterative reconstructions, and/or weight-based dosing when appropriate to reduce radiation to a low as reasonably achievable. CT RADIUS ULNA LEFT W CONTRAST    Result Date: 1/22/2023  CT left upper extremity with contrast COMPARISON: No prior FINDINGS: The lateral portion of the left upper extremity is partly off the field-of-view. The patient is status post left shoulder hemiarthroplasty and total left hip replacement. Metallic streak artifact limits the evaluation of the adjacent structures. There is diffuse soft tissue swelling, skin thickening and subcutaneous edema/inflammation of the left upper arm, left forearm and left hand. No abscess is seen. No fracture or dislocation is seen. Lateral portion of the left upper extremity is partly off the field-of-view. Status post left shoulder hemiarthroplasty and total left hip replacement. Metallic streak artifact limits the evaluation of the adjacent structures. Diffuse soft tissue swelling, skin thickening and subcutaneous edema/inflammation of the left upper arm, left forearm and left hand. Correlate clinically for cellulitis. No abscess is seen.  This document has been electronically signed by: Allan Domingo MD on 01/22/2023 06:26 AM All CTs at this facility use dose modulation techniques and iterative reconstructions, and/or weight-based dosing when appropriate to reduce radiation to a low as reasonably achievable. CT HAND LEFT W CONTRAST    Result Date: 1/22/2023  CT left upper extremity with contrast COMPARISON: No prior FINDINGS: The lateral portion of the left upper extremity is partly off the field-of-view. The patient is status post left shoulder hemiarthroplasty and total left hip replacement. Metallic streak artifact limits the evaluation of the adjacent structures. There is diffuse soft tissue swelling, skin thickening and subcutaneous edema/inflammation of the left upper arm, left forearm and left hand. No abscess is seen. No fracture or dislocation is seen. Lateral portion of the left upper extremity is partly off the field-of-view. Status post left shoulder hemiarthroplasty and total left hip replacement. Metallic streak artifact limits the evaluation of the adjacent structures. Diffuse soft tissue swelling, skin thickening and subcutaneous edema/inflammation of the left upper arm, left forearm and left hand. Correlate clinically for cellulitis. No abscess is seen. This document has been electronically signed by: Allan Domingo MD on 01/22/2023 06:43 AM All CTs at this facility use dose modulation techniques and iterative reconstructions, and/or weight-based dosing when appropriate to reduce radiation to a low as reasonably achievable. XR CHEST PORTABLE    Result Date: 1/21/2023  1 view chest x-ray Comparison: CR/SR - XR CHEST PORTABLE - 01/04/2023 04:26 PM EST Findings: Decrease in pulmonary interstitial densities compared to x-ray 1/4/2023. Right internal iliac catheter terminates in the right atrium. Status post bilateral hip replacement. No acute fractures. Decrease in pulmonary interstitial densities compared to x-ray 1/4/2023.  This document has been electronically signed by: Joni Baldwin MD on 01/21/2023 11:52 PM      Electronically signed by Shannan Nassar PA-C on 1/23/2023 at 11:09 AM

## 2023-01-23 NOTE — PROGRESS NOTES
[facility] THERAPY COMMUNICATION  NOTE  STRZ ONC MED 5K      Date: 2023  Patient Name: Diane Taylor        MRN: [id number]    : 1950  (71 y.o.)    COMMUNICATION NOTE:  ST received new order for swallow evaluation; patient with hx of dysphagia. Recent hospitalization (23)  in which ST recommend minced and moist with moderately thick liquids. Diet order modified to minced and moist diet with moderately thick liquids to maximize patient safety. ST to complete bedside swallow evaluation 23.      SINCERE Huangtray

## 2023-01-23 NOTE — CARE COORDINATION
1/23/23, 3:10 PM EST    DISCHARGE ON GOING EVALUATION    [place] day: 1  Location: -15/015-A Reason for admit: Hypokalemia [E87.6]  Cellulitis [L03.90]  Cellulitis of left upper extremity [L03.114]   Procedure: 1/22: Left hand CT:   Status post left shoulder hemiarthroplasty and total left hip replacement. Metallic streak artifact limits the evaluation of the adjacent structures     Diffuse soft tissue swelling, skin thickening and subcutaneous    edema/inflammation of the left upper arm, left forearm and left hand. Correlate clinically for cellulitis. No abscess is seen     Barriers to Discharge: IV Zyvox q 12 hr, IV Zosyn q8hr for left arm cellulitis, ID following, PT/OT following, diet minced and moist with thickened liquids, culture for wound ordered if wound opens  PCP: Stanley Vickers MD  Readmission Risk Score: 26.6%  Patient Goals/Plan/Treatment Preferences: Plan to discharge to different SNF at discharge. SW following.

## 2023-01-23 NOTE — PLAN OF CARE
Patient noted to have Na 153. Free water deficit noted to be 3.4 L. Will trial D5 @ 100 mL/hr for 4 hours: total 400 mL. Recheck Na level at 0945 and resume management per day shift provider.

## 2023-01-23 NOTE — PROGRESS NOTES
[address] [id number]  INPATIENT PHYSICAL THERAPY  EVALUATION  [facility] ONC MED 5K - 5K-15/015-A    Time In: 4099  Time Out: 1505  Timed Code Treatment Minutes: 23 Minutes  Minutes: 31          Date: 2023  Patient Name: Jairo Collado,  Gender:  female        MRN: [id number]  : 1950  (71 y.o.)      Referring Practitioner: Oralia Fishman PA-C  Diagnosis: Hypokalemia  Additional Pertinent Hx: 71 y.o. female with PMHx of paroxysmal A-fib, HTN, HFpEF, hypothyroidism, severe protein calorie malnutrition, and physical debility with deconditioning who presented to [facility] with chief complaint of left upper extremity swelling and pain. Patient was recently discharged on 23 for submandibular cellulitis and discharged to SNF. She endorses having no recollection of injuring her left arm, but stated that the arm started to swell just 3 days ago. Around that time she had stopped her outpatient course of antibiotics. At first when the swelling started she stated that she did not think much about it as it was not bothering her. Then today her arm was quite tender to touch and just throbbed all day, hence the admission to [facility]. Currently she is not having any pain, but that was after administration of Morphine while in the ED. She denies any dizziness, chest pain or shortness of breath. She denies any sick contacts. She does not endorse any tingling or numbness that is unusual for her. Restrictions/Precautions:  Restrictions/Precautions: General Precautions, Fall Risk    Subjective:  Chart Reviewed: Yes  Patient assessed for rehabilitation services?: Yes  Family / Caregiver Present: No  Subjective: RN approved session.  Pt pleasant and agreeable to therapy    General:  Overall Orientation Status: Within Functional Limits  Vision: Within Functional Limits  Hearing: Within functional limits       Pain: 0/10: denies pain     Vitals: Vitals not assessed per clinical judgement, see nursing flowsheet    Social/Functional History:    Type of Home: Facility  Home Layout: One level  Home Access: Level entry             ADL Assistance: Needs assistance  Toileting: Needs assistance  Homemaking Assistance: Needs assistance  Ambulation Assistance: Non-ambulatory  Transfer Assistance: Needs assistance          Additional Comments: Pt originally home from wtih  but has been in/out of hospital recently and at SNF for therapy d/t deconditioning and  recently undergoing back surgery and unable to assist her; baseline is stand pivot only    OBJECTIVE:  Range of Motion:  Right Lower Extremity: Impaired - lack of full ankle DF  Left Lower Extremity: Impaired - lack of full ankle DF and knee flex; spacticity noted throughout LE     Strength:  Bilateral Lower Extremity: Impaired - grossly deconditioned L>R     Balance:  Static Sitting Balance:  Contact Guard Assistance  Static Standing Balance: Minimal Assistance, with increased time for completion[facility]     Bed Mobility:  Rolling to Left: Maximum Assistance, X 1   Rolling to Right: Maximum Assistance, X 1   Supine to Sit: Maximum Assistance, X 1  Sit to Supine: Maximum Assistance, X 1     Transfers:  Sit to Stand: Maximum Assistance, X 1, with [facility], with verbal cues  Stand to [address] [place] [address], X 1, with [facility], with verbal cues  Sit<>stand x2 attempts; maintains standing ~30 sec each time. Unable to place ayo kamrandy buttock flaps down d/t ptincreased hip flexion and unable to obtain full hip ext     Ambulation:  Not Tested    Functional Outcome Measures: Completed  AM-PAC Inpatient Mobility without Stair Climbing Raw Score : 8  AM-PAC Inpatient without Stair Climbing T-Scale Score : 30.65    ASSESSMENT:  Activity Tolerance:  Patient tolerance of  treatment: good. Treatment Initiated: Treatment and education initiated within context of evaluation.   Evaluation time included review of current medical information, gathering information related to past medical, social and functional history, completion of standardized testing, formal and informal observation of tasks, assessment of data and development of plan of care and goals. Treatment time included skilled education and facilitation of tasks to increase safety and independence with functional mobility for improved independence and quality of life. Assessment: Body Structures, Functions, Activity Limitations Requiring Skilled Therapeutic Intervention: Decreased functional mobility , Decreased balance, Decreased endurance, Decreased posture, Decreased strength  Assessment: Jonah Hugo is a 71 y.o. female that presents with arm swelling. she is ind prior to admission now requiring assist for basic mobility. Pt demonstrates a decrease in baseline by way of bed mobility, transfers and ambulation secondary to decreased activity tolerance, strength, fatigue, and balance deficits. Pt will benefit from skilled PT services throughout admission and beyond hospital discharge for improvements in functional mobility and in order to decrease fall risk and return pt to PLOF. Therapy Prognosis: Good    Requires PT Follow-Up: Yes    Discharge Recommendations:  Discharge Recommendations: [address]    Patient Education:      .     Patient Education  Education Given To: Patient  Education Provided: Role of Therapy, Plan of Care  Education Method: Verbal  Barriers to Learning: None  Education Outcome: Verbalized understanding       Equipment Recommendations:  Equipment Needed: No    Plan:  Current Treatment Recommendations: Strengthening, Balance training, Functional mobility training, Transfer training, Endurance training, Safety education & training, Therapeutic activities  General Plan:  (3-5x GM)    Goals:  Patient Goals : none stated  Short Term Goals  Time Frame for Short Term Goals: by discharge  Short Term Goal 1: bed mobility with HOB flat, no rails, mod I for increased functional ind  Short Term Goal 2: sit<>Stand from various surfaces with LRD mod I for safe transfers  Short Term Goal 3: PT to assess transfers  Long Term Goals  Time Frame for Long Term Goals : NA d/t short ELOS    Following session, patient left in safe position with all fall risk precautions in place.     Tanya Wood ([place]) PT, DPT

## 2023-01-23 NOTE — CARE COORDINATION
Readmission Risk (Low < 19, Mod (19-27), High > 27): Readmission Risk Score: 26.6    Current PCP: Lara Fiore MD  PCP verified by CM? Yes    Patient Orientation: Other (see comment) (Alert and answers questions appropriately)    Patient Cognition: Alert  History Provided by: Patient    Advance Directives:      Code Status: Limited   Patient's Primary Decision Maker is: Named in Scanned ACP Document       Discharge Planning:    Patient lives with:   Type of Home:    Primary Care Giver: Other (Comment) (had been with [facility] prior to admission)  Patient Support Systems include: Spouse/Significant Other, Children, Other (Comment) (admitted from [facility])   Current Financial resources:    Current community resources:    Current services prior to admission:              Current DME:              Type of Home Care services:       ADLS  Prior functional level: Assistance with the following:, Bathing, Dressing, Toileting, Cooking, Housework, Shopping, Mobility  Current functional level: Assistance with the following:, Bathing, Dressing, Toileting, Cooking, Housework, Shopping, Mobility    Family can provide assistance at DC: Other (comment) (unknown at this time, pt prefers to return home, voicemail left with spouse for a call back)  Would you like Case Management to discuss the discharge plan with any other family members/significant others, and if so, who?  Yes (spouse \"Flako\" Phililp)  Plans to Return to Present Housing: Unknown at present  Other Identified Issues/Barriers to RETURNING to current housing: precert if SNF  Potential Assistance needed at discharge:              Potential DME:    Patient expects to discharge to:    Plan for transportation at discharge:      Financial  Payor: Ramiro Caldwell / Plan: [employer] PREFERRED PPO / Product Type: *No Product type* /     Potential assistance Purchasing Medications:         met with Shirley this morning, friend at bedside, consult for HILARY \"family does not want patient to go back to [facility]. Would like something possibly closer to son in [place]\" SW did discuss this with pt, pt reports she does not want to go [address]  area, reports she wants to return home. Pt reports  had recent back surgery, reports he has been home for 2-3 days now. Pt reports she has discussed plan to return to home with . SW let her know we would want to make sure she is able to care for herself at home and/or have assistance available. Therapies to work with pt. Pt has one step to get into the home. Pt reports  able to do the cooking. Pt believes she has had Home Health in the past.   Post-acute [facility]) provider list was provided to patient. Patient was informed of their freedom to choose [facility] provider. Discussed and offered to show the patient the relevant [facility] Providers quality and resource use measures on Medicare Compare web site via computer based on patient's goals of care and treatment preferences. Questions regarding selection process were answered. HILARY provided private duty list along with information regarding [facility]. HILARY did call , left a voicemail for a call back. 1:33 PM  Call from pt's  \"Flako\", HILARY did asked about discharge plans, reports he is not crazy about pt returning to [facility], reports he did not feels with pt's arm/pain they were doing anything to help, reports he had to call from home to have pt brought in to emergency room.  states plan is not for pt to return to their home, reports he was thinking about facilities closer to their son in [address] . HILARY discussed with  would to search facilities and start rating. Hilary did ask to have 3 preferences for facilities.

## 2023-01-23 NOTE — CONSULTS
CONSULTATION NOTE :ID       Patient - Phoenix Osorio,  Age - 71 y.o.    - 1950      Room Number - 5K-15/015-A   MRN -  [id number]   [facility]  Date of Admission -  2023  7:56 PM  Patient's PCP: Dwight Wills MD     Requesting Physician: Keli Oliveira PA-C    REASON FOR CONSULTATION   Left upper arm cellulites  CHIEF COMPLAINT     Left arm swelling  HISTORY OF PRESENT ILLNESS       This is a very pleasant 71 y.o. female who was admitted to the hospital with a chief complaints of left arm swelling. She was recently in the hospital and was reported of iv infiltrating to the left upper arm. Developed redness and swelling of 4 days duration prior to admission, the left upper extremity is more swollen and red. Started on iv antibiotics. she had degenerative joint disease. Has limited range of movement of the upper extremity/ she denies any fever or chills, no purulent drainage. CT shows soft tissue swelling. No abscess or soft tissue gas was noted. She is on chronic steroid use    PAST MEDICAL  HISTORY       Past Medical History:   Diagnosis Date    Asthma     uses albuterol 1-2x per year    Atrial thrombosis     on [address]    Bursitis     right shoulder -s/p steroid injections    Cancer (HCC)     Chronic diastolic heart failure (HCC)     CVA (cerebral infarction) 6/14/15    Mult. 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 Clemons Nones    Hypothyroidism     Meningioma [facility])     3 separate meningiomas, s/p gamma knife (2012) , [facility]    MRSA nasal colonization 2013    Obesity (BMI 30-39. 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[facility]     status post resection for macroadenoma in the 1970's    Pneumonia 11/2018    Stroke [facility]) 1988    due to radiation -bleeding CVA - residual left upper and lower extremity weakness    Stroke [facility]) October 2015    Urinary incontinence        PAST SURGICAL HISTORY     Past Surgical History:   Procedure Laterality Date    APPENDECTOMY      BACK SURGERY      BRAIN SURGERY      1970 due to pituitary mass, drained and s/p radiation    CHOLECYSTECTOMY      HYSTERECTOMY (CERVIX STATUS UNKNOWN)      total done for DUB    OTHER SURGICAL HISTORY  9/30/2014    LAPAROSCOPIC RUPTURED APPENDECTOMY    OTHER SURGICAL HISTORY Left 10/26/15    Evacuation and Debridement of Left Lower Leg Hematoma - Dr. Giorgi Banks  3/13/2013    left    SHOULDER ARTHROPLASTY      right    TOOTH EXTRACTION  03/19/2018    TOTAL HIP ARTHROPLASTY      bilateral    TOTAL KNEE ARTHROPLASTY      right          MEDICATIONS:       Scheduled Meds:   [Held by provider] bumetanide  0.5 mg Oral Daily    hydrocortisone  20 mg Oral QAM    hydrocortisone  10 mg Oral Nightly    levothyroxine  125 mcg Oral Daily    oxybutynin  15 mg Oral Daily    pantoprazole  40 mg Oral QAM AC    atorvastatin  20 mg Oral Daily    terbinafine  250 mg Oral Daily    sodium chloride flush  5-40 mL IntraVENous 2 times per day    piperacillin-tazobactam  3,375 mg IntraVENous Q8H    linezolid  600 mg IntraVENous Q12H     Continuous Infusions:   sodium chloride       PRN Meds:sodium chloride flush, sodium chloride, ondansetron **OR** ondansetron, polyethylene glycol, acetaminophen **OR** acetaminophen, potassium chloride **OR** potassium alternative oral replacement **OR** potassium chloride, magnesium sulfate, morphine  Allergies:   ALLERGIES:    Pregabalin and Tape [adhesive tape]        SOCIAL HISTORY:     TOBACCO:   reports that she has never smoked.  She has never used smokeless tobacco.     ETOH:   reports no history of alcohol use. Patient currently lives with family        FAMILY HISTORY:         Problem Relation Age of Onset    High Blood Pressure Mother     Stroke Maternal Grandmother 72       REVIEW OF SYSTEMS:     Constitutional: no fever, no night sweats, +fatigue, no weight loss. Head: no head ache , no head injury, no migranes. Eye: no eye discharge, blurring of vision, no double vision,no eye pain. Ears: no hearing difficulty, no tinnitus  Mouth/throat: no ulceration, dental caries , dysphagia, no hoarseness and voice change  Respiratory: no cough no chest pain,no shortness of breath,no wheezing  CVS: no palpitation, no chest pain,   GI: no abdominal pain, no nausea , no vomiting, no constipation,no diarrhea. WALKER: no dysuria, frequency and urgency, no hematuria, no kidney stones  Musculoskeletal: as noted in HPI. Endocrine: no polyuria, polydipsia, no cold or heat intolerance  Hematology: no anemia,+easy brusing or bleeding, no hx of clotting disorder  Dermatology: no skin rash, no skin lesions, no pruritis,  Neurological:no headaches,no dizziness, no seizure, no numbness. Psychiatry: no depression, no anxiety,no panic attacks, no suicide ideation    PHYSICAL EXAM:     /76   Pulse 72   Temp 97.7 °F (36.5 °C) (Oral)   Resp 22   Ht 5' 3\" (1.6 m)   Wt 180 lb (81.6 kg)   SpO2 100%   BMI 31.89 kg/m²   General apperance:  Awake, alert, not in distress. chronically sick  looking  HEENT: pink conjunctiva, unicteric sclera, moist oral mucosa. Chest:  bilateral air entry  Cardiovascular:  RRR ,S1S2, no murmur or gallop. Abdomen:  Soft, non tender to palpation. Extremities: +edema, the left upper arm is swollen , red, the skin is very thin and fragile  Skin:  Warm and dry.   CNS:awake and oriented          LABS:     CBC:   Recent Labs     01/21/23  2318 01/22/23  1440 01/23/23  0354   WBC 15.8* 13.7* 10.6   HGB 10.2* 9.8* 8.8*    192 182     BMP:    Recent Labs 01/21/23  2327 01/22/23  1440 01/22/23  1840 01/22/23  2255 01/23/23  0354   * 149* 150* 148* 153*   K 3.3* 2.8*  --  3.9 3.7    113*  --   --  120*   CO2 27 25  --   --  24   BUN 6* 5*  --   --  5*   CREATININE 0.8 0.9  --   --  0.8   GLUCOSE 97 89  --   --  89     Calcium:  Recent Labs     01/23/23  0354   CALCIUM 7.9*      Hepatic:   Recent Labs     01/21/23 2327   ALKPHOS 95   ALT 17   AST 11   PROT 5.1*   BILITOT 0.8   LABALBU 2.8*        UA:   Recent Labs     01/22/23  0002   PHUR 7.5   COLORU YELLOW   PROTEINU NEGATIVE   BLOODU MODERATE*   RBCUA 5-10   WBCUA 0-2   BACTERIA NONE SEEN   NITRU NEGATIVE   GLUCOSEU NEGATIVE   BILIRUBINUR NEGATIVE   UROBILINOGEN 0.2   KETUA NEGATIVE         IMAGING:    Micro:   Lab Results   Component Value Date/Time    BC No growth 24 hours.  01/21/2023 09:01 PM       Problem list of patient      Patient Active Problem List   Diagnosis Code    Asthma J45.909    Hyperlipidemia E78.5    Osteoarthritis M19.90    GERD (gastroesophageal reflux disease) K21.9    Meningioma (HCC) D32.9    Mechanical loosening of prosthetic joint (HCC) T84.039A    Hyperglycemia R73.9    SIRS (systemic inflammatory response syndrome) (HCC) R65.10    Leukocytosis D72.829    Right sided weakness R53.1    Cerebrovascular disease A58.9    Metabolic acidosis B66.48    Sinus bradycardia R00.1    Generalized weakness R53.1    Hx of subdural hematoma Z86.79    Cerebral infarction (HCC) I63.9    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 (HCC) E23.0, D49.7    Hypercoagulable state ([address] D68.59    Fatigue R53.83    Atrial thrombosis I51.3    Microcytic anemia D50.9    Hypokalemia E87.6    Altered mental status R41.82    Postoperative hypothyroidism E89.0    Hypernatremia T84.3    Metabolic encephalopathy M55.38    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) (HCC) E23.0    Hypersomnia G47.10    Long term (current) use of systemic steroids Z79.52    Essential hypertension I10 Diabetes insipidus ([facility]) E23.2    Somnolence R40.0    PAF (paroxysmal atrial fibrillation) ([facility]) I48.0    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 5XAA    JESSE (acute kidney injury) ([facility] N17.9    Hyperkalemia E87.5    Gastroenteritis due to norovirus P86.36    Acute diastolic heart failure ([facility]) I50.31    History of stroke with residual effects I69.30    Chronic venous stasis dermatitis of both lower extremities I87.2    Normocytic anemia D64.9    Chronic diastolic congestive heart failure ([facility]) I50.32    CKD (chronic kidney disease), stage IV ([facility]) N18.4    Confusion R41.0    Chronic kidney disease (CKD), stage III (moderate) ([facility]) N18.30    Obesity (BMI 30.0-34. 9) E66.9    Community acquired pneumonia of right lung J18.9    Acute renal failure superimposed on stage 3 chronic kidney disease ([facility]) N17.9, N18.30    Chronic anticoagulation Z79.01    Hypoalbuminemia E88.09    Impaired mobility Z74.09    Bilateral leg edema +2- better now trace R60.0    CKD (chronic kidney disease) stage 3, GFR 30-59 ml/min ([facility]) N18.30    Fluid overload E87.70    Panhypopituitarism ([facility]) E23.0    Hypothyroidism E03.9    Scalp lesion L98.9    Non-compliance F/u advised Z91.199    Verruca vulgaris B07.9    COVID-19 U07.1    Septic shock ([facility]) A41.9, R65.21    Cellulitis of submandibular region K12.2    Severe malnutrition ([facility]) E43    Submandibular gland infection K11.20    Cellulitis L03.90           Impression and Recommendation:   Left upper arm cellulites: the arm is still swollen and red, will continue current iv antibiotic, need compression with kerlix ,a ce wrap and elevation of the arm  Chronic steroid use contributing to skin fragility  HTN  Atrial fibrillation  Hx of adrenal insufficiency on chronic steroid     Thank you Oralia Fishman PA-C for allowing me to participate in this patient's care.     Jarrod Bryant MD, MD,FACP 1/23/2023 11:48 AM

## 2023-01-23 NOTE — PROGRESS NOTES
Physician Progress Note      PATIENT:               Alli Beltran  CSN #:                  [id number]  :                       1950  ADMIT DATE:       2023 7:56 PM  [address] [place] DATE:  Lyle Wakefield  PROVIDER #:        Pam Sorensen PA-C          QUERY TEXT:    Patient admitted with left upper extremity cellulitis . Documentation reflects   Sepsis in ED lab-note(s) dated 23. If possible, please make selection   below, document in the progress notes and discharge summary if Sepsis was: The medical record reflects the following:  Risk Factors: Recent admission for submandibular cellulitis, current left   upper extremity cellulitis, recent IV medication infiltration  Clinical Indicators: WBC 15.8,   13.7,  10.6 , procalcitonin 0.28 ( ref   0.01-0.09) , extremity with serosanguinous drainage, redness, pain,   subcutaneous edema. Treatment: admission, imaging, labs & culture , IV fluids and Antibiotics-   vanc. and Zosyn    Thank Marry Chanel  RN, BSN, [facility]  Clinical   P: [phone]  F: [phone]  Options provided:  -- Sepsis POA confirmed after study  -- Sepsis POA  treated and resolved  -- Sepsis ruled out after study  -- Other - I will add my own diagnosis  -- Disagree - Not applicable / Not valid  -- Disagree - Clinically unable to determine / Unknown  -- Refer to Clinical Documentation Reviewer    PROVIDER RESPONSE TEXT:    Sepsis ruled out after study.     Query created by: Brenda Mansfield on 2023 10:45 AM      Electronically signed by:  Pam Sorensen PA-C 2023 10:53 AM

## 2023-01-23 NOTE — PROGRESS NOTES
Comprehensive Nutrition Assessment    Type and Reason for Visit:  Initial, Positive Nutrition Screen, Wound    Nutrition Recommendations/Plan:   Recommend diet as per SLP. Will send Magic Cups once/day, Abhijit BID. Recommend MVI. Will monitor need for additional nutrition interventions. Malnutrition Assessment:  Malnutrition Status: At risk for malnutrition (Comment) (01/23/23 1306)    Context:  Acute Illness     Findings of the 6 clinical characteristics of malnutrition:  Energy Intake:  Unable to assess (intake variable recent admit; po n/r this admission; reports minimal intake today)  Weight Loss:  Unable to assess (difficult to evaluate with edema; weights fluctuate up/down)     Body Fat Loss:  No significant body fat loss     Muscle Mass Loss:  No significant muscle mass loss    Fluid Accumulation:  Unable to assess     Strength:  Not Performed    Nutrition Assessment:     Pt. nutritionally compromised AEB wounds. At risk for further nutrition compromise r/t increased nutrient needs for wound healing, variable po intake and underlying medical condition (hx CVA, GERD, HLD, HTN, meningioma of brain, COVID 11/22, diabetes insipidus). Nutrition Related Findings:      Wound Type: Stage II, Skin Tears (cellulitis) - recent IV infiltrate  Pt. Report/Treatments/Miscellaneous: pt. Seen - reports good appetite and intake; denies any trouble tolerating diet; states good appetite pta; pt. Reports ate fruits and vegetables for lunch but nothing else; states they wouldn't let her eat breakfast; intake variable last admission; agrees to above ONS  GI Status: 1 BM noted past 24 hours  Pertinent Labs: 1/23: Glucose 89, Sodium 153  Pertinent Meds: Glycolax, Zofran, Bumex      Current Nutrition Intake & Therapies:          ADULT DIET; Dysphagia - Minced and Moist; Mildly Thick (Nectar)  ADULT ORAL NUTRITION SUPPLEMENT; Dinner; Frozen Oral Supplement  ADULT ORAL NUTRITION SUPPLEMENT; Breakfast, Lunch;  Wound Healing Oral Supplement    Anthropometric Measures:  Height: 5' 3\" (160 cm)  Ideal Body Weight (IBW): 115 lbs (52 kg)    Admission Body Weight: 173 lb 11.6 oz (78.8 kg) (1/23 bedscale, +2, +3 edema)  Current Body Weight: 173 lb 11.6 oz (78.8 kg) (1/23 bedscale, +2, +3 edema),      Current BMI (kg/m2): 30.8  Usual Body Weight:  (per pt. 180#: per EMR: 12/3/22: 192# 2 oz, 1/5/23: 167# 5 oz, 1/13/23: 196# 8 oz, 1/15/23: 183# 5 oz)                       BMI Categories: Obese Class 1 (BMI 30.0-34. 9)    Estimated Daily Nutrient Needs:  Energy Requirements Based On: Kcal/kg  Weight Used for Energy Requirements: Other (Comment) (79)  Energy (kcal/day): 7801-1052 kcal (18-20)  Weight Used for Protein Requirements: Ideal (52)  Protein (g/day): 73+ grams (1.3+)       Nutrition Diagnosis:   Increased nutrient needs related to increase demand for energy/nutrients as evidenced by wounds    Nutrition Interventions:   Food and/or Nutrient Delivery: Continue Current Diet, Start Oral Nutrition Supplement  Nutrition Education/Counseling: Education initiated (1/23 Encouraged po, good nutrition at best efforts for healing.)  Coordination of Nutrition Care: Continue to monitor while inpatient       Goals:     Goals: PO intake 75% or greater, by next RD assessment       Nutrition Monitoring and Evaluation:      Food/Nutrient Intake Outcomes: Diet Advancement/Tolerance, Food and Nutrient Intake, Supplement Intake  Physical Signs/Symptoms Outcomes: Biochemical Data, Chewing or Swallowing, GI Status, Fluid Status or Edema, Nutrition Focused Physical Findings, Skin, Weight    Discharge Planning:     Too soon to determine     Nia Beckett RD, LD  Contact: [phone]

## 2023-01-24 LAB
ANION GAP SERPL CALC-SCNC: 13 MEQ/L (ref 8–16)
BUN SERPL-MCNC: 4 MG/DL (ref 7–22)
CALCIUM SERPL-MCNC: 7.9 MG/DL (ref 8.5–10.5)
CHLORIDE SERPL-SCNC: 109 MEQ/L (ref 98–111)
CO2 SERPL-SCNC: 22 MEQ/L (ref 23–33)
CREAT SERPL-MCNC: 0.6 MG/DL (ref 0.4–1.2)
DEPRECATED RDW RBC AUTO: 61.2 FL (ref 35–45)
ERYTHROCYTE [DISTWIDTH] IN BLOOD BY AUTOMATED COUNT: 17.3 % (ref 11.5–14.5)
FERRITIN SERPL IA-MCNC: 395 NG/ML (ref 10–291)
GFR SERPL CREATININE-BSD FRML MDRD: > 60 ML/MIN/1.73M2
GLUCOSE SERPL-MCNC: 122 MG/DL (ref 70–108)
HCT VFR BLD AUTO: 29 % (ref 37–47)
HGB BLD-MCNC: 8.9 GM/DL (ref 12–16)
IRON SERPL-MCNC: 38 UG/DL (ref 50–170)
MCH RBC QN AUTO: 29.5 PG (ref 26–33)
MCHC RBC AUTO-ENTMCNC: 30.7 GM/DL (ref 32.2–35.5)
MCV RBC AUTO: 96 FL (ref 81–99)
PLATELET # BLD AUTO: 191 THOU/MM3 (ref 130–400)
PMV BLD AUTO: 10 FL (ref 9.4–12.4)
POTASSIUM SERPL-SCNC: 3.3 MEQ/L (ref 3.5–5.2)
RBC # BLD AUTO: 3.02 MILL/MM3 (ref 4.2–5.4)
SODIUM SERPL-SCNC: 144 MEQ/L (ref 135–145)
SODIUM SERPL-SCNC: 144 MEQ/L (ref 135–145)
SODIUM SERPL-SCNC: 145 MEQ/L (ref 135–145)
TIBC SERPL-MCNC: 137 UG/DL (ref 171–450)
WBC # BLD AUTO: 7.9 THOU/MM3 (ref 4.8–10.8)

## 2023-01-24 PROCEDURE — 2580000003 HC RX 258

## 2023-01-24 PROCEDURE — 84295 ASSAY OF SERUM SODIUM: CPT

## 2023-01-24 PROCEDURE — 82728 ASSAY OF FERRITIN: CPT

## 2023-01-24 PROCEDURE — 6360000002 HC RX W HCPCS: Performed by: PHYSICIAN ASSISTANT

## 2023-01-24 PROCEDURE — 85027 COMPLETE CBC AUTOMATED: CPT

## 2023-01-24 PROCEDURE — 36415 COLL VENOUS BLD VENIPUNCTURE: CPT

## 2023-01-24 PROCEDURE — 99233 SBSQ HOSP IP/OBS HIGH 50: CPT | Performed by: PHYSICIAN ASSISTANT

## 2023-01-24 PROCEDURE — 84466 ASSAY OF TRANSFERRIN: CPT

## 2023-01-24 PROCEDURE — 2580000003 HC RX 258: Performed by: PHYSICIAN ASSISTANT

## 2023-01-24 PROCEDURE — 1200000000 HC SEMI PRIVATE

## 2023-01-24 PROCEDURE — 83540 ASSAY OF IRON: CPT

## 2023-01-24 PROCEDURE — 80048 BASIC METABOLIC PNL TOTAL CA: CPT

## 2023-01-24 PROCEDURE — 97166 OT EVAL MOD COMPLEX 45 MIN: CPT

## 2023-01-24 PROCEDURE — 6370000000 HC RX 637 (ALT 250 FOR IP)

## 2023-01-24 PROCEDURE — 92610 EVALUATE SWALLOWING FUNCTION: CPT

## 2023-01-24 PROCEDURE — 1200000003 HC TELEMETRY R&B

## 2023-01-24 PROCEDURE — 6370000000 HC RX 637 (ALT 250 FOR IP): Performed by: PHYSICIAN ASSISTANT

## 2023-01-24 PROCEDURE — 36592 COLLECT BLOOD FROM PICC: CPT

## 2023-01-24 PROCEDURE — 97530 THERAPEUTIC ACTIVITIES: CPT

## 2023-01-24 PROCEDURE — 92526 ORAL FUNCTION THERAPY: CPT

## 2023-01-24 RX ORDER — SODIUM CHLORIDE, SODIUM LACTATE, POTASSIUM CHLORIDE, CALCIUM CHLORIDE 600; 310; 30; 20 MG/100ML; MG/100ML; MG/100ML; MG/100ML
INJECTION, SOLUTION INTRAVENOUS CONTINUOUS
Status: ACTIVE | OUTPATIENT
Start: 2023-01-24 | End: 2023-01-25

## 2023-01-24 RX ORDER — LACTOBACILLUS RHAMNOSUS GG 10B CELL
1 CAPSULE ORAL
Status: DISCONTINUED | OUTPATIENT
Start: 2023-01-25 | End: 2023-02-06 | Stop reason: HOSPADM

## 2023-01-24 RX ADMIN — SODIUM CHLORIDE, POTASSIUM CHLORIDE, SODIUM LACTATE AND CALCIUM CHLORIDE: 600; 310; 30; 20 INJECTION, SOLUTION INTRAVENOUS at 16:58

## 2023-01-24 RX ADMIN — ATORVASTATIN CALCIUM 20 MG: 20 TABLET, FILM COATED ORAL at 11:36

## 2023-01-24 RX ADMIN — SODIUM CHLORIDE, PRESERVATIVE FREE 10 ML: 5 INJECTION INTRAVENOUS at 20:42

## 2023-01-24 RX ADMIN — SODIUM CHLORIDE, PRESERVATIVE FREE 10 ML: 5 INJECTION INTRAVENOUS at 11:37

## 2023-01-24 RX ADMIN — PIPERACILLIN AND TAZOBACTAM 3375 MG: 3; .375 INJECTION, POWDER, FOR SOLUTION INTRAVENOUS at 11:33

## 2023-01-24 RX ADMIN — LINEZOLID 600 MG: 600 INJECTION, SOLUTION INTRAVENOUS at 05:50

## 2023-01-24 RX ADMIN — TERBINAFINE TABLETS 250 MG 250 MG: 250 TABLET ORAL at 11:35

## 2023-01-24 RX ADMIN — RIVAROXABAN 15 MG: 15 TABLET, FILM COATED ORAL at 17:07

## 2023-01-24 RX ADMIN — LEVOTHYROXINE SODIUM 125 MCG: 0.12 TABLET ORAL at 11:36

## 2023-01-24 RX ADMIN — PIPERACILLIN AND TAZOBACTAM 3375 MG: 3; .375 INJECTION, POWDER, FOR SOLUTION INTRAVENOUS at 20:48

## 2023-01-24 RX ADMIN — POTASSIUM BICARBONATE 40 MEQ: 782 TABLET, EFFERVESCENT ORAL at 18:53

## 2023-01-24 RX ADMIN — PANTOPRAZOLE SODIUM 40 MG: 40 TABLET, DELAYED RELEASE ORAL at 05:50

## 2023-01-24 RX ADMIN — BUMETANIDE 0.5 MG: 0.5 TABLET ORAL at 13:47

## 2023-01-24 RX ADMIN — LINEZOLID 600 MG: 600 INJECTION, SOLUTION INTRAVENOUS at 17:05

## 2023-01-24 RX ADMIN — PIPERACILLIN AND TAZOBACTAM 3375 MG: 3; .375 INJECTION, POWDER, FOR SOLUTION INTRAVENOUS at 00:32

## 2023-01-24 RX ADMIN — OXYBUTYNIN CHLORIDE 15 MG: 10 TABLET, EXTENDED RELEASE ORAL at 11:36

## 2023-01-24 RX ADMIN — HYDROCORTISONE 10 MG: 10 TABLET ORAL at 20:42

## 2023-01-24 RX ADMIN — HYDROCORTISONE 20 MG: 10 TABLET ORAL at 11:35

## 2023-01-24 NOTE — CARE COORDINATION
1/24/23, 3:02 PM EST    DISCHARGE PLANNING EVALUATION       Spoke with spouse and he stated that patient will need an ECF at discharge and prefers that facility be in [place] area closer to their son as he is not able to visit patient if she were to remain in this area. Spouse gave a list of facility. [facility], spoke with Kip Dunn and they are not in [facility]. [facility], called and they are full and have a waiting list and left a message for [employer] [phone]. Spouse also gave facilities of   [facility]  was facility name spouse gave but number spouse provided was for Sree Cali [phone]     Spouse did prefer [facility] and they have no beds after that he had no preference in order of referrals.

## 2023-01-24 NOTE — PROGRESS NOTES
[address] [facility] 5K  Clinical Swallow Evaluation      SLP Individual Minutes  Time In: 0279  Time Out: 3190  Minutes: 13  Timed Code Treatment Minutes: 0 Minutes       Date: 2023  Patient Name: Yogesh De Leon      CSN: [id number]   : 1950  (71 y.o.)  Gender: female   Referring Physician:  Berenice Estrada PA-C    Diagnosis: Cellulitis    History of Present Illness/Injury: Patient admitted to [facility] with above diagnosis; please see physician H&P for full report. Per chart review, \"Shirley Fleming is a 71 y.o. female with PMHx of paroxysmal A-fib, HTN, HFpEF, hypothyroidism, severe protein calorie malnutrition, and physical debility with deconditioning who presented to [facility] with chief complaint of left upper extremity swelling and pain. Patient was recently discharged on 23 for submandibular cellulitis and discharged to SNF. She endorses having no recollection of injuring her left arm, but stated that the arm started to swell just 3 days ago. Around that time she had stopped her outpatient course of antibiotics. At first when the swelling started she stated that she did not think much about it as it was not bothering her. Then today her arm was quite tender to touch and just throbbed all day, hence the admission to [facility]. Currently she is not having any pain, but that was after administration of Morphine while in the ED. She denies any dizziness, chest pain or shortness of breath. She denies any sick contacts. She does not endorse any tingling or numbness that is unusual for her. \"    Per chart review, \"patient with hx of dysphagia. Recent hospitalization (23)  in which ST recommend minced and moist with moderately thick liquids. Diet order modified to minced and moist diet with moderately thick liquids to maximize patient safety. \"    ST consulted to further evaluate oropharyngeal swallow integrity with implementation of goals/POC as clinically indicated. Past Medical History:   Diagnosis Date    Asthma     uses albuterol 1-2x per year    Atrial thrombosis     on OAC    Bursitis     right shoulder -s/p steroid injections    Cancer (HCC)     Chronic diastolic heart failure (HCC)     CVA (cerebral infarction) 6/14/15    Mult. 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 Katt Allen    Hypothyroidism     Meningioma [facility])     3 separate meningiomas, s/p gamma knife (1/2012) , Dr. Jarek Felix at [facility]    MRSA nasal colonization 6/2013    Obesity (BMI 30-39. 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 Patient seen sitting upright in bed upon ST arrival consuming breakfast meal in semi-Ma's position; alert and cooperative throughout evaluation. No family present. Of note, patient with thin liquids and ice chips in room upon ST arrival; ST with direct education re: safety with consumption of moderately thick liquids with verbal receptiveness noted. Nursing tech and RNAltagracia informed of need for moderately thick liquids with education provided re: thickening ratio/process with verbal receptiveness noted. OBJECTIVE:    Pain:  No pain reported.     Current Diet: Minced and Moist Diet with Moderately Thick Liquids    Respiratory Status:  Room Air    Behavioral Observation:  Alert  CRANIAL NERVE ASSESSMENT   CN V (Trigeminal) Closes and Opens Mandible WFL    Rotary Jaw Movement WFL       CN VII (Facial) Cheeks Hold Food out of Sulci WFL    Opens, Closes/Seals, Protrudes, Retracts Lips WFL    General Appearance WFL    Sensation Not Tested       CN X (Vagus - Pharyngeal) Raises Back of Tongue WFL       CN XI (Accessory) Lifts Soft Palate WFL       CN XII (Hypoglossal) Elevates Tongue Up and Back WFL    Protrusion    WFL    Lateralizes Tongue WFL    Sensation Not Tested       Other Observations Dentition WFL    Vocal Quality Reduced vocal intensity     Cough WFL      Patient Evaluated Using: Moderately Thick Liquids, Puree, Minced and Moist (breakfast tray)     Oral Phase:  Impaired:  Impaired Mastication, Impaired Oral Initiation, and reduced oral opening      Pharyngeal Phase: Impaired:  MBS completed 11/26/22 indicating moderate pharyngeal dysphagia      Signs and Symptoms of Laryngeal Penetration/Aspiration: No signs/symptoms of aspiration evident in this evaluation, but cannot rule out silent aspiration with puree textures and moderately thick liquids      Impressions: Patient presents with moderate oral dysphagia with inability to fully discern potential presence of pharyngeal phase deficits without formal instrumentation. Recent MBS completed 11/26/22 recommending minced and moist diet with moderately thick liquids; patient discharged to ECF to follow with recent re-admission with corresponding recommendations for continuation of minced and moist diet with moderately thick liquids. Patient consumed PO trials of puree and moderately thick liquids via spoon, cup, and straw; appropriate oral control given additional time. Advanced PO trials not completed at this time at bedside d/t evidence of audible aspiration of thin and mildly thick liquids per most recent MBS completed 11/26/22. ST recommending initiation of minced and moist diet with moderately thick liquids.  Patient would benefit from ongoing skilled ST services to target dysphagia. This ST familiar with patient d/t recent hospitalization; patient appears to be approaching cognitive/speech baseline with no recommendations for f/u services to address cognitive-linguistic skills at this time. *post evaluation, patient without respiratory distress upon leaving room; RN Lor Jones notified re: clinical findings and recommendations from the assessment; verbal receptiveness noted. RECOMMENDATIONS/ASSESSMENT:  Instrumental Evaluation: Instrumental evaluation not indicated at this time. Diet Recommendations:  Minced and Moist Diet with Moderately Thick Liquids  Strategies:  Full Upright Position, Small Bite/Sip, Multiple Swallow, Medications Crushed with Puree, Direct 1:1 Supervision, Limit Distractions, and Monitor for Fatigue   Rehabilitation Potential: fair  Discharge Recommendations: SNF    EDUCATION:  Learner: Patient  Education:  Reviewed results and recommendations of this evaluation, Reviewed diet and strategies, Reviewed signs, symptoms and risks of aspiration, Reviewed ST goals and Plan of Care, Reviewed recommendations for follow-up, Education Related to Potential Risks and Complications Due to Impairment/Illness/Injury, Education Related to Prevention of Recurrence of Impairment/Illness/Injury, Education Related to [employer] and Wellness, and Home Safety Education  Evaluation of Education: Demonstrates with assistance, Needs further instruction, and Family not present    PLAN:  Skilled SLP intervention on acute care 3-5 x per week or until goals met and/or pt plateaus in function. Specific interventions for next session may include: dysphagia management, ? MBS. PATIENT GOAL:    Return to prior level of function.     SHORT TERM GOALS:  Short Term Goals  Time Frame for Short Term Goals: 2 weeks  Goal 1: Patient will safely consume minced and moist diet with moderately thick liquids without overt s/s of penetration/aspiration and/or pulmonary compromise r/t swallow function in order to assist with meeting nutrition/hydration standards. Goal 2: Patient will complete advanced PO trials with ST ONLY in order to determine readiness for potential advancement vs. need for repeat instrumental assessment. Goal 3: Patient, family, and medical staff will exhibit return demonstration for utilization of compensatory swallowing strategies and completion of comprehensive oral care Q2H in order to assist with maintanance of pharyngeal integrity and overall safety with PO consumption. LONG TERM GOALS:  No LTGs established due to short ELOS.       Katiana Ocasio M.S., [facility]

## 2023-01-24 NOTE — PROGRESS NOTES
Progress note: Infectious diseases    Patient - Mary Beth Srivastava,  Age - 71 y.o.    - 1950      Room Number - 5K-15/015-A   MRN -  [id number]   Acct # - [de-identified]  Date of Admission -  2023  7:56 PM    SUBJECTIVE:   She has no new complaints    OBJECTIVE   VITALS    height is 5' 3\" (1.6 m) and weight is 176 lb 12.9 oz (80.2 kg). Her oral temperature is 97.7 °F (36.5 °C). Her blood pressure is 114/61 and her pulse is 67. Her respiration is 18 and oxygen saturation is 100%. Wt Readings from Last 3 Encounters:   23 176 lb 12.9 oz (80.2 kg)   01/15/23 183 lb 4.8 oz (83.1 kg)   22 163 lb 1.6 oz (74 kg)       I/O (24 Hours)    Intake/Output Summary (Last 24 hours) at 2023 1051  Last data filed at 2023 0556  Gross per 24 hour   Intake 120 ml   Output 225 ml   Net -105 ml       General Appearance  Awake, alert, oriented,  chronically sick looking. HEENT - normocephalic, atraumatic, pale  conjunctiva,  anicteric sclera  Neck - Supple, no mass  Lungs -  Bilateral   air entry,    Cardiovascular - Heart sounds are normal.     Abdomen - soft, not distended, nontender,   Neurologic -oriented. Skin - No bruising or bleeding  Extremities - the redness and swelling on the left upper arm is better, no drainage.     MEDICATIONS:      bumetanide  0.5 mg Oral Daily    hydrocortisone  20 mg Oral QAM    hydrocortisone  10 mg Oral Nightly    levothyroxine  125 mcg Oral Daily    oxybutynin  15 mg Oral Daily    pantoprazole  40 mg Oral QAM AC    atorvastatin  20 mg Oral Daily    terbinafine  250 mg Oral Daily    sodium chloride flush  5-40 mL IntraVENous 2 times per day    piperacillin-tazobactam  3,375 mg IntraVENous Q8H    linezolid  600 mg IntraVENous Q12H      sodium chloride       sodium chloride flush, sodium chloride, ondansetron **OR** ondansetron, polyethylene glycol, acetaminophen **OR** acetaminophen, potassium chloride **OR** potassium alternative oral replacement **OR** potassium chloride, magnesium sulfate, morphine      LABS:     CBC:   Recent Labs     01/21/23  2318 01/22/23  1440 01/23/23  0354 01/23/23  1445   WBC 15.8* 13.7* 10.6  --    HGB 10.2* 9.8* 8.8* 8.6*    192 182  --      BMP:    Recent Labs     01/21/23  2327 01/22/23  1440 01/22/23  1840 01/22/23  2255 01/23/23  0354 01/23/23  1445 01/23/23  2100 01/24/23  0200 01/24/23  0543   * 149*   < > 148* 153*   < > 142 144 145   K 3.3* 2.8*  --  3.9 3.7  --   --   --   --     113*  --   --  120*  --   --   --   --    CO2 27 25  --   --  24  --   --   --   --    BUN 6* 5*  --   --  5*  --   --   --   --    CREATININE 0.8 0.9  --   --  0.8  --   --   --   --    GLUCOSE 97 89  --   --  89  --   --   --   --     < > = values in this interval not displayed. Calcium:  Recent Labs     01/23/23 0354   CALCIUM 7.9*      Recent Labs     01/21/23 2327   ALKPHOS 95   ALT 17   AST 11   PROT 5.1*   BILITOT 0.8   LABALBU 2.8*      CULTURES:   UA:   Recent Labs     01/22/23  0002   PHUR 7.5   COLORU YELLOW   PROTEINU NEGATIVE   BLOODU MODERATE*   RBCUA 5-10   WBCUA 0-2   BACTERIA NONE SEEN   NITRU NEGATIVE   GLUCOSEU NEGATIVE   BILIRUBINUR NEGATIVE   UROBILINOGEN 0.2   KETUA NEGATIVE     Micro:   Lab Results   Component Value Date/Time    BC No growth 24 hours. No growth 48 hours.  01/21/2023 09:01 PM            Problem list of patient:     Patient Active Problem List   Diagnosis Code    Asthma J45.909    Hyperlipidemia E78.5    Osteoarthritis M19.90    GERD (gastroesophageal reflux disease) K21.9    Meningioma (HCC) D32.9    Mechanical loosening of prosthetic joint ([facility]) T84.039A    Hyperglycemia R73.9    SIRS (systemic inflammatory response syndrome) (HCC) R65.10    Leukocytosis D72.829    Right sided weakness R53.1    Cerebrovascular disease U58.7    Metabolic acidosis E23.92    Sinus bradycardia R00.1    Generalized weakness R53.1    Hx of subdural hematoma Z86.79    Cerebral infarction (HCC) I63.9    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) E23.0    Hypersomnia G47.10    Long term (current) use of systemic steroids Z79.52    Essential hypertension I10    Diabetes insipidus ([facility]) E23.2    Somnolence R40.0    PAF (paroxysmal atrial fibrillation) (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) G81.94    History of CVA with residual deficit I69.30    Subdural hematoma S06. 5XAA    JESSE (acute kidney injury) ([facility] N17.9    Hyperkalemia E87.5    Gastroenteritis due to norovirus I95.33    Acute diastolic heart failure ([facility]) I50.31    History of stroke with residual effects I69.30    Chronic venous stasis dermatitis of both lower extremities I87.2    Normocytic anemia D64.9    Chronic diastolic congestive heart failure (HCC) I50.32    CKD (chronic kidney disease), stage IV (HCC) N18.4    Confusion R41.0    Chronic kidney disease (CKD), stage III (moderate) (HCC) N18.30    Obesity (BMI 30.0-34. 9) E66.9    Community acquired pneumonia of right lung J18.9    Acute renal failure superimposed on stage 3 chronic kidney disease (HCC) N17.9, N18.30    Chronic anticoagulation Z79.01    Hypoalbuminemia E88.09    Impaired mobility Z74.09    Bilateral leg edema +2- better now trace R60.0    CKD (chronic kidney disease) stage 3, GFR 30-59 ml/min (HCC) N18.30    Fluid overload E87.70    Panhypopituitarism (HCC) E23.0    Hypothyroidism E03.9    Scalp lesion L98.9    Non-compliance F/u advised Z91.199    Verruca vulgaris B07.9    COVID-19 U07.1    Septic shock (HCC) A41.9, R65.21    Cellulitis of submandibular region K12.2    Severe malnutrition ([address] E43    Submandibular gland infection K11.20    Cellulitis L03.90         ASSESSMENT/PLAN   Left upper extremity cellulites:improving.   Atrial fibrillation  HTN  Continue compression wrap which seems to control the swelling  Iv antibiotic for 24-48 hrs and will transition to oral      David Hinojosa MD, MD, FACP 1/24/2023 10:51 AM

## 2023-01-24 NOTE — PROGRESS NOTES
01/24/23 1200   Encounter Summary   Encounter Overview/Reason  Initial Encounter   Service Provided For: Patient   Referral/Consult From: Rounding   Last Encounter  01/24/23   Complexity of Encounter Moderate   Begin Time 1150   End Time  1200   Total Time Calculated 10 min   Encounter    Type Initial Screen/Assessment   Spiritual/Emotional needs   Type Spiritual Support   Assessment/Intervention/Outcome   Assessment Calm   Intervention Nurtured Hope;Prayer (assurance of)/[place];Sustaining Presence/Ministry of presence   Outcome Comfort   Assessment: In my encounter with the 71 yr old patient, while rounding  the unit 5K,  I provided spiritual care to patient through conversation, I also came to assess the patient's spiritual needs present. The pt was sitting in her chair and was admitted due to cellulitis. Interventions:  I provided prayer, emotional support and words of comfort.  provided a listening presence and encouraged pt to share their beliefs and how they support them during their hospitalization. Outcomes: The patient was encouraged and didn't share any further spiritual needs at this time. Plan:  Chaplains will follow-up at a later time for assessment of any spiritual care needs present.

## 2023-01-24 NOTE — PROGRESS NOTES
[facility]  INPATIENT PHYSICAL THERAPY  DAILY NOTE  [facility] ONC MED 5K - 5K-15/015-A    Time In: 0940  Time Out: 1003  Timed Code Treatment Minutes: 23 Minutes  Minutes: 23          Date: 2023  Patient Name: Roxanne De León,  Gender:  female        MRN: [id number]  : 1950  (71 y.o.)     Referring Practitioner: Nathaniel Mcdaniel PA-C  Diagnosis: Hypokalemia  Additional Pertinent Hx: 71 y.o. female with PMHx of paroxysmal A-fib, HTN, HFpEF, hypothyroidism, severe protein calorie malnutrition, and physical debility with deconditioning who presented to [facility] with chief complaint of left upper extremity swelling and pain. Patient was recently discharged on 23 for submandibular cellulitis and discharged to SNF. She endorses having no recollection of injuring her left arm, but stated that the arm started to swell just 3 days ago. Around that time she had stopped her outpatient course of antibiotics. At first when the swelling started she stated that she did not think much about it as it was not bothering her. Then today her arm was quite tender to touch and just throbbed all day, hence the admission to [facility]. Currently she is not having any pain, but that was after administration of Morphine while in the ED. She denies any dizziness, chest pain or shortness of breath. She denies any sick contacts. She does not endorse any tingling or numbness that is unusual for her.      Prior Level of Function:  Type of Home: Facility  Home Layout: One level  Home Access: Level entry  Home Equipment: Rosen Haver Shower/Tub: Walk-in shower  Bathroom Toilet: Handicap height  Bathroom Equipment: Grab bars in shower, Shower chair, Grab bars around toilet  Bathroom Accessibility: Walker accessible    Receives Help From: Family, Other (comment) (facility staff)  ADL Assistance: Needs assistance  Homemaking Assistance: Needs assistance  Ambulation Assistance: Non-ambulatory  Transfer Assistance: Needs assistance  Active : No  Additional Comments: Pt originally home from [facility]  but has been in/out of hospital recently and at [facility] for therapy d/t deconditioning and  recently undergoing back surgery and unable to assist her- is main caregiver; baseline is stand pivot only. ramp into entrance of home. Restrictions/Precautions:  Restrictions/Precautions: General Precautions, Fall Risk     SUBJECTIVE: RN approved session. Pt pleasant and agreeable to therapy     PAIN: 0/10: denies pain     Vitals: Vitals not assessed per clinical judgement, see nursing flowsheet    OBJECTIVE:  Bed Mobility:  Supine to Sit: Maximum Assistance, X 1, with head of bed raised    Transfers:  Sit to Stand: Moderate Assistance, X 1, with Nathaly Kil, with verbal cues  Stand to Sit:Moderate Assistance, X 1, with Nathaly Kil, with verbal cues  To/From Bed and Chair: Dependent, with Nathaly Kil    Ambulation:  Not Tested    Balance:  Static Sitting Balance:  Stand By Assistance  Static Standing Balance: Minimal Assistance, in [address] [place] [address] ste; pt with difficulty maintaining hip ext     Functional Outcome Measures: Completed  AM-PAC Inpatient Mobility without Stair Climbing Raw Score : 8  AM-PAC Inpatient without Stair Climbing T-Scale Score : 30.65    ASSESSMENT:  Assessment: Patient progressing toward established goals. Activity Tolerance:  Patient tolerance of  treatment: good. Increased time taken assisting with romel-care as pt had a BM in bed.       Equipment Recommendations:Equipment Needed: No  Discharge Recommendations: Subacte/Skilled Nursing Facility  Plan: Current Treatment Recommendations: Strengthening, Balance training, Functional mobility training, Transfer training, Endurance training, Safety education & training, Therapeutic activities  General Plan:  (3-5x GM)    Patient Education  Patient Education: Plan of Care, Bed Mobility, Transfers    Goals:  Patient Goals : none stated  Short Term Goals  Time Frame for Short Term Goals: by discharge  Short Term Goal 1: bed mobility with HOB flat, no rails, mod I for increased functional ind  Short Term Goal 2: sit<>Stand from various surfaces with LRD mod I for safe transfers  Short Term Goal 3: pt to transfer with ayo taylor with CGA for increased safety with transfers  Long Term Goals  Time Frame for Long Term Goals : NA d/t short ELOS    Following session, patient left in safe position with all fall risk precautions in place.     Sujey Horowitz ([place]) PT, DPT

## 2023-01-24 NOTE — PROGRESS NOTES
Hospitalist Progress Note      Patient:  Jonah Hugo    Unit/Bed:5K-15/015-A  YOB: 1950  MRN: [id number]   Acct: [de-identified]   PCP: Cuco Wheat MD  Date of Admission: 1/21/2023    Assessment/Plan:    Left upper extremity cellulitis, possibly related to recent IV infiltration: Patient had an IV infiltrated on 1/15 per provider's note and was receiving Unasyn during that stay. X-ray of left hand, elbow, radius and ulna on 1/21 revealed no acute bony abnormalities and no osseous abnormalities. CT left hand, radius, ulna, and humerus on 1/22 revealed diffuse soft tissue swelling, skin thickening and subcutaneous edema / inflammation of left upper arm, left forearm and left hand, correlate clinically for cellulitis. No abscess seen. Blood cultures reveal no growth at 24 or 48 hours. Completed 1 dose of zosyn, 1 dose of vancomycin, and 1 dose of clindamycin. ID consulted recommended continuing Zyvox / Zosyn. Continue morphine and Tylenol for pain management. Culture left arm if discharge occurs. Elevation as much as possible, wound ostomy nurse placed a prevalon heel boot backwards to elevate left arm. Continue to monitor if pain develops from elevation. Consult PT/OT. Diarrhea: suspect related to abx therapy, begin culturelle. Continue to monitor. Can consider GI panel if persists. Leukocytosis, resolved: Secondary to #1. WBC decreased to 10.6 from 13.7. Recheck CBC Q24 hours. Patient afebrile and denies fever/chills. Paroxysmal A-fib on [address]; rate controlled: continue home medications, continue tele. Okay to resume Xarelto, no plan for surgical intervention. Hypernatremia, resolved: Sodium normalized to 145. Discontinue IV dextrose 5%, gentle hydration with LR 50 mL/hr, continue to monitor Na. Dysphagia: SLP recommended minced and moist moderately thick liquids per 1/23.   History of adrenal insufficiency without crisis: Likely contributing to #1 skin fragility. Continue home cortef therapy. BP has been stable. Chronic normocytic Anemia, worsening: Hgb decreased from 8.8 to 8.6. Continue CBC Q24 hours. No acute bleeding. Will transfuse if Hgb <7. Hypothyroidism: TSH 0.047 and free T4 1.31 on 1/21. Continue home medications  Severe protein calorie malnutrition: Encourage oral intake; SLP recommended minced and moist moderately thick liquids per 1/23. Physical debility with deconditioning: Pt came from SNF. Consult PT/OT. CXR on 1/21 revealed decrease in pulmonary interstitial densities compared to x-ray on 1/4/23  Disposition: SNF placement    Chief Complaint: Left arm swelling    Initial H and P:-    \"Shirley Burgos is a 71 y.o. female with PMHx of paroxysmal A-fib, HTN, HFpEF, hypothyroidism, severe protein calorie malnutrition, and physical debility with deconditioning who presented to [facility] with chief complaint of left upper extremity swelling and pain. Patient was recently discharged on 1/17/23 for submandibular cellulitis and discharged to SNF. She endorses having no recollection of injuring her left arm, but stated that the arm started to swell just 3 days ago. Around that time she had stopped her outpatient course of antibiotics. At first when the swelling started she stated that she did not think much about it as it was not bothering her. Then today her arm was quite tender to touch and just throbbed all day, hence the admission to [facility]. Currently she is not having any pain, but that was after administration of Morphine while in the ED. She denies any dizziness, chest pain or shortness of breath. She denies any sick contacts. She does not endorse any tingling or numbness that is unusual for her. \"     1/22: Patient is sitting upright in bed. She endorses 9/10 sharp pain of LUE from finger tips to mid upper arm. She states this is improved from yesterday. She states her arm was \"bubbling\" yesterday but denies drainage today.  She denies changes to right upper extremity, or bilateral lower extremities. She denies fever/chills, chest pain, abdominal pain, shortness of breath, palpitations, nausea or vomiting. Patient is unable to recall when her last bowel movement was.     1/23: Patient laying in bed. She notes overall she is feeling better today, however clinically arm appears worse. She endorses pain of left arm but states this has improved, she states has increased range of motion and strength in left arm from yesterday. She notes discharge of left arm with movement. She denies fever / chills, shortness of breath, chest pain, nausea, vomiting, abdominal pain, lightheadedness, headache. Subjective (past 24 hours):   1/24: Patient sitting in chair with brother at bedside. Patient states she is feeling better today overall. She states her left arm feels and looks better, she endorses improved mobility. She denies left arm pain or drainage. She endorses urgency of bowel movements with diarrhea for the past few days. She denies pain with bowel movements or foul odor. She denies abdominal pain, nausea or vomiting. She denies shortness of breath, chest pain, palpitations, numbness, tingling, lightheadedness, dizziness or headaches. Patient states she would like to go home but is understandable and agreeable that she needs additional therapy. Past medical history, family history, social history and allergies reviewed again and is unchanged since admission. ROS (All review of systems completed. Pertinent positives noted.  Otherwise All other systems reviewed and negative.)     Medications:  Reviewed    Infusion Medications    sodium chloride       Scheduled Medications    bumetanide  0.5 mg Oral Daily    hydrocortisone  20 mg Oral QAM    hydrocortisone  10 mg Oral Nightly    levothyroxine  125 mcg Oral Daily    oxybutynin  15 mg Oral Daily    pantoprazole  40 mg Oral QAM AC    atorvastatin  20 mg Oral Daily    terbinafine  250 mg Oral Daily    sodium chloride flush  5-40 mL IntraVENous 2 times per day    piperacillin-tazobactam  3,375 mg IntraVENous Q8H    linezolid  600 mg IntraVENous Q12H     PRN Meds: sodium chloride flush, sodium chloride, ondansetron **OR** ondansetron, polyethylene glycol, acetaminophen **OR** acetaminophen, potassium chloride **OR** potassium alternative oral replacement **OR** potassium chloride, magnesium sulfate, morphine      Intake/Output Summary (Last 24 hours) at 1/24/2023 3005  Last data filed at 1/24/2023 0556  Gross per 24 hour   Intake 120 ml   Output 225 ml   Net -105 ml       Diet:  ADULT ORAL NUTRITION SUPPLEMENT; Dinner; Frozen Oral Supplement  ADULT ORAL NUTRITION SUPPLEMENT; Breakfast, Lunch; Wound Healing Oral Supplement  ADULT DIET; Dysphagia - Minced and Moist; Moderately Thick (Honey)    Physical Exam:  BP (!) 124/54   Pulse 58   Temp 98.3 °F (36.8 °C) (Oral)   Resp 20   Ht 5' 3\" (1.6 m)   Wt 176 lb 12.9 oz (80.2 kg)   SpO2 100%   BMI 31.32 kg/m²   General appearance: Speaking softly throughout examination. No apparent distress, appears stated age and cooperative. HEENT: Pupils equal, round, and reactive to light. Conjunctivae/corneas clear. Neck: Supple, with full range of motion. No jugular venous distention. Trachea midline. Respiratory:  Normal respiratory effort. Clear to auscultation, bilaterally without Rales/Wheezes/Rhonchi. Cardiovascular: Regular rate and rhythm with normal S1/S2 without murmurs, rubs or gallops. Abdomen: Soft, non-tender, non-distended with normal bowel sounds. Musculoskeletal: Diminished active and passive range of motion of bilateral upper extremities. Active range of motion of left upper extremity improved from yesterday. Left upper extremity strength diminished. Skin: Diffuse bruising throughout right upper extremity and bilateral lower extremities. Patient states this is her baseline. Improving erythema and edema of left upper extremity up to mid upper arm.  Sporadic hematomas of left upper extremity. No discharge of left upper extremity. Neurologic:  Neurovascularly intact without any focal sensory deficits. Cranial nerves: II-XII intact, grossly non-focal.  Psychiatric: Alert and oriented, thought content appropriate, normal insight  Capillary Refill: Brisk,< 3 seconds   Peripheral Pulses: +2 palpable, equal bilaterally     Labs:   Recent Labs     01/21/23  2318 01/22/23  1440 01/23/23  0354 01/23/23  1445   WBC 15.8* 13.7* 10.6  --    HGB 10.2* 9.8* 8.8* 8.6*   HCT 32.1* 31.1* 28.2* 28.1*    192 182  --      Recent Labs     01/21/23  2327 01/22/23  1440 01/22/23  1840 01/22/23  2255 01/23/23  0354 01/23/23  1445 01/23/23  2100 01/24/23  0200 01/24/23  0543   * 149*   < > 148* 153*   < > 142 144 145   K 3.3* 2.8*  --  3.9 3.7  --   --   --   --     113*  --   --  120*  --   --   --   --    CO2 27 25  --   --  24  --   --   --   --    BUN 6* 5*  --   --  5*  --   --   --   --    CREATININE 0.8 0.9  --   --  0.8  --   --   --   --    CALCIUM 8.1* 7.9*  --   --  7.9*  --   --   --   --     < > = values in this interval not displayed. Recent Labs     01/21/23 2327   AST 11   ALT 17   BILITOT 0.8   ALKPHOS 95     No results for input(s): INR in the last 72 hours. No results for input(s): Murvin Bridges in the last 72 hours. Microbiology:    Blood culture #1:   Lab Results   Component Value Date/Time    BC No growth 24 hours. No growth 48 hours. 01/21/2023 09:01 PM       Blood culture #2:No results found for: Salud Guan    Organism:  Lab Results   Component Value Date/Time    ORG Staphylococcus aureus 12/17/2018 04:04 PM         Lab Results   Component Value Date/Time    LABGRAM  02/16/2022 04:00 PM     No segmented neutrophils observed. Rare epithelial cells observed. No organisms observed.        MRSA culture only:No results found for: [facility]    Urine culture:   Lab Results   Component Value Date/Time    LABURIN No growth-preliminary  No growth 02/09/2017 12:35 PM       Respiratory culture: No results found for: CULTRESP    Aerobic and Anaerobic :  Lab Results   Component Value Date/Time    LABAERO No Acinetobacter species isolated. 01/04/2023 10:00 PM     Lab Results   Component Value Date/Time    LABANAE  12/01/2021 12:00 PM     No anaerobes isolated- preliminary Culture yielded light growth of gram positive bacilli most consistent with Cutibacterium species. Clinical correlation required. Urinalysis:      Lab Results   Component Value Date/Time    NITRU NEGATIVE 01/22/2023 12:02 AM    WBCUA 0-2 01/22/2023 12:02 AM    WBCUA 2-4 02/22/2012 01:00 PM    BACTERIA NONE SEEN 01/22/2023 12:02 AM    RBCUA 5-10 01/22/2023 12:02 AM    BLOODU MODERATE 01/22/2023 12:02 AM    SPECGRAV 1.025 09/18/2020 12:00 PM    GLUCOSEU NEGATIVE 01/22/2023 12:02 AM       Radiology:  CT HAND LEFT W CONTRAST   Final Result      Lateral portion of the left upper extremity is partly off the    field-of-view. Status post left shoulder hemiarthroplasty and total left hip replacement. Metallic streak artifact limits the evaluation of the adjacent structures. Diffuse soft tissue swelling, skin thickening and subcutaneous    edema/inflammation of the left upper arm, left forearm and left hand. Correlate clinically for cellulitis. No abscess is seen. This document has been electronically signed by: Mukesh Rawls MD on    01/22/2023 06:43 AM      All CTs at this facility use dose modulation techniques and iterative    reconstructions, and/or weight-based dosing   when appropriate to reduce radiation to a low as reasonably achievable. CT RADIUS ULNA LEFT W CONTRAST   Final Result      Lateral portion of the left upper extremity is partly off the    field-of-view. Status post left shoulder hemiarthroplasty and total left hip replacement. Metallic streak artifact limits the evaluation of the adjacent structures.       Diffuse soft tissue swelling, skin thickening and subcutaneous    edema/inflammation of the left upper arm, left forearm and left hand. Correlate clinically for cellulitis. No abscess is seen. This document has been electronically signed by: Roman Sandifer, MD on    01/22/2023 06:26 AM      All CTs at this facility use dose modulation techniques and iterative    reconstructions, and/or weight-based dosing   when appropriate to reduce radiation to a low as reasonably achievable. CT HUMERUS LEFT W CONTRAST   Final Result      Lateral portion of the left upper extremity is partly off the    field-of-view. Status post left shoulder hemiarthroplasty and total left hip replacement. Metallic streak artifact limits the evaluation of the adjacent structures. Diffuse soft tissue swelling, skin thickening and subcutaneous    edema/inflammation of the left upper arm, left forearm and left hand. Correlate clinically for cellulitis. No abscess is seen. This document has been electronically signed by: Roman Sandifer, MD on    01/22/2023 06:42 AM      All CTs at this facility use dose modulation techniques and iterative    reconstructions, and/or weight-based dosing   when appropriate to reduce radiation to a low as reasonably achievable. XR CHEST PORTABLE   Final Result   Decrease in pulmonary interstitial densities compared to x-ray 1/4/2023. This document has been electronically signed by: Joni Baldwin MD on    01/21/2023 11:52 PM      XR RADIUS ULNA LEFT (2 VIEWS)   Final Result   No acute bony abnormality. This document has been electronically signed by: Joni Baldwin MD on    01/21/2023 11:51 PM      XR HAND LEFT (MIN 3 VIEWS)   Final Result   No acute osseous abnormality. This document has been electronically signed by: Joni Baldwin MD on    01/21/2023 10:30 PM      XR ELBOW LEFT (MIN 3 VIEWS)   Final Result   No acute bony abnormality.       This document has been electronically signed by: Joni Baldwin MD on 01/21/2023 10:28 PM        XR ELBOW LEFT (MIN 3 VIEWS)    Result Date: 1/21/2023  3 view right elbow Comparison: None Findings: No acute fractures. Normal alignment. No significant arthritic change or erosions. No joint effusion. No radiopaque foreign body. Diffuse edema. No acute bony abnormality. This document has been electronically signed by: Paula Murray MD on 01/21/2023 10:28 PM    XR RADIUS ULNA LEFT (2 VIEWS)    Result Date: 1/21/2023  2 view left forearm Comparison: None Findings: No fractures or dislocations. No joint effusion. No significant arthritic change. No radiopaque foreign body. Extensive soft tissue edema. No acute bony abnormality. This document has been electronically signed by: Paula Murray MD on 01/21/2023 11:51 PM    XR HAND LEFT (MIN 3 VIEWS)    Result Date: 1/21/2023  3 view left hand Comparison: None Findings: Osteopenia. No acute fractures. No significant loss of joint space or osteophytes. No erosions. No radiopaque foreign body. Diffuse edema. No acute osseous abnormality. This document has been electronically signed by: Paula Murray MD on 01/21/2023 10:30 PM    CT HUMERUS LEFT W CONTRAST    Result Date: 1/22/2023  CT left upper extremity with contrast COMPARISON: No prior FINDINGS: The lateral portion of the left upper extremity is partly off the field-of-view. The patient is status post left shoulder hemiarthroplasty and total left hip replacement. Metallic streak artifact limits the evaluation of the adjacent structures. There is diffuse soft tissue swelling, skin thickening and subcutaneous edema/inflammation of the left upper arm, left forearm and left hand. No abscess is seen. No fracture or dislocation is seen. Lateral portion of the left upper extremity is partly off the field-of-view. Status post left shoulder hemiarthroplasty and total left hip replacement. Metallic streak artifact limits the evaluation of the adjacent structures.  Diffuse soft tissue swelling, skin thickening and subcutaneous edema/inflammation of the left upper arm, left forearm and left hand. Correlate clinically for cellulitis. No abscess is seen. This document has been electronically signed by: Kelsey Pate MD on 01/22/2023 06:42 AM All CTs at this facility use dose modulation techniques and iterative reconstructions, and/or weight-based dosing when appropriate to reduce radiation to a low as reasonably achievable. CT RADIUS ULNA LEFT W CONTRAST    Result Date: 1/22/2023  CT left upper extremity with contrast COMPARISON: No prior FINDINGS: The lateral portion of the left upper extremity is partly off the field-of-view. The patient is status post left shoulder hemiarthroplasty and total left hip replacement. Metallic streak artifact limits the evaluation of the adjacent structures. There is diffuse soft tissue swelling, skin thickening and subcutaneous edema/inflammation of the left upper arm, left forearm and left hand. No abscess is seen. No fracture or dislocation is seen. Lateral portion of the left upper extremity is partly off the field-of-view. Status post left shoulder hemiarthroplasty and total left hip replacement. Metallic streak artifact limits the evaluation of the adjacent structures. Diffuse soft tissue swelling, skin thickening and subcutaneous edema/inflammation of the left upper arm, left forearm and left hand. Correlate clinically for cellulitis. No abscess is seen. This document has been electronically signed by: Kelsey Pate MD on 01/22/2023 06:26 AM All CTs at this facility use dose modulation techniques and iterative reconstructions, and/or weight-based dosing when appropriate to reduce radiation to a low as reasonably achievable. CT HAND LEFT W CONTRAST    Result Date: 1/22/2023  CT left upper extremity with contrast COMPARISON: No prior FINDINGS: The lateral portion of the left upper extremity is partly off the field-of-view.  The patient is status post left shoulder hemiarthroplasty and total left hip replacement. Metallic streak artifact limits the evaluation of the adjacent structures. There is diffuse soft tissue swelling, skin thickening and subcutaneous edema/inflammation of the left upper arm, left forearm and left hand. No abscess is seen. No fracture or dislocation is seen. Lateral portion of the left upper extremity is partly off the field-of-view. Status post left shoulder hemiarthroplasty and total left hip replacement. Metallic streak artifact limits the evaluation of the adjacent structures. Diffuse soft tissue swelling, skin thickening and subcutaneous edema/inflammation of the left upper arm, left forearm and left hand. Correlate clinically for cellulitis. No abscess is seen. This document has been electronically signed by: Faby Edmonds MD on 01/22/2023 06:43 AM All CTs at this facility use dose modulation techniques and iterative reconstructions, and/or weight-based dosing when appropriate to reduce radiation to a low as reasonably achievable. XR CHEST PORTABLE    Result Date: 1/21/2023  1 view chest x-ray Comparison: CR/SR - XR CHEST PORTABLE - 01/04/2023 04:26 PM EST Findings: Decrease in pulmonary interstitial densities compared to x-ray 1/4/2023. Right internal iliac catheter terminates in the right atrium. Status post bilateral hip replacement. No acute fractures. Decrease in pulmonary interstitial densities compared to x-ray 1/4/2023.  This document has been electronically signed by: Nathan Lopez MD on 01/21/2023 11:52 PM      Electronically signed by Berenice Estrada PA-C on 1/24/2023 at 9:07 AM

## 2023-01-24 NOTE — PROGRESS NOTES
Virgil Dunham 61  INPATIENT OCCUPATIONAL THERAPY  [facility] ONC MED 5K  EVALUATION    Time:   Time In: 1523  Time Out: 1116  Timed Code Treatment Minutes: 9 Minutes  Minutes: 18          Date: 2023  Patient Name: Chema Isaac,   Gender: female      MRN: [id number]  : 1950  (71 y.o.)  Referring Practitioner: Melina Patiño PA-C  Diagnosis: cellulitis  Additional Pertinent Hx: per chart review; Chema Isaac is a 71 y.o. female with PMHx of paroxysmal A-fib, HTN, HFpEF, hypothyroidism, severe protein calorie malnutrition, and physical debility with deconditioning who presented to [facility] with chief complaint of left upper extremity swelling and pain. Patient was recently discharged on 23 for submandibular cellulitis and discharged to SNF. She endorses having no recollection of injuring her left arm, but stated that the arm started to swell just 3 days ago. Around that time she had stopped her outpatient course of antibiotics. At first when the swelling started she stated that she did not think much about it as it was not bothering her. Then today her arm was quite tender to touch and just throbbed all day, hence the admission to [facility]. Currently she is not having any pain, but that was after administration of Morphine while in the ED. She denies any dizziness, chest pain or shortness of breath. She denies any sick contacts. She does not endorse any tingling or numbness that is unusual for her    Restrictions/Precautions:  Restrictions/Precautions: General Precautions, Fall Risk  Position Activity Restriction  Other position/activity restrictions: skin tears easily, hx CVA with L sided deficits    Subjective  Chart Reviewed: Yes, Orders, Progress Notes, History and Physical  Patient assessed for rehabilitation services?: Yes  Family / Caregiver Present: Yes (brother)    Subjective: RN approved session, patient seated up in recliner upon OT arrival with brother present.  patient A & O x 2. brother provided social hx info with patient due to patient's variable cognition. Pain:  reports sensitivity to touch in UE s and LE s and with sores and limited ROM in L UE and LE. Did not rate pain. Vitals: Vitals not assessed per clinical judgement, see nursing flowsheet    Social/Functional History:  Type of Home: Facility  Home Layout: One level  Home Access: Level entry  Home Equipment: Earlie Quanve Shower/Tub: Walk-in shower  Bathroom Toilet: Handicap height  Bathroom Equipment: Grab bars in shower, Shower chair, Grab bars around toilet  Bathroom Accessibility: Walker accessible    [address] Help From: Family, Other (comment) (facility staff)  ADL Assistance: Needs assistance  Homemaking Assistance: Needs assistance  Ambulation Assistance: Non-ambulatory  Transfer Assistance: Needs assistance    Active : No  Patient's  Info: Spouse takes her to appointments     Additional Comments: Pt originally home from [facility]  but has been in/out of hospital recently and at [facility] for therapy d/t deconditioning and  recently undergoing back surgery and unable to assist her- is main caregiver; baseline is stand pivot only. ramp into entrance of home. VISION:WFL    HEARING:   hard of hearing    COGNITION: Slow Processing, Decreased Recall, Decreased Insight, Decreased Problem Solving, and Difficulty Following Commands    RANGE OF MOTION:  Right Upper Extremity: WFL  Left Upper Extremity:  Impaired - hemiplegic with firm/hard end feel at shldr with PROM into flex to approx 70 degrees. No facial grimacing    STRENGTH:  Right Upper Extremity: elbow flex and ext 4/5  (F)  Left Upper Extremity:   (F-)    SENSATION:   WFL    ADL:   No ADL's completed this session. Patient minimally completes ADLs at home at [facility]  . BALANCE:  Sitting Balance:  Minimal Assistance. To sit upright to prep for sit to stand transfer  Standing Balance: Moderate Assistance, Maximum Assistance.  In ayo steady with flexed posture     BED MOBILITY:  Not Tested    TRANSFERS:  Sit to Stand:  Maximum Assistance, X 1. X 2 attempts in ayo steady    FUNCTIONAL MOBILITY:  Not tested      Activity Tolerance:  Patient tolerance of  treatment: fair. Assessment:  Assessment: patient requries heavy assist for sit to stand transfers in a ayo steady and demonstrates overall de-conditioning and weakness impacting caregiver burden and level of activity due to cellulitis. patient would benefit from continued, skilled OT to progress toward PLOF and increase quality of life. Performance deficits / Impairments: Decreased functional mobility , Decreased ADL status, Decreased endurance, Decreased posture, Decreased cognition, Decreased balance  Prognosis: Fair  REQUIRES OT FOLLOW-UP: Yes  Decision Making: Medium Complexity    Treatment Initiated: Treatment and education initiated within context of evaluation. Evaluation time included review of current medical information, gathering information related to past medical, social and functional history, completion of standardized testing, formal and informal observation of tasks, assessment of data and development of plan of care and goals. Treatment time included skilled education and facilitation of tasks to increase safety and independence with ADL's for improved functional independence and quality of life.     Discharge Recommendations:  Continue to assess pending progress, Patient would benefit from continued therapy after discharge, [facility], 24 hour supervision or assist, Long Term Care with OT    Patient Education:     Patient Education  Education Given To: Patient  Education Provided: Role of Therapy, Precautions, Fall Prevention Strategies, Plan of Care, ADL Adaptive Strategies, Transfer Training  Education Provided Comments: importance of increasing activity  Barriers to Learning: Cognition    Equipment Recommendations:  Equipment Needed: Yes  Mobility Devices: ADL Assistive Devices    Plan:  Times Per Week: 5x  Times Per Day: Once a day  Current Treatment Recommendations: Strengthening, ROM, Balance training, Functional mobility training, Endurance training, Neuromuscular re-education, Safety education & training, Equipment evaluation, education, & procurement, Patient/Caregiver education & training, Self-Care / ADL, Coordination training. See long-term goal time frame for expected duration of plan of care. If no long-term goals established, a short length of stay is anticipated. Goals:  Patient goals : return home at [facility]  Short Term Goals  Time Frame for Short Term Goals: by discharge  Short Term Goal 1: patient will tolerate 2 min static standing with sit to stand and maintain upright posture with MIN A x 1. Short Term Goal 2: patient will participate in (B) UE AROM HEP and R UE strengthening to increase UB strength and function for functional transfers. Short Term Goal 3: patient will complete simple UB ADLs with MIN  A and edu in juan tech to compensate for L UE hemiparesis. Following session, patient left in safe position with all fall risk precautions in place.

## 2023-01-25 LAB
ANION GAP SERPL CALC-SCNC: 9 MEQ/L (ref 8–16)
BUN SERPL-MCNC: 3 MG/DL (ref 7–22)
CALCIUM SERPL-MCNC: 7.5 MG/DL (ref 8.5–10.5)
CHLORIDE SERPL-SCNC: 112 MEQ/L (ref 98–111)
CO2 SERPL-SCNC: 26 MEQ/L (ref 23–33)
CREAT SERPL-MCNC: 0.6 MG/DL (ref 0.4–1.2)
GFR SERPL CREATININE-BSD FRML MDRD: > 60 ML/MIN/1.73M2
GLUCOSE SERPL-MCNC: 114 MG/DL (ref 70–108)
POTASSIUM SERPL-SCNC: 3.4 MEQ/L (ref 3.5–5.2)
SODIUM SERPL-SCNC: 145 MEQ/L (ref 135–145)
SODIUM SERPL-SCNC: 147 MEQ/L (ref 135–145)
TRANSFERRIN SERPL-MCNC: 112 MG/DL (ref 200–360)

## 2023-01-25 PROCEDURE — 2580000003 HC RX 258: Performed by: PHYSICIAN ASSISTANT

## 2023-01-25 PROCEDURE — 80048 BASIC METABOLIC PNL TOTAL CA: CPT

## 2023-01-25 PROCEDURE — 36415 COLL VENOUS BLD VENIPUNCTURE: CPT

## 2023-01-25 PROCEDURE — 6360000002 HC RX W HCPCS: Performed by: PHYSICIAN ASSISTANT

## 2023-01-25 PROCEDURE — 6370000000 HC RX 637 (ALT 250 FOR IP): Performed by: INTERNAL MEDICINE

## 2023-01-25 PROCEDURE — 6370000000 HC RX 637 (ALT 250 FOR IP)

## 2023-01-25 PROCEDURE — 99233 SBSQ HOSP IP/OBS HIGH 50: CPT | Performed by: PHYSICIAN ASSISTANT

## 2023-01-25 PROCEDURE — 6370000000 HC RX 637 (ALT 250 FOR IP): Performed by: PHYSICIAN ASSISTANT

## 2023-01-25 PROCEDURE — 2580000003 HC RX 258

## 2023-01-25 PROCEDURE — 97535 SELF CARE MNGMENT TRAINING: CPT

## 2023-01-25 PROCEDURE — 1200000000 HC SEMI PRIVATE

## 2023-01-25 PROCEDURE — 1200000003 HC TELEMETRY R&B

## 2023-01-25 PROCEDURE — 36592 COLLECT BLOOD FROM PICC: CPT

## 2023-01-25 PROCEDURE — 84295 ASSAY OF SERUM SODIUM: CPT

## 2023-01-25 RX ORDER — LINEZOLID 600 MG/1
600 TABLET, FILM COATED ORAL EVERY 12 HOURS SCHEDULED
Status: COMPLETED | OUTPATIENT
Start: 2023-01-25 | End: 2023-02-01

## 2023-01-25 RX ADMIN — LEVOTHYROXINE SODIUM 125 MCG: 0.12 TABLET ORAL at 09:20

## 2023-01-25 RX ADMIN — TERBINAFINE TABLETS 250 MG 250 MG: 250 TABLET ORAL at 09:20

## 2023-01-25 RX ADMIN — POTASSIUM BICARBONATE 40 MEQ: 782 TABLET, EFFERVESCENT ORAL at 09:17

## 2023-01-25 RX ADMIN — SODIUM CHLORIDE, PRESERVATIVE FREE 10 ML: 5 INJECTION INTRAVENOUS at 09:26

## 2023-01-25 RX ADMIN — OXYBUTYNIN CHLORIDE 15 MG: 10 TABLET, EXTENDED RELEASE ORAL at 09:21

## 2023-01-25 RX ADMIN — PANTOPRAZOLE SODIUM 40 MG: 40 TABLET, DELAYED RELEASE ORAL at 05:13

## 2023-01-25 RX ADMIN — LINEZOLID 600 MG: 600 INJECTION, SOLUTION INTRAVENOUS at 05:19

## 2023-01-25 RX ADMIN — SODIUM CHLORIDE, PRESERVATIVE FREE 10 ML: 5 INJECTION INTRAVENOUS at 20:08

## 2023-01-25 RX ADMIN — ATORVASTATIN CALCIUM 20 MG: 20 TABLET, FILM COATED ORAL at 09:21

## 2023-01-25 RX ADMIN — PIPERACILLIN AND TAZOBACTAM 3375 MG: 3; .375 INJECTION, POWDER, FOR SOLUTION INTRAVENOUS at 03:57

## 2023-01-25 RX ADMIN — HYDROCORTISONE 20 MG: 10 TABLET ORAL at 09:21

## 2023-01-25 RX ADMIN — Medication 1 CAPSULE: at 09:16

## 2023-01-25 RX ADMIN — HYDROCORTISONE 10 MG: 10 TABLET ORAL at 20:07

## 2023-01-25 RX ADMIN — PIPERACILLIN AND TAZOBACTAM 3375 MG: 3; .375 INJECTION, POWDER, FOR SOLUTION INTRAVENOUS at 11:52

## 2023-01-25 RX ADMIN — RIVAROXABAN 15 MG: 15 TABLET, FILM COATED ORAL at 16:27

## 2023-01-25 RX ADMIN — LINEZOLID 600 MG: 600 TABLET, FILM COATED ORAL at 20:07

## 2023-01-25 NOTE — PROGRESS NOTES
Patient alert and oriented x3, move extremities x4, pedal push and pull moderate bilaterally. Hand grasp moderate @ right, weak on left hand, negative hand drift. Left Arm cellulitic and swollen, dressed with ABD and secured with ace wraps. Speech clear, mucus membrane moist and pink. Pupils equal round react to light brisk, 3mm-2mm. Consensual.  Skin color appropriate for ethnicity. Dry , flaky Turgor slow, 3 seconds,  capillary refill less than 3 seconds. Respirations, easy unlabored, Lungs sounds clear throughout, no cough noted, chest moves symmetrically. Heart sound regular, Radial and pedal pulses present bilaterally. Abdomen round soft, Bowel sounds active all four quadrants, denies pain on palpation, last Bm, early morning, medium, soft. Urine yellow, on external catheter. Patient has central line on right neck for infusion, no leakage, no redness, no swollen, line patent. Patient has skin break down on right lower leg, and left leg chin,covered with optifoam dressing.   Electronically signed by Elder Mackey on 1/25/2023 at 2:21 PM

## 2023-01-25 NOTE — PLAN OF CARE
Problem: Discharge Planning  Goal: Discharge to home or other facility with appropriate resources  1/25/2023 1454 by Amina Michel RN  Outcome: Progressing  Flowsheets (Taken 1/25/2023 1454)  Discharge to home or other facility with appropriate resources:   Identify barriers to discharge with patient and caregiver   Arrange for needed discharge resources and transportation as appropriate     Problem: Safety - Adult  Goal: Free from fall injury  1/25/2023 1454 by Amina Michel RN  Outcome: Progressing  Flowsheets (Taken 1/25/2023 1454)  Free From Fall Injury: Ran Cline family/caregiver on patient safety     Problem: Safety - Adult  Goal: Educate on transmission based isolation - droplet  Description: Educate on transmission based isolation   Outcome: Progressing     Problem: Pain  Goal: Verbalizes/displays adequate comfort level or baseline comfort level  1/25/2023 1454 by Amina Michel RN  Outcome: Progressing  Flowsheets (Taken 1/25/2023 1454)  Verbalizes/displays adequate comfort level or baseline comfort level:   Encourage patient to monitor pain and request assistance   Assess pain using appropriate pain scale   Implement non-pharmacological measures as appropriate and evaluate response     Problem: Nutrition Deficit:  Goal: Optimize nutritional status  1/25/2023 1454 by Amina Michel RN  Outcome: Progressing  Flowsheets (Taken 1/25/2023 1454)  Nutrient intake appropriate for improving, restoring, or maintaining nutritional needs:   Assess nutritional status and recommend course of action   Recommend appropriate diets, oral nutritional supplements, and vitamin/mineral supplements     Problem: Skin/Tissue Integrity  Goal: Absence of new skin breakdown  Description: 1. Monitor for areas of redness and/or skin breakdown  2. Assess vascular access sites hourly  3. Every 4-6 hours minimum:  Change oxygen saturation probe site  4.   Every 4-6 hours:  If on nasal continuous positive airway pressure, respiratory therapy assess nares and determine need for appliance change or resting period.   1/25/2023 1454 by Tima Harvey RN  Outcome: Progressing

## 2023-01-25 NOTE — PROGRESS NOTES
Report received from primary nurse Ronak Hess.   Electronically signed by Jose Galvan on 1/25/2023 at 2:23 PM

## 2023-01-25 NOTE — PROGRESS NOTES
Patient alert and oriented x3, move extremities x4, pedal push and pull moderate bilaterally. Hand grasp moderate @ right, weak on left hand, negative hand drift. Left Arm cellulitic and swollen, dressed with ABD and secured with ace wraps. Speech clear, mucus membrane moist and pink. Pupils equal round react to light brisk, 3mm-2mm. Consensual.  Skin color appropriate for ethnicity. Dry , flaky Turgor slow, 3 seconds,  capillary refill less than 3 seconds. Respirations, easy unlabored, Lungs sounds clear throughout, no cough noted, chest moves symmetrically. Heart sound regular, Radial and pedal pulses present bilaterally. Abdomen round soft, Bowel sounds active all four quadrants, denies pain on palpation, last Bm, early morning, medium, soft. Urine yellow, on external catheter. Patient has central line on right neck for infusion, no leakage, no redness, no swollen, line patent. Patient has skin break down on right lower leg, and left leg chin,covered with optifoam dressing.   Electronically signed by Sridevi Hood on 1/25/2023 at 2:22 PM

## 2023-01-25 NOTE — PLAN OF CARE
Problem: Discharge Planning  Goal: Discharge to home or other facility with appropriate resources  Outcome: Progressing  Flowsheets (Taken 1/24/2023 2029)  Discharge to home or other facility with appropriate resources:   Identify barriers to discharge with patient and caregiver   Arrange for needed discharge resources and transportation as appropriate   Identify discharge learning needs (meds, wound care, etc)     Problem: Safety - Adult  Goal: Free from fall injury  Outcome: Progressing  Flowsheets (Taken 1/24/2023 2029)  Free From Fall Injury: Instruct family/caregiver on patient safety     Problem: Pain  Goal: Verbalizes/displays adequate comfort level or baseline comfort level  Outcome: Progressing  Flowsheets (Taken 1/24/2023 2029)  Verbalizes/displays adequate comfort level or baseline comfort level:   Encourage patient to monitor pain and request assistance   Assess pain using appropriate pain scale   Administer analgesics based on type and severity of pain and evaluate response   Implement non-pharmacological measures as appropriate and evaluate response     Problem: Nutrition Deficit:  Goal: Optimize nutritional status  Outcome: Progressing     Problem: Skin/Tissue Integrity  Goal: Absence of new skin breakdown  Description: 1. Monitor for areas of redness and/or skin breakdown  2. Assess vascular access sites hourly  3. Every 4-6 hours minimum:  Change oxygen saturation probe site  4. Every 4-6 hours:  If on nasal continuous positive airway pressure, respiratory therapy assess nares and determine need for appliance change or resting period.   Outcome: Progressing     Problem: Chronic Conditions and Co-morbidities  Goal: Patient's chronic conditions and co-morbidity symptoms are monitored and maintained or improved  Outcome: Progressing  Flowsheets (Taken 1/24/2023 2029)  Care Plan - Patient's Chronic Conditions and Co-Morbidity Symptoms are Monitored and Maintained or Improved: Monitor and assess patient's chronic conditions and comorbid symptoms for stability, deterioration, or improvement    Care plan reviewed with patient. Patient verbalize understanding of the plan of care and contribute to goal setting.

## 2023-01-25 NOTE — PROGRESS NOTES
Hospitalist Progress Note      Patient:  Connie Saini    Unit/Bed:5K-15/015-A  YOB: 1950  MRN: [id number]   Acct: [de-identified]   PCP: Janet Ayon MD  Date of Admission: 1/21/2023    Assessment/Plan:    Left upper extremity cellulitis, possibly related to recent IV infiltration: Patient had an IV infiltrated on 1/15 per provider's note and was receiving Unasyn during that stay. X-ray of left hand, elbow, radius and ulna on 1/21 revealed no acute bony abnormalities and no osseous abnormalities. CT left hand, radius, ulna, and humerus on 1/22 revealed diffuse soft tissue swelling, skin thickening and subcutaneous edema / inflammation of left upper arm, left forearm and left hand without abscess. Blood cultures reveal no growth at 24 or 48 hours. ID consulted recommended continuing Zyvox / Zosyn. Appreciate recommendations for transition to PO abx at discharge. Continue Tylenol for pain management. Elevation as much as possible, wound ostomy nurse placed a prevalon heel boot backwards to elevate left arm. Consult PT/OT. Diarrhea: suspect related to abx therapy, begin culturelle. Continue to monitor. Can consider GI panel if persists. Electrolyte abnormalities:  Hypernatremia: Sodium up to 147. Hold Bumex, Check q4 hour Na. Consider IV dextrose 5% and monitor. Hypokalemia: K 3.4, replete per protocol. Repeat BMP in am   Dysphagia: SLP recommended minced and moist moderately thick liquids per 1/23  Paroxysmal A-fib on 4 [facility]; rate controlled: continue home medications, continue tele. Okay to resume Xarelto, no plan for surgical intervention. Leukocytosis, resolved: Secondary to #1. WBC decreased to 10.6 from 13.7. Recheck CBC Q24 hours. Patient afebrile and denies fever/chills. History of adrenal insufficiency without crisis: Likely contributing to #1 skin fragility. Continue home cortef therapy. BP has been stable.    Chronic normocytic Anemia, worsening: Hgb stable in 8 range. Continue CBC Q24 hours. No acute bleeding. Will transfuse if Hgb <7. Hypothyroidism: TSH 0.047 and free T4 1.31 on 1/21. Continue home medications  Severe protein calorie malnutrition: Encourage oral intake; dietitian   Physical debility with deconditioning: Pt came from SNF. Consult PT/OT. CXR on 1/21 revealed decrease in pulmonary interstitial densities compared to x-ray on 1/4/23  Disposition: SNF placement    Chief Complaint: Left arm swelling    Initial H and P:-    \"Shirley Cespedes Ma is a 71 y.o. female with PMHx of paroxysmal A-fib, HTN, HFpEF, hypothyroidism, severe protein calorie malnutrition, and physical debility with deconditioning who presented to [facility] with chief complaint of left upper extremity swelling and pain. Patient was recently discharged on 1/17/23 for submandibular cellulitis and discharged to SNF. She endorses having no recollection of injuring her left arm, but stated that the arm started to swell just 3 days ago. Around that time she had stopped her outpatient course of antibiotics. At first when the swelling started she stated that she did not think much about it as it was not bothering her. Then today her arm was quite tender to touch and just throbbed all day, hence the admission to [facility]. Currently she is not having any pain, but that was after administration of Morphine while in the ED. She denies any dizziness, chest pain or shortness of breath. She denies any sick contacts. She does not endorse any tingling or numbness that is unusual for her. \"     1/22: Patient is sitting upright in bed. She endorses 9/10 sharp pain of LUE from finger tips to mid upper arm. She states this is improved from yesterday. She states her arm was \"bubbling\" yesterday but denies drainage today. She denies changes to right upper extremity, or bilateral lower extremities.   She denies fever/chills, chest pain, abdominal pain, shortness of breath, palpitations, nausea or vomiting. Patient is unable to recall when her last bowel movement was.     1/23: Patient laying in bed. She notes overall she is feeling better today, however clinically arm appears worse. She endorses pain of left arm but states this has improved, she states has increased range of motion and strength in left arm from yesterday. She notes discharge of left arm with movement. She denies fever / chills, shortness of breath, chest pain, nausea, vomiting, abdominal pain, lightheadedness, headache.     1/24: Patient sitting in chair with brother at bedside. Patient states she is feeling better today overall. She states her left arm feels and looks better, she endorses improved mobility. She denies left arm pain or drainage. She endorses urgency of bowel movements with diarrhea for the past few days. She denies pain with bowel movements or foul odor. She denies abdominal pain, nausea or vomiting. She denies shortness of breath, chest pain, palpitations, numbness, tingling, lightheadedness, dizziness or headaches. Patient states she would like to go home but is understandable and agreeable that she needs additional therapy. Subjective (past 24 hours):   1/25: pt elijahmamadou well, sitting up in bed. Does not like her minced and moist diet. Denies n/v, admits to diarrhea however upon discussion with RN this is more soft rather than watery. Denies CP/SOB. Afebrile. Past medical history, family history, social history and allergies reviewed again and is unchanged since admission. ROS (All review of systems completed. Pertinent positives noted.  Otherwise All other systems reviewed and negative.)     Medications:  Reviewed    Infusion Medications    sodium chloride       Scheduled Medications    piperacillin-tazobactam  3,375 mg IntraVENous Q8H    lactobacillus  1 capsule Oral Daily with breakfast    rivaroxaban  15 mg Oral Dinner    bumetanide  0.5 mg Oral Daily    hydrocortisone  20 mg Oral QAM hydrocortisone  10 mg Oral Nightly    levothyroxine  125 mcg Oral Daily    oxybutynin  15 mg Oral Daily    pantoprazole  40 mg Oral QAM AC    atorvastatin  20 mg Oral Daily    terbinafine  250 mg Oral Daily    sodium chloride flush  5-40 mL IntraVENous 2 times per day    linezolid  600 mg IntraVENous Q12H     PRN Meds: sodium chloride flush, sodium chloride, ondansetron **OR** ondansetron, polyethylene glycol, acetaminophen **OR** acetaminophen, potassium chloride **OR** potassium alternative oral replacement **OR** potassium chloride, magnesium sulfate, morphine      Intake/Output Summary (Last 24 hours) at 1/25/2023 0780  Last data filed at 1/24/2023 1347  Gross per 24 hour   Intake 150 ml   Output --   Net 150 ml       Diet:  ADULT ORAL NUTRITION SUPPLEMENT; Dinner; Frozen Oral Supplement  ADULT ORAL NUTRITION SUPPLEMENT; Breakfast, Lunch; Wound Healing Oral Supplement  ADULT DIET; Dysphagia - Minced and Moist; Moderately Thick (Honey)    Physical Exam:  /68   Pulse 65   Temp 98.4 °F (36.9 °C) (Oral)   Resp 18   Ht 5' 3\" (1.6 m)   Wt 176 lb 12.9 oz (80.2 kg)   SpO2 100%   BMI 31.32 kg/m²   General appearance: Speaking softly throughout examination. No apparent distress, appears stated age and cooperative. HEENT: Pupils equal, round, and reactive to light. Conjunctivae/corneas clear. Neck: Supple, with full range of motion. No jugular venous distention. Trachea midline. Respiratory:  Normal respiratory effort. Clear to auscultation, bilaterally without Rales/Wheezes/Rhonchi. Cardiovascular: Regular rate and rhythm with normal S1/S2 without murmurs, rubs or gallops. Abdomen: Soft, non-tender, non-distended with normal bowel sounds. Musculoskeletal: Diminished active and passive range of motion of bilateral upper extremities. Active range of motion of left upper extremity improved from yesterday. Left upper extremity strength diminished.    Skin: Diffuse bruising throughout right upper extremity and bilateral lower extremities. Patient states this is her baseline. Improving erythema and edema of left upper extremity up to mid upper arm. Sporadic hematomas of left upper extremity. No discharge of left upper extremity. Currently wrapped , dressing CD&I  Neurologic:  Neurovascularly intact without any focal sensory deficits. Cranial nerves: II-XII intact, grossly non-focal.  Psychiatric: Alert and oriented, thought content appropriate, normal insight  Capillary Refill: Brisk,< 3 seconds   Peripheral Pulses: +2 palpable, equal bilaterally     Labs:   Recent Labs     01/22/23  1440 01/23/23  0354 01/23/23  1445 01/24/23  1650   WBC 13.7* 10.6  --  7.9   HGB 9.8* 8.8* 8.6* 8.9*   HCT 31.1* 28.2* 28.1* 29.0*    182  --  191     Recent Labs     01/23/23  0354 01/23/23  1445 01/24/23  0543 01/24/23  1650 01/25/23  0519   *   < > 145 144 147*   K 3.7  --   --  3.3* 3.4*   *  --   --  109 112*   CO2 24  --   --  22* 26   BUN 5*  --   --  4* 3*   CREATININE 0.8  --   --  0.6 0.6   CALCIUM 7.9*  --   --  7.9* 7.5*    < > = values in this interval not displayed. No results for input(s): AST, ALT, BILIDIR, BILITOT, ALKPHOS in the last 72 hours. No results for input(s): INR in the last 72 hours. No results for input(s): Radha Urbina in the last 72 hours. Microbiology:    Blood culture #1:   Lab Results   Component Value Date/Time    BC No growth 24 hours. No growth 48 hours. 01/21/2023 09:01 PM       Blood culture #2:No results found for: Angeles Martinez    Organism:  Lab Results   Component Value Date/Time    ORG Staphylococcus aureus 12/17/2018 04:04 PM         Lab Results   Component Value Date/Time    LABGRAM  02/16/2022 04:00 PM     No segmented neutrophils observed. Rare epithelial cells observed. No organisms observed.        MRSA culture only:No results found for: [facility]    Urine culture:   Lab Results   Component Value Date/Time    LABURIN No growth-preliminary  No growth   02/09/2017 12:35 PM       Respiratory culture: No results found for: CULTRESP    Aerobic and Anaerobic :  Lab Results   Component Value Date/Time    LABAERO No Acinetobacter species isolated. 01/04/2023 10:00 PM     Lab Results   Component Value Date/Time    LABANAE  12/01/2021 12:00 PM     No anaerobes isolated- preliminary Culture yielded light growth of gram positive bacilli most consistent with Cutibacterium species. Clinical correlation required. Urinalysis:      Lab Results   Component Value Date/Time    NITRU NEGATIVE 01/22/2023 12:02 AM    WBCUA 0-2 01/22/2023 12:02 AM    WBCUA 2-4 02/22/2012 01:00 PM    BACTERIA NONE SEEN 01/22/2023 12:02 AM    RBCUA 5-10 01/22/2023 12:02 AM    BLOODU MODERATE 01/22/2023 12:02 AM    SPECGRAV 1.025 09/18/2020 12:00 PM    GLUCOSEU NEGATIVE 01/22/2023 12:02 AM       Radiology:  CT HAND LEFT W CONTRAST   Final Result      Lateral portion of the left upper extremity is partly off the    field-of-view. Status post left shoulder hemiarthroplasty and total left hip replacement. Metallic streak artifact limits the evaluation of the adjacent structures. Diffuse soft tissue swelling, skin thickening and subcutaneous    edema/inflammation of the left upper arm, left forearm and left hand. Correlate clinically for cellulitis. No abscess is seen. This document has been electronically signed by: Alvin Briceño MD on    01/22/2023 06:43 AM      All CTs at this facility use dose modulation techniques and iterative    reconstructions, and/or weight-based dosing   when appropriate to reduce radiation to a low as reasonably achievable. CT RADIUS ULNA LEFT W CONTRAST   Final Result      Lateral portion of the left upper extremity is partly off the    field-of-view. Status post left shoulder hemiarthroplasty and total left hip replacement. Metallic streak artifact limits the evaluation of the adjacent structures.       Diffuse soft tissue swelling, skin thickening and subcutaneous    edema/inflammation of the left upper arm, left forearm and left hand. Correlate clinically for cellulitis. No abscess is seen. This document has been electronically signed by: Katey Michele MD on    01/22/2023 06:26 AM      All CTs at this facility use dose modulation techniques and iterative    reconstructions, and/or weight-based dosing   when appropriate to reduce radiation to a low as reasonably achievable. CT HUMERUS LEFT W CONTRAST   Final Result      Lateral portion of the left upper extremity is partly off the    field-of-view. Status post left shoulder hemiarthroplasty and total left hip replacement. Metallic streak artifact limits the evaluation of the adjacent structures. Diffuse soft tissue swelling, skin thickening and subcutaneous    edema/inflammation of the left upper arm, left forearm and left hand. Correlate clinically for cellulitis. No abscess is seen. This document has been electronically signed by: Katey Michele MD on    01/22/2023 06:42 AM      All CTs at this facility use dose modulation techniques and iterative    reconstructions, and/or weight-based dosing   when appropriate to reduce radiation to a low as reasonably achievable. XR CHEST PORTABLE   Final Result   Decrease in pulmonary interstitial densities compared to x-ray 1/4/2023. This document has been electronically signed by: Leobardo Davila MD on    01/21/2023 11:52 PM      XR RADIUS ULNA LEFT (2 VIEWS)   Final Result   No acute bony abnormality. This document has been electronically signed by: Leobardo Davila MD on    01/21/2023 11:51 PM      XR HAND LEFT (MIN 3 VIEWS)   Final Result   No acute osseous abnormality. This document has been electronically signed by: Leobardo Davila MD on    01/21/2023 10:30 PM      XR ELBOW LEFT (MIN 3 VIEWS)   Final Result   No acute bony abnormality.       This document has been electronically signed by: Leobardo Davila MD on    01/21/2023 10:28 PM XR ELBOW LEFT (MIN 3 VIEWS)    Result Date: 1/21/2023  3 view right elbow Comparison: None Findings: No acute fractures. Normal alignment. No significant arthritic change or erosions. No joint effusion. No radiopaque foreign body. Diffuse edema. No acute bony abnormality. This document has been electronically signed by: Kwame Kelly MD on 01/21/2023 10:28 PM    XR RADIUS ULNA LEFT (2 VIEWS)    Result Date: 1/21/2023  2 view left forearm Comparison: None Findings: No fractures or dislocations. No joint effusion. No significant arthritic change. No radiopaque foreign body. Extensive soft tissue edema. No acute bony abnormality. This document has been electronically signed by: Kwame Kelly MD on 01/21/2023 11:51 PM    XR HAND LEFT (MIN 3 VIEWS)    Result Date: 1/21/2023  3 view left hand Comparison: None Findings: Osteopenia. No acute fractures. No significant loss of joint space or osteophytes. No erosions. No radiopaque foreign body. Diffuse edema. No acute osseous abnormality. This document has been electronically signed by: Kwame Kelly MD on 01/21/2023 10:30 PM    CT HUMERUS LEFT W CONTRAST    Result Date: 1/22/2023  CT left upper extremity with contrast COMPARISON: No prior FINDINGS: The lateral portion of the left upper extremity is partly off the field-of-view. The patient is status post left shoulder hemiarthroplasty and total left hip replacement. Metallic streak artifact limits the evaluation of the adjacent structures. There is diffuse soft tissue swelling, skin thickening and subcutaneous edema/inflammation of the left upper arm, left forearm and left hand. No abscess is seen. No fracture or dislocation is seen. Lateral portion of the left upper extremity is partly off the field-of-view. Status post left shoulder hemiarthroplasty and total left hip replacement. Metallic streak artifact limits the evaluation of the adjacent structures.  Diffuse soft tissue swelling, skin thickening and subcutaneous edema/inflammation of the left upper arm, left forearm and left hand. Correlate clinically for cellulitis. No abscess is seen. This document has been electronically signed by: Dahlia Peterson MD on 01/22/2023 06:42 AM All CTs at this facility use dose modulation techniques and iterative reconstructions, and/or weight-based dosing when appropriate to reduce radiation to a low as reasonably achievable. CT RADIUS ULNA LEFT W CONTRAST    Result Date: 1/22/2023  CT left upper extremity with contrast COMPARISON: No prior FINDINGS: The lateral portion of the left upper extremity is partly off the field-of-view. The patient is status post left shoulder hemiarthroplasty and total left hip replacement. Metallic streak artifact limits the evaluation of the adjacent structures. There is diffuse soft tissue swelling, skin thickening and subcutaneous edema/inflammation of the left upper arm, left forearm and left hand. No abscess is seen. No fracture or dislocation is seen. Lateral portion of the left upper extremity is partly off the field-of-view. Status post left shoulder hemiarthroplasty and total left hip replacement. Metallic streak artifact limits the evaluation of the adjacent structures. Diffuse soft tissue swelling, skin thickening and subcutaneous edema/inflammation of the left upper arm, left forearm and left hand. Correlate clinically for cellulitis. No abscess is seen. This document has been electronically signed by: Dahlia Peterson MD on 01/22/2023 06:26 AM All CTs at this facility use dose modulation techniques and iterative reconstructions, and/or weight-based dosing when appropriate to reduce radiation to a low as reasonably achievable. CT HAND LEFT W CONTRAST    Result Date: 1/22/2023  CT left upper extremity with contrast COMPARISON: No prior FINDINGS: The lateral portion of the left upper extremity is partly off the field-of-view.  The patient is status post left shoulder hemiarthroplasty and total left hip replacement. Metallic streak artifact limits the evaluation of the adjacent structures. There is diffuse soft tissue swelling, skin thickening and subcutaneous edema/inflammation of the left upper arm, left forearm and left hand. No abscess is seen. No fracture or dislocation is seen. Lateral portion of the left upper extremity is partly off the field-of-view. Status post left shoulder hemiarthroplasty and total left hip replacement. Metallic streak artifact limits the evaluation of the adjacent structures. Diffuse soft tissue swelling, skin thickening and subcutaneous edema/inflammation of the left upper arm, left forearm and left hand. Correlate clinically for cellulitis. No abscess is seen. This document has been electronically signed by: Allan Domingo MD on 01/22/2023 06:43 AM All CTs at this facility use dose modulation techniques and iterative reconstructions, and/or weight-based dosing when appropriate to reduce radiation to a low as reasonably achievable. XR CHEST PORTABLE    Result Date: 1/21/2023  1 view chest x-ray Comparison: CR/SR - XR CHEST PORTABLE - 01/04/2023 04:26 PM EST Findings: Decrease in pulmonary interstitial densities compared to x-ray 1/4/2023. Right internal iliac catheter terminates in the right atrium. Status post bilateral hip replacement. No acute fractures. Decrease in pulmonary interstitial densities compared to x-ray 1/4/2023.  This document has been electronically signed by: Monalisa Parker MD on 01/21/2023 11:52 PM      Electronically signed by Sherin Cartagena PA-C on 1/25/2023 at 7:47 AM

## 2023-01-25 NOTE — PROGRESS NOTES
Progress note: Infectious diseases    Patient - Rommel Thompson,  Age - 71 y.o.    - 1950      Room Number - 5K-15/015-A   N -  [id number]   [facility]  Date of Admission -  2023  7:56 PM    SUBJECTIVE:   Patient seen and examined. No acute complaints today. OBJECTIVE   VITALS    height is 5' 3\" (1.6 m) and weight is 176 lb 12.9 oz (80.2 kg). Her oral temperature is 97.9 °F (36.6 °C). Her blood pressure is 130/59 (abnormal) and her pulse is 65. Her respiration is 17 and oxygen saturation is 99%. Wt Readings from Last 3 Encounters:   23 176 lb 12.9 oz (80.2 kg)   01/15/23 183 lb 4.8 oz (83.1 kg)   22 163 lb 1.6 oz (74 kg)       I/O (24 Hours)    Intake/Output Summary (Last 24 hours) at 2023  Last data filed at 2023 0809  Gross per 24 hour   Intake 891.88 ml   Output 700 ml   Net 191.88 ml       General Appearance  Awake, alert, oriented,  not  In acute distress  HEENT - normocephalic, atraumatic, pink conjunctiva,  anicteric sclera  Neck - Supple, no mass  Lungs -  Bilateral good air entry, no rhonchi, no wheeze  Cardiovascular - Heart sounds are normal.  Regular rate and rhythm without murmur, gallop or rub.   Abdomen - soft, not distended, nontender,   Neurologic - Alert and oriented  Skin - No bruising or bleeding  Extremities - No edema, no cyanosis, clubbing     MEDICATIONS:      piperacillin-tazobactam  3,375 mg IntraVENous Q8H    lactobacillus  1 capsule Oral Daily with breakfast    rivaroxaban  15 mg Oral Dinner    [Held by provider] bumetanide  0.5 mg Oral Daily    hydrocortisone  20 mg Oral QAM    hydrocortisone  10 mg Oral Nightly    levothyroxine  125 mcg Oral Daily    oxybutynin  15 mg Oral Daily    pantoprazole  40 mg Oral QAM AC    atorvastatin  20 mg Oral Daily    terbinafine  250 mg Oral Daily    sodium chloride flush  5-40 mL IntraVENous 2 times per day linezolid  600 mg IntraVENous Q12H      sodium chloride       sodium chloride flush, sodium chloride, ondansetron **OR** ondansetron, polyethylene glycol, acetaminophen **OR** acetaminophen, potassium chloride **OR** potassium alternative oral replacement **OR** potassium chloride, magnesium sulfate      LABS:     CBC:   Recent Labs     01/22/23  1440 01/23/23  0354 01/23/23  1445 01/24/23  1650   WBC 13.7* 10.6  --  7.9   HGB 9.8* 8.8* 8.6* 8.9*    182  --  191     BMP:    Recent Labs     01/23/23  0354 01/23/23  1445 01/24/23  0543 01/24/23  1650 01/25/23  0519   *   < > 145 144 147*   K 3.7  --   --  3.3* 3.4*   *  --   --  109 112*   CO2 24  --   --  22* 26   BUN 5*  --   --  4* 3*   CREATININE 0.8  --   --  0.6 0.6   GLUCOSE 89  --   --  122* 114*    < > = values in this interval not displayed. Calcium:  Recent Labs     01/25/23  0519   CALCIUM 7.5*        CULTURES:   UA: No results for input(s): SPECGRAV, PHUR, COLORU, CLARITYU, MUCUS, PROTEINU, BLOODU, RBCUA, WBCUA, BACTERIA, NITRU, GLUCOSEU, BILIRUBINUR, UROBILINOGEN, KETUA, LABCAST, LABCASTTY, AMORPHOS in the last 72 hours. Invalid input(s): CRYSTALS  Micro:   Lab Results   Component Value Date/Time    BC No growth 24 hours. No growth 48 hours.  01/21/2023 09:01 PM        Problem list of patient:     Patient Active Problem List   Diagnosis Code    Asthma J45.909    Hyperlipidemia E78.5    Osteoarthritis M19.90    GERD (gastroesophageal reflux disease) K21.9    Meningioma ([facility]) D32.9    Mechanical loosening of prosthetic joint ([facility]) T84.039A    Hyperglycemia R73.9    SIRS (systemic inflammatory response syndrome) ([facility]) R65.10    Leukocytosis D72.829    Right sided weakness R53.1    Cerebrovascular disease J03.9    Metabolic acidosis H71.71    Sinus bradycardia R00.1    Generalized weakness R53.1    Hx of subdural hematoma Z86.79    Cerebral infarction ([address] I63.9    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 5XAA    JESSE (acute kidney injury) ([facility] [address] N17.9    Hyperkalemia E87.5    Gastroenteritis due to norovirus P00.09    Acute diastolic heart failure ([facility]) I50.31    History of stroke with residual effects I69.30    Chronic venous stasis dermatitis of both lower extremities I87.2    Normocytic anemia D64.9    Chronic diastolic congestive heart failure ([facility]) I50.32    CKD (chronic kidney disease), stage IV ([facility]) N18.4    Confusion R41.0    Chronic kidney disease (CKD), stage III (moderate) ([facility]) N18.30    Obesity (BMI 30.0-34. 9) E66.9    Community acquired pneumonia of right lung J18.9    Acute renal failure superimposed on stage 3 chronic kidney disease (HCC) N17.9, N18.30    Chronic anticoagulation Z79.01    Hypoalbuminemia E88.09    Impaired mobility Z74.09    Bilateral leg edema +2- better now trace R60.0    CKD (chronic kidney disease) stage 3, GFR 30-59 ml/min ([facility]) N18.30    Fluid overload E87.70    Panhypopituitarism ([facility]) E23.0    Hypothyroidism E03.9    Scalp lesion L98.9    Non-compliance F/u advised Z91.199    Verruca vulgaris B07.9    COVID-19 U07.1    Septic shock ([facility]) A41.9, R65.21    Cellulitis of submandibular region K12.2    Severe malnutrition (HCC) E43    Submandibular gland infection K11.20    Cellulitis L03.90 ASSESSMENT/PLAN   Left upper extremity cellulitis: Improving. Currently on Zosyn/Zyvox. Afebrile with resolved leukocytosis. Will transition to oral zyvox  and stop zosyn  Atrial fibrillation  HTN  Anemia  Adrenal insufficiency  Hypernatremia, resolved  Discussed with hospitalist.        Jamin Cramer MD 1/25/2023 9:38 AM   Patient was seen and examined face-to-face by me  The chart, progress notes, labs and radiographs were reviewed. Case discussed with the hospitalist. Questions and concerns were addressed. I agree with the progress note.

## 2023-01-25 NOTE — PROGRESS NOTES
[facility]  Occupational Therapy  Daily Note  Time:  Time In: 1101  Time Out: 1124  Timed Code Treatment Minutes: 23 Minutes  Minutes: 23          Date: 2023  Patient Name: Doyle Dillon,   Gender: female      Room: [facility]15/015-A  MRN: [id number]  : 1950  (71 y.o.)  Referring Practitioner: Laron Hooks PA-C  Diagnosis: cellulitis  Additional Pertinent Hx: per chart review; Doyle Dillon is a 71 y.o. female with PMHx of paroxysmal A-fib, HTN, HFpEF, hypothyroidism, severe protein calorie malnutrition, and physical debility with deconditioning who presented to [facility] with chief complaint of left upper extremity swelling and pain. Patient was recently discharged on 23 for submandibular cellulitis and discharged to SNF. She endorses having no recollection of injuring her left arm, but stated that the arm started to swell just 3 days ago. Around that time she had stopped her outpatient course of antibiotics. At first when the swelling started she stated that she did not think much about it as it was not bothering her. Then today her arm was quite tender to touch and just throbbed all day, hence the admission to [facility]. Currently she is not having any pain, but that was after administration of Morphine while in the ED. She denies any dizziness, chest pain or shortness of breath. She denies any sick contacts. She does not endorse any tingling or numbness that is unusual for her    Restrictions/Precautions:  Restrictions/Precautions: General Precautions, Fall Risk  Position Activity Restriction  Other position/activity restrictions: skin tears easily, hx CVA with L sided deficits     SUBJECTIVE: Nurse approved Tx. Pt agreeable to Tx. Supine in bed. PAIN: not stated      Vitals: Vitals not assessed per clinical judgement, see nursing flowsheet    COGNITION: Slow Processing and Decreased Insight    ADL:   Bathing: Maximum Assistance and with increased time for completion.   UB and LB sponge bath   Upper Extremity Dressing: Maximum Assistance. Don and doff gown   Lower Extremity Dressing: Maximum Assistance. Don and doff socks . BED MOBILITY:  Rolling to Left: Maximum Assistance, X 1, with head of bed flat, with rail, with verbal cues , with increased time for completion    Rolling to Right: Maximum Assistance, X 1, with head of bed flat, with verbal cues , with increased time for completion          ASSESSMENT:     Activity Tolerance:  Patient tolerance of  treatment: fair. Discharge Recommendations: ECF with OT  Equipment Recommendations: Equipment Needed: Yes  Mobility Devices: ADL Assistive Devices  Plan: Times Per Week: 5x  Times Per Day: Once a day  Current Treatment Recommendations: Strengthening, ROM, Balance training, Functional mobility training, Endurance training, Neuromuscular re-education, Safety education & training, Equipment evaluation, education, & procurement, Patient/Caregiver education & training, Self-Care / ADL, Coordination training    Patient Education  Patient Education: Role of OT, Plan of Care, and ADL's     Goals  Short Term Goals  Time Frame for Short Term Goals: by discharge  Short Term Goal 1: patient will tolerate 2 min static standing with sit to stand and maintain upright posture with MIN A x 1. Short Term Goal 2: patient will participate in (B) UE AROM HEP and R UE strengthening to increase UB strength and function for functional transfers. Short Term Goal 3: patient will complete simple UB ADLs with MIN  A and edu in juan tech to compensate for L UE hemiparesis. Following session, patient left in safe position with all fall risk precautions in place.

## 2023-01-25 NOTE — PROGRESS NOTES
[facility]   Electronically signed by Bartolo Castro on 1/25/2023 at 2:18 PM

## 2023-01-25 NOTE — PROGRESS NOTES
Comprehensive Nutrition Assessment    Type and Reason for Visit:  Reassess, Consult (Oral Nutrition Supplements)    Nutrition Recommendations/Plan:   Recommend diet as per SLP. Continue Magic Cup once/day, Abhijit BID. Recommend MVI. Will monitor need for additional nutrition interventions. Malnutrition Assessment:  Malnutrition Status: At risk for malnutrition (Comment) (01/23/23 4753)    Context:  Acute Illness     Findings of the 6 clinical characteristics of malnutrition:  Energy Intake:  Unable to assess (intake variable recent admit; po n/r this admission; reports minimal intake today)  Weight Loss:  Unable to assess (difficult to evaluate with edema; weights fluctuate up/down)     Body Fat Loss:  No significant body fat loss     Muscle Mass Loss:  No significant muscle mass loss    Fluid Accumulation:  Unable to assess     Strength:  Not Performed    Nutrition Assessment:     Pt. Improving from a nutritional standpoint AEB consuming % of some meals. At risk for further nutrition compromise r/t increased nutrient needs for wound healing, variable po intake and underlying medical condition (hx CVA, GERD, HLD, HTN, meningioma of brain, COVID 11/22, diabetes insipidus). Nutrition Related Findings:      Wound Type: Stage II, Skin Tears (cellulitis)     Pt. Report/Treatments/Miscellaneous: pt. Seen - reports fairly good appetite; states consumed a few bites of Magic Cup and reports acceptance; pt. Put Abhijit on her oatmeal and wasn't too fond of it; discussed mixing it in her liquids - pt.  Agreeable to trial  GI Status: 3 BMs noted past 24 hours  Pertinent Labs: 1/25: Glucose 114, BUN 3, Cr 0.6, Sodium 147  Pertinent Meds: ATB, Culturelle, Glycolax, Bumex, Lipitor      Current Nutrition Intake & Therapies:    Average Meal Intake: 1-25%, 26-50%, %  Average Supplements Intake:  (tried some of ONS)  ADULT ORAL NUTRITION SUPPLEMENT; Dinner; Frozen Oral Supplement  ADULT ORAL NUTRITION SUPPLEMENT; Breakfast, Lunch; Wound Healing Oral Supplement  ADULT DIET; Dysphagia - Minced and Moist; Moderately Thick (Honey)    Anthropometric Measures:  Height: 5' 3\" (160 cm)  Ideal Body Weight (IBW): 115 lbs (52 kg)    Admission Body Weight: 173 lb 11.6 oz (78.8 kg) (1/23 bedscale, +2, +3 edema)  Current Body Weight: 176 lb 12.9 oz (80.2 kg) (1/24 +1 edema),      Current BMI (kg/m2): 31.3  Usual Body Weight:  (per pt. 180#: per EMR: 12/3/22: 192# 2 oz, 1/5/23: 167# 5 oz, 1/13/23: 196# 8 oz, 1/15/23: 183# 5 oz)                       BMI Categories: Obese Class 1 (BMI 30.0-34. 9)    Estimated Daily Nutrient Needs:  Energy Requirements Based On: Kcal/kg  Weight Used for Energy Requirements: Other (Comment) (79)  Energy (kcal/day): 6482-3984 kcal (18-20)  Weight Used for Protein Requirements: Ideal (52)  Protein (g/day): 73+ grams (1.3+)       Nutrition Diagnosis:   Increased nutrient needs related to increase demand for energy/nutrients as evidenced by wounds    Nutrition Interventions:   Food and/or Nutrient Delivery: Continue Current Diet, Continue Oral Nutrition Supplement  Nutrition Education/Counseling: Education initiated (1/25 Encouraged po, protein, ONS intake at best efforts.)  Coordination of Nutrition Care: Continue to monitor while inpatient       Goals:  Previous Goal Met: Progressing toward Goal(s)  Goals: PO intake 75% or greater, by next RD assessment       Nutrition Monitoring and Evaluation:      Food/Nutrient Intake Outcomes: Diet Advancement/Tolerance, Food and Nutrient Intake, Supplement Intake  Physical Signs/Symptoms Outcomes: Biochemical Data, Chewing or Swallowing, GI Status, Fluid Status or Edema, Nutrition Focused Physical Findings, Skin, Weight    Discharge Planning:     Too soon to determine     Azar Lagos RD, LD  Contact: [phone]

## 2023-01-26 LAB
ANION GAP SERPL CALC-SCNC: 7 MEQ/L (ref 8–16)
BACTERIA BLD AEROBE CULT: NORMAL
BACTERIA BLD AEROBE CULT: NORMAL
BUN SERPL-MCNC: 5 MG/DL (ref 7–22)
CALCIUM SERPL-MCNC: 8.3 MG/DL (ref 8.5–10.5)
CHLORIDE 24H UR-SRATE: 24 MEQ/L
CHLORIDE SERPL-SCNC: 114 MEQ/L (ref 98–111)
CO2 SERPL-SCNC: 28 MEQ/L (ref 23–33)
CREAT SERPL-MCNC: 0.6 MG/DL (ref 0.4–1.2)
CREAT UR-MCNC: 73.3 MG/DL
GFR SERPL CREATININE-BSD FRML MDRD: > 60 ML/MIN/1.73M2
GLUCOSE BLD STRIP.AUTO-MCNC: 136 MG/DL (ref 70–108)
GLUCOSE BLD STRIP.AUTO-MCNC: 147 MG/DL (ref 70–108)
GLUCOSE SERPL-MCNC: 111 MG/DL (ref 70–108)
MAGNESIUM SERPL-MCNC: 2.1 MG/DL (ref 1.6–2.4)
OSMOLALITY SERPL: 303 MOSMOL/KG (ref 275–295)
OSMOLALITY UR: 241 MOSMOL/KG (ref 250–750)
POTASSIUM SERPL-SCNC: 2.8 MEQ/L (ref 3.5–5.2)
POTASSIUM UR-SCNC: 37.8 MEQ/L
SODIUM SERPL-SCNC: 142 MEQ/L (ref 135–145)
SODIUM SERPL-SCNC: 149 MEQ/L (ref 135–145)
SODIUM UR-SCNC: 20 MEQ/L

## 2023-01-26 PROCEDURE — 97535 SELF CARE MNGMENT TRAINING: CPT

## 2023-01-26 PROCEDURE — 84244 ASSAY OF RENIN: CPT

## 2023-01-26 PROCEDURE — 6370000000 HC RX 637 (ALT 250 FOR IP): Performed by: PHYSICIAN ASSISTANT

## 2023-01-26 PROCEDURE — 1200000000 HC SEMI PRIVATE

## 2023-01-26 PROCEDURE — 80048 BASIC METABOLIC PNL TOTAL CA: CPT

## 2023-01-26 PROCEDURE — 6370000000 HC RX 637 (ALT 250 FOR IP): Performed by: INTERNAL MEDICINE

## 2023-01-26 PROCEDURE — 6370000000 HC RX 637 (ALT 250 FOR IP)

## 2023-01-26 PROCEDURE — 83735 ASSAY OF MAGNESIUM: CPT

## 2023-01-26 PROCEDURE — 83935 ASSAY OF URINE OSMOLALITY: CPT

## 2023-01-26 PROCEDURE — 84295 ASSAY OF SERUM SODIUM: CPT

## 2023-01-26 PROCEDURE — 82948 REAGENT STRIP/BLOOD GLUCOSE: CPT

## 2023-01-26 PROCEDURE — 82570 ASSAY OF URINE CREATININE: CPT

## 2023-01-26 PROCEDURE — 6360000002 HC RX W HCPCS

## 2023-01-26 PROCEDURE — 82436 ASSAY OF URINE CHLORIDE: CPT

## 2023-01-26 PROCEDURE — 97110 THERAPEUTIC EXERCISES: CPT

## 2023-01-26 PROCEDURE — 82088 ASSAY OF ALDOSTERONE: CPT

## 2023-01-26 PROCEDURE — 2580000003 HC RX 258: Performed by: PHYSICIAN ASSISTANT

## 2023-01-26 PROCEDURE — 36415 COLL VENOUS BLD VENIPUNCTURE: CPT

## 2023-01-26 PROCEDURE — 83930 ASSAY OF BLOOD OSMOLALITY: CPT

## 2023-01-26 PROCEDURE — 84300 ASSAY OF URINE SODIUM: CPT

## 2023-01-26 PROCEDURE — 1200000003 HC TELEMETRY R&B

## 2023-01-26 PROCEDURE — 2580000003 HC RX 258

## 2023-01-26 PROCEDURE — 99233 SBSQ HOSP IP/OBS HIGH 50: CPT | Performed by: PHYSICIAN ASSISTANT

## 2023-01-26 PROCEDURE — 97530 THERAPEUTIC ACTIVITIES: CPT

## 2023-01-26 PROCEDURE — 84133 ASSAY OF URINE POTASSIUM: CPT

## 2023-01-26 RX ORDER — SODIUM CHLORIDE 9 MG/ML
INJECTION, SOLUTION INTRAVENOUS CONTINUOUS
Status: DISCONTINUED | OUTPATIENT
Start: 2023-01-26 | End: 2023-01-26

## 2023-01-26 RX ORDER — DEXTROSE MONOHYDRATE 50 MG/ML
INJECTION, SOLUTION INTRAVENOUS CONTINUOUS
Status: DISCONTINUED | OUTPATIENT
Start: 2023-01-26 | End: 2023-01-27

## 2023-01-26 RX ORDER — POTASSIUM CHLORIDE 20 MEQ/1
40 TABLET, EXTENDED RELEASE ORAL
Status: DISCONTINUED | OUTPATIENT
Start: 2023-01-26 | End: 2023-01-27

## 2023-01-26 RX ADMIN — LINEZOLID 600 MG: 600 TABLET, FILM COATED ORAL at 08:52

## 2023-01-26 RX ADMIN — LEVOTHYROXINE SODIUM 125 MCG: 0.12 TABLET ORAL at 08:52

## 2023-01-26 RX ADMIN — RIVAROXABAN 15 MG: 15 TABLET, FILM COATED ORAL at 18:29

## 2023-01-26 RX ADMIN — DEXTROSE MONOHYDRATE: 50 INJECTION, SOLUTION INTRAVENOUS at 19:17

## 2023-01-26 RX ADMIN — DEXTROSE MONOHYDRATE: 50 INJECTION, SOLUTION INTRAVENOUS at 13:21

## 2023-01-26 RX ADMIN — POTASSIUM CHLORIDE 10 MEQ: 7.46 INJECTION, SOLUTION INTRAVENOUS at 20:02

## 2023-01-26 RX ADMIN — HYDROCORTISONE 20 MG: 10 TABLET ORAL at 08:52

## 2023-01-26 RX ADMIN — POTASSIUM CHLORIDE 10 MEQ: 7.46 INJECTION, SOLUTION INTRAVENOUS at 23:43

## 2023-01-26 RX ADMIN — SODIUM CHLORIDE, PRESERVATIVE FREE 10 ML: 5 INJECTION INTRAVENOUS at 08:54

## 2023-01-26 RX ADMIN — HYDROCORTISONE 10 MG: 10 TABLET ORAL at 21:09

## 2023-01-26 RX ADMIN — PANTOPRAZOLE SODIUM 40 MG: 40 TABLET, DELAYED RELEASE ORAL at 06:22

## 2023-01-26 RX ADMIN — TERBINAFINE TABLETS 250 MG 250 MG: 250 TABLET ORAL at 08:52

## 2023-01-26 RX ADMIN — POTASSIUM CHLORIDE 10 MEQ: 7.46 INJECTION, SOLUTION INTRAVENOUS at 18:31

## 2023-01-26 RX ADMIN — ATORVASTATIN CALCIUM 20 MG: 20 TABLET, FILM COATED ORAL at 08:52

## 2023-01-26 RX ADMIN — POTASSIUM CHLORIDE 40 MEQ: 1500 TABLET, EXTENDED RELEASE ORAL at 18:29

## 2023-01-26 RX ADMIN — OXYBUTYNIN CHLORIDE 15 MG: 10 TABLET, EXTENDED RELEASE ORAL at 08:52

## 2023-01-26 RX ADMIN — POTASSIUM CHLORIDE 10 MEQ: 7.46 INJECTION, SOLUTION INTRAVENOUS at 21:07

## 2023-01-26 RX ADMIN — Medication 1 CAPSULE: at 08:55

## 2023-01-26 RX ADMIN — LINEZOLID 600 MG: 600 TABLET, FILM COATED ORAL at 21:09

## 2023-01-26 NOTE — PROGRESS NOTES
Progress note: Infectious diseases    Patient - Edilma Azevedo,  Age - 71 y.o.    - 1950      Room Number - 5K-15/015-A   MRN -  [id number]   Acct # - [de-identified]  Date of Admission -  2023  7:56 PM    SUBJECTIVE:   No new complaints. Denies any fever  OBJECTIVE   VITALS    height is 5' 3\" (1.6 m) and weight is 176 lb 12.9 oz (80.2 kg). Her oral temperature is 98.3 °F (36.8 °C). Her blood pressure is 141/77 (abnormal) and her pulse is 65. Her respiration is 16 and oxygen saturation is 99%. Wt Readings from Last 3 Encounters:   23 176 lb 12.9 oz (80.2 kg)   01/15/23 183 lb 4.8 oz (83.1 kg)   22 163 lb 1.6 oz (74 kg)       I/O (24 Hours)    Intake/Output Summary (Last 24 hours) at 2023 5616  Last data filed at 2023  Gross per 24 hour   Intake 1030 ml   Output --   Net 1030 ml         General Appearance  Awake, alert, oriented, chronically sick looking. HEENT - normocephalic, atraumatic, pale  conjunctiva,  anicteric sclera  Neck - Supple, no mass  Lungs -  Bilateral   air entry, no rhonchi, no wheeze  Cardiovascular - Heart sounds are normal.    Abdomen - soft, not distended, nontender,   Neurologic - Alert and oriented  Skin - No bruising or bleeding  Extremities - +edema on extremites, there is bruising of the skin  The redness on the left upper arm has resolved.     MEDICATIONS:      linezolid  600 mg Oral 2 times per day    lactobacillus  1 capsule Oral Daily with breakfast    rivaroxaban  15 mg Oral Dinner    [Held by provider] bumetanide  0.5 mg Oral Daily    hydrocortisone  20 mg Oral QAM    hydrocortisone  10 mg Oral Nightly    levothyroxine  125 mcg Oral Daily    oxybutynin  15 mg Oral Daily    pantoprazole  40 mg Oral QAM AC    atorvastatin  20 mg Oral Daily    terbinafine  250 mg Oral Daily    sodium chloride flush  5-40 mL IntraVENous 2 times per day      sodium chloride sodium chloride flush, sodium chloride, ondansetron **OR** ondansetron, polyethylene glycol, acetaminophen **OR** acetaminophen, potassium chloride **OR** potassium alternative oral replacement **OR** potassium chloride, magnesium sulfate      LABS:     CBC:   Recent Labs     01/23/23  1445 01/24/23  1650   WBC  --  7.9   HGB 8.6* 8.9*   PLT  --  191       BMP:    Recent Labs     01/24/23  1650 01/25/23  0519 01/25/23  1200 01/25/23  1630 01/25/23 2010    147* 145 145 145   K 3.3* 3.4*  --   --   --     112*  --   --   --    CO2 22* 26  --   --   --    BUN 4* 3*  --   --   --    CREATININE 0.6 0.6  --   --   --    GLUCOSE 122* 114*  --   --   --        Calcium:  Recent Labs     01/25/23  0519   CALCIUM 7.5*          CULTURES:   UA: No results for input(s): SPECGRAV, PHUR, COLORU, CLARITYU, MUCUS, PROTEINU, BLOODU, RBCUA, WBCUA, BACTERIA, NITRU, GLUCOSEU, BILIRUBINUR, UROBILINOGEN, KETUA, LABCAST, LABCASTTY, AMORPHOS in the last 72 hours. Invalid input(s): CRYSTALS  Micro:   Lab Results   Component Value Date/Time    BC No growth 24 hours. No growth 48 hours.  01/21/2023 09:01 PM        Problem list of patient:     Patient Active Problem List   Diagnosis Code    Asthma J45.909    Hyperlipidemia E78.5    Osteoarthritis M19.90    GERD (gastroesophageal reflux disease) K21.9    Meningioma ([facility]) D32.9    Mechanical loosening of prosthetic joint ([facility]) T84.039A    Hyperglycemia R73.9    SIRS (systemic inflammatory response syndrome) ([facility]) R65.10    Leukocytosis D72.829    Right sided weakness R53.1    Cerebrovascular disease W35.3    Metabolic acidosis P68.18    Sinus bradycardia R00.1    Generalized weakness R53.1    Hx of subdural hematoma Z86.79    Cerebral infarction ([facility]) I63.9    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 ([facility]) E23.0, D49.7    Hypercoagulable state ([address] D68.59    Fatigue R53.83    Atrial thrombosis I51.3    Microcytic anemia D50.9    Hypokalemia E87.6    Altered mental status R41.82 Postoperative hypothyroidism E89.0    Hypernatremia H66.5    Metabolic encephalopathy B52.75    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 5XAA    JESSE (acute kidney injury) ([facility] [address] N17.9    Hyperkalemia E87.5    Gastroenteritis due to norovirus T00.22    Acute diastolic heart failure ([facility]) I50.31    History of stroke with residual effects I69.30    Chronic venous stasis dermatitis of both lower extremities I87.2    Normocytic anemia D64.9    Chronic diastolic congestive heart failure ([facility]) I50.32    CKD (chronic kidney disease), stage IV ([facility]) N18.4    Confusion R41.0    Chronic kidney disease (CKD), stage III (moderate) ([facility]) N18.30    Obesity (BMI 30.0-34. 9) E66.9    Community acquired pneumonia of right lung J18.9    Acute renal failure superimposed on stage 3 chronic kidney disease ([facility]) N17.9, N18.30    Chronic anticoagulation Z79.01    Hypoalbuminemia E88.09    Impaired mobility Z74.09    Bilateral leg edema +2- better now trace R60.0    CKD (chronic kidney disease) stage 3, GFR 30-59 ml/min ([facility]) N18.30    Fluid overload E87.70    Panhypopituitarism ([facility]) E23.0    Hypothyroidism E03.9    Scalp lesion L98.9    Non-compliance F/u advised Z91.199    Verruca vulgaris B07.9    COVID-19 U07.1    Septic shock ([facility]) A41.9, R65.21    Cellulitis of submandibular region K12.2    Severe malnutrition ([facility]) E43    Submandibular gland infection K11.20    Cellulitis L03.90         ASSESSMENT/PLAN   Left upper extremity cellulitis: resolved.  She is on oral zyvox  Atrial fibrillation  HTN  Anemia  Adrenal insufficiency: on chronic steroid which has contributed to skin fragility  Hypernatremia, resolved  Continue current supportive care  Discharge planning      Ash Wagner MD 1/26/2023 8:23 AM

## 2023-01-26 NOTE — PLAN OF CARE
Problem: Discharge Planning  Goal: Discharge to home or other facility with appropriate resources  1/26/2023 1205 by Gaby Oliveros RN  Outcome: Progressing  Flowsheets (Taken 1/26/2023 0845)  Discharge to home or other facility with appropriate resources:   Identify barriers to discharge with patient and caregiver   Arrange for needed discharge resources and transportation as appropriate   Identify discharge learning needs (meds, wound care, etc)   Refer to discharge planning if patient needs post-hospital services based on physician order or complex needs related to functional status, cognitive ability or social support system  1/26/2023 0121 by Philip Griffin RN  Outcome: Progressing  Flowsheets (Taken 1/25/2023 2005)  Discharge to home or other facility with appropriate resources:   Identify barriers to discharge with patient and caregiver   Arrange for needed discharge resources and transportation as appropriate   Identify discharge learning needs (meds, wound care, etc)     Problem: Chronic Conditions and Co-morbidities  Goal: Patient's chronic conditions and co-morbidity symptoms are monitored and maintained or improved  1/26/2023 1205 by Gaby Oliveros RN  Outcome: Progressing  Flowsheets (Taken 1/26/2023 0845)  Care Plan - Patient's Chronic Conditions and Co-Morbidity Symptoms are Monitored and Maintained or Improved:   Monitor and assess patient's chronic conditions and comorbid symptoms for stability, deterioration, or improvement   Collaborate with multidisciplinary team to address chronic and comorbid conditions and prevent exacerbation or deterioration   Update acute care plan with appropriate goals if chronic or comorbid symptoms are exacerbated and prevent overall improvement and discharge  1/26/2023 0121 by Philip Griffin RN  Outcome: Progressing  Flowsheets (Taken 1/25/2023 2005)  Care Plan - Patient's Chronic Conditions and Co-Morbidity Symptoms are Monitored and Maintained or Improved: Monitor and assess patient's chronic conditions and comorbid symptoms for stability, deterioration, or improvement     Problem: Pain  Goal: Verbalizes/displays adequate comfort level or baseline comfort level  1/26/2023 1205 by Aga Hollingsworth RN  Outcome: Progressing  1/26/2023 0121 by Anna Marie Baptiste RN  Outcome: Progressing  Flowsheets (Taken 1/25/2023 2005)  Verbalizes/displays adequate comfort level or baseline comfort level:   Encourage patient to monitor pain and request assistance   Assess pain using appropriate pain scale   Implement non-pharmacological measures as appropriate and evaluate response   Administer analgesics based on type and severity of pain and evaluate response

## 2023-01-26 NOTE — PROGRESS NOTES
Hospitalist Progress Note      Patient:  Nanci Perkins    Unit/Bed:5K-15/015-A  YOB: 1950  MRN: [id number]   Acct: [de-identified]   PCP: Aakash Niño MD  Date of Admission: 1/21/2023    Assessment/Plan:    Left upper extremity cellulitis, possibly related to recent IV infiltration: Patient had an IV infiltrated on 1/15 per provider's note and was receiving Unasyn during that stay. X-ray of left hand, elbow, radius and ulna on 1/21 revealed no acute bony abnormalities and no osseous abnormalities. CT left hand, radius, ulna, and humerus on 1/22 revealed diffuse soft tissue swelling, skin thickening and subcutaneous edema / inflammation of left upper arm, left forearm and left hand without abscess. Blood cultures negative to date   ID consulted recommended continuing Zyvox  - continue on DC   Was additionally treated with Zosyn while IP   Continue Tylenol for pain management. Elevation as much as possible, wound ostomy nurse placed a prevalon heel boot backwards to elevate left arm. Consult PT/OT. Diarrhea: suspect related to abx therapy, begin culturelle. Continue to monitor. Can consider GI panel if persists. Electrolyte abnormalities:  Hypernatremia: Sodium up to 149. Hold Bumex, Check q4 hour Na. Consider IV dextrose 5% at 175cc/ hr and adjust pending course. Accucheck every 4 hours   Hypokalemia: K 2.8, replete per protocol and start scheduled 40meq daily. Repeat BMP in am   Consult Nephrology  Maintain telemetry  Dysphagia: SLP recommended minced and moist moderately thick liquids per 1/23  Paroxysmal A-fib on [address] [place] [address]; rate controlled: continue home medications, continue tele. Okay to resume Xarelto, no plan for surgical intervention. Leukocytosis, resolved: Secondary to #1. History of adrenal insufficiency without crisis: Likely contributing to #1 skin fragility. Continue home cortef therapy. BP has been stable.    Chronic normocytic Anemia, worsening: Hgb stable in 8 range. Continue CBC Q24 hours. No acute bleeding. Will transfuse if Hgb <7. Hypothyroidism: TSH 0.047 and free T4 1.31 on 1/21. Continue home medications  Severe protein calorie malnutrition: Encourage oral intake; dietitian   Physical debility with deconditioning: Pt came from SNF. Consult PT/OT. CXR on 1/21 revealed decrease in pulmonary interstitial densities compared to x-ray on 1/4/23  Disposition: SNF placement    Chief Complaint: Left arm swelling    Initial H and P:-    \"Shirley Iverson is a 71 y.o. female with PMHx of paroxysmal A-fib, HTN, HFpEF, hypothyroidism, severe protein calorie malnutrition, and physical debility with deconditioning who presented to [facility] with chief complaint of left upper extremity swelling and pain. Patient was recently discharged on 1/17/23 for submandibular cellulitis and discharged to SNF. She endorses having no recollection of injuring her left arm, but stated that the arm started to swell just 3 days ago. Around that time she had stopped her outpatient course of antibiotics. At first when the swelling started she stated that she did not think much about it as it was not bothering her. Then today her arm was quite tender to touch and just throbbed all day, hence the admission to [facility]. Currently she is not having any pain, but that was after administration of Morphine while in the ED. She denies any dizziness, chest pain or shortness of breath. She denies any sick contacts. She does not endorse any tingling or numbness that is unusual for her. \"     1/22: Patient is sitting upright in bed. She endorses 9/10 sharp pain of LUE from finger tips to mid upper arm. She states this is improved from yesterday. She states her arm was \"bubbling\" yesterday but denies drainage today. She denies changes to right upper extremity, or bilateral lower extremities.   She denies fever/chills, chest pain, abdominal pain, shortness of breath, palpitations, nausea or vomiting. Patient is unable to recall when her last bowel movement was.     1/23: Patient laying in bed. She notes overall she is feeling better today, however clinically arm appears worse. She endorses pain of left arm but states this has improved, she states has increased range of motion and strength in left arm from yesterday. She notes discharge of left arm with movement. She denies fever / chills, shortness of breath, chest pain, nausea, vomiting, abdominal pain, lightheadedness, headache.     1/24: Patient sitting in chair with brother at bedside. Patient states she is feeling better today overall. She states her left arm feels and looks better, she endorses improved mobility. She denies left arm pain or drainage. She endorses urgency of bowel movements with diarrhea for the past few days. She denies pain with bowel movements or foul odor. She denies abdominal pain, nausea or vomiting. She denies shortness of breath, chest pain, palpitations, numbness, tingling, lightheadedness, dizziness or headaches. Patient states she would like to go home but is understandable and agreeable that she needs additional therapy. 1/25: pt samson well, sitting up in bed. Does not like her minced and moist diet. Denies n/v, admits to diarrhea however upon discussion with RN this is more soft rather than watery. Denies CP/SOB. Afebrile. Subjective (past 24 hours):   1/26:  Patient sitting up in bed. Pleasant affect. Denies any issues at this time but does seem mildly confused which is reportedly baseline for her. Past medical history, family history, social history and allergies reviewed again and is unchanged since admission. ROS (All review of systems completed. Pertinent positives noted.  Otherwise All other systems reviewed and negative.)     Medications:  Reviewed    Infusion Medications    sodium chloride       Scheduled Medications    linezolid  600 mg Oral 2 times per day    lactobacillus  1 capsule Oral Daily with breakfast    rivaroxaban  15 mg Oral Dinner    [Held by provider] bumetanide  0.5 mg Oral Daily    hydrocortisone  20 mg Oral QAM    hydrocortisone  10 mg Oral Nightly    levothyroxine  125 mcg Oral Daily    oxybutynin  15 mg Oral Daily    pantoprazole  40 mg Oral QAM AC    atorvastatin  20 mg Oral Daily    terbinafine  250 mg Oral Daily    sodium chloride flush  5-40 mL IntraVENous 2 times per day     PRN Meds: sodium chloride flush, sodium chloride, ondansetron **OR** ondansetron, polyethylene glycol, acetaminophen **OR** acetaminophen, potassium chloride **OR** potassium alternative oral replacement **OR** potassium chloride, magnesium sulfate      Intake/Output Summary (Last 24 hours) at 1/26/2023 0849  Last data filed at 1/25/2023 2008  Gross per 24 hour   Intake 1030 ml   Output --   Net 1030 ml         Diet:  ADULT ORAL NUTRITION SUPPLEMENT; Dinner; Frozen Oral Supplement  ADULT ORAL NUTRITION SUPPLEMENT; Breakfast, Lunch; Wound Healing Oral Supplement  ADULT DIET; Dysphagia - Minced and Moist; Moderately Thick (Honey)    Physical Exam:  BP (!) 141/77   Pulse 65   Temp 98.3 °F (36.8 °C) (Oral)   Resp 16   Ht 5' 3\" (1.6 m)   Wt 176 lb 12.9 oz (80.2 kg)   SpO2 99%   BMI 31.32 kg/m²   General appearance: Speaking softly throughout examination. No apparent distress, appears stated age and cooperative. HEENT: Pupils equal, round, and reactive to light. Conjunctivae/corneas clear. Neck: Supple, with full range of motion. No jugular venous distention. Trachea midline. Respiratory:  Normal respiratory effort. Clear to auscultation, bilaterally without Rales/Wheezes/Rhonchi. Cardiovascular: Regular rate and rhythm with normal S1/S2 without murmurs, rubs or gallops. Abdomen: Soft, non-tender, non-distended with normal bowel sounds. Musculoskeletal: Diminished active and passive range of motion of bilateral upper extremities. Active range of motion of left upper extremity improved from yesterday. Left upper extremity strength diminished. Skin: Diffuse bruising throughout right upper extremity and bilateral lower extremities. Patient states this is her baseline. Improving erythema and edema of left upper extremity up to mid upper arm. Sporadic hematomas of left upper extremity. No discharge of left upper extremity. Currently wrapped , dressing CD&I  Neurologic:  Neurovascularly intact without any focal sensory deficits. Cranial nerves: II-XII intact, grossly non-focal.  Psychiatric: Alert and oriented, thought content appropriate, normal insight. Conversationally confused   Capillary Refill: Brisk,< 3 seconds   Peripheral Pulses: +2 palpable, equal bilaterally     Labs:   Recent Labs     01/23/23  1445 01/24/23  1650   WBC  --  7.9   HGB 8.6* 8.9*   HCT 28.1* 29.0*   PLT  --  191       Recent Labs     01/24/23  1650 01/25/23  0519 01/25/23  1200 01/25/23  1630 01/25/23 2010    147* 145 145 145   K 3.3* 3.4*  --   --   --     112*  --   --   --    CO2 22* 26  --   --   --    BUN 4* 3*  --   --   --    CREATININE 0.6 0.6  --   --   --    CALCIUM 7.9* 7.5*  --   --   --        No results for input(s): AST, ALT, BILIDIR, BILITOT, ALKPHOS in the last 72 hours. No results for input(s): INR in the last 72 hours. No results for input(s): Savilla Hanks in the last 72 hours. Microbiology:    Blood culture #1:   Lab Results   Component Value Date/Time    BC No growth 24 hours. No growth 48 hours. 01/21/2023 09:01 PM       Blood culture #2:No results found for: Amaris Giraldo    Organism:  Lab Results   Component Value Date/Time    ORG Staphylococcus aureus 12/17/2018 04:04 PM         Lab Results   Component Value Date/Time    LABGRAM  02/16/2022 04:00 PM     No segmented neutrophils observed. Rare epithelial cells observed. No organisms observed.        MRSA culture only:No results found for: [facility]    Urine culture:   Lab Results   Component Value Date/Time    LABURIN No growth-preliminary  No growth   02/09/2017 12:35 PM       Respiratory culture: No results found for: CULTRESP    Aerobic and Anaerobic :  Lab Results   Component Value Date/Time    LABAERO No Acinetobacter species isolated. 01/04/2023 10:00 PM     Lab Results   Component Value Date/Time    LABANAE  12/01/2021 12:00 PM     No anaerobes isolated- preliminary Culture yielded light growth of gram positive bacilli most consistent with Cutibacterium species. Clinical correlation required. Urinalysis:      Lab Results   Component Value Date/Time    NITRU NEGATIVE 01/22/2023 12:02 AM    WBCUA 0-2 01/22/2023 12:02 AM    WBCUA 2-4 02/22/2012 01:00 PM    BACTERIA NONE SEEN 01/22/2023 12:02 AM    RBCUA 5-10 01/22/2023 12:02 AM    BLOODU MODERATE 01/22/2023 12:02 AM    SPECGRAV 1.025 09/18/2020 12:00 PM    GLUCOSEU NEGATIVE 01/22/2023 12:02 AM       Radiology:  CT HAND LEFT W CONTRAST   Final Result      Lateral portion of the left upper extremity is partly off the    field-of-view. Status post left shoulder hemiarthroplasty and total left hip replacement. Metallic streak artifact limits the evaluation of the adjacent structures. Diffuse soft tissue swelling, skin thickening and subcutaneous    edema/inflammation of the left upper arm, left forearm and left hand. Correlate clinically for cellulitis. No abscess is seen. This document has been electronically signed by: Katey Michele MD on    01/22/2023 06:43 AM      All CTs at this facility use dose modulation techniques and iterative    reconstructions, and/or weight-based dosing   when appropriate to reduce radiation to a low as reasonably achievable. CT RADIUS ULNA LEFT W CONTRAST   Final Result      Lateral portion of the left upper extremity is partly off the    field-of-view. Status post left shoulder hemiarthroplasty and total left hip replacement. Metallic streak artifact limits the evaluation of the adjacent structures.       Diffuse soft tissue swelling, skin thickening and subcutaneous    edema/inflammation of the left upper arm, left forearm and left hand. Correlate clinically for cellulitis. No abscess is seen. This document has been electronically signed by: Lukas Mejia MD on    01/22/2023 06:26 AM      All CTs at this facility use dose modulation techniques and iterative    reconstructions, and/or weight-based dosing   when appropriate to reduce radiation to a low as reasonably achievable. CT HUMERUS LEFT W CONTRAST   Final Result      Lateral portion of the left upper extremity is partly off the    field-of-view. Status post left shoulder hemiarthroplasty and total left hip replacement. Metallic streak artifact limits the evaluation of the adjacent structures. Diffuse soft tissue swelling, skin thickening and subcutaneous    edema/inflammation of the left upper arm, left forearm and left hand. Correlate clinically for cellulitis. No abscess is seen. This document has been electronically signed by: Lukas Mejia MD on    01/22/2023 06:42 AM      All CTs at this facility use dose modulation techniques and iterative    reconstructions, and/or weight-based dosing   when appropriate to reduce radiation to a low as reasonably achievable. XR CHEST PORTABLE   Final Result   Decrease in pulmonary interstitial densities compared to x-ray 1/4/2023. This document has been electronically signed by: Kayce Johansen MD on    01/21/2023 11:52 PM      XR RADIUS ULNA LEFT (2 VIEWS)   Final Result   No acute bony abnormality. This document has been electronically signed by: Kayce Johansen MD on    01/21/2023 11:51 PM      XR HAND LEFT (MIN 3 VIEWS)   Final Result   No acute osseous abnormality. This document has been electronically signed by: Kayce Johansen MD on    01/21/2023 10:30 PM      XR ELBOW LEFT (MIN 3 VIEWS)   Final Result   No acute bony abnormality.       This document has been electronically signed by: Kayce Johansen MD on 01/21/2023 10:28 PM        XR ELBOW LEFT (MIN 3 VIEWS)    Result Date: 1/21/2023  3 view right elbow Comparison: None Findings: No acute fractures. Normal alignment. No significant arthritic change or erosions. No joint effusion. No radiopaque foreign body. Diffuse edema. No acute bony abnormality. This document has been electronically signed by: Samir De León MD on 01/21/2023 10:28 PM    XR RADIUS ULNA LEFT (2 VIEWS)    Result Date: 1/21/2023  2 view left forearm Comparison: None Findings: No fractures or dislocations. No joint effusion. No significant arthritic change. No radiopaque foreign body. Extensive soft tissue edema. No acute bony abnormality. This document has been electronically signed by: Samir De León MD on 01/21/2023 11:51 PM    XR HAND LEFT (MIN 3 VIEWS)    Result Date: 1/21/2023  3 view left hand Comparison: None Findings: Osteopenia. No acute fractures. No significant loss of joint space or osteophytes. No erosions. No radiopaque foreign body. Diffuse edema. No acute osseous abnormality. This document has been electronically signed by: Samir De León MD on 01/21/2023 10:30 PM    CT HUMERUS LEFT W CONTRAST    Result Date: 1/22/2023  CT left upper extremity with contrast COMPARISON: No prior FINDINGS: The lateral portion of the left upper extremity is partly off the field-of-view. The patient is status post left shoulder hemiarthroplasty and total left hip replacement. Metallic streak artifact limits the evaluation of the adjacent structures. There is diffuse soft tissue swelling, skin thickening and subcutaneous edema/inflammation of the left upper arm, left forearm and left hand. No abscess is seen. No fracture or dislocation is seen. Lateral portion of the left upper extremity is partly off the field-of-view. Status post left shoulder hemiarthroplasty and total left hip replacement. Metallic streak artifact limits the evaluation of the adjacent structures.  Diffuse soft tissue swelling, skin thickening and subcutaneous edema/inflammation of the left upper arm, left forearm and left hand. Correlate clinically for cellulitis. No abscess is seen. This document has been electronically signed by: Fred Hu MD on 01/22/2023 06:42 AM All CTs at this facility use dose modulation techniques and iterative reconstructions, and/or weight-based dosing when appropriate to reduce radiation to a low as reasonably achievable. CT RADIUS ULNA LEFT W CONTRAST    Result Date: 1/22/2023  CT left upper extremity with contrast COMPARISON: No prior FINDINGS: The lateral portion of the left upper extremity is partly off the field-of-view. The patient is status post left shoulder hemiarthroplasty and total left hip replacement. Metallic streak artifact limits the evaluation of the adjacent structures. There is diffuse soft tissue swelling, skin thickening and subcutaneous edema/inflammation of the left upper arm, left forearm and left hand. No abscess is seen. No fracture or dislocation is seen. Lateral portion of the left upper extremity is partly off the field-of-view. Status post left shoulder hemiarthroplasty and total left hip replacement. Metallic streak artifact limits the evaluation of the adjacent structures. Diffuse soft tissue swelling, skin thickening and subcutaneous edema/inflammation of the left upper arm, left forearm and left hand. Correlate clinically for cellulitis. No abscess is seen. This document has been electronically signed by: Fred Hu MD on 01/22/2023 06:26 AM All CTs at this facility use dose modulation techniques and iterative reconstructions, and/or weight-based dosing when appropriate to reduce radiation to a low as reasonably achievable. CT HAND LEFT W CONTRAST    Result Date: 1/22/2023  CT left upper extremity with contrast COMPARISON: No prior FINDINGS: The lateral portion of the left upper extremity is partly off the field-of-view.  The patient is status post left shoulder hemiarthroplasty and total left hip replacement. Metallic streak artifact limits the evaluation of the adjacent structures. There is diffuse soft tissue swelling, skin thickening and subcutaneous edema/inflammation of the left upper arm, left forearm and left hand. No abscess is seen. No fracture or dislocation is seen. Lateral portion of the left upper extremity is partly off the field-of-view. Status post left shoulder hemiarthroplasty and total left hip replacement. Metallic streak artifact limits the evaluation of the adjacent structures. Diffuse soft tissue swelling, skin thickening and subcutaneous edema/inflammation of the left upper arm, left forearm and left hand. Correlate clinically for cellulitis. No abscess is seen. This document has been electronically signed by: Gretchen Rinaldi MD on 01/22/2023 06:43 AM All CTs at this facility use dose modulation techniques and iterative reconstructions, and/or weight-based dosing when appropriate to reduce radiation to a low as reasonably achievable. XR CHEST PORTABLE    Result Date: 1/21/2023  1 view chest x-ray Comparison: CR/SR - XR CHEST PORTABLE - 01/04/2023 04:26 PM EST Findings: Decrease in pulmonary interstitial densities compared to x-ray 1/4/2023. Right internal iliac catheter terminates in the right atrium. Status post bilateral hip replacement. No acute fractures. Decrease in pulmonary interstitial densities compared to x-ray 1/4/2023.  This document has been electronically signed by: Triny Bradley MD on 01/21/2023 11:52 PM      Electronically signed by Eliazar Santiago PA-C on 1/26/2023 at 8:49 AM

## 2023-01-26 NOTE — PROGRESS NOTES
[address] [address]  INPATIENT PHYSICAL THERAPY  DAILY NOTE  [facility] ONC MED 5K - 5K-15/015-A     Time In: 9279  Time Out: 1633  Timed Code Treatment Minutes: 13 Minutes  Minutes: 13          Date: 2023  Patient Name: Nanci Perkins,  Gender:  female        MRN: [id number]  : 1950  (71 y.o.)     Referring Practitioner: Haroon Gaines PA-C  Diagnosis: Hypokalemia  Additional Pertinent Hx: 71 y.o. female with PMHx of paroxysmal A-fib, HTN, HFpEF, hypothyroidism, severe protein calorie malnutrition, and physical debility with deconditioning who presented to [facility] with chief complaint of left upper extremity swelling and pain. Patient was recently discharged on 23 for submandibular cellulitis and discharged to SNF. She endorses having no recollection of injuring her left arm, but stated that the arm started to swell just 3 days ago. Around that time she had stopped her outpatient course of antibiotics. At first when the swelling started she stated that she did not think much about it as it was not bothering her. Then today her arm was quite tender to touch and just throbbed all day, hence the admission to [facility]. Currently she is not having any pain, but that was after administration of Morphine while in the ED. She denies any dizziness, chest pain or shortness of breath. She denies any sick contacts. She does not endorse any tingling or numbness that is unusual for her.      Prior Level of Function:  Type of Home: Facility  Home Layout: One level  Home Access: Level entry  Home Equipment: Rosbian Pillow Shower/Tub: Walk-in shower  Bathroom Toilet: Handicap height  Bathroom Equipment: Grab bars in shower, Shower chair, Grab bars around toilet  Bathroom Accessibility: Walker accessible    Receives Help From: Family, Other (comment) (facility staff)  ADL Assistance: Needs assistance  Homemaking Assistance: Needs assistance  Ambulation Assistance: Non-ambulatory  Transfer Assistance: Needs assistance  Active : No  Additional Comments: Pt originally home from wtih  but has been in/out of hospital recently and at [facility] for therapy d/t deconditioning and  recently undergoing back surgery and unable to assist her- is main caregiver; baseline is stand pivot only. ramp into entrance of home. Restrictions/Precautions:  Restrictions/Precautions: General Precautions, Fall Risk  Position Activity Restriction  Other position/activity restrictions: skin tears easily, hx CVA with L sided deficits      SUBJECTIVE: Pt resting in bed and had been up with OT earlier. Pt agreed to LE exercises. PAIN: did not rate      Vitals: Vitals not assessed per clinical judgement, see nursing flowsheet    OBJECTIVE:  Bed Mobility:  Not Tested    Transfers:  Not Tested    Ambulation:  Not Tested    Exercise:  Patient was guided in 1 set(s) 10 reps of exercise to both lower extremities. Ankle pumps, Glut sets, Quad sets, Heelslides, Short arc quads, and Hip abduction/adduction. Exercises were completed for increased independence with functional mobility. Functional Outcome Measures: Not completed       ASSESSMENT:  Assessment: Patient progressing toward established goals. Activity Tolerance:  Patient tolerance of  treatment: good.        Equipment Recommendations:Equipment Needed: No  Discharge Recommendations: Continue to assess pending progress and Subacte/Skilled Nursing Facility  Plan: Current Treatment Recommendations: Strengthening, Balance training, Functional mobility training, Transfer training, Endurance training, Safety education & training, Therapeutic activities  General Plan:  (3-5x GM)    Patient Education  Patient Education: Plan of Care, Home Exercise Program    Goals:  Patient Goals : none stated  Short Term Goals  Time Frame for Short Term Goals: by discharge  Short Term Goal 1: bed mobility with HOB flat, no rails, mod I for increased functional ind  Short Term Goal 2: sit<>Stand from various surfaces with LRD mod I for safe transfers  Short Term Goal 3: pt to transfer with ayo taylor with CGA for increased safety with transfers  Long Term Goals  Time Frame for Long Term Goals : NA d/t short ELOS    Following session, patient left in safe position with all fall risk precautions in place. Kristian Shea.  Dolores Hale [place] 8

## 2023-01-26 NOTE — PROGRESS NOTES
[address] [facility] 5K  Occupational Therapy  Daily Note  Time:   Time In: 1401  Time Out: 1455  Timed Code Treatment Minutes: 47 Minutes  Minutes: 54          Date: 2023  Patient Name: Aman Fishman,   Gender: female      Room: -15/015-A  MRN: [id number]  : 1950  (71 y.o.)  Referring Practitioner: Noah Serna PA-C  Diagnosis: cellulitis  Additional Pertinent Hx: per chart review; Aman Fishman is a 71 y.o. female with PMHx of paroxysmal A-fib, HTN, HFpEF, hypothyroidism, severe protein calorie malnutrition, and physical debility with deconditioning who presented to [facility] with chief complaint of left upper extremity swelling and pain. Patient was recently discharged on 23 for submandibular cellulitis and discharged to SNF. She endorses having no recollection of injuring her left arm, but stated that the arm started to swell just 3 days ago. Around that time she had stopped her outpatient course of antibiotics. At first when the swelling started she stated that she did not think much about it as it was not bothering her. Then today her arm was quite tender to touch and just throbbed all day, hence the admission to [facility]. Currently she is not having any pain, but that was after administration of Morphine while in the ED. She denies any dizziness, chest pain or shortness of breath. She denies any sick contacts. She does not endorse any tingling or numbness that is unusual for her    Restrictions/Precautions:  Restrictions/Precautions: General Precautions, Fall Risk  Position Activity Restriction  Other position/activity restrictions: skin tears easily, hx CVA with L sided deficits     SUBJECTIVE: Patient sleeping in bed upon arrival with minimal verbal stimulation to alert; agreeable to therapy this date. Patient pleasant and cooperative throughout.   Upon standing, patient developed skin tear to L posterior side of distal UE where blood blister was present with nursing notified and present to apply strips/gauze. PAIN: 0/10:     Vitals: Vitals not assessed per clinical judgement, see nursing flowsheet    COGNITION: Decreased Problem Solving    ADL:   Bathing: Maximum Assistance. Supine in bed  Upper Extremity Dressing: Moderate Assistance. Assist to thread LUE, tie gown x 2   Footwear Management: Maximum Assistance. Davie B socks, sanderson shoe to LLE  Toileting: Dependent. pericare. BALANCE:  Sitting Balance:  Stand By Assistance, X 1. Seated EOB  Standing Balance: Moderate Assistance, X 2, with cues for safety, with verbal cues , with increased time for completion, within [facility] x 2 trials x 1 min. LLE management d/t extensor tone    BED MOBILITY:  Rolling to Left: Moderate Assistance, X 1, with head of bed flat, with rail, with verbal cues  x 2  Rolling to Right: Moderate Assistance, X 1, with head of bed flat, with rail, with verbal cues , with increased time for completion x2   Supine to Sit: Moderate Assistance, X 1, with head of bed flat, with rail, with verbal cues , with increased time for completion    Sit to Supine: Moderate Assistance, X 1, with head of bed flat, with rail, with verbal cues , with increased time for completion    Scooting: Dependent hercules    TRANSFERS:  Sit to Stand:  Maximum Assistance, X 2, with [place], with increased time for completion, cues for hand placement, with verbal cues. Stand to Sit: Minimal Assistance, X 1, with increased time for completion, cues for hand placement. Within [facility]    ASSESSMENT:     Activity Tolerance:  Patient tolerance of  treatment: good. Discharge Recommendations: Subacute/skilled nursing facility  Equipment Recommendations: Equipment Needed: Yes  Mobility Devices: ADL Assistive Devices  Plan: Times Per Week: 5x  Times Per Day:  Once a day  Current Treatment Recommendations: Strengthening, ROM, Balance training, Functional mobility training, Endurance training, Neuromuscular re-education, Safety education & training, Equipment evaluation, education, & procurement, Patient/Caregiver education & training, Self-Care / ADL, Coordination training    Patient Education  Patient Education: Role of OT, Plan of Care, and ADL's    Goals  Short Term Goals  Time Frame for Short Term Goals: by discharge  Short Term Goal 1: patient will tolerate 2 min static standing with sit to stand and maintain upright posture with MIN A x 1. Short Term Goal 2: patient will participate in (B) UE AROM HEP and R UE strengthening to increase UB strength and function for functional transfers. Short Term Goal 3: patient will complete simple UB ADLs with MIN  A and edu in juan tech to compensate for L UE hemiparesis. Following session, patient left in safe position with all fall risk precautions in place.

## 2023-01-26 NOTE — CARE COORDINATION
1/26/23, 7:23 AM EST    DISCHARGE PLANNING EVALUATION    Message from Adriana Graham with [facility], they do not have any open beds. Call to [facility], spoke with [facility], admissions person not in yet this morning, however they asked to have referral faxed to [phone] and they will review. Call to [facility], left a voicemail for a call back. 10:14 AM  Call to [facility] again, they will call SW back once reviewing referral.     2:25 PM  Call to [facility], spoke with Maty Vazquez, reports they think she may need LTC and they do not have bed available. Report sister facility in [place] may have something available. Call to pt's , left a voicemail for a call back. 2:38 PM  Call to [facility] skilled nursing [place], left a voicemail for a call back.

## 2023-01-26 NOTE — PLAN OF CARE
Problem: Discharge Planning  Goal: Discharge to home or other facility with appropriate resources  1/26/2023 0121 by Halle Carbajal RN  Outcome: Progressing  Flowsheets (Taken 1/25/2023 2005)  Discharge to home or other facility with appropriate resources:   Identify barriers to discharge with patient and caregiver   Arrange for needed discharge resources and transportation as appropriate   Identify discharge learning needs (meds, wound care, etc)     Problem: Safety - Adult  Goal: Free from fall injury  1/26/2023 0121 by Halle Carbajal RN  Outcome: Progressing  Flowsheets (Taken 1/25/2023 2005)  Free From Fall Injury: Instruct family/caregiver on patient safety     Problem: Safety - Adult  Goal: Educate on transmission based isolation - droplet  Description: Educate on transmission based isolation   1/26/2023 0121 by Halle Carbajal RN  Outcome: Progressing     Problem: Pain  Goal: Verbalizes/displays adequate comfort level or baseline comfort level  1/26/2023 0121 by Halle Carbajal RN  Outcome: Progressing  Flowsheets (Taken 1/25/2023 2005)  Verbalizes/displays adequate comfort level or baseline comfort level:   Encourage patient to monitor pain and request assistance   Assess pain using appropriate pain scale   Implement non-pharmacological measures as appropriate and evaluate response   Administer analgesics based on type and severity of pain and evaluate response     Problem: Nutrition Deficit:  Goal: Optimize nutritional status  1/26/2023 0121 by Halle Carbajal RN  Outcome: Progressing     Problem: Skin/Tissue Integrity  Goal: Absence of new skin breakdown  Description: 1. Monitor for areas of redness and/or skin breakdown  2. Assess vascular access sites hourly  3. Every 4-6 hours minimum:  Change oxygen saturation probe site  4. Every 4-6 hours:  If on nasal continuous positive airway pressure, respiratory therapy assess nares and determine need for appliance change or resting period.   1/26/2023 0121 by Kym Jose RN  Outcome: Progressing     Problem: Chronic Conditions and Co-morbidities  Goal: Patient's chronic conditions and co-morbidity symptoms are monitored and maintained or improved  1/26/2023 0121 by Kym Jose RN  Outcome: Progressing  Flowsheets (Taken 1/25/2023 2005)  Care Plan - Patient's Chronic Conditions and Co-Morbidity Symptoms are Monitored and Maintained or Improved: Monitor and assess patient's chronic conditions and comorbid symptoms for stability, deterioration, or improvement    Care plan reviewed with patient. Patient verbalize understanding of the plan of care and contribute to goal setting.

## 2023-01-27 LAB
ANION GAP SERPL CALC-SCNC: 6 MEQ/L (ref 8–16)
BUN SERPL-MCNC: 4 MG/DL (ref 7–22)
CALCIUM SERPL-MCNC: 7.7 MG/DL (ref 8.5–10.5)
CHLORIDE SERPL-SCNC: 109 MEQ/L (ref 98–111)
CO2 SERPL-SCNC: 25 MEQ/L (ref 23–33)
CREAT SERPL-MCNC: 0.5 MG/DL (ref 0.4–1.2)
GFR SERPL CREATININE-BSD FRML MDRD: > 60 ML/MIN/1.73M2
GLUCOSE BLD STRIP.AUTO-MCNC: 120 MG/DL (ref 70–108)
GLUCOSE BLD STRIP.AUTO-MCNC: 129 MG/DL (ref 70–108)
GLUCOSE BLD STRIP.AUTO-MCNC: 130 MG/DL (ref 70–108)
GLUCOSE BLD STRIP.AUTO-MCNC: 93 MG/DL (ref 70–108)
GLUCOSE SERPL-MCNC: 126 MG/DL (ref 70–108)
POTASSIUM SERPL-SCNC: 4.5 MEQ/L (ref 3.5–5.2)
SODIUM SERPL-SCNC: 139 MEQ/L (ref 135–145)
SODIUM SERPL-SCNC: 140 MEQ/L (ref 135–145)
SODIUM SERPL-SCNC: 141 MEQ/L (ref 135–145)

## 2023-01-27 PROCEDURE — 36592 COLLECT BLOOD FROM PICC: CPT

## 2023-01-27 PROCEDURE — 6360000002 HC RX W HCPCS

## 2023-01-27 PROCEDURE — 6370000000 HC RX 637 (ALT 250 FOR IP): Performed by: INTERNAL MEDICINE

## 2023-01-27 PROCEDURE — 2580000003 HC RX 258

## 2023-01-27 PROCEDURE — 97530 THERAPEUTIC ACTIVITIES: CPT

## 2023-01-27 PROCEDURE — 82948 REAGENT STRIP/BLOOD GLUCOSE: CPT

## 2023-01-27 PROCEDURE — 1200000003 HC TELEMETRY R&B

## 2023-01-27 PROCEDURE — 36415 COLL VENOUS BLD VENIPUNCTURE: CPT

## 2023-01-27 PROCEDURE — 1200000000 HC SEMI PRIVATE

## 2023-01-27 PROCEDURE — 84295 ASSAY OF SERUM SODIUM: CPT

## 2023-01-27 PROCEDURE — 99232 SBSQ HOSP IP/OBS MODERATE 35: CPT | Performed by: PHYSICIAN ASSISTANT

## 2023-01-27 PROCEDURE — 6370000000 HC RX 637 (ALT 250 FOR IP): Performed by: PHYSICIAN ASSISTANT

## 2023-01-27 PROCEDURE — 80048 BASIC METABOLIC PNL TOTAL CA: CPT

## 2023-01-27 PROCEDURE — 2580000003 HC RX 258: Performed by: PHYSICIAN ASSISTANT

## 2023-01-27 PROCEDURE — 99222 1ST HOSP IP/OBS MODERATE 55: CPT | Performed by: INTERNAL MEDICINE

## 2023-01-27 PROCEDURE — 6370000000 HC RX 637 (ALT 250 FOR IP)

## 2023-01-27 RX ADMIN — OXYBUTYNIN CHLORIDE 15 MG: 10 TABLET, EXTENDED RELEASE ORAL at 10:23

## 2023-01-27 RX ADMIN — LEVOTHYROXINE SODIUM 125 MCG: 0.12 TABLET ORAL at 10:23

## 2023-01-27 RX ADMIN — LINEZOLID 600 MG: 600 TABLET, FILM COATED ORAL at 22:21

## 2023-01-27 RX ADMIN — ATORVASTATIN CALCIUM 20 MG: 20 TABLET, FILM COATED ORAL at 10:23

## 2023-01-27 RX ADMIN — PANTOPRAZOLE SODIUM 40 MG: 40 TABLET, DELAYED RELEASE ORAL at 06:25

## 2023-01-27 RX ADMIN — Medication 1 CAPSULE: at 12:50

## 2023-01-27 RX ADMIN — SODIUM CHLORIDE, PRESERVATIVE FREE 10 ML: 5 INJECTION INTRAVENOUS at 22:20

## 2023-01-27 RX ADMIN — HYDROCORTISONE 10 MG: 10 TABLET ORAL at 22:21

## 2023-01-27 RX ADMIN — HYDROCORTISONE 20 MG: 10 TABLET ORAL at 10:23

## 2023-01-27 RX ADMIN — DEXTROSE MONOHYDRATE: 50 INJECTION, SOLUTION INTRAVENOUS at 01:15

## 2023-01-27 RX ADMIN — SODIUM CHLORIDE, PRESERVATIVE FREE 10 ML: 5 INJECTION INTRAVENOUS at 10:22

## 2023-01-27 RX ADMIN — RIVAROXABAN 15 MG: 15 TABLET, FILM COATED ORAL at 19:13

## 2023-01-27 RX ADMIN — LINEZOLID 600 MG: 600 TABLET, FILM COATED ORAL at 10:23

## 2023-01-27 RX ADMIN — POTASSIUM CHLORIDE 10 MEQ: 7.46 INJECTION, SOLUTION INTRAVENOUS at 02:48

## 2023-01-27 RX ADMIN — TERBINAFINE TABLETS 250 MG 250 MG: 250 TABLET ORAL at 10:22

## 2023-01-27 RX ADMIN — POTASSIUM CHLORIDE 10 MEQ: 7.46 INJECTION, SOLUTION INTRAVENOUS at 01:06

## 2023-01-27 ASSESSMENT — ENCOUNTER SYMPTOMS
BACK PAIN: 0
SHORTNESS OF BREATH: 0
COUGH: 0
EYES NEGATIVE: 1
VOMITING: 0
NAUSEA: 0
DIARRHEA: 0
ABDOMINAL PAIN: 0
SORE THROAT: 0
EYE PAIN: 0

## 2023-01-27 NOTE — PROGRESS NOTES
Progress note: Infectious diseases    Patient - Jonah Hugo,  Age - 71 y.o.    - 1950      Room Number - 5K-15/015-A   MRN -  [id number]   Acct # - [de-identified]  Date of Admission -  2023  7:56 PM    SUBJECTIVE:   Patient seen and examined this AM. Doing well, left arm erythema improved. OBJECTIVE   VITALS    height is 5' 3\" (1.6 m) and weight is 176 lb 12.9 oz (80.2 kg). Her oral temperature is 98.1 °F (36.7 °C). Her blood pressure is 128/60 and her pulse is 76. Her respiration is 14 and oxygen saturation is 99%. Wt Readings from Last 3 Encounters:   23 176 lb 12.9 oz (80.2 kg)   01/15/23 183 lb 4.8 oz (83.1 kg)   22 163 lb 1.6 oz (74 kg)       I/O (24 Hours)    Intake/Output Summary (Last 24 hours) at 2023 1046  Last data filed at 2023 1024  Gross per 24 hour   Intake 440 ml   Output 1100 ml   Net -660 ml       General Appearance  Awake, alert, oriented,  not  In acute distress  HEENT - normocephalic, atraumatic, pink conjunctiva,  anicteric sclera  Neck - Supple, no mass  Lungs -  Bilateral good air entry, no rhonchi, no wheeze  Cardiovascular - Heart sounds are normal.  Regular rate and rhythm without murmur, gallop or rub. Abdomen - soft, not distended, nontender,   Neurologic - Alert and oriented  Skin - No bruising or bleeding  Extremities - +edema of extremites, has skin tear on the left upper arm, the redness has resolved.       MEDICATIONS:      potassium chloride  40 mEq Oral Dinner    linezolid  600 mg Oral 2 times per day    lactobacillus  1 capsule Oral Daily with breakfast    rivaroxaban  15 mg Oral Dinner    [Held by provider] bumetanide  0.5 mg Oral Daily    hydrocortisone  20 mg Oral QAM    hydrocortisone  10 mg Oral Nightly    levothyroxine  125 mcg Oral Daily    oxybutynin  15 mg Oral Daily    pantoprazole  40 mg Oral QAM AC    atorvastatin  20 mg Oral Daily terbinafine  250 mg Oral Daily    sodium chloride flush  5-40 mL IntraVENous 2 times per day      sodium chloride       sodium chloride flush, sodium chloride, ondansetron **OR** ondansetron, polyethylene glycol, acetaminophen **OR** acetaminophen, potassium chloride **OR** potassium alternative oral replacement **OR** potassium chloride, magnesium sulfate      LABS:     CBC:   Recent Labs     01/24/23  1650   WBC 7.9   HGB 8.9*        BMP:    Recent Labs     01/25/23  0519 01/25/23  1200 01/26/23  1231 01/26/23  2115 01/27/23  0110 01/27/23  0502   *   < > 149* 142 139 140   K 3.4*  --  2.8*  --   --  4.5   *  --  114*  --   --  109   CO2 26  --  28  --   --  25   BUN 3*  --  5*  --   --  4*   CREATININE 0.6  --  0.6  --   --  0.5   GLUCOSE 114*  --  111*  --   --  126*    < > = values in this interval not displayed. Calcium:  Recent Labs     01/27/23  0502   CALCIUM 7.7*     Ionized Calcium:No results for input(s): IONCA in the last 72 hours. Magnesium:  Recent Labs     01/26/23  1231   MG 2.1     Phosphorus:No results for input(s): PHOS in the last 72 hours. BNP:No results for input(s): BNP in the last 72 hours. Glucose:  Recent Labs     01/27/23  0126 01/27/23  0501 01/27/23  0917   POCGLU 130* 129* 93     HgbA1C: No results for input(s): LABA1C in the last 72 hours. INR: No results for input(s): INR in the last 72 hours. Hepatic: No results for input(s): ALKPHOS, ALT, AST, PROT, BILITOT, BILIDIR, LABALBU in the last 72 hours. Amylase and Lipase:No results for input(s): LACTA, AMYLASE in the last 72 hours. Lactic Acid: No results for input(s): LACTA in the last 72 hours. Troponin: No results for input(s): CKTOTAL, CKMB, TROPONINI in the last 72 hours. BNP: No results for input(s): BNP in the last 72 hours.     CULTURES:   UA: No results for input(s): SPECGRAV, [address] [facility], [address] [place] [address] 37, [address], 18 [facility], [address] [facility] Xander Callejasaroldo Tracyvivi [facility] 89., [address] [place] [address],  Madelin Alves [facility] 298, [address], Madelinraquel Wallace 994, 12 [facility], [address] Sakshi Hernandez, LABCAST, LABCASTTY, AMORPHOS in the last 72 hours. Invalid input(s): CRYSTALS  Micro:   Lab Results   Component Value Date/Time    [facility]  01/21/2023 09:01 PM     No growth 24 hours. No growth 48 hours. No growth at 5 days            Problem list of patient:     Patient Active Problem List   Diagnosis Code    Asthma J45.909    Hyperlipidemia E78.5    Osteoarthritis M19.90    GERD (gastroesophageal reflux disease) K21.9    Meningioma ([facility]) D32.9    Mechanical loosening of prosthetic joint ([facility]) T84.039A    Hyperglycemia R73.9    SIRS (systemic inflammatory response syndrome) ([facility]) R65.10    Leukocytosis D72.829    Right sided weakness R53.1    Cerebrovascular disease T52.7    Metabolic acidosis E53.69    Sinus bradycardia R00.1    Generalized weakness R53.1    Hx of subdural hematoma Z86.79    Cerebral infarction (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) E23.2    Somnolence R40.0    PAF (paroxysmal atrial fibrillation) (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) G81.94    History of CVA with residual deficit I69.30    Subdural hematoma S06. 5XAA    JESSE (acute kidney injury) ([facility] N17.9    Hyperkalemia E87.5    Gastroenteritis due to norovirus T83.50    Acute diastolic heart failure ([facility]) I50.31    History of stroke with residual effects I69.30    Chronic venous stasis dermatitis of both lower extremities I87.2    Normocytic anemia D64.9    Chronic diastolic congestive heart failure ([facility] I50.32    CKD (chronic kidney disease), stage IV (HCC) N18.4    Confusion R41.0    Chronic kidney disease (CKD), stage III (moderate) (HCC) N18.30    Obesity (BMI 30.0-34. 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 Currently on oral Zyvox. Continue 7 day total course.   Atrial fibrillation  Anemia  HTN  Adrenal insufficiency: On chronic steroids  Hypernatremia, resolved  Continue current treatment      Cody Clark MD  1/27/2023 10:46 AM

## 2023-01-27 NOTE — PROGRESS NOTES
[address] [facility] 5K  Occupational Therapy  Daily Note  Time:   Time In: 7144  Time Out: 1116  Timed Code Treatment Minutes: 23 Minutes  Minutes: 23          Date: 2023  Patient Name: Mary Beth Srivastava,   Gender: female      Room: -15/015-A  MRN: [id number]  : 1950  (71 y.o.)  Referring Practitioner: Urvashi Aguillon PA-C  Diagnosis: cellulitis  Additional Pertinent Hx: per chart review; Mary Beth Srivastava is a 71 y.o. female with PMHx of paroxysmal A-fib, HTN, HFpEF, hypothyroidism, severe protein calorie malnutrition, and physical debility with deconditioning who presented to [facility] with chief complaint of left upper extremity swelling and pain. Patient was recently discharged on 23 for submandibular cellulitis and discharged to SNF. She endorses having no recollection of injuring her left arm, but stated that the arm started to swell just 3 days ago. Around that time she had stopped her outpatient course of antibiotics. At first when the swelling started she stated that she did not think much about it as it was not bothering her. Then today her arm was quite tender to touch and just throbbed all day, hence the admission to [facility]. Currently she is not having any pain, but that was after administration of Morphine while in the ED. She denies any dizziness, chest pain or shortness of breath. She denies any sick contacts. She does not endorse any tingling or numbness that is unusual for her    Restrictions/Precautions:  Restrictions/Precautions: General Precautions, Fall Risk  Position Activity Restriction  Other position/activity restrictions: skin tears easily, hx CVA with L sided deficits     SUBJECTIVE: Nurse approved Tx. Pt agreeable to Tx. PAIN: not stated      Vitals: Vitals not assessed per clinical judgement, see nursing flowsheet    COGNITION: Slow Processing, Decreased Recall, and Decreased Insight    ADL:   No ADL's completed this session. Remington Postal     BALANCE:  Sitting Balance: Contact Guard Assistance. EOB supported   Standing Balance: Maximum Assistance, X 2, with verbal cues , with increased time for completion. Use of ayo stedy. Pt never reached full stand. BED MOBILITY:  Sit to Supine: Maximum Assistance, X 1, with head of bed raised, with rail, with verbal cues , with increased time for completion    Scooting: Maximum Assistance, X 1, with head of bed flat, with verbal cues , with increased time for completion      TRANSFERS:  Sit to Stand:  Maximum Assistance, X 2, with Kye Pavy, with increased time for completion, cues for hand placement, with verbal cues. Stand to Sit: Maximum Assistance, X 2, with Key Pavy, with increased time for completion, cues for hand placement. 3 attempts to stand transfer to ayo stedy          ASSESSMENT:     Activity Tolerance:  Patient tolerance of  treatment: fair. Discharge Recommendations: ECF with OT  Equipment Recommendations: Equipment Needed: Yes  Mobility Devices: ADL Assistive Devices  Plan: Times Per Week: 5x  Times Per Day: Once a day  Current Treatment Recommendations: Strengthening, ROM, Balance training, Functional mobility training, Endurance training, Neuromuscular re-education, Safety education & training, Equipment evaluation, education, & procurement, Patient/Caregiver education & training, Self-Care / ADL, Coordination training    Patient Education  Patient Education: Role of OT, Plan of Care, and Importance of Increasing Activity     Goals  Short Term Goals  Time Frame for Short Term Goals: by discharge  Short Term Goal 1: patient will tolerate 2 min static standing with sit to stand and maintain upright posture with MIN A x 1. Short Term Goal 2: patient will participate in (B) UE AROM HEP and R UE strengthening to increase UB strength and function for functional transfers. Short Term Goal 3: patient will complete simple UB ADLs with MIN  A and edu in juan tech to compensate for L UE hemiparesis.     Following session, patient left in safe position with all fall risk precautions in place.

## 2023-01-27 NOTE — CONSULTS
[facility]          [facility] [address]        [address] [facility], [facility] Gurjit Balderrama [facility]        [phone]           Inpatient Initial consult note         1/27/2023 8:03 AM      Patient Name:   Vinod Abraham  YOB: 1950  Primary Care Physician:  Veronique Connors MD   Admit Date:    1/21/2023  7:56 PM    Consultation requested by : Daisy Alan PA-C    Reason for Consult : Nephrology following for hyponatremia. History of presenting illness :Vinod Abraham is a 71 y.o.   female with Past Medical History as stated below presented with a chief complaint of Arm Swelling   on 1/21/2023 . Patient presented with chief complaints of swelling and pain in the left upper extremity. Does not remember injuring her left arm symptoms started 3 days ago symptoms have gradually gotten worse associated with tenderness to touch and throbbing all day so she came to the ED no associated dizziness chest pain or shortness of breath no other modifying factors. Patient was recently treated for submandibular cellulitis and was discharged to skilled nursing facility. Nephrology has been consulted for evaluation of electrolyte abnormalities    Tenderness on my exam patient appears to be slightly disoriented seems to be oriented.     Past History:  Past Medical History:   Diagnosis Date    Asthma     uses albuterol 1-2x per year    Atrial thrombosis     on [address]    Bursitis     right shoulder -s/p steroid injections    Cancer (HCC)     Chronic diastolic heart failure (HCC)     CVA (cerebral infarction) 6/14/15    Mult. 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 Jemal Gallagher    Hypothyroidism Meningioma [facility])     3 separate meningiomas, s/p gamma knife (1/2012) , Dr. Irma Duenas at [facility]    MRSA nasal colonization 6/2013    Obesity (BMI 30-39. 9)     Osteoarthritis     PAF (paroxysmal atrial fibrillation) (HCC)     Panhypopituitarism (HCC)     status post resection for macroadenoma in the 1970's    Pneumonia 11/2018    Stroke [facility]) 1988    due to radiation -bleeding CVA - residual left upper and lower extremity weakness    Stroke [facility]) October 2015    Urinary incontinence      Past Surgical History:   Procedure Laterality Date    [address] due to pituitary mass, drained and s/p radiation    CHOLECYSTECTOMY      HYSTERECTOMY (CERVIX STATUS UNKNOWN)      total done for DUB    OTHER SURGICAL HISTORY  9/30/2014    LAPAROSCOPIC RUPTURED APPENDECTOMY    OTHER SURGICAL HISTORY Left 10/26/15    Evacuation and Debridement of Left Lower Leg Hematoma - Dr. Sahil Mandel  3/13/2013    left    SHOULDER ARTHROPLASTY      right    TOOTH EXTRACTION  03/19/2018    TOTAL HIP ARTHROPLASTY      bilateral    TOTAL KNEE ARTHROPLASTY      right      Social History     Socioeconomic History    Marital status:      Spouse name: Not on file    Number of children: 2    Years of education: Not on file    Highest education level: Not on file   Occupational History    Not on file   Tobacco Use    Smoking status: Never    Smokeless tobacco: Never   Vaping Use    Vaping Use: Never used   Substance and Sexual Activity    Alcohol use: No    Drug use: No    Sexual activity: Yes     Partners: Male   Other Topics Concern    Not on file   Social History Narrative    Not on file     Social Determinants of Health     Financial Resource Strain: Not on file   Food Insecurity: Not on file   Transportation Needs: Not on file   Physical Activity: Not on file   Stress: Not on file   Social Connections: Not on file   Intimate Partner Violence: Not on file   Housing Stability: Not on file     Family History   Problem Relation Age of Onset    High Blood Pressure Mother     Stroke Maternal Grandmother 72       Medications & Allergies :  Prior to Admission medications    Medication Sig Start Date End Date Taking? Authorizing Provider   bumetanide (BUMEX) 1 MG tablet Take 0.5 tablets by mouth daily 1/17/23   Maureen Oliver DO   levothyroxine (SYNTHROID) 125 MCG tablet TAKE 1 TABLET BY MOUTH DAILY 1/9/23   Jarett Rose MD   terbinafine (LAMISIL) 250 MG tablet Take 1 tablet by mouth daily 11/21/22 5/20/23  Rj Vanessa MD   rivaroxaban (XARELTO) 15 MG TABS tablet TAKE 1 TABLET BY MOUTH DAILY WITH SUPPER 10/14/22   Sharon Weinstein MD   oxybutynin (DITROPAN XL) 15 MG extended release tablet TAKE 1 TABLET BY MOUTH DAILY 10/3/22   Jarett Rose MD   pantoprazole (PROTONIX) 40 MG tablet TAKE 1 TABLET BY MOUTH EVERY NIGHT 5/2/22   Jarett Rose MD   chlorhexidine (HIBICLENS) 4 % external liquid Wash hair with small amount twice weekly.  6/17/21   Reyna Cespedes, APRN - CNP   miconazole (MICOTIN) 2 % powder Apply topically 2 times daily PRN for excoriation 4/14/17   Daren Keen MD   hydrocortisone (CORTEF) 20 MG tablet Take 20 mg by mouth every morning     Historical Provider, MD   hydrocortisone (CORTEF) 5 MG tablet Take 10 mg by mouth nightly     Historical Provider, MD   diphenhydrAMINE-APAP, sleep, (TYLENOL PM EXTRA STRENGTH)  MG tablet Take 1 tablet by mouth nightly    Historical Provider, MD   acetaminophen (TYLENOL) 325 MG tablet Take 650 mg by mouth every 6 hours as needed for Pain 1 or 2 tabs q6h prn pain    Historical Provider, MD   simvastatin (ZOCOR) 20 MG tablet Take 1 tablet by mouth nightly 7/2/15   Deepika Johnson MD     Allergies: Pregabalin and Tape Clois Vaishali tape]  IP meds : Scheduled Meds:   potassium chloride  40 mEq Oral Dinner    linezolid  600 mg Oral 2 times per day    lactobacillus  1 capsule Oral Daily with breakfast    rivaroxaban  15 mg Oral Dinner    [Held by provider] bumetanide  0.5 mg Oral Daily    hydrocortisone  20 mg Oral QAM    hydrocortisone  10 mg Oral Nightly    levothyroxine  125 mcg Oral Daily    oxybutynin  15 mg Oral Daily    pantoprazole  40 mg Oral QAM AC    atorvastatin  20 mg Oral Daily    terbinafine  250 mg Oral Daily    sodium chloride flush  5-40 mL IntraVENous 2 times per day     Continuous Infusions:   dextrose 150 mL/hr at 01/27/23 0248    sodium chloride         Review of Systems  Review of Systems   Constitutional:  Positive for fatigue. Negative for appetite change, chills and fever. HENT:  Negative for ear pain and sore throat. Eyes: Negative. Negative for pain. Respiratory:  Negative for cough and shortness of breath. Cardiovascular:  Negative for chest pain and leg swelling. Gastrointestinal:  Negative for abdominal pain, diarrhea, nausea and vomiting. Genitourinary:  Negative for dysuria, frequency and hematuria. Musculoskeletal:  Negative for back pain and neck pain. Swelling of her left arm   Skin:  Negative for rash. Neurological:  Negative for dizziness, light-headedness and headaches. Psychiatric/Behavioral:  Negative for agitation and confusion. Physical Exam  Physical Exam  Vitals reviewed. Constitutional:       Appearance: Normal appearance. HENT:      Head: Normocephalic and atraumatic. Right Ear: External ear normal.      Left Ear: External ear normal.      Nose: Nose normal.      Mouth/Throat:      Mouth: Mucous membranes are moist.   Eyes:      General: No scleral icterus. Right eye: No discharge. Left eye: No discharge. Conjunctiva/sclera: Conjunctivae normal.   Cardiovascular:      Rate and Rhythm: Normal rate and regular rhythm. Heart sounds: Normal heart sounds. Pulmonary:      Effort: Pulmonary effort is normal. No respiratory distress. Breath sounds: Normal breath sounds. No wheezing. Abdominal:      General: There is no distension. Palpations: Abdomen is soft. Tenderness: There is no abdominal tenderness. Musculoskeletal:         General: No swelling. Cervical back: Normal range of motion and neck supple. Right lower leg: No edema. Left lower leg: No edema. Comments: Swelling of the left arm with a dressing in place and seepage noted   Skin:     General: Skin is warm and dry. Findings: No rash. Neurological:      General: No focal deficit present. Mental Status: She is alert and oriented to person, place, and time. Psychiatric:         Mood and Affect: Mood normal.         Behavior: Behavior normal.        /60   Pulse 76   Temp 98.1 °F (36.7 °C) (Oral)   Resp 14   Ht 5' 3\" (1.6 m)   Wt 176 lb 12.9 oz (80.2 kg)   SpO2 99%   BMI 31.32 kg/m²   Labs, Radiology and Tests :  CBC:   Recent Labs     01/24/23  1650   WBC 7.9   HGB 8.9*   HCT 29.0*        CMP:  Recent Labs     01/25/23  0519 01/25/23  1200 01/26/23  1231 01/26/23  2115 01/27/23  0110 01/27/23  0502   *   < > 149* 142 139 140   K 3.4*  --  2.8*  --   --  4.5   *  --  114*  --   --  109   CO2 26  --  28  --   --  25   BUN 3*  --  5*  --   --  4*   CREATININE 0.6  --  0.6  --   --  0.5   GLUCOSE 114*  --  111*  --   --  126*   CALCIUM 7.5*  --  8.3*  --   --  7.7*   MG  --   --  2.1  --   --   --     < > = values in this interval not displayed. Hepatic: No results for input(s): LABALBU, AST, ALT, ALB, BILITOT, ALKPHOS in the last 72 hours.     Radiology : Chest x-ray NAD    Old lab data have been reviewed and noted patient has been having hypernatremia on and off even as late as 2017    Other / Echocardiogram: EF 60%    Assessment and Plan:  Renal -renal function appears to be stable at baseline  Hypernatremia fluctuating most likely due to decreased oral intake of liquids and excessive insensible losses due to the wound on the left arm/cellulitis and diarrhea  Patient has received D5W at 150 mL/h for the last 24 hours sodium has significantly improved  Will discontinue at this time encourage oral intake of liquids and monitor sodium level closely  Electrolytes -hypokalemia currently on potassium supplements which currently at 4.5 we will hold for today and reinitiate tomorrow if necessary  Left upper extremity cellulitis on antibiotics  Chronic anemia  Essential hypertension reasonable  Meds reviewed and discussed with patient and nursing staff    Cristian Martel MD  [facility]    This report has been created using voice recognition software.  It may contain minor errors which are inherent in voice recognition technology

## 2023-01-27 NOTE — CARE COORDINATION
1/27/23, 8:09 AM EST    DISCHARGE PLANNING EVALUATION     Message from pt's  from last night. Call to pt's  this morning, left a voicemail for a call back. 9:05 AM  Call to  again, left a voicemail, SW unable to get placement in [facility] where  had given preferences for. SW asking to look at return to [facility], will wait to hear back from pt's . 10:10 AM  SW stopped by pt's room, no family at bedside. SW did ask nursing to contact SW if  does visit and/or calls. Call to son Brett Perez to see if another way to contact . He will try calling  and get back with SW. Son reports he almost always answers his calls. 12:02 PM  Call from pt's , reports for some reason when SW calls, his phone does not ring. SW discussed above with . He reports he is okay with looking at faciltiies in the area again, reports he does not want her to return to [facility]. LENNIE discussed [facility], [address] [place] [address]  asked about ratings for [address] [place] [address], SW provided this, he does not want referral there. Call to [facility], they cannot take pt's insurance. Call to [facility], left a voicemail for a call back. LENNIE reviewed in network facilities and next closest, [facility]. Call to [facility], referral made to [facility]. 3:89 PM  Call from [place] with [facility], reports they do have a private room available. LENNIE faxed referral to [phone]. They will review and get back with LENNIE.     2:56 PM  Call to pt's , left a voicemail regarding [facility] out [facility] and [facility]     3:08 PM  Call from pt's , provided update, he is okay with either of those facilities. SW let him know we would update him once we heard back from them.

## 2023-01-27 NOTE — PROGRESS NOTES
Hospitalist Progress Note      Patient:  Jonah Garland    Unit/Bed:5K-15/015-A  YOB: 1950  MRN: [id number]   Acct: [de-identified]   PCP: Cuco Wheat MD  Date of Admission: 1/21/2023    Assessment/Plan:    Left upper extremity cellulitis, possibly related to recent IV infiltration: Patient had an IV infiltrated on 1/15 per provider's note and was receiving Unasyn during that stay. X-ray of left hand, elbow, radius and ulna on 1/21 revealed no acute bony abnormalities and no osseous abnormalities. CT left hand, radius, ulna, and humerus on 1/22 revealed diffuse soft tissue swelling, skin thickening and subcutaneous edema / inflammation of left upper arm, left forearm and left hand without abscess. Blood cultures negative to date   ID consulted recommended continuing Zyvox  - continue on DC   Was additionally treated with Zosyn while IP   Continue Tylenol for pain management. Elevation as much as possible, wound ostomy nurse placed a prevalon heel boot backwards to elevate left arm. PT/OT following  Diarrhea: suspect related to abx therapy, begin culturelle. Continue to monitor. Can consider GI panel if persists. Electrolyte abnormalities:  Hypernatremia: Sodium up to 149 on 1/26- currently 140. Hold Bumex, Check q6 hour Na. Was started on  IV dextrose 5% at 175cc/ hr, decreased to 150cc/hr overnight. Decrease further today to 75cc/hr and wait for further recs from nephro     Accucheck every 6 hours   Urine osmo 241 and serum osmo 303- suspect diabetes insipidus  Hypokalemia: K 2.8 1/26 1/27- 4.5 today- continue with scheduled 40meq daily. Repeat BMP in am   Consult Nephrology  Maintain telemetry  Dysphagia: SLP recommended minced and moist moderately thick liquids per 1/23  Paroxysmal A-fib on [address]; rate controlled: continue home medications, continue tele. Okay to resume Xarelto, no plan for surgical intervention.   Leukocytosis, resolved: Secondary to #1. History of adrenal insufficiency without crisis: Likely contributing to #1 skin fragility. Continue home cortef therapy. BP has been stable. Chronic normocytic Anemia, worsening: Hgb stable in 8 range. Continue CBC Q24 hours. No acute bleeding. Will transfuse if Hgb <7. Hypothyroidism: TSH 0.047 and free T4 1.31 on 1/21. Continue home medications  Severe protein calorie malnutrition: Encourage oral intake; dietitian   Physical debility with deconditioning: Pt came from SNF. Consult PT/OT. CXR on 1/21 revealed decrease in pulmonary interstitial densities compared to x-ray on 1/4/23  Disposition: SNF placement    Chief Complaint: Left arm swelling    Initial H and P:-    \"Shirley Hope is a 71 y.o. female with PMHx of paroxysmal A-fib, HTN, HFpEF, hypothyroidism, severe protein calorie malnutrition, and physical debility with deconditioning who presented to [facility] with chief complaint of left upper extremity swelling and pain. Patient was recently discharged on 1/17/23 for submandibular cellulitis and discharged to SNF. She endorses having no recollection of injuring her left arm, but stated that the arm started to swell just 3 days ago. Around that time she had stopped her outpatient course of antibiotics. At first when the swelling started she stated that she did not think much about it as it was not bothering her. Then today her arm was quite tender to touch and just throbbed all day, hence the admission to [facility]. Currently she is not having any pain, but that was after administration of Morphine while in the ED. She denies any dizziness, chest pain or shortness of breath. She denies any sick contacts. She does not endorse any tingling or numbness that is unusual for her. \"     1/22: Patient is sitting upright in bed. She endorses 9/10 sharp pain of LUE from finger tips to mid upper arm. She states this is improved from yesterday.  She states her arm was \"bubbling\" yesterday but denies drainage today. She denies changes to right upper extremity, or bilateral lower extremities. She denies fever/chills, chest pain, abdominal pain, shortness of breath, palpitations, nausea or vomiting. Patient is unable to recall when her last bowel movement was.     1/23: Patient laying in bed. She notes overall she is feeling better today, however clinically arm appears worse. She endorses pain of left arm but states this has improved, she states has increased range of motion and strength in left arm from yesterday. She notes discharge of left arm with movement. She denies fever / chills, shortness of breath, chest pain, nausea, vomiting, abdominal pain, lightheadedness, headache.     1/24: Patient sitting in chair with brother at bedside. Patient states she is feeling better today overall. She states her left arm feels and looks better, she endorses improved mobility. She denies left arm pain or drainage. She endorses urgency of bowel movements with diarrhea for the past few days. She denies pain with bowel movements or foul odor. She denies abdominal pain, nausea or vomiting. She denies shortness of breath, chest pain, palpitations, numbness, tingling, lightheadedness, dizziness or headaches. Patient states she would like to go home but is understandable and agreeable that she needs additional therapy. 1/25: pt samson well, sitting up in bed. Does not like her minced and moist diet. Denies n/v, admits to diarrhea however upon discussion with RN this is more soft rather than watery. Denies CP/SOB. Afebrile. 1/26:  Patient sitting up in bed. Pleasant affect. Denies any issues at this time but does seem mildly confused which is reportedly baseline for her. Subjective (past 24 hours):   1/27: Patient reports she is doing well- no complaints at this time. Past medical history, family history, social history and allergies reviewed again and is unchanged since admission.     ROS (All review of systems completed. Pertinent positives noted. Otherwise All other systems reviewed and negative.)     Medications:  Reviewed    Infusion Medications    dextrose 150 mL/hr at 01/27/23 0248    sodium chloride       Scheduled Medications    potassium chloride  40 mEq Oral Dinner    linezolid  600 mg Oral 2 times per day    lactobacillus  1 capsule Oral Daily with breakfast    rivaroxaban  15 mg Oral Dinner    [Held by provider] bumetanide  0.5 mg Oral Daily    hydrocortisone  20 mg Oral QAM    hydrocortisone  10 mg Oral Nightly    levothyroxine  125 mcg Oral Daily    oxybutynin  15 mg Oral Daily    pantoprazole  40 mg Oral QAM AC    atorvastatin  20 mg Oral Daily    terbinafine  250 mg Oral Daily    sodium chloride flush  5-40 mL IntraVENous 2 times per day     PRN Meds: sodium chloride flush, sodium chloride, ondansetron **OR** ondansetron, polyethylene glycol, acetaminophen **OR** acetaminophen, potassium chloride **OR** potassium alternative oral replacement **OR** potassium chloride, magnesium sulfate    No intake or output data in the 24 hours ending 01/27/23 0809      Diet:  ADULT ORAL NUTRITION SUPPLEMENT; Dinner; Frozen Oral Supplement  ADULT ORAL NUTRITION SUPPLEMENT; Breakfast, Lunch; Wound Healing Oral Supplement  ADULT DIET; Dysphagia - Minced and Moist; Moderately Thick (Honey)    Physical Exam:  /60   Pulse 76   Temp 98.1 °F (36.7 °C) (Oral)   Resp 14   Ht 5' 3\" (1.6 m)   Wt 176 lb 12.9 oz (80.2 kg)   SpO2 99%   BMI 31.32 kg/m²   General appearance: Speaking softly throughout examination. No apparent distress, appears stated age and cooperative. HEENT: Pupils equal, round, and reactive to light. Conjunctivae/corneas clear. Neck: Supple, with full range of motion. No jugular venous distention. Trachea midline. Respiratory:  Normal respiratory effort. Clear to auscultation, bilaterally without Rales/Wheezes/Rhonchi.   Cardiovascular: Regular rate and rhythm with normal S1/S2 without murmurs, rubs or gallops. Abdomen: Soft, non-tender, non-distended with normal bowel sounds. Musculoskeletal: Diminished active and passive range of motion of bilateral upper extremities. Active range of motion of left upper extremity improved from yesterday. Left upper extremity strength diminished. Skin: Diffuse bruising throughout right upper extremity and bilateral lower extremities. Patient states this is her baseline. Improving erythema and edema of left upper extremity up to mid upper arm. Sporadic hematomas of left upper extremity. No discharge of left upper extremity. Currently wrapped , dressing CD&I  Neurologic:  Neurovascularly intact without any focal sensory deficits. Cranial nerves: II-XII intact, grossly non-focal.  Psychiatric: Alert and oriented, thought content appropriate, normal insight. Conversationally confused   Capillary Refill: Brisk,< 3 seconds   Peripheral Pulses: +2 palpable, equal bilaterally     Labs:   Recent Labs     01/24/23  1650   WBC 7.9   HGB 8.9*   HCT 29.0*          Recent Labs     01/25/23  0519 01/25/23  1200 01/26/23  1231 01/26/23  2115 01/27/23  0110 01/27/23  0502   *   < > 149* 142 139 140   K 3.4*  --  2.8*  --   --  4.5   *  --  114*  --   --  109   CO2 26  --  28  --   --  25   BUN 3*  --  5*  --   --  4*   CREATININE 0.6  --  0.6  --   --  0.5   CALCIUM 7.5*  --  8.3*  --   --  7.7*    < > = values in this interval not displayed. No results for input(s): AST, ALT, BILIDIR, BILITOT, ALKPHOS in the last 72 hours. No results for input(s): INR in the last 72 hours. No results for input(s): Quinton Neri in the last 72 hours. Microbiology:    Blood culture #1:   Lab Results   Component Value Date/Time    [facility]  01/21/2023 09:01 PM     No growth 24 hours. No growth 48 hours.  No growth at 5 days       Blood culture #2:No results found for: BLOODCULT2    Organism:  Lab Results   Component Value Date/Time    ORG Staphylococcus aureus 12/17/2018 04:04 PM Lab Results   Component Value Date/Time    LABGRAM  02/16/2022 04:00 PM     No segmented neutrophils observed. Rare epithelial cells observed. No organisms observed. MRSA culture only:No results found for: [facility]    Urine culture:   Lab Results   Component Value Date/Time    LABURIN No growth-preliminary  No growth   02/09/2017 12:35 PM       Respiratory culture: No results found for: CULTRESP    Aerobic and Anaerobic :  Lab Results   Component Value Date/Time    LABAERO No Acinetobacter species isolated. 01/04/2023 10:00 PM     Lab Results   Component Value Date/Time    LABANAE  12/01/2021 12:00 PM     No anaerobes isolated- preliminary Culture yielded light growth of gram positive bacilli most consistent with Cutibacterium species. Clinical correlation required. Urinalysis:      Lab Results   Component Value Date/Time    NITRU NEGATIVE 01/22/2023 12:02 AM    WBCUA 0-2 01/22/2023 12:02 AM    WBCUA 2-4 02/22/2012 01:00 PM    BACTERIA NONE SEEN 01/22/2023 12:02 AM    RBCUA 5-10 01/22/2023 12:02 AM    BLOODU MODERATE 01/22/2023 12:02 AM    SPECGRAV 1.025 09/18/2020 12:00 PM    GLUCOSEU NEGATIVE 01/22/2023 12:02 AM       Radiology:  CT HAND LEFT W CONTRAST   Final Result      Lateral portion of the left upper extremity is partly off the    field-of-view. Status post left shoulder hemiarthroplasty and total left hip replacement. Metallic streak artifact limits the evaluation of the adjacent structures. Diffuse soft tissue swelling, skin thickening and subcutaneous    edema/inflammation of the left upper arm, left forearm and left hand. Correlate clinically for cellulitis. No abscess is seen. This document has been electronically signed by: Danetta Ormond, MD on    01/22/2023 06:43 AM      All CTs at this facility use dose modulation techniques and iterative    reconstructions, and/or weight-based dosing   when appropriate to reduce radiation to a low as reasonably achievable.       CT RADIUS ULNA LEFT W CONTRAST   Final Result      Lateral portion of the left upper extremity is partly off the    field-of-view. Status post left shoulder hemiarthroplasty and total left hip replacement. Metallic streak artifact limits the evaluation of the adjacent structures. Diffuse soft tissue swelling, skin thickening and subcutaneous    edema/inflammation of the left upper arm, left forearm and left hand. Correlate clinically for cellulitis. No abscess is seen. This document has been electronically signed by: Eliazar Riedel, MD on    01/22/2023 06:26 AM      All CTs at this facility use dose modulation techniques and iterative    reconstructions, and/or weight-based dosing   when appropriate to reduce radiation to a low as reasonably achievable. CT HUMERUS LEFT W CONTRAST   Final Result      Lateral portion of the left upper extremity is partly off the    field-of-view. Status post left shoulder hemiarthroplasty and total left hip replacement. Metallic streak artifact limits the evaluation of the adjacent structures. Diffuse soft tissue swelling, skin thickening and subcutaneous    edema/inflammation of the left upper arm, left forearm and left hand. Correlate clinically for cellulitis. No abscess is seen. This document has been electronically signed by: Eliazar Riedel, MD on    01/22/2023 06:42 AM      All CTs at this facility use dose modulation techniques and iterative    reconstructions, and/or weight-based dosing   when appropriate to reduce radiation to a low as reasonably achievable. XR CHEST PORTABLE   Final Result   Decrease in pulmonary interstitial densities compared to x-ray 1/4/2023. This document has been electronically signed by: Michelle Srivastava MD on    01/21/2023 11:52 PM      XR RADIUS ULNA LEFT (2 VIEWS)   Final Result   No acute bony abnormality.       This document has been electronically signed by: Michelle Srivastava MD on    01/21/2023 11:51 PM      XR HAND LEFT (MIN 3 VIEWS)   Final Result   No acute osseous abnormality. This document has been electronically signed by: Kaylah Tavera MD on    01/21/2023 10:30 PM      XR ELBOW LEFT (MIN 3 VIEWS)   Final Result   No acute bony abnormality. This document has been electronically signed by: Kaylah Tavera MD on    01/21/2023 10:28 PM        XR ELBOW LEFT (MIN 3 VIEWS)    Result Date: 1/21/2023  3 view right elbow Comparison: None Findings: No acute fractures. Normal alignment. No significant arthritic change or erosions. No joint effusion. No radiopaque foreign body. Diffuse edema. No acute bony abnormality. This document has been electronically signed by: Kaylah Tavera MD on 01/21/2023 10:28 PM    XR RADIUS ULNA LEFT (2 VIEWS)    Result Date: 1/21/2023  2 view left forearm Comparison: None Findings: No fractures or dislocations. No joint effusion. No significant arthritic change. No radiopaque foreign body. Extensive soft tissue edema. No acute bony abnormality. This document has been electronically signed by: Kaylah Tavera MD on 01/21/2023 11:51 PM    XR HAND LEFT (MIN 3 VIEWS)    Result Date: 1/21/2023  3 view left hand Comparison: None Findings: Osteopenia. No acute fractures. No significant loss of joint space or osteophytes. No erosions. No radiopaque foreign body. Diffuse edema. No acute osseous abnormality. This document has been electronically signed by: Kaylah Tavera MD on 01/21/2023 10:30 PM    CT HUMERUS LEFT W CONTRAST    Result Date: 1/22/2023  CT left upper extremity with contrast COMPARISON: No prior FINDINGS: The lateral portion of the left upper extremity is partly off the field-of-view. The patient is status post left shoulder hemiarthroplasty and total left hip replacement. Metallic streak artifact limits the evaluation of the adjacent structures.  There is diffuse soft tissue swelling, skin thickening and subcutaneous edema/inflammation of the left upper arm, left forearm and left hand. No abscess is seen. No fracture or dislocation is seen. Lateral portion of the left upper extremity is partly off the field-of-view. Status post left shoulder hemiarthroplasty and total left hip replacement. Metallic streak artifact limits the evaluation of the adjacent structures. Diffuse soft tissue swelling, skin thickening and subcutaneous edema/inflammation of the left upper arm, left forearm and left hand. Correlate clinically for cellulitis. No abscess is seen. This document has been electronically signed by: Fred Hu MD on 01/22/2023 06:42 AM All CTs at this facility use dose modulation techniques and iterative reconstructions, and/or weight-based dosing when appropriate to reduce radiation to a low as reasonably achievable. CT RADIUS ULNA LEFT W CONTRAST    Result Date: 1/22/2023  CT left upper extremity with contrast COMPARISON: No prior FINDINGS: The lateral portion of the left upper extremity is partly off the field-of-view. The patient is status post left shoulder hemiarthroplasty and total left hip replacement. Metallic streak artifact limits the evaluation of the adjacent structures. There is diffuse soft tissue swelling, skin thickening and subcutaneous edema/inflammation of the left upper arm, left forearm and left hand. No abscess is seen. No fracture or dislocation is seen. Lateral portion of the left upper extremity is partly off the field-of-view. Status post left shoulder hemiarthroplasty and total left hip replacement. Metallic streak artifact limits the evaluation of the adjacent structures. Diffuse soft tissue swelling, skin thickening and subcutaneous edema/inflammation of the left upper arm, left forearm and left hand. Correlate clinically for cellulitis. No abscess is seen.  This document has been electronically signed by: Fred Hu MD on 01/22/2023 06:26 AM All CTs at this facility use dose modulation techniques and iterative reconstructions, and/or weight-based dosing when appropriate to reduce radiation to a low as reasonably achievable. CT HAND LEFT W CONTRAST    Result Date: 1/22/2023  CT left upper extremity with contrast COMPARISON: No prior FINDINGS: The lateral portion of the left upper extremity is partly off the field-of-view. The patient is status post left shoulder hemiarthroplasty and total left hip replacement. Metallic streak artifact limits the evaluation of the adjacent structures. There is diffuse soft tissue swelling, skin thickening and subcutaneous edema/inflammation of the left upper arm, left forearm and left hand. No abscess is seen. No fracture or dislocation is seen. Lateral portion of the left upper extremity is partly off the field-of-view. Status post left shoulder hemiarthroplasty and total left hip replacement. Metallic streak artifact limits the evaluation of the adjacent structures. Diffuse soft tissue swelling, skin thickening and subcutaneous edema/inflammation of the left upper arm, left forearm and left hand. Correlate clinically for cellulitis. No abscess is seen. This document has been electronically signed by: Luba Carter MD on 01/22/2023 06:43 AM All CTs at this facility use dose modulation techniques and iterative reconstructions, and/or weight-based dosing when appropriate to reduce radiation to a low as reasonably achievable. XR CHEST PORTABLE    Result Date: 1/21/2023  1 view chest x-ray Comparison: CR/SR - XR CHEST PORTABLE - 01/04/2023 04:26 PM EST Findings: Decrease in pulmonary interstitial densities compared to x-ray 1/4/2023. Right internal iliac catheter terminates in the right atrium. Status post bilateral hip replacement. No acute fractures. Decrease in pulmonary interstitial densities compared to x-ray 1/4/2023.  This document has been electronically signed by: Sweetie Leslie MD on 01/21/2023 11:52 PM      Electronically signed by Shahla Ware PA-C on 1/27/2023 at 8:29 AM

## 2023-01-27 NOTE — PROGRESS NOTES
Patient on dextrose 5% at 175ml/hr with q4hr Na checks. Na trending downwards. Messaged provider at 0230 to see if provider wanted to decrease dextrose. Orders given to slow dextrose to 150ml/hr.

## 2023-01-27 NOTE — PLAN OF CARE
Problem: Discharge Planning  Goal: Discharge to home or other facility with appropriate resources  1/27/2023 0020 by Jonnie Weldon RN  Outcome: Progressing  Flowsheets (Taken 1/27/2023 0015)  Discharge to home or other facility with appropriate resources:   Identify barriers to discharge with patient and caregiver   Arrange for needed discharge resources and transportation as appropriate   Identify discharge learning needs (meds, wound care, etc)  1/26/2023 1205 by Vera Ulloa RN  Outcome: Progressing  Flowsheets (Taken 1/26/2023 0845)  Discharge to home or other facility with appropriate resources:   Identify barriers to discharge with patient and caregiver   Arrange for needed discharge resources and transportation as appropriate   Identify discharge learning needs (meds, wound care, etc)   Refer to discharge planning if patient needs post-hospital services based on physician order or complex needs related to functional status, cognitive ability or social support system     Problem: Safety - Adult  Goal: Free from fall injury  1/27/2023 0020 by Jonnie Weldon RN  Outcome: Progressing  Flowsheets (Taken 1/27/2023 0015)  Free From Fall Injury:   Instruct family/caregiver on patient safety   Based on caregiver fall risk screen, instruct family/caregiver to ask for assistance with transferring infant if caregiver noted to have fall risk factors  1/26/2023 1205 by Vera Ulloa RN  Outcome: Progressing  Goal: Educate on transmission based isolation - droplet  Description: Educate on transmission based isolation   1/27/2023 0020 by Jonnie Weldon RN  Outcome: Progressing  1/26/2023 1205 by Vera Ulloa RN  Outcome: Progressing     Problem: Pain  Goal: Verbalizes/displays adequate comfort level or baseline comfort level  1/27/2023 0020 by Jonnie Weldon RN  Outcome: Progressing  Flowsheets (Taken 1/27/2023 0015)  Verbalizes/displays adequate comfort level or baseline comfort level:   Encourage patient to monitor pain and request assistance   Assess pain using appropriate pain scale   Administer analgesics based on type and severity of pain and evaluate response  1/26/2023 1205 by Yi Diaz RN  Outcome: Progressing     Problem: Nutrition Deficit:  Goal: Optimize nutritional status  1/27/2023 0020 by Don Lucero RN  Outcome: Progressing  Flowsheets (Taken 1/27/2023 0015)  Nutrient intake appropriate for improving, restoring, or maintaining nutritional needs:   Assess nutritional status and recommend course of action   Monitor oral intake, labs, and treatment plans   Recommend appropriate diets, oral nutritional supplements, and vitamin/mineral supplements  1/26/2023 1205 by Yi Diaz RN  Outcome: Progressing     Problem: Skin/Tissue Integrity  Goal: Absence of new skin breakdown  Description: 1. Monitor for areas of redness and/or skin breakdown  2. Assess vascular access sites hourly  3. Every 4-6 hours minimum:  Change oxygen saturation probe site  4. Every 4-6 hours:  If on nasal continuous positive airway pressure, respiratory therapy assess nares and determine need for appliance change or resting period.   1/27/2023 0020 by Don Lucero RN  Outcome: Progressing  1/26/2023 1205 by Yi Diaz RN  Outcome: Progressing     Problem: Chronic Conditions and Co-morbidities  Goal: Patient's chronic conditions and co-morbidity symptoms are monitored and maintained or improved  1/27/2023 0020 by Don Lucero RN  Outcome: Progressing  Flowsheets (Taken 1/27/2023 0020)  Care Plan - Patient's Chronic Conditions and Co-Morbidity Symptoms are Monitored and Maintained or Improved:   Monitor and assess patient's chronic conditions and comorbid symptoms for stability, deterioration, or improvement   Collaborate with multidisciplinary team to address chronic and comorbid conditions and prevent exacerbation or deterioration   Update acute care plan with appropriate goals if chronic or comorbid symptoms are exacerbated and prevent overall improvement and discharge  1/26/2023 1205 by Marisel Gaona RN  Outcome: Progressing  Flowsheets (Taken 1/26/2023 0893)  Care Plan - Patient's Chronic Conditions and Co-Morbidity Symptoms are Monitored and Maintained or Improved:   Monitor and assess patient's chronic conditions and comorbid symptoms for stability, deterioration, or improvement   Collaborate with multidisciplinary team to address chronic and comorbid conditions and prevent exacerbation or deterioration   Update acute care plan with appropriate goals if chronic or comorbid symptoms are exacerbated and prevent overall improvement and discharge       Care plan reviewed with patient. Patient verbalize understanding of the plan of care and contribute to goal setting.

## 2023-01-28 LAB
ANION GAP SERPL CALC-SCNC: 7 MEQ/L (ref 8–16)
BUN SERPL-MCNC: 7 MG/DL (ref 7–22)
CALCIUM SERPL-MCNC: 8.3 MG/DL (ref 8.5–10.5)
CHLORIDE 24H UR-SRATE: 47 MEQ/L
CHLORIDE SERPL-SCNC: 113 MEQ/L (ref 98–111)
CO2 SERPL-SCNC: 25 MEQ/L (ref 23–33)
CREAT SERPL-MCNC: 0.5 MG/DL (ref 0.4–1.2)
GFR SERPL CREATININE-BSD FRML MDRD: > 60 ML/MIN/1.73M2
GLUCOSE SERPL-MCNC: 79 MG/DL (ref 70–108)
OSMOLALITY UR: 249 MOSMOL/KG (ref 250–750)
POTASSIUM SERPL-SCNC: 4.4 MEQ/L (ref 3.5–5.2)
SODIUM SERPL-SCNC: 141 MEQ/L (ref 135–145)
SODIUM SERPL-SCNC: 145 MEQ/L (ref 135–145)
SODIUM UR-SCNC: 31 MEQ/L
URATE SERPL-MCNC: 2.2 MG/DL (ref 2.4–5.7)

## 2023-01-28 PROCEDURE — 99232 SBSQ HOSP IP/OBS MODERATE 35: CPT | Performed by: INTERNAL MEDICINE

## 2023-01-28 PROCEDURE — 6370000000 HC RX 637 (ALT 250 FOR IP): Performed by: PHYSICIAN ASSISTANT

## 2023-01-28 PROCEDURE — 1200000000 HC SEMI PRIVATE

## 2023-01-28 PROCEDURE — 1200000003 HC TELEMETRY R&B

## 2023-01-28 PROCEDURE — 99232 SBSQ HOSP IP/OBS MODERATE 35: CPT | Performed by: PHYSICIAN ASSISTANT

## 2023-01-28 PROCEDURE — 6370000000 HC RX 637 (ALT 250 FOR IP)

## 2023-01-28 PROCEDURE — 36415 COLL VENOUS BLD VENIPUNCTURE: CPT

## 2023-01-28 PROCEDURE — 2580000003 HC RX 258: Performed by: INTERNAL MEDICINE

## 2023-01-28 PROCEDURE — 82436 ASSAY OF URINE CHLORIDE: CPT

## 2023-01-28 PROCEDURE — 83935 ASSAY OF URINE OSMOLALITY: CPT

## 2023-01-28 PROCEDURE — 80048 BASIC METABOLIC PNL TOTAL CA: CPT

## 2023-01-28 PROCEDURE — 2580000003 HC RX 258

## 2023-01-28 PROCEDURE — 84550 ASSAY OF BLOOD/URIC ACID: CPT

## 2023-01-28 PROCEDURE — 6370000000 HC RX 637 (ALT 250 FOR IP): Performed by: INTERNAL MEDICINE

## 2023-01-28 PROCEDURE — 84300 ASSAY OF URINE SODIUM: CPT

## 2023-01-28 RX ORDER — DEXTROSE MONOHYDRATE 50 MG/ML
INJECTION, SOLUTION INTRAVENOUS CONTINUOUS
Status: DISCONTINUED | OUTPATIENT
Start: 2023-01-28 | End: 2023-01-29

## 2023-01-28 RX ADMIN — HYDROCORTISONE 20 MG: 10 TABLET ORAL at 08:15

## 2023-01-28 RX ADMIN — ATORVASTATIN CALCIUM 20 MG: 20 TABLET, FILM COATED ORAL at 10:29

## 2023-01-28 RX ADMIN — OXYBUTYNIN CHLORIDE 15 MG: 10 TABLET, EXTENDED RELEASE ORAL at 08:15

## 2023-01-28 RX ADMIN — LINEZOLID 600 MG: 600 TABLET, FILM COATED ORAL at 08:16

## 2023-01-28 RX ADMIN — PANTOPRAZOLE SODIUM 40 MG: 40 TABLET, DELAYED RELEASE ORAL at 05:51

## 2023-01-28 RX ADMIN — Medication 1 CAPSULE: at 08:14

## 2023-01-28 RX ADMIN — LEVOTHYROXINE SODIUM 125 MCG: 0.12 TABLET ORAL at 08:16

## 2023-01-28 RX ADMIN — LINEZOLID 600 MG: 600 TABLET, FILM COATED ORAL at 21:26

## 2023-01-28 RX ADMIN — Medication: at 21:26

## 2023-01-28 RX ADMIN — DEXTROSE MONOHYDRATE: 50 INJECTION, SOLUTION INTRAVENOUS at 13:38

## 2023-01-28 RX ADMIN — HYDROCORTISONE 10 MG: 10 TABLET ORAL at 21:26

## 2023-01-28 RX ADMIN — SODIUM CHLORIDE, PRESERVATIVE FREE 10 ML: 5 INJECTION INTRAVENOUS at 08:14

## 2023-01-28 RX ADMIN — RIVAROXABAN 15 MG: 15 TABLET, FILM COATED ORAL at 16:31

## 2023-01-28 RX ADMIN — TERBINAFINE TABLETS 250 MG 250 MG: 250 TABLET ORAL at 10:29

## 2023-01-28 NOTE — PROGRESS NOTES
[facility]   Nephrology progress note  1/28/2023, 12:34 PM      Pt Name:    Connie Saini  MRN:     [id number]     YOB: 1950  Admit Date:    1/21/2023  7:56 PM    Chief Complaint: Nephrology following for hypernatremia    Subjective:  Patient was seen and examined this morning  Late entry  On room air  Awake and alert  No chest pain or shortness of breath  Feels okay    Objective:  24HR INTAKE/OUTPUT:    Intake/Output Summary (Last 24 hours) at 1/28/2023 1234  Last data filed at 1/28/2023 9015  Gross per 24 hour   Intake 120 ml   Output 1200 ml   Net -1080 ml         I/O last 3 completed shifts:   In: 560 [P.O.:560]  Out: 1500 [Urine:1500]  I/O this shift:  In: -   Out: 800 [Urine:800]   Admission weight: 180 lb (81.6 kg)  Wt Readings from Last 3 Encounters:   01/24/23 176 lb 12.9 oz (80.2 kg)   01/15/23 183 lb 4.8 oz (83.1 kg)   12/09/22 163 lb 1.6 oz (74 kg)        Vitals :   Vitals:    01/27/23 1004 01/27/23 1945 01/28/23 0415 01/28/23 0800   BP: 117/74 139/71 136/70 (!) 144/65   Pulse: 78 69 68 63   Resp: 20 18 18 20   Temp: 98 °F (36.7 °C) 98 °F (36.7 °C) 98.6 °F (37 °C) 98.2 °F (36.8 °C)   TempSrc:  Oral Oral Oral   SpO2:  97% 97% 97%   Weight:       Height:           Physical examination  General Appearance: alert and cooperative with exam, appears comfortable, no distress  Mouth/Throat: Oral mucosa moist  Neck: No JVD  Lungs: Air entry B/L, no rales, no use of accessory muscles  Heart:  S1, S2 heard  GI: soft, non-tender, no guarding    Medications:  Infusion:    sodium chloride       Meds:    linezolid  600 mg Oral 2 times per day    lactobacillus  1 capsule Oral Daily with breakfast    rivaroxaban  15 mg Oral Dinner    [Held by provider] bumetanide  0.5 mg Oral Daily    hydrocortisone  20 mg Oral QAM    hydrocortisone  10 mg Oral Nightly    levothyroxine  125 mcg Oral Daily    oxybutynin  15 mg Oral Daily    pantoprazole  40 mg Oral QAM AC    atorvastatin  20 mg Oral Daily terbinafine  250 mg Oral Daily    sodium chloride flush  5-40 mL IntraVENous 2 times per day     Meds prn: sodium chloride flush, sodium chloride, ondansetron **OR** ondansetron, polyethylene glycol, acetaminophen **OR** acetaminophen, potassium chloride **OR** potassium alternative oral replacement **OR** potassium chloride, magnesium sulfate     Lab Data :  CBC: No results for input(s): WBC, HGB, HCT, PLT in the last 72 hours. CMP:  Recent Labs     01/26/23  1231 01/26/23  2115 01/27/23  0502 01/27/23  1040 01/27/23  2354 01/28/23  0550   *   < > 140 141 141 145   K 2.8*  --  4.5  --   --  4.4   *  --  109  --   --  113*   CO2 28  --  25  --   --  25   BUN 5*  --  4*  --   --  7   CREATININE 0.6  --  0.5  --   --  0.5   GLUCOSE 111*  --  126*  --   --  79   CALCIUM 8.3*  --  7.7*  --   --  8.3*   MG 2.1  --   --   --   --   --     < > = values in this interval not displayed. Hepatic: No results for input(s): LABALBU, AST, ALT, ALB, BILITOT, ALKPHOS in the last 72 hours. Assessment and Plan:  Hypernatremia. Improved. Multifactorial--decreased oral intake/recent loop diuretics and diarrhea  Sodium is 145 this morning. Resume D5W  Bumex currently on hold  Check serum osmolality. Repeat urine osmolality, urine sodium. Check serum uric acid level  Will follow above electrolyte panel  Further recommendations depending on evolving clinical course  Renal.  Overall stable  Left upper extremity cellulitis  Hypokalemia improved      Pedro Maya MD  [facility]    This report has been created using voice recognition software.  It may contain minor errors which are inherent in voice recognition technology

## 2023-01-28 NOTE — PROGRESS NOTES
Hospitalist Progress Note      Patient:  Vinod Abraham    Unit/Bed:5K-15/015-A  YOB: 1950  MRN: [id number]   Acct: [de-identified]   PCP: Veronique Connors MD  Date of Admission: 1/21/2023    Assessment/Plan:    Left upper extremity cellulitis, possibly related to recent IV infiltration: Patient had an IV infiltrated on 1/15 per provider's note and was receiving Unasyn during that stay. X-ray of left hand, elbow, radius and ulna on 1/21 revealed no acute bony abnormalities and no osseous abnormalities. CT left hand, radius, ulna, and humerus on 1/22 revealed diffuse soft tissue swelling, skin thickening and subcutaneous edema / inflammation of left upper arm, left forearm and left hand without abscess. Blood cultures negative to date   ID consulted recommended continuing Zyvox  - complete a total of 7 days which will finish 2/1  Was additionally treated with Zosyn while IP   Continue Tylenol for pain management. Elevation as much as possible, wound ostomy nurse placed a prevalon heel boot backwards to elevate left arm. Consult PT/OT. Electrolyte abnormalities:  Hypernatremia: Sodium up to 149 1/26- currently 145  Hold Bumex  Was given  IV dextrose 5% at 175cc/ hr with resolution of hypernatremia, therefore was stopped. Nephrology is following   Diabetes insipidus? ?? Hypokalemia: K 4.4, BMP in am   Nephrology following  Maintain telemetry  Dysphagia: SLP recommended minced and moist moderately thick liquids per 1/23  Paroxysmal A-fib on 4 [facility]; rate controlled: continue home medications, continue tele. Okay to resume Xarelto, no plan for surgical intervention. Leukocytosis, resolved: Secondary to #1. History of adrenal insufficiency without crisis: Likely contributing to #1 skin fragility. Continue home cortef therapy. BP has been stable. Chronic normocytic Anemia, worsening: Hgb stable in 8 range. No acute bleeding.  Will transfuse if Hgb <7.  Hypothyroidism: TSH 0.047 and free T4 1.31 on 1/21. Continue home medications  Severe protein calorie malnutrition: Encourage oral intake; dietitian   Physical debility with deconditioning: Pt came from SNF. Consult PT/OT. Disposition: SNF placement    Chief Complaint: Left arm swelling    Initial H and P:-    \"Janine Florinda Kayser is a 71 y.o. female with PMHx of paroxysmal A-fib, HTN, HFpEF, hypothyroidism, severe protein calorie malnutrition, and physical debility with deconditioning who presented to [facility] with chief complaint of left upper extremity swelling and pain. Patient was recently discharged on 1/17/23 for submandibular cellulitis and discharged to SNF. She endorses having no recollection of injuring her left arm, but stated that the arm started to swell just 3 days ago. Around that time she had stopped her outpatient course of antibiotics. At first when the swelling started she stated that she did not think much about it as it was not bothering her. Then today her arm was quite tender to touch and just throbbed all day, hence the admission to [facility]. Currently she is not having any pain, but that was after administration of Morphine while in the ED. She denies any dizziness, chest pain or shortness of breath. She denies any sick contacts. She does not endorse any tingling or numbness that is unusual for her. \"     1/22: Patient is sitting upright in bed. She endorses 9/10 sharp pain of LUE from finger tips to mid upper arm. She states this is improved from yesterday. She states her arm was \"bubbling\" yesterday but denies drainage today. She denies changes to right upper extremity, or bilateral lower extremities. She denies fever/chills, chest pain, abdominal pain, shortness of breath, palpitations, nausea or vomiting. Patient is unable to recall when her last bowel movement was.     1/23: Patient laying in bed. She notes overall she is feeling better today, however clinically arm appears worse.  She endorses pain of left arm but states this has improved, she states has increased range of motion and strength in left arm from yesterday. She notes discharge of left arm with movement. She denies fever / chills, shortness of breath, chest pain, nausea, vomiting, abdominal pain, lightheadedness, headache.     1/24: Patient sitting in chair with brother at bedside. Patient states she is feeling better today overall. She states her left arm feels and looks better, she endorses improved mobility. She denies left arm pain or drainage. She endorses urgency of bowel movements with diarrhea for the past few days. She denies pain with bowel movements or foul odor. She denies abdominal pain, nausea or vomiting. She denies shortness of breath, chest pain, palpitations, numbness, tingling, lightheadedness, dizziness or headaches. Patient states she would like to go home but is understandable and agreeable that she needs additional therapy. 1/25: pt doin well, sitting up in bed. Does not like her minced and moist diet. Denies n/v, admits to diarrhea however upon discussion with RN this is more soft rather than watery. Denies CP/SOB. Afebrile. 1/26:  Patient sitting up in bed. Pleasant affect. Denies any issues at this time but does seem mildly confused which is reportedly baseline for her.     1/27: Patient doing well. Sitting in chair. Denies any arm pain. Subjective (past 24 hours): No subjective changes overnight. Hopeful for DC on Monday once precert back       Past medical history, family history, social history and allergies reviewed again and is unchanged since admission. ROS (All review of systems completed. Pertinent positives noted.  Otherwise All other systems reviewed and negative.)     Medications:  Reviewed    Infusion Medications    sodium chloride       Scheduled Medications    linezolid  600 mg Oral 2 times per day    lactobacillus  1 capsule Oral Daily with breakfast    rivaroxaban  15 mg Oral Dinner    [employer] by provider] bumetanide  0.5 mg Oral Daily    hydrocortisone  20 mg Oral QAM    hydrocortisone  10 mg Oral Nightly    levothyroxine  125 mcg Oral Daily    oxybutynin  15 mg Oral Daily    pantoprazole  40 mg Oral QAM AC    atorvastatin  20 mg Oral Daily    terbinafine  250 mg Oral Daily    sodium chloride flush  5-40 mL IntraVENous 2 times per day     PRN Meds: sodium chloride flush, sodium chloride, ondansetron **OR** ondansetron, polyethylene glycol, acetaminophen **OR** acetaminophen, potassium chloride **OR** potassium alternative oral replacement **OR** potassium chloride, magnesium sulfate      Intake/Output Summary (Last 24 hours) at 1/28/2023 0800  Last data filed at 1/27/2023 1527  Gross per 24 hour   Intake 560 ml   Output 1500 ml   Net -940 ml         Diet:  ADULT ORAL NUTRITION SUPPLEMENT; Dinner; Frozen Oral Supplement  ADULT ORAL NUTRITION SUPPLEMENT; Breakfast, Lunch; Wound Healing Oral Supplement  ADULT DIET; Dysphagia - Minced and Moist; Moderately Thick (Honey)    Physical Exam:  /70   Pulse 68   Temp 98.6 °F (37 °C) (Oral)   Resp 18   Ht 5' 3\" (1.6 m)   Wt 176 lb 12.9 oz (80.2 kg)   SpO2 97%   BMI 31.32 kg/m²   General appearance: Speaking softly throughout examination. No apparent distress, appears stated age and cooperative. HEENT: Pupils equal, round, and reactive to light. Conjunctivae/corneas clear. Neck: Supple, with full range of motion. No jugular venous distention. Trachea midline. Respiratory:  Normal respiratory effort. Clear to auscultation, bilaterally without Rales/Wheezes/Rhonchi. Cardiovascular: Regular rate and rhythm with normal S1/S2 without murmurs, rubs or gallops. Abdomen: Soft, non-tender, non-distended with normal bowel sounds. Musculoskeletal: Diminished active and passive range of motion of bilateral upper extremities. Active range of motion of left upper extremity improved from yesterday. Left upper extremity strength diminished.    Skin: Diffuse bruising throughout right upper extremity and bilateral lower extremities. Patient states this is her baseline. Improving erythema and edema of left upper extremity up to mid upper arm. Serosanginous oozing noted but no overt signs of purulent discharge. No warmth the left forearm . Sporadic hematomas of left upper extremity. urrently wrapped , dressing CD&I  Neurologic:  Neurovascularly intact without any focal sensory deficits. Cranial nerves: II-XII intact, grossly non-focal.  Psychiatric: Alert and oriented, thought content appropriate, normal insight. Conversationally confused   Capillary Refill: Brisk,< 3 seconds   Peripheral Pulses: +2 palpable, equal bilaterally     Labs:   No results for input(s): WBC, HGB, HCT, PLT in the last 72 hours. Recent Labs     01/26/23  1231 01/26/23  2115 01/27/23  0502 01/27/23  1040 01/27/23  2354 01/28/23  0550   *   < > 140 141 141 145   K 2.8*  --  4.5  --   --  4.4   *  --  109  --   --  113*   CO2 28  --  25  --   --  25   BUN 5*  --  4*  --   --  7   CREATININE 0.6  --  0.5  --   --  0.5   CALCIUM 8.3*  --  7.7*  --   --  8.3*    < > = values in this interval not displayed. No results for input(s): AST, ALT, BILIDIR, BILITOT, ALKPHOS in the last 72 hours. No results for input(s): INR in the last 72 hours. No results for input(s): Maria Luisa Zoroastrian in the last 72 hours. Microbiology:    Blood culture #1:   Lab Results   Component Value Date/Time    [facility]  01/21/2023 09:01 PM     No growth 24 hours. No growth 48 hours. No growth at 5 days       Blood culture #2:No results found for: BLOODCULT2    Organism:  Lab Results   Component Value Date/Time    [facility] Staphylococcus aureus 12/17/2018 04:04 PM         Lab Results   Component Value Date/Time    LABGRAM  02/16/2022 04:00 PM     No segmented neutrophils observed. Rare epithelial cells observed. No organisms observed.        MRSA culture only:No results found for: [facility]    Urine culture:   Lab Results   Component Value Date/Time    LABURIN No growth-preliminary  No growth   02/09/2017 12:35 PM       Respiratory culture: No results found for: CULTRESP    Aerobic and Anaerobic :  Lab Results   Component Value Date/Time    LABAERO No Acinetobacter species isolated. 01/04/2023 10:00 PM     Lab Results   Component Value Date/Time    LABANAE  12/01/2021 12:00 PM     No anaerobes isolated- preliminary Culture yielded light growth of gram positive bacilli most consistent with Cutibacterium species. Clinical correlation required. Urinalysis:      Lab Results   Component Value Date/Time    NITRU NEGATIVE 01/22/2023 12:02 AM    WBCUA 0-2 01/22/2023 12:02 AM    WBCUA 2-4 02/22/2012 01:00 PM    BACTERIA NONE SEEN 01/22/2023 12:02 AM    RBCUA 5-10 01/22/2023 12:02 AM    BLOODU MODERATE 01/22/2023 12:02 AM    SPECGRAV 1.025 09/18/2020 12:00 PM    GLUCOSEU NEGATIVE 01/22/2023 12:02 AM       Radiology:  CT HAND LEFT W CONTRAST   Final Result      Lateral portion of the left upper extremity is partly off the    field-of-view. Status post left shoulder hemiarthroplasty and total left hip replacement. Metallic streak artifact limits the evaluation of the adjacent structures. Diffuse soft tissue swelling, skin thickening and subcutaneous    edema/inflammation of the left upper arm, left forearm and left hand. Correlate clinically for cellulitis. No abscess is seen. This document has been electronically signed by: Shena Panda MD on    01/22/2023 06:43 AM      All CTs at this facility use dose modulation techniques and iterative    reconstructions, and/or weight-based dosing   when appropriate to reduce radiation to a low as reasonably achievable. CT RADIUS ULNA LEFT W CONTRAST   Final Result      Lateral portion of the left upper extremity is partly off the    field-of-view. Status post left shoulder hemiarthroplasty and total left hip replacement.     Metallic streak artifact limits the evaluation of the adjacent structures. Diffuse soft tissue swelling, skin thickening and subcutaneous    edema/inflammation of the left upper arm, left forearm and left hand. Correlate clinically for cellulitis. No abscess is seen. This document has been electronically signed by: Charley Lima MD on    01/22/2023 06:26 AM      All CTs at this facility use dose modulation techniques and iterative    reconstructions, and/or weight-based dosing   when appropriate to reduce radiation to a low as reasonably achievable. CT HUMERUS LEFT W CONTRAST   Final Result      Lateral portion of the left upper extremity is partly off the    field-of-view. Status post left shoulder hemiarthroplasty and total left hip replacement. Metallic streak artifact limits the evaluation of the adjacent structures. Diffuse soft tissue swelling, skin thickening and subcutaneous    edema/inflammation of the left upper arm, left forearm and left hand. Correlate clinically for cellulitis. No abscess is seen. This document has been electronically signed by: Charley Lima MD on    01/22/2023 06:42 AM      All CTs at this facility use dose modulation techniques and iterative    reconstructions, and/or weight-based dosing   when appropriate to reduce radiation to a low as reasonably achievable. XR CHEST PORTABLE   Final Result   Decrease in pulmonary interstitial densities compared to x-ray 1/4/2023. This document has been electronically signed by: Yi Marsh MD on    01/21/2023 11:52 PM      XR RADIUS ULNA LEFT (2 VIEWS)   Final Result   No acute bony abnormality. This document has been electronically signed by: Yi Marsh MD on    01/21/2023 11:51 PM      XR HAND LEFT (MIN 3 VIEWS)   Final Result   No acute osseous abnormality. This document has been electronically signed by: Yi Marsh MD on    01/21/2023 10:30 PM      XR ELBOW LEFT (MIN 3 VIEWS)   Final Result   No acute bony abnormality. This document has been electronically signed by: Ira Miranda MD on    01/21/2023 10:28 PM        XR ELBOW LEFT (MIN 3 VIEWS)    Result Date: 1/21/2023  3 view right elbow Comparison: None Findings: No acute fractures. Normal alignment. No significant arthritic change or erosions. No joint effusion. No radiopaque foreign body. Diffuse edema. No acute bony abnormality. This document has been electronically signed by: Ira Miranda MD on 01/21/2023 10:28 PM    XR RADIUS ULNA LEFT (2 VIEWS)    Result Date: 1/21/2023  2 view left forearm Comparison: None Findings: No fractures or dislocations. No joint effusion. No significant arthritic change. No radiopaque foreign body. Extensive soft tissue edema. No acute bony abnormality. This document has been electronically signed by: Ira Miranda MD on 01/21/2023 11:51 PM    XR HAND LEFT (MIN 3 VIEWS)    Result Date: 1/21/2023  3 view left hand Comparison: None Findings: Osteopenia. No acute fractures. No significant loss of joint space or osteophytes. No erosions. No radiopaque foreign body. Diffuse edema. No acute osseous abnormality. This document has been electronically signed by: Ira Miranda MD on 01/21/2023 10:30 PM    CT HUMERUS LEFT W CONTRAST    Result Date: 1/22/2023  CT left upper extremity with contrast COMPARISON: No prior FINDINGS: The lateral portion of the left upper extremity is partly off the field-of-view. The patient is status post left shoulder hemiarthroplasty and total left hip replacement. Metallic streak artifact limits the evaluation of the adjacent structures. There is diffuse soft tissue swelling, skin thickening and subcutaneous edema/inflammation of the left upper arm, left forearm and left hand. No abscess is seen. No fracture or dislocation is seen. Lateral portion of the left upper extremity is partly off the field-of-view. Status post left shoulder hemiarthroplasty and total left hip replacement.  Metallic streak artifact limits the evaluation of the adjacent structures. Diffuse soft tissue swelling, skin thickening and subcutaneous edema/inflammation of the left upper arm, left forearm and left hand. Correlate clinically for cellulitis. No abscess is seen. This document has been electronically signed by: Dahlia Peterson MD on 01/22/2023 06:42 AM All CTs at this facility use dose modulation techniques and iterative reconstructions, and/or weight-based dosing when appropriate to reduce radiation to a low as reasonably achievable. CT RADIUS ULNA LEFT W CONTRAST    Result Date: 1/22/2023  CT left upper extremity with contrast COMPARISON: No prior FINDINGS: The lateral portion of the left upper extremity is partly off the field-of-view. The patient is status post left shoulder hemiarthroplasty and total left hip replacement. Metallic streak artifact limits the evaluation of the adjacent structures. There is diffuse soft tissue swelling, skin thickening and subcutaneous edema/inflammation of the left upper arm, left forearm and left hand. No abscess is seen. No fracture or dislocation is seen. Lateral portion of the left upper extremity is partly off the field-of-view. Status post left shoulder hemiarthroplasty and total left hip replacement. Metallic streak artifact limits the evaluation of the adjacent structures. Diffuse soft tissue swelling, skin thickening and subcutaneous edema/inflammation of the left upper arm, left forearm and left hand. Correlate clinically for cellulitis. No abscess is seen. This document has been electronically signed by: Dahlia Peterson MD on 01/22/2023 06:26 AM All CTs at this facility use dose modulation techniques and iterative reconstructions, and/or weight-based dosing when appropriate to reduce radiation to a low as reasonably achievable.     CT HAND LEFT W CONTRAST    Result Date: 1/22/2023  CT left upper extremity with contrast COMPARISON: No prior FINDINGS: The lateral portion of the left upper extremity is partly off the field-of-view. The patient is status post left shoulder hemiarthroplasty and total left hip replacement. Metallic streak artifact limits the evaluation of the adjacent structures. There is diffuse soft tissue swelling, skin thickening and subcutaneous edema/inflammation of the left upper arm, left forearm and left hand. No abscess is seen. No fracture or dislocation is seen. Lateral portion of the left upper extremity is partly off the field-of-view. Status post left shoulder hemiarthroplasty and total left hip replacement. Metallic streak artifact limits the evaluation of the adjacent structures. Diffuse soft tissue swelling, skin thickening and subcutaneous edema/inflammation of the left upper arm, left forearm and left hand. Correlate clinically for cellulitis. No abscess is seen. This document has been electronically signed by: Sonia Tinoco MD on 01/22/2023 06:43 AM All CTs at this facility use dose modulation techniques and iterative reconstructions, and/or weight-based dosing when appropriate to reduce radiation to a low as reasonably achievable. XR CHEST PORTABLE    Result Date: 1/21/2023  1 view chest x-ray Comparison: CR/SR - XR CHEST PORTABLE - 01/04/2023 04:26 PM EST Findings: Decrease in pulmonary interstitial densities compared to x-ray 1/4/2023. Right internal iliac catheter terminates in the right atrium. Status post bilateral hip replacement. No acute fractures. Decrease in pulmonary interstitial densities compared to x-ray 1/4/2023.  This document has been electronically signed by: Terese Mckeon MD on 01/21/2023 11:52 PM      Electronically signed by Amaya Blum PA-C on 1/28/2023 at 8:00 AM

## 2023-01-29 LAB
ALDOST SERPL-MCNC: 3.2 NG/DL
ANION GAP SERPL CALC-SCNC: 6 MEQ/L (ref 8–16)
BUN SERPL-MCNC: 10 MG/DL (ref 7–22)
CALCIUM SERPL-MCNC: 8 MG/DL (ref 8.5–10.5)
CHLORIDE SERPL-SCNC: 107 MEQ/L (ref 98–111)
CO2 SERPL-SCNC: 27 MEQ/L (ref 23–33)
CREAT SERPL-MCNC: 0.5 MG/DL (ref 0.4–1.2)
DEPRECATED RDW RBC AUTO: 59.7 FL (ref 35–45)
ERYTHROCYTE [DISTWIDTH] IN BLOOD BY AUTOMATED COUNT: 16.9 % (ref 11.5–14.5)
GFR SERPL CREATININE-BSD FRML MDRD: > 60 ML/MIN/1.73M2
GLUCOSE SERPL-MCNC: 106 MG/DL (ref 70–108)
HCT VFR BLD AUTO: 22.9 % (ref 37–47)
HGB BLD-MCNC: 7.2 GM/DL (ref 12–16)
MCH RBC QN AUTO: 30.1 PG (ref 26–33)
MCHC RBC AUTO-ENTMCNC: 31.4 GM/DL (ref 32.2–35.5)
MCV RBC AUTO: 95.8 FL (ref 81–99)
PHOSPHATE SERPL-MCNC: 2.2 MG/DL (ref 2.4–4.7)
PLATELET # BLD AUTO: 206 THOU/MM3 (ref 130–400)
PMV BLD AUTO: 9.4 FL (ref 9.4–12.4)
POTASSIUM SERPL-SCNC: 3.9 MEQ/L (ref 3.5–5.2)
RBC # BLD AUTO: 2.39 MILL/MM3 (ref 4.2–5.4)
SODIUM SERPL-SCNC: 140 MEQ/L (ref 135–145)
WBC # BLD AUTO: 7.9 THOU/MM3 (ref 4.8–10.8)

## 2023-01-29 PROCEDURE — 80048 BASIC METABOLIC PNL TOTAL CA: CPT

## 2023-01-29 PROCEDURE — 84100 ASSAY OF PHOSPHORUS: CPT

## 2023-01-29 PROCEDURE — 2580000003 HC RX 258: Performed by: INTERNAL MEDICINE

## 2023-01-29 PROCEDURE — 1200000003 HC TELEMETRY R&B

## 2023-01-29 PROCEDURE — 2580000003 HC RX 258

## 2023-01-29 PROCEDURE — 1200000000 HC SEMI PRIVATE

## 2023-01-29 PROCEDURE — 99232 SBSQ HOSP IP/OBS MODERATE 35: CPT | Performed by: PHYSICIAN ASSISTANT

## 2023-01-29 PROCEDURE — 6370000000 HC RX 637 (ALT 250 FOR IP): Performed by: INTERNAL MEDICINE

## 2023-01-29 PROCEDURE — 36415 COLL VENOUS BLD VENIPUNCTURE: CPT

## 2023-01-29 PROCEDURE — 85027 COMPLETE CBC AUTOMATED: CPT

## 2023-01-29 PROCEDURE — 6370000000 HC RX 637 (ALT 250 FOR IP)

## 2023-01-29 PROCEDURE — 99232 SBSQ HOSP IP/OBS MODERATE 35: CPT | Performed by: INTERNAL MEDICINE

## 2023-01-29 PROCEDURE — 6370000000 HC RX 637 (ALT 250 FOR IP): Performed by: PHYSICIAN ASSISTANT

## 2023-01-29 RX ADMIN — ATORVASTATIN CALCIUM 20 MG: 20 TABLET, FILM COATED ORAL at 09:01

## 2023-01-29 RX ADMIN — TERBINAFINE TABLETS 250 MG 250 MG: 250 TABLET ORAL at 09:00

## 2023-01-29 RX ADMIN — HYDROCORTISONE 10 MG: 10 TABLET ORAL at 20:08

## 2023-01-29 RX ADMIN — POTASSIUM & SODIUM PHOSPHATES POWDER PACK 280-160-250 MG 250 MG: 280-160-250 PACK at 16:58

## 2023-01-29 RX ADMIN — Medication 1 CAPSULE: at 09:00

## 2023-01-29 RX ADMIN — POTASSIUM & SODIUM PHOSPHATES POWDER PACK 280-160-250 MG 250 MG: 280-160-250 PACK at 20:09

## 2023-01-29 RX ADMIN — LEVOTHYROXINE SODIUM 125 MCG: 0.12 TABLET ORAL at 09:01

## 2023-01-29 RX ADMIN — HYDROCORTISONE 20 MG: 10 TABLET ORAL at 09:00

## 2023-01-29 RX ADMIN — LINEZOLID 600 MG: 600 TABLET, FILM COATED ORAL at 09:01

## 2023-01-29 RX ADMIN — PANTOPRAZOLE SODIUM 40 MG: 40 TABLET, DELAYED RELEASE ORAL at 05:47

## 2023-01-29 RX ADMIN — RIVAROXABAN 15 MG: 15 TABLET, FILM COATED ORAL at 17:37

## 2023-01-29 RX ADMIN — SODIUM CHLORIDE, PRESERVATIVE FREE 10 ML: 5 INJECTION INTRAVENOUS at 20:14

## 2023-01-29 RX ADMIN — LINEZOLID 600 MG: 600 TABLET, FILM COATED ORAL at 20:09

## 2023-01-29 RX ADMIN — OXYBUTYNIN CHLORIDE 15 MG: 10 TABLET, EXTENDED RELEASE ORAL at 09:01

## 2023-01-29 RX ADMIN — DEXTROSE MONOHYDRATE: 50 INJECTION, SOLUTION INTRAVENOUS at 09:00

## 2023-01-29 ASSESSMENT — PAIN SCALES - GENERAL: PAINLEVEL_OUTOF10: 0

## 2023-01-29 NOTE — PROGRESS NOTES
[facility]   Nephrology progress note  1/29/2023, 12:54 PM      Pt Name:    Amado Baugh  MRN:     [id number]     [place]:    1950  Admit Date:    1/21/2023  7:56 PM    Chief Complaint: Nephrology following for hypernatremia    Subjective:  Patient was seen and examined this morning  Late entry  On room air  Awake and alert  On D5W    Objective:  24HR INTAKE/OUTPUT:    Intake/Output Summary (Last 24 hours) at 1/29/2023 1254  Last data filed at 1/29/2023 0855  Gross per 24 hour   Intake 150 ml   Output 1300 ml   Net -1150 ml           I/O last 3 completed shifts:   In: 350 [P.O.:350]  Out: 1600 [Urine:1600]  I/O this shift:  In: -   Out: 500 [Urine:500]   Admission weight: 180 lb (81.6 kg)  Wt Readings from Last 3 Encounters:   01/24/23 176 lb 12.9 oz (80.2 kg)   01/15/23 183 lb 4.8 oz (83.1 kg)   12/09/22 163 lb 1.6 oz (74 kg)        Vitals :   Vitals:    01/28/23 0800 01/28/23 1531 01/28/23 2130 01/29/23 0845   BP: (!) 144/65 (!) 140/65 (!) 143/75 (!) 143/63   Pulse: 63 71 79 74   Resp: 20 18 16 16   Temp: 98.2 °F (36.8 °C) 97.8 °F (36.6 °C) 98.3 °F (36.8 °C) 98.6 °F (37 °C)   TempSrc: Oral Oral Oral Oral   SpO2: 97% 99% 100% 98%   Weight:       Height:           Physical examination  General Appearance: alert and cooperative with exam, appears comfortable, no distress  Mouth/Throat: Oral mucosa moist  Neck: No JVD  Lungs: Air entry B/L, no rales, no use of accessory muscles  Heart:  S1, S2 heard  GI: soft, non-tender, no guarding    Medications:  Infusion:    dextrose 100 mL/hr at 01/29/23 0900    sodium chloride       Meds:    linezolid  600 mg Oral 2 times per day    lactobacillus  1 capsule Oral Daily with breakfast    rivaroxaban  15 mg Oral Dinner    [Held by provider] bumetanide  0.5 mg Oral Daily    hydrocortisone  20 mg Oral QAM    hydrocortisone  10 mg Oral Nightly    levothyroxine  125 mcg Oral Daily    oxybutynin  15 mg Oral Daily    pantoprazole  40 mg Oral QAM AC atorvastatin  20 mg Oral Daily    terbinafine  250 mg Oral Daily    sodium chloride flush  5-40 mL IntraVENous 2 times per day     Meds prn: sodium chloride flush, sodium chloride, ondansetron **OR** ondansetron, polyethylene glycol, acetaminophen **OR** acetaminophen, potassium chloride **OR** potassium alternative oral replacement **OR** potassium chloride, magnesium sulfate     Lab Data :  CBC:   Recent Labs     01/29/23  0550   WBC 7.9   HGB 7.2*   HCT 22.9*        CMP:  Recent Labs     01/27/23  0502 01/27/23  1040 01/27/23  2354 01/28/23  0550 01/29/23  0550      < > 141 145 140   K 4.5  --   --  4.4 3.9     --   --  113* 107   CO2 25  --   --  25 27   BUN 4*  --   --  7 10   CREATININE 0.5  --   --  0.5 0.5   GLUCOSE 126*  --   --  79 106   CALCIUM 7.7*  --   --  8.3* 8.0*   PHOS  --   --   --   --  2.2*    < > = values in this interval not displayed. Hepatic: No results for input(s): LABALBU, AST, ALT, ALB, BILITOT, ALKPHOS in the last 72 hours. Assessment and Plan:  Hypernatremia. Improved. Multifactorial--decreased oral intake/recent loop diuretics and diarrhea  Urine osmolality 249. Urine sodium 31. Hold D5W and monitor response  Bumex currently on hold  Check BMP in a.m. Renal.  Overall stable  Left upper extremity cellulitis  Hypokalemia improved  Mild hypophosphatemia. Add Neutra-Phos packets    Carole Velez MD  [facility]    This report has been created using voice recognition software.  It may contain minor errors which are inherent in voice recognition technology

## 2023-01-29 NOTE — PLAN OF CARE
Problem: Discharge Planning  Goal: Discharge to home or other facility with appropriate resources  Outcome: Progressing  Flowsheets (Taken 1/29/2023 0136)  Discharge to home or other facility with appropriate resources:   Identify barriers to discharge with patient and caregiver   Arrange for needed discharge resources and transportation as appropriate   Identify discharge learning needs (meds, wound care, etc)     Problem: Safety - Adult  Goal: Free from fall injury  Outcome: Progressing  Flowsheets (Taken 1/29/2023 0136)  Free From Fall Injury:   Instruct family/caregiver on patient safety   Based on caregiver fall risk screen, instruct family/caregiver to ask for assistance with transferring infant if caregiver noted to have fall risk factors  Goal: Educate on transmission based isolation - droplet  Description: Educate on transmission based isolation   Outcome: Progressing     Problem: Pain  Goal: Verbalizes/displays adequate comfort level or baseline comfort level  Outcome: Progressing  Flowsheets (Taken 1/29/2023 0136)  Verbalizes/displays adequate comfort level or baseline comfort level:   Encourage patient to monitor pain and request assistance   Assess pain using appropriate pain scale   Administer analgesics based on type and severity of pain and evaluate response   Implement non-pharmacological measures as appropriate and evaluate response     Problem: Nutrition Deficit:  Goal: Optimize nutritional status  Outcome: Progressing  Flowsheets (Taken 1/29/2023 0136)  Nutrient intake appropriate for improving, restoring, or maintaining nutritional needs:   Assess nutritional status and recommend course of action   Monitor oral intake, labs, and treatment plans   Recommend appropriate diets, oral nutritional supplements, and vitamin/mineral supplements   Order, calculate, and assess calorie counts as needed     Problem: Skin/Tissue Integrity  Goal: Absence of new skin breakdown  Description: 1.   Monitor for areas of redness and/or skin breakdown  2. Assess vascular access sites hourly  3. Every 4-6 hours minimum:  Change oxygen saturation probe site  4. Every 4-6 hours:  If on nasal continuous positive airway pressure, respiratory therapy assess nares and determine need for appliance change or resting period. Outcome: Progressing     Problem: Chronic Conditions and Co-morbidities  Goal: Patient's chronic conditions and co-morbidity symptoms are monitored and maintained or improved  Outcome: Progressing  Flowsheets (Taken 1/29/2023 0136)  Care Plan - Patient's Chronic Conditions and Co-Morbidity Symptoms are Monitored and Maintained or Improved:   Monitor and assess patient's chronic conditions and comorbid symptoms for stability, deterioration, or improvement   Collaborate with multidisciplinary team to address chronic and comorbid conditions and prevent exacerbation or deterioration   Update acute care plan with appropriate goals if chronic or comorbid symptoms are exacerbated and prevent overall improvement and discharge     Care plan reviewed with patient. Patient verbalize understanding of the plan of care and contribute to goal setting.

## 2023-01-29 NOTE — PROGRESS NOTES
Hospitalist Progress Note      Patient:  Nanci Perkins    Unit/Bed:5K-15/015-A  YOB: 1950  MRN: [id number]   Acct: [de-identified]   PCP: Aakash Niño MD  Date of Admission: 1/21/2023    Assessment/Plan:    Left upper extremity cellulitis, possibly related to recent IV infiltration: Patient had an IV infiltrated on 1/15 per provider's note and was receiving Unasyn during that stay. X-ray of left hand, elbow, radius and ulna on 1/21 revealed no acute bony abnormalities and no osseous abnormalities. CT left hand, radius, ulna, and humerus on 1/22 revealed diffuse soft tissue swelling, skin thickening and subcutaneous edema / inflammation of left upper arm, left forearm and left hand without abscess. Blood cultures negative to date   ID consulted recommended continuing Zyvox  - complete a total of 7 days which will finish 2/1  Was additionally treated with Zosyn while IP   PT and OT following. Electrolyte abnormalities:  Hypernatremia: Sodium up to 149 1/26- currently 145  Hold Bumex  Was given  IV dextrose 5% at 175cc/ hr with resolution of hypernatremia, therefore was stopped. However, upon stopping, NA increase again and nephro resumed D5W with improvement again to 140. Nephrology is following   Diabetes insipidus? ?? - test results are pending  Hypokalemia- improved: K 3.9, BMP in am   Hypophosphatemia- 2.2- replace   Maintain telemetry  Dysphagia: SLP recommended minced and moist moderately thick liquids per 1/23  Paroxysmal A-fib on [address]; rate controlled: continue home medications, continue tele. Okay to resume Xarelto, no plan for surgical intervention. Leukocytosis, resolved: Secondary to #1. History of adrenal insufficiency without crisis: Likely contributing to #1 skin fragility. Continue home cortef therapy. BP has been stable.    Chronic normocytic Anemia, worsening: Hgb with a decrease to 7.2- suspect dilutional given large volume of D5W given- continue to monitor. No acute bleeding. Will transfuse if Hgb <7. Hypothyroidism: TSH 0.047 and free T4 1.31 on 1/21. Continue home medications  Severe protein calorie malnutrition: Encourage oral intake; dietitian   Physical debility with deconditioning: Pt came from SNF. Consult PT/OT. Disposition: SNF placement    Chief Complaint: Left arm swelling    Initial H and P:-    \"Shirley Cooper is a 71 y.o. female with PMHx of paroxysmal A-fib, HTN, HFpEF, hypothyroidism, severe protein calorie malnutrition, and physical debility with deconditioning who presented to [facility] with chief complaint of left upper extremity swelling and pain. Patient was recently discharged on 1/17/23 for submandibular cellulitis and discharged to SNF. She endorses having no recollection of injuring her left arm, but stated that the arm started to swell just 3 days ago. Around that time she had stopped her outpatient course of antibiotics. At first when the swelling started she stated that she did not think much about it as it was not bothering her. Then today her arm was quite tender to touch and just throbbed all day, hence the admission to [facility]. Currently she is not having any pain, but that was after administration of Morphine while in the ED. She denies any dizziness, chest pain or shortness of breath. She denies any sick contacts. She does not endorse any tingling or numbness that is unusual for her. \"     1/22: Patient is sitting upright in bed. She endorses 9/10 sharp pain of LUE from finger tips to mid upper arm. She states this is improved from yesterday. She states her arm was \"bubbling\" yesterday but denies drainage today. She denies changes to right upper extremity, or bilateral lower extremities. She denies fever/chills, chest pain, abdominal pain, shortness of breath, palpitations, nausea or vomiting. Patient is unable to recall when her last bowel movement was.     1/23: Patient laying in bed.  She notes overall she is feeling better today, however clinically arm appears worse. She endorses pain of left arm but states this has improved, she states has increased range of motion and strength in left arm from yesterday. She notes discharge of left arm with movement. She denies fever / chills, shortness of breath, chest pain, nausea, vomiting, abdominal pain, lightheadedness, headache.     1/24: Patient sitting in chair with brother at bedside. Patient states she is feeling better today overall. She states her left arm feels and looks better, she endorses improved mobility. She denies left arm pain or drainage. She endorses urgency of bowel movements with diarrhea for the past few days. She denies pain with bowel movements or foul odor. She denies abdominal pain, nausea or vomiting. She denies shortness of breath, chest pain, palpitations, numbness, tingling, lightheadedness, dizziness or headaches. Patient states she would like to go home but is understandable and agreeable that she needs additional therapy. 1/25: pt samson well, sitting up in bed. Does not like her minced and moist diet. Denies n/v, admits to diarrhea however upon discussion with RN this is more soft rather than watery. Denies CP/SOB. Afebrile. 1/26:  Patient sitting up in bed. Pleasant affect. Denies any issues at this time but does seem mildly confused which is reportedly baseline for her.     1/27: Patient doing well. Sitting in chair. Denies any arm pain. 1/28: No subjective changes overnight. Hopeful for DC on Monday once precert back     Subjective (past 24 hours):   1/29- patient reports she is doing well except she wishes she didn't have to have IV in her neck. States her arm feels the best it has felt       Past medical history, family history, social history and allergies reviewed again and is unchanged since admission. ROS (All review of systems completed. Pertinent positives noted.  Otherwise All other systems reviewed and negative.) Medications:  Reviewed    Infusion Medications    dextrose 100 mL/hr at 01/28/23 1338    sodium chloride       Scheduled Medications    linezolid  600 mg Oral 2 times per day    lactobacillus  1 capsule Oral Daily with breakfast    rivaroxaban  15 mg Oral Dinner    [Held by provider] bumetanide  0.5 mg Oral Daily    hydrocortisone  20 mg Oral QAM    hydrocortisone  10 mg Oral Nightly    levothyroxine  125 mcg Oral Daily    oxybutynin  15 mg Oral Daily    pantoprazole  40 mg Oral QAM AC    atorvastatin  20 mg Oral Daily    terbinafine  250 mg Oral Daily    sodium chloride flush  5-40 mL IntraVENous 2 times per day     PRN Meds: sodium chloride flush, sodium chloride, ondansetron **OR** ondansetron, polyethylene glycol, acetaminophen **OR** acetaminophen, potassium chloride **OR** potassium alternative oral replacement **OR** potassium chloride, magnesium sulfate      Intake/Output Summary (Last 24 hours) at 1/29/2023 0806  Last data filed at 1/29/2023 0348  Gross per 24 hour   Intake 350 ml   Output 1600 ml   Net -1250 ml         Diet:  ADULT ORAL NUTRITION SUPPLEMENT; Dinner; Frozen Oral Supplement  ADULT ORAL NUTRITION SUPPLEMENT; Breakfast, Lunch; Wound Healing Oral Supplement  ADULT DIET; Dysphagia - Minced and Moist; Moderately Thick (Honey)    Physical Exam:  BP (!) 143/75   Pulse 79   Temp 98.3 °F (36.8 °C) (Oral)   Resp 16   Ht 5' 3\" (1.6 m)   Wt 176 lb 12.9 oz (80.2 kg)   SpO2 100%   BMI 31.32 kg/m²   General appearance: Speaking softly throughout examination. No apparent distress, appears stated age and cooperative. HEENT: Pupils equal, round, and reactive to light. Conjunctivae/corneas clear. Neck: Supple, with full range of motion. No jugular venous distention. Trachea midline. Respiratory:  Normal respiratory effort. Clear to auscultation, bilaterally without Rales/Wheezes/Rhonchi. Cardiovascular: Regular rate and rhythm with normal S1/S2 without murmurs, rubs or gallops.   Abdomen: Soft, non-tender, non-distended with normal bowel sounds. Musculoskeletal: Diminished active and passive range of motion of bilateral upper extremities. Active range of motion of left upper extremity improved from yesterday. Left upper extremity strength diminished. Skin: Diffuse bruising throughout right upper extremity and bilateral lower extremities. Patient states this is her baseline. Improving erythema and edema of left upper extremity up to mid upper arm. Serosanginous oozing noted but no overt signs of purulent discharge. No warmth the left forearm . Sporadic hematomas of left upper extremity. urrently wrapped , dressing CD&I  Neurologic:  Neurovascularly intact without any focal sensory deficits. Cranial nerves: II-XII intact, grossly non-focal.  Psychiatric: Alert and oriented, thought content appropriate, normal insight. Conversationally confused   Capillary Refill: Brisk,< 3 seconds   Peripheral Pulses: +2 palpable, equal bilaterally     Labs:   Recent Labs     01/29/23  0550   WBC 7.9   HGB 7.2*   HCT 22.9*          Recent Labs     01/27/23  0502 01/27/23  1040 01/27/23  2354 01/28/23  0550 01/29/23  0550      < > 141 145 140   K 4.5  --   --  4.4 3.9     --   --  113* 107   CO2 25  --   --  25 27   BUN 4*  --   --  7 10   CREATININE 0.5  --   --  0.5 0.5   CALCIUM 7.7*  --   --  8.3* 8.0*   PHOS  --   --   --   --  2.2*    < > = values in this interval not displayed. No results for input(s): AST, ALT, BILIDIR, BILITOT, ALKPHOS in the last 72 hours. No results for input(s): INR in the last 72 hours. No results for input(s): [employer] in the last 72 hours. Microbiology:    Blood culture #1:   Lab Results   Component Value Date/Time    [facility]  01/21/2023 09:01 PM     No growth 24 hours. No growth 48 hours.  No growth at 5 days       Blood culture #2:No results found for: BLOODCULT2    Organism:  Lab Results   Component Value Date/Time    ORG Staphylococcus aureus 12/17/2018 04:04 PM         Lab Results   Component Value Date/Time    LABGRAM  02/16/2022 04:00 PM     No segmented neutrophils observed. Rare epithelial cells observed. No organisms observed. MRSA culture only:No results found for: [facility]    Urine culture:   Lab Results   Component Value Date/Time    LABURIN No growth-preliminary  No growth   02/09/2017 12:35 PM       Respiratory culture: No results found for: CULTRESP    Aerobic and Anaerobic :  Lab Results   Component Value Date/Time    LABAERO No Acinetobacter species isolated. 01/04/2023 10:00 PM     Lab Results   Component Value Date/Time    LABANAE  12/01/2021 12:00 PM     No anaerobes isolated- preliminary Culture yielded light growth of gram positive bacilli most consistent with Cutibacterium species. Clinical correlation required. Urinalysis:      Lab Results   Component Value Date/Time    NITRU NEGATIVE 01/22/2023 12:02 AM    WBCUA 0-2 01/22/2023 12:02 AM    WBCUA 2-4 02/22/2012 01:00 PM    BACTERIA NONE SEEN 01/22/2023 12:02 AM    RBCUA 5-10 01/22/2023 12:02 AM    BLOODU MODERATE 01/22/2023 12:02 AM    SPECGRAV 1.025 09/18/2020 12:00 PM    GLUCOSEU NEGATIVE 01/22/2023 12:02 AM       Radiology:  CT HAND LEFT W CONTRAST   Final Result      Lateral portion of the left upper extremity is partly off the    field-of-view. Status post left shoulder hemiarthroplasty and total left hip replacement. Metallic streak artifact limits the evaluation of the adjacent structures. Diffuse soft tissue swelling, skin thickening and subcutaneous    edema/inflammation of the left upper arm, left forearm and left hand. Correlate clinically for cellulitis. No abscess is seen.       This document has been electronically signed by: Juanita Garcia MD on    01/22/2023 06:43 AM      All CTs at this facility use dose modulation techniques and iterative    reconstructions, and/or weight-based dosing   when appropriate to reduce radiation to a low as reasonably achievable. CT RADIUS ULNA LEFT W CONTRAST   Final Result      Lateral portion of the left upper extremity is partly off the    field-of-view. Status post left shoulder hemiarthroplasty and total left hip replacement. Metallic streak artifact limits the evaluation of the adjacent structures. Diffuse soft tissue swelling, skin thickening and subcutaneous    edema/inflammation of the left upper arm, left forearm and left hand. Correlate clinically for cellulitis. No abscess is seen. This document has been electronically signed by: Los Perez MD on    01/22/2023 06:26 AM      All CTs at this facility use dose modulation techniques and iterative    reconstructions, and/or weight-based dosing   when appropriate to reduce radiation to a low as reasonably achievable. CT HUMERUS LEFT W CONTRAST   Final Result      Lateral portion of the left upper extremity is partly off the    field-of-view. Status post left shoulder hemiarthroplasty and total left hip replacement. Metallic streak artifact limits the evaluation of the adjacent structures. Diffuse soft tissue swelling, skin thickening and subcutaneous    edema/inflammation of the left upper arm, left forearm and left hand. Correlate clinically for cellulitis. No abscess is seen. This document has been electronically signed by: Los Perez MD on    01/22/2023 06:42 AM      All CTs at this facility use dose modulation techniques and iterative    reconstructions, and/or weight-based dosing   when appropriate to reduce radiation to a low as reasonably achievable. XR CHEST PORTABLE   Final Result   Decrease in pulmonary interstitial densities compared to x-ray 1/4/2023. This document has been electronically signed by: Kat Kuhn MD on    01/21/2023 11:52 PM      XR RADIUS ULNA LEFT (2 VIEWS)   Final Result   No acute bony abnormality.       This document has been electronically signed by: Kat Kuhn MD on 01/21/2023 11:51 PM      XR HAND LEFT (MIN 3 VIEWS)   Final Result   No acute osseous abnormality. This document has been electronically signed by: Monalisa Parker MD on    01/21/2023 10:30 PM      XR ELBOW LEFT (MIN 3 VIEWS)   Final Result   No acute bony abnormality. This document has been electronically signed by: Monalisa Parker MD on    01/21/2023 10:28 PM        XR ELBOW LEFT (MIN 3 VIEWS)    Result Date: 1/21/2023  3 view right elbow Comparison: None Findings: No acute fractures. Normal alignment. No significant arthritic change or erosions. No joint effusion. No radiopaque foreign body. Diffuse edema. No acute bony abnormality. This document has been electronically signed by: Monalisa Parker MD on 01/21/2023 10:28 PM    XR RADIUS ULNA LEFT (2 VIEWS)    Result Date: 1/21/2023  2 view left forearm Comparison: None Findings: No fractures or dislocations. No joint effusion. No significant arthritic change. No radiopaque foreign body. Extensive soft tissue edema. No acute bony abnormality. This document has been electronically signed by: Monalisa Parker MD on 01/21/2023 11:51 PM    XR HAND LEFT (MIN 3 VIEWS)    Result Date: 1/21/2023  3 view left hand Comparison: None Findings: Osteopenia. No acute fractures. No significant loss of joint space or osteophytes. No erosions. No radiopaque foreign body. Diffuse edema. No acute osseous abnormality. This document has been electronically signed by: Monalisa Parker MD on 01/21/2023 10:30 PM    CT HUMERUS LEFT W CONTRAST    Result Date: 1/22/2023  CT left upper extremity with contrast COMPARISON: No prior FINDINGS: The lateral portion of the left upper extremity is partly off the field-of-view. The patient is status post left shoulder hemiarthroplasty and total left hip replacement. Metallic streak artifact limits the evaluation of the adjacent structures.  There is diffuse soft tissue swelling, skin thickening and subcutaneous edema/inflammation of the left upper arm, left forearm and left hand. No abscess is seen. No fracture or dislocation is seen. Lateral portion of the left upper extremity is partly off the field-of-view. Status post left shoulder hemiarthroplasty and total left hip replacement. Metallic streak artifact limits the evaluation of the adjacent structures. Diffuse soft tissue swelling, skin thickening and subcutaneous edema/inflammation of the left upper arm, left forearm and left hand. Correlate clinically for cellulitis. No abscess is seen. This document has been electronically signed by: Kelsey Pate MD on 01/22/2023 06:42 AM All CTs at this facility use dose modulation techniques and iterative reconstructions, and/or weight-based dosing when appropriate to reduce radiation to a low as reasonably achievable. CT RADIUS ULNA LEFT W CONTRAST    Result Date: 1/22/2023  CT left upper extremity with contrast COMPARISON: No prior FINDINGS: The lateral portion of the left upper extremity is partly off the field-of-view. The patient is status post left shoulder hemiarthroplasty and total left hip replacement. Metallic streak artifact limits the evaluation of the adjacent structures. There is diffuse soft tissue swelling, skin thickening and subcutaneous edema/inflammation of the left upper arm, left forearm and left hand. No abscess is seen. No fracture or dislocation is seen. Lateral portion of the left upper extremity is partly off the field-of-view. Status post left shoulder hemiarthroplasty and total left hip replacement. Metallic streak artifact limits the evaluation of the adjacent structures. Diffuse soft tissue swelling, skin thickening and subcutaneous edema/inflammation of the left upper arm, left forearm and left hand. Correlate clinically for cellulitis. No abscess is seen.  This document has been electronically signed by: Kelsey Pate MD on 01/22/2023 06:26 AM All CTs at this facility use dose modulation techniques and iterative reconstructions, and/or weight-based dosing when appropriate to reduce radiation to a low as reasonably achievable. CT HAND LEFT W CONTRAST    Result Date: 1/22/2023  CT left upper extremity with contrast COMPARISON: No prior FINDINGS: The lateral portion of the left upper extremity is partly off the field-of-view. The patient is status post left shoulder hemiarthroplasty and total left hip replacement. Metallic streak artifact limits the evaluation of the adjacent structures. There is diffuse soft tissue swelling, skin thickening and subcutaneous edema/inflammation of the left upper arm, left forearm and left hand. No abscess is seen. No fracture or dislocation is seen. Lateral portion of the left upper extremity is partly off the field-of-view. Status post left shoulder hemiarthroplasty and total left hip replacement. Metallic streak artifact limits the evaluation of the adjacent structures. Diffuse soft tissue swelling, skin thickening and subcutaneous edema/inflammation of the left upper arm, left forearm and left hand. Correlate clinically for cellulitis. No abscess is seen. This document has been electronically signed by: Gabi Carcamo MD on 01/22/2023 06:43 AM All CTs at this facility use dose modulation techniques and iterative reconstructions, and/or weight-based dosing when appropriate to reduce radiation to a low as reasonably achievable. XR CHEST PORTABLE    Result Date: 1/21/2023  1 view chest x-ray Comparison: CR/SR - XR CHEST PORTABLE - 01/04/2023 04:26 PM EST Findings: Decrease in pulmonary interstitial densities compared to x-ray 1/4/2023. Right internal iliac catheter terminates in the right atrium. Status post bilateral hip replacement. No acute fractures. Decrease in pulmonary interstitial densities compared to x-ray 1/4/2023.  This document has been electronically signed by: Jaimie Srivastava MD on 01/21/2023 11:52 PM      Electronically signed by Connor Menjivar PA-C on 1/29/2023 at 8:06 AM

## 2023-01-30 LAB
ANION GAP SERPL CALC-SCNC: 9 MEQ/L (ref 8–16)
BUN SERPL-MCNC: 7 MG/DL (ref 7–22)
CALCIUM SERPL-MCNC: 8.3 MG/DL (ref 8.5–10.5)
CHLORIDE SERPL-SCNC: 108 MEQ/L (ref 98–111)
CO2 SERPL-SCNC: 25 MEQ/L (ref 23–33)
CREAT SERPL-MCNC: 0.6 MG/DL (ref 0.4–1.2)
DEPRECATED RDW RBC AUTO: 60.6 FL (ref 35–45)
ERYTHROCYTE [DISTWIDTH] IN BLOOD BY AUTOMATED COUNT: 16.9 % (ref 11.5–14.5)
GFR SERPL CREATININE-BSD FRML MDRD: > 60 ML/MIN/1.73M2
GLUCOSE BLD STRIP.AUTO-MCNC: 90 MG/DL (ref 70–108)
GLUCOSE SERPL-MCNC: 74 MG/DL (ref 70–108)
HCT VFR BLD AUTO: 23.9 % (ref 37–47)
HGB BLD-MCNC: 7.4 GM/DL (ref 12–16)
MCH RBC QN AUTO: 30.2 PG (ref 26–33)
MCHC RBC AUTO-ENTMCNC: 31 GM/DL (ref 32.2–35.5)
MCV RBC AUTO: 97.6 FL (ref 81–99)
PHOSPHATE SERPL-MCNC: 2.6 MG/DL (ref 2.4–4.7)
PLATELET # BLD AUTO: 219 THOU/MM3 (ref 130–400)
PMV BLD AUTO: 9.3 FL (ref 9.4–12.4)
POTASSIUM SERPL-SCNC: 3.9 MEQ/L (ref 3.5–5.2)
RBC # BLD AUTO: 2.45 MILL/MM3 (ref 4.2–5.4)
RENIN PLAS-CCNC: 0.1 NG/ML/HR
SODIUM SERPL-SCNC: 142 MEQ/L (ref 135–145)
WBC # BLD AUTO: 6.6 THOU/MM3 (ref 4.8–10.8)

## 2023-01-30 PROCEDURE — 1200000003 HC TELEMETRY R&B

## 2023-01-30 PROCEDURE — 36415 COLL VENOUS BLD VENIPUNCTURE: CPT

## 2023-01-30 PROCEDURE — 82948 REAGENT STRIP/BLOOD GLUCOSE: CPT

## 2023-01-30 PROCEDURE — 97530 THERAPEUTIC ACTIVITIES: CPT

## 2023-01-30 PROCEDURE — 99232 SBSQ HOSP IP/OBS MODERATE 35: CPT | Performed by: PHYSICIAN ASSISTANT

## 2023-01-30 PROCEDURE — 6370000000 HC RX 637 (ALT 250 FOR IP): Performed by: INTERNAL MEDICINE

## 2023-01-30 PROCEDURE — 6370000000 HC RX 637 (ALT 250 FOR IP)

## 2023-01-30 PROCEDURE — 85027 COMPLETE CBC AUTOMATED: CPT

## 2023-01-30 PROCEDURE — 1200000000 HC SEMI PRIVATE

## 2023-01-30 PROCEDURE — 84100 ASSAY OF PHOSPHORUS: CPT

## 2023-01-30 PROCEDURE — 99232 SBSQ HOSP IP/OBS MODERATE 35: CPT | Performed by: INTERNAL MEDICINE

## 2023-01-30 PROCEDURE — 80048 BASIC METABOLIC PNL TOTAL CA: CPT

## 2023-01-30 PROCEDURE — 2580000003 HC RX 258

## 2023-01-30 PROCEDURE — 97110 THERAPEUTIC EXERCISES: CPT

## 2023-01-30 PROCEDURE — 6370000000 HC RX 637 (ALT 250 FOR IP): Performed by: PHYSICIAN ASSISTANT

## 2023-01-30 RX ADMIN — LEVOTHYROXINE SODIUM 125 MCG: 0.12 TABLET ORAL at 11:40

## 2023-01-30 RX ADMIN — HYDROCORTISONE 20 MG: 10 TABLET ORAL at 11:40

## 2023-01-30 RX ADMIN — TERBINAFINE TABLETS 250 MG 250 MG: 250 TABLET ORAL at 11:50

## 2023-01-30 RX ADMIN — PANTOPRAZOLE SODIUM 40 MG: 40 TABLET, DELAYED RELEASE ORAL at 05:19

## 2023-01-30 RX ADMIN — LINEZOLID 600 MG: 600 TABLET, FILM COATED ORAL at 20:01

## 2023-01-30 RX ADMIN — SODIUM CHLORIDE, PRESERVATIVE FREE 10 ML: 5 INJECTION INTRAVENOUS at 11:52

## 2023-01-30 RX ADMIN — SODIUM CHLORIDE, PRESERVATIVE FREE 10 ML: 5 INJECTION INTRAVENOUS at 20:01

## 2023-01-30 RX ADMIN — Medication 1 CAPSULE: at 11:40

## 2023-01-30 RX ADMIN — ATORVASTATIN CALCIUM 20 MG: 20 TABLET, FILM COATED ORAL at 11:40

## 2023-01-30 RX ADMIN — HYDROCORTISONE 10 MG: 10 TABLET ORAL at 20:01

## 2023-01-30 RX ADMIN — RIVAROXABAN 15 MG: 15 TABLET, FILM COATED ORAL at 17:43

## 2023-01-30 RX ADMIN — LINEZOLID 600 MG: 600 TABLET, FILM COATED ORAL at 11:49

## 2023-01-30 RX ADMIN — OXYBUTYNIN CHLORIDE 15 MG: 10 TABLET, EXTENDED RELEASE ORAL at 11:40

## 2023-01-30 RX ADMIN — POTASSIUM & SODIUM PHOSPHATES POWDER PACK 280-160-250 MG 250 MG: 280-160-250 PACK at 11:40

## 2023-01-30 NOTE — CARE COORDINATION
1/30/23, 8:52 AM EST    DISCHARGE PLANNING EVALUATION       Called and spoke with Carl Starkey at [facility] and Bret at [facility] both facilities are still reviewing.

## 2023-01-30 NOTE — CARE COORDINATION
1/30/23, 1:24 PM EST    DISCHARGE PLANNING EVALUATION       Faxed updated notes to facility so they can start pre-cert.

## 2023-01-30 NOTE — PROGRESS NOTES
[facility] 5K  Occupational Therapy  Daily Note  Time:   Time In: 1100  Time Out: 1129  Timed Code Treatment Minutes: 29 Minutes  Minutes: 34      **co-tx with PT for bed mobility and functional transfers due to patient's level of assist.     Date: 2023  Patient Name: Nanci Perkins,   Gender: female      Room: [facility]15/015-  MRN: [id number]  : 1950  (71 y.o.)  Referring Practitioner: Haroon Gaines PA-C  Diagnosis: cellulitis  Additional Pertinent Hx: per chart review; Nanci Perkins is a 71 y.o. female with PMHx of paroxysmal A-fib, HTN, HFpEF, hypothyroidism, severe protein calorie malnutrition, and physical debility with deconditioning who presented to [facility] with chief complaint of left upper extremity swelling and pain. Patient was recently discharged on 23 for submandibular cellulitis and discharged to SNF. She endorses having no recollection of injuring her left arm, but stated that the arm started to swell just 3 days ago. Around that time she had stopped her outpatient course of antibiotics. At first when the swelling started she stated that she did not think much about it as it was not bothering her. Then today her arm was quite tender to touch and just throbbed all day, hence the admission to [facility]. Currently she is not having any pain, but that was after administration of Morphine while in the ED. She denies any dizziness, chest pain or shortness of breath. She denies any sick contacts. She does not endorse any tingling or numbness that is unusual for her    Restrictions/Precautions:  Restrictions/Precautions: General Precautions, Fall Risk  Position Activity Restriction  Other position/activity restrictions: skin tears easily, hx CVA with L sided deficits     SUBJECTIVE: RN approved session, patient supine in bed upon OT arrival with PT attempting to get patient out of bed/rolling with difficulty due to patient's deficits and this writer assisting patient.  Patient required encouragement to sit in recliner and sit up for a few hours due to not much activity over the weekend. Patient agreeable. Patient A & O x 3. PAIN:  no report of pain beyond normal aches and pains. Vitals: Vitals not assessed per clinical judgement, see nursing flowsheet    COGNITION: Slow Processing, Decreased Recall, Decreased Insight, Decreased Problem Solving, and Difficulty Following Commands    ADL:   No ADL's completed this session. Carol Pulido BALANCE:  Sitting Balance:  Contact Guard Assistance, Minimal Assistance. Seated EOB due to initial post lean and able to correct to upright posture with repositioning on EOB and cues for tech  Standing Balance: Minimal Assistance, X 2. In [address] steady with flexed posture with cues for upright posture. BED MOBILITY:  Supine to Sit: Maximum Assistance, X 2 cues for sequencing and tech and difficulty using bed rails due to limited ROM in shldrs and hemiparesis of L UE.    TRANSFERS:  Sit to Stand:  Minimal Assistance, Moderate Assistance, X 2. From slightly elevated EOB in ayo steady with cues for tech and following instructions. Once patient in [address] steady; patient cued to sit with patient continuing to  flexed position and was becoming anxious with cues to relax in [address] steady stating she couldn't stand long and edu in following instructions for safe transfers and completing ADLs with caregiver. Patient demo understanding. Sit to  ayo steady with CGA x 2 and cues for posture and then lowered into recliner with cues to flex hips for effective chair positioning. ADDITIONAL ACTIVITIES:  Completed 1 set x 10-15 reps of UB exer with R UE only with rest breaks with 1# free wt with cues to adapt exer due to R shldr ROM deficits to increase UB strength for functional transfers.      Elevated L UE with pillow and cued to keep hand open due to resting with pointer finger flexed and able to actively extend to prevent contracture and promote function for (B) UE task. ASSESSMENT:\  Activity Tolerance:  Patient tolerance of  treatment: fair. Discharge Recommendations: Continue to assess pending progress, Subacute/skilled nursing facility, ECF with OT, 24 hour assistance or supervision, Not safe to return home at this time, and Patient would benefit from continued OT at discharge  Equipment Recommendations: Equipment Needed: Yes  Mobility Devices: ADL Assistive Devices  Plan: Times Per Week: 5x  Times Per Day: Once a day  Current Treatment Recommendations: Strengthening, ROM, Balance training, Functional mobility training, Endurance training, Neuromuscular re-education, Safety education & training, Equipment evaluation, education, & procurement, Patient/Caregiver education & training, Self-Care / ADL, Coordination training    Patient Education  Patient Education: Role of OT, Plan of Care, Home Exercise Program, Precautions, Reviewed Prior Education, Importance of Increasing Activity, and Assistive Device Safety    Goals  Short Term Goals  Time Frame for Short Term Goals: by discharge  Short Term Goal 1: patient will tolerate 2 min static standing with sit to stand and maintain upright posture with MIN A x 1. Short Term Goal 2: patient will participate in (B) UE AROM HEP and R UE strengthening to increase UB strength and function for functional transfers. Short Term Goal 3: patient will complete simple UB ADLs with MIN  A and edu in juan tech to compensate for L UE hemiparesis. Following session, patient left in safe position with all fall risk precautions in place.

## 2023-01-30 NOTE — DISCHARGE INSTR - COC
23Continuity of Care Form    Patient Name: Lennox Woodard   :  1950  MRN:  [id number]    Admit date:  2023  Discharge date:  2023    Code Status Order: Limited   Advance Directives:     Admitting Physician:  Mariel Robledo MD  PCP: Talat Carpio MD    Discharging Nurse: Shantal Katerin Unit/Room#: 7M-18/800-V  Discharging Unit Phone Number: [phone]    Emergency Contact:   Extended Emergency Contact Information  Primary Emergency Contact: Shania Gant  Address: [address] Phone: [phone]  Relation: Spouse  Secondary Emergency Contact: Yinka Pang  Mobile Phone: [phone]  Relation: Child    Past Surgical History:  Past Surgical History:   Procedure Laterality Date    [address] due to pituitary mass, drained and s/p radiation    CHOLECYSTECTOMY      HYSTERECTOMY (CERVIX STATUS UNKNOWN)      total done for DUB    OTHER SURGICAL HISTORY  2014    LAPAROSCOPIC RUPTURED APPENDECTOMY    OTHER SURGICAL HISTORY Left 10/26/15    Evacuation and Debridement of Left Lower Leg Hematoma - Dr. Sahil Mandel  3/13/2013    left    SHOULDER ARTHROPLASTY      right    TOOTH EXTRACTION  2018    TOTAL HIP ARTHROPLASTY      bilateral    TOTAL KNEE ARTHROPLASTY      right        Immunization History:   Immunization History   Administered Date(s) Administered    Influenza A ([id number]) Vaccine PF IM 2009    Influenza Virus Vaccine 10/16/2014, 2016    Influenza Whole 2016    Influenza, FLUAD, (age 72 y+), Adjuvanted, 0.5mL 2021    Influenza, High Dose (Fluzone 65 yrs and older) 2018    Pneumococcal Conjugate 13-valent ([id number]) 2016    Pneumococcal Polysaccharide ([id number]) 2017    Tdap (Boostrix, Adacel) 2015, 2017    Zoster Live (Zostavax) 06/08/2015       Active Problems:  Patient Active Problem List   Diagnosis Code    Asthma J45.909    Hyperlipidemia E78.5    Osteoarthritis M19.90    GERD (gastroesophageal reflux disease) K21.9    Meningioma ([facility]) D32.9    Mechanical loosening of prosthetic joint ([facility]) T84.039A    Hyperglycemia R73.9    SIRS (systemic inflammatory response syndrome) ([facility]) R65.10    Leukocytosis D72.829    Right sided weakness R53.1    Cerebrovascular disease L30.7    Metabolic acidosis M57.66    Sinus bradycardia R00.1    Generalized weakness R53.1    Hx of subdural hematoma Z86.79    Cerebral infarction ([facility]) I63.9    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 5XAA    JESSE (acute kidney injury) ([facility] N17.9    Hyperkalemia E87.5    Gastroenteritis due to norovirus P20.94    Acute diastolic heart failure ([facility]) I50.31    History of stroke with residual effects I69.30    Chronic venous stasis dermatitis of both lower extremities I87.2    Normocytic anemia D64.9    Chronic diastolic congestive heart failure (HCC) I50.32    CKD (chronic kidney disease), stage IV (HCC) N18.4    Confusion R41.0    Chronic kidney disease (CKD), stage III (moderate) ([facility]) N18.30    Obesity (BMI 30.0-34. 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    Hypothyroidism E03.9    Scalp lesion L98.9    Non-compliance F/u advised Z91.199    Verruca vulgaris B07.9    COVID-19 U07.1    Septic shock ([facility]) A41.9, R65.21    Cellulitis of submandibular region K12.2    Severe malnutrition ([address] E43    Submandibular gland infection K11.20    Cellulitis L03.90       Isolation/Infection:   Isolation            Contact          Patient Infection Status       Infection Onset Added Last Indicated Last Indicated By Review Planned Expiration Resolved Resolved By    MRSA 01/04/23 01/05/23 01/04/23 MRSA by PCR        PCR 1/2023    Resolved    COVID-19 11/23/22 11/23/22 11/23/22 COVID-19 & Influenza Combo   12/06/22 Lorna López RN    + 11/23    COVID-19 (Rule Out) 11/23/22 11/23/22 11/23/22 COVID-19 & Influenza Combo (Ordered)   11/23/22 Rule-Out Test Resulted    VRE  01/10/17 01/10/17 Lorna López RN   01/02/19 Mercedes Lowe RN    Urine 1/17    MRSA  12/14/12 12/14/12 Alexx Omer RN   06/16/15 Krystal Couch RN    + rt leg 12-11-12            Nurse Assessment:  Last Vital Signs: /70   Pulse 66   Temp 97.8 °F (36.6 °C) (Oral)   Resp 13   Ht 5' 3\" (1.6 m)   Wt 187 lb 9.8 oz (85.1 kg)   SpO2 99%   BMI 33.23 kg/m²     Last documented pain score (0-10 scale): Pain Level: 0  Last Weight:   Wt Readings from Last 1 Encounters:   01/30/23 187 lb 9.8 oz (85.1 kg)     Mental Status:  oriented and alert    IV Access:  - None    Nursing Mobility/ADLs:  Walking   Dependent  Transfer  Dependent  Bathing  Dependent  Dressing  Dependent  Toileting  Dependent  Feeding  Independent  Med Admin  Dependent  Med Delivery   whole, prefers mixed with applesauce, and pudding    Wound Care Documentation and Therapy:  Wound 11/23/22 Pretibial Proximal;Right (Active)   Wound Etiology Skin Tear 01/29/23 2000   Dressing Status New dressing applied 01/22/23 2015   Wound Cleansed Not Cleansed 01/22/23 2015   Dressing/Treatment Foam 01/29/23 2000   Wound Assessment Bleeding;Pink/red 01/22/23 2015   Drainage Amount Small 01/22/23 2015   Drainage Description Serosanguinous 01/22/23 2015   Odor None 01/22/23 2015   Jennifer-wound Assessment Ecchymosis 01/22/23 2015   Number of days: 67       Wound 01/04/23 Buttocks Right (Active)   Wound Etiology Pressure Stage 2 01/29/23 2000   Dressing Status Other (Comment) 01/29/23 2000   Wound Cleansed Not Cleansed 01/29/23 2000   Dressing/Treatment Moisture barrier 01/29/23 2000   Wound Assessment Pink/red;Purple/maroon 01/29/23 2000   Drainage Amount None 01/29/23 2000   Drainage Description Serous 01/22/23 2015   Odor None 01/29/23 2000   Jennifer-wound Assessment Non-blanchable erythema 01/29/23 2000   Number of days: 25        Elimination:  Continence: Bowel: No  Bladder: No  Urinary Catheter: None   Colostomy/Ileostomy/Ileal Conduit: No       Date of Last BM: 02/06/23    Intake/Output Summary (Last 24 hours) at 1/30/2023 1138  Last data filed at 1/30/2023 0856  Gross per 24 hour   Intake 240 ml   Output 1950 ml   Net -1710 ml     I/O last 3 completed shifts: In: 5 [P.O.:420]  Out: 2500 [Urine:2500]    Safety Concerns:     None    Impairments/Disabilities:      None    Nutrition Therapy:  Current Nutrition Therapy:   - Oral Diet:  Dysphagia 1 pureed    Routes of Feeding: Oral  Liquids: Honey Thick Liquids  Daily Fluid Restriction: no  Last Modified Barium Swallow with Video (Video Swallowing Test): done on 11/30/22    Treatments at the Time of Hospital Discharge:   Respiratory Treatments: none  Oxygen Therapy:  is not on home oxygen therapy.   Ventilator:    - No ventilator support    Rehab Therapies: Physical Therapy and Occupational Therapy  Weight Bearing Status/Restrictions: No weight bearing restrictions  Other Medical Equipment (for information only, NOT a DME order):  wheelchair and hospital bed  Other Treatments: none    Patient's personal belongings (please select all that are sent with patient):  Glasses, cell phone, book    RN SIGNATURE:  Electronically signed by Mirna Licea RN on 2/6/23 at 2:42 PM EST    CASE MANAGEMENT/SOCIAL WORK SECTION    Inpatient Status Date: 1/22/2023    Readmission Risk Assessment Score:  Readmission Risk              Risk of Unplanned Readmission:  34           Discharging to Facility/ Agency   Name: Tanja Iyer   Address: [place] [address] [place], [place] [address], [place] [employer]  Phone:  [phone]  Fax: [phone]  Dialysis Facility (if applicable)   Name:  Address:  Dialysis Schedule:  Phone:  Fax:    / signature: Electronically signed by LASHAY Walsh on 1/30/23 at 11:39 AM EST    PHYSICIAN SECTION    Prognosis: Good    Condition at Discharge: Stable    Rehab Potential (if transferring to Rehab): Fair    Recommended Labs or Other Treatments After Discharge: CBC    Physician Certification: I certify the above information and transfer of Yen Melgar  is necessary for the continuing treatment of the diagnosis listed and that she requires [facility] for greater 30 days.      Update Admission H&P: No change in H&P    PHYSICIAN SIGNATURE:  Electronically signed by Raulito Carpio PA-C on 2/6/23 at 11:36 AM EST

## 2023-01-30 NOTE — CARE COORDINATION
1/30/23, 11:41 AM EST    DISCHARGE PLANNING EVALUATION       Spoke with Carol Ann Pagan at [facility] and they can accept patient and start pre-cert once therapy has updated notes. Spoke with spouse and he is in agreement with plan and prefers [facility]. Will send therapy notes to facility once completed.

## 2023-01-30 NOTE — PROGRESS NOTES
[address] [address]  INPATIENT PHYSICAL THERAPY  DAILY NOTE  [facility]K - 5K-15/015-A    Time In: 9227  Time Out: 1122  Timed Code Treatment Minutes: 45 Minutes  Minutes: 45    Co-treat with OT for bed mobility and transfers due to increased need for assistance          Date: 2023  Patient Name: Sabi Peters,  Gender:  female        MRN: [id number]  : 1950  (71 y.o.)     Referring Practitioner: Harinder De La O PA-C  Diagnosis: Hypokalemia  Additional Pertinent Hx: 71 y.o. female with PMHx of paroxysmal A-fib, HTN, HFpEF, hypothyroidism, severe protein calorie malnutrition, and physical debility with deconditioning who presented to [facility] with chief complaint of left upper extremity swelling and pain. Patient was recently discharged on 23 for submandibular cellulitis and discharged to SNF. She endorses having no recollection of injuring her left arm, but stated that the arm started to swell just 3 days ago. Around that time she had stopped her outpatient course of antibiotics. At first when the swelling started she stated that she did not think much about it as it was not bothering her. Then today her arm was quite tender to touch and just throbbed all day, hence the admission to [facility]. Currently she is not having any pain, but that was after administration of Morphine while in the ED. She denies any dizziness, chest pain or shortness of breath. She denies any sick contacts. She does not endorse any tingling or numbness that is unusual for her.      Prior Level of Function:  Type of Home: Facility  Home Layout: One level  Home Access: Level entry  Home Equipment: DBVu Shower/Tub: Walk-in shower  Bathroom Toilet: Handicap height  Bathroom Equipment: Grab bars in shower, Shower chair, Grab bars around toilet  Bathroom Accessibility: Walker accessible    Receives Help From: Family, Other (comment) (facility staff)  ADL Assistance: Needs assistance  Homemaking Assistance: Needs assistance  Ambulation Assistance: Non-ambulatory  Transfer Assistance: Needs assistance  Active : No  Additional Comments: Pt originally home from wtih  but has been in/out of hospital recently and at SNF for therapy d/t deconditioning and  recently undergoing back surgery and unable to assist her- is main caregiver; baseline is stand pivot only. ramp into entrance of home. Restrictions/Precautions:  Restrictions/Precautions: General Precautions, Fall Risk  Position Activity Restriction  Other position/activity restrictions: skin tears easily, hx CVA with L sided deficits     SUBJECTIVE: RN approved session. Patient in bed upon arrival and agreeable to therapy.      PAIN: Not Rated    Vitals: Vitals not assessed per clinical judgement, see nursing flowsheet    OBJECTIVE:  Bed Mobility:  Rolling to Left: Moderate Assistance, X 1, with head of bed flat, with rail, with verbal cues , with increased time for completion   Rolling to Right: Maximum Assistance, X 1, with head of bed flat, without rail, with verbal cues , with increased time for completion   Supine to Sit: Minimal Assistance, Moderate Assistance, X 2, with head of bed raised, without rail, with verbal cues , with increased time for completion, x2 trials; initial trial max A x1 but unable to complete therefore for second trials OT gave assistance  Sit to Supine: Maximum Assistance, X 1, with head of bed raised, without rail, with verbal cues , with increased time for completion, following unable to complete initial supine to sit transfer   Scooting: Maximum Assistance, X 1, with head of bed flat, with verbal cues , with increased time for completion    Transfers:  Sit to Stand: Minimal Assistance, Moderate Assistance, Dependent, X 2, with Giovanna Stai, with increased time for completion, cues for hand placement, with verbal cues, x2 trials; first trial from EOB, second trials from [address] [facility]  Stand to [address] [facility], X 2, with Key Lin, with increased time for completion, cues for hand placement    Ambulation:  Not Tested    Balance:  Static Sitting Balance:  Contact Guard Assistance, with verbal cues , cues needed for postural corrections  Dynamic Sitting Balance: Contact Guard Assistance, with verbal cues , with back support while performing seated exercises  Static Standing Balance: Minimal Assistance, with verbal cues , in ayo taylor; forward flexed posture and difficulty correcting with verbal cues. Exercise:  Patient was guided in 1 set(s) 15 reps of exercise to both lower extremities. Seated marches and Long arc quads. Exercises were completed for increased independence with functional mobility. Functional Outcome Measures: Completed  AM-PAC Inpatient Mobility without Stair Climbing Raw Score : 7  AM-PAC Inpatient without Stair Climbing T-Scale Score : 28.66    ASSESSMENT:  Assessment: Patient progressing toward established goals. Pt was very fatigued following session. Activity Tolerance:  Patient tolerance of  treatment: fair.       Equipment Recommendations:Equipment Needed: No  Discharge Recommendations: Subacte/Skilled Nursing Facility and Patient would benefit from continued PT at discharge  Plan: Current Treatment Recommendations: Strengthening, Balance training, Functional mobility training, Transfer training, Endurance training, Safety education & training, Therapeutic activities  General Plan:  (3-5x GM)    Patient Education  Patient Education: Plan of Care, Bed Mobility, Transfers, Up in Chair for All Meals, Verbal Exercise Instruction    Goals:  Patient Goals : none stated  Short Term Goals  Time Frame for Short Term Goals: by discharge  Short Term Goal 1: bed mobility with HOB flat, no rails, mod I for increased functional ind  Short Term Goal 2: sit<>Stand from various surfaces with LRD mod I for safe transfers  Short Term Goal 3: pt to transfer with ayo brandon with CGA for increased safety with transfers  Long Term Goals  Time Frame for Long Term Goals : NA d/t short ELOS    Following session, patient left in safe position with all fall risk precautions in place.

## 2023-01-30 NOTE — PROGRESS NOTES
Progress note: Infectious diseases    Patient - Valerie Lang,  Age - 71 y.o.    - 1950      Room Number - 5K-15/015-A   MRN -  [id number]   Acct # - [de-identified]  Date of Admission -  2023  7:56 PM    SUBJECTIVE:   No new issues. OBJECTIVE   VITALS    height is 5' 3\" (1.6 m) and weight is 187 lb 9.8 oz (85.1 kg). Her oral temperature is 97.8 °F (36.6 °C). Her blood pressure is 105/70 and her pulse is 66. Her respiration is 13 and oxygen saturation is 99%.        Wt Readings from Last 3 Encounters:   23 187 lb 9.8 oz (85.1 kg)   01/15/23 183 lb 4.8 oz (83.1 kg)   22 163 lb 1.6 oz (74 kg)       I/O (24 Hours)    Intake/Output Summary (Last 24 hours) at 2023 1234  Last data filed at 2023 0856  Gross per 24 hour   Intake 240 ml   Output 1950 ml   Net -1710 ml       General Appearance  Awake, alert, oriented,  not  In acute distress  HEENT - normocephalic, atraumatic, pink conjunctiva,  anicteric sclera  Neck - Supple, no mass  Lungs -  Bilateral good air entry, no rhonchi, no wheeze  Cardiovascular - Heart sounds are normal.     Abdomen - soft, not distended, nontender,   Neurologic - Alert and oriented  Skin - No bruising or bleeding  Extremities - +edema of extremites, the redness on the left upper extremity has resolved      MEDICATIONS:      potassium & sodium phosphates  1 packet Oral 4x Daily    linezolid  600 mg Oral 2 times per day    lactobacillus  1 capsule Oral Daily with breakfast    rivaroxaban  15 mg Oral Dinner    [Held by provider] bumetanide  0.5 mg Oral Daily    hydrocortisone  20 mg Oral QAM    hydrocortisone  10 mg Oral Nightly    levothyroxine  125 mcg Oral Daily    oxybutynin  15 mg Oral Daily    pantoprazole  40 mg Oral QAM AC    atorvastatin  20 mg Oral Daily    terbinafine  250 mg Oral Daily    sodium chloride flush  5-40 mL IntraVENous 2 times per day      sodium chloride sodium chloride flush, sodium chloride, ondansetron **OR** ondansetron, polyethylene glycol, acetaminophen **OR** acetaminophen, potassium chloride **OR** potassium alternative oral replacement **OR** potassium chloride, magnesium sulfate      LABS:     CBC:   Recent Labs     01/29/23  0550 01/30/23  0532   WBC 7.9 6.6   HGB 7.2* 7.4*    219     BMP:    Recent Labs     01/28/23  0550 01/29/23  0550 01/30/23  0532    140 142   K 4.4 3.9 3.9   * 107 108   CO2 25 27 25   BUN 7 10 7   CREATININE 0.5 0.5 0.6   GLUCOSE 79 106 74     Calcium:  Recent Labs     01/30/23  0532   CALCIUM 8.3*      Phosphorus:  Recent Labs     01/30/23  0532   PHOS 2.6     BNP:No results for input(s): BNP in the last 72 hours. Glucose:  Recent Labs     01/30/23  0846   POCGLU 90        CULTURES:   UA: No results for input(s): SPECGRAV, PHUR, COLORU, CLARITYU, MUCUS, PROTEINU, BLOODU, RBCUA, WBCUA, BACTERIA, NITRU, GLUCOSEU, BILIRUBINUR, UROBILINOGEN, KETUA, LABCAST, LABCASTTY, AMORPHOS in the last 72 hours. Invalid input(s): CRYSTALS  Micro:   Lab Results   Component Value Date/Time    [facility]  01/21/2023 09:01 PM     No growth 24 hours. No growth 48 hours.  No growth at 5 days            Problem list of patient:     Patient Active Problem List   Diagnosis Code    Asthma J45.909    Hyperlipidemia E78.5    Osteoarthritis M19.90    GERD (gastroesophageal reflux disease) K21.9    Meningioma ([facility]) D32.9    Mechanical loosening of prosthetic joint ([facility]) T84.039A    Hyperglycemia R73.9    SIRS (systemic inflammatory response syndrome) ([facility]) R65.10    Leukocytosis D72.829    Right sided weakness R53.1    Cerebrovascular disease A46.0    Metabolic acidosis V16.42    Sinus bradycardia R00.1    Generalized weakness R53.1    Hx of subdural hematoma Z86.79    Cerebral infarction ([facility]) I63.9    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, 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    Hypercoagulable state ([address] D68.59    Fatigue R53.83    Atrial thrombosis I51.3 Microcytic anemia D50.9    Hypokalemia E87.6    Altered mental status R41.82    Postoperative hypothyroidism E89.0    Hypernatremia W40.4    Metabolic encephalopathy A97.71    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 5XAA    JESSE (acute kidney injury) ([facility] N17.9    Hyperkalemia E87.5    Gastroenteritis due to norovirus V00.08    Acute diastolic heart failure ([facility]) I50.31    History of stroke with residual effects I69.30    Chronic venous stasis dermatitis of both lower extremities I87.2    Normocytic anemia D64.9    Chronic diastolic congestive heart failure ([facility]) I50.32    CKD (chronic kidney disease), stage IV ([facility]) N18.4    Confusion R41.0    Chronic kidney disease (CKD), stage III (moderate) ([facility]) N18.30    Obesity (BMI 30.0-34. 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

## 2023-01-30 NOTE — PLAN OF CARE
Problem: Discharge Planning  Goal: Discharge to home or other facility with appropriate resources  Outcome: Progressing   Awaiting precert. Problem: Safety - Adult  Goal: Free from fall injury  Outcome: Progressing   All fall precautions in place. Bed in low position, alarm activated and appropriate use of call light. Problem: Pain  Goal: Verbalizes/displays adequate comfort level or baseline comfort level  1/30/2023 0231 by Jono Jesus RN  Outcome: Progressing   Pain Assessment: None - Denies Pain  Pain Level: 0   Patient's Stated Pain Goal: 1   Is pain goal met at this time? Yes     Non-Pharmaceutical Pain Intervention(s): Rest, Repositioned    Problem: Nutrition Deficit:  Goal: Optimize nutritional status  1/30/2023 0231 by Jono Jesus RN  Outcome: Progressing     Problem: Skin/Tissue Integrity  Goal: Absence of new skin breakdown  Description: 1. Monitor for areas of redness and/or skin breakdown  2. Assess vascular access sites hourly  3. Every 4-6 hours minimum:  Change oxygen saturation probe site  4. Every 4-6 hours:  If on nasal continuous positive airway pressure, respiratory therapy assess nares and determine need for appliance change or resting period. 1/30/2023 0231 by Jono Jesus RN  Outcome: Progressing   Skin assessment completed. Patient turned every 2 hours and as needed. No skin breakdown this shift. Problem: Skin/Tissue Integrity  Goal: Isolation precautions  Description: Isolation precautions  Outcome: Progressing   Patient remains in isolation precautions. Problem: Chronic Conditions and Co-morbidities  Goal: Patient's chronic conditions and co-morbidity symptoms are monitored and maintained or improved  1/30/2023 0231 by Jono Jesus RN  Outcome: Progressing     Care plan reviewed with patient. Patient verbalizes understanding of the plan of care and contributes to goal setting.

## 2023-01-30 NOTE — PROGRESS NOTES
[facility]   Nephrology progress note  1/30/2023, 11:13 AM      Pt Name:    Amado Baugh  MRN:     [id number]     YOB: 1950  Admit Date:    1/21/2023  7:56 PM    Chief Complaint: Nephrology following for hypernatremia. Subjective:  Patient seen and examined  Resting comfortably no distress    Objective:  24HR INTAKE/OUTPUT:    Intake/Output Summary (Last 24 hours) at 1/30/2023 1113  Last data filed at 1/30/2023 0856  Gross per 24 hour   Intake 240 ml   Output 1950 ml   Net -1710 ml      Admission weight: 180 lb (81.6 kg)  Wt Readings from Last 3 Encounters:   01/30/23 187 lb 9.8 oz (85.1 kg)   01/15/23 183 lb 4.8 oz (83.1 kg)   12/09/22 163 lb 1.6 oz (74 kg)        Vitals :   Vitals:    01/29/23 2000 01/30/23 0300 01/30/23 0521 01/30/23 0845   BP: (!) 126/56 136/62  105/70   Pulse: 69 75  66   Resp: 16 14  13   Temp: 98.3 °F (36.8 °C) 97.9 °F (36.6 °C)  97.8 °F (36.6 °C)   TempSrc: Oral Oral  Oral   SpO2: 100% 99%  99%   Weight:   187 lb 9.8 oz (85.1 kg)    Height:           Physical examination  General Appearance:  Well developed.  No distress  Mouth/Throat:  Oral mucosa moist  Neck: No accessory muscle use  Lungs:  Breath sounds: clear diminished  Heart[de-identified]  S1,S2 heard  Abdomen:  Soft, non - tender  Musculoskeletal:  Edema -not significant    Medications:  Infusion:    sodium chloride       Meds:    potassium & sodium phosphates  1 packet Oral 4x Daily    linezolid  600 mg Oral 2 times per day    lactobacillus  1 capsule Oral Daily with breakfast    rivaroxaban  15 mg Oral Dinner    [Held by provider] bumetanide  0.5 mg Oral Daily    hydrocortisone  20 mg Oral QAM    hydrocortisone  10 mg Oral Nightly    levothyroxine  125 mcg Oral Daily    oxybutynin  15 mg Oral Daily    pantoprazole  40 mg Oral QAM AC    atorvastatin  20 mg Oral Daily    terbinafine  250 mg Oral Daily    sodium chloride flush  5-40 mL IntraVENous 2 times per day       Lab Data :  CBC:   Recent Labs 01/29/23  0550 01/30/23  0532   WBC 7.9 6.6   HGB 7.2* 7.4*   HCT 22.9* 23.9*    219     CMP:  Recent Labs     01/28/23  0550 01/29/23  0550 01/30/23  0532    140 142   K 4.4 3.9 3.9   * 107 108   CO2 25 27 25   BUN 7 10 7   CREATININE 0.5 0.5 0.6   GLUCOSE 79 106 74   CALCIUM 8.3* 8.0* 8.3*   PHOS  --  2.2* 2.6         Assessment and Plan:  Renal -renal function appears to be stable at baseline  Hypernatremia fluctuating most likely due to decreased oral intake of liquids and excessive insensible losses due to the wound on the left arm/cellulitis and diarrhea  Overall sodium has improved but still running around 142  D5W have been stopped yesterday we will see how she trends encourage oral intake of liquids  Electrolytes -hypokalemia doing well  Left upper extremity cellulitis on antibiotics appears to be getting better  Chronic anemia  Essential hypertension reasonable  Meds reviewed and discussed with patient and nursing staff    Elmo Cain MD  [facility]    This report has been created using voice recognition software.  It may contain minor errors which are inherent in voice recognition technology

## 2023-01-30 NOTE — PROGRESS NOTES
Hospitalist Progress Note      Patient:  Silvia Ayers    Unit/Bed:5K-15/015-A  YOB: 1950  MRN: [id number]   Acct: [de-identified]   PCP: Tatiana Spurling, MD  Date of Admission: 1/21/2023    Assessment/Plan:    Left upper extremity cellulitis, possibly related to recent IV infiltration: Patient had an IV infiltrated on 1/15 per provider's note and was receiving Unasyn during that stay. X-ray of left hand, elbow, radius and ulna on 1/21 revealed no acute bony abnormalities and no osseous abnormalities. CT left hand, radius, ulna, and humerus on 1/22 revealed diffuse soft tissue swelling, skin thickening and subcutaneous edema / inflammation of left upper arm, left forearm and left hand without abscess. Blood cultures negative to date   ID consulted recommended continuing Zyvox  - complete a total of 7 days which will finish 2/1  Was additionally treated with Zosyn while IP   PT and OT following. Electrolyte abnormalities:  Hypernatremia: Sodium up to 149 1/26- currently [address] [facility]  Was given  IV dextrose 5% at 175cc/ hr with resolution of hypernatremia, therefore was stopped. However, upon stopping, NA increase again and nephro resumed D5W with improvement again to 140. Nephrology is following   1/30- D5W stopped last night. Sodium 142  Hypokalemia- improved: K 3.9, BMP in am   Hypophosphatemia- 2.2- - improved to 2.6 with neutra phos  Maintain telemetry  Dysphagia: SLP recommended minced and moist moderately thick liquids per 1/23  Paroxysmal A-fib on [facility]; rate controlled: continue home medications, continue tele. Okay to resume Xarelto, no plan for surgical intervention. Leukocytosis, resolved: Secondary to #1. History of adrenal insufficiency without crisis: Likely contributing to #1 skin fragility. Continue home cortef therapy. BP has been stable.    Chronic normocytic Anemia, worsening: Hgb stable in 7s- suspect dilutional given large volume of D5W given- continue to monitor. No acute bleeding. Will transfuse if Hgb <7. Hypothyroidism: TSH 0.047 and free T4 1.31 on 1/21. Continue home medications  Severe protein calorie malnutrition: Encourage oral intake; dietitian   Physical debility with deconditioning: Pt came from SNF. Consult PT/OT. Disposition: SNF placement    Chief Complaint: Left arm swelling    Initial H and P:-    \"Shirley Hightower is a 71 y.o. female with PMHx of paroxysmal A-fib, HTN, HFpEF, hypothyroidism, severe protein calorie malnutrition, and physical debility with deconditioning who presented to [facility] with chief complaint of left upper extremity swelling and pain. Patient was recently discharged on 1/17/23 for submandibular cellulitis and discharged to SNF. She endorses having no recollection of injuring her left arm, but stated that the arm started to swell just 3 days ago. Around that time she had stopped her outpatient course of antibiotics. At first when the swelling started she stated that she did not think much about it as it was not bothering her. Then today her arm was quite tender to touch and just throbbed all day, hence the admission to [facility]. Currently she is not having any pain, but that was after administration of Morphine while in the ED. She denies any dizziness, chest pain or shortness of breath. She denies any sick contacts. She does not endorse any tingling or numbness that is unusual for her. \"     1/22: Patient is sitting upright in bed. She endorses 9/10 sharp pain of LUE from finger tips to mid upper arm. She states this is improved from yesterday. She states her arm was \"bubbling\" yesterday but denies drainage today. She denies changes to right upper extremity, or bilateral lower extremities. She denies fever/chills, chest pain, abdominal pain, shortness of breath, palpitations, nausea or vomiting. Patient is unable to recall when her last bowel movement was.     1/23: Patient laying in bed.  She notes overall she is feeling better today, however clinically arm appears worse. She endorses pain of left arm but states this has improved, she states has increased range of motion and strength in left arm from yesterday. She notes discharge of left arm with movement. She denies fever / chills, shortness of breath, chest pain, nausea, vomiting, abdominal pain, lightheadedness, headache.     1/24: Patient sitting in chair with brother at bedside. Patient states she is feeling better today overall. She states her left arm feels and looks better, she endorses improved mobility. She denies left arm pain or drainage. She endorses urgency of bowel movements with diarrhea for the past few days. She denies pain with bowel movements or foul odor. She denies abdominal pain, nausea or vomiting. She denies shortness of breath, chest pain, palpitations, numbness, tingling, lightheadedness, dizziness or headaches. Patient states she would like to go home but is understandable and agreeable that she needs additional therapy. 1/25: pt elijahn well, sitting up in bed. Does not like her minced and moist diet. Denies n/v, admits to diarrhea however upon discussion with RN this is more soft rather than watery. Denies CP/SOB. Afebrile. 1/26:  Patient sitting up in bed. Pleasant affect. Denies any issues at this time but does seem mildly confused which is reportedly baseline for her.     1/27: Patient doing well. Sitting in chair. Denies any arm pain. 1/28: No subjective changes overnight. Hopeful for DC on Monday once precert back     5/56: patient reports she is doing well except she wishes she didn't have to have IV in her neck. States her arm feels the best it has felt     1/30:   Overall patient stable. Sodium, potassium and phos all WNL.   Patient accepted at [facility] and we are waiting on precert     Subjective (past 24 hours):         Past medical history, family history, social history and allergies reviewed again and is unchanged since admission. ROS (All review of systems completed. Pertinent positives noted. Otherwise All other systems reviewed and negative.)     Medications:  Reviewed    Infusion Medications    sodium chloride       Scheduled Medications    potassium & sodium phosphates  1 packet Oral 4x Daily    linezolid  600 mg Oral 2 times per day    lactobacillus  1 capsule Oral Daily with breakfast    rivaroxaban  15 mg Oral Dinner    [Held by provider] bumetanide  0.5 mg Oral Daily    hydrocortisone  20 mg Oral QAM    hydrocortisone  10 mg Oral Nightly    levothyroxine  125 mcg Oral Daily    oxybutynin  15 mg Oral Daily    pantoprazole  40 mg Oral QAM AC    atorvastatin  20 mg Oral Daily    terbinafine  250 mg Oral Daily    sodium chloride flush  5-40 mL IntraVENous 2 times per day     PRN Meds: sodium chloride flush, sodium chloride, ondansetron **OR** ondansetron, polyethylene glycol, acetaminophen **OR** acetaminophen, potassium chloride **OR** potassium alternative oral replacement **OR** potassium chloride, magnesium sulfate      Intake/Output Summary (Last 24 hours) at 1/30/2023 0754  Last data filed at 1/30/2023 0341  Gross per 24 hour   Intake 420 ml   Output 1900 ml   Net -1480 ml         Diet:  ADULT ORAL NUTRITION SUPPLEMENT; Dinner; Frozen Oral Supplement  ADULT ORAL NUTRITION SUPPLEMENT; Breakfast, Lunch; Wound Healing Oral Supplement  ADULT DIET; Dysphagia - Minced and Moist; Moderately Thick (Honey)    Physical Exam:  /62   Pulse 75   Temp 97.9 °F (36.6 °C) (Oral)   Resp 14   Ht 5' 3\" (1.6 m)   Wt 187 lb 9.8 oz (85.1 kg)   SpO2 99%   BMI 33.23 kg/m²   General appearance: Speaking softly throughout examination. No apparent distress, appears stated age and cooperative. HEENT: Pupils equal, round, and reactive to light. Conjunctivae/corneas clear. Neck: Supple, with full range of motion. No jugular venous distention. Trachea midline. Respiratory:  Normal respiratory effort.  Clear to auscultation, bilaterally without Rales/Wheezes/Rhonchi. Cardiovascular: Regular rate and rhythm with normal S1/S2 without murmurs, rubs or gallops. Abdomen: Soft, non-tender, non-distended with normal bowel sounds. Musculoskeletal: Diminished active and passive range of motion of bilateral upper extremities. Active range of motion of left upper extremity improved from yesterday. Left upper extremity strength diminished. Skin: Diffuse bruising throughout right upper extremity and bilateral lower extremities. Patient states this is her baseline. Previous area of cellulitis on left forearm is no longer warm, is without erythema or tenderness. Sporadic hematomas of left upper extremity. Neurologic:  Neurovascularly intact without any focal sensory deficits. Cranial nerves: II-XII intact, grossly non-focal.  Psychiatric: Alert and oriented, thought content appropriate, normal insight. Conversationally confused   Capillary Refill: Brisk,< 3 seconds   Peripheral Pulses: +2 palpable, equal bilaterally     Labs:   Recent Labs     01/29/23  0550 01/30/23  0532   WBC 7.9 6.6   HGB 7.2* 7.4*   HCT 22.9* 23.9*    219       Recent Labs     01/28/23  0550 01/29/23  0550 01/30/23  0532    140 142   K 4.4 3.9 3.9   * 107 108   CO2 25 27 25   BUN 7 10 7   CREATININE 0.5 0.5 0.6   CALCIUM 8.3* 8.0* 8.3*   PHOS  --  2.2* 2.6       No results for input(s): AST, ALT, BILIDIR, BILITOT, ALKPHOS in the last 72 hours. No results for input(s): INR in the last 72 hours. No results for input(s): Milas Ruts in the last 72 hours. Microbiology:    Blood culture #1:   Lab Results   Component Value Date/Time    [facility]  01/21/2023 09:01 PM     No growth 24 hours. No growth 48 hours.  No growth at 5 days       Blood culture #2:No results found for: BLOODCULT2    Organism:  Lab Results   Component Value Date/Time    ORG Staphylococcus aureus 12/17/2018 04:04 PM         Lab Results   Component Value Date/Time LABGRAM  02/16/2022 04:00 PM     No segmented neutrophils observed. Rare epithelial cells observed. No organisms observed. MRSA culture only:No results found for: [facility]    Urine culture:   Lab Results   Component Value Date/Time    LABURIN No growth-preliminary  No growth   02/09/2017 12:35 PM       Respiratory culture: No results found for: CULTRESP    Aerobic and Anaerobic :  Lab Results   Component Value Date/Time    LABAERO No Acinetobacter species isolated. 01/04/2023 10:00 PM     Lab Results   Component Value Date/Time    LABANAE  12/01/2021 12:00 PM     No anaerobes isolated- preliminary Culture yielded light growth of gram positive bacilli most consistent with Cutibacterium species. Clinical correlation required. Urinalysis:      Lab Results   Component Value Date/Time    NITRU NEGATIVE 01/22/2023 12:02 AM    WBCUA 0-2 01/22/2023 12:02 AM    WBCUA 2-4 02/22/2012 01:00 PM    BACTERIA NONE SEEN 01/22/2023 12:02 AM    RBCUA 5-10 01/22/2023 12:02 AM    BLOODU MODERATE 01/22/2023 12:02 AM    SPECGRAV 1.025 09/18/2020 12:00 PM    GLUCOSEU NEGATIVE 01/22/2023 12:02 AM       Radiology:  CT HAND LEFT W CONTRAST   Final Result      Lateral portion of the left upper extremity is partly off the    field-of-view. Status post left shoulder hemiarthroplasty and total left hip replacement. Metallic streak artifact limits the evaluation of the adjacent structures. Diffuse soft tissue swelling, skin thickening and subcutaneous    edema/inflammation of the left upper arm, left forearm and left hand. Correlate clinically for cellulitis. No abscess is seen. This document has been electronically signed by: Yadira Park MD on    01/22/2023 06:43 AM      All CTs at this facility use dose modulation techniques and iterative    reconstructions, and/or weight-based dosing   when appropriate to reduce radiation to a low as reasonably achievable.       CT RADIUS ULNA LEFT W CONTRAST   Final Result Lateral portion of the left upper extremity is partly off the    field-of-view. Status post left shoulder hemiarthroplasty and total left hip replacement. Metallic streak artifact limits the evaluation of the adjacent structures. Diffuse soft tissue swelling, skin thickening and subcutaneous    edema/inflammation of the left upper arm, left forearm and left hand. Correlate clinically for cellulitis. No abscess is seen. This document has been electronically signed by: Kelsey Pate MD on    01/22/2023 06:26 AM      All CTs at this facility use dose modulation techniques and iterative    reconstructions, and/or weight-based dosing   when appropriate to reduce radiation to a low as reasonably achievable. CT HUMERUS LEFT W CONTRAST   Final Result      Lateral portion of the left upper extremity is partly off the    field-of-view. Status post left shoulder hemiarthroplasty and total left hip replacement. Metallic streak artifact limits the evaluation of the adjacent structures. Diffuse soft tissue swelling, skin thickening and subcutaneous    edema/inflammation of the left upper arm, left forearm and left hand. Correlate clinically for cellulitis. No abscess is seen. This document has been electronically signed by: Kelsey Pate MD on    01/22/2023 06:42 AM      All CTs at this facility use dose modulation techniques and iterative    reconstructions, and/or weight-based dosing   when appropriate to reduce radiation to a low as reasonably achievable. XR CHEST PORTABLE   Final Result   Decrease in pulmonary interstitial densities compared to x-ray 1/4/2023. This document has been electronically signed by: Aye Ceron MD on    01/21/2023 11:52 PM      XR RADIUS ULNA LEFT (2 VIEWS)   Final Result   No acute bony abnormality.       This document has been electronically signed by: Aye Ceron MD on    01/21/2023 11:51 PM      XR HAND LEFT (MIN 3 VIEWS)   Final Result   No acute osseous abnormality. This document has been electronically signed by: Rishi Carroll MD on    01/21/2023 10:30 PM      XR ELBOW LEFT (MIN 3 VIEWS)   Final Result   No acute bony abnormality. This document has been electronically signed by: Rishi Carroll MD on    01/21/2023 10:28 PM        XR ELBOW LEFT (MIN 3 VIEWS)    Result Date: 1/21/2023  3 view right elbow Comparison: None Findings: No acute fractures. Normal alignment. No significant arthritic change or erosions. No joint effusion. No radiopaque foreign body. Diffuse edema. No acute bony abnormality. This document has been electronically signed by: Rishi Carroll MD on 01/21/2023 10:28 PM    XR RADIUS ULNA LEFT (2 VIEWS)    Result Date: 1/21/2023  2 view left forearm Comparison: None Findings: No fractures or dislocations. No joint effusion. No significant arthritic change. No radiopaque foreign body. Extensive soft tissue edema. No acute bony abnormality. This document has been electronically signed by: Rishi Carroll MD on 01/21/2023 11:51 PM    XR HAND LEFT (MIN 3 VIEWS)    Result Date: 1/21/2023  3 view left hand Comparison: None Findings: Osteopenia. No acute fractures. No significant loss of joint space or osteophytes. No erosions. No radiopaque foreign body. Diffuse edema. No acute osseous abnormality. This document has been electronically signed by: Rishi Carroll MD on 01/21/2023 10:30 PM    CT HUMERUS LEFT W CONTRAST    Result Date: 1/22/2023  CT left upper extremity with contrast COMPARISON: No prior FINDINGS: The lateral portion of the left upper extremity is partly off the field-of-view. The patient is status post left shoulder hemiarthroplasty and total left hip replacement. Metallic streak artifact limits the evaluation of the adjacent structures. There is diffuse soft tissue swelling, skin thickening and subcutaneous edema/inflammation of the left upper arm, left forearm and left hand. No abscess is seen.  No fracture or dislocation is seen. Lateral portion of the left upper extremity is partly off the field-of-view. Status post left shoulder hemiarthroplasty and total left hip replacement. Metallic streak artifact limits the evaluation of the adjacent structures. Diffuse soft tissue swelling, skin thickening and subcutaneous edema/inflammation of the left upper arm, left forearm and left hand. Correlate clinically for cellulitis. No abscess is seen. This document has been electronically signed by: Cari Cabrales MD on 01/22/2023 06:42 AM All CTs at this facility use dose modulation techniques and iterative reconstructions, and/or weight-based dosing when appropriate to reduce radiation to a low as reasonably achievable. CT RADIUS ULNA LEFT W CONTRAST    Result Date: 1/22/2023  CT left upper extremity with contrast COMPARISON: No prior FINDINGS: The lateral portion of the left upper extremity is partly off the field-of-view. The patient is status post left shoulder hemiarthroplasty and total left hip replacement. Metallic streak artifact limits the evaluation of the adjacent structures. There is diffuse soft tissue swelling, skin thickening and subcutaneous edema/inflammation of the left upper arm, left forearm and left hand. No abscess is seen. No fracture or dislocation is seen. Lateral portion of the left upper extremity is partly off the field-of-view. Status post left shoulder hemiarthroplasty and total left hip replacement. Metallic streak artifact limits the evaluation of the adjacent structures. Diffuse soft tissue swelling, skin thickening and subcutaneous edema/inflammation of the left upper arm, left forearm and left hand. Correlate clinically for cellulitis. No abscess is seen.  This document has been electronically signed by: Cari Cabrales MD on 01/22/2023 06:26 AM All CTs at this facility use dose modulation techniques and iterative reconstructions, and/or weight-based dosing when appropriate to reduce radiation to a low as reasonably achievable. CT HAND LEFT W CONTRAST    Result Date: 1/22/2023  CT left upper extremity with contrast COMPARISON: No prior FINDINGS: The lateral portion of the left upper extremity is partly off the field-of-view. The patient is status post left shoulder hemiarthroplasty and total left hip replacement. Metallic streak artifact limits the evaluation of the adjacent structures. There is diffuse soft tissue swelling, skin thickening and subcutaneous edema/inflammation of the left upper arm, left forearm and left hand. No abscess is seen. No fracture or dislocation is seen. Lateral portion of the left upper extremity is partly off the field-of-view. Status post left shoulder hemiarthroplasty and total left hip replacement. Metallic streak artifact limits the evaluation of the adjacent structures. Diffuse soft tissue swelling, skin thickening and subcutaneous edema/inflammation of the left upper arm, left forearm and left hand. Correlate clinically for cellulitis. No abscess is seen. This document has been electronically signed by: Juanita Garcia MD on 01/22/2023 06:43 AM All CTs at this facility use dose modulation techniques and iterative reconstructions, and/or weight-based dosing when appropriate to reduce radiation to a low as reasonably achievable. XR CHEST PORTABLE    Result Date: 1/21/2023  1 view chest x-ray Comparison: CR/SR - XR CHEST PORTABLE - 01/04/2023 04:26 PM EST Findings: Decrease in pulmonary interstitial densities compared to x-ray 1/4/2023. Right internal iliac catheter terminates in the right atrium. Status post bilateral hip replacement. No acute fractures. Decrease in pulmonary interstitial densities compared to x-ray 1/4/2023.  This document has been electronically signed by: Bashir Mayfield MD on 01/21/2023 11:52 PM      Electronically signed by Martina Bennett PA-C on 1/30/2023 at 7:58 AM

## 2023-01-31 LAB
ANION GAP SERPL CALC-SCNC: 6 MEQ/L (ref 8–16)
BUN SERPL-MCNC: 8 MG/DL (ref 7–22)
CALCIUM SERPL-MCNC: 8.2 MG/DL (ref 8.5–10.5)
CHLORIDE SERPL-SCNC: 109 MEQ/L (ref 98–111)
CO2 SERPL-SCNC: 27 MEQ/L (ref 23–33)
CREAT SERPL-MCNC: 0.6 MG/DL (ref 0.4–1.2)
DEPRECATED RDW RBC AUTO: 58.8 FL (ref 35–45)
ERYTHROCYTE [DISTWIDTH] IN BLOOD BY AUTOMATED COUNT: 16.8 % (ref 11.5–14.5)
GFR SERPL CREATININE-BSD FRML MDRD: > 60 ML/MIN/1.73M2
GLUCOSE SERPL-MCNC: 79 MG/DL (ref 70–108)
HCT VFR BLD AUTO: 22.9 % (ref 37–47)
HGB BLD-MCNC: 7.2 GM/DL (ref 12–16)
MCH RBC QN AUTO: 30.4 PG (ref 26–33)
MCHC RBC AUTO-ENTMCNC: 31.4 GM/DL (ref 32.2–35.5)
MCV RBC AUTO: 96.6 FL (ref 81–99)
PHOSPHATE SERPL-MCNC: 3.3 MG/DL (ref 2.4–4.7)
PLATELET # BLD AUTO: 229 THOU/MM3 (ref 130–400)
PMV BLD AUTO: 9.2 FL (ref 9.4–12.4)
POTASSIUM SERPL-SCNC: 3.8 MEQ/L (ref 3.5–5.2)
RBC # BLD AUTO: 2.37 MILL/MM3 (ref 4.2–5.4)
SODIUM SERPL-SCNC: 142 MEQ/L (ref 135–145)
WBC # BLD AUTO: 6.4 THOU/MM3 (ref 4.8–10.8)

## 2023-01-31 PROCEDURE — 97530 THERAPEUTIC ACTIVITIES: CPT

## 2023-01-31 PROCEDURE — 1200000003 HC TELEMETRY R&B

## 2023-01-31 PROCEDURE — 84100 ASSAY OF PHOSPHORUS: CPT

## 2023-01-31 PROCEDURE — 36592 COLLECT BLOOD FROM PICC: CPT

## 2023-01-31 PROCEDURE — 99232 SBSQ HOSP IP/OBS MODERATE 35: CPT | Performed by: INTERNAL MEDICINE

## 2023-01-31 PROCEDURE — 2580000003 HC RX 258

## 2023-01-31 PROCEDURE — 6370000000 HC RX 637 (ALT 250 FOR IP)

## 2023-01-31 PROCEDURE — 6370000000 HC RX 637 (ALT 250 FOR IP): Performed by: PHYSICIAN ASSISTANT

## 2023-01-31 PROCEDURE — 97535 SELF CARE MNGMENT TRAINING: CPT

## 2023-01-31 PROCEDURE — 6370000000 HC RX 637 (ALT 250 FOR IP): Performed by: INTERNAL MEDICINE

## 2023-01-31 PROCEDURE — 99232 SBSQ HOSP IP/OBS MODERATE 35: CPT | Performed by: PHYSICIAN ASSISTANT

## 2023-01-31 PROCEDURE — 36415 COLL VENOUS BLD VENIPUNCTURE: CPT

## 2023-01-31 PROCEDURE — 85027 COMPLETE CBC AUTOMATED: CPT

## 2023-01-31 PROCEDURE — 80048 BASIC METABOLIC PNL TOTAL CA: CPT

## 2023-01-31 PROCEDURE — 1200000000 HC SEMI PRIVATE

## 2023-01-31 RX ADMIN — ATORVASTATIN CALCIUM 20 MG: 20 TABLET, FILM COATED ORAL at 09:07

## 2023-01-31 RX ADMIN — HYDROCORTISONE 10 MG: 10 TABLET ORAL at 21:59

## 2023-01-31 RX ADMIN — OXYBUTYNIN CHLORIDE 15 MG: 10 TABLET, EXTENDED RELEASE ORAL at 09:09

## 2023-01-31 RX ADMIN — LINEZOLID 600 MG: 600 TABLET, FILM COATED ORAL at 22:00

## 2023-01-31 RX ADMIN — RIVAROXABAN 15 MG: 15 TABLET, FILM COATED ORAL at 18:58

## 2023-01-31 RX ADMIN — TERBINAFINE TABLETS 250 MG 250 MG: 250 TABLET ORAL at 09:10

## 2023-01-31 RX ADMIN — LINEZOLID 600 MG: 600 TABLET, FILM COATED ORAL at 09:12

## 2023-01-31 RX ADMIN — SODIUM CHLORIDE, PRESERVATIVE FREE 10 ML: 5 INJECTION INTRAVENOUS at 21:59

## 2023-01-31 RX ADMIN — HYDROCORTISONE 20 MG: 10 TABLET ORAL at 09:13

## 2023-01-31 RX ADMIN — SODIUM CHLORIDE, PRESERVATIVE FREE 10 ML: 5 INJECTION INTRAVENOUS at 11:06

## 2023-01-31 RX ADMIN — Medication 1 CAPSULE: at 09:14

## 2023-01-31 RX ADMIN — LEVOTHYROXINE SODIUM 125 MCG: 0.12 TABLET ORAL at 09:10

## 2023-01-31 RX ADMIN — PANTOPRAZOLE SODIUM 40 MG: 40 TABLET, DELAYED RELEASE ORAL at 05:46

## 2023-01-31 ASSESSMENT — PAIN SCALES - GENERAL: PAINLEVEL_OUTOF10: 2

## 2023-01-31 ASSESSMENT — PAIN DESCRIPTION - LOCATION: LOCATION: HAND

## 2023-01-31 ASSESSMENT — PAIN DESCRIPTION - DESCRIPTORS: DESCRIPTORS: TINGLING

## 2023-01-31 ASSESSMENT — PAIN DESCRIPTION - ORIENTATION: ORIENTATION: LEFT

## 2023-01-31 NOTE — PROGRESS NOTES
[address] [facility]  Occupational Therapy  Daily Note  Time:   Time In:   Time Out: 0820  Timed Code Treatment Minutes: 27 Minutes  Minutes: 27          Date: 2023  Patient Name: Chema Isaac,   Gender: female      Room: -15/015-A  MRN: [id number]  : 1950  (71 y.o.)  Referring Practitioner: Melina Patiño PA-C  Diagnosis: cellulitis  Additional Pertinent Hx: per chart review; Chema Isaac is a 71 y.o. female with PMHx of paroxysmal A-fib, HTN, HFpEF, hypothyroidism, severe protein calorie malnutrition, and physical debility with deconditioning who presented to [facility] with chief complaint of left upper extremity swelling and pain. Patient was recently discharged on 23 for submandibular cellulitis and discharged to SNF. She endorses having no recollection of injuring her left arm, but stated that the arm started to swell just 3 days ago. Around that time she had stopped her outpatient course of antibiotics. At first when the swelling started she stated that she did not think much about it as it was not bothering her. Then today her arm was quite tender to touch and just throbbed all day, hence the admission to [facility]. Currently she is not having any pain, but that was after administration of Morphine while in the ED. She denies any dizziness, chest pain or shortness of breath. She denies any sick contacts. She does not endorse any tingling or numbness that is unusual for her    Restrictions/Precautions:  Restrictions/Precautions: General Precautions, Fall Risk  Position Activity Restriction  Other position/activity restrictions: skin tears easily, hx CVA with L sided deficits     SUBJECTIVE: Patient supine in bed upon arrival; agreeable tot herapy this date. Patient pleasant and cooperative throughout. PAIN: 0/10:     Vitals: Vitals not assessed per clinical judgement, see nursing flowsheet    COGNITION: Slow Processing    ADL:   Upper Extremity Dressing:  Moderate Assistance, X 1, with verbal cues , and with increased time for completion. Assistance to thread LUE . BALANCE:  Sitting Balance:  Stand By Assistance. Standing Balance: Contact Guard Assistance, X 1, within [facility]. BED MOBILITY:  Supine to Sit: Maximum Assistance, X 1, with head of bed raised, with rail, with verbal cues , with increased time for completion      TRANSFERS:  Sit to Stand: Moderate Assistance, X 2, with Rasta Hakim, with increased time for completion, cues for hand placement, with verbal cues. Stand to Sit: Minimal Assistance, X 2, with Rasta Hakim, with increased time for completion, cues for hand placement. FUNCTIONAL MOBILITY:  Assistive Device: ayo stedy  Assist Level:  Dependent. Distance:  EOB to bedside chair    ADDITIONAL ACTIVITIES:  Patient completed BUE strengthening exercises with skilled education on HEP: completed x10 reps x1 set with a 1lb hand weight in all joints and all planes as well as B shoulder shrugs and scapular retraction x 10 reps x 1 set in order to improve UE strength and activity tolerance required for BADL routine and toilet / shower transfers. Patient tolerated good, requiring minimal rest breaks. Patient also required minimal verbal/visual cues for technique. ASSESSMENT:     Activity Tolerance:  Patient tolerance of  treatment: good. Discharge Recommendations: Subacute/skilled nursing facility  Equipment Recommendations: Equipment Needed: Yes  Mobility Devices: ADL Assistive Devices  Plan: Times Per Week: 5x  Times Per Day:  Once a day  Current Treatment Recommendations: Strengthening, ROM, Balance training, Functional mobility training, Endurance training, Neuromuscular re-education, Safety education & training, Equipment evaluation, education, & procurement, Patient/Caregiver education & training, Self-Care / ADL, Coordination training    Patient Education  Patient Education: Role of OT, Plan of Care, ADL's, Home Exercise Program, Home Safety, and Importance of Increasing Activity    Goals  Short Term Goals  Time Frame for Short Term Goals: by discharge  Short Term Goal 1: patient will tolerate 2 min static standing with sit to stand and maintain upright posture with MIN A x 1. Short Term Goal 2: patient will participate in (B) UE AROM HEP and R UE strengthening to increase UB strength and function for functional transfers. Short Term Goal 3: patient will complete simple UB ADLs with MIN  A and edu in juan tech to compensate for L UE hemiparesis. Following session, patient left in safe position with all fall risk precautions in place.

## 2023-01-31 NOTE — PROGRESS NOTES
Hospitalist Progress Note      Patient:  Carlos Yoder    Unit/Bed:5K-15/015-A  YOB: 1950  MRN: [id number]   Acct: [de-identified]   PCP: Angie Rawls MD  Date of Admission: 1/21/2023    Assessment/Plan:    Left upper extremity cellulitis, possibly related to recent IV infiltration: Patient had an IV infiltrated on 1/15 per provider's note and was receiving Unasyn during that stay. X-ray of left hand, elbow, radius and ulna on 1/21 negative    CT left hand, radius, ulna, and humerus on 1/22 revealed diffuse soft tissue swelling, skin thickening and subcutaneous edema / inflammation of left upper arm, left forearm and left hand without abscess. Blood cultures negative to date   ID consulted recommended continuing Zyvox  - complete a total of 7 days which will finish 2/1  Was additionally treated with Zosyn while IP   PT and OT following. Electrolyte abnormalities:  Hypernatremia- resolved: Sodium up to 149 1/26- currently [address] [place] [address]  Was treated with D5W which was stopped 1/29-  sodium   1/30- D5W stopped last night. Sodium 142  1/31- sodium stable at 142- nephro signed off- recommend 60 ounces water per day   Hypokalemia- resolved : K 3.8, BMP in am   Hypophosphatemia- 2.2- - improved to 2.6 with neutra phos  Maintain telemetry  Dysphagia: SLP recommended minced and moist moderately thick liquids per 1/23  Paroxysmal A-fib on 4 [facility]; rate controlled: continue home medications, continue tele. Okay to resume Xarelto, no plan for surgical intervention. Leukocytosis, resolved: Secondary to #1. History of adrenal insufficiency without crisis: Likely contributing to #1 skin fragility. Continue home cortef therapy. BP has been stable. Chronic normocytic Anemia, worsening: Hgb stable in 7s- suspect dilutional given large volume of D5W given- continue to monitor. No acute bleeding. Will transfuse if Hgb <7.   Hypothyroidism: TSH 0.047 and free T4 1.31 on 1/21. Continue home medications  Severe protein calorie malnutrition: Encourage oral intake; dietitian   Physical debility with deconditioning: Pt came from SNF. Consult PT/OT. Disposition: SNF placement    Chief Complaint: Left arm swelling    Initial H and P:-    \"Shirley Cespedes Ma is a 71 y.o. female with PMHx of paroxysmal A-fib, HTN, HFpEF, hypothyroidism, severe protein calorie malnutrition, and physical debility with deconditioning who presented to [facility] with chief complaint of left upper extremity swelling and pain. Patient was recently discharged on 1/17/23 for submandibular cellulitis and discharged to SNF. She endorses having no recollection of injuring her left arm, but stated that the arm started to swell just 3 days ago. Around that time she had stopped her outpatient course of antibiotics. At first when the swelling started she stated that she did not think much about it as it was not bothering her. Then today her arm was quite tender to touch and just throbbed all day, hence the admission to [facility]. Currently she is not having any pain, but that was after administration of Morphine while in the ED. She denies any dizziness, chest pain or shortness of breath. She denies any sick contacts. She does not endorse any tingling or numbness that is unusual for her. \"     1/22: Patient is sitting upright in bed. She endorses 9/10 sharp pain of LUE from finger tips to mid upper arm. She states this is improved from yesterday. She states her arm was \"bubbling\" yesterday but denies drainage today. She denies changes to right upper extremity, or bilateral lower extremities. She denies fever/chills, chest pain, abdominal pain, shortness of breath, palpitations, nausea or vomiting. Patient is unable to recall when her last bowel movement was.     1/23: Patient laying in bed. She notes overall she is feeling better today, however clinically arm appears worse.  She endorses pain of left arm but states this has improved, she states has increased range of motion and strength in left arm from yesterday. She notes discharge of left arm with movement. She denies fever / chills, shortness of breath, chest pain, nausea, vomiting, abdominal pain, lightheadedness, headache.     1/24: Patient sitting in chair with brother at bedside. Patient states she is feeling better today overall. She states her left arm feels and looks better, she endorses improved mobility. She denies left arm pain or drainage. She endorses urgency of bowel movements with diarrhea for the past few days. She denies pain with bowel movements or foul odor. She denies abdominal pain, nausea or vomiting. She denies shortness of breath, chest pain, palpitations, numbness, tingling, lightheadedness, dizziness or headaches. Patient states she would like to go home but is understandable and agreeable that she needs additional therapy. 1/25: pt samson well, sitting up in bed. Does not like her minced and moist diet. Denies n/v, admits to diarrhea however upon discussion with RN this is more soft rather than watery. Denies CP/SOB. Afebrile. 1/26:  Patient sitting up in bed. Pleasant affect. Denies any issues at this time but does seem mildly confused which is reportedly baseline for her.     1/27: Patient doing well. Sitting in chair. Denies any arm pain. 1/28: No subjective changes overnight. Hopeful for DC on Monday once precert back     8/18: patient reports she is doing well except she wishes she didn't have to have IV in her neck. States her arm feels the best it has felt     1/30:   Overall patient stable. Sodium, potassium and phos all WNL. Patient accepted at [facility] and we are waiting on precert     8/26:  No changes overnight. Sodium is normal.  Nephrology signed off. Waiting for precert     Subjective (past 24 hours):         Past medical history, family history, social history and allergies reviewed again and is unchanged since admission.     DAVID (All review of systems completed. Pertinent positives noted. Otherwise All other systems reviewed and negative.)     Medications:  Reviewed    Infusion Medications    sodium chloride       Scheduled Medications    linezolid  600 mg Oral 2 times per day    lactobacillus  1 capsule Oral Daily with breakfast    rivaroxaban  15 mg Oral Dinner    [Held by provider] bumetanide  0.5 mg Oral Daily    hydrocortisone  20 mg Oral QAM    hydrocortisone  10 mg Oral Nightly    levothyroxine  125 mcg Oral Daily    oxybutynin  15 mg Oral Daily    pantoprazole  40 mg Oral QAM AC    atorvastatin  20 mg Oral Daily    terbinafine  250 mg Oral Daily    sodium chloride flush  5-40 mL IntraVENous 2 times per day     PRN Meds: sodium chloride flush, sodium chloride, ondansetron **OR** ondansetron, polyethylene glycol, acetaminophen **OR** acetaminophen, potassium chloride **OR** potassium alternative oral replacement **OR** potassium chloride, magnesium sulfate      Intake/Output Summary (Last 24 hours) at 1/31/2023 1130  Last data filed at 1/31/2023 0920  Gross per 24 hour   Intake 10 ml   Output 700 ml   Net -690 ml         Diet:  ADULT ORAL NUTRITION SUPPLEMENT; Dinner; Frozen Oral Supplement  ADULT ORAL NUTRITION SUPPLEMENT; Breakfast, Lunch; Wound Healing Oral Supplement  ADULT DIET; Dysphagia - Minced and Moist; Moderately Thick (Honey)    Physical Exam:  BP (!) 123/59   Pulse 90   Temp 97.4 °F (36.3 °C) (Oral)   Resp 16   Ht 5' 3\" (1.6 m)   Wt 187 lb 9.8 oz (85.1 kg)   SpO2 100%   BMI 33.23 kg/m²   General appearance: Speaking softly throughout examination. No apparent distress, appears stated age and cooperative. HEENT: Pupils equal, round, and reactive to light. Conjunctivae/corneas clear. Neck: Supple, with full range of motion. No jugular venous distention. Trachea midline. Respiratory:  Normal respiratory effort. Clear to auscultation, bilaterally without Rales/Wheezes/Rhonchi.   Cardiovascular: Regular rate and rhythm with normal S1/S2 without murmurs, rubs or gallops. Abdomen: Soft, non-tender, non-distended with normal bowel sounds. Musculoskeletal: Diminished active and passive range of motion of bilateral upper extremities. Active range of motion of left upper extremity improved from yesterday. Left upper extremity strength diminished. Skin: Diffuse bruising throughout right upper extremity and bilateral lower extremities. Patient states this is her baseline. Previous area of cellulitis on left forearm is no longer warm, is without erythema or tenderness. Sporadic hematomas of left upper extremity. Neurologic:  Neurovascularly intact without any focal sensory deficits. Cranial nerves: II-XII intact, grossly non-focal.  Psychiatric: Alert and oriented, thought content appropriate, normal insight. Conversationally confused   Capillary Refill: Brisk,< 3 seconds   Peripheral Pulses: +2 palpable, equal bilaterally     Labs:   Recent Labs     01/29/23  0550 01/30/23  0532 01/31/23  0540   WBC 7.9 6.6 6.4   HGB 7.2* 7.4* 7.2*   HCT 22.9* 23.9* 22.9*    219 229       Recent Labs     01/29/23  0550 01/30/23  0532 01/31/23  0540    142 142   K 3.9 3.9 3.8    108 109   CO2 27 25 27   BUN 10 7 8   CREATININE 0.5 0.6 0.6   CALCIUM 8.0* 8.3* 8.2*   PHOS 2.2* 2.6 3.3       No results for input(s): AST, ALT, BILIDIR, BILITOT, ALKPHOS in the last 72 hours. No results for input(s): INR in the last 72 hours. No results for input(s): Pasty Dragon in the last 72 hours. Microbiology:    Blood culture #1:   Lab Results   Component Value Date/Time    [facility]  01/21/2023 09:01 PM     No growth 24 hours. No growth 48 hours.  No growth at 5 days       Blood culture #2:No results found for: BLOODCULT2    Organism:  Lab Results   Component Value Date/Time    ORG Staphylococcus aureus 12/17/2018 04:04 PM         Lab Results   Component Value Date/Time    LABGRAM  02/16/2022 04:00 PM     No segmented neutrophils observed. Rare epithelial cells observed. No organisms observed. MRSA culture only:No results found for: [facility]    Urine culture:   Lab Results   Component Value Date/Time    LABURIN No growth-preliminary  No growth   02/09/2017 12:35 PM       Respiratory culture: No results found for: CULTRESP    Aerobic and Anaerobic :  Lab Results   Component Value Date/Time    LABAERO No Acinetobacter species isolated. 01/04/2023 10:00 PM     Lab Results   Component Value Date/Time    LABANAE  12/01/2021 12:00 PM     No anaerobes isolated- preliminary Culture yielded light growth of gram positive bacilli most consistent with Cutibacterium species. Clinical correlation required. Urinalysis:      Lab Results   Component Value Date/Time    NITRU NEGATIVE 01/22/2023 12:02 AM    WBCUA 0-2 01/22/2023 12:02 AM    WBCUA 2-4 02/22/2012 01:00 PM    BACTERIA NONE SEEN 01/22/2023 12:02 AM    RBCUA 5-10 01/22/2023 12:02 AM    BLOODU MODERATE 01/22/2023 12:02 AM    SPECGRAV 1.025 09/18/2020 12:00 PM    GLUCOSEU NEGATIVE 01/22/2023 12:02 AM       Radiology:  CT HAND LEFT W CONTRAST   Final Result      Lateral portion of the left upper extremity is partly off the    field-of-view. Status post left shoulder hemiarthroplasty and total left hip replacement. Metallic streak artifact limits the evaluation of the adjacent structures. Diffuse soft tissue swelling, skin thickening and subcutaneous    edema/inflammation of the left upper arm, left forearm and left hand. Correlate clinically for cellulitis. No abscess is seen. This document has been electronically signed by: Buzz Alba MD on    01/22/2023 06:43 AM      All CTs at this facility use dose modulation techniques and iterative    reconstructions, and/or weight-based dosing   when appropriate to reduce radiation to a low as reasonably achievable.       CT RADIUS ULNA LEFT W CONTRAST   Final Result      Lateral portion of the left upper extremity is partly off the field-of-view. Status post left shoulder hemiarthroplasty and total left hip replacement. Metallic streak artifact limits the evaluation of the adjacent structures. Diffuse soft tissue swelling, skin thickening and subcutaneous    edema/inflammation of the left upper arm, left forearm and left hand. Correlate clinically for cellulitis. No abscess is seen. This document has been electronically signed by: Lukas Mejia MD on    01/22/2023 06:26 AM      All CTs at this facility use dose modulation techniques and iterative    reconstructions, and/or weight-based dosing   when appropriate to reduce radiation to a low as reasonably achievable. CT HUMERUS LEFT W CONTRAST   Final Result      Lateral portion of the left upper extremity is partly off the    field-of-view. Status post left shoulder hemiarthroplasty and total left hip replacement. Metallic streak artifact limits the evaluation of the adjacent structures. Diffuse soft tissue swelling, skin thickening and subcutaneous    edema/inflammation of the left upper arm, left forearm and left hand. Correlate clinically for cellulitis. No abscess is seen. This document has been electronically signed by: Lukas Mejia MD on    01/22/2023 06:42 AM      All CTs at this facility use dose modulation techniques and iterative    reconstructions, and/or weight-based dosing   when appropriate to reduce radiation to a low as reasonably achievable. XR CHEST PORTABLE   Final Result   Decrease in pulmonary interstitial densities compared to x-ray 1/4/2023. This document has been electronically signed by: Kayce Johansen MD on    01/21/2023 11:52 PM      XR RADIUS ULNA LEFT (2 VIEWS)   Final Result   No acute bony abnormality. This document has been electronically signed by: Kayce Johansen MD on    01/21/2023 11:51 PM      XR HAND LEFT (MIN 3 VIEWS)   Final Result   No acute osseous abnormality.       This document has been electronically signed by: Frank Martin MD on    01/21/2023 10:30 PM      XR ELBOW LEFT (MIN 3 VIEWS)   Final Result   No acute bony abnormality. This document has been electronically signed by: Frank Martin MD on    01/21/2023 10:28 PM        XR ELBOW LEFT (MIN 3 VIEWS)    Result Date: 1/21/2023  3 view right elbow Comparison: None Findings: No acute fractures. Normal alignment. No significant arthritic change or erosions. No joint effusion. No radiopaque foreign body. Diffuse edema. No acute bony abnormality. This document has been electronically signed by: Frank Martin MD on 01/21/2023 10:28 PM    XR RADIUS ULNA LEFT (2 VIEWS)    Result Date: 1/21/2023  2 view left forearm Comparison: None Findings: No fractures or dislocations. No joint effusion. No significant arthritic change. No radiopaque foreign body. Extensive soft tissue edema. No acute bony abnormality. This document has been electronically signed by: Frank Martin MD on 01/21/2023 11:51 PM    XR HAND LEFT (MIN 3 VIEWS)    Result Date: 1/21/2023  3 view left hand Comparison: None Findings: Osteopenia. No acute fractures. No significant loss of joint space or osteophytes. No erosions. No radiopaque foreign body. Diffuse edema. No acute osseous abnormality. This document has been electronically signed by: Frank Martin MD on 01/21/2023 10:30 PM    CT HUMERUS LEFT W CONTRAST    Result Date: 1/22/2023  CT left upper extremity with contrast COMPARISON: No prior FINDINGS: The lateral portion of the left upper extremity is partly off the field-of-view. The patient is status post left shoulder hemiarthroplasty and total left hip replacement. Metallic streak artifact limits the evaluation of the adjacent structures. There is diffuse soft tissue swelling, skin thickening and subcutaneous edema/inflammation of the left upper arm, left forearm and left hand. No abscess is seen. No fracture or dislocation is seen.      Lateral portion of the left upper extremity is partly off the field-of-view. Status post left shoulder hemiarthroplasty and total left hip replacement. Metallic streak artifact limits the evaluation of the adjacent structures. Diffuse soft tissue swelling, skin thickening and subcutaneous edema/inflammation of the left upper arm, left forearm and left hand. Correlate clinically for cellulitis. No abscess is seen. This document has been electronically signed by: Luba Carter MD on 01/22/2023 06:42 AM All CTs at this facility use dose modulation techniques and iterative reconstructions, and/or weight-based dosing when appropriate to reduce radiation to a low as reasonably achievable. CT RADIUS ULNA LEFT W CONTRAST    Result Date: 1/22/2023  CT left upper extremity with contrast COMPARISON: No prior FINDINGS: The lateral portion of the left upper extremity is partly off the field-of-view. The patient is status post left shoulder hemiarthroplasty and total left hip replacement. Metallic streak artifact limits the evaluation of the adjacent structures. There is diffuse soft tissue swelling, skin thickening and subcutaneous edema/inflammation of the left upper arm, left forearm and left hand. No abscess is seen. No fracture or dislocation is seen. Lateral portion of the left upper extremity is partly off the field-of-view. Status post left shoulder hemiarthroplasty and total left hip replacement. Metallic streak artifact limits the evaluation of the adjacent structures. Diffuse soft tissue swelling, skin thickening and subcutaneous edema/inflammation of the left upper arm, left forearm and left hand. Correlate clinically for cellulitis. No abscess is seen. This document has been electronically signed by: Luba Carter MD on 01/22/2023 06:26 AM All CTs at this facility use dose modulation techniques and iterative reconstructions, and/or weight-based dosing when appropriate to reduce radiation to a low as reasonably achievable.     CT HAND LEFT W CONTRAST    Result Date: 1/22/2023  CT left upper extremity with contrast COMPARISON: No prior FINDINGS: The lateral portion of the left upper extremity is partly off the field-of-view. The patient is status post left shoulder hemiarthroplasty and total left hip replacement. Metallic streak artifact limits the evaluation of the adjacent structures. There is diffuse soft tissue swelling, skin thickening and subcutaneous edema/inflammation of the left upper arm, left forearm and left hand. No abscess is seen. No fracture or dislocation is seen. Lateral portion of the left upper extremity is partly off the field-of-view. Status post left shoulder hemiarthroplasty and total left hip replacement. Metallic streak artifact limits the evaluation of the adjacent structures. Diffuse soft tissue swelling, skin thickening and subcutaneous edema/inflammation of the left upper arm, left forearm and left hand. Correlate clinically for cellulitis. No abscess is seen. This document has been electronically signed by: Alvin Briceño MD on 01/22/2023 06:43 AM All CTs at this facility use dose modulation techniques and iterative reconstructions, and/or weight-based dosing when appropriate to reduce radiation to a low as reasonably achievable. XR CHEST PORTABLE    Result Date: 1/21/2023  1 view chest x-ray Comparison: CR/SR - XR CHEST PORTABLE - 01/04/2023 04:26 PM EST Findings: Decrease in pulmonary interstitial densities compared to x-ray 1/4/2023. Right internal iliac catheter terminates in the right atrium. Status post bilateral hip replacement. No acute fractures. Decrease in pulmonary interstitial densities compared to x-ray 1/4/2023.  This document has been electronically signed by: Melanie Rivera MD on 01/21/2023 11:52 PM      Electronically signed by Nicki Steiner PA-C on 1/31/2023 at 11:30 AM

## 2023-01-31 NOTE — PLAN OF CARE
Problem: Discharge Planning  Goal: Discharge to home or other facility with appropriate resources  Outcome: Progressing  Flowsheets (Taken 1/31/2023 7275)  Discharge to home or other facility with appropriate resources: Identify barriers to discharge with patient and caregiver     Problem: Safety - Adult  Goal: Free from fall injury  Outcome: Progressing      Problem: Pain  Goal: Verbalizes/displays adequate comfort level or baseline comfort level  Outcome: Progressing     Problem: Nutrition Deficit:  Goal: Optimize nutritional status  Outcome: Progressing     Problem: Skin/Tissue Integrity  Goal: Absence of new skin breakdown  Description: 1. Monitor for areas of redness and/or skin breakdown  2. Assess vascular access sites hourly  3. Every 4-6 hours minimum:  Change oxygen saturation probe site  4. Every 4-6 hours:  If on nasal continuous positive airway pressure, respiratory therapy assess nares and determine need for appliance change or resting period. Outcome: Progressing       Problem: Chronic Conditions and Co-morbidities  Goal: Patient's chronic conditions and co-morbidity symptoms are monitored and maintained or improved  Outcome: Progressing  Flowsheets (Taken 1/31/2023 0952)  Care Plan - Patient's Chronic Conditions and Co-Morbidity Symptoms are Monitored and Maintained or Improved: Collaborate with multidisciplinary team to address chronic and comorbid conditions and prevent exacerbation or deterioration   Care plan reviewed with patient. Patient verbalizes understanding of the plan of care and contributes to goal setting.

## 2023-01-31 NOTE — CARE COORDINATION
1/31/23, 9:07 AM EST    DISCHARGE PLANNING EVALUATION     Call to [facility] with [facility], to have precert process started from hospital end. She report partner Nataliia Arellano has this case, she will update Tari. LENNIE faxed clinicals to [phone]    12:08 PM  Call from Nataliia Arellano with [facility], provided authorization number [id number], reports auth good through Friday. 2/3. Call to [facility] with [address], he will verify if they can take today and get back with LENNIE.     1:18 PM  Call from Nataliia Arellano with [facility], reports [facility] out of network, so temp Abraham Finely will not work. LENNIE did ask for assistance with other facilities in network, which pulled up [address] again, she is verifying if they are in or out of network and will get back with SW.     Call to Lacy Knight with [address] to see if they can consider pt again. She will get back with LENNIE on this. 1:41 PM   Call from Nataliia Arellano with [facility], confirms [facility] [address] not in network. Reports they can consider out of network, reports they would recommend family contact customer services to check out of network benefits wanting to pursue this at [address] to let her know. Call to pt's , left a voicemail for a call back. Call from , he is okay with referrals \"anywhere\". LENNIE did let him know she did reach back out to [facility]. 3:02 PM Call from Lacy Knight with [facility], concerned if insurance would only pay for a short time  plans. LENNIE did let her know per discussion with  in the past he planned on this being short term. LENNIE did call  again, left a voicemail for a call back. Lacy Knight reports [employer] can accept and they will start precert. Call back from . LENNIE did explain [facility] did accept pt. LENNIE did let him know he would need to work with facility on future plans home vs private pay, explaining insurance would only cover for so long.  LENNIE did provide  with phone number to facility to reach out to them. Call to [facility] with [facility], reports same temp authorization number would work for [employer]. LENNIE did call Hilda Loyd with [facility], provided update on temp auth number and contact for [facility]. She will get with her team and get back with LENNIE.

## 2023-01-31 NOTE — PROGRESS NOTES
[facility]   Nephrology progress note  1/31/2023, 7:55 AM      Pt Name:    Lennox Woodard  MRN:     [id number]     YOB: 1950  Admit Date:    1/21/2023  7:56 PM    Chief Complaint: Nephrology following for hypernatremia. Subjective:  Patient seen and examined  Resting comfortably no distress  Still appears somewhat confused no chest pain or shortness of breath    Objective:  24HR INTAKE/OUTPUT:    Intake/Output Summary (Last 24 hours) at 1/31/2023 0755  Last data filed at 1/31/2023 0440  Gross per 24 hour   Intake 10 ml   Output 950 ml   Net -940 ml        Admission weight: 180 lb (81.6 kg)  Wt Readings from Last 3 Encounters:   01/30/23 187 lb 9.8 oz (85.1 kg)   01/15/23 183 lb 4.8 oz (83.1 kg)   12/09/22 163 lb 1.6 oz (74 kg)        Vitals :   Vitals:    01/30/23 0845 01/30/23 1315 01/30/23 1959 01/31/23 0350   BP: 105/70 120/70 131/60 133/71   Pulse: 66 68 68 55   Resp: 13 15 16 14   Temp: 97.8 °F (36.6 °C) 97.9 °F (36.6 °C) 97.6 °F (36.4 °C) 97.4 °F (36.3 °C)   TempSrc: Oral Oral Oral Oral   SpO2: 99% 99% 100% 99%   Weight:       Height:           Physical examination  General Appearance:  Well developed.  No distress  Mouth/Throat:  Oral mucosa moist  Neck: No accessory muscle use  Lungs:  Breath sounds: clear diminished  Heart[de-identified]  S1,S2 heard  Abdomen:  Soft, non - tender  Musculoskeletal:  Edema -not significant    Medications:  Infusion:    sodium chloride       Meds:    linezolid  600 mg Oral 2 times per day    lactobacillus  1 capsule Oral Daily with breakfast    rivaroxaban  15 mg Oral Dinner    [Held by provider] bumetanide  0.5 mg Oral Daily    hydrocortisone  20 mg Oral QAM    hydrocortisone  10 mg Oral Nightly    levothyroxine  125 mcg Oral Daily    oxybutynin  15 mg Oral Daily    pantoprazole  40 mg Oral QAM AC    atorvastatin  20 mg Oral Daily    terbinafine  250 mg Oral Daily    sodium chloride flush  5-40 mL IntraVENous 2 times per day       Lab Data :  CBC: Recent Labs     01/29/23  0550 01/30/23  0532 01/31/23  0540   WBC 7.9 6.6 6.4   HGB 7.2* 7.4* 7.2*   HCT 22.9* 23.9* 22.9*    219 229       CMP:  Recent Labs     01/29/23  0550 01/30/23  0532 01/31/23  0540    142 142   K 3.9 3.9 3.8    108 109   CO2 27 25 27   BUN 10 7 8   CREATININE 0.5 0.6 0.6   GLUCOSE 106 74 79   CALCIUM 8.0* 8.3* 8.2*   PHOS 2.2* 2.6 3.3           Assessment and Plan:  Renal -renal function appears to be stable at baseline  Hypernatremia fluctuating most likely due to decreased oral intake of liquids and excessive insensible losses due to the wound on the left arm/cellulitis and diarrhea  Overall sodium has improved but still running around 142  Encourage at least 60 ounces of liquids per day  Electrolytes -hypokalemia doing well  Left upper extremity cellulitis on antibiotics appears to be getting better  Chronic anemia  Essential hypertension reasonable  Meds reviewed and discussed with patient     Overall renal issues resolved will sign off    Dionne Fritz MD  [facility]    This report has been created using voice recognition software.  It may contain minor errors which are inherent in voice recognition technology

## 2023-01-31 NOTE — PROGRESS NOTES
Progress note: Infectious diseases    Patient - Yen Melgar,  Age - 71 y.o.    - 1950      Room Number - 5K-15/015-A   MRN -  [id number]   [facility]  Date of Admission -  2023  7:56 PM    SUBJECTIVE:   Patient seen and examined. No acute complaints. Continued with upper extremity weakness. Awaiting placement  OBJECTIVE   VITALS    height is 5' 3\" (1.6 m) and weight is 187 lb 9.8 oz (85.1 kg). Her oral temperature is 97.4 °F (36.3 °C). Her blood pressure is 123/59 (abnormal) and her pulse is 90. Her respiration is 16 and oxygen saturation is 100%. Wt Readings from Last 3 Encounters:   23 187 lb 9.8 oz (85.1 kg)   01/15/23 183 lb 4.8 oz (83.1 kg)   22 163 lb 1.6 oz (74 kg)       I/O (24 Hours)    Intake/Output Summary (Last 24 hours) at 2023 8764  Last data filed at 2023 0920  Gross per 24 hour   Intake 10 ml   Output 700 ml   Net -690 ml       General Appearance  Awake, alert,  not  In acute distress  HEENT - normocephalic, atraumatic, pink conjunctiva,  anicteric sclera  Neck - Supple, no mass  Lungs -  Bilateral good air entry, no rhonchi, no wheeze  Cardiovascular - Heart sounds are normal.  Regular rate and rhythm without murmur, gallop or rub.   Abdomen - soft, not distended, nontender,   Neurologic - Mild confusion  Skin - No bruising or bleeding  Extremities - Bilateral edema, no erythema of the LUE    MEDICATIONS:      linezolid  600 mg Oral 2 times per day    lactobacillus  1 capsule Oral Daily with breakfast    rivaroxaban  15 mg Oral Dinner    [Held by provider] bumetanide  0.5 mg Oral Daily    hydrocortisone  20 mg Oral QAM    hydrocortisone  10 mg Oral Nightly    levothyroxine  125 mcg Oral Daily    oxybutynin  15 mg Oral Daily    pantoprazole  40 mg Oral QAM AC    atorvastatin  20 mg Oral Daily    terbinafine  250 mg Oral Daily    sodium chloride flush  5-40 mL IntraVENous 2 times per day      sodium chloride       sodium chloride flush, sodium chloride, ondansetron **OR** ondansetron, polyethylene glycol, acetaminophen **OR** acetaminophen, potassium chloride **OR** potassium alternative oral replacement **OR** potassium chloride, magnesium sulfate      LABS:     CBC:   Recent Labs     01/29/23  0550 01/30/23  0532 01/31/23  0540   WBC 7.9 6.6 6.4   HGB 7.2* 7.4* 7.2*    219 229     BMP:    Recent Labs     01/29/23  0550 01/30/23  0532 01/31/23  0540    142 142   K 3.9 3.9 3.8    108 109   CO2 27 25 27   BUN 10 7 8   CREATININE 0.5 0.6 0.6   GLUCOSE 106 74 79     Calcium:  Recent Labs     01/31/23  0540   CALCIUM 8.2*     Ionized Calcium:No results for input(s): IONCA in the last 72 hours. Magnesium:No results for input(s): MG in the last 72 hours. Phosphorus:  Recent Labs     01/31/23  0540   PHOS 3.3     BNP:No results for input(s): BNP in the last 72 hours. Glucose:  Recent Labs     01/30/23  0846   POCGLU 90     HgbA1C: No results for input(s): LABA1C in the last 72 hours. INR: No results for input(s): INR in the last 72 hours. Hepatic: No results for input(s): ALKPHOS, ALT, AST, PROT, BILITOT, BILIDIR, LABALBU in the last 72 hours. Amylase and Lipase:No results for input(s): LACTA, AMYLASE in the last 72 hours. Lactic Acid: No results for input(s): LACTA in the last 72 hours. Troponin: No results for input(s): CKTOTAL, CKMB, TROPONINI in the last 72 hours. BNP: No results for input(s): BNP in the last 72 hours. CULTURES:   UA: No results for input(s): SPECGRAV, PHUR, COLORU, CLARITYU, MUCUS, PROTEINU, BLOODU, RBCUA, WBCUA, BACTERIA, NITRU, GLUCOSEU, BILIRUBINUR, UROBILINOGEN, KETUA, LABCAST, LABCASTTY, AMORPHOS in the last 72 hours. Invalid input(s): CRYSTALS  Micro:   Lab Results   Component Value Date/Time    [facility]  01/21/2023 09:01 PM     No growth 24 hours. No growth 48 hours.  No growth at 5 days         IMAGING:         Problem list of patient:     Patient Active Problem List   Diagnosis Code    Asthma J45.909    Hyperlipidemia E78.5    Osteoarthritis M19.90    GERD (gastroesophageal reflux disease) K21.9    Meningioma ([facility]) D32.9    Mechanical loosening of prosthetic joint ([facility]) T84.039A    Hyperglycemia R73.9    SIRS (systemic inflammatory response syndrome) ([facility]) R65.10    Leukocytosis D72.829    Right sided weakness R53.1    Cerebrovascular disease P66.2    Metabolic acidosis Q61.56    Sinus bradycardia R00.1    Generalized weakness R53.1    Hx of subdural hematoma Z86.79    Cerebral infarction ([facility]) I63.9    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, 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    Hypercoagulable state ([facility] [address] D68.59    Fatigue R53.83    Atrial thrombosis I51.3    Microcytic anemia D50.9    Hypokalemia E87.6    Altered mental status R41.82    Postoperative hypothyroidism E89.0    Hypernatremia X92.7    Metabolic encephalopathy A94.05    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 5XAA    JESSE (acute kidney injury) ([facility] [address] N17.9    Hyperkalemia E87.5    Gastroenteritis due to norovirus K01.02    Acute diastolic heart failure ([facility]) I50.31    History of stroke with residual effects I69.30    Chronic venous stasis dermatitis of both lower extremities I87.2    Normocytic anemia D64.9    Chronic diastolic congestive heart failure ([facility]) I50.32    CKD (chronic kidney disease), stage IV (HCC) N18.4    Confusion R41.0    Chronic kidney disease (CKD), stage III (moderate) ([facility]) N18.30    Obesity (BMI 30.0-34. 9) E66.9    Community acquired pneumonia of right lung J18.9 Acute renal failure superimposed on stage 3 chronic kidney disease (HCC) N17.9, N18.30    Chronic anticoagulation Z79.01    Hypoalbuminemia E88.09    Impaired mobility Z74.09    Bilateral leg edema +2- better now trace R60.0    CKD (chronic kidney disease) stage 3, GFR 30-59 ml/min ([facility]) N18.30    Fluid overload E87.70    Panhypopituitarism ([facility]) E23.0    Hypothyroidism E03.9    Scalp lesion L98.9    Non-compliance F/u advised Z91.199    Verruca vulgaris B07.9    COVID-19 U07.1    Septic shock ([facility]) A41.9, R65.21    Cellulitis of submandibular region K12.2    Severe malnutrition ([facility]) E43    Submandibular gland infection K11.20    Cellulitis L03.90         ASSESSMENT/PLAN   L upper extremity cellulitis: Resolved. 7 day Linezolid course ends 2/1/23  Atrial fibrillation  HTN  Adrenal insufficiency: On chronic sterodis  Hypernatremia, resolved    Ok to dc from ID perspective        Sb Rodriguez MD 1/31/2023 9:24 AM   Patient was seen and examined face-to-face by me  The chart, progress notes, labs and radiographs were reviewed. Case discussed with the nurse. Questions and concerns were addressed. I agree with the progress note.

## 2023-02-01 LAB
ABO: NORMAL
ANTIBODY SCREEN: NORMAL
DEPRECATED RDW RBC AUTO: 59.2 FL (ref 35–45)
ERYTHROCYTE [DISTWIDTH] IN BLOOD BY AUTOMATED COUNT: 16.8 % (ref 11.5–14.5)
FOLATE SERPL-MCNC: > 20 NG/ML (ref 4.8–24.2)
HCT VFR BLD AUTO: 21.9 % (ref 37–47)
HCT VFR BLD AUTO: 22.1 % (ref 37–47)
HCT VFR BLD AUTO: 26.7 % (ref 37–47)
HGB BLD-MCNC: 6.8 GM/DL (ref 12–16)
HGB BLD-MCNC: 6.9 GM/DL (ref 12–16)
HGB BLD-MCNC: 8.4 GM/DL (ref 12–16)
MCH RBC QN AUTO: 30.1 PG (ref 26–33)
MCHC RBC AUTO-ENTMCNC: 31.2 GM/DL (ref 32.2–35.5)
MCV RBC AUTO: 96.5 FL (ref 81–99)
PATHOLOGIST REVIEW: ABNORMAL
PHOSPHATE SERPL-MCNC: 2.8 MG/DL (ref 2.4–4.7)
PLATELET # BLD AUTO: 259 THOU/MM3 (ref 130–400)
PMV BLD AUTO: 9.4 FL (ref 9.4–12.4)
RBC # BLD AUTO: 2.29 MILL/MM3 (ref 4.2–5.4)
RH FACTOR: NORMAL
SCAN OF BLOOD SMEAR: NORMAL
VIT B12 SERPL-MCNC: 708 PG/ML (ref 211–911)
WBC # BLD AUTO: 6.8 THOU/MM3 (ref 4.8–10.8)

## 2023-02-01 PROCEDURE — 2580000003 HC RX 258

## 2023-02-01 PROCEDURE — 86923 COMPATIBILITY TEST ELECTRIC: CPT

## 2023-02-01 PROCEDURE — 99233 SBSQ HOSP IP/OBS HIGH 50: CPT | Performed by: PHYSICIAN ASSISTANT

## 2023-02-01 PROCEDURE — 82607 VITAMIN B-12: CPT

## 2023-02-01 PROCEDURE — 85027 COMPLETE CBC AUTOMATED: CPT

## 2023-02-01 PROCEDURE — 97535 SELF CARE MNGMENT TRAINING: CPT

## 2023-02-01 PROCEDURE — 1200000000 HC SEMI PRIVATE

## 2023-02-01 PROCEDURE — 82746 ASSAY OF FOLIC ACID SERUM: CPT

## 2023-02-01 PROCEDURE — 36430 TRANSFUSION BLD/BLD COMPNT: CPT

## 2023-02-01 PROCEDURE — 84100 ASSAY OF PHOSPHORUS: CPT

## 2023-02-01 PROCEDURE — P9016 RBC LEUKOCYTES REDUCED: HCPCS

## 2023-02-01 PROCEDURE — 86900 BLOOD TYPING SEROLOGIC ABO: CPT

## 2023-02-01 PROCEDURE — 85014 HEMATOCRIT: CPT

## 2023-02-01 PROCEDURE — 97530 THERAPEUTIC ACTIVITIES: CPT

## 2023-02-01 PROCEDURE — 86901 BLOOD TYPING SEROLOGIC RH(D): CPT

## 2023-02-01 PROCEDURE — 6370000000 HC RX 637 (ALT 250 FOR IP)

## 2023-02-01 PROCEDURE — 6360000002 HC RX W HCPCS: Performed by: PHYSICIAN ASSISTANT

## 2023-02-01 PROCEDURE — 6370000000 HC RX 637 (ALT 250 FOR IP): Performed by: PHYSICIAN ASSISTANT

## 2023-02-01 PROCEDURE — 1200000003 HC TELEMETRY R&B

## 2023-02-01 PROCEDURE — 86850 RBC ANTIBODY SCREEN: CPT

## 2023-02-01 PROCEDURE — 6370000000 HC RX 637 (ALT 250 FOR IP): Performed by: INTERNAL MEDICINE

## 2023-02-01 PROCEDURE — 85018 HEMOGLOBIN: CPT

## 2023-02-01 PROCEDURE — 2580000003 HC RX 258: Performed by: PHYSICIAN ASSISTANT

## 2023-02-01 RX ORDER — SODIUM CHLORIDE 9 MG/ML
INJECTION, SOLUTION INTRAVENOUS PRN
Status: DISCONTINUED | OUTPATIENT
Start: 2023-02-01 | End: 2023-02-06 | Stop reason: HOSPADM

## 2023-02-01 RX ADMIN — OXYBUTYNIN CHLORIDE 15 MG: 10 TABLET, EXTENDED RELEASE ORAL at 08:23

## 2023-02-01 RX ADMIN — TERBINAFINE TABLETS 250 MG 250 MG: 250 TABLET ORAL at 09:57

## 2023-02-01 RX ADMIN — LINEZOLID 600 MG: 600 TABLET, FILM COATED ORAL at 08:24

## 2023-02-01 RX ADMIN — PANTOPRAZOLE SODIUM 40 MG: 40 TABLET, DELAYED RELEASE ORAL at 05:32

## 2023-02-01 RX ADMIN — IRON SUCROSE 200 MG: 20 INJECTION, SOLUTION INTRAVENOUS at 12:41

## 2023-02-01 RX ADMIN — HYDROCORTISONE 20 MG: 10 TABLET ORAL at 08:24

## 2023-02-01 RX ADMIN — LEVOTHYROXINE SODIUM 125 MCG: 0.12 TABLET ORAL at 08:23

## 2023-02-01 RX ADMIN — HYDROCORTISONE 10 MG: 10 TABLET ORAL at 20:32

## 2023-02-01 RX ADMIN — RIVAROXABAN 15 MG: 15 TABLET, FILM COATED ORAL at 18:28

## 2023-02-01 RX ADMIN — ATORVASTATIN CALCIUM 20 MG: 20 TABLET, FILM COATED ORAL at 08:24

## 2023-02-01 RX ADMIN — SODIUM CHLORIDE, PRESERVATIVE FREE 10 ML: 5 INJECTION INTRAVENOUS at 20:33

## 2023-02-01 RX ADMIN — SODIUM CHLORIDE, PRESERVATIVE FREE 10 ML: 5 INJECTION INTRAVENOUS at 08:24

## 2023-02-01 RX ADMIN — Medication 1 CAPSULE: at 08:23

## 2023-02-01 NOTE — CARE COORDINATION
2/1/23, 9:04 AM EST    DISCHARGE PLANNING EVALUATION     Call to [facility] with [address], reports they started precert on their end and will keep SW updated on when this come back.

## 2023-02-01 NOTE — PROGRESS NOTES
Progress note: Infectious diseases    Patient - Ascencion Melendez,  Age - 71 y.o.    - 1950      Room Number - 5K-15/015-A   MRN -  [id number]   Acct # - [de-identified]  Date of Admission -  2023  7:56 PM    SUBJECTIVE:   No new issues. OBJECTIVE   VITALS    height is 5' 3\" (1.6 m) and weight is 187 lb 9.8 oz (85.1 kg). Her oral temperature is 97.6 °F (36.4 °C). Her blood pressure is 135/65 and her pulse is 73. Her respiration is 16 and oxygen saturation is 100%.        Wt Readings from Last 3 Encounters:   23 187 lb 9.8 oz (85.1 kg)   01/15/23 183 lb 4.8 oz (83.1 kg)   22 163 lb 1.6 oz (74 kg)       I/O (24 Hours)    Intake/Output Summary (Last 24 hours) at 2023 1745  Last data filed at 2023 1600  Gross per 24 hour   Intake 870 ml   Output 600 ml   Net 270 ml         General Appearance  Awake, alert,  not  In acute distress  HEENT - normocephalic, atraumatic, pale  conjunctiva,  anicteric sclera  Neck - Supple, no mass triple lumen over the right IJV  Lungs -  Bilateral  air entry, no rhonchi, no wheeze  Cardiovascular - Heart sounds are normal.     Abdomen - soft, not distended, nontender,   Neurologic awake and oriented  Skin - No bruising or bleeding  Extremities - Bilateral edema,   MEDICATIONS:      iron sucrose  200 mg IntraVENous Daily    lactobacillus  1 capsule Oral Daily with breakfast    rivaroxaban  15 mg Oral Dinner    [Held by provider] bumetanide  0.5 mg Oral Daily    hydrocortisone  20 mg Oral QAM    hydrocortisone  10 mg Oral Nightly    levothyroxine  125 mcg Oral Daily    oxybutynin  15 mg Oral Daily    pantoprazole  40 mg Oral QAM AC    atorvastatin  20 mg Oral Daily    terbinafine  250 mg Oral Daily    sodium chloride flush  5-40 mL IntraVENous 2 times per day      sodium chloride      sodium chloride       sodium chloride, sodium chloride flush, sodium chloride, ondansetron **OR** ondansetron, polyethylene glycol, acetaminophen **OR** acetaminophen, potassium chloride **OR** potassium alternative oral replacement **OR** potassium chloride, magnesium sulfate      LABS:     CBC:   Recent Labs     01/30/23  0532 01/31/23  0540 02/01/23  0535 02/01/23  0650 02/01/23  1610   WBC 6.6 6.4 6.8  --   --    HGB 7.4* 7.2* 6.9* 6.8* 8.4*    229 259  --   --        BMP:    Recent Labs     01/30/23  0532 01/31/23  0540    142   K 3.9 3.8    109   CO2 25 27   BUN 7 8   CREATININE 0.6 0.6   GLUCOSE 74 79       Calcium:  Recent Labs     01/31/23  0540   CALCIUM 8.2*       Ionized Calcium:No results for input(s): IONCA in the last 72 hours. Magnesium:No results for input(s): MG in the last 72 hours. Phosphorus:  Recent Labs     02/01/23  0535   PHOS 2.8       BNP:No results for input(s): BNP in the last 72 hours. Glucose:  Recent Labs     01/30/23  0846   POCGLU 90        CULTURES:   UA: No results for input(s): SPECGRAV, PHUR, COLORU, CLARITYU, MUCUS, PROTEINU, BLOODU, RBCUA, WBCUA, BACTERIA, NITRU, GLUCOSEU, BILIRUBINUR, UROBILINOGEN, KETUA, LABCAST, LABCASTTY, AMORPHOS in the last 72 hours. Invalid input(s): CRYSTALS  Micro:   Lab Results   Component Value Date/Time    [facility]  01/21/2023 09:01 PM     No growth 24 hours. No growth 48 hours.  No growth at 5 days            Problem list of patient:     Patient Active Problem List   Diagnosis Code    Asthma J45.909    Hyperlipidemia E78.5    Osteoarthritis M19.90    GERD (gastroesophageal reflux disease) K21.9    Meningioma ([facility]) D32.9    Mechanical loosening of prosthetic joint ([facility]) T84.039A    Hyperglycemia R73.9    SIRS (systemic inflammatory response syndrome) ([facility]) R65.10    Leukocytosis D72.829    Right sided weakness R53.1    Cerebrovascular disease B14.3    Metabolic acidosis L97.05    Sinus bradycardia R00.1    Generalized weakness R53.1    Hx of subdural hematoma Z86.79    Cerebral infarction ([facility] [address] I63.9    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 (HCC) E23.0, D49.7    Hypercoagulable state ([facility] [address] D68.59    Fatigue R53.83    Atrial thrombosis I51.3    Microcytic anemia D50.9    Hypokalemia E87.6    Altered mental status R41.82    Postoperative hypothyroidism E89.0    Hypernatremia F25.3    Metabolic encephalopathy Q95.65    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 5XAA    JESSE (acute kidney injury) ([facility] [address] N17.9    Hyperkalemia E87.5    Gastroenteritis due to norovirus D92.82    Acute diastolic heart failure (HCC) I50.31    History of stroke with residual effects I69.30    Chronic venous stasis dermatitis of both lower extremities I87.2    Normocytic anemia D64.9    Chronic diastolic congestive heart failure (HCC) I50.32    CKD (chronic kidney disease), stage IV (HCC) N18.4    Confusion R41.0    Chronic kidney disease (CKD), stage III (moderate) (HCC) N18.30    Obesity (BMI 30.0-34. 9) E66.9    Community acquired pneumonia of right lung J18.9    Acute renal failure superimposed on stage 3 chronic kidney disease (HCC) N17.9, N18.30    Chronic anticoagulation Z79.01    Hypoalbuminemia E88.09    Impaired mobility Z74.09    Bilateral leg edema +2- better now trace R60.0    CKD (chronic kidney disease) stage 3, GFR 30-59 ml/min (HCC) N18.30    Fluid overload E87.70    Panhypopituitarism (HCC) E23.0    Hypothyroidism E03.9    Scalp lesion L98.9    Non-compliance F/u advised Z91.199    Verruca vulgaris B07.9    COVID-19 U07.1    Septic shock (HCC) A41.9, R65.21    Cellulitis of submandibular region K12.2    Severe malnutrition ([address] E43    Submandibular gland infection K11.20    Cellulitis L03.90         ASSESSMENT/PLAN   L upper extremity cellulitis: Resolved. Completed treatment  Atrial fibrillation  HTN  Adrenal insufficiency: On chronic sterodis   Ok to [employer]    Will sign off  Remove line prior to discharge.         Maycol Streeter MD 2/1/2023 5:45 PM

## 2023-02-01 NOTE — PROGRESS NOTES
[facility] 5K  Occupational Therapy  Daily Note  Time:   Time In: 8447  Time Out: 0933  Timed Code Treatment Minutes: 23 Minutes  Minutes: 23          Date: 2023  Patient Name: Amado Baugh,   Gender: female      Room: -15/015-A  MRN: [id number]  : 1950  (71 y.o.)  Referring Practitioner: Mojgan De Los Santos PA-C  Diagnosis: cellulitis  Additional Pertinent Hx: per chart review; Amado Baugh is a 71 y.o. female with PMHx of paroxysmal A-fib, HTN, HFpEF, hypothyroidism, severe protein calorie malnutrition, and physical debility with deconditioning who presented to [facility] with chief complaint of left upper extremity swelling and pain. Patient was recently discharged on 23 for submandibular cellulitis and discharged to SNF. She endorses having no recollection of injuring her left arm, but stated that the arm started to swell just 3 days ago. Around that time she had stopped her outpatient course of antibiotics. At first when the swelling started she stated that she did not think much about it as it was not bothering her. Then today her arm was quite tender to touch and just throbbed all day, hence the admission to [facility]. Currently she is not having any pain, but that was after administration of Morphine while in the ED. She denies any dizziness, chest pain or shortness of breath. She denies any sick contacts.  She does not endorse any tingling or numbness that is unusual for her    Restrictions/Precautions:  Restrictions/Precautions: General Precautions, Fall Risk  Position Activity Restriction  Other position/activity restrictions: skin tears easily, hx CVA with L sided deficits     SUBJECTIVE: Pt laying in bed upon arrival, pt agreeable to OT session, RN Gave verbal approval for session     PAIN: 0/10:     Vitals: Nurse checked vitals prior to session    COGNITION: Decreased Recall, Impaired Memory, Decreased Problem Solving, and Decreased Safety Awareness    ADL:   Lower Extremity Dressing: Maximum Assistance. For donning brief sitting and standing  Footwear Management: Maximum Assistance. For donning socks . BALANCE:  Standing Balance: Moderate Assistance. With Azeb Duet, and vc's for upright posture and for arm placement with pt standing for 30-40 seconds with 2 trials    BED MOBILITY:  Supine to Sit: Maximum Assistance with the HOB elevated and scooting hips to the EOB    TRANSFERS:  Sit to Stand: Moderate Assistance. With the bed elevated  Stand to Sit: Minimal Assistance. To the recliner    ASSESSMENT:     Activity Tolerance:  Patient tolerance of  treatment: good. Discharge Recommendations: ECF with OT  Equipment Recommendations: Equipment Needed: Yes  Mobility Devices: ADL Assistive Devices  Plan: Times Per Week: 5x  Times Per Day: Once a day  Current Treatment Recommendations: Strengthening, ROM, Balance training, Functional mobility training, Endurance training, Neuromuscular re-education, Safety education & training, Equipment evaluation, education, & procurement, Patient/Caregiver education & training, Self-Care / ADL, Coordination training    Patient Education  Patient Education: ADL's    Goals  Short Term Goals  Time Frame for Short Term Goals: by discharge  Short Term Goal 1: patient will tolerate 2 min static standing with sit to stand and maintain upright posture with MIN A x 1. Short Term Goal 2: patient will participate in (B) UE AROM HEP and R UE strengthening to increase UB strength and function for functional transfers. Short Term Goal 3: patient will complete simple UB ADLs with MIN  A and edu in juan tech to compensate for L UE hemiparesis. Following session, patient left in safe position with all fall risk precautions in place.

## 2023-02-01 NOTE — PROGRESS NOTES
02/01/23 1405   Encounter Summary   Encounter Overview/Reason  Spiritual/Emotional Needs   Service Provided For: Patient   Referral/Consult From: Edmar   Last Encounter  02/01/23   Complexity of Encounter Moderate   Begin Time 1355   End Time  1405   Total Time Calculated 10 min   Encounter    Type Follow up   Spiritual/Emotional needs   Type Spiritual Support   Assessment/Intervention/Outcome   Assessment Calm   Intervention Nurtured Hope   Outcome Comfort   Assessment: In my encounter with the 71 yr old patient, while rounding  the unit 5K, I provided spiritual care to patient through conversation, I also came to assess the patient's spiritual needs present. The pt was admitted due to cellulitis. Interventions:  I provided prayer, emotional support and words of comfort.  provided a listening presence and encouraged pt to share their beliefs and how they support them during their hospitalization. Outcomes: The patient was encouraged and didn't share any further spiritual needs at this time. Plan:  Chaplains will follow-up at a later time for assessment of any spiritual care needs present.

## 2023-02-01 NOTE — CARE COORDINATION
2/1/23, 2:52 PM EST    DISCHARGE ON GOING EVALUATION    Bryan Gtz day: 10  Location: [facility]15/015-A Reason for admit: Hypokalemia [E87.6]  Cellulitis [L03.90]  Cellulitis of left upper extremity [L03.114]   Barriers to Discharge: H/H 6.8/21.9. Nephrology has signed off, ID following, patient transfused with one unit PRBC's, IV Venofer, Bumex on hold, last dose of Zyvox, Xarelto, prn Tylenol and Zofran, Magnesium and Potassium replacement protocol, stool for occult blood, H/H in a.m., moist diet with honey thickened liquids, daily weights, SCD's, up with assistance as tolerated, PT/OT, SS, Dietician. PCP: Elda Osorio MD  Readmission Risk Score: 26.9%  Patient Goals/Plan/Treatment Preferences: Shirley came from [facility]. She is pending insurance approval for [employer].

## 2023-02-01 NOTE — PROGRESS NOTES
Hospitalist Progress Note      Patient:  Lennox Woodard    Unit/Bed:5K-15/015-A  YOB: 1950  MRN: [id number]   Acct: [de-identified]   PCP: Talat Carpio MD  Date of Admission: 1/21/2023    Assessment/Plan:    Left upper extremity cellulitis, possibly related to recent IV infiltration: Patient had an IV infiltrated on 1/15 per provider's note and was receiving Unasyn during that stay. X-ray of left hand, elbow, radius and ulna on 1/21 negative   CT left hand, radius, ulna, and humerus on 1/22 revealed diffuse soft tissue swelling, skin thickening and subcutaneous edema / inflammation of left upper arm, left forearm and left hand without abscess. Blood cultures negative to date   Completed 7 days of Zyvox per ID recommendations  Was additionally treated with Zosyn while IP   PT and OT following. Chronic normocytic hypochromic anemia - likely Iron deficiency anemia: Hgb continuing to decrease to 6.8 today. Iron studies from 1/24 reveal iron of 38, TIBC of 137, transferrin of 112, and ferritin 395. Folate and B12 WNL. No acute bleeding. Begin Venofer x3 doses daily, transition to PO ferrous sulfate subsequently. Begin 1 unit PRBC transfusion. Recheck H&B s/p transfusion. Repeat CBC in am.   B/l Lower extremity edema: Begin bilateral leg wrapping. Patient states this is her baseline. Keep elevated. Electrolyte abnormalities:  Hypernatremia- resolved: Sodium up to 142 1/31  Continue to hold Bumex  Was treated with D5W which was stopped 1/29-  sodium   1/30- D5W stopped last night. Sodium 142  1/31- sodium stable at 142- nephro signed off- recommend 60 ounces water per day   Hypokalemia- resolved : K 3.8, on 1/31  Hypophosphatemia, resolved- 2.8 on 2/1  Maintain telemetry  Dysphagia: SLP recommended minced and moist moderately thick liquids per 1/23  Paroxysmal A-fib on [address] [place] [address] Road; rate controlled: continue home medications, continue tele.  Okay to resume Xarelto, no plan for surgical intervention. Leukocytosis, resolved: Secondary to #1. History of adrenal insufficiency without crisis: Likely contributing to #1 skin fragility. Continue home cortef therapy. BP has been stable. Hypothyroidism: TSH 0.047 and free T4 1.31 on 1/21. Continue home medications  Severe protein calorie malnutrition: Encourage oral intake; dietitian   Physical debility with deconditioning: Pt came from SNF. Consult PT/OT. Disposition: SNF placement    Chief Complaint: Left arm swelling    Initial H and P:-    \"Shirley Cooper is a 71 y.o. female with PMHx of paroxysmal A-fib, HTN, HFpEF, hypothyroidism, severe protein calorie malnutrition, and physical debility with deconditioning who presented to [facility] with chief complaint of left upper extremity swelling and pain. Patient was recently discharged on 1/17/23 for submandibular cellulitis and discharged to SNF. She endorses having no recollection of injuring her left arm, but stated that the arm started to swell just 3 days ago. Around that time she had stopped her outpatient course of antibiotics. At first when the swelling started she stated that she did not think much about it as it was not bothering her. Then today her arm was quite tender to touch and just throbbed all day, hence the admission to [facility]. Currently she is not having any pain, but that was after administration of Morphine while in the ED. She denies any dizziness, chest pain or shortness of breath. She denies any sick contacts. She does not endorse any tingling or numbness that is unusual for her. \"    Subjective (past 24 hours):   2/1: Patient is sitting upright in chair. She states that she feels good today. She notes improved range of motion and strength of left arm. She endorses pruritis throughout the day of left arm. She denies drainage of left arm. She notes diarrhea and increased urinary frequency. She denies melena, hematochezia, or hematuria.  She notes an occasional non-productive cough, she denies mucosal bleeding of mouth or nose. She denies abdominal pain, fatigue, lightheadedness, headache, chest pain, palpitations, or shortness of breath. Past medical history, family history, social history and allergies reviewed again and is unchanged since admission. ROS (All review of systems completed. Pertinent positives noted. Otherwise All other systems reviewed and negative.)     Medications:  Reviewed    Infusion Medications    sodium chloride      sodium chloride       Scheduled Medications    iron sucrose  200 mg IntraVENous Daily    lactobacillus  1 capsule Oral Daily with breakfast    rivaroxaban  15 mg Oral Dinner    [Held by provider] bumetanide  0.5 mg Oral Daily    hydrocortisone  20 mg Oral QAM    hydrocortisone  10 mg Oral Nightly    levothyroxine  125 mcg Oral Daily    oxybutynin  15 mg Oral Daily    pantoprazole  40 mg Oral QAM AC    atorvastatin  20 mg Oral Daily    terbinafine  250 mg Oral Daily    sodium chloride flush  5-40 mL IntraVENous 2 times per day     PRN Meds: sodium chloride, sodium chloride flush, sodium chloride, ondansetron **OR** ondansetron, polyethylene glycol, acetaminophen **OR** acetaminophen, potassium chloride **OR** potassium alternative oral replacement **OR** potassium chloride, magnesium sulfate      Intake/Output Summary (Last 24 hours) at 2/1/2023 1124  Last data filed at 2/1/2023 0900  Gross per 24 hour   Intake 350 ml   Output 600 ml   Net -250 ml       Diet:  ADULT ORAL NUTRITION SUPPLEMENT; Dinner; Frozen Oral Supplement  ADULT ORAL NUTRITION SUPPLEMENT; Breakfast, Lunch; Wound Healing Oral Supplement  ADULT DIET; Dysphagia - Minced and Moist; Moderately Thick (Honey)    Physical Exam:  BP (!) 145/71   Pulse 73   Temp 97.6 °F (36.4 °C) (Oral)   Resp 18   Ht 5' 3\" (1.6 m)   Wt 187 lb 9.8 oz (85.1 kg)   SpO2 100%   BMI 33.23 kg/m²   General appearance: Speaking softly throughout examination.  No apparent distress, appears stated age and cooperative. HEENT: Pupils equal, round, and reactive to light. Conjunctivae/corneas clear. Neck: Supple, with full range of motion. No jugular venous distention. Trachea midline. Respiratory:  Normal respiratory effort. Clear to auscultation, bilaterally without Rales/Wheezes/Rhonchi. Cardiovascular: Regular rate and rhythm with normal S1/S2 without murmurs, rubs or gallops. Abdomen: Soft, non-tender, non-distended with normal bowel sounds. Musculoskeletal: Diminished active and passive range of motion of bilateral upper extremities. Active range of motion of left upper extremity improved from yesterday. Left upper extremity strength diminished. Skin: Diffuse bruising throughout right upper extremity and bilateral lower extremities. Patient states this is her baseline. Previous area of cellulitis on left forearm is no longer warm, is without erythema or tenderness. Sporadic hematomas of left upper extremity. +1 pitting edema of ble lower extremities, no erythema, warmth or tenderness. Neurologic:  Neurovascularly intact without any focal sensory deficits. Cranial nerves: II-XII intact, grossly non-focal.  Psychiatric: Alert and oriented, thought content appropriate, normal insight. Capillary Refill: Brisk,< 3 seconds   Peripheral Pulses: +2 palpable, equal bilaterally     Labs:   Recent Labs     01/30/23  0532 01/31/23  0540 02/01/23  0535 02/01/23  0650   WBC 6.6 6.4 6.8  --    HGB 7.4* 7.2* 6.9* 6.8*   HCT 23.9* 22.9* 22.1* 21.9*    229 259  --      Recent Labs     01/30/23  0532 01/31/23  0540 02/01/23  0535    142  --    K 3.9 3.8  --     109  --    CO2 25 27  --    BUN 7 8  --    CREATININE 0.6 0.6  --    CALCIUM 8.3* 8.2*  --    PHOS 2.6 3.3 2.8     No results for input(s): AST, ALT, BILIDIR, BILITOT, ALKPHOS in the last 72 hours. No results for input(s): INR in the last 72 hours.   No results for input(s): Elvia Conklin in the last 72 hours.    Microbiology:    Blood culture #1:   Lab Results   Component Value Date/Time    [facility]  01/21/2023 09:01 PM     No growth 24 hours. No growth 48 hours. No growth at 5 days       Blood culture #2:No results found for: BLOODCULT2    Organism:  Lab Results   Component Value Date/Time    ORG Staphylococcus aureus 12/17/2018 04:04 PM         Lab Results   Component Value Date/Time    LABGRAM  02/16/2022 04:00 PM     No segmented neutrophils observed. Rare epithelial cells observed. No organisms observed. MRSA culture only:No results found for: [address]     Urine culture:   Lab Results   Component Value Date/Time    LABURIN No growth-preliminary  No growth   02/09/2017 12:35 PM       Respiratory culture: No results found for: CULTRESP    Aerobic and Anaerobic :  Lab Results   Component Value Date/Time    LABAERO No Acinetobacter species isolated. 01/04/2023 10:00 PM     Lab Results   Component Value Date/Time    LABANAE  12/01/2021 12:00 PM     No anaerobes isolated- preliminary Culture yielded light growth of gram positive bacilli most consistent with Cutibacterium species. Clinical correlation required. Urinalysis:      Lab Results   Component Value Date/Time    NITRU NEGATIVE 01/22/2023 12:02 AM    WBCUA 0-2 01/22/2023 12:02 AM    WBCUA 2-4 02/22/2012 01:00 PM    BACTERIA NONE SEEN 01/22/2023 12:02 AM    RBCUA 5-10 01/22/2023 12:02 AM    BLOODU MODERATE 01/22/2023 12:02 AM    SPECGRAV 1.025 09/18/2020 12:00 PM    GLUCOSEU NEGATIVE 01/22/2023 12:02 AM       Radiology:  CT HAND LEFT W CONTRAST   Final Result      Lateral portion of the left upper extremity is partly off the    field-of-view. Status post left shoulder hemiarthroplasty and total left hip replacement. Metallic streak artifact limits the evaluation of the adjacent structures. Diffuse soft tissue swelling, skin thickening and subcutaneous    edema/inflammation of the left upper arm, left forearm and left hand.     Correlate clinically for cellulitis. No abscess is seen. This document has been electronically signed by: Danni Coopre MD on    01/22/2023 06:43 AM      All CTs at this facility use dose modulation techniques and iterative    reconstructions, and/or weight-based dosing   when appropriate to reduce radiation to a low as reasonably achievable. CT RADIUS ULNA LEFT W CONTRAST   Final Result      Lateral portion of the left upper extremity is partly off the    field-of-view. Status post left shoulder hemiarthroplasty and total left hip replacement. Metallic streak artifact limits the evaluation of the adjacent structures. Diffuse soft tissue swelling, skin thickening and subcutaneous    edema/inflammation of the left upper arm, left forearm and left hand. Correlate clinically for cellulitis. No abscess is seen. This document has been electronically signed by: Danni Cooper MD on    01/22/2023 06:26 AM      All CTs at this facility use dose modulation techniques and iterative    reconstructions, and/or weight-based dosing   when appropriate to reduce radiation to a low as reasonably achievable. CT HUMERUS LEFT W CONTRAST   Final Result      Lateral portion of the left upper extremity is partly off the    field-of-view. Status post left shoulder hemiarthroplasty and total left hip replacement. Metallic streak artifact limits the evaluation of the adjacent structures. Diffuse soft tissue swelling, skin thickening and subcutaneous    edema/inflammation of the left upper arm, left forearm and left hand. Correlate clinically for cellulitis. No abscess is seen. This document has been electronically signed by: Danni Cooper MD on    01/22/2023 06:42 AM      All CTs at this facility use dose modulation techniques and iterative    reconstructions, and/or weight-based dosing   when appropriate to reduce radiation to a low as reasonably achievable.       XR CHEST PORTABLE   Final Result Decrease in pulmonary interstitial densities compared to x-ray 1/4/2023. This document has been electronically signed by: Jaimie Srivastava MD on    01/21/2023 11:52 PM      XR RADIUS ULNA LEFT (2 VIEWS)   Final Result   No acute bony abnormality. This document has been electronically signed by: Jaimie Srivastava MD on    01/21/2023 11:51 PM      XR HAND LEFT (MIN 3 VIEWS)   Final Result   No acute osseous abnormality. This document has been electronically signed by: Jaimie Srivastava MD on    01/21/2023 10:30 PM      XR ELBOW LEFT (MIN 3 VIEWS)   Final Result   No acute bony abnormality. This document has been electronically signed by: Jaimie Srivastava MD on    01/21/2023 10:28 PM        XR ELBOW LEFT (MIN 3 VIEWS)    Result Date: 1/21/2023  3 view right elbow Comparison: None Findings: No acute fractures. Normal alignment. No significant arthritic change or erosions. No joint effusion. No radiopaque foreign body. Diffuse edema. No acute bony abnormality. This document has been electronically signed by: Jaimie Srivastava MD on 01/21/2023 10:28 PM    XR RADIUS ULNA LEFT (2 VIEWS)    Result Date: 1/21/2023  2 view left forearm Comparison: None Findings: No fractures or dislocations. No joint effusion. No significant arthritic change. No radiopaque foreign body. Extensive soft tissue edema. No acute bony abnormality. This document has been electronically signed by: Jaimie Srivastava MD on 01/21/2023 11:51 PM    XR HAND LEFT (MIN 3 VIEWS)    Result Date: 1/21/2023  3 view left hand Comparison: None Findings: Osteopenia. No acute fractures. No significant loss of joint space or osteophytes. No erosions. No radiopaque foreign body. Diffuse edema. No acute osseous abnormality.  This document has been electronically signed by: Jaimie Srivastava MD on 01/21/2023 10:30 PM    CT HUMERUS LEFT W CONTRAST    Result Date: 1/22/2023  CT left upper extremity with contrast COMPARISON: No prior FINDINGS: The lateral portion of the left upper extremity is partly off the field-of-view. The patient is status post left shoulder hemiarthroplasty and total left hip replacement. Metallic streak artifact limits the evaluation of the adjacent structures. There is diffuse soft tissue swelling, skin thickening and subcutaneous edema/inflammation of the left upper arm, left forearm and left hand. No abscess is seen. No fracture or dislocation is seen. Lateral portion of the left upper extremity is partly off the field-of-view. Status post left shoulder hemiarthroplasty and total left hip replacement. Metallic streak artifact limits the evaluation of the adjacent structures. Diffuse soft tissue swelling, skin thickening and subcutaneous edema/inflammation of the left upper arm, left forearm and left hand. Correlate clinically for cellulitis. No abscess is seen. This document has been electronically signed by: Pramod Giron MD on 01/22/2023 06:42 AM All CTs at this facility use dose modulation techniques and iterative reconstructions, and/or weight-based dosing when appropriate to reduce radiation to a low as reasonably achievable. CT RADIUS ULNA LEFT W CONTRAST    Result Date: 1/22/2023  CT left upper extremity with contrast COMPARISON: No prior FINDINGS: The lateral portion of the left upper extremity is partly off the field-of-view. The patient is status post left shoulder hemiarthroplasty and total left hip replacement. Metallic streak artifact limits the evaluation of the adjacent structures. There is diffuse soft tissue swelling, skin thickening and subcutaneous edema/inflammation of the left upper arm, left forearm and left hand. No abscess is seen. No fracture or dislocation is seen. Lateral portion of the left upper extremity is partly off the field-of-view. Status post left shoulder hemiarthroplasty and total left hip replacement. Metallic streak artifact limits the evaluation of the adjacent structures.  Diffuse soft tissue swelling, skin thickening and subcutaneous edema/inflammation of the left upper arm, left forearm and left hand. Correlate clinically for cellulitis. No abscess is seen. This document has been electronically signed by: Linda Zamorano MD on 01/22/2023 06:26 AM All CTs at this facility use dose modulation techniques and iterative reconstructions, and/or weight-based dosing when appropriate to reduce radiation to a low as reasonably achievable. CT HAND LEFT W CONTRAST    Result Date: 1/22/2023  CT left upper extremity with contrast COMPARISON: No prior FINDINGS: The lateral portion of the left upper extremity is partly off the field-of-view. The patient is status post left shoulder hemiarthroplasty and total left hip replacement. Metallic streak artifact limits the evaluation of the adjacent structures. There is diffuse soft tissue swelling, skin thickening and subcutaneous edema/inflammation of the left upper arm, left forearm and left hand. No abscess is seen. No fracture or dislocation is seen. Lateral portion of the left upper extremity is partly off the field-of-view. Status post left shoulder hemiarthroplasty and total left hip replacement. Metallic streak artifact limits the evaluation of the adjacent structures. Diffuse soft tissue swelling, skin thickening and subcutaneous edema/inflammation of the left upper arm, left forearm and left hand. Correlate clinically for cellulitis. No abscess is seen. This document has been electronically signed by: Linda Zamorano MD on 01/22/2023 06:43 AM All CTs at this facility use dose modulation techniques and iterative reconstructions, and/or weight-based dosing when appropriate to reduce radiation to a low as reasonably achievable. XR CHEST PORTABLE    Result Date: 1/21/2023  1 view chest x-ray Comparison: CR/SR - XR CHEST PORTABLE - 01/04/2023 04:26 PM EST Findings: Decrease in pulmonary interstitial densities compared to x-ray 1/4/2023.  Right internal iliac catheter terminates in the right atrium. Status post bilateral hip replacement. No acute fractures. Decrease in pulmonary interstitial densities compared to x-ray 1/4/2023.  This document has been electronically signed by: Rishabh Lopez MD on 01/21/2023 11:52 PM      Electronically signed by Gisel Hunter PA-C on 2/1/2023 at 11:24 AM

## 2023-02-02 LAB
ANION GAP SERPL CALC-SCNC: 7 MEQ/L (ref 8–16)
BUN SERPL-MCNC: 6 MG/DL (ref 7–22)
CALCIUM SERPL-MCNC: 8.2 MG/DL (ref 8.5–10.5)
CHLORIDE SERPL-SCNC: 112 MEQ/L (ref 98–111)
CO2 SERPL-SCNC: 27 MEQ/L (ref 23–33)
CREAT SERPL-MCNC: 0.5 MG/DL (ref 0.4–1.2)
DEPRECATED RDW RBC AUTO: 60.1 FL (ref 35–45)
ERYTHROCYTE [DISTWIDTH] IN BLOOD BY AUTOMATED COUNT: 17.9 % (ref 11.5–14.5)
GFR SERPL CREATININE-BSD FRML MDRD: > 60 ML/MIN/1.73M2
GLUCOSE SERPL-MCNC: 83 MG/DL (ref 70–108)
HCT VFR BLD AUTO: 25.6 % (ref 37–47)
HGB BLD-MCNC: 8.1 GM/DL (ref 12–16)
MCH RBC QN AUTO: 29 PG (ref 26–33)
MCHC RBC AUTO-ENTMCNC: 31.6 GM/DL (ref 32.2–35.5)
MCV RBC AUTO: 91.8 FL (ref 81–99)
PLATELET # BLD AUTO: 227 THOU/MM3 (ref 130–400)
PMV BLD AUTO: 9 FL (ref 9.4–12.4)
POTASSIUM SERPL-SCNC: 3.5 MEQ/L (ref 3.5–5.2)
RBC # BLD AUTO: 2.79 MILL/MM3 (ref 4.2–5.4)
SODIUM SERPL-SCNC: 146 MEQ/L (ref 135–145)
WBC # BLD AUTO: 7.2 THOU/MM3 (ref 4.8–10.8)

## 2023-02-02 PROCEDURE — 36415 COLL VENOUS BLD VENIPUNCTURE: CPT

## 2023-02-02 PROCEDURE — 97110 THERAPEUTIC EXERCISES: CPT

## 2023-02-02 PROCEDURE — 99232 SBSQ HOSP IP/OBS MODERATE 35: CPT | Performed by: PHYSICIAN ASSISTANT

## 2023-02-02 PROCEDURE — 85027 COMPLETE CBC AUTOMATED: CPT

## 2023-02-02 PROCEDURE — 2580000003 HC RX 258

## 2023-02-02 PROCEDURE — 1200000000 HC SEMI PRIVATE

## 2023-02-02 PROCEDURE — 6370000000 HC RX 637 (ALT 250 FOR IP): Performed by: PHYSICIAN ASSISTANT

## 2023-02-02 PROCEDURE — 80048 BASIC METABOLIC PNL TOTAL CA: CPT

## 2023-02-02 PROCEDURE — 97535 SELF CARE MNGMENT TRAINING: CPT

## 2023-02-02 PROCEDURE — 2580000003 HC RX 258: Performed by: PHYSICIAN ASSISTANT

## 2023-02-02 PROCEDURE — 6370000000 HC RX 637 (ALT 250 FOR IP)

## 2023-02-02 PROCEDURE — 6360000002 HC RX W HCPCS: Performed by: PHYSICIAN ASSISTANT

## 2023-02-02 RX ORDER — DEXTROSE MONOHYDRATE 50 MG/ML
INJECTION, SOLUTION INTRAVENOUS CONTINUOUS
Status: ACTIVE | OUTPATIENT
Start: 2023-02-02 | End: 2023-02-02

## 2023-02-02 RX ORDER — DOCUSATE SODIUM 100 MG/1
100 CAPSULE, LIQUID FILLED ORAL DAILY
Status: DISCONTINUED | OUTPATIENT
Start: 2023-02-02 | End: 2023-02-06 | Stop reason: HOSPADM

## 2023-02-02 RX ADMIN — SODIUM CHLORIDE, PRESERVATIVE FREE 10 ML: 5 INJECTION INTRAVENOUS at 20:34

## 2023-02-02 RX ADMIN — IRON SUCROSE 200 MG: 20 INJECTION, SOLUTION INTRAVENOUS at 09:43

## 2023-02-02 RX ADMIN — LEVOTHYROXINE SODIUM 125 MCG: 0.12 TABLET ORAL at 08:32

## 2023-02-02 RX ADMIN — TERBINAFINE TABLETS 250 MG 250 MG: 250 TABLET ORAL at 08:32

## 2023-02-02 RX ADMIN — RIVAROXABAN 15 MG: 15 TABLET, FILM COATED ORAL at 17:07

## 2023-02-02 RX ADMIN — Medication 1 CAPSULE: at 08:32

## 2023-02-02 RX ADMIN — SODIUM CHLORIDE, PRESERVATIVE FREE 10 ML: 5 INJECTION INTRAVENOUS at 08:34

## 2023-02-02 RX ADMIN — DOCUSATE SODIUM 100 MG: 100 CAPSULE, LIQUID FILLED ORAL at 11:19

## 2023-02-02 RX ADMIN — ATORVASTATIN CALCIUM 20 MG: 20 TABLET, FILM COATED ORAL at 08:32

## 2023-02-02 RX ADMIN — DEXTROSE MONOHYDRATE: 50 INJECTION, SOLUTION INTRAVENOUS at 11:14

## 2023-02-02 RX ADMIN — PANTOPRAZOLE SODIUM 40 MG: 40 TABLET, DELAYED RELEASE ORAL at 06:48

## 2023-02-02 RX ADMIN — HYDROCORTISONE 20 MG: 10 TABLET ORAL at 08:32

## 2023-02-02 RX ADMIN — OXYBUTYNIN CHLORIDE 15 MG: 10 TABLET, EXTENDED RELEASE ORAL at 08:32

## 2023-02-02 RX ADMIN — HYDROCORTISONE 10 MG: 10 TABLET ORAL at 20:34

## 2023-02-02 ASSESSMENT — PAIN SCALES - GENERAL: PAINLEVEL_OUTOF10: 0

## 2023-02-02 NOTE — CARE COORDINATION
2/2/23, 11:24 AM EST    DISCHARGE PLANNING EVALUATION    Call to [facility] with [facility], reports precert still pending.

## 2023-02-02 NOTE — PROGRESS NOTES
[address]  Occupational Therapy  Daily Note  Time:    Time In: 5212  Time Out: 6273  Timed Code Treatment Minutes: 24 Minutes  Minutes: 24          Date: 2023  Patient Name: Chema Isaac,   Gender: female      Room: [facility]15/015-A  MRN: [id number]  : 1950  (71 y.o.)  Referring Practitioner: Melina Patiño PA-C  Diagnosis: cellulitis  Additional Pertinent Hx: per chart review; Chema Isaac is a 71 y.o. female with PMHx of paroxysmal A-fib, HTN, HFpEF, hypothyroidism, severe protein calorie malnutrition, and physical debility with deconditioning who presented to [facility] with chief complaint of left upper extremity swelling and pain. Patient was recently discharged on 23 for submandibular cellulitis and discharged to SNF. She endorses having no recollection of injuring her left arm, but stated that the arm started to swell just 3 days ago. Around that time she had stopped her outpatient course of antibiotics. At first when the swelling started she stated that she did not think much about it as it was not bothering her. Then today her arm was quite tender to touch and just throbbed all day, hence the admission to [facility]. Currently she is not having any pain, but that was after administration of Morphine while in the ED. She denies any dizziness, chest pain or shortness of breath. She denies any sick contacts. She does not endorse any tingling or numbness that is unusual for her    Restrictions/Precautions:  Restrictions/Precautions: General Precautions, Fall Risk  Position Activity Restriction  Other position/activity restrictions: skin tears easily, hx CVA with L sided deficits      SUBJECTIVE: agreeable to session    PAIN: 0/10: no c/o pain during session    Vitals: Nurse checked vitals prior to session    COGNITION: Decreased Insight, Decreased Problem Solving, and Decreased Safety Awareness    ADL:   Footwear Management: Dependent.   For donning slipper socks  Toileting: Dependent. For cleaning bottom, Pt found incontinent of stool (rolled side to side for clean up) . BALANCE:  Sitting Balance:  Contact Guard Assistance. Gentle AAROM for increasing L knee flexion in prep for t/f while seated EOB  Standing Balance: Maximum Assistance, X 1. Noted increased extensor tone in LLE-difficult to get LE in positioning for WB    BED MOBILITY:  Rolling to Left: Maximum Assistance, X 1    Rolling to Right: Maximum Assistance, X 1    Supine to Sit: Maximum Assistance, X 1    **education on technique to A with bed mobility    TRANSFERS:  Sit to Stand:  Maximum Assistance, X 1. From EOB 2x trials; noted increased tone LLE-recommend VADIM STEDY at this time, no safe for stand-pivot. Sit-stand on VADIM STEDY with mod A x 2  Stand to Sit: Moderate Assistance, X 1. From [address] onto recliner  **therapist placed pillow on front leg plate on VADIM STEDY to pad from sensitive / thin skin    FUNCTIONAL MOBILITY:  Pt is non ambulatory     ASSESSMENT:     Activity Tolerance:  Patient tolerance of  treatment: fair. Discharge Recommendations: Subacute/skilled nursing facility  Equipment Recommendations: Equipment Needed: Yes  Mobility Devices: ADL Assistive Devices  Plan: Times Per Week: 5x  Times Per Day: Once a day  Current Treatment Recommendations: Strengthening, ROM, Balance training, Functional mobility training, Endurance training, Neuromuscular re-education, Safety education & training, Equipment evaluation, education, & procurement, Patient/Caregiver education & training, Self-Care / ADL, Coordination training    Patient Education  Patient Education:  see above    Goals  Short Term Goals  Time Frame for Short Term Goals: by discharge  Short Term Goal 1: patient will tolerate 2 min static standing with sit to stand and maintain upright posture with MIN A x 1.   Short Term Goal 2: patient will participate in (B) UE AROM HEP and R UE strengthening to increase UB strength and function for functional transfers. Short Term Goal 3: patient will complete simple UB ADLs with MIN  A and edu in juan tech to compensate for L UE hemiparesis. Following session, patient left in safe position with all fall risk precautions in place.

## 2023-02-02 NOTE — PROGRESS NOTES
Hospitalist Progress Note      Patient:  Ascencion Melendez    Unit/Bed:5K-15/015-A  YOB: 1950  MRN: [id number]   Acct: [de-identified]   PCP: Yamilet Sims MD  Date of Admission: 1/21/2023    Assessment/Plan:    Left upper extremity cellulitis, possibly related to recent IV infiltration: Patient had an IV infiltrated on 1/15 per provider's note and was receiving Unasyn during that stay. X-ray of left hand, elbow, radius and ulna on 1/21 negative   CT left hand, radius, ulna, and humerus on 1/22 revealed diffuse soft tissue swelling, skin thickening and subcutaneous edema / inflammation of left upper arm, left forearm and left hand without abscess. Blood cultures negative to date   Completed 7 days of Zyvox per ID recommendations - signed off   Was additionally treated with Zosyn while IP   PT and OT following. Chronic normocytic hypochromic anemia - likely Iron deficiency anemia, improving: Hgb 8.4 after 1 unit PRBC transfusion, decreased to 8.1 currently. Iron studies from 1/24 reveal iron of 38, TIBC of 137, transferrin of 112, and ferritin 395. Folate and B12 WNL. No acute bleeding. Continue Venofer x3 doses daily, transition to PO ferrous sulfate subsequently. Continue to transfuse if Hgb <7. Repeat CBC Q12 hours. B/l Lower extremity edema: Continue bilateral leg wrapping. Patient states this is her baseline. Keep elevated. Electrolyte abnormalities:  Hypernatremia: Sodium increased to 146 from 142. Continue to hold Bumex  Begin D5W x3 hours and reassess BMP   Was treated with D5W which was stopped 1/29-  sodium   1/30- D5W stopped last night.   Sodium 142  1/31- sodium stable at 142- nephro signed off- recommend 60 ounces water per day   Encourage oral intake - pt has not been drinking   Hypokalemia- resolved : K 3.5 today  Hypophosphatemia, resolved- 2.8 on 2/1  Hypochloridemia: increased to 112 from 109  BUN decreased: decreased to 6 from 8  Maintain telemetry  Constipation: Begin Colase. Continue to monitor. Patient denies abdominal pain. Dysphagia: SLP recommended minced and moist moderately thick liquids per 1/23  Paroxysmal A-fib on [facility]; rate controlled: continue home medications, continue tele. Okay to resume Xarelto, no plan for surgical intervention. Leukocytosis, resolved: Secondary to #1. History of adrenal insufficiency without crisis: Likely contributing to #1 skin fragility. Continue home cortef therapy. BP has been stable. Hypothyroidism: TSH 0.047 and free T4 1.31 on 1/21. Continue home medications  Severe protein calorie malnutrition: Encourage oral intake; dietitian   Physical debility with deconditioning: Pt came from SNF. Consult PT/OT. Disposition: SNF placement    Chief Complaint: Left arm swelling    Initial H and P:-    \"Shirley Cohen is a 71 y.o. female with PMHx of paroxysmal A-fib, HTN, HFpEF, hypothyroidism, severe protein calorie malnutrition, and physical debility with deconditioning who presented to [facility] with chief complaint of left upper extremity swelling and pain. Patient was recently discharged on 1/17/23 for submandibular cellulitis and discharged to SNF. She endorses having no recollection of injuring her left arm, but stated that the arm started to swell just 3 days ago. Around that time she had stopped her outpatient course of antibiotics. At first when the swelling started she stated that she did not think much about it as it was not bothering her. Then today her arm was quite tender to touch and just throbbed all day, hence the admission to [facility]. Currently she is not having any pain, but that was after administration of Morphine while in the ED. She denies any dizziness, chest pain or shortness of breath. She denies any sick contacts. She does not endorse any tingling or numbness that is unusual for her. \"    2/1: Patient is sitting upright in chair. She states that she feels good today.  She notes improved range of motion and strength of left arm. She endorses pruritis throughout the day of left arm. She denies drainage of left arm. She notes diarrhea and increased urinary frequency. She denies melena, hematochezia, or hematuria. She notes an occasional non-productive cough, she denies mucosal bleeding of mouth or nose. She denies abdominal pain, fatigue, lightheadedness, headache, chest pain, palpitations, or shortness of breath. Subjective (past 24 hours):   2/2: Patient sitting upright in chair. She states that she feels better today. She notes improved range of motion and strength of left arm. She endorses intermittent pruritis and \"pins and needles\" feeling of left hand. She denies drainage of left arm. She notes increase in non-productive cough, already resolved. She denies shortness of breath, rhinorrhea, sore throat, chest pain, headache, lightheadedness, or dizziness. She states her last bowel movement was a 2 days ago. She denies abdominal pain, nausea, or vomiting. Past medical history, family history, social history and allergies reviewed again and is unchanged since admission. ROS (All review of systems completed. Pertinent positives noted.  Otherwise All other systems reviewed and negative.)     Medications:  Reviewed    Infusion Medications    dextrose 100 mL/hr at 02/02/23 1114    sodium chloride      sodium chloride       Scheduled Medications    docusate sodium  100 mg Oral Daily    iron sucrose  200 mg IntraVENous Daily    lactobacillus  1 capsule Oral Daily with breakfast    rivaroxaban  15 mg Oral Dinner    [Held by provider] bumetanide  0.5 mg Oral Daily    hydrocortisone  20 mg Oral QAM    hydrocortisone  10 mg Oral Nightly    levothyroxine  125 mcg Oral Daily    oxybutynin  15 mg Oral Daily    pantoprazole  40 mg Oral QAM AC    atorvastatin  20 mg Oral Daily    terbinafine  250 mg Oral Daily    sodium chloride flush  5-40 mL IntraVENous 2 times per day     PRN Meds: sodium chloride, sodium chloride flush, sodium chloride, ondansetron **OR** ondansetron, polyethylene glycol, acetaminophen **OR** acetaminophen, potassium chloride **OR** potassium alternative oral replacement **OR** potassium chloride, magnesium sulfate      Intake/Output Summary (Last 24 hours) at 2/2/2023 1130  Last data filed at 2/2/2023 0233  Gross per 24 hour   Intake 690 ml   Output 400 ml   Net 290 ml       Diet:  ADULT ORAL NUTRITION SUPPLEMENT; Dinner; Frozen Oral Supplement  ADULT ORAL NUTRITION SUPPLEMENT; Breakfast, Lunch; Wound Healing Oral Supplement  ADULT DIET; Dysphagia - Minced and Moist; Moderately Thick (Honey)    Physical Exam:  /74   Pulse 64   Temp 97.1 °F (36.2 °C) (Axillary)   Resp 18   Ht 5' 3\" (1.6 m)   Wt 184 lb 4.9 oz (83.6 kg)   SpO2 98%   BMI 32.65 kg/m²   General appearance: Speaking softly throughout examination. No apparent distress, appears stated age and cooperative. HEENT: Pupils equal, round, and reactive to light. Conjunctivae/corneas clear. Neck: Supple, with full range of motion. No jugular venous distention. Trachea midline. Respiratory:  Normal respiratory effort. Clear to auscultation, bilaterally without Rales/Wheezes/Rhonchi. Cardiovascular: Regular rate and rhythm with normal S1/S2 without murmurs, rubs or gallops. Abdomen: Soft, non-tender, non-distended with normal bowel sounds. Musculoskeletal: Diminished active and passive range of motion of bilateral upper extremities. Active range of motion of left upper extremity improved from yesterday. Left upper extremity strength diminished. Skin: Diffuse bruising throughout right upper extremity and bilateral lower extremities. Patient states this is her baseline. Previous area of cellulitis on left forearm is no longer warm, is without erythema or tenderness. Sporadic hematomas of left upper extremity. +1 pitting edema of ble lower extremities, improving, no erythema, warmth or tenderness.   Neurologic: Neurovascularly intact without any focal sensory deficits. Cranial nerves: II-XII intact, grossly non-focal.  Psychiatric: Alert and oriented, thought content appropriate, normal insight. Capillary Refill: Brisk,< 3 seconds   Peripheral Pulses: +2 palpable, equal bilaterally     Labs:   Recent Labs     01/31/23  0540 02/01/23  0535 02/01/23  0650 02/01/23  1610 02/02/23  0508   WBC 6.4 6.8  --   --  7.2   HGB 7.2* 6.9* 6.8* 8.4* 8.1*   HCT 22.9* 22.1* 21.9* 26.7* 25.6*    259  --   --  227     Recent Labs     01/31/23  0540 02/01/23  0535 02/02/23  0508     --  146*   K 3.8  --  3.5     --  112*   CO2 27  --  27   BUN 8  --  6*   CREATININE 0.6  --  0.5   CALCIUM 8.2*  --  8.2*   PHOS 3.3 2.8  --      No results for input(s): AST, ALT, BILIDIR, BILITOT, ALKPHOS in the last 72 hours. No results for input(s): INR in the last 72 hours. No results for input(s): Marlene Richard in the last 72 hours. Microbiology:    Blood culture #1:   Lab Results   Component Value Date/Time    [facility]  01/21/2023 09:01 PM     No growth 24 hours. No growth 48 hours. No growth at 5 days       Blood culture #2:No results found for: BLOODCULT2    Organism:  Lab Results   Component Value Date/Time    ORG Staphylococcus aureus 12/17/2018 04:04 PM         Lab Results   Component Value Date/Time    LABGRAM  02/16/2022 04:00 PM     No segmented neutrophils observed. Rare epithelial cells observed. No organisms observed. MRSA culture only:No results found for: [facility]    Urine culture:   Lab Results   Component Value Date/Time    LABURIN No growth-preliminary  No growth   02/09/2017 12:35 PM       Respiratory culture: No results found for: CULTRESP    Aerobic and Anaerobic :  Lab Results   Component Value Date/Time    LABAERO No Acinetobacter species isolated.  01/04/2023 10:00 PM     Lab Results   Component Value Date/Time    LABANAE  12/01/2021 12:00 PM     No anaerobes isolated- preliminary Culture yielded light growth of gram positive bacilli most consistent with Cutibacterium species. Clinical correlation required. Urinalysis:      Lab Results   Component Value Date/Time    NITRU NEGATIVE 01/22/2023 12:02 AM    WBCUA 0-2 01/22/2023 12:02 AM    WBCUA 2-4 02/22/2012 01:00 PM    BACTERIA NONE SEEN 01/22/2023 12:02 AM    RBCUA 5-10 01/22/2023 12:02 AM    BLOODU MODERATE 01/22/2023 12:02 AM    SPECGRAV 1.025 09/18/2020 12:00 PM    GLUCOSEU NEGATIVE 01/22/2023 12:02 AM       Radiology:  CT HAND LEFT W CONTRAST   Final Result      Lateral portion of the left upper extremity is partly off the    field-of-view. Status post left shoulder hemiarthroplasty and total left hip replacement. Metallic streak artifact limits the evaluation of the adjacent structures. Diffuse soft tissue swelling, skin thickening and subcutaneous    edema/inflammation of the left upper arm, left forearm and left hand. Correlate clinically for cellulitis. No abscess is seen. This document has been electronically signed by: Jose A Veliz MD on    01/22/2023 06:43 AM      All CTs at this facility use dose modulation techniques and iterative    reconstructions, and/or weight-based dosing   when appropriate to reduce radiation to a low as reasonably achievable. CT RADIUS ULNA LEFT W CONTRAST   Final Result      Lateral portion of the left upper extremity is partly off the    field-of-view. Status post left shoulder hemiarthroplasty and total left hip replacement. Metallic streak artifact limits the evaluation of the adjacent structures. Diffuse soft tissue swelling, skin thickening and subcutaneous    edema/inflammation of the left upper arm, left forearm and left hand. Correlate clinically for cellulitis. No abscess is seen.       This document has been electronically signed by: Jose A Veliz MD on    01/22/2023 06:26 AM      All CTs at this facility use dose modulation techniques and iterative    reconstructions, and/or weight-based dosing   when appropriate to reduce radiation to a low as reasonably achievable. CT HUMERUS LEFT W CONTRAST   Final Result      Lateral portion of the left upper extremity is partly off the    field-of-view. Status post left shoulder hemiarthroplasty and total left hip replacement. Metallic streak artifact limits the evaluation of the adjacent structures. Diffuse soft tissue swelling, skin thickening and subcutaneous    edema/inflammation of the left upper arm, left forearm and left hand. Correlate clinically for cellulitis. No abscess is seen. This document has been electronically signed by: Roman Sandifer, MD on    01/22/2023 06:42 AM      All CTs at this facility use dose modulation techniques and iterative    reconstructions, and/or weight-based dosing   when appropriate to reduce radiation to a low as reasonably achievable. XR CHEST PORTABLE   Final Result   Decrease in pulmonary interstitial densities compared to x-ray 1/4/2023. This document has been electronically signed by: Joni Baldwin MD on    01/21/2023 11:52 PM      XR RADIUS ULNA LEFT (2 VIEWS)   Final Result   No acute bony abnormality. This document has been electronically signed by: Joni Baldwin MD on    01/21/2023 11:51 PM      XR HAND LEFT (MIN 3 VIEWS)   Final Result   No acute osseous abnormality. This document has been electronically signed by: Joni Baldwin MD on    01/21/2023 10:30 PM      XR ELBOW LEFT (MIN 3 VIEWS)   Final Result   No acute bony abnormality. This document has been electronically signed by: Joni Baldwin MD on    01/21/2023 10:28 PM        XR ELBOW LEFT (MIN 3 VIEWS)    Result Date: 1/21/2023  3 view right elbow Comparison: None Findings: No acute fractures. Normal alignment. No significant arthritic change or erosions. No joint effusion. No radiopaque foreign body. Diffuse edema. No acute bony abnormality.  This document has been electronically signed by: Nichole Barnhart Sigrid Salinas MD on 01/21/2023 10:28 PM    XR RADIUS ULNA LEFT (2 VIEWS)    Result Date: 1/21/2023  2 view left forearm Comparison: None Findings: No fractures or dislocations. No joint effusion. No significant arthritic change. No radiopaque foreign body. Extensive soft tissue edema. No acute bony abnormality. This document has been electronically signed by: Triny Bradley MD on 01/21/2023 11:51 PM    XR HAND LEFT (MIN 3 VIEWS)    Result Date: 1/21/2023  3 view left hand Comparison: None Findings: Osteopenia. No acute fractures. No significant loss of joint space or osteophytes. No erosions. No radiopaque foreign body. Diffuse edema. No acute osseous abnormality. This document has been electronically signed by: Triny Bradley MD on 01/21/2023 10:30 PM    CT HUMERUS LEFT W CONTRAST    Result Date: 1/22/2023  CT left upper extremity with contrast COMPARISON: No prior FINDINGS: The lateral portion of the left upper extremity is partly off the field-of-view. The patient is status post left shoulder hemiarthroplasty and total left hip replacement. Metallic streak artifact limits the evaluation of the adjacent structures. There is diffuse soft tissue swelling, skin thickening and subcutaneous edema/inflammation of the left upper arm, left forearm and left hand. No abscess is seen. No fracture or dislocation is seen. Lateral portion of the left upper extremity is partly off the field-of-view. Status post left shoulder hemiarthroplasty and total left hip replacement. Metallic streak artifact limits the evaluation of the adjacent structures. Diffuse soft tissue swelling, skin thickening and subcutaneous edema/inflammation of the left upper arm, left forearm and left hand. Correlate clinically for cellulitis. No abscess is seen.  This document has been electronically signed by: Gretchen Rinaldi MD on 01/22/2023 06:42 AM All CTs at this facility use dose modulation techniques and iterative reconstructions, and/or weight-based dosing when appropriate to reduce radiation to a low as reasonably achievable. CT RADIUS ULNA LEFT W CONTRAST    Result Date: 1/22/2023  CT left upper extremity with contrast COMPARISON: No prior FINDINGS: The lateral portion of the left upper extremity is partly off the field-of-view. The patient is status post left shoulder hemiarthroplasty and total left hip replacement. Metallic streak artifact limits the evaluation of the adjacent structures. There is diffuse soft tissue swelling, skin thickening and subcutaneous edema/inflammation of the left upper arm, left forearm and left hand. No abscess is seen. No fracture or dislocation is seen. Lateral portion of the left upper extremity is partly off the field-of-view. Status post left shoulder hemiarthroplasty and total left hip replacement. Metallic streak artifact limits the evaluation of the adjacent structures. Diffuse soft tissue swelling, skin thickening and subcutaneous edema/inflammation of the left upper arm, left forearm and left hand. Correlate clinically for cellulitis. No abscess is seen. This document has been electronically signed by: Eliazar Riedel, MD on 01/22/2023 06:26 AM All CTs at this facility use dose modulation techniques and iterative reconstructions, and/or weight-based dosing when appropriate to reduce radiation to a low as reasonably achievable. CT HAND LEFT W CONTRAST    Result Date: 1/22/2023  CT left upper extremity with contrast COMPARISON: No prior FINDINGS: The lateral portion of the left upper extremity is partly off the field-of-view. The patient is status post left shoulder hemiarthroplasty and total left hip replacement. Metallic streak artifact limits the evaluation of the adjacent structures. There is diffuse soft tissue swelling, skin thickening and subcutaneous edema/inflammation of the left upper arm, left forearm and left hand. No abscess is seen. No fracture or dislocation is seen.      Lateral portion of the left upper extremity is partly off the field-of-view. Status post left shoulder hemiarthroplasty and total left hip replacement. Metallic streak artifact limits the evaluation of the adjacent structures. Diffuse soft tissue swelling, skin thickening and subcutaneous edema/inflammation of the left upper arm, left forearm and left hand. Correlate clinically for cellulitis. No abscess is seen. This document has been electronically signed by: Tricia Pastrana MD on 01/22/2023 06:43 AM All CTs at this facility use dose modulation techniques and iterative reconstructions, and/or weight-based dosing when appropriate to reduce radiation to a low as reasonably achievable. XR CHEST PORTABLE    Result Date: 1/21/2023  1 view chest x-ray Comparison: CR/SR - XR CHEST PORTABLE - 01/04/2023 04:26 PM EST Findings: Decrease in pulmonary interstitial densities compared to x-ray 1/4/2023. Right internal iliac catheter terminates in the right atrium. Status post bilateral hip replacement. No acute fractures. Decrease in pulmonary interstitial densities compared to x-ray 1/4/2023.  This document has been electronically signed by: Rishabh Lopez MD on 01/21/2023 11:52 PM      Electronically signed by Gisel Hunter PA-C on 2/2/2023 at 11:30 AM

## 2023-02-03 LAB
ANION GAP SERPL CALC-SCNC: 10 MEQ/L (ref 8–16)
BUN SERPL-MCNC: 5 MG/DL (ref 7–22)
CALCIUM SERPL-MCNC: 8.1 MG/DL (ref 8.5–10.5)
CHLORIDE SERPL-SCNC: 113 MEQ/L (ref 98–111)
CO2 SERPL-SCNC: 26 MEQ/L (ref 23–33)
CREAT SERPL-MCNC: 0.6 MG/DL (ref 0.4–1.2)
GFR SERPL CREATININE-BSD FRML MDRD: > 60 ML/MIN/1.73M2
GLUCOSE SERPL-MCNC: 86 MG/DL (ref 70–108)
HCT VFR BLD AUTO: 25.8 % (ref 37–47)
HGB BLD-MCNC: 8.3 GM/DL (ref 12–16)
POTASSIUM SERPL-SCNC: 3.4 MEQ/L (ref 3.5–5.2)
SODIUM SERPL-SCNC: 148 MEQ/L (ref 135–145)
SODIUM SERPL-SCNC: 149 MEQ/L (ref 135–145)

## 2023-02-03 PROCEDURE — 2580000003 HC RX 258: Performed by: PHYSICIAN ASSISTANT

## 2023-02-03 PROCEDURE — 85018 HEMOGLOBIN: CPT

## 2023-02-03 PROCEDURE — 97110 THERAPEUTIC EXERCISES: CPT

## 2023-02-03 PROCEDURE — 85014 HEMATOCRIT: CPT

## 2023-02-03 PROCEDURE — 97530 THERAPEUTIC ACTIVITIES: CPT

## 2023-02-03 PROCEDURE — 6370000000 HC RX 637 (ALT 250 FOR IP)

## 2023-02-03 PROCEDURE — 2580000003 HC RX 258

## 2023-02-03 PROCEDURE — 2500000003 HC RX 250 WO HCPCS: Performed by: PHYSICIAN ASSISTANT

## 2023-02-03 PROCEDURE — 84295 ASSAY OF SERUM SODIUM: CPT

## 2023-02-03 PROCEDURE — 1200000000 HC SEMI PRIVATE

## 2023-02-03 PROCEDURE — 80048 BASIC METABOLIC PNL TOTAL CA: CPT

## 2023-02-03 PROCEDURE — 36415 COLL VENOUS BLD VENIPUNCTURE: CPT

## 2023-02-03 PROCEDURE — 36592 COLLECT BLOOD FROM PICC: CPT

## 2023-02-03 PROCEDURE — 99233 SBSQ HOSP IP/OBS HIGH 50: CPT | Performed by: PHYSICIAN ASSISTANT

## 2023-02-03 PROCEDURE — 6370000000 HC RX 637 (ALT 250 FOR IP): Performed by: PHYSICIAN ASSISTANT

## 2023-02-03 PROCEDURE — 6360000002 HC RX W HCPCS: Performed by: PHYSICIAN ASSISTANT

## 2023-02-03 RX ORDER — SODIUM CHLORIDE 0.9 % (FLUSH) 0.9 %
5-40 SYRINGE (ML) INJECTION PRN
Status: DISCONTINUED | OUTPATIENT
Start: 2023-02-03 | End: 2023-02-06 | Stop reason: HOSPADM

## 2023-02-03 RX ORDER — LIDOCAINE HYDROCHLORIDE 10 MG/ML
5 INJECTION, SOLUTION EPIDURAL; INFILTRATION; INTRACAUDAL; PERINEURAL ONCE
Status: DISCONTINUED | OUTPATIENT
Start: 2023-02-03 | End: 2023-02-06 | Stop reason: HOSPADM

## 2023-02-03 RX ORDER — SODIUM CHLORIDE 9 MG/ML
25 INJECTION, SOLUTION INTRAVENOUS PRN
Status: DISCONTINUED | OUTPATIENT
Start: 2023-02-03 | End: 2023-02-06 | Stop reason: HOSPADM

## 2023-02-03 RX ORDER — SODIUM CHLORIDE 0.9 % (FLUSH) 0.9 %
5-40 SYRINGE (ML) INJECTION EVERY 12 HOURS SCHEDULED
Status: DISCONTINUED | OUTPATIENT
Start: 2023-02-03 | End: 2023-02-06 | Stop reason: HOSPADM

## 2023-02-03 RX ORDER — DEXTROSE MONOHYDRATE 50 MG/ML
INJECTION, SOLUTION INTRAVENOUS CONTINUOUS
Status: DISCONTINUED | OUTPATIENT
Start: 2023-02-03 | End: 2023-02-05

## 2023-02-03 RX ORDER — FERROUS SULFATE 325(65) MG
325 TABLET ORAL 2 TIMES DAILY WITH MEALS
Status: DISCONTINUED | OUTPATIENT
Start: 2023-02-04 | End: 2023-02-06 | Stop reason: HOSPADM

## 2023-02-03 RX ORDER — CALCIUM CARBONATE 200(500)MG
500 TABLET,CHEWABLE ORAL 3 TIMES DAILY PRN
Status: DISCONTINUED | OUTPATIENT
Start: 2023-02-03 | End: 2023-02-06 | Stop reason: HOSPADM

## 2023-02-03 RX ADMIN — OXYBUTYNIN CHLORIDE 15 MG: 10 TABLET, EXTENDED RELEASE ORAL at 08:11

## 2023-02-03 RX ADMIN — Medication 1 CAPSULE: at 08:10

## 2023-02-03 RX ADMIN — POTASSIUM CHLORIDE 40 MEQ: 1500 TABLET, EXTENDED RELEASE ORAL at 12:19

## 2023-02-03 RX ADMIN — RIVAROXABAN 15 MG: 15 TABLET, FILM COATED ORAL at 18:03

## 2023-02-03 RX ADMIN — TERBINAFINE TABLETS 250 MG 250 MG: 250 TABLET ORAL at 08:13

## 2023-02-03 RX ADMIN — PANTOPRAZOLE SODIUM 40 MG: 40 TABLET, DELAYED RELEASE ORAL at 05:17

## 2023-02-03 RX ADMIN — HYDROCORTISONE 20 MG: 10 TABLET ORAL at 08:11

## 2023-02-03 RX ADMIN — ANTACID TABLETS 500 MG: 500 TABLET, CHEWABLE ORAL at 16:21

## 2023-02-03 RX ADMIN — MICONAZOLE NITRATE: 20 POWDER TOPICAL at 20:37

## 2023-02-03 RX ADMIN — IRON SUCROSE 200 MG: 20 INJECTION, SOLUTION INTRAVENOUS at 08:24

## 2023-02-03 RX ADMIN — LEVOTHYROXINE SODIUM 125 MCG: 0.12 TABLET ORAL at 08:11

## 2023-02-03 RX ADMIN — MICONAZOLE NITRATE: 20 POWDER TOPICAL at 12:20

## 2023-02-03 RX ADMIN — DEXTROSE MONOHYDRATE: 50 INJECTION, SOLUTION INTRAVENOUS at 16:24

## 2023-02-03 RX ADMIN — SODIUM CHLORIDE, PRESERVATIVE FREE 10 ML: 5 INJECTION INTRAVENOUS at 08:13

## 2023-02-03 RX ADMIN — ATORVASTATIN CALCIUM 20 MG: 20 TABLET, FILM COATED ORAL at 08:11

## 2023-02-03 RX ADMIN — HYDROCORTISONE 10 MG: 10 TABLET ORAL at 20:37

## 2023-02-03 ASSESSMENT — PAIN SCALES - GENERAL: PAINLEVEL_OUTOF10: 0

## 2023-02-03 NOTE — DISCHARGE SUMMARY
Hospitalist Discharge Summary        Patient: Connie Saini  YOB: 1950  MRN: [id number]   Acct: [de-identified]    Primary Care Physician: Janet Ayon MD    Admit date  1/21/2023    Discharge date: No discharge date for patient encounter. Chief Complaint on presentation :-  Left arm swelling    Discharge Assessment and Plan:-   Left upper extremity cellulitis, possibly related to recent IV infiltration, treated: Patient had an IV infiltrated on 1/15 per provider's note and was receiving Unasyn during that stay. X-ray of left hand, elbow, radius and ulna on 1/21 negative   CT left hand, radius, ulna, and humerus on 1/22 revealed diffuse soft tissue swelling, skin thickening and subcutaneous edema / inflammation of left upper arm, left forearm and left hand without abscess. Blood cultures negative to date   Completed 7 days of Zyvox per ID recommendations - signed off   Was additionally treated with Zosyn while IP   Continue PT and OT. Chronic normocytic hypochromic anemia - likely Iron deficiency anemia, improving: Hgb increased to 8.7 from 7.9. Iron studies from 1/24 reveal iron of 38, TIBC of 137, transferrin of 112, and ferritin 395. Folate and B12 WNL. No acute bleeding. Completed Venofer x3 doses daily. Continue PO ferrous sulfate. Continue to transfuse if Hgb <7. Repeat CBC as outpatient. B/l Lower extremity edema: Continue bilateral leg wrapping. Patient states this is her baseline. Keep elevated. Electrolyte abnormalities:  Hypernatremia - resolved: Sodium 143. Continue to hold Bumex at discharge, f/u for when able / if able to resume this as an outpatient   Continue D5W x3 hours and reassess BMP   Encourage oral intake - pt has not been drinking   Hypokalemia- resolved : K 3.6 today  Hypophosphatemia, resolved- 2.8 on 2/1  Hypochloridemia: 109 today  BUN decreased: increased to 5 from <2  Constipation- resolved: Continue to monitor. Patient denies abdominal pain. Dysphagia: SLP recommended minced and moist moderately thick liquids per 1/23  Paroxysmal A-fib on 4 [facility]; rate controlled: continue home medications, continue tele. Continue Xarelto. Leukocytosis: 15.4 on 2/6. Patient afebrile, denies fever/chills, left arm pain or dysuria. Will repeat outpatient CBC to monitor. F/u with PCP. History of adrenal insufficiency without crisis: Likely contributing to #1 skin fragility. Continue home cortef therapy. BP has been stable. Hypothyroidism: TSH 0.047 and free T4 1.31 on 1/21. Continue home medications  Severe protein calorie malnutrition: Encourage oral intake; dietitian   Physical debility with deconditioning: Pt came from SNF. Consult PT/OT. Disposition: SNF     Initial H and P and Hospital course:-  \"Shirley Kaur is a 71 y.o. female with PMHx of paroxysmal A-fib, HTN, HFpEF, hypothyroidism, severe protein calorie malnutrition, and physical debility with deconditioning who presented to [facility] with chief complaint of left upper extremity swelling and pain. Patient was recently discharged on 1/17/23 for submandibular cellulitis and discharged to SNF. She endorses having no recollection of injuring her left arm, but stated that the arm started to swell just 3 days ago. Around that time she had stopped her outpatient course of antibiotics. At first when the swelling started she stated that she did not think much about it as it was not bothering her. Then today her arm was quite tender to touch and just throbbed all day, hence the admission to [facility]. Currently she is not having any pain, but that was after administration of Morphine while in the ED. She denies any dizziness, chest pain or shortness of breath. She denies any sick contacts. She does not endorse any tingling or numbness that is unusual for her. \"    Patient presented to the ED on 1/21 for left arm swelling and pain following an IV that infilitrated on 1/15 per provider's note and was receiving Unasyn during that stay. Patient was readmitted. X-ray of left hand, elbow, radius and ulna on 1/21 revealed no acute bony abnormalities and no osseous abnormalities. CT left hand, radius, ulna, and humerus on 1/22 revealed diffuse soft tissue swelling, skin thickening and subcutaneous edema / inflammation of left upper arm, left forearm and left hand, suspicious for cellulitis. No abscess was seen. Blood cultures were taken and were negative throughout hospitalization. Patient completed 1 dose of zosyn, 1 dose of vancomycin, and 1 dose of clindamycin. ID was consulted who suggest to start Zyvox and to continue Zosyn and to hold Ancef. Patient completed 7 days of Zyvox and  ~ 5 days Zosyn with great improvement noted in her cellulitis. Morphine was used for pain management and transitioned to Tylenol. PT/OT worked with patient throughout her hospitalization and recommended SNF placement. SLP reevaluated patient on 1/23 and recommended minced and moist moderately thick liquids. CBC was monitored throughout hospitalization, leukocytosis from admission resolved until 2/6. Iron deficiency anemia required 1 unit transfusion PRBCs on 2/1 and Venofer x3 doses daily beginning 2/1 which was transitioned to ferrous sulfate which patient will continue at discharge. Anemia was monitored and improved following transfusion. CMP was monitored throughout hospitalization and her course was complicated by hypernatremia for which nephrology was consulted. It was thought presentation was likely secondary to decreased oral intake and she was treated with DW5 and sodium normalized. Bumex was held during this time and will be held t discharge until patient is able to f/u with PCP for guidance and recommendations. Her most recent ECHO did show EF 60% and there is no evidence of significant edema on exam. Remaining electrolyte abnormalities of hypokalemia, hypophosphatemia, and  hypochloridemia noted throughout hospitalization all resolved by discharge. Patient's discharge was significantly delayed due to precert. Patient continued home medications for Afib, HTN, adrenal insufficiency, and hypothyroidism. Patient is to continue home medications upon discharge and continue newly prescribed ferrous sulfate. Patient is to be discharged to SNF and follow up with PCP. Patient is to have outpatient CBC for anemia and leukocytosis. Patient educated to continue to increase oral intake. VSS. Physical Exam:-  Vitals:   Patient Vitals for the past 24 hrs:   BP Temp Temp src Pulse Resp SpO2   02/03/23 0808 135/63 97.6 °F (36.4 °C) Oral 58 18 99 %   02/03/23 0350 (!) 140/64 98.5 °F (36.9 °C) Oral 70 18 99 %   02/02/23 2033 (!) 142/60 97.6 °F (36.4 °C) Oral 65 15 100 %   02/02/23 1359 (!) 145/63 97.8 °F (36.6 °C) Oral 68 16 99 %     Weight:   Weight: 184 lb 4.9 oz (83.6 kg)   24 hour intake/output:     Intake/Output Summary (Last 24 hours) at 2/3/2023 1344  Last data filed at 2/3/2023 0914  Gross per 24 hour   Intake 40 ml   Output 1050 ml   Net -1010 ml     General appearance: Speaking softly throughout examination. Appears mildly confused but alert and oriented. No apparent distress, appears stated age and cooperative. HEENT: Pupils equal, round, and reactive to light. Conjunctivae/corneas clear. Neck: Supple, with full range of motion. No jugular venous distention. Trachea midline. Respiratory:  Normal respiratory effort. Clear to auscultation, bilaterally without Rales/Wheezes/Rhonchi. Cardiovascular: Regular rate and rhythm with normal S1/S2 without murmurs, rubs or gallops. Abdomen: Soft, non-tender, non-distended with normal bowel sounds. Musculoskeletal: Diminished active and passive range of motion of bilateral upper extremities. Active range of motion of left upper extremity improved. Left upper extremity strength diminished. Skin: Diffuse bruising throughout right upper extremity and bilateral lower extremities. Patient states this is her baseline. Previous area of cellulitis on left forearm is no longer warm, is without  tenderness, minimal clear drainage from 1 site on left arm. Sporadic hematomas of left upper extremity. +1 pitting edema of ble lower extremities, improving, no erythema, warmth or tenderness. Left lower extremity wrapped due to open wound, active bleeding, no discharge. Neurologic:  Neurovascularly intact without any focal sensory deficits. Cranial nerves: II-XII intact, grossly non-focal.  Psychiatric: Alert and oriented to self, location, month, mentation seems improved from yesterday. Capillary Refill: Brisk,< 3 seconds       Discharge Medications:-      Medication List        ASK your doctor about these medications      acetaminophen 325 MG tablet  Commonly known as: TYLENOL     bumetanide 1 MG tablet  Commonly known as: BUMEX  Take 0.5 tablets by mouth daily     chlorhexidine 4 % external liquid  Commonly known as: HIBICLENS  Wash hair with small amount twice weekly.      diphenhydrAMINE-APAP (sleep)  MG tablet  Commonly known as: TYLENOL PM EXTRA STRENGTH     * hydrocortisone 20 MG tablet  Commonly known as: CORTEF     * hydrocortisone 5 MG tablet  Commonly known as: CORTEF     levothyroxine 125 MCG tablet  Commonly known as: SYNTHROID  TAKE 1 TABLET BY MOUTH DAILY     miconazole 2 % powder  Commonly known as: MICOTIN  Apply topically 2 times daily PRN for excoriation     oxybutynin 15 MG extended release tablet  Commonly known as: DITROPAN XL  TAKE 1 TABLET BY MOUTH DAILY     pantoprazole 40 MG tablet  Commonly known as: PROTONIX  TAKE 1 TABLET BY MOUTH EVERY NIGHT     rivaroxaban 15 MG Tabs tablet  Commonly known as: Xarelto  TAKE 1 TABLET BY MOUTH DAILY WITH SUPPER     simvastatin 20 MG tablet  Commonly known as: ZOCOR  Take 1 tablet by mouth nightly     terbinafine 250 MG tablet  Commonly known as: LamISIL  Take 1 tablet by mouth daily           * This list has 2 medication(s) that are the same as other medications prescribed for you. Read the directions carefully, and ask your doctor or other care provider to review them with you.                    Labs :-  Recent Results (from the past 72 hour(s))   CBC    Collection Time: 02/01/23  5:35 AM   Result Value Ref Range    WBC 6.8 4.8 - 10.8 thou/mm3    RBC 2.29 (L) 4.20 - 5.40 mill/mm3    Hemoglobin 6.9 (LL) 12.0 - 16.0 gm/dl    Hematocrit 22.1 (L) 37.0 - 47.0 %    MCV 96.5 81.0 - 99.0 fL    MCH 30.1 26.0 - 33.0 pg    MCHC 31.2 (L) 32.2 - 35.5 gm/dl    RDW-CV 16.8 (H) 11.5 - 14.5 %    RDW-SD 59.2 (H) 35.0 - 45.0 fL    Platelets 055 272 - 699 thou/mm3    MPV 9.4 9.4 - 12.4 fL    Pathologist Review ALP    Phosphorus    Collection Time: 02/01/23  5:35 AM   Result Value Ref Range    Phosphorus 2.8 2.4 - 4.7 mg/dL   Scan of Blood Smear    Collection Time: 02/01/23  5:35 AM   Result Value Ref Range    SCAN OF BLOOD SMEAR see below    Vitamin B12 & Folate    Collection Time: 02/01/23  5:35 AM   Result Value Ref Range    Vitamin B-12 708 211 - 911 pg/mL    Folate > 20.0 4.8 - 24.2 ng/mL   Hemoglobin and Hematocrit    Collection Time: 02/01/23  6:50 AM   Result Value Ref Range    Hemoglobin 6.8 (LL) 12.0 - 16.0 gm/dl    Hematocrit 21.9 (L) 37.0 - 47.0 %   TYPE AND SCREEN    Collection Time: 02/01/23 10:35 AM   Result Value Ref Range    ABO B     Rh Factor POS     Antibody Screen NEG    Hemoglobin and Hematocrit    Collection Time: 02/01/23  4:10 PM   Result Value Ref Range    Hemoglobin 8.4 (L) 12.0 - 16.0 gm/dl    Hematocrit 26.7 (L) 37.0 - 47.0 %   CBC    Collection Time: 02/02/23  5:08 AM   Result Value Ref Range    WBC 7.2 4.8 - 10.8 thou/mm3    RBC 2.79 (L) 4.20 - 5.40 mill/mm3    Hemoglobin 8.1 (L) 12.0 - 16.0 gm/dl    Hematocrit 25.6 (L) 37.0 - 47.0 %    MCV 91.8 81.0 - 99.0 fL    MCH 29.0 26.0 - 33.0 pg    MCHC 31.6 (L) 32.2 - 35.5 gm/dl    RDW-CV 17.9 (H) 11.5 - 14.5 %    RDW-SD 60.1 (H) 35.0 - 45.0 fL    Platelets 285 258 - 437 thou/mm3    MPV 9.0 (L) 9.4 - 12.4 fL   Basic Metabolic Panel    Collection Time: 02/02/23  5:08 AM   Result Value Ref Range    Sodium 146 (H) 135 - 145 meq/L    Potassium 3.5 3.5 - 5.2 meq/L    Chloride 112 (H) 98 - 111 meq/L    CO2 27 23 - 33 meq/L    Glucose 83 70 - 108 mg/dL    BUN 6 (L) 7 - 22 mg/dL    Creatinine 0.5 0.4 - 1.2 mg/dL    Calcium 8.2 (L) 8.5 - 10.5 mg/dL   Anion Gap    Collection Time: 02/02/23  5:08 AM   Result Value Ref Range    Anion Gap 7.0 (L) 8.0 - 16.0 meq/L   Glomerular Filtration Rate, Estimated    Collection Time: 02/02/23  5:08 AM   Result Value Ref Range    Est, Glom Filt Rate >60 >60 ml/min/1.73m2   Hemoglobin and Hematocrit    Collection Time: 02/03/23 11:08 AM   Result Value Ref Range    Hemoglobin 8.3 (L) 12.0 - 16.0 gm/dl    Hematocrit 25.8 (L) 37.0 - 47.0 %   Basic Metabolic Panel    Collection Time: 02/03/23 11:08 AM   Result Value Ref Range    Sodium 149 (H) 135 - 145 meq/L    Potassium 3.4 (L) 3.5 - 5.2 meq/L    Chloride 113 (H) 98 - 111 meq/L    CO2 26 23 - 33 meq/L    Glucose 86 70 - 108 mg/dL    BUN 5 (L) 7 - 22 mg/dL    Creatinine 0.6 0.4 - 1.2 mg/dL    Calcium 8.1 (L) 8.5 - 10.5 mg/dL   Anion Gap    Collection Time: 02/03/23 11:08 AM   Result Value Ref Range    Anion Gap 10.0 8.0 - 16.0 meq/L   Glomerular Filtration Rate, Estimated    Collection Time: 02/03/23 11:08 AM   Result Value Ref Range    Est, Glom Filt Rate >60 >60 ml/min/1.73m2        Microbiology:    Blood culture #1:   Lab Results   Component Value Date/Time    [facility]  01/21/2023 09:01 PM     No growth 24 hours. No growth 48 hours. No growth at 5 days       Blood culture #2:No results found for: BLOODCULT2    Organism:    Lab Results   Component Value Date/Time    LABGRAM  02/16/2022 04:00 PM     No segmented neutrophils observed. Rare epithelial cells observed. No organisms observed.        MRSA culture only:No results found for: [facility]    Urine culture:   Lab Results   Component Value Date/Time    LABURIN No growth-preliminary  No growth   02/09/2017 12:35 PM Lab Results   Component Value Date/Time    ORG Staphylococcus aureus 12/17/2018 04:04 PM        Respiratory culture: No results found for: CULTRESP    Aerobic and Anaerobic :  Lab Results   Component Value Date/Time    LABAERO No Acinetobacter species isolated. 01/04/2023 10:00 PM     Lab Results   Component Value Date/Time    LABANAE  12/01/2021 12:00 PM     No anaerobes isolated- preliminary Culture yielded light growth of gram positive bacilli most consistent with Cutibacterium species. Clinical correlation required. Urinalysis:      Lab Results   Component Value Date/Time    NITRU NEGATIVE 01/22/2023 12:02 AM    WBCUA 0-2 01/22/2023 12:02 AM    WBCUA 2-4 02/22/2012 01:00 PM    BACTERIA NONE SEEN 01/22/2023 12:02 AM    RBCUA 5-10 01/22/2023 12:02 AM    BLOODU MODERATE 01/22/2023 12:02 AM    SPECGRAV 1.025 09/18/2020 12:00 PM    GLUCOSEU NEGATIVE 01/22/2023 12:02 AM       Radiology:-  XR ELBOW LEFT (MIN 3 VIEWS)    Result Date: 1/21/2023  3 view right elbow Comparison: None Findings: No acute fractures. Normal alignment. No significant arthritic change or erosions. No joint effusion. No radiopaque foreign body. Diffuse edema. No acute bony abnormality. This document has been electronically signed by: Ollie Viveros MD on 01/21/2023 10:28 PM    XR RADIUS ULNA LEFT (2 VIEWS)    Result Date: 1/21/2023  2 view left forearm Comparison: None Findings: No fractures or dislocations. No joint effusion. No significant arthritic change. No radiopaque foreign body. Extensive soft tissue edema. No acute bony abnormality. This document has been electronically signed by: Ollie Viveros MD on 01/21/2023 11:51 PM    XR HAND LEFT (MIN 3 VIEWS)    Result Date: 1/21/2023  3 view left hand Comparison: None Findings: Osteopenia. No acute fractures. No significant loss of joint space or osteophytes. No erosions. No radiopaque foreign body. Diffuse edema. No acute osseous abnormality.  This document has been electronically signed by: Basil Tena MD on 01/21/2023 10:30 PM    CT HUMERUS LEFT W CONTRAST    Result Date: 1/22/2023  CT left upper extremity with contrast COMPARISON: No prior FINDINGS: The lateral portion of the left upper extremity is partly off the field-of-view. The patient is status post left shoulder hemiarthroplasty and total left hip replacement. Metallic streak artifact limits the evaluation of the adjacent structures. There is diffuse soft tissue swelling, skin thickening and subcutaneous edema/inflammation of the left upper arm, left forearm and left hand. No abscess is seen. No fracture or dislocation is seen. Lateral portion of the left upper extremity is partly off the field-of-view. Status post left shoulder hemiarthroplasty and total left hip replacement. Metallic streak artifact limits the evaluation of the adjacent structures. Diffuse soft tissue swelling, skin thickening and subcutaneous edema/inflammation of the left upper arm, left forearm and left hand. Correlate clinically for cellulitis. No abscess is seen. This document has been electronically signed by: Pramod Giron MD on 01/22/2023 06:42 AM All CTs at this facility use dose modulation techniques and iterative reconstructions, and/or weight-based dosing when appropriate to reduce radiation to a low as reasonably achievable. CT RADIUS ULNA LEFT W CONTRAST    Result Date: 1/22/2023  CT left upper extremity with contrast COMPARISON: No prior FINDINGS: The lateral portion of the left upper extremity is partly off the field-of-view. The patient is status post left shoulder hemiarthroplasty and total left hip replacement. Metallic streak artifact limits the evaluation of the adjacent structures. There is diffuse soft tissue swelling, skin thickening and subcutaneous edema/inflammation of the left upper arm, left forearm and left hand. No abscess is seen. No fracture or dislocation is seen.      Lateral portion of the left upper extremity is partly off the field-of-view. Status post left shoulder hemiarthroplasty and total left hip replacement. Metallic streak artifact limits the evaluation of the adjacent structures. Diffuse soft tissue swelling, skin thickening and subcutaneous edema/inflammation of the left upper arm, left forearm and left hand. Correlate clinically for cellulitis. No abscess is seen. This document has been electronically signed by: Alvin Briceño MD on 01/22/2023 06:26 AM All CTs at this facility use dose modulation techniques and iterative reconstructions, and/or weight-based dosing when appropriate to reduce radiation to a low as reasonably achievable. CT HAND LEFT W CONTRAST    Result Date: 1/22/2023  CT left upper extremity with contrast COMPARISON: No prior FINDINGS: The lateral portion of the left upper extremity is partly off the field-of-view. The patient is status post left shoulder hemiarthroplasty and total left hip replacement. Metallic streak artifact limits the evaluation of the adjacent structures. There is diffuse soft tissue swelling, skin thickening and subcutaneous edema/inflammation of the left upper arm, left forearm and left hand. No abscess is seen. No fracture or dislocation is seen. Lateral portion of the left upper extremity is partly off the field-of-view. Status post left shoulder hemiarthroplasty and total left hip replacement. Metallic streak artifact limits the evaluation of the adjacent structures. Diffuse soft tissue swelling, skin thickening and subcutaneous edema/inflammation of the left upper arm, left forearm and left hand. Correlate clinically for cellulitis. No abscess is seen. This document has been electronically signed by: Alvin Briceño MD on 01/22/2023 06:43 AM All CTs at this facility use dose modulation techniques and iterative reconstructions, and/or weight-based dosing when appropriate to reduce radiation to a low as reasonably achievable.     XR CHEST PORTABLE    Result Date: 1/21/2023  1 view chest x-ray Comparison: CR/SR - XR CHEST PORTABLE - 01/04/2023 04:26 PM EST Findings: Decrease in pulmonary interstitial densities compared to x-ray 1/4/2023. Right internal iliac catheter terminates in the right atrium. Status post bilateral hip replacement. No acute fractures. Decrease in pulmonary interstitial densities compared to x-ray 1/4/2023.  This document has been electronically signed by: Jaimie Srivastava MD on 01/21/2023 11:52 PM       Follow-up scheduled after discharge :-    in the next few days with Yamilet Sims MD      Consultations during this hospital stay:-  [] NONE [] Cardiology  [x] Nephrology  [] Hemo onco   [] GI   [x] ID  [] Endocrine  [] Pulm    [] Neuro    [] Psych   [] Urology  [] ENT   [] G SURGERY   []Ortho    []CV surg    [] Palliative  [] Hospice [] Pain management   [x]    []TCU   [x] PT/OT  OTHERS:- SLP    Disposition: SNF  Condition at Discharge: Stable    Time Spent:- 45 minutes    Electronically signed by Shermon Harada, PA-C on 2/3/23 at 1:44 PM EST   Discharging Hospitalist

## 2023-02-03 NOTE — PLAN OF CARE
Problem: Discharge Planning  Goal: Discharge to home or other facility with appropriate resources  Outcome: Progressing  Flowsheets (Taken 2/2/2023 5928)  Discharge to home or other facility with appropriate resources:   Identify barriers to discharge with patient and caregiver   Arrange for needed discharge resources and transportation as appropriate   Identify discharge learning needs (meds, wound care, etc)   Refer to discharge planning if patient needs post-hospital services based on physician order or complex needs related to functional status, cognitive ability or social support system   Discharge plan is waiting on precert. Problem: Safety - Adult  Goal: Free from fall injury  Outcome: Progressing   Patient remains free from falls. Problem: Pain  Goal: Verbalizes/displays adequate comfort level or baseline comfort level  Outcome: Progressing   Pain Assessment: 0-10  Pain Level: 0   Patient's Stated Pain Goal: 1   Is pain goal met at this time? Yes     Non-Pharmaceutical Pain Intervention(s): Rest, Repositioned    Problem: Nutrition Deficit:  Goal: Optimize nutritional status  Outcome: Progressing   Patients appetite fair. Continuing to encourage fluids, patient does not like thickened liquids. Problem: Skin/Tissue Integrity  Goal: Absence of new skin breakdown  Description: 1. Monitor for areas of redness and/or skin breakdown  2. Assess vascular access sites hourly  3. Every 4-6 hours minimum:  Change oxygen saturation probe site  4. Every 4-6 hours:  If on nasal continuous positive airway pressure, respiratory therapy assess nares and determine need for appliance change or resting period. Outcome: Progressing   Patient assisted with turning and pillow support. Problem: Skin/Tissue Integrity  Goal: Isolation precautions  Description: Isolation precautions  Outcome: Progressing   Contact isolation precautions maintained.     Problem: Chronic Conditions and Co-morbidities  Goal: Patient's chronic conditions and co-morbidity symptoms are monitored and maintained or improved  Outcome: Progressing  Flowsheets (Taken 2/2/2023 9595)  Care Plan - Patient's Chronic Conditions and Co-Morbidity Symptoms are Monitored and Maintained or Improved:   Monitor and assess patient's chronic conditions and comorbid symptoms for stability, deterioration, or improvement   Collaborate with multidisciplinary team to address chronic and comorbid conditions and prevent exacerbation or deterioration     Care plan reviewed with patient. Patient verbalize understanding of the plan of care and contribute to goal setting.

## 2023-02-03 NOTE — CARE COORDINATION
2/3/23, 3:10 PM EST    DISCHARGE ON GOING EVALUATION    Bryan Gtz day: 12  Location: -15/015-A Reason for admit: Hypokalemia [E87.6]  Cellulitis [L03.90]  Cellulitis of left upper extremity [L03.114]   Barriers to Discharge: PICC line placed, PT/OT, SS, Dietician, IV fluids, Venofer complete, Xarelto, prn Tylenol and Zofran, Magnesium and Potassium replacement protocol, moist diet with honey thickened liquids, ace wrap to bilateral lower extremities, SCD's, incentive spirometry, up with assistance. PCP: Gilberto Engle MD  Readmission Risk Score: 26.6%  Patient Goals/Plan/Treatment Preferences: Pre-cert pending for [facility].

## 2023-02-03 NOTE — PROGRESS NOTES
Hospitalist Progress Note      Patient:  Mary Beth Srivastava    Unit/Bed:5K-15/015-A  YOB: 1950  MRN: [id number]   Acct: [de-identified]   PCP: Kaitlin Mcarthur MD  Date of Admission: 1/21/2023    Assessment/Plan:    Left upper extremity cellulitis, possibly related to recent IV infiltration: Patient had an IV infiltrated on 1/15 per provider's note and was receiving Unasyn during that stay. X-ray of left hand, elbow, radius and ulna on 1/21 negative   CT left hand, radius, ulna, and humerus on 1/22 revealed diffuse soft tissue swelling, skin thickening and subcutaneous edema / inflammation of left upper arm, left forearm and left hand without abscess. Blood cultures negative to date   Completed 7 days of Zyvox per ID recommendations - signed off   Was additionally treated with Zosyn while IP   PT and OT following. Chronic normocytic hypochromic anemia - likely Iron deficiency anemia, improving: Hgb stable in 8 range after 1 unit PRBC transfusion yesterday. Iron studies from 1/24 reveal iron of 38, TIBC of 137, transferrin of 112, and ferritin 395. Folate and B12 WNL. No acute bleeding. Completed Venofer x3 doses daily, transition to PO ferrous sulfate. Continue to transfuse if Hgb <7. Repeat CBC Q12 hours. B/l Lower extremity edema: Continue bilateral leg wrapping. Patient states this is her baseline. Keep elevated. Electrolyte abnormalities:  Hypernatremia, worsening: Sodium increased to 149 from 146.    Recheck sodium D0igfzf x3  Continue to hold Bumex  Begin D5W and reassess BMP  1/31- sodium stable at 142- nephro signed off- recommend 60 ounces water per day   2/3: consider reconsult to Nephro pending Na checks  Encourage oral intake - pt has not been drinking   Hypokalemia: K 3.4 today  Hypophosphatemia, resolved- 2.8 on 2/1  Hypochloridemia: increased to 113 from 112  BUN decreased: decreased to 5 from 6  Maintain telemetry  Constipation, resolved: Begin Colace. Continue to monitor. Patient denies abdominal pain. Dysphagia: SLP recommended minced and moist moderately thick liquids per 1/23  Paroxysmal A-fib on [address]; rate controlled: continue home medications, continue tele. Okay to resume Xarelto, no plan for surgical intervention. Leukocytosis, resolved: Secondary to #1. History of adrenal insufficiency without crisis: Likely contributing to #1 skin fragility. Continue home cortef therapy. BP has been stable. Hypothyroidism: TSH 0.047 and free T4 1.31 on 1/21. Continue home medications  Severe protein calorie malnutrition: Encourage oral intake; dietitian   Physical debility with deconditioning: Pt came from SNF. Consult PT/OT. Disposition: SNF placement    Chief Complaint: Left arm swelling    Initial H and P:-    \"Shirley Gordon is a 71 y.o. female with PMHx of paroxysmal A-fib, HTN, HFpEF, hypothyroidism, severe protein calorie malnutrition, and physical debility with deconditioning who presented to [facility] with chief complaint of left upper extremity swelling and pain. Patient was recently discharged on 1/17/23 for submandibular cellulitis and discharged to SNF. She endorses having no recollection of injuring her left arm, but stated that the arm started to swell just 3 days ago. Around that time she had stopped her outpatient course of antibiotics. At first when the swelling started she stated that she did not think much about it as it was not bothering her. Then today her arm was quite tender to touch and just throbbed all day, hence the admission to [facility]. Currently she is not having any pain, but that was after administration of Morphine while in the ED. She denies any dizziness, chest pain or shortness of breath. She denies any sick contacts. She does not endorse any tingling or numbness that is unusual for her. \"    Subjective (past 24 hours):   2/3: Patient sitting upright in chair. She states that her arm is feeling better since yesterday.  She notes intermittent mild pruritis and pins and needles sensation that has improved from yesterday. She notes she is more fatigued today than yesterday but states she slept well last night. She denies chest pain and shortness of breath. She notes 2-3 bowel movements yesterday and denies abdominal pain or diarrhea. Awaiting pre-cert. Past medical history, family history, social history and allergies reviewed again and is unchanged since admission. ROS (All review of systems completed. Pertinent positives noted.  Otherwise All other systems reviewed and negative.)     Medications:  Reviewed    Infusion Medications    sodium chloride      dextrose      sodium chloride      sodium chloride       Scheduled Medications    miconazole   Topical BID    lidocaine 1 % injection  5 mL IntraDERmal Once    sodium chloride flush  5-40 mL IntraVENous 2 times per day    docusate sodium  100 mg Oral Daily    lactobacillus  1 capsule Oral Daily with breakfast    rivaroxaban  15 mg Oral Dinner    [Held by provider] bumetanide  0.5 mg Oral Daily    hydrocortisone  20 mg Oral QAM    hydrocortisone  10 mg Oral Nightly    levothyroxine  125 mcg Oral Daily    oxybutynin  15 mg Oral Daily    pantoprazole  40 mg Oral QAM AC    atorvastatin  20 mg Oral Daily    terbinafine  250 mg Oral Daily    sodium chloride flush  5-40 mL IntraVENous 2 times per day     PRN Meds: sodium chloride flush, sodium chloride, sodium chloride, sodium chloride flush, sodium chloride, ondansetron **OR** ondansetron, polyethylene glycol, acetaminophen **OR** acetaminophen, potassium chloride **OR** potassium alternative oral replacement **OR** potassium chloride, magnesium sulfate      Intake/Output Summary (Last 24 hours) at 2/3/2023 1356  Last data filed at 2/3/2023 5981  Gross per 24 hour   Intake 40 ml   Output 1050 ml   Net -1010 ml       Diet:  ADULT ORAL NUTRITION SUPPLEMENT; Dinner; Frozen Oral Supplement  ADULT ORAL NUTRITION SUPPLEMENT; Breakfast, Lunch; Wound Healing Oral Supplement  ADULT DIET; Dysphagia - Minced and Moist; Moderately Thick (Honey)    Physical Exam:  /63   Pulse 58   Temp 97.6 °F (36.4 °C) (Oral)   Resp 18   Ht 5' 3\" (1.6 m)   Wt 184 lb 4.9 oz (83.6 kg)   SpO2 99%   BMI 32.65 kg/m²   General appearance: Speaking softly throughout examination. No apparent distress, appears stated age and cooperative. HEENT: Pupils equal, round, and reactive to light. Conjunctivae/corneas clear. Neck: Supple, with full range of motion. No jugular venous distention. Trachea midline. Respiratory:  Normal respiratory effort. Clear to auscultation, bilaterally without Rales/Wheezes/Rhonchi. Cardiovascular: Regular rate and rhythm with normal S1/S2 without murmurs, rubs or gallops. Abdomen: Soft, non-tender, non-distended with normal bowel sounds. Musculoskeletal: Diminished active and passive range of motion of bilateral upper extremities. Active range of motion of left upper extremity improved from yesterday. Left upper extremity strength diminished. Skin: Diffuse bruising throughout right upper extremity and bilateral lower extremities. Patient states this is her baseline. Previous area of cellulitis on left forearm is no longer warm, is without erythema or tenderness. Sporadic hematomas of left upper extremity. +1 pitting edema of ble lower extremities, no erythema, warmth or tenderness. Neurologic:  Neurovascularly intact without any focal sensory deficits. Cranial nerves: II-XII intact, grossly non-focal.  Psychiatric: Alert and oriented, thought content appropriate, normal insight.    Capillary Refill: Brisk,< 3 seconds   Peripheral Pulses: +2 palpable, equal bilaterally     Labs:   Recent Labs     02/01/23  0535 02/01/23  0650 02/01/23  1610 02/02/23  0508 02/03/23  1108   WBC 6.8  --   --  7.2  --    HGB 6.9*   < > 8.4* 8.1* 8.3*   HCT 22.1*   < > 26.7* 25.6* 25.8*     --   --  227  --     < > = values in this interval not displayed. Recent Labs     02/01/23  0535 02/02/23  0508 02/03/23  1108   NA  --  146* 149*   K  --  3.5 3.4*   CL  --  112* 113*   CO2  --  27 26   BUN  --  6* 5*   CREATININE  --  0.5 0.6   CALCIUM  --  8.2* 8.1*   PHOS 2.8  --   --      No results for input(s): AST, ALT, BILIDIR, BILITOT, ALKPHOS in the last 72 hours. No results for input(s): INR in the last 72 hours. No results for input(s): [place] in the last 72 hours. Microbiology:    Blood culture #1:   Lab Results   Component Value Date/Time    [facility]  01/21/2023 09:01 PM     No growth 24 hours. No growth 48 hours. No growth at 5 days       Blood culture #2:No results found for: BLOODCULT2    Organism:  Lab Results   Component Value Date/Time    ORG Staphylococcus aureus 12/17/2018 04:04 PM         Lab Results   Component Value Date/Time    LABGRAM  02/16/2022 04:00 PM     No segmented neutrophils observed. Rare epithelial cells observed. No organisms observed. MRSA culture only:No results found for: [facility]    Urine culture:   Lab Results   Component Value Date/Time    LABURIN No growth-preliminary  No growth   02/09/2017 12:35 PM       Respiratory culture: No results found for: CULTRESP    Aerobic and Anaerobic :  Lab Results   Component Value Date/Time    LABAERO No Acinetobacter species isolated. 01/04/2023 10:00 PM     Lab Results   Component Value Date/Time    LABANAE  12/01/2021 12:00 PM     No anaerobes isolated- preliminary Culture yielded light growth of gram positive bacilli most consistent with Cutibacterium species. Clinical correlation required.        Urinalysis:      Lab Results   Component Value Date/Time    NITRU NEGATIVE 01/22/2023 12:02 AM    WBCUA 0-2 01/22/2023 12:02 AM    WBCUA 2-4 02/22/2012 01:00 PM    BACTERIA NONE SEEN 01/22/2023 12:02 AM    RBCUA 5-10 01/22/2023 12:02 AM    BLOODU MODERATE 01/22/2023 12:02 AM    SPECGRAV 1.025 09/18/2020 12:00 PM    GLUCOSEU NEGATIVE 01/22/2023 12:02 AM       Radiology:  CT HAND LEFT W CONTRAST   Final Result      Lateral portion of the left upper extremity is partly off the    field-of-view. Status post left shoulder hemiarthroplasty and total left hip replacement. Metallic streak artifact limits the evaluation of the adjacent structures. Diffuse soft tissue swelling, skin thickening and subcutaneous    edema/inflammation of the left upper arm, left forearm and left hand. Correlate clinically for cellulitis. No abscess is seen. This document has been electronically signed by: Sonam Enriquez MD on    01/22/2023 06:43 AM      All CTs at this facility use dose modulation techniques and iterative    reconstructions, and/or weight-based dosing   when appropriate to reduce radiation to a low as reasonably achievable. CT RADIUS ULNA LEFT W CONTRAST   Final Result      Lateral portion of the left upper extremity is partly off the    field-of-view. Status post left shoulder hemiarthroplasty and total left hip replacement. Metallic streak artifact limits the evaluation of the adjacent structures. Diffuse soft tissue swelling, skin thickening and subcutaneous    edema/inflammation of the left upper arm, left forearm and left hand. Correlate clinically for cellulitis. No abscess is seen. This document has been electronically signed by: Sonam Enriquez MD on    01/22/2023 06:26 AM      All CTs at this facility use dose modulation techniques and iterative    reconstructions, and/or weight-based dosing   when appropriate to reduce radiation to a low as reasonably achievable. CT HUMERUS LEFT W CONTRAST   Final Result      Lateral portion of the left upper extremity is partly off the    field-of-view. Status post left shoulder hemiarthroplasty and total left hip replacement. Metallic streak artifact limits the evaluation of the adjacent structures.       Diffuse soft tissue swelling, skin thickening and subcutaneous    edema/inflammation of the left upper arm, left forearm and left hand. Correlate clinically for cellulitis. No abscess is seen. This document has been electronically signed by: Nadja Saul MD on    01/22/2023 06:42 AM      All CTs at this facility use dose modulation techniques and iterative    reconstructions, and/or weight-based dosing   when appropriate to reduce radiation to a low as reasonably achievable. XR CHEST PORTABLE   Final Result   Decrease in pulmonary interstitial densities compared to x-ray 1/4/2023. This document has been electronically signed by: Samir De León MD on    01/21/2023 11:52 PM      XR RADIUS ULNA LEFT (2 VIEWS)   Final Result   No acute bony abnormality. This document has been electronically signed by: Samir De León MD on    01/21/2023 11:51 PM      XR HAND LEFT (MIN 3 VIEWS)   Final Result   No acute osseous abnormality. This document has been electronically signed by: Samir De León MD on    01/21/2023 10:30 PM      XR ELBOW LEFT (MIN 3 VIEWS)   Final Result   No acute bony abnormality. This document has been electronically signed by: Samir De León MD on    01/21/2023 10:28 PM        XR ELBOW LEFT (MIN 3 VIEWS)    Result Date: 1/21/2023  3 view right elbow Comparison: None Findings: No acute fractures. Normal alignment. No significant arthritic change or erosions. No joint effusion. No radiopaque foreign body. Diffuse edema. No acute bony abnormality. This document has been electronically signed by: Samir De León MD on 01/21/2023 10:28 PM    XR RADIUS ULNA LEFT (2 VIEWS)    Result Date: 1/21/2023  2 view left forearm Comparison: None Findings: No fractures or dislocations. No joint effusion. No significant arthritic change. No radiopaque foreign body. Extensive soft tissue edema. No acute bony abnormality.  This document has been electronically signed by: Samir De León MD on 01/21/2023 11:51 PM    XR HAND LEFT (MIN 3 VIEWS)    Result Date: 1/21/2023  3 view left hand Comparison: None Findings: Osteopenia. No acute fractures. No significant loss of joint space or osteophytes. No erosions. No radiopaque foreign body. Diffuse edema. No acute osseous abnormality. This document has been electronically signed by: Fabricio Gill MD on 01/21/2023 10:30 PM    CT HUMERUS LEFT W CONTRAST    Result Date: 1/22/2023  CT left upper extremity with contrast COMPARISON: No prior FINDINGS: The lateral portion of the left upper extremity is partly off the field-of-view. The patient is status post left shoulder hemiarthroplasty and total left hip replacement. Metallic streak artifact limits the evaluation of the adjacent structures. There is diffuse soft tissue swelling, skin thickening and subcutaneous edema/inflammation of the left upper arm, left forearm and left hand. No abscess is seen. No fracture or dislocation is seen. Lateral portion of the left upper extremity is partly off the field-of-view. Status post left shoulder hemiarthroplasty and total left hip replacement. Metallic streak artifact limits the evaluation of the adjacent structures. Diffuse soft tissue swelling, skin thickening and subcutaneous edema/inflammation of the left upper arm, left forearm and left hand. Correlate clinically for cellulitis. No abscess is seen. This document has been electronically signed by: Jennifer Navarro MD on 01/22/2023 06:42 AM All CTs at this facility use dose modulation techniques and iterative reconstructions, and/or weight-based dosing when appropriate to reduce radiation to a low as reasonably achievable. CT RADIUS ULNA LEFT W CONTRAST    Result Date: 1/22/2023  CT left upper extremity with contrast COMPARISON: No prior FINDINGS: The lateral portion of the left upper extremity is partly off the field-of-view. The patient is status post left shoulder hemiarthroplasty and total left hip replacement. Metallic streak artifact limits the evaluation of the adjacent structures.  There is diffuse soft tissue swelling, skin thickening and subcutaneous edema/inflammation of the left upper arm, left forearm and left hand. No abscess is seen. No fracture or dislocation is seen. Lateral portion of the left upper extremity is partly off the field-of-view. Status post left shoulder hemiarthroplasty and total left hip replacement. Metallic streak artifact limits the evaluation of the adjacent structures. Diffuse soft tissue swelling, skin thickening and subcutaneous edema/inflammation of the left upper arm, left forearm and left hand. Correlate clinically for cellulitis. No abscess is seen. This document has been electronically signed by: Katey Michele MD on 01/22/2023 06:26 AM All CTs at this facility use dose modulation techniques and iterative reconstructions, and/or weight-based dosing when appropriate to reduce radiation to a low as reasonably achievable. CT HAND LEFT W CONTRAST    Result Date: 1/22/2023  CT left upper extremity with contrast COMPARISON: No prior FINDINGS: The lateral portion of the left upper extremity is partly off the field-of-view. The patient is status post left shoulder hemiarthroplasty and total left hip replacement. Metallic streak artifact limits the evaluation of the adjacent structures. There is diffuse soft tissue swelling, skin thickening and subcutaneous edema/inflammation of the left upper arm, left forearm and left hand. No abscess is seen. No fracture or dislocation is seen. Lateral portion of the left upper extremity is partly off the field-of-view. Status post left shoulder hemiarthroplasty and total left hip replacement. Metallic streak artifact limits the evaluation of the adjacent structures. Diffuse soft tissue swelling, skin thickening and subcutaneous edema/inflammation of the left upper arm, left forearm and left hand. Correlate clinically for cellulitis. No abscess is seen.  This document has been electronically signed by: Katey Michele MD on 01/22/2023 06:43 AM All CTs at this facility use dose modulation techniques and iterative reconstructions, and/or weight-based dosing when appropriate to reduce radiation to a low as reasonably achievable. XR CHEST PORTABLE    Result Date: 1/21/2023  1 view chest x-ray Comparison: CR/SR - XR CHEST PORTABLE - 01/04/2023 04:26 PM EST Findings: Decrease in pulmonary interstitial densities compared to x-ray 1/4/2023. Right internal iliac catheter terminates in the right atrium. Status post bilateral hip replacement. No acute fractures. Decrease in pulmonary interstitial densities compared to x-ray 1/4/2023.  This document has been electronically signed by: Monalisa Parker MD on 01/21/2023 11:52 PM      Electronically signed by Sherin Cartagena PA-C on 2/3/2023 at 1:55 PM

## 2023-02-03 NOTE — CARE COORDINATION
2/3/23, 9:23 AM EST    DISCHARGE PLANNING EVALUATION    Call to [facility] with [facility], precert not back yet, she will check on this this morning and update SW when precert is back. 10:47 AM  Call from pt's , provided update on waiting on precert.

## 2023-02-03 NOTE — PROGRESS NOTES
[facility] 5K  Occupational Therapy  Daily Note  Time:   Time In: 922  Time Out: 1014  Timed Code Treatment Minutes: 45 Minutes  Minutes: 38          Date: 2/3/2023  Patient Name: Yen Melgar,   Gender: female      Room: [facility]15/015-A  MRN: [id number]  : 1950  (71 y.o.)  Referring Practitioner: Raulito Carpio PA-C  Diagnosis: cellulitis  Additional Pertinent Hx: per chart review; Yen Melgar is a 71 y.o. female with PMHx of paroxysmal A-fib, HTN, HFpEF, hypothyroidism, severe protein calorie malnutrition, and physical debility with deconditioning who presented to [facility] with chief complaint of left upper extremity swelling and pain. Patient was recently discharged on 23 for submandibular cellulitis and discharged to SNF. She endorses having no recollection of injuring her left arm, but stated that the arm started to swell just 3 days ago. Around that time she had stopped her outpatient course of antibiotics. At first when the swelling started she stated that she did not think much about it as it was not bothering her. Then today her arm was quite tender to touch and just throbbed all day, hence the admission to [facility]. Currently she is not having any pain, but that was after administration of Morphine while in the ED. She denies any dizziness, chest pain or shortness of breath. She denies any sick contacts. She does not endorse any tingling or numbness that is unusual for her    Restrictions/Precautions:  Restrictions/Precautions: General Precautions, Fall Risk  Position Activity Restriction  Other position/activity restrictions: skin tears easily, hx CVA with L sided deficits     SUBJECTIVE: RN approved session, patient supine in bed upon OT arrival and agreeable to tx. Patient A & O x 3.      PAIN: 0/10:     Vitals: Vitals not assessed per clinical judgement, see nursing flowsheet    COGNITION: Slow Processing, Decreased Recall, Decreased Insight, and Decreased Problem Solving    ADL:   Footwear Management: Maximum Assistance. To don slipper socks seated EOB . BALANCE:  Sitting Balance:  Stand By Assistance. Standing Balance: Contact Guard Assistance, Minimal Assistance. In [facility] steady with flexed posture; tactile cues for upright posture with patient unable to maintain    BED MOBILITY:  Supine to Sit: Maximum Assistance cues for sequencing and tech going to her R    TRANSFERS:  Sit to Stand: Moderate Assistance. From slightly elevated EOB in ayo steady with [place] A to reach for bar with L hand      ADDITIONAL ACTIVITIES:  Patient completed 2 trials of static standing in ayo steady x approx 10 sec each with tactile cues for upright posture which fatigued patient. Seated rest breaks in ayo steady b/t stands. Static standing to increase activity tolerance for self care routine. Patient tolerated PROM to L shldr and elbow into flex/ext and abduction to increase L UE function and prevent contracture. Noted increased mobility and decreased spasticity this date with PROM and no c/o pain. Completed 1 set x 15 reps of UB exer with 1# free wt with R UE only with rest breaks to increase UB strength for functional transfers and self positioning/pressure relief. ASSESSMENT:     Activity Tolerance:  Patient tolerance of  treatment: fair. Sedentary, weakness, poor standing tolerance in ayo steady      Discharge Recommendations: Continue to assess pending progress, Subacute/skilled nursing facility, ECF with OT, 24 hour assistance or supervision, and Patient would benefit from continued OT at discharge  Equipment Recommendations: Equipment Needed: Yes  Mobility Devices: ADL Assistive Devices  Plan: Times Per Week: 3-5x  Times Per Day:  Once a day  Current Treatment Recommendations: Strengthening, ROM, Balance training, Functional mobility training, Endurance training, Neuromuscular re-education, Safety education & training, Equipment evaluation, education, & procurement, Patient/Caregiver education & training, Self-Care / ADL, Coordination training    Patient Education  Patient Education: Role of OT, Plan of Care, Home Exercise Program, Precautions, Hemibody Awareness/Attention, Reviewed Prior Education, Importance of Increasing Activity, and Assistive Device Safety    Goals  Short Term Goals  Time Frame for Short Term Goals: by discharge  Short Term Goal 1: patient will tolerate 2 min static standing with sit to stand and maintain upright posture with MIN A x 1. Short Term Goal 2: patient will participate in (B) UE AROM HEP and R UE strengthening to increase UB strength and function for functional transfers. Short Term Goal 3: patient will complete simple UB ADLs with MIN  A and edu in juan tech to compensate for L UE hemiparesis. Following session, patient left in safe position with all fall risk precautions in place.

## 2023-02-03 NOTE — PROGRESS NOTES
Order received for CVC removal per [facility], PAJENISE. Patient placed in bed. Transparent dressing removed. Skin accessed appears pink. Sutures removed, area cleaned with chloro prep, sterile gauze placed over vasoline gauze CVC removed with catheter intact pressure held with sterile gauze for 5 minutes. New transparent dressing applied. Educated the patient on remaining in bed for one hour, dressing stays on for 24 hours, signs and symptoms of infection and notify primary nurse if any blood or oozing. Julee Haynes RN notified.

## 2023-02-03 NOTE — PLAN OF CARE
Problem: Discharge Planning  Goal: Discharge to home or other facility with appropriate resources  2/3/2023 0001 by Todd Wang RN  Outcome: Progressing  Flowsheets (Taken 2/3/2023 0001)  Discharge to home or other facility with appropriate resources:   Identify barriers to discharge with patient and caregiver   Arrange for needed discharge resources and transportation as appropriate   Identify discharge learning needs (meds, wound care, etc)  Note: Patient to be discharged to [facility]. Problem: Safety - Adult  Goal: Free from fall injury  2/3/2023 0001 by Todd Wang RN  Outcome: Progressing  Flowsheets (Taken 2/3/2023 0001)  Free From Fall Injury: Instruct family/caregiver on patient safety  Note: Fall assessment completed. Patient using call light appropriately to call for assistance. Personal items within reach. Patient is also compliant with use of non-skid slippers. Problem: Pain  Goal: Verbalizes/displays adequate comfort level or baseline comfort level  2/3/2023 0001 by Todd Wang RN  Outcome: Progressing  Flowsheets (Taken 2/3/2023 0001)  Verbalizes/displays adequate comfort level or baseline comfort level:   Encourage patient to monitor pain and request assistance   Assess pain using appropriate pain scale   Administer analgesics based on type and severity of pain and evaluate response   Implement non-pharmacological measures as appropriate and evaluate response  Note: No pain stated this shift. Problem: Nutrition Deficit:  Goal: Optimize nutritional status  2/3/2023 0001 by Todd Wang RN  Outcome: Progressing  Flowsheets (Taken 2/3/2023 0001)  Nutrient intake appropriate for improving, restoring, or maintaining nutritional needs:   Assess nutritional status and recommend course of action   Recommend appropriate diets, oral nutritional supplements, and vitamin/mineral supplements     Problem: Skin/Tissue Integrity  Goal: Absence of new skin breakdown  Description: 1. Monitor for areas of redness and/or skin breakdown  2. Assess vascular access sites hourly  3. Every 4-6 hours minimum:  Change oxygen saturation probe site  4. Every 4-6 hours:  If on nasal continuous positive airway pressure, respiratory therapy assess nares and determine need for appliance change or resting period. 2/3/2023 0001 by Mahesh Schafer RN  Outcome: Progressing  Note: No skin breakdown this shift. Patient being assisted with turning. Patients states understanding of repositioning every two hours. Problem: Skin/Tissue Integrity  Goal: Isolation precautions  Description: Isolation precautions  2/3/2023 0001 by Mahesh Schafer RN  Outcome: Progressing     Problem: Chronic Conditions and Co-morbidities  Goal: Patient's chronic conditions and co-morbidity symptoms are monitored and maintained or improved  2/3/2023 0001 by Mahesh Schafer RN  Outcome: Progressing  Flowsheets (Taken 2/3/2023 0001)  Care Plan - Patient's Chronic Conditions and Co-Morbidity Symptoms are Monitored and Maintained or Improved:   Monitor and assess patient's chronic conditions and comorbid symptoms for stability, deterioration, or improvement   Collaborate with multidisciplinary team to address chronic and comorbid conditions and prevent exacerbation or deterioration   Care plan reviewed with patient. Patient verbalizes understanding of the plan of care and contribute to goal setting.

## 2023-02-03 NOTE — PROGRESS NOTES
Physician Progress Note      PATIENT:               Javier Costa  CSN #:                  [id number]  :                       1950  ADMIT DATE:       2023 7:56 PM  [address] [place] [place] DATE:  Stacy Valentine  PROVIDER #:        Gagandeep Beckett PA-C          QUERY TEXT:    Patient admitted with Cellulitis. Noted documentation of Severe protein   calorie malnutrition (dx) in H&P through Progress notes . In order to support   the diagnosis of Severe Malnutrition , please include additional clinical   indicators in your documentation. Or please document if the diagnosis of   Severe Malnutrition has been ruled out after further study. The medical record reflects the following:  Risk Factors: poor oral intake , on modified diet of nectar thickened liquids   and minced and moist  Clinical Indicators: At risk of Malnutrition per clinical dietician , needing   Physician support -----  Treatment: admission , clinical dietician eval , modified diet of nectar   thickened liquids and minced and moist , monitoring for signs /symptoms of   aspiration , monitor intake with nutritional goals    Thank Israel Ackerman RN, BSN, [address] [place] [address]   P: [phone]  F: [phone]  Options provided:  -- Severe Malnutrition present as evidenced by, Please document evidence.   -- Severe Malnutrition was ruled out  -- Other - I will add my own diagnosis  -- Disagree - Not applicable / Not valid  -- Disagree - Clinically unable to determine / Unknown  -- Refer to Clinical Documentation Reviewer    PROVIDER RESPONSE TEXT:    At risk for malnutrition per dietitian eval    Query created by: Huber Kraft on 2023 10:59 AM      Electronically signed by:  Gagandeep Beckett PA-C 2/3/2023 9:39 AM

## 2023-02-04 LAB
ANION GAP SERPL CALC-SCNC: 4 MEQ/L (ref 8–16)
BUN SERPL-MCNC: 3 MG/DL (ref 7–22)
CALCIUM SERPL-MCNC: 8.4 MG/DL (ref 8.5–10.5)
CHLORIDE SERPL-SCNC: 114 MEQ/L (ref 98–111)
CO2 SERPL-SCNC: 28 MEQ/L (ref 23–33)
CREAT SERPL-MCNC: 0.5 MG/DL (ref 0.4–1.2)
DEPRECATED RDW RBC AUTO: 60.6 FL (ref 35–45)
ERYTHROCYTE [DISTWIDTH] IN BLOOD BY AUTOMATED COUNT: 17.8 % (ref 11.5–14.5)
GFR SERPL CREATININE-BSD FRML MDRD: > 60 ML/MIN/1.73M2
GLUCOSE SERPL-MCNC: 102 MG/DL (ref 70–108)
HCT VFR BLD AUTO: 25.5 % (ref 37–47)
HGB BLD-MCNC: 7.9 GM/DL (ref 12–16)
MCH RBC QN AUTO: 28.7 PG (ref 26–33)
MCHC RBC AUTO-ENTMCNC: 31 GM/DL (ref 32.2–35.5)
MCV RBC AUTO: 92.7 FL (ref 81–99)
PLATELET # BLD AUTO: 215 THOU/MM3 (ref 130–400)
PMV BLD AUTO: 9.1 FL (ref 9.4–12.4)
POTASSIUM SERPL-SCNC: 3.6 MEQ/L (ref 3.5–5.2)
RBC # BLD AUTO: 2.75 MILL/MM3 (ref 4.2–5.4)
SODIUM SERPL-SCNC: 129 MEQ/L (ref 135–145)
SODIUM SERPL-SCNC: 140 MEQ/L (ref 135–145)
SODIUM SERPL-SCNC: 146 MEQ/L (ref 135–145)
WBC # BLD AUTO: 10.1 THOU/MM3 (ref 4.8–10.8)

## 2023-02-04 PROCEDURE — 85027 COMPLETE CBC AUTOMATED: CPT

## 2023-02-04 PROCEDURE — 92610 EVALUATE SWALLOWING FUNCTION: CPT

## 2023-02-04 PROCEDURE — 1200000000 HC SEMI PRIVATE

## 2023-02-04 PROCEDURE — 99232 SBSQ HOSP IP/OBS MODERATE 35: CPT | Performed by: PHYSICIAN ASSISTANT

## 2023-02-04 PROCEDURE — 6370000000 HC RX 637 (ALT 250 FOR IP)

## 2023-02-04 PROCEDURE — 84295 ASSAY OF SERUM SODIUM: CPT

## 2023-02-04 PROCEDURE — 36415 COLL VENOUS BLD VENIPUNCTURE: CPT

## 2023-02-04 PROCEDURE — 6370000000 HC RX 637 (ALT 250 FOR IP): Performed by: PHYSICIAN ASSISTANT

## 2023-02-04 PROCEDURE — 2500000003 HC RX 250 WO HCPCS: Performed by: PHYSICIAN ASSISTANT

## 2023-02-04 PROCEDURE — 2580000003 HC RX 258: Performed by: PHYSICIAN ASSISTANT

## 2023-02-04 PROCEDURE — 80048 BASIC METABOLIC PNL TOTAL CA: CPT

## 2023-02-04 RX ADMIN — MICONAZOLE NITRATE: 20 POWDER TOPICAL at 08:02

## 2023-02-04 RX ADMIN — FERROUS SULFATE TAB 325 MG (65 MG ELEMENTAL FE) 325 MG: 325 (65 FE) TAB at 17:20

## 2023-02-04 RX ADMIN — HYDROCORTISONE 10 MG: 10 TABLET ORAL at 21:48

## 2023-02-04 RX ADMIN — Medication 1 CAPSULE: at 08:02

## 2023-02-04 RX ADMIN — DEXTROSE MONOHYDRATE: 50 INJECTION, SOLUTION INTRAVENOUS at 21:57

## 2023-02-04 RX ADMIN — RIVAROXABAN 15 MG: 15 TABLET, FILM COATED ORAL at 17:20

## 2023-02-04 RX ADMIN — PANTOPRAZOLE SODIUM 40 MG: 40 TABLET, DELAYED RELEASE ORAL at 06:29

## 2023-02-04 RX ADMIN — LEVOTHYROXINE SODIUM 125 MCG: 0.12 TABLET ORAL at 08:02

## 2023-02-04 RX ADMIN — DEXTROSE MONOHYDRATE: 50 INJECTION, SOLUTION INTRAVENOUS at 01:54

## 2023-02-04 RX ADMIN — DOCUSATE SODIUM 100 MG: 100 CAPSULE, LIQUID FILLED ORAL at 08:02

## 2023-02-04 RX ADMIN — FERROUS SULFATE TAB 325 MG (65 MG ELEMENTAL FE) 325 MG: 325 (65 FE) TAB at 08:02

## 2023-02-04 RX ADMIN — ATORVASTATIN CALCIUM 20 MG: 20 TABLET, FILM COATED ORAL at 08:02

## 2023-02-04 RX ADMIN — HYDROCORTISONE 20 MG: 10 TABLET ORAL at 08:01

## 2023-02-04 RX ADMIN — MICONAZOLE NITRATE: 20 POWDER TOPICAL at 21:48

## 2023-02-04 RX ADMIN — TERBINAFINE TABLETS 250 MG 250 MG: 250 TABLET ORAL at 08:01

## 2023-02-04 RX ADMIN — OXYBUTYNIN CHLORIDE 15 MG: 10 TABLET, EXTENDED RELEASE ORAL at 08:01

## 2023-02-04 NOTE — PROGRESS NOTES
Hospitalist Progress Note      Patient:  Diane Taylor    Unit/Bed:5K-15/015-A  YOB: 1950  MRN: [id number]   Acct: [de-identified]   PCP: Jocelin Dorado MD  Date of Admission: 1/21/2023    Assessment/Plan:    Left upper extremity cellulitis, possibly related to recent IV infiltration: Patient had an IV infiltrated on 1/15 per provider's note and was receiving Unasyn during that stay. X-ray of left hand, elbow, radius and ulna on 1/21 negative   CT left hand, radius, ulna, and humerus on 1/22 revealed diffuse soft tissue swelling, skin thickening and subcutaneous edema / inflammation of left upper arm, left forearm and left hand without abscess. Blood cultures negative to date   Completed 7 days of Zyvox per ID recommendations - signed off   Was additionally treated with Zosyn while IP   PT and OT following. Chronic normocytic hypochromic anemia - likely Iron deficiency anemia, improving: Hgb decreased to 7.9 from 8.3. Iron studies from 1/24 reveal iron of 38, TIBC of 137, transferrin of 112, and ferritin 395. Folate and B12 WNL. No acute bleeding. Completed Venofer x3 doses daily. Continue PO ferrous sulfate. Continue to transfuse if Hgb <7. Repeat CBC Q12 hours. B/l Lower extremity edema: Continue bilateral leg wrapping. Patient states this is her baseline. Keep elevated. Electrolyte abnormalities:  Hypernatremia: Sodium decreased to 146 from 148. Continue to hold Bumex  Continue D5W and reassess BMP daily, Q4 hour Na checks   Encourage oral intake - pt has not been drinking, SLP reevaluated. Hypokalemia- resolved : K 3.6 today  Hypophosphatemia, resolved- 2.8 on 2/1  Hypochloridemia: increased to 112 from 109  BUN decreased: decreased to 3 from 5 likely secondary to malnutrition. Cr stable at 0.5  Maintain telemetry  Constipation, resolved: Continue to monitor. Patient denies abdominal pain.    Dysphagia: SLP recommended minced and moist moderately thick liquids per 1/23  Paroxysmal A-fib on [facility]; rate controlled: continue home medications, continue tele. Okay to resume Xarelto, no plan for surgical intervention. Leukocytosis, resolved: Secondary to #1. History of adrenal insufficiency without crisis: Likely contributing to #1 skin fragility. Continue home cortef therapy. BP has been stable. Hypothyroidism: TSH 0.047 and free T4 1.31 on 1/21. Continue home medications  Severe protein calorie malnutrition: Encourage oral intake; dietitian   Physical debility with deconditioning: Pt came from SNF. Consult PT/OT. Disposition: SNF placement awaiting precert    Chief Complaint: Left arm swelling    Initial H and P:-    \"Shirley Jorge is a 71 y.o. female with PMHx of paroxysmal A-fib, HTN, HFpEF, hypothyroidism, severe protein calorie malnutrition, and physical debility with deconditioning who presented to [facility] with chief complaint of left upper extremity swelling and pain. Patient was recently discharged on 1/17/23 for submandibular cellulitis and discharged to SNF. She endorses having no recollection of injuring her left arm, but stated that the arm started to swell just 3 days ago. Around that time she had stopped her outpatient course of antibiotics. At first when the swelling started she stated that she did not think much about it as it was not bothering her. Then today her arm was quite tender to touch and just throbbed all day, hence the admission to [facility]. Currently she is not having any pain, but that was after administration of Morphine while in the ED. She denies any dizziness, chest pain or shortness of breath. She denies any sick contacts. She does not endorse any tingling or numbness that is unusual for her. \"    Subjective (past 24 hours):   2/4: Patient sitting upright in bed. She denies left arm pain, pins and needles sensation, or pruritis.  Patient denies shortness of breath, chest pain, palpitations, headache, lightheadedness, or dizziness. Patient notes last bowel movement yesterday night, she denies diarrhea or abdominal pain. Patient states her fatigue from yesterday has improved. Past medical history, family history, social history and allergies reviewed again and is unchanged since admission. ROS (All review of systems completed. Pertinent positives noted. Otherwise All other systems reviewed and negative.)     Medications:  Reviewed    Infusion Medications    sodium chloride      dextrose 100 mL/hr at 02/04/23 0154    sodium chloride      sodium chloride       Scheduled Medications    miconazole   Topical BID    lidocaine 1 % injection  5 mL IntraDERmal Once    sodium chloride flush  5-40 mL IntraVENous 2 times per day    ferrous sulfate  325 mg Oral BID WC    docusate sodium  100 mg Oral Daily    lactobacillus  1 capsule Oral Daily with breakfast    rivaroxaban  15 mg Oral Dinner    [Held by provider] bumetanide  0.5 mg Oral Daily    hydrocortisone  20 mg Oral QAM    hydrocortisone  10 mg Oral Nightly    levothyroxine  125 mcg Oral Daily    oxybutynin  15 mg Oral Daily    pantoprazole  40 mg Oral QAM AC    atorvastatin  20 mg Oral Daily    terbinafine  250 mg Oral Daily    sodium chloride flush  5-40 mL IntraVENous 2 times per day     PRN Meds: sodium chloride flush, sodium chloride, calcium carbonate, sodium chloride, sodium chloride flush, sodium chloride, ondansetron **OR** ondansetron, polyethylene glycol, acetaminophen **OR** acetaminophen, potassium chloride **OR** potassium alternative oral replacement **OR** potassium chloride, magnesium sulfate      Intake/Output Summary (Last 24 hours) at 2/4/2023 1528  Last data filed at 2/4/2023 1345  Gross per 24 hour   Intake 200 ml   Output 2050 ml   Net -1850 ml       Diet:  ADULT ORAL NUTRITION SUPPLEMENT; Dinner; Frozen Oral Supplement  ADULT ORAL NUTRITION SUPPLEMENT; Breakfast, Lunch; Wound Healing Oral Supplement  ADULT DIET;  Dysphagia - Minced and Moist; Moderately Thick (Honey)    Physical Exam:  BP (!) 116/59   Pulse 87   Temp 97.6 °F (36.4 °C) (Oral)   Resp 17   Ht 5' 3\" (1.6 m)   Wt 184 lb 4.9 oz (83.6 kg)   SpO2 99%   BMI 32.65 kg/m²   General appearance: Speaking softly throughout examination. Appears mildly confused but alert and oriented. No apparent distress, appears stated age and cooperative. HEENT: Pupils equal, round, and reactive to light. Conjunctivae/corneas clear. Neck: Supple, with full range of motion. No jugular venous distention. Trachea midline. Respiratory:  Normal respiratory effort. Clear to auscultation, bilaterally without Rales/Wheezes/Rhonchi. Cardiovascular: Regular rate and rhythm with normal S1/S2 without murmurs, rubs or gallops. Abdomen: Soft, non-tender, non-distended with normal bowel sounds. Musculoskeletal: Diminished active and passive range of motion of bilateral upper extremities. Active range of motion of left upper extremity improved. Left upper extremity strength diminished. Skin: Diffuse bruising throughout right upper extremity and bilateral lower extremities. Patient states this is her baseline. Previous area of cellulitis on left forearm is no longer warm, is without  tenderness, minimal clear drainage from 1 site on left arm. Sporadic hematomas of left upper extremity. +1 pitting edema of ble lower extremities, improving, no erythema, warmth or tenderness. Left lower extremity wrapped due to open wound per RN, not visualized. Neurologic:  Neurovascularly intact without any focal sensory deficits.  Cranial nerves: II-XII intact, grossly non-focal.  Psychiatric: Alert and oriented to self, location, month, seems slightly confused today   Capillary Refill: Brisk,< 3 seconds   Peripheral Pulses: +2 palpable, equal bilaterally     Labs:   Recent Labs     02/02/23  0508 02/03/23  1108 02/04/23  0620   WBC 7.2  --  10.1   HGB 8.1* 8.3* 7.9*   HCT 25.6* 25.8* 25.5*     --  215     Recent Labs     02/02/23  0508 02/03/23  1108 02/03/23  1835 02/04/23  0620   * 149* 148* 146*   K 3.5 3.4*  --  3.6   * 113*  --  114*   CO2 27 26  --  28   BUN 6* 5*  --  3*   CREATININE 0.5 0.6  --  0.5   CALCIUM 8.2* 8.1*  --  8.4*     No results for input(s): AST, ALT, BILIDIR, BILITOT, ALKPHOS in the last 72 hours. No results for input(s): INR in the last 72 hours. No results for input(s): Pasty Dragon in the last 72 hours. Microbiology:    Blood culture #1:   Lab Results   Component Value Date/Time    [facility]  01/21/2023 09:01 PM     No growth 24 hours. No growth 48 hours. No growth at 5 days       Blood culture #2:No results found for: BLOODCULT2    Organism:  Lab Results   Component Value Date/Time    ORG Staphylococcus aureus 12/17/2018 04:04 PM         Lab Results   Component Value Date/Time    LABGRAM  02/16/2022 04:00 PM     No segmented neutrophils observed. Rare epithelial cells observed. No organisms observed. MRSA culture only:No results found for: [facility]    Urine culture:   Lab Results   Component Value Date/Time    LABURIN No growth-preliminary  No growth   02/09/2017 12:35 PM       Respiratory culture: No results found for: CULTRESP    Aerobic and Anaerobic :  Lab Results   Component Value Date/Time    LABAERO No Acinetobacter species isolated. 01/04/2023 10:00 PM     Lab Results   Component Value Date/Time    LABANAE  12/01/2021 12:00 PM     No anaerobes isolated- preliminary Culture yielded light growth of gram positive bacilli most consistent with Cutibacterium species. Clinical correlation required.        Urinalysis:      Lab Results   Component Value Date/Time    NITRU NEGATIVE 01/22/2023 12:02 AM    WBCUA 0-2 01/22/2023 12:02 AM    WBCUA 2-4 02/22/2012 01:00 PM    BACTERIA NONE SEEN 01/22/2023 12:02 AM    RBCUA 5-10 01/22/2023 12:02 AM    BLOODU MODERATE 01/22/2023 12:02 AM    SPECGRAV 1.025 09/18/2020 12:00 PM    GLUCOSEU NEGATIVE 01/22/2023 12:02 AM       Radiology:  CT HAND LEFT W CONTRAST Final Result      Lateral portion of the left upper extremity is partly off the    field-of-view. Status post left shoulder hemiarthroplasty and total left hip replacement. Metallic streak artifact limits the evaluation of the adjacent structures. Diffuse soft tissue swelling, skin thickening and subcutaneous    edema/inflammation of the left upper arm, left forearm and left hand. Correlate clinically for cellulitis. No abscess is seen. This document has been electronically signed by: Eliazar Riedel, MD on    01/22/2023 06:43 AM      All CTs at this facility use dose modulation techniques and iterative    reconstructions, and/or weight-based dosing   when appropriate to reduce radiation to a low as reasonably achievable. CT RADIUS ULNA LEFT W CONTRAST   Final Result      Lateral portion of the left upper extremity is partly off the    field-of-view. Status post left shoulder hemiarthroplasty and total left hip replacement. Metallic streak artifact limits the evaluation of the adjacent structures. Diffuse soft tissue swelling, skin thickening and subcutaneous    edema/inflammation of the left upper arm, left forearm and left hand. Correlate clinically for cellulitis. No abscess is seen. This document has been electronically signed by: Eliazar Riedel, MD on    01/22/2023 06:26 AM      All CTs at this facility use dose modulation techniques and iterative    reconstructions, and/or weight-based dosing   when appropriate to reduce radiation to a low as reasonably achievable. CT HUMERUS LEFT W CONTRAST   Final Result      Lateral portion of the left upper extremity is partly off the    field-of-view. Status post left shoulder hemiarthroplasty and total left hip replacement. Metallic streak artifact limits the evaluation of the adjacent structures.       Diffuse soft tissue swelling, skin thickening and subcutaneous    edema/inflammation of the left upper arm, left forearm and left hand. Correlate clinically for cellulitis. No abscess is seen. This document has been electronically signed by: Shena Panda MD on    01/22/2023 06:42 AM      All CTs at this facility use dose modulation techniques and iterative    reconstructions, and/or weight-based dosing   when appropriate to reduce radiation to a low as reasonably achievable. XR CHEST PORTABLE   Final Result   Decrease in pulmonary interstitial densities compared to x-ray 1/4/2023. This document has been electronically signed by: Theresa Rodriguez MD on    01/21/2023 11:52 PM      XR RADIUS ULNA LEFT (2 VIEWS)   Final Result   No acute bony abnormality. This document has been electronically signed by: Theresa Rodriguez MD on    01/21/2023 11:51 PM      XR HAND LEFT (MIN 3 VIEWS)   Final Result   No acute osseous abnormality. This document has been electronically signed by: Theresa Rodriguez MD on    01/21/2023 10:30 PM      XR ELBOW LEFT (MIN 3 VIEWS)   Final Result   No acute bony abnormality. This document has been electronically signed by: Theresa Rodriguez MD on    01/21/2023 10:28 PM        XR ELBOW LEFT (MIN 3 VIEWS)    Result Date: 1/21/2023  3 view right elbow Comparison: None Findings: No acute fractures. Normal alignment. No significant arthritic change or erosions. No joint effusion. No radiopaque foreign body. Diffuse edema. No acute bony abnormality. This document has been electronically signed by: Theresa Rodriguez MD on 01/21/2023 10:28 PM    XR RADIUS ULNA LEFT (2 VIEWS)    Result Date: 1/21/2023  2 view left forearm Comparison: None Findings: No fractures or dislocations. No joint effusion. No significant arthritic change. No radiopaque foreign body. Extensive soft tissue edema. No acute bony abnormality. This document has been electronically signed by: Theresa Rodriguez MD on 01/21/2023 11:51 PM    XR HAND LEFT (MIN 3 VIEWS)    Result Date: 1/21/2023  3 view left hand Comparison: None Findings: Osteopenia.  No acute fractures. No significant loss of joint space or osteophytes. No erosions. No radiopaque foreign body. Diffuse edema. No acute osseous abnormality. This document has been electronically signed by: Kaylah Tavera MD on 01/21/2023 10:30 PM    CT HUMERUS LEFT W CONTRAST    Result Date: 1/22/2023  CT left upper extremity with contrast COMPARISON: No prior FINDINGS: The lateral portion of the left upper extremity is partly off the field-of-view. The patient is status post left shoulder hemiarthroplasty and total left hip replacement. Metallic streak artifact limits the evaluation of the adjacent structures. There is diffuse soft tissue swelling, skin thickening and subcutaneous edema/inflammation of the left upper arm, left forearm and left hand. No abscess is seen. No fracture or dislocation is seen. Lateral portion of the left upper extremity is partly off the field-of-view. Status post left shoulder hemiarthroplasty and total left hip replacement. Metallic streak artifact limits the evaluation of the adjacent structures. Diffuse soft tissue swelling, skin thickening and subcutaneous edema/inflammation of the left upper arm, left forearm and left hand. Correlate clinically for cellulitis. No abscess is seen. This document has been electronically signed by: Shawn Osuna MD on 01/22/2023 06:42 AM All CTs at this facility use dose modulation techniques and iterative reconstructions, and/or weight-based dosing when appropriate to reduce radiation to a low as reasonably achievable. CT RADIUS ULNA LEFT W CONTRAST    Result Date: 1/22/2023  CT left upper extremity with contrast COMPARISON: No prior FINDINGS: The lateral portion of the left upper extremity is partly off the field-of-view. The patient is status post left shoulder hemiarthroplasty and total left hip replacement. Metallic streak artifact limits the evaluation of the adjacent structures.  There is diffuse soft tissue swelling, skin thickening and subcutaneous edema/inflammation of the left upper arm, left forearm and left hand. No abscess is seen. No fracture or dislocation is seen.     Lateral portion of the left upper extremity is partly off the field-of-view. Status post left shoulder hemiarthroplasty and total left hip replacement. Metallic streak artifact limits the evaluation of the adjacent structures. Diffuse soft tissue swelling, skin thickening and subcutaneous edema/inflammation of the left upper arm, left forearm and left hand. Correlate clinically for cellulitis. No abscess is seen. This document has been electronically signed by: Js Edwards MD on 01/22/2023 06:26 AM All CTs at this facility use dose modulation techniques and iterative reconstructions, and/or weight-based dosing when appropriate to reduce radiation to a low as reasonably achievable.    CT HAND LEFT W CONTRAST    Result Date: 1/22/2023  CT left upper extremity with contrast COMPARISON: No prior FINDINGS: The lateral portion of the left upper extremity is partly off the field-of-view. The patient is status post left shoulder hemiarthroplasty and total left hip replacement. Metallic streak artifact limits the evaluation of the adjacent structures. There is diffuse soft tissue swelling, skin thickening and subcutaneous edema/inflammation of the left upper arm, left forearm and left hand. No abscess is seen. No fracture or dislocation is seen.     Lateral portion of the left upper extremity is partly off the field-of-view. Status post left shoulder hemiarthroplasty and total left hip replacement. Metallic streak artifact limits the evaluation of the adjacent structures. Diffuse soft tissue swelling, skin thickening and subcutaneous edema/inflammation of the left upper arm, left forearm and left hand. Correlate clinically for cellulitis. No abscess is seen. This document has been electronically signed by: Js Edwards MD on 01/22/2023 06:43 AM All CTs at this facility use dose  modulation techniques and iterative reconstructions, and/or weight-based dosing when appropriate to reduce radiation to a low as reasonably achievable. XR CHEST PORTABLE    Result Date: 1/21/2023  1 view chest x-ray Comparison: CR/SR - XR CHEST PORTABLE - 01/04/2023 04:26 PM EST Findings: Decrease in pulmonary interstitial densities compared to x-ray 1/4/2023. Right internal iliac catheter terminates in the right atrium. Status post bilateral hip replacement. No acute fractures. Decrease in pulmonary interstitial densities compared to x-ray 1/4/2023.  This document has been electronically signed by: Martin Xie MD on 01/21/2023 11:52 PM      Electronically signed by Ana M Puga PA-C on 2/4/2023 at 3:28 PM

## 2023-02-04 NOTE — PLAN OF CARE
Problem: Safety - Adult  Goal: Free from fall injury  2/4/2023 1131 by Molli Bernheim, RN  Flowsheets (Taken 2/4/2023 1131)  Free From Fall Injury:   Instruct family/caregiver on patient safety   Based on caregiver fall risk screen, instruct family/caregiver to ask for assistance with transferring infant if caregiver noted to have fall risk factors     Problem: Pain  Goal: Verbalizes/displays adequate comfort level or baseline comfort level  2/4/2023 1131 by Molli Bernheim, RN  Flowsheets (Taken 2/4/2023 1131)  Verbalizes/displays adequate comfort level or baseline comfort level:   Encourage patient to monitor pain and request assistance   Assess pain using appropriate pain scale   Administer analgesics based on type and severity of pain and evaluate response   Implement non-pharmacological measures as appropriate and evaluate response   Consider cultural and social influences on pain and pain management   Notify Licensed Independent Practitioner if interventions unsuccessful or patient reports new pain     Problem: Skin/Tissue Integrity  Goal: Absence of new skin breakdown  Description: 1. Monitor for areas of redness and/or skin breakdown  2. Assess vascular access sites hourly  3. Every 4-6 hours minimum:  Change oxygen saturation probe site  4. Every 4-6 hours:  If on nasal continuous positive airway pressure, respiratory therapy assess nares and determine need for appliance change or resting period.   2/4/2023 1131 by Molli Bernheim, RN  Outcome: Progressing     Problem: Discharge Planning  Goal: Discharge to home or other facility with appropriate resources  Flowsheets (Taken 2/4/2023 1131)  Discharge to home or other facility with appropriate resources: Identify barriers to discharge with patient and caregiver     Problem: Nutrition Deficit:  Goal: Optimize nutritional status  Flowsheets (Taken 2/4/2023 1131)  Nutrient intake appropriate for improving, restoring, or maintaining nutritional needs:   Assess nutritional status and recommend course of action   Monitor oral intake, labs, and treatment plans     Problem: Chronic Conditions and Co-morbidities  Goal: Patient's chronic conditions and co-morbidity symptoms are monitored and maintained or improved  Flowsheets (Taken 2/4/2023 1131)  Care Plan - Patient's Chronic Conditions and Co-Morbidity Symptoms are Monitored and Maintained or Improved:   Monitor and assess patient's chronic conditions and comorbid symptoms for stability, deterioration, or improvement   Collaborate with multidisciplinary team to address chronic and comorbid conditions and prevent exacerbation or deterioration   Update acute care plan with appropriate goals if chronic or comorbid symptoms are exacerbated and prevent overall improvement and discharge   Care plan reviewed with patient and family. Patient and family verbalize understanding of the plan of care and contribute to goal setting.

## 2023-02-04 NOTE — PROGRESS NOTES
[facility]  SPEECH THERAPY  STRZ ONC MED 5K  Clinical Swallow Evaluation      SLP Individual Minutes  Time In: 1300  Time Out: 6459  Minutes: 8  Timed Code Treatment Minutes: 0 Minutes       Date: 2023  Patient Name: Rommel Thompson      CSN: [id number]   : 1950  (71 y.o.)  Gender: female   Referring Physician:  Osmin Nichole PA-C    Diagnosis: Cellulitis    History of Present Illness/Injury: Patient admitted to [facility] with above medical dx. Per chart review, \"Shirley Smiley is a 71 y.o. female with PMHx of paroxysmal A-fib, HTN, HFpEF, hypothyroidism, severe protein calorie malnutrition, and physical debility with deconditioning who presented to [facility] with chief complaint of left upper extremity swelling and pain. Patient was recently discharged on 23 for submandibular cellulitis and discharged to SNF. She endorses having no recollection of injuring her left arm, but stated that the arm started to swell just 3 days ago. Around that time she had stopped her outpatient course of antibiotics. At first when the swelling started she stated that she did not think much about it as it was not bothering her. Then today her arm was quite tender to touch and just throbbed all day, hence the admission to [facility]. Currently she is not having any pain, but that was after administration of Morphine while in the ED. She denies any dizziness, chest pain or shortness of breath. She denies any sick contacts. She does not endorse any tingling or numbness that is unusual for her. \"    ST consulted to complete clinical swallow evaluation to establish POC.   Past Medical History:   Diagnosis Date    Asthma     uses albuterol 1-2x per year    Atrial thrombosis     on [address]    Bursitis     right shoulder -s/p steroid injections    Cancer (HCC)     Chronic diastolic heart failure (HCC)     CVA (cerebral infarction) 6/14/15    Mult. 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 Nick Cleaning    Hypothyroidism     Meningioma (HCC)     3 separate meningiomas, s/p gamma knife (1/2012) , Dr. Mckeon at [facility]    MRSA nasal colonization 6/2013    Obesity (BMI 30-39.9)     Osteoarthritis     PAF (paroxysmal atrial fibrillation) (HCC)     Panhypopituitarism (HCC)     status post resection for macroadenoma in the 1970's    Pneumonia 11/2018    Stroke (HCC) 1988    due to radiation -bleeding CVA - residual left upper and lower extremity weakness    Stroke (HCC) October 2015    Urinary incontinence        SUBJECTIVE:  EITAN Olivera with approval to proceed with clinical swallow evaluation.  Upon arrival, patient resting in bed     OBJECTIVE:    Pain:  No pain reported.    Current Diet: Minced and moist diet and moderately thick liquids    Respiratory Status:  Room Air    Behavioral Observation:  Alert         CRANIAL NERVE ASSESSMENT   CN V (Trigeminal) Closes and Opens Mandible WFL    Rotary Jaw Movement Not tested       CN VII (Facial) Cheeks Hold Food out of Sulci WFL    Opens, Closes/Seals, Protrudes, Retracts Lips WFL    General Appearance WFL    Sensation Not Tested       CN X (Vagus - Pharyngeal) Raises Back of Tongue WFL       CN XI (Accessory) Lifts Soft Palate WFL       CN XII (Hypoglossal) Elevates Tongue Up and Back WFL    Protrusion    WFL, weak    Lateralizes Tongue WFL, weak     Sensation Not Tested       Other Observations Dentition WFL    Vocal Quality Reduced vocal intensity     Cough Not tested      Patient Evaluated Using: Moderately Thick Liquids, Minced and Moist (crushed kacey crackers in puree)     Oral Phase:  Impaired:  Impaired Mastication, Impaired Oral Initiation, and reduced oral opening     Pharyngeal Phase: Impaired:  MBS completed 11/26/22 indicating moderate pharyngeal dysphagia      Signs and Symptoms of Laryngeal Penetration/Aspiration: No signs/symptoms of aspiration evident in this evaluation, but cannot rule out silent aspiration without completion of instrumental evaluation     Impressions: Patient presents with moderate oral dysphagia with inability to fully discern potential presence of pharyngeal phase deficits without formal instrumentation. Recent MBS completed 11/26/22 recommending minced and moist diet with moderately thick liquids. Patient consumed PO trials of puree with crushed kacey cracker and moderately thick liquids via spoon, cup, and straw; patient demonstrated slight bolus hold due to patient report of not enjoying taste of PO trials. Advanced PO trials not completed at this time at bedside d/t evidence of audible aspiration of thin and mildly thick liquids per most recent MBS completed 11/26/22. ST recommending initiation of minced and moist diet with moderately thick liquids. Patient would benefit from ongoing skilled ST services to address dysphagia management. RECOMMENDATIONS/ASSESSMENT:  Instrumental Evaluation: Instrumental evaluation not indicated at this time.   Diet Recommendations:  Minced and Moist Diet with Moderately Thick Liquids  Strategies:  Full Upright Position, Small Bite/Sip, Multiple Swallow, Medications Crushed with Puree, Direct 1:1 Supervision, Limit Distractions, and Monitor for Fatigue     Rehabilitation Potential: fair  Discharge Recommendations: SNF     EDUCATION:  Learner: Patient  Education:  Reviewed results and recommendations of this evaluation, Reviewed diet and strategies, Reviewed signs, symptoms and risks of aspiration, Reviewed ST goals and Plan of Care, Reviewed recommendations for follow-up, Education Related to Potential Risks and Complications Due to Impairment/Illness/Injury, Education Related to Prevention of Recurrence of Impairment/Illness/Injury, Education Related to [employer] [facility]ness, and Home Safety Education  Evaluation of Education: Demonstrates with assistance, Needs further instruction, and Family not present     PLAN:  Skilled SLP intervention on acute care 3-5 x per week or until goals met and/or pt plateaus in function. Specific interventions for next session may include: dysphagia management, ? MBS. PATIENT GOAL:    Return to prior level of function. SHORT TERM GOALS:  Short Term Goals  Time Frame for Short Term Goals: 2 weeks  Goal 1: Patient will safely consume minced and moist diet with moderately thick liquids without overt s/s of penetration/aspiration and/or pulmonary compromise r/t swallow function in order to assist with meeting nutrition/hydration standards. Goal 2: Patient will complete advanced PO trials with ST ONLY in order to determine readiness for potential advancement vs. need for repeat instrumental assessment. Goal 3: Patient, family, and medical staff will exhibit return demonstration for utilization of compensatory swallowing strategies and completion of comprehensive oral care Q2H in order to assist with maintanance of pharyngeal integrity and overall safety with PO consumption. LONG TERM GOALS:  No LTGs established due to short ELOS.

## 2023-02-05 LAB
ANION GAP SERPL CALC-SCNC: 9 MEQ/L (ref 8–16)
BUN SERPL-MCNC: < 2 MG/DL (ref 7–22)
CALCIUM SERPL-MCNC: 8.4 MG/DL (ref 8.5–10.5)
CHLORIDE SERPL-SCNC: 104 MEQ/L (ref 98–111)
CO2 SERPL-SCNC: 25 MEQ/L (ref 23–33)
CREAT SERPL-MCNC: 0.6 MG/DL (ref 0.4–1.2)
GFR SERPL CREATININE-BSD FRML MDRD: > 60 ML/MIN/1.73M2
GLUCOSE SERPL-MCNC: 87 MG/DL (ref 70–108)
POTASSIUM SERPL-SCNC: 3.7 MEQ/L (ref 3.5–5.2)
SODIUM SERPL-SCNC: 138 MEQ/L (ref 135–145)
SODIUM SERPL-SCNC: 141 MEQ/L (ref 135–145)

## 2023-02-05 PROCEDURE — 6370000000 HC RX 637 (ALT 250 FOR IP)

## 2023-02-05 PROCEDURE — 84295 ASSAY OF SERUM SODIUM: CPT

## 2023-02-05 PROCEDURE — 80048 BASIC METABOLIC PNL TOTAL CA: CPT

## 2023-02-05 PROCEDURE — 1200000000 HC SEMI PRIVATE

## 2023-02-05 PROCEDURE — 36415 COLL VENOUS BLD VENIPUNCTURE: CPT

## 2023-02-05 PROCEDURE — 6370000000 HC RX 637 (ALT 250 FOR IP): Performed by: PHYSICIAN ASSISTANT

## 2023-02-05 PROCEDURE — 99232 SBSQ HOSP IP/OBS MODERATE 35: CPT | Performed by: PHYSICIAN ASSISTANT

## 2023-02-05 RX ADMIN — MICONAZOLE NITRATE: 20 POWDER TOPICAL at 08:27

## 2023-02-05 RX ADMIN — RIVAROXABAN 15 MG: 15 TABLET, FILM COATED ORAL at 18:06

## 2023-02-05 RX ADMIN — FERROUS SULFATE TAB 325 MG (65 MG ELEMENTAL FE) 325 MG: 325 (65 FE) TAB at 08:27

## 2023-02-05 RX ADMIN — MICONAZOLE NITRATE: 20 POWDER TOPICAL at 22:32

## 2023-02-05 RX ADMIN — FERROUS SULFATE TAB 325 MG (65 MG ELEMENTAL FE) 325 MG: 325 (65 FE) TAB at 18:06

## 2023-02-05 RX ADMIN — OXYBUTYNIN CHLORIDE 15 MG: 10 TABLET, EXTENDED RELEASE ORAL at 08:26

## 2023-02-05 RX ADMIN — TERBINAFINE TABLETS 250 MG 250 MG: 250 TABLET ORAL at 08:26

## 2023-02-05 RX ADMIN — LEVOTHYROXINE SODIUM 125 MCG: 0.12 TABLET ORAL at 08:26

## 2023-02-05 RX ADMIN — DOCUSATE SODIUM 100 MG: 100 CAPSULE, LIQUID FILLED ORAL at 08:27

## 2023-02-05 RX ADMIN — PANTOPRAZOLE SODIUM 40 MG: 40 TABLET, DELAYED RELEASE ORAL at 06:21

## 2023-02-05 RX ADMIN — Medication 1 CAPSULE: at 08:26

## 2023-02-05 RX ADMIN — ATORVASTATIN CALCIUM 20 MG: 20 TABLET, FILM COATED ORAL at 08:26

## 2023-02-05 RX ADMIN — HYDROCORTISONE 20 MG: 10 TABLET ORAL at 08:26

## 2023-02-05 RX ADMIN — HYDROCORTISONE 10 MG: 10 TABLET ORAL at 22:01

## 2023-02-05 ASSESSMENT — PAIN DESCRIPTION - ORIENTATION: ORIENTATION: LOWER

## 2023-02-05 ASSESSMENT — PAIN DESCRIPTION - LOCATION: LOCATION: BACK

## 2023-02-05 ASSESSMENT — PAIN SCALES - GENERAL: PAINLEVEL_OUTOF10: 3

## 2023-02-05 NOTE — PLAN OF CARE
Problem: Discharge Planning  Goal: Discharge to home or other facility with appropriate resources  Outcome: Progressing  Flowsheets (Taken 2/4/2023 2141)  Discharge to home or other facility with appropriate resources: Identify barriers to discharge with patient and caregiver     Problem: Safety - Adult  Goal: Free from fall injury  Outcome: Progressing  Goal: Educate on transmission based isolation - droplet  Description: Educate on transmission based isolation   Outcome: Progressing     Problem: Pain  Goal: Verbalizes/displays adequate comfort level or baseline comfort level  Outcome: Progressing     Problem: Nutrition Deficit:  Goal: Optimize nutritional status  Outcome: Progressing     Problem: Skin/Tissue Integrity  Goal: Absence of new skin breakdown  Description: 1. Monitor for areas of redness and/or skin breakdown  2. Assess vascular access sites hourly  3. Every 4-6 hours minimum:  Change oxygen saturation probe site  4. Every 4-6 hours:  If on nasal continuous positive airway pressure, respiratory therapy assess nares and determine need for appliance change or resting period. Outcome: Progressing  Goal: Isolation precautions  Description: Isolation precautions  Outcome: Progressing     Problem: Chronic Conditions and Co-morbidities  Goal: Patient's chronic conditions and co-morbidity symptoms are monitored and maintained or improved  Outcome: Progressing  Flowsheets (Taken 2/4/2023 2141)  Care Plan - Patient's Chronic Conditions and Co-Morbidity Symptoms are Monitored and Maintained or Improved: Monitor and assess patient's chronic conditions and comorbid symptoms for stability, deterioration, or improvement   Patient currently with low dose dextrose support to right hand and right IJ dressing remains dry and intact. She denies pain and is on every 4 hour sodium draws that lab was unable to obtain on first few attempts at 8pm but obtained by 2200.  Her next lab draw will be at 0200 to ensure 4 hour window and her sodium level has returned. She is dependent to 2 person assist and q2h turns due to fragile tissues.  Call light, landline and personal phone within her reach and bed alarm on

## 2023-02-05 NOTE — PLAN OF CARE
Problem: Discharge Planning  Goal: Discharge to home or other facility with appropriate resources  2/5/2023 1331 by Leandro Smith RN  Flowsheets (Taken 2/5/2023 1331)  Discharge to home or other facility with appropriate resources:   Identify barriers to discharge with patient and caregiver   Arrange for needed discharge resources and transportation as appropriate   Identify discharge learning needs (meds, wound care, etc)   Arrange for interpreters to assist at discharge as needed   Refer to discharge planning if patient needs post-hospital services based on physician order or complex needs related to functional status, cognitive ability or social support system     Problem: Safety - Adult  Goal: Free from fall injury  2/5/2023 1331 by Leandro Smith RN  Flowsheets (Taken 2/5/2023 1331)  Free From Fall Injury:   Instruct family/caregiver on patient safety   Based on caregiver fall risk screen, instruct family/caregiver to ask for assistance with transferring infant if caregiver noted to have fall risk factors     Problem: Pain  Goal: Verbalizes/displays adequate comfort level or baseline comfort level  2/5/2023 1331 by Leandro Smith RN  Flowsheets (Taken 2/5/2023 1331)  Verbalizes/displays adequate comfort level or baseline comfort level:   Encourage patient to monitor pain and request assistance   Assess pain using appropriate pain scale   Administer analgesics based on type and severity of pain and evaluate response   Notify Licensed Independent Practitioner if interventions unsuccessful or patient reports new pain   Consider cultural and social influences on pain and pain management   Implement non-pharmacological measures as appropriate and evaluate response     Problem: Nutrition Deficit:  Goal: Optimize nutritional status  2/5/2023 1331 by Leandro Smith RN  Flowsheets (Taken 2/5/2023 1331)  Nutrient intake appropriate for improving, restoring, or maintaining nutritional needs:   Assess nutritional status and recommend course of action   Monitor oral intake, labs, and treatment plans   Recommend appropriate diets, oral nutritional supplements, and vitamin/mineral supplements     Problem: Skin/Tissue Integrity  Goal: Absence of new skin breakdown  Description: 1. Monitor for areas of redness and/or skin breakdown  2. Assess vascular access sites hourly  3. Every 4-6 hours minimum:  Change oxygen saturation probe site  4. Every 4-6 hours:  If on nasal continuous positive airway pressure, respiratory therapy assess nares and determine need for appliance change or resting period. 2/5/2023 1331 by Roshan Nichols RN  Outcome: Progressing     Problem: Chronic Conditions and Co-morbidities  Goal: Patient's chronic conditions and co-morbidity symptoms are monitored and maintained or improved  2/5/2023 1331 by Roshan Nichols RN  Flowsheets (Taken 2/5/2023 1331)  Care Plan - Patient's Chronic Conditions and Co-Morbidity Symptoms are Monitored and Maintained or Improved:   Monitor and assess patient's chronic conditions and comorbid symptoms for stability, deterioration, or improvement   Collaborate with multidisciplinary team to address chronic and comorbid conditions and prevent exacerbation or deterioration   Update acute care plan with appropriate goals if chronic or comorbid symptoms are exacerbated and prevent overall improvement and discharge     Problem: Skin/Tissue Integrity  Goal: Isolation precautions  Description: Isolation precautions  2/5/2023 1331 by Roshan Nichols RN  Outcome: Progressing   Care plan reviewed with patient and family. Patient and family verbalize understanding of the plan of care and contribute to goal setting.

## 2023-02-05 NOTE — PROGRESS NOTES
Hospitalist Progress Note      [facility]    Unit/Bed:5K-15/015-A  YOB: 1950  MRN: [id number]   Acct: [de-identified]   PCP: Estefany Velasquez MD  Date of Admission: 1/21/2023    Assessment/Plan:    Left upper extremity cellulitis, possibly related to recent IV infiltration: Patient had an IV infiltrated on 1/15 per provider's note and was receiving Unasyn during that stay. X-ray of left hand, elbow, radius and ulna on 1/21 negative   CT left hand, radius, ulna, and humerus on 1/22 revealed diffuse soft tissue swelling, skin thickening and subcutaneous edema / inflammation of left upper arm, left forearm and left hand without abscess. Blood cultures negative to date   Completed 7 days of Zyvox per ID recommendations - signed off   Was additionally treated with Zosyn while IP   PT and OT following. Chronic normocytic hypochromic anemia - likely Iron deficiency anemia, improving: Hgb decreased to 7.9 from 8.3. Iron studies from 1/24 reveal iron of 38, TIBC of 137, transferrin of 112, and ferritin 395. Folate and B12 WNL. No acute bleeding. Completed Venofer x3 doses daily. Continue PO ferrous sulfate. Continue to transfuse if Hgb <7. Repeat CBC Q12 hours. B/l Lower extremity edema: Continue bilateral leg wrapping. Patient states this is her baseline. Keep elevated. Electrolyte abnormalities:  Hypernatremia, resolved: Sodium decreased to 141 from 146. Sodium was noted to be 129 on 2/4 but the following three sodium checks were within normal limits, suspect lab error. Continue Q4 hour Na checks. Continue BMP daily   Continue to hold Bumex  Discontinue D5W    Encourage oral intake - pt has not been drinking, SLP reevaluated. Hypokalemia- resolved : K 3.6 on 2/4  Hypophosphatemia, resolved- 2.8 on 2/1  Hypochloridemia: increased to 114 from 113  BUN decreased: decreased to 3 from 5 likely secondary to malnutrition.  Cr stable at 0.5  Maintain telemetry  Constipation, resolved: Continue to monitor. Patient denies abdominal pain. Dysphagia: SLP recommended minced and moist moderately thick liquids per 1/23  Paroxysmal A-fib on [address]; rate controlled: continue home medications, continue tele. Okay to resume Xarelto, no plan for surgical intervention. Leukocytosis, resolved: Secondary to #1. History of adrenal insufficiency without crisis: Likely contributing to #1 skin fragility. Continue home cortef therapy. BP has been stable. Hypothyroidism: TSH 0.047 and free T4 1.31 on 1/21. Continue home medications  Severe protein calorie malnutrition: Encourage oral intake; dietitian   Physical debility with deconditioning: Pt came from SNF. Consult PT/OT. Disposition: SNF placement awaiting pre-cert    Chief Complaint: Left arm swelling    Initial H and P:-    \"Shirley Lopez is a 71 y.o. female with PMHx of paroxysmal A-fib, HTN, HFpEF, hypothyroidism, severe protein calorie malnutrition, and physical debility with deconditioning who presented to [facility] with chief complaint of left upper extremity swelling and pain. Patient was recently discharged on 1/17/23 for submandibular cellulitis and discharged to SNF. She endorses having no recollection of injuring her left arm, but stated that the arm started to swell just 3 days ago. Around that time she had stopped her outpatient course of antibiotics. At first when the swelling started she stated that she did not think much about it as it was not bothering her. Then today her arm was quite tender to touch and just throbbed all day, hence the admission to [facility]. Currently she is not having any pain, but that was after administration of Morphine while in the ED. She denies any dizziness, chest pain or shortness of breath. She denies any sick contacts. She does not endorse any tingling or numbness that is unusual for her. \"    Subjective (past 24 hours):   2/5: Patient is sitting upright in bed.  She notes increased urination but denies pain with urination or hematuria. She denies left arm pain, pins and needles sensation, or pruritis. She denies left leg pain from wound. She denies shortness of breath, chest pain, palpitations, headache, lightheadedness, or dizziness. Patient notes last bowel movement yesterday that she believes was diarrhea. She denies abdominal pain or pain with defecation. She states her fatigue from yesterday has remained stable. Past medical history, family history, social history and allergies reviewed again and is unchanged since admission. ROS (All review of systems completed. Pertinent positives noted.  Otherwise All other systems reviewed and negative.)     Medications:  Reviewed    Infusion Medications    sodium chloride      sodium chloride      sodium chloride       Scheduled Medications    miconazole   Topical BID    lidocaine 1 % injection  5 mL IntraDERmal Once    sodium chloride flush  5-40 mL IntraVENous 2 times per day    ferrous sulfate  325 mg Oral BID WC    docusate sodium  100 mg Oral Daily    lactobacillus  1 capsule Oral Daily with breakfast    rivaroxaban  15 mg Oral Dinner    [Held by provider] bumetanide  0.5 mg Oral Daily    hydrocortisone  20 mg Oral QAM    hydrocortisone  10 mg Oral Nightly    levothyroxine  125 mcg Oral Daily    oxybutynin  15 mg Oral Daily    pantoprazole  40 mg Oral QAM AC    atorvastatin  20 mg Oral Daily    terbinafine  250 mg Oral Daily    sodium chloride flush  5-40 mL IntraVENous 2 times per day     PRN Meds: sodium chloride flush, sodium chloride, calcium carbonate, sodium chloride, sodium chloride flush, sodium chloride, ondansetron **OR** ondansetron, polyethylene glycol, acetaminophen **OR** acetaminophen, potassium chloride **OR** potassium alternative oral replacement **OR** potassium chloride, magnesium sulfate      Intake/Output Summary (Last 24 hours) at 2/5/2023 1121  Last data filed at 2/5/2023 0315  Gross per 24 hour Intake 3560.83 ml   Output 1200 ml   Net 2360.83 ml       Diet:  ADULT ORAL NUTRITION SUPPLEMENT; Dinner; Frozen Oral Supplement  ADULT ORAL NUTRITION SUPPLEMENT; Breakfast, Lunch; Wound Healing Oral Supplement  ADULT DIET; Dysphagia - Minced and Moist; Moderately Thick (Honey)    Physical Exam:  BP (!) 144/73   Pulse 68   Temp 97.9 °F (36.6 °C) (Oral)   Resp 16   Ht 5' 3\" (1.6 m)   Wt 174 lb 13.2 oz (79.3 kg)   SpO2 99%   BMI 30.97 kg/m²   General appearance: Speaking softly throughout examination. Appears mildly confused but alert and oriented. No apparent distress, appears stated age and cooperative. HEENT: Pupils equal, round, and reactive to light. Conjunctivae/corneas clear. Neck: Supple, with full range of motion. No jugular venous distention. Trachea midline. Respiratory:  Normal respiratory effort. Clear to auscultation, bilaterally without Rales/Wheezes/Rhonchi. Cardiovascular: Regular rate and rhythm with normal S1/S2 without murmurs, rubs or gallops. Abdomen: Soft, non-tender, non-distended with normal bowel sounds. Musculoskeletal: Diminished active and passive range of motion of bilateral upper extremities. Active range of motion of left upper extremity improved. Left upper extremity strength diminished. Skin: Diffuse bruising throughout right upper extremity and bilateral lower extremities. Patient states this is her baseline. Previous area of cellulitis on left forearm is no longer warm, is without  tenderness, minimal clear drainage from 1 site on left arm. Sporadic hematomas of left upper extremity. +1 pitting edema of ble lower extremities, improving, no erythema, warmth or tenderness. Left lower extremity wrapped due to open wound per RN, not visualized. Neurologic:  Neurovascularly intact without any focal sensory deficits.  Cranial nerves: II-XII intact, grossly non-focal.  Psychiatric: Alert and oriented to self, location, month, seems slightly confused today, stable from yesterday. Capillary Refill: Brisk,< 3 seconds   Peripheral Pulses: +2 palpable, equal bilaterally     Labs:   Recent Labs     02/03/23  1108 02/04/23  0620   WBC  --  10.1   HGB 8.3* 7.9*   HCT 25.8* 25.5*   PLT  --  215     Recent Labs     02/03/23  1108 02/03/23  1835 02/04/23  0620 02/04/23  1600 02/04/23  2223 02/05/23  0654   *   < > 146* 129* 140 141   K 3.4*  --  3.6  --   --   --    *  --  114*  --   --   --    CO2 26  --  28  --   --   --    BUN 5*  --  3*  --   --   --    CREATININE 0.6  --  0.5  --   --   --    CALCIUM 8.1*  --  8.4*  --   --   --     < > = values in this interval not displayed. No results for input(s): AST, ALT, BILIDIR, BILITOT, ALKPHOS in the last 72 hours. No results for input(s): INR in the last 72 hours. No results for input(s): Beck Net in the last 72 hours. Microbiology:    Blood culture #1:   Lab Results   Component Value Date/Time    [facility]  01/21/2023 09:01 PM     No growth 24 hours. No growth 48 hours. No growth at 5 days       Blood culture #2:No results found for: BLOODCULT2    Organism:  Lab Results   Component Value Date/Time    ORG Staphylococcus aureus 12/17/2018 04:04 PM         Lab Results   Component Value Date/Time    LABGRAM  02/16/2022 04:00 PM     No segmented neutrophils observed. Rare epithelial cells observed. No organisms observed. MRSA culture only:No results found for: [facility]    Urine culture:   Lab Results   Component Value Date/Time    LABURIN No growth-preliminary  No growth   02/09/2017 12:35 PM       Respiratory culture: No results found for: CULTRESP    Aerobic and Anaerobic :  Lab Results   Component Value Date/Time    LABAERO No Acinetobacter species isolated. 01/04/2023 10:00 PM     Lab Results   Component Value Date/Time    LABANAE  12/01/2021 12:00 PM     No anaerobes isolated- preliminary Culture yielded light growth of gram positive bacilli most consistent with Cutibacterium species.  Clinical correlation required. Urinalysis:      Lab Results   Component Value Date/Time    NITRU NEGATIVE 01/22/2023 12:02 AM    WBCUA 0-2 01/22/2023 12:02 AM    WBCUA 2-4 02/22/2012 01:00 PM    BACTERIA NONE SEEN 01/22/2023 12:02 AM    RBCUA 5-10 01/22/2023 12:02 AM    BLOODU MODERATE 01/22/2023 12:02 AM    SPECGRAV 1.025 09/18/2020 12:00 PM    GLUCOSEU NEGATIVE 01/22/2023 12:02 AM       Radiology:  CT HAND LEFT W CONTRAST   Final Result      Lateral portion of the left upper extremity is partly off the    field-of-view. Status post left shoulder hemiarthroplasty and total left hip replacement. Metallic streak artifact limits the evaluation of the adjacent structures. Diffuse soft tissue swelling, skin thickening and subcutaneous    edema/inflammation of the left upper arm, left forearm and left hand. Correlate clinically for cellulitis. No abscess is seen. This document has been electronically signed by: Sonam Enriquez MD on    01/22/2023 06:43 AM      All CTs at this facility use dose modulation techniques and iterative    reconstructions, and/or weight-based dosing   when appropriate to reduce radiation to a low as reasonably achievable. CT RADIUS ULNA LEFT W CONTRAST   Final Result      Lateral portion of the left upper extremity is partly off the    field-of-view. Status post left shoulder hemiarthroplasty and total left hip replacement. Metallic streak artifact limits the evaluation of the adjacent structures. Diffuse soft tissue swelling, skin thickening and subcutaneous    edema/inflammation of the left upper arm, left forearm and left hand. Correlate clinically for cellulitis. No abscess is seen. This document has been electronically signed by: Sonam Enriquez MD on    01/22/2023 06:26 AM      All CTs at this facility use dose modulation techniques and iterative    reconstructions, and/or weight-based dosing   when appropriate to reduce radiation to a low as reasonably achievable. CT HUMERUS LEFT W CONTRAST   Final Result      Lateral portion of the left upper extremity is partly off the    field-of-view. Status post left shoulder hemiarthroplasty and total left hip replacement. Metallic streak artifact limits the evaluation of the adjacent structures. Diffuse soft tissue swelling, skin thickening and subcutaneous    edema/inflammation of the left upper arm, left forearm and left hand. Correlate clinically for cellulitis. No abscess is seen. This document has been electronically signed by: Tricia Pastrana MD on    01/22/2023 06:42 AM      All CTs at this facility use dose modulation techniques and iterative    reconstructions, and/or weight-based dosing   when appropriate to reduce radiation to a low as reasonably achievable. XR CHEST PORTABLE   Final Result   Decrease in pulmonary interstitial densities compared to x-ray 1/4/2023. This document has been electronically signed by: Rishabh Lopez MD on    01/21/2023 11:52 PM      XR RADIUS ULNA LEFT (2 VIEWS)   Final Result   No acute bony abnormality. This document has been electronically signed by: Rishabh Lopez MD on    01/21/2023 11:51 PM      XR HAND LEFT (MIN 3 VIEWS)   Final Result   No acute osseous abnormality. This document has been electronically signed by: Rishabh Lopez MD on    01/21/2023 10:30 PM      XR ELBOW LEFT (MIN 3 VIEWS)   Final Result   No acute bony abnormality. This document has been electronically signed by: Rishabh Lopez MD on    01/21/2023 10:28 PM        XR ELBOW LEFT (MIN 3 VIEWS)    Result Date: 1/21/2023  3 view right elbow Comparison: None Findings: No acute fractures. Normal alignment. No significant arthritic change or erosions. No joint effusion. No radiopaque foreign body. Diffuse edema. No acute bony abnormality.  This document has been electronically signed by: Rishabh Lopez MD on 01/21/2023 10:28 PM    XR RADIUS ULNA LEFT (2 VIEWS)    Result Date: 1/21/2023  2 view left forearm Comparison: None Findings: No fractures or dislocations. No joint effusion. No significant arthritic change. No radiopaque foreign body. Extensive soft tissue edema. No acute bony abnormality. This document has been electronically signed by: Martin Xie MD on 01/21/2023 11:51 PM    XR HAND LEFT (MIN 3 VIEWS)    Result Date: 1/21/2023  3 view left hand Comparison: None Findings: Osteopenia. No acute fractures. No significant loss of joint space or osteophytes. No erosions. No radiopaque foreign body. Diffuse edema. No acute osseous abnormality. This document has been electronically signed by: Martin Xie MD on 01/21/2023 10:30 PM    CT HUMERUS LEFT W CONTRAST    Result Date: 1/22/2023  CT left upper extremity with contrast COMPARISON: No prior FINDINGS: The lateral portion of the left upper extremity is partly off the field-of-view. The patient is status post left shoulder hemiarthroplasty and total left hip replacement. Metallic streak artifact limits the evaluation of the adjacent structures. There is diffuse soft tissue swelling, skin thickening and subcutaneous edema/inflammation of the left upper arm, left forearm and left hand. No abscess is seen. No fracture or dislocation is seen. Lateral portion of the left upper extremity is partly off the field-of-view. Status post left shoulder hemiarthroplasty and total left hip replacement. Metallic streak artifact limits the evaluation of the adjacent structures. Diffuse soft tissue swelling, skin thickening and subcutaneous edema/inflammation of the left upper arm, left forearm and left hand. Correlate clinically for cellulitis. No abscess is seen. This document has been electronically signed by: Libby Chun MD on 01/22/2023 06:42 AM All CTs at this facility use dose modulation techniques and iterative reconstructions, and/or weight-based dosing when appropriate to reduce radiation to a low as reasonably achievable.     CT RADIUS ULNA LEFT W CONTRAST    Result Date: 1/22/2023  CT left upper extremity with contrast COMPARISON: No prior FINDINGS: The lateral portion of the left upper extremity is partly off the field-of-view. The patient is status post left shoulder hemiarthroplasty and total left hip replacement. Metallic streak artifact limits the evaluation of the adjacent structures. There is diffuse soft tissue swelling, skin thickening and subcutaneous edema/inflammation of the left upper arm, left forearm and left hand. No abscess is seen. No fracture or dislocation is seen.     Lateral portion of the left upper extremity is partly off the field-of-view. Status post left shoulder hemiarthroplasty and total left hip replacement. Metallic streak artifact limits the evaluation of the adjacent structures. Diffuse soft tissue swelling, skin thickening and subcutaneous edema/inflammation of the left upper arm, left forearm and left hand. Correlate clinically for cellulitis. No abscess is seen. This document has been electronically signed by: Js Edwards MD on 01/22/2023 06:26 AM All CTs at this facility use dose modulation techniques and iterative reconstructions, and/or weight-based dosing when appropriate to reduce radiation to a low as reasonably achievable.    CT HAND LEFT W CONTRAST    Result Date: 1/22/2023  CT left upper extremity with contrast COMPARISON: No prior FINDINGS: The lateral portion of the left upper extremity is partly off the field-of-view. The patient is status post left shoulder hemiarthroplasty and total left hip replacement. Metallic streak artifact limits the evaluation of the adjacent structures. There is diffuse soft tissue swelling, skin thickening and subcutaneous edema/inflammation of the left upper arm, left forearm and left hand. No abscess is seen. No fracture or dislocation is seen.     Lateral portion of the left upper extremity is partly off the field-of-view. Status post left shoulder hemiarthroplasty and total left  hip replacement. Metallic streak artifact limits the evaluation of the adjacent structures. Diffuse soft tissue swelling, skin thickening and subcutaneous edema/inflammation of the left upper arm, left forearm and left hand. Correlate clinically for cellulitis. No abscess is seen. This document has been electronically signed by: Luba Carter MD on 01/22/2023 06:43 AM All CTs at this facility use dose modulation techniques and iterative reconstructions, and/or weight-based dosing when appropriate to reduce radiation to a low as reasonably achievable. XR CHEST PORTABLE    Result Date: 1/21/2023  1 view chest x-ray Comparison: CR/SR - XR CHEST PORTABLE - 01/04/2023 04:26 PM EST Findings: Decrease in pulmonary interstitial densities compared to x-ray 1/4/2023. Right internal iliac catheter terminates in the right atrium. Status post bilateral hip replacement. No acute fractures. Decrease in pulmonary interstitial densities compared to x-ray 1/4/2023.  This document has been electronically signed by: Sweetie Leslie MD on 01/21/2023 11:52 PM      Electronically signed by Mellisa Al PA-C on 2/5/2023 at 11:29 AM

## 2023-02-05 NOTE — PROGRESS NOTES
Patient has q4h sodium collection order in place until midnight tonight. Lab calls due to inability to obtain the 8pm collection until later, they will collect the final sodium at 0200 for a 4 hour window. Patient currently on dextrose support and 2200 sodium level has regained to stable values.  Will report final result to covering care provider once obtained for planned 0200 draw

## 2023-02-06 VITALS
TEMPERATURE: 98 F | RESPIRATION RATE: 20 BRPM | DIASTOLIC BLOOD PRESSURE: 65 MMHG | OXYGEN SATURATION: 97 % | WEIGHT: 183.64 LBS | BODY MASS INDEX: 32.54 KG/M2 | SYSTOLIC BLOOD PRESSURE: 110 MMHG | HEIGHT: 63 IN | HEART RATE: 76 BPM

## 2023-02-06 LAB
ANION GAP SERPL CALC-SCNC: 8 MEQ/L (ref 8–16)
BUN SERPL-MCNC: 5 MG/DL (ref 7–22)
CALCIUM SERPL-MCNC: 8.5 MG/DL (ref 8.5–10.5)
CHLORIDE SERPL-SCNC: 109 MEQ/L (ref 98–111)
CO2 SERPL-SCNC: 26 MEQ/L (ref 23–33)
CREAT SERPL-MCNC: 0.5 MG/DL (ref 0.4–1.2)
DEPRECATED RDW RBC AUTO: 57.3 FL (ref 35–45)
ERYTHROCYTE [DISTWIDTH] IN BLOOD BY AUTOMATED COUNT: 16.9 % (ref 11.5–14.5)
GFR SERPL CREATININE-BSD FRML MDRD: > 60 ML/MIN/1.73M2
GLUCOSE SERPL-MCNC: 74 MG/DL (ref 70–108)
HCT VFR BLD AUTO: 28 % (ref 37–47)
HGB BLD-MCNC: 8.7 GM/DL (ref 12–16)
MCH RBC QN AUTO: 28.4 PG (ref 26–33)
MCHC RBC AUTO-ENTMCNC: 31.1 GM/DL (ref 32.2–35.5)
MCV RBC AUTO: 91.5 FL (ref 81–99)
PLATELET # BLD AUTO: 233 THOU/MM3 (ref 130–400)
PMV BLD AUTO: 9.8 FL (ref 9.4–12.4)
POTASSIUM SERPL-SCNC: 3.6 MEQ/L (ref 3.5–5.2)
RBC # BLD AUTO: 3.06 MILL/MM3 (ref 4.2–5.4)
SODIUM SERPL-SCNC: 143 MEQ/L (ref 135–145)
WBC # BLD AUTO: 15.4 THOU/MM3 (ref 4.8–10.8)

## 2023-02-06 PROCEDURE — 97110 THERAPEUTIC EXERCISES: CPT

## 2023-02-06 PROCEDURE — 97535 SELF CARE MNGMENT TRAINING: CPT

## 2023-02-06 PROCEDURE — 6370000000 HC RX 637 (ALT 250 FOR IP)

## 2023-02-06 PROCEDURE — 36415 COLL VENOUS BLD VENIPUNCTURE: CPT

## 2023-02-06 PROCEDURE — 97530 THERAPEUTIC ACTIVITIES: CPT

## 2023-02-06 PROCEDURE — 85027 COMPLETE CBC AUTOMATED: CPT

## 2023-02-06 PROCEDURE — 2500000003 HC RX 250 WO HCPCS: Performed by: PHYSICIAN ASSISTANT

## 2023-02-06 PROCEDURE — 6370000000 HC RX 637 (ALT 250 FOR IP): Performed by: PHYSICIAN ASSISTANT

## 2023-02-06 PROCEDURE — 80048 BASIC METABOLIC PNL TOTAL CA: CPT

## 2023-02-06 RX ORDER — LACTOBACILLUS RHAMNOSUS GG 10B CELL
1 CAPSULE ORAL
DISCHARGE
Start: 2023-02-07

## 2023-02-06 RX ORDER — BACITRACIN ZINC AND POLYMYXIN B SULFATE 500; 1000 [USP'U]/G; [USP'U]/G
OINTMENT TOPICAL
Refills: 1 | DISCHARGE
Start: 2023-02-06 | End: 2023-02-13

## 2023-02-06 RX ORDER — FERROUS SULFATE 325(65) MG
325 TABLET ORAL 2 TIMES DAILY WITH MEALS
Qty: 30 TABLET | Refills: 3 | DISCHARGE
Start: 2023-02-06

## 2023-02-06 RX ADMIN — TERBINAFINE TABLETS 250 MG 250 MG: 250 TABLET ORAL at 09:08

## 2023-02-06 RX ADMIN — OXYBUTYNIN CHLORIDE 15 MG: 10 TABLET, EXTENDED RELEASE ORAL at 09:06

## 2023-02-06 RX ADMIN — LEVOTHYROXINE SODIUM 125 MCG: 0.12 TABLET ORAL at 09:07

## 2023-02-06 RX ADMIN — MICONAZOLE NITRATE: 20 POWDER TOPICAL at 09:07

## 2023-02-06 RX ADMIN — FERROUS SULFATE TAB 325 MG (65 MG ELEMENTAL FE) 325 MG: 325 (65 FE) TAB at 09:06

## 2023-02-06 RX ADMIN — PANTOPRAZOLE SODIUM 40 MG: 40 TABLET, DELAYED RELEASE ORAL at 06:11

## 2023-02-06 RX ADMIN — DOCUSATE SODIUM 100 MG: 100 CAPSULE, LIQUID FILLED ORAL at 09:06

## 2023-02-06 RX ADMIN — ATORVASTATIN CALCIUM 20 MG: 20 TABLET, FILM COATED ORAL at 09:07

## 2023-02-06 RX ADMIN — HYDROCORTISONE 20 MG: 10 TABLET ORAL at 09:06

## 2023-02-06 RX ADMIN — Medication 1 CAPSULE: at 09:06

## 2023-02-06 NOTE — CARE COORDINATION
2/6/23, 8:20 AM EST    DISCHARGE PLANNING EVALUATION    Call to [facility] with [facility], they have no heard back on precert yet. Call to Maria Luisa Azul with [facility] [place] to see hold up on this. Maria Luisa Deantray is following up with post acute nurse and will call SW back. Call from Maria Luisa Azul reports [facility] was sent determination this morning. Call to [facility] with [facility], she will follow up on this and get back with SW.    Call from [facility], precert is approved. 11:14 AM  Call to [facility], transport set for 3:30-4pm  time. LENNIE faxed transport paperwork to [facility]. SW notified nurse of transport time. Call to pt's  Franchot Player and updated on precert approved and transport time. LENNIE met with pt, she had just spoke with her  and aware of plans to go to [facility] today and transport time. 2/6/23, 11:15 AM EST    Patient goals/plan/ treatment preferences discussed by  and . Patient goals/plan/ treatment preferences reviewed with patient/ family. Patient/ family verbalize understanding of discharge plan and are in agreement with goal/plan/treatment preferences. Understanding was demonstrated using the teach back method. AVS provided by RN at time of discharge, which includes all necessary medical information pertaining to the patients current course of illness, treatment, post-discharge goals of care, and treatment preferences. Services At/After Discharge: [facility] ([facility] and In ambulance [facility] Ambulance       IMM Letter  IMM Letter given to Patient/Family/Significant other/Guardian/POA/by[de-identified] Matilde Yuan RN Case Mgr.   IMM Letter date given[de-identified] 02/06/23  IMM Letter time given[de-identified] 0     LENNIE faxed AVS to [facility]

## 2023-02-06 NOTE — PROGRESS NOTES
[facility] 5  Occupational Therapy  Daily Note  Time:   Time In: 0815  Time Out: 0900  Timed Code Treatment Minutes: 39 Minutes  Minutes: 45          Date: 2023  Patient Name: Carmen Rivera,   Gender: female      Room: [facility]15/015-A  MRN: [id number]  : 1950  (71 y.o.)  Referring Practitioner: Mellisa Al PA-C  Diagnosis: cellulitis  Additional Pertinent Hx: per chart review; Carmen Rivera is a 71 y.o. female with PMHx of paroxysmal A-fib, HTN, HFpEF, hypothyroidism, severe protein calorie malnutrition, and physical debility with deconditioning who presented to [facility] with chief complaint of left upper extremity swelling and pain. Patient was recently discharged on 23 for submandibular cellulitis and discharged to SNF. She endorses having no recollection of injuring her left arm, but stated that the arm started to swell just 3 days ago. Around that time she had stopped her outpatient course of antibiotics. At first when the swelling started she stated that she did not think much about it as it was not bothering her. Then today her arm was quite tender to touch and just throbbed all day, hence the admission to [facility]. Currently she is not having any pain, but that was after administration of Morphine while in the ED. She denies any dizziness, chest pain or shortness of breath. She denies any sick contacts. She does not endorse any tingling or numbness that is unusual for her    Restrictions/Precautions:  Restrictions/Precautions: General Precautions, Fall Risk  Position Activity Restriction  Other position/activity restrictions: skin tears easily, hx CVA with L sided deficits     SUBJECTIVE: Pleasant. Pt agreed to work with therapy and try getting up into a chair.   She stated she had used a walker previously but upon further questioning and showing her the walker and the [place] [employer] and the difference between both of them, pt agreed that it has been some time since using a walker. RN approved session. PAIN: Pt reported pain in her legs when standing. Pt was positioned with her legs elevated in the recliner chair following session and was not in any pain. Vitals: Vitals not assessed per clinical judgement, see nursing flowsheet    COGNITION: Slow Processing, Decreased Insight, and Decreased Problem Solving    ADL:   Grooming: Stand By Assistance. Brushing her hair while upright in the bed  Toileting: Maximum Assistance. Help needed for wiping off her perineum while in standing. Assist of a second person as pt became more fatigued and anxious . BALANCE:  Sitting Balance:  Contact Guard Assistance. Leaning to the sides while scooting forward  Standing Balance: Minimal Assistance. Static standing while holding onto the [facility] CATHY with assist for tucking in her bottom. Cues also provided for keeping her head elevated as pt tends to look down. BED MOBILITY:  Supine to Sit: Minimal Assistance, with head of bed raised, with rail, with increased time for completion Help needed for reaching across for the bedrail on her L side. Scooting: Minimal Assistance, with rail and cues for leaning to her R side while bringing her L hip forward    TRANSFERS:  Sit to Stand: Moderate Assistance, with [facility] CATHY. From the elevated bed surface and also from the recliner chair. Stand to Sit: Minimal Assistance, with [facility] C[facility]. To the chair x 2 trials    FUNCTIONAL MOBILITY:  Not able to assess. ADDITIONAL ACTIVITIES:  Pt was attempting to scoot back in the chair and needed MAX A and verbal cues- difficulty noted with coordination of forward lean and pushing back with her legs- pt's L leg has extensor tone and needed assist for it to remain on the platform of the chair while pushing off    ASSESSMENT:     Activity Tolerance:  Patient tolerance of  treatment: fair. Pt stood for 2 trials of 45 seconds and 1.5 minutes. Pt was very tired with each trial and needed rest breaks between. Discharge Recommendations: Subacute/skilled nursing facility  Equipment Recommendations: Equipment Needed: Yes  Mobility Devices: ADL Assistive Devices  Plan: Times Per Week: 3-5x  Times Per Day: Once a day  Current Treatment Recommendations: Strengthening, ROM, Balance training, Functional mobility training, Endurance training, Neuromuscular re-education, Safety education & training, Equipment evaluation, education, & procurement, Patient/Caregiver education & training, Self-Care / ADL, Coordination training    Patient Education  Patient Education:  Importance of remaining safe with any activities she is doing    Goals  Short Term Goals  Time Frame for Short Term Goals: by discharge  Short Term Goal 1: patient will tolerate 2 min static standing with sit to stand and maintain upright posture with MIN A x 1. Short Term Goal 2: patient will participate in (B) UE AROM HEP and R UE strengthening to increase UB strength and function for functional transfers. Short Term Goal 3: patient will complete simple UB ADLs with MIN  A and edu in juan tech to compensate for L UE hemiparesis. Following session, patient left in safe position with all fall risk precautions in place.

## 2023-02-06 NOTE — PROGRESS NOTES
[address] [address]  INPATIENT PHYSICAL THERAPY  DAILY NOTE  [facility] 5K - 5K-15/015-A     Time In: 0554  Time Out: 1103  Timed Code Treatment Minutes: 23 Minutes  Minutes: 23          Date: 2023  Patient Name: Connie Saini,  Gender:  female        MRN: [id number]  : 1950  (71 y.o.)     Referring Practitioner: Renzo Hart PA-C  Diagnosis: Hypokalemia  Additional Pertinent Hx: 71 y.o. female with PMHx of paroxysmal A-fib, HTN, HFpEF, hypothyroidism, severe protein calorie malnutrition, and physical debility with deconditioning who presented to [address] [place] [address] with chief complaint of left upper extremity swelling and pain. Patient was recently discharged on 23 for submandibular cellulitis and discharged to SNF. She endorses having no recollection of injuring her left arm, but stated that the arm started to swell just 3 days ago. Around that time she had stopped her outpatient course of antibiotics. At first when the swelling started she stated that she did not think much about it as it was not bothering her. Then today her arm was quite tender to touch and just throbbed all day, hence the admission to [address] [place] [address]. Currently she is not having any pain, but that was after administration of Morphine while in the ED. She denies any dizziness, chest pain or shortness of breath. She denies any sick contacts. She does not endorse any tingling or numbness that is unusual for her.      Prior Level of Function:  Type of Home: Facility  Home Layout: One level  Home Access: Level entry  Home Equipment: [facility] Shower/Tub: Walk-in shower  Bathroom Toilet: Handicap height  Bathroom Equipment: Grab bars in shower, Shower chair, Grab bars around toilet  Bathroom Accessibility: Walker accessible    Receives Help From: Family, Other (comment) (facility staff)  ADL Assistance: Needs assistance  Homemaking Assistance: Needs assistance  Ambulation Assistance: Non-ambulatory  Transfer Assistance: Needs assistance  Active : No  Additional Comments: Pt originally home from wtih  but has been in/out of hospital recently and at [facility] for therapy d/t deconditioning and  recently undergoing back surgery and unable to assist her- is main caregiver; baseline is stand pivot only. ramp into entrance of home. Restrictions/Precautions:  Restrictions/Precautions: General Precautions, Fall Risk  Position Activity Restriction  Other position/activity restrictions: skin tears easily, hx CVA with L sided deficits      SUBJECTIVE: Pt sitting up in room chair and agrees to therapy assisting her to return to bed. PAIN: did not rate: Pt grimacing and stating ouch a couple times during session. Vitals: Vitals not assessed per clinical judgement, see nursing flowsheet    OBJECTIVE:  Bed Mobility:  Sit to Supine: Maximum Assistance, with head of bed flat     Transfers:  Sit to Stand: Moderate Assistance, with Kendell Hernandez, with increased time for completion, cues for hand placement, with verbal cues  Stand to Sit:Moderate Assistance, with Kendell Hernandez, with verbal cues  To/From Bed and Chair: Dependent, with Kendell Hernandez    Sit to stand completed x 3-4 trials due to finding that she had incontinence of stool requiring clean up and to get in/out of VADIM Stedy. Ambulation:  Not Tested    Balance:  Static Sitting Balance:  Supervision  Dynamic Sitting Balance: Stand By Assistance  Static Standing Balance: Moderate Assistance  Dynamic Standing Balance: Moderate Assistance, with verbal cues , to tuck buttocks and bring up chest    Exercise:  Patient was guided in 1 set(s) 5-7 reps of exercise to both lower extremities. L LE requires P to AAROM   Ankle pumps, Heelslides, and Hip abduction/adduction. Exercises were completed for increased independence with functional mobility.     Functional Outcome Measures: Completed  AM-PAC Inpatient Mobility without Stair Climbing Raw Score : 8  AM-PAC Inpatient without Stair Climbing T-Scale Score : 30.65    ASSESSMENT:  Assessment: Patient progressing toward established goals. Activity Tolerance:  Patient tolerance of  treatment: good. minus     Equipment Recommendations:Equipment Needed: No  Discharge Recommendations: Continue to assess pending progress and Subacte/Skilled Nursing Facility  Plan: Current Treatment Recommendations: Strengthening, Balance training, Functional mobility training, Transfer training, Endurance training, Safety education & training, Therapeutic activities  General Plan:  (3-5x GM)    Patient Education  Patient Education: Plan of Care, Home Exercise Program, Transfers    Goals:  Patient Goals : none stated  Short Term Goals  Time Frame for Short Term Goals: by discharge  Short Term Goal 1: bed mobility with HOB flat, no rails, mod I for increased functional ind  Short Term Goal 2: sit<>Stand from various surfaces with LRD mod I for safe transfers  Short Term Goal 3: pt to transfer with ayo taylor with CGA for increased safety with transfers  Long Term Goals  Time Frame for Long Term Goals : NA d/t short ELOS    Following session, patient left in safe position with all fall risk precautions in place. Venus Jaffe.  Dolores Ortiz [place] [address]

## 2023-02-06 NOTE — PLAN OF CARE
Problem: Discharge Planning  Goal: Discharge to home or other facility with appropriate resources  Outcome: Adequate for Discharge  Flowsheets (Taken 2/6/2023 0900)  Discharge to home or other facility with appropriate resources:   Identify barriers to discharge with patient and caregiver   Arrange for needed discharge resources and transportation as appropriate     Problem: Safety - Adult  Goal: Free from fall injury  Outcome: Adequate for Discharge  Goal: Educate on transmission based isolation - droplet  Description: Educate on transmission based isolation   Outcome: Adequate for Discharge     Problem: Pain  Goal: Verbalizes/displays adequate comfort level or baseline comfort level  Outcome: Adequate for Discharge  Flowsheets (Taken 2/6/2023 0900)  Verbalizes/displays adequate comfort level or baseline comfort level:   Encourage patient to monitor pain and request assistance   Assess pain using appropriate pain scale     Problem: Nutrition Deficit:  Goal: Optimize nutritional status  Outcome: Adequate for Discharge     Problem: Skin/Tissue Integrity  Goal: Absence of new skin breakdown  Description: 1. Monitor for areas of redness and/or skin breakdown  2. Assess vascular access sites hourly  3. Every 4-6 hours minimum:  Change oxygen saturation probe site  4. Every 4-6 hours:  If on nasal continuous positive airway pressure, respiratory therapy assess nares and determine need for appliance change or resting period.   Outcome: Adequate for Discharge  Goal: Isolation precautions  Description: Isolation precautions  Outcome: Adequate for Discharge     Problem: Chronic Conditions and Co-morbidities  Goal: Patient's chronic conditions and co-morbidity symptoms are monitored and maintained or improved  Outcome: Adequate for Discharge  Flowsheets (Taken 2/6/2023 0900)  Care Plan - Patient's Chronic Conditions and Co-Morbidity Symptoms are Monitored and Maintained or Improved:   Monitor and assess patient's chronic conditions and comorbid symptoms for stability, deterioration, or improvement   Collaborate with multidisciplinary team to address chronic and comorbid conditions and prevent exacerbation or deterioration   Update acute care plan with appropriate goals if chronic or comorbid symptoms are exacerbated and prevent overall improvement and discharge     Care plan reviewed with patient. Patient verbalizes understanding of plan and contributes to goal setting.

## 2023-02-06 NOTE — PROGRESS NOTES
Patient with no IV access of this time. Covering hospitalist made aware and resource RN attempted replacement with no success.  If sodium levels become imbalanced, day shift ED or picc team will need to replace on dayshift

## 2023-02-06 NOTE — PLAN OF CARE
Problem: Discharge Planning  Goal: Discharge to home or other facility with appropriate resources  2/6/2023 0153 by Dee Phipps RN  Outcome: Progressing  2/5/2023 1331 by Roshan Nichols RN  Flowsheets (Taken 2/5/2023 1331)  Discharge to home or other facility with appropriate resources:   Identify barriers to discharge with patient and caregiver   Arrange for needed discharge resources and transportation as appropriate   Identify discharge learning needs (meds, wound care, etc)   Arrange for interpreters to assist at discharge as needed   Refer to discharge planning if patient needs post-hospital services based on physician order or complex needs related to functional status, cognitive ability or social support system     Problem: Safety - Adult  Goal: Free from fall injury  2/6/2023 0153 by Dee Phipps RN  Outcome: Progressing  2/5/2023 1331 by Roshan Nichols RN  Flowsheets (Taken 2/5/2023 1331)  Free From Fall Injury:   Instruct family/caregiver on patient safety   Based on caregiver fall risk screen, instruct family/caregiver to ask for assistance with transferring infant if caregiver noted to have fall risk factors  Goal: Educate on transmission based isolation - droplet  Description: Educate on transmission based isolation   Outcome: Progressing     Problem: Pain  Goal: Verbalizes/displays adequate comfort level or baseline comfort level  2/6/2023 0153 by Dee Phipps RN  Outcome: Progressing  2/5/2023 1331 by Roshan Nichols RN  Flowsheets (Taken 2/5/2023 1331)  Verbalizes/displays adequate comfort level or baseline comfort level:   Encourage patient to monitor pain and request assistance   Assess pain using appropriate pain scale   Administer analgesics based on type and severity of pain and evaluate response   Notify Licensed Independent Practitioner if interventions unsuccessful or patient reports new pain   Consider cultural and social influences on pain and pain management   Implement non-pharmacological measures as appropriate and evaluate response     Problem: Skin/Tissue Integrity  Goal: Absence of new skin breakdown  Description: 1. Monitor for areas of redness and/or skin breakdown  2. Assess vascular access sites hourly  3. Every 4-6 hours minimum:  Change oxygen saturation probe site  4. Every 4-6 hours:  If on nasal continuous positive airway pressure, respiratory therapy assess nares and determine need for appliance change or resting period. 2/6/2023 0153 by Becky Damon RN  Outcome: Progressing  2/5/2023 1331 by Mukesh Hill RN  Outcome: Progressing  Goal: Isolation precautions  Description: Isolation precautions  2/6/2023 0153 by Becky Damon RN  Outcome: Progressing  2/5/2023 1331 by Mukesh Hill RN  Outcome: Progressing     Problem: Chronic Conditions and Co-morbidities  Goal: Patient's chronic conditions and co-morbidity symptoms are monitored and maintained or improved  2/6/2023 0153 by Becky Damon RN  Outcome: Progressing  2/5/2023 1331 by Mukesh Hill RN  Flowsheets (Taken 2/5/2023 1331)  Care Plan - Patient's Chronic Conditions and Co-Morbidity Symptoms are Monitored and Maintained or Improved:   Monitor and assess patient's chronic conditions and comorbid symptoms for stability, deterioration, or improvement   Collaborate with multidisciplinary team to address chronic and comorbid conditions and prevent exacerbation or deterioration   Update acute care plan with appropriate goals if chronic or comorbid symptoms are exacerbated and prevent overall improvement and discharge   Patient with no IV access as of midnight this shift. Precert in process for [employer]. Sodium levels stabilized by day shift. No pain, dyspnea, or anxiety and VSS. Patient with Angelia See in place and oral intake is poor due to thickened liquid and texture reluctance.  Bed alarm on, and frequent repositioning due to sacral and romel region redness with Miconazole topical powder application

## 2023-02-27 ENCOUNTER — TELEPHONE (OUTPATIENT)
Dept: NEPHROLOGY | Age: 73
End: 2023-02-27

## 2023-02-27 DIAGNOSIS — N18.31 STAGE 3A CHRONIC KIDNEY DISEASE (HCC): Primary | ICD-10-CM

## 2023-02-27 NOTE — TELEPHONE ENCOUNTER
Patients  called complaining that [facility] is not giving her bumex as to it was stopped at some point. He states legs are swollen and weeping. I had explained to [facility] that patient has been been seen since 7.21.21 and he verbalized she has been in and out of hospitals and nurshing homes and he is unable to get her here. However Dr. Lidia Barksdale was consulted in on patient on 1.31.23 while inpatient. [facility] would like us to send something to the nursing home to get her back on bumex.

## 2023-03-08 ENCOUNTER — TELEPHONE (OUTPATIENT)
Dept: FAMILY MEDICINE CLINIC | Age: 73
End: 2023-03-08

## 2023-03-09 NOTE — TELEPHONE ENCOUNTER
Left message for Nury Thacker at [facility] to inform her that our office will reach out to patient's  to schedule VV. Spoke with patient's .   VV scheduled with Dr. Ramila Sheth 3/20/23 at 3:30pm.

## 2023-03-20 ENCOUNTER — TELEMEDICINE (OUTPATIENT)
Dept: FAMILY MEDICINE CLINIC | Age: 73
End: 2023-03-20
Payer: MEDICARE

## 2023-03-20 DIAGNOSIS — D32.9 MENINGIOMA (HCC): ICD-10-CM

## 2023-03-20 DIAGNOSIS — K21.9 GASTROESOPHAGEAL REFLUX DISEASE WITHOUT ESOPHAGITIS: ICD-10-CM

## 2023-03-20 DIAGNOSIS — E78.00 PURE HYPERCHOLESTEROLEMIA: ICD-10-CM

## 2023-03-20 DIAGNOSIS — M15.9 PRIMARY OSTEOARTHRITIS INVOLVING MULTIPLE JOINTS: ICD-10-CM

## 2023-03-20 DIAGNOSIS — I48.0 PAF (PAROXYSMAL ATRIAL FIBRILLATION) (HCC): ICD-10-CM

## 2023-03-20 DIAGNOSIS — I10 ESSENTIAL HYPERTENSION: ICD-10-CM

## 2023-03-20 DIAGNOSIS — I50.32 CHRONIC DIASTOLIC CONGESTIVE HEART FAILURE (HCC): ICD-10-CM

## 2023-03-20 DIAGNOSIS — I63.00 CEREBRAL INFARCTION DUE TO THROMBOSIS OF PRECEREBRAL ARTERY (HCC): ICD-10-CM

## 2023-03-20 DIAGNOSIS — E23.0 PANHYPOPITUITARISM (HCC): ICD-10-CM

## 2023-03-20 DIAGNOSIS — E66.9 OBESITY (BMI 30.0-34.9): ICD-10-CM

## 2023-03-20 DIAGNOSIS — D72.828 OTHER ELEVATED WHITE BLOOD CELL (WBC) COUNT: ICD-10-CM

## 2023-03-20 DIAGNOSIS — R82.90 FOUL SMELLING URINE: ICD-10-CM

## 2023-03-20 DIAGNOSIS — R35.0 FREQUENT URINATION: ICD-10-CM

## 2023-03-20 DIAGNOSIS — D68.59 HYPERCOAGULABLE STATE (HCC): ICD-10-CM

## 2023-03-20 DIAGNOSIS — D50.8 OTHER IRON DEFICIENCY ANEMIA: ICD-10-CM

## 2023-03-20 DIAGNOSIS — N18.4 CKD (CHRONIC KIDNEY DISEASE), STAGE IV (HCC): ICD-10-CM

## 2023-03-20 DIAGNOSIS — N18.32 STAGE 3B CHRONIC KIDNEY DISEASE (HCC): ICD-10-CM

## 2023-03-20 DIAGNOSIS — E03.9 ACQUIRED HYPOTHYROIDISM: Primary | ICD-10-CM

## 2023-03-20 DIAGNOSIS — R65.21 SEPTIC SHOCK (HCC): ICD-10-CM

## 2023-03-20 DIAGNOSIS — G81.94 LEFT HEMIPARESIS (HCC): ICD-10-CM

## 2023-03-20 DIAGNOSIS — E23.2 DIABETES INSIPIDUS (HCC): ICD-10-CM

## 2023-03-20 DIAGNOSIS — J45.909 MODERATE ASTHMA WITHOUT COMPLICATION, UNSPECIFIED WHETHER PERSISTENT: ICD-10-CM

## 2023-03-20 DIAGNOSIS — A41.9 SEPTIC SHOCK (HCC): ICD-10-CM

## 2023-03-20 DIAGNOSIS — I69.30 HISTORY OF CVA WITH RESIDUAL DEFICIT: ICD-10-CM

## 2023-03-20 PROCEDURE — 1123F ACP DISCUSS/DSCN MKR DOCD: CPT | Performed by: FAMILY MEDICINE

## 2023-03-20 PROCEDURE — 99215 OFFICE O/P EST HI 40 MIN: CPT | Performed by: FAMILY MEDICINE

## 2023-03-21 NOTE — PROGRESS NOTES
Spoke with patient's . 3 month f/u scheduled as VV 6/21/23 at 11am.    Patient did not need refills. Patient's  did. Refill request entered for .
Handy Gutierrez MD       Social History     Tobacco Use    Smoking status: Never    Smokeless tobacco: Never   Vaping Use    Vaping Use: Never used   Substance Use Topics    Alcohol use: No    Drug use: No        Allergies   Allergen Reactions    Pregabalin      Other reaction(s): dizziness    Tape [Adhesive Tape] Dermatitis   ,   Past Medical History:   Diagnosis Date    Asthma     uses albuterol 1-2x per year    Atrial thrombosis     on [address]    Bursitis     right shoulder -s/p steroid injections    Cancer (HCC)     Chronic diastolic heart failure (HCC)     CVA (cerebral infarction) 6/14/15    Mult. small acute infarctions- Left temporal, internal capsule, basal ganglia    Diabetes insipidus ([address] 1970's    borderline since 1970's    GERD (gastroesophageal reflux disease)     History of diabetes insipidus     History of meningioma of the brain     Hyperlipidemia     Hypertension     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 ([facility]     Dr. Gavin Niño    Hypothyroidism     Meningioma [facility])     3 separate meningiomas, s/p gamma knife (1/2012) , Dr. Vicki Norris at [facility]    MRSA nasal colonization 6/2013    Obesity (BMI 30-39. 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HCC)     status post resection for macroadenoma in the 1970's    Pneumonia 11/2018    Stroke [facility]) 1988    due to radiation -bleeding CVA - residual left upper and lower extremity weakness    Stroke [facility]) October 2015    Urinary incontinence    ,   Past Surgical History:   Procedure Laterality Date    [address] due to pituitary mass, drained and s/p radiation    CHOLECYSTECTOMY      HYSTERECTOMY (CERVIX STATUS UNKNOWN)      total done for DUB    OTHER SURGICAL HISTORY  9/30/2014    LAPAROSCOPIC RUPTURED APPENDECTOMY    OTHER SURGICAL HISTORY Left 10/26/15    Evacuation and Debridement of Left Lower Leg Hematoma - Dr. Jeremy Quinonez

## 2023-04-05 LAB
BUN BLDV-MCNC: 13 MG/DL
CALCIUM SERPL-MCNC: 8.5 MG/DL
CHLORIDE BLD-SCNC: 111 MMOL/L
CO2: 24 MMOL/L
CREAT SERPL-MCNC: 0.8 MG/DL
EGFR: >60
GLUCOSE BLD-MCNC: 104 MG/DL
POTASSIUM SERPL-SCNC: 3.7 MMOL/L
SODIUM BLD-SCNC: 146 MMOL/L

## 2023-04-06 ENCOUNTER — TELEPHONE (OUTPATIENT)
Dept: NEPHROLOGY | Age: 73
End: 2023-04-06

## 2023-04-06 DIAGNOSIS — N18.31 STAGE 3A CHRONIC KIDNEY DISEASE (HCC): Primary | ICD-10-CM

## 2023-04-07 RX ORDER — POTASSIUM CHLORIDE 20 MEQ/1
20 TABLET, EXTENDED RELEASE ORAL DAILY
Qty: 30 TABLET | Refills: 1 | Status: SHIPPED | OUTPATIENT
Start: 2023-04-07

## 2023-04-07 RX ORDER — BUMETANIDE 1 MG/1
1 TABLET ORAL DAILY
Qty: 30 TABLET | Refills: 3 | Status: SHIPPED | OUTPATIENT
Start: 2023-04-07

## 2023-04-07 RX ORDER — BUMETANIDE 1 MG/1
1 TABLET ORAL DAILY
COMMUNITY
End: 2023-04-07 | Stop reason: SDUPTHER

## 2023-04-07 RX ORDER — POTASSIUM CHLORIDE 20 MEQ/1
20 TABLET, EXTENDED RELEASE ORAL DAILY
COMMUNITY
End: 2023-04-07 | Stop reason: SDUPTHER

## 2023-06-05 RX ORDER — POTASSIUM CHLORIDE 20 MEQ/1
20 TABLET, EXTENDED RELEASE ORAL DAILY
Qty: 30 TABLET | Refills: 1 | OUTPATIENT
Start: 2023-06-05

## 2023-06-19 PROBLEM — L98.491 NON-PRESSURE CHRONIC ULCER OF SKIN OF OTHER SITES LIMITED TO BREAKDOWN OF SKIN (HCC): Status: ACTIVE | Noted: 2023-06-19

## 2023-06-21 ENCOUNTER — TELEMEDICINE (OUTPATIENT)
Dept: FAMILY MEDICINE CLINIC | Age: 73
End: 2023-06-21
Payer: MEDICARE

## 2023-06-21 DIAGNOSIS — E66.9 OBESITY (BMI 30.0-34.9): ICD-10-CM

## 2023-06-21 DIAGNOSIS — I10 ESSENTIAL HYPERTENSION: Primary | ICD-10-CM

## 2023-06-21 DIAGNOSIS — L98.491 NON-PRESSURE CHRONIC ULCER OF SKIN OF OTHER SITES LIMITED TO BREAKDOWN OF SKIN (HCC): ICD-10-CM

## 2023-06-21 DIAGNOSIS — F01.A0 MILD VASCULAR DEMENTIA WITHOUT BEHAVIORAL DISTURBANCE, PSYCHOTIC DISTURBANCE, MOOD DISTURBANCE, OR ANXIETY (HCC): ICD-10-CM

## 2023-06-21 DIAGNOSIS — E23.2 DIABETES INSIPIDUS (HCC): ICD-10-CM

## 2023-06-21 DIAGNOSIS — K21.9 GASTROESOPHAGEAL REFLUX DISEASE WITHOUT ESOPHAGITIS: ICD-10-CM

## 2023-06-21 DIAGNOSIS — M15.9 PRIMARY OSTEOARTHRITIS INVOLVING MULTIPLE JOINTS: ICD-10-CM

## 2023-06-21 DIAGNOSIS — D68.59 HYPERCOAGULABLE STATE (HCC): ICD-10-CM

## 2023-06-21 DIAGNOSIS — E78.00 PURE HYPERCHOLESTEROLEMIA: ICD-10-CM

## 2023-06-21 DIAGNOSIS — I48.0 PAF (PAROXYSMAL ATRIAL FIBRILLATION) (HCC): ICD-10-CM

## 2023-06-21 DIAGNOSIS — D32.9 MENINGIOMA (HCC): ICD-10-CM

## 2023-06-21 DIAGNOSIS — N18.32 STAGE 3B CHRONIC KIDNEY DISEASE (HCC): ICD-10-CM

## 2023-06-21 DIAGNOSIS — R35.0 FREQUENT URINATION: ICD-10-CM

## 2023-06-21 DIAGNOSIS — E23.0 PANHYPOPITUITARISM (HCC): ICD-10-CM

## 2023-06-21 DIAGNOSIS — G81.94 LEFT HEMIPARESIS (HCC): ICD-10-CM

## 2023-06-21 DIAGNOSIS — J45.909 MODERATE ASTHMA WITHOUT COMPLICATION, UNSPECIFIED WHETHER PERSISTENT: ICD-10-CM

## 2023-06-21 DIAGNOSIS — E03.9 ACQUIRED HYPOTHYROIDISM: ICD-10-CM

## 2023-06-21 DIAGNOSIS — D72.828 OTHER ELEVATED WHITE BLOOD CELL (WBC) COUNT: ICD-10-CM

## 2023-06-21 DIAGNOSIS — D50.8 OTHER IRON DEFICIENCY ANEMIA: ICD-10-CM

## 2023-06-21 DIAGNOSIS — I69.30 HISTORY OF CVA WITH RESIDUAL DEFICIT: ICD-10-CM

## 2023-06-21 DIAGNOSIS — I63.00 CEREBRAL INFARCTION DUE TO THROMBOSIS OF PRECEREBRAL ARTERY (HCC): ICD-10-CM

## 2023-06-21 DIAGNOSIS — I50.32 CHRONIC DIASTOLIC CONGESTIVE HEART FAILURE (HCC): ICD-10-CM

## 2023-06-21 PROCEDURE — 99215 OFFICE O/P EST HI 40 MIN: CPT | Performed by: FAMILY MEDICINE

## 2023-06-21 PROCEDURE — 1123F ACP DISCUSS/DSCN MKR DOCD: CPT | Performed by: FAMILY MEDICINE

## 2023-06-21 NOTE — PROGRESS NOTES
Pt  is okay with home health, he was going to check with insurance as well  Referral is pending since there are hard stops that writer is unsure of
daily  Christina Ríos DO   lactobacillus (CULTURELLE) capsule Take 1 capsule by mouth daily (with breakfast)  Tanya Olivas PA-C   ferrous sulfate (IRON 325) 325 (65 Fe) MG tablet Take 1 tablet by mouth 2 times daily (with meals)  Pablo Lindquist PA-C   levothyroxine (SYNTHROID) 125 MCG tablet TAKE 1 TABLET BY MOUTH DAILY  Paolo Jennings MD   rivaroxaban (XARELTO) 15 MG TABS tablet TAKE 1 TABLET BY MOUTH DAILY WITH SUPPER  Stephanie Mcmullen MD   oxybutynin (DITROPAN XL) 15 MG extended release tablet TAKE 1 TABLET BY MOUTH DAILY  Paolo Jenninsg MD   pantoprazole (PROTONIX) 40 MG tablet TAKE 1 TABLET BY MOUTH EVERY NIGHT  Paolo Jennings MD   chlorhexidine (HIBICLENS) 4 % external liquid Wash hair with small amount twice weekly. Edna Angelo APRN - CNP   miconazole (MICOTIN) 2 % powder Apply topically 2 times daily PRN for excoriation  Winifred Stewart MD   hydrocortisone (CORTEF) 20 MG tablet Take 20 mg by mouth every morning   Historical Provider, MD   hydrocortisone (CORTEF) 5 MG tablet Take 10 mg by mouth nightly   Historical Provider, MD   diphenhydrAMINE-APAP, sleep, (TYLENOL PM EXTRA STRENGTH)  MG tablet Take 1 tablet by mouth nightly  Historical Provider, MD   acetaminophen (TYLENOL) 325 MG tablet Take 650 mg by mouth every 6 hours as needed for Pain 1 or 2 tabs q6h prn pain  Historical Provider, MD   simvastatin (ZOCOR) 20 MG tablet Take 1 tablet by mouth nightly  Tatyana Cook MD       Social History     Tobacco Use    Smoking status: Never    Smokeless tobacco: Never   Vaping Use    Vaping Use: Never used   Substance Use Topics    Alcohol use: No    Drug use:  No            PHYSICAL EXAMINATION:  [ INSTRUCTIONS:  \"[x]\" Indicates a positive item  \"[]\" Indicates a negative item  -- DELETE ALL ITEMS NOT EXAMINED]  Vital Signs: (As obtained by patient/caregiver or practitioner observation)    Blood pressure-  Heart rate-    Respiratory rate-    Temperature-  Pulse oximetry-

## 2023-06-30 ENCOUNTER — TELEPHONE (OUTPATIENT)
Dept: FAMILY MEDICINE CLINIC | Age: 73
End: 2023-06-30

## 2023-07-23 RX ORDER — PANTOPRAZOLE SODIUM 40 MG/1
TABLET, DELAYED RELEASE ORAL
Qty: 90 TABLET | Refills: 3 | Status: SHIPPED | OUTPATIENT
Start: 2023-07-23

## 2023-08-01 ENCOUNTER — TELEPHONE (OUTPATIENT)
Dept: FAMILY MEDICINE CLINIC | Age: 73
End: 2023-08-01

## 2023-08-08 ENCOUNTER — TELEPHONE (OUTPATIENT)
Dept: FAMILY MEDICINE CLINIC | Age: 73
End: 2023-08-08

## 2023-08-08 NOTE — TELEPHONE ENCOUNTER
Per Dr. Pete Bledsoe, please call [facility] in [facility]. Need last home visit notes faxed to our office. Phone #[phone]. Office was closed for the day.

## 2023-08-09 NOTE — TELEPHONE ENCOUNTER
Number below is incorrect, that is the number for [facility]  Correct phone number is [phone]  Fax number [phone] they need medical release form is

## 2023-08-09 NOTE — TELEPHONE ENCOUNTER
Spoke with pt  Stefan La and he is going to come in today to sign the release of medical record form since he is POA doc.  Scanned in 2015

## 2023-08-16 ENCOUNTER — TELEMEDICINE (OUTPATIENT)
Dept: FAMILY MEDICINE CLINIC | Age: 73
End: 2023-08-16
Payer: MEDICARE

## 2023-08-16 DIAGNOSIS — I69.30 HISTORY OF CVA WITH RESIDUAL DEFICIT: ICD-10-CM

## 2023-08-16 DIAGNOSIS — L03.032 PARONYCHIA OF GREAT TOE OF LEFT FOOT: ICD-10-CM

## 2023-08-16 DIAGNOSIS — R15.9 FULL INCONTINENCE OF FECES: ICD-10-CM

## 2023-08-16 DIAGNOSIS — E03.9 ACQUIRED HYPOTHYROIDISM: ICD-10-CM

## 2023-08-16 DIAGNOSIS — E23.2 DIABETES INSIPIDUS (HCC): ICD-10-CM

## 2023-08-16 DIAGNOSIS — J45.909 MODERATE ASTHMA WITHOUT COMPLICATION, UNSPECIFIED WHETHER PERSISTENT: ICD-10-CM

## 2023-08-16 DIAGNOSIS — G81.94 LEFT HEMIPARESIS (HCC): ICD-10-CM

## 2023-08-16 DIAGNOSIS — E43 SEVERE MALNUTRITION (HCC): ICD-10-CM

## 2023-08-16 DIAGNOSIS — E78.00 PURE HYPERCHOLESTEROLEMIA: ICD-10-CM

## 2023-08-16 DIAGNOSIS — I48.0 PAF (PAROXYSMAL ATRIAL FIBRILLATION) (HCC): ICD-10-CM

## 2023-08-16 DIAGNOSIS — E66.9 OBESITY (BMI 30.0-34.9): ICD-10-CM

## 2023-08-16 DIAGNOSIS — D32.9 MENINGIOMA (HCC): ICD-10-CM

## 2023-08-16 DIAGNOSIS — K21.9 GASTROESOPHAGEAL REFLUX DISEASE WITHOUT ESOPHAGITIS: ICD-10-CM

## 2023-08-16 DIAGNOSIS — D50.8 OTHER IRON DEFICIENCY ANEMIA: ICD-10-CM

## 2023-08-16 DIAGNOSIS — I50.32 CHRONIC DIASTOLIC CONGESTIVE HEART FAILURE (HCC): ICD-10-CM

## 2023-08-16 DIAGNOSIS — I63.00 CEREBRAL INFARCTION DUE TO THROMBOSIS OF PRECEREBRAL ARTERY (HCC): ICD-10-CM

## 2023-08-16 DIAGNOSIS — E23.0 PANHYPOPITUITARISM (HCC): ICD-10-CM

## 2023-08-16 DIAGNOSIS — D72.828 OTHER ELEVATED WHITE BLOOD CELL (WBC) COUNT: ICD-10-CM

## 2023-08-16 DIAGNOSIS — M15.9 PRIMARY OSTEOARTHRITIS INVOLVING MULTIPLE JOINTS: ICD-10-CM

## 2023-08-16 DIAGNOSIS — L98.491 NON-PRESSURE CHRONIC ULCER OF SKIN OF OTHER SITES LIMITED TO BREAKDOWN OF SKIN (HCC): ICD-10-CM

## 2023-08-16 DIAGNOSIS — D68.59 HYPERCOAGULABLE STATE (HCC): ICD-10-CM

## 2023-08-16 DIAGNOSIS — F01.A0 MILD VASCULAR DEMENTIA WITHOUT BEHAVIORAL DISTURBANCE, PSYCHOTIC DISTURBANCE, MOOD DISTURBANCE, OR ANXIETY (HCC): ICD-10-CM

## 2023-08-16 DIAGNOSIS — I10 ESSENTIAL HYPERTENSION: Primary | ICD-10-CM

## 2023-08-16 DIAGNOSIS — N39.42 URINARY INCONTINENCE WITHOUT SENSORY AWARENESS: ICD-10-CM

## 2023-08-16 DIAGNOSIS — N18.32 STAGE 3B CHRONIC KIDNEY DISEASE (HCC): ICD-10-CM

## 2023-08-16 PROCEDURE — 99215 OFFICE O/P EST HI 40 MIN: CPT | Performed by: FAMILY MEDICINE

## 2023-08-16 PROCEDURE — 1123F ACP DISCUSS/DSCN MKR DOCD: CPT | Performed by: FAMILY MEDICINE

## 2023-08-16 RX ORDER — CALAMINE, MENTHOL, WHITE PETROLATUM, ZINC OXIDE .5; .2; 69; 19.5 G/100G; G/100G; G/100G; G/100G
1 PASTE TOPICAL 3 TIMES DAILY
Qty: 113 G | Refills: 5 | Status: SHIPPED | OUTPATIENT
Start: 2023-08-16

## 2023-08-16 RX ORDER — CEPHALEXIN 500 MG/1
500 CAPSULE ORAL 2 TIMES DAILY
Qty: 20 CAPSULE | Refills: 0 | Status: SHIPPED | OUTPATIENT
Start: 2023-08-16 | End: 2023-08-26

## 2023-08-16 NOTE — PROGRESS NOTES
Pt scheduled
transfers and for eating, bathing, toileting, personal cares, ambulating, grooming, hygiene, dressing upper body, dressing lower body, and taking own medications. Current body  . The need for this equipment was discussed with the patient and she understands and is in agreement. Return in about 3 months (around 11/16/2023). Latia Orozco, was evaluated through a synchronous (real-time) audio-video encounter. The patient (or guardian if applicable) is aware that this is a billable service, which includes applicable co-pays. This Virtual Visit was conducted with patient's (and/or legal guardian's) consent. Patient identification was verified, and a caregiver was present when appropriate. The patient was located at Home: [address] [place] [id number]  Provider was located at [address]): [facility] Savi Grissom. [address] [place] [address],  [address] [facility]        Total time spent on this encounter:   Over 50 minutes spent with patient with >50% spent in counseling and coordination of care    --Emmett Negron MD on 8/16/2023 at 3:24 PM    An electronic signature was used to authenticate this note.

## 2023-08-25 ENCOUNTER — TELEPHONE (OUTPATIENT)
Dept: FAMILY MEDICINE CLINIC | Age: 73
End: 2023-08-25

## 2023-08-25 NOTE — TELEPHONE ENCOUNTER
[facility]

## 2023-09-28 DIAGNOSIS — E03.9 ACQUIRED HYPOTHYROIDISM: ICD-10-CM

## 2023-09-28 RX ORDER — LEVOTHYROXINE SODIUM 0.12 MG/1
125 TABLET ORAL DAILY
Qty: 90 TABLET | Refills: 1 | Status: SHIPPED | OUTPATIENT
Start: 2023-09-28

## 2023-10-30 RX ORDER — BUMETANIDE 1 MG/1
1 TABLET ORAL DAILY
Qty: 30 TABLET | Refills: 3 | OUTPATIENT
Start: 2023-10-30

## 2023-10-31 ENCOUNTER — TELEPHONE (OUTPATIENT)
Dept: FAMILY MEDICINE CLINIC | Age: 73
End: 2023-10-31

## 2023-10-31 NOTE — TELEPHONE ENCOUNTER
Pt son Frankie Owusu calling asking how to start getting pt into SNF. Advised to call to local SNF in [place] to be near son per his request.  See if rooms are open and what she had for cost/insurance and to call here if he needs any further help with this. Also erika voiced that pt spouse speedy is being transferred to larger hospital in [place].

## 2023-11-01 ENCOUNTER — APPOINTMENT (OUTPATIENT)
Dept: GENERAL RADIOLOGY | Age: 73
DRG: 884 | End: 2023-11-01
Payer: MEDICARE

## 2023-11-01 ENCOUNTER — HOSPITAL ENCOUNTER (INPATIENT)
Age: 73
LOS: 29 days | Discharge: NURSING, REHAB OR LONG-TERM CARE | DRG: 884 | End: 2023-12-09
Attending: EMERGENCY MEDICINE
Payer: MEDICARE

## 2023-11-01 ENCOUNTER — APPOINTMENT (OUTPATIENT)
Dept: ULTRASOUND IMAGING | Age: 73
DRG: 884 | End: 2023-11-01
Payer: MEDICARE

## 2023-11-01 ENCOUNTER — APPOINTMENT (OUTPATIENT)
Dept: CT IMAGING | Age: 73
DRG: 884 | End: 2023-11-01
Payer: MEDICARE

## 2023-11-01 DIAGNOSIS — L89.102 PRESSURE INJURY OF BACK, STAGE 2 (HCC): ICD-10-CM

## 2023-11-01 DIAGNOSIS — L97.919 ULCERS OF BOTH LOWER EXTREMITIES, UNSPECIFIED ULCER STAGE (HCC): ICD-10-CM

## 2023-11-01 DIAGNOSIS — R62.7 FAILURE TO THRIVE IN ADULT: Primary | ICD-10-CM

## 2023-11-01 DIAGNOSIS — L97.929 ULCERS OF BOTH LOWER EXTREMITIES, UNSPECIFIED ULCER STAGE (HCC): ICD-10-CM

## 2023-11-01 DIAGNOSIS — N39.0 URINARY TRACT INFECTION WITHOUT HEMATURIA, SITE UNSPECIFIED: ICD-10-CM

## 2023-11-01 PROBLEM — R53.81 PHYSICAL DEBILITY: Status: ACTIVE | Noted: 2023-11-01

## 2023-11-01 LAB
ALBUMIN SERPL BCG-MCNC: 4.1 G/DL (ref 3.5–5.1)
ALP SERPL-CCNC: 98 U/L (ref 38–126)
ALT SERPL W/O P-5'-P-CCNC: 16 U/L (ref 11–66)
ANION GAP SERPL CALC-SCNC: 13 MEQ/L (ref 8–16)
AST SERPL-CCNC: 24 U/L (ref 5–40)
BACTERIA URNS QL MICRO: ABNORMAL /HPF
BASOPHILS ABSOLUTE: 0 THOU/MM3 (ref 0–0.1)
BASOPHILS NFR BLD AUTO: 0.3 %
BILIRUB CONJ SERPL-MCNC: < 0.2 MG/DL (ref 0–0.3)
BILIRUB SERPL-MCNC: 0.4 MG/DL (ref 0.3–1.2)
BILIRUB UR QL STRIP.AUTO: NEGATIVE
BUN SERPL-MCNC: 15 MG/DL (ref 7–22)
CALCIUM SERPL-MCNC: 10 MG/DL (ref 8.5–10.5)
CASTS #/AREA URNS LPF: ABNORMAL /LPF
CASTS 2: ABNORMAL /LPF
CHARACTER UR: ABNORMAL
CHLORIDE SERPL-SCNC: 105 MEQ/L (ref 98–111)
CO2 SERPL-SCNC: 29 MEQ/L (ref 23–33)
COLOR: YELLOW
CREAT SERPL-MCNC: 1 MG/DL (ref 0.4–1.2)
CRYSTALS URNS MICRO: ABNORMAL
DEPRECATED RDW RBC AUTO: 46 FL (ref 35–45)
EOSINOPHIL NFR BLD AUTO: 1.3 %
EOSINOPHILS ABSOLUTE: 0.2 THOU/MM3 (ref 0–0.4)
EPITHELIAL CELLS, UA: ABNORMAL /HPF
ERYTHROCYTE [DISTWIDTH] IN BLOOD BY AUTOMATED COUNT: 13.6 % (ref 11.5–14.5)
FLUAV RNA RESP QL NAA+PROBE: NOT DETECTED
FLUBV RNA RESP QL NAA+PROBE: NOT DETECTED
GFR SERPL CREATININE-BSD FRML MDRD: 60 ML/MIN/1.73M2
GLUCOSE SERPL-MCNC: 89 MG/DL (ref 70–108)
GLUCOSE UR QL STRIP.AUTO: NEGATIVE MG/DL
HCT VFR BLD AUTO: 50.3 % (ref 37–47)
HGB BLD-MCNC: 16.1 GM/DL (ref 12–16)
HGB UR QL STRIP.AUTO: ABNORMAL
IMM GRANULOCYTES # BLD AUTO: 0.05 THOU/MM3 (ref 0–0.07)
IMM GRANULOCYTES NFR BLD AUTO: 0.3 %
KETONES UR QL STRIP.AUTO: NEGATIVE
LACTATE SERPL-SCNC: 2.3 MMOL/L (ref 0.5–2)
LYMPHOCYTES ABSOLUTE: 2.5 THOU/MM3 (ref 1–4.8)
LYMPHOCYTES NFR BLD AUTO: 17.4 %
MCH RBC QN AUTO: 29.7 PG (ref 26–33)
MCHC RBC AUTO-ENTMCNC: 32 GM/DL (ref 32.2–35.5)
MCV RBC AUTO: 92.8 FL (ref 81–99)
MISCELLANEOUS 2: ABNORMAL
MONOCYTES ABSOLUTE: 0.6 THOU/MM3 (ref 0.4–1.3)
MONOCYTES NFR BLD AUTO: 4.1 %
NEUTROPHILS NFR BLD AUTO: 76.6 %
NITRITE UR QL STRIP: NEGATIVE
NRBC BLD AUTO-RTO: 0 /100 WBC
NT-PROBNP SERPL IA-MCNC: 121.5 PG/ML (ref 0–124)
OSMOLALITY SERPL CALC.SUM OF ELEC: 292.7 MOSMOL/KG (ref 275–300)
PH UR STRIP.AUTO: 6.5 [PH] (ref 5–9)
PLATELET # BLD AUTO: 366 THOU/MM3 (ref 130–400)
PMV BLD AUTO: 10.2 FL (ref 9.4–12.4)
POTASSIUM SERPL-SCNC: 3.9 MEQ/L (ref 3.5–5.2)
PROT SERPL-MCNC: 6.9 G/DL (ref 6.1–8)
PROT UR STRIP.AUTO-MCNC: NEGATIVE MG/DL
RBC # BLD AUTO: 5.42 MILL/MM3 (ref 4.2–5.4)
RBC URINE: ABNORMAL /HPF
RENAL EPI CELLS #/AREA URNS HPF: ABNORMAL /[HPF]
SARS-COV-2 RNA RESP QL NAA+PROBE: NOT DETECTED
SEGMENTED NEUTROPHILS ABSOLUTE COUNT: 11 THOU/MM3 (ref 1.8–7.7)
SODIUM SERPL-SCNC: 147 MEQ/L (ref 135–145)
SP GR UR REFRACT.AUTO: < 1.005 (ref 1–1.03)
UROBILINOGEN, URINE: 0.2 EU/DL (ref 0–1)
WBC # BLD AUTO: 14.3 THOU/MM3 (ref 4.8–10.8)
WBC #/AREA URNS HPF: > 100 /HPF
WBC #/AREA URNS HPF: ABNORMAL /[HPF]
YEAST LIKE FUNGI URNS QL MICRO: ABNORMAL

## 2023-11-01 PROCEDURE — 81001 URINALYSIS AUTO W/SCOPE: CPT

## 2023-11-01 PROCEDURE — 6360000002 HC RX W HCPCS: Performed by: EMERGENCY MEDICINE

## 2023-11-01 PROCEDURE — 96361 HYDRATE IV INFUSION ADD-ON: CPT

## 2023-11-01 PROCEDURE — 82248 BILIRUBIN DIRECT: CPT

## 2023-11-01 PROCEDURE — 87040 BLOOD CULTURE FOR BACTERIA: CPT

## 2023-11-01 PROCEDURE — 2580000003 HC RX 258: Performed by: EMERGENCY MEDICINE

## 2023-11-01 PROCEDURE — 83605 ASSAY OF LACTIC ACID: CPT

## 2023-11-01 PROCEDURE — 99223 1ST HOSP IP/OBS HIGH 75: CPT | Performed by: STUDENT IN AN ORGANIZED HEALTH CARE EDUCATION/TRAINING PROGRAM

## 2023-11-01 PROCEDURE — 87086 URINE CULTURE/COLONY COUNT: CPT

## 2023-11-01 PROCEDURE — 6360000002 HC RX W HCPCS

## 2023-11-01 PROCEDURE — 71045 X-RAY EXAM CHEST 1 VIEW: CPT

## 2023-11-01 PROCEDURE — 96367 TX/PROPH/DG ADDL SEQ IV INF: CPT

## 2023-11-01 PROCEDURE — 6360000004 HC RX CONTRAST MEDICATION: Performed by: EMERGENCY MEDICINE

## 2023-11-01 PROCEDURE — 6370000000 HC RX 637 (ALT 250 FOR IP): Performed by: STUDENT IN AN ORGANIZED HEALTH CARE EDUCATION/TRAINING PROGRAM

## 2023-11-01 PROCEDURE — 93010 ELECTROCARDIOGRAM REPORT: CPT | Performed by: NUCLEAR MEDICINE

## 2023-11-01 PROCEDURE — 75635 CT ANGIO ABDOMINAL ARTERIES: CPT

## 2023-11-01 PROCEDURE — 96366 THER/PROPH/DIAG IV INF ADDON: CPT

## 2023-11-01 PROCEDURE — 2580000003 HC RX 258

## 2023-11-01 PROCEDURE — G0378 HOSPITAL OBSERVATION PER HR: HCPCS

## 2023-11-01 PROCEDURE — 36556 INSERT NON-TUNNEL CV CATH: CPT

## 2023-11-01 PROCEDURE — 87636 SARSCOV2 & INF A&B AMP PRB: CPT

## 2023-11-01 PROCEDURE — 80053 COMPREHEN METABOLIC PANEL: CPT

## 2023-11-01 PROCEDURE — 36415 COLL VENOUS BLD VENIPUNCTURE: CPT

## 2023-11-01 PROCEDURE — 83880 ASSAY OF NATRIURETIC PEPTIDE: CPT

## 2023-11-01 PROCEDURE — 2580000003 HC RX 258: Performed by: STUDENT IN AN ORGANIZED HEALTH CARE EDUCATION/TRAINING PROGRAM

## 2023-11-01 PROCEDURE — 76770 US EXAM ABDO BACK WALL COMP: CPT

## 2023-11-01 PROCEDURE — 99285 EMERGENCY DEPT VISIT HI MDM: CPT

## 2023-11-01 PROCEDURE — 85025 COMPLETE CBC W/AUTO DIFF WBC: CPT

## 2023-11-01 PROCEDURE — 93005 ELECTROCARDIOGRAM TRACING: CPT | Performed by: EMERGENCY MEDICINE

## 2023-11-01 PROCEDURE — 70450 CT HEAD/BRAIN W/O DYE: CPT

## 2023-11-01 PROCEDURE — 96365 THER/PROPH/DIAG IV INF INIT: CPT

## 2023-11-01 RX ORDER — SODIUM CHLORIDE 9 MG/ML
INJECTION, SOLUTION INTRAVENOUS PRN
Status: DISCONTINUED | OUTPATIENT
Start: 2023-11-01 | End: 2023-12-09 | Stop reason: HOSPADM

## 2023-11-01 RX ORDER — ONDANSETRON 2 MG/ML
4 INJECTION INTRAMUSCULAR; INTRAVENOUS EVERY 6 HOURS PRN
Status: DISCONTINUED | OUTPATIENT
Start: 2023-11-01 | End: 2023-12-09 | Stop reason: HOSPADM

## 2023-11-01 RX ORDER — SODIUM CHLORIDE 9 MG/ML
INJECTION, SOLUTION INTRAVENOUS CONTINUOUS
Status: ACTIVE | OUTPATIENT
Start: 2023-11-01 | End: 2023-11-03

## 2023-11-01 RX ORDER — CHLORHEXIDINE GLUCONATE 4 G/100ML
SOLUTION TOPICAL DAILY PRN
Status: DISCONTINUED | OUTPATIENT
Start: 2023-11-01 | End: 2023-11-01

## 2023-11-01 RX ORDER — HYDROCORTISONE 10 MG/1
20 TABLET ORAL EVERY MORNING
Status: DISCONTINUED | OUTPATIENT
Start: 2023-11-02 | End: 2023-12-09 | Stop reason: HOSPADM

## 2023-11-01 RX ORDER — HYDROCORTISONE 10 MG/1
10 TABLET ORAL NIGHTLY
Status: DISCONTINUED | OUTPATIENT
Start: 2023-11-01 | End: 2023-12-09 | Stop reason: HOSPADM

## 2023-11-01 RX ORDER — ACETAMINOPHEN 325 MG/1
650 TABLET ORAL EVERY 6 HOURS PRN
Status: DISCONTINUED | OUTPATIENT
Start: 2023-11-01 | End: 2023-12-09 | Stop reason: HOSPADM

## 2023-11-01 RX ORDER — POTASSIUM CHLORIDE 20 MEQ/1
40 TABLET, EXTENDED RELEASE ORAL PRN
Status: DISCONTINUED | OUTPATIENT
Start: 2023-11-01 | End: 2023-11-14

## 2023-11-01 RX ORDER — MAGNESIUM SULFATE IN WATER 40 MG/ML
2000 INJECTION, SOLUTION INTRAVENOUS PRN
Status: DISCONTINUED | OUTPATIENT
Start: 2023-11-01 | End: 2023-12-09 | Stop reason: HOSPADM

## 2023-11-01 RX ORDER — POTASSIUM CHLORIDE 7.45 MG/ML
10 INJECTION INTRAVENOUS PRN
Status: DISCONTINUED | OUTPATIENT
Start: 2023-11-01 | End: 2023-11-14

## 2023-11-01 RX ORDER — CALAMINE, MENTHOL, WHITE PETROLATUM, ZINC OXIDE .5; .2; 69; 19.5 G/100G; G/100G; G/100G; G/100G
1 PASTE TOPICAL 3 TIMES DAILY
Status: DISCONTINUED | OUTPATIENT
Start: 2023-11-01 | End: 2023-11-01 | Stop reason: RX

## 2023-11-01 RX ORDER — FERROUS SULFATE 325(65) MG
325 TABLET ORAL 2 TIMES DAILY WITH MEALS
Status: DISCONTINUED | OUTPATIENT
Start: 2023-11-01 | End: 2023-11-01

## 2023-11-01 RX ORDER — SODIUM CHLORIDE 0.9 % (FLUSH) 0.9 %
5-40 SYRINGE (ML) INJECTION EVERY 12 HOURS SCHEDULED
Status: DISCONTINUED | OUTPATIENT
Start: 2023-11-01 | End: 2023-12-09 | Stop reason: HOSPADM

## 2023-11-01 RX ORDER — SODIUM CHLORIDE 0.9 % (FLUSH) 0.9 %
5-40 SYRINGE (ML) INJECTION PRN
Status: DISCONTINUED | OUTPATIENT
Start: 2023-11-01 | End: 2023-12-09 | Stop reason: HOSPADM

## 2023-11-01 RX ORDER — POLYETHYLENE GLYCOL 3350 17 G/17G
17 POWDER, FOR SOLUTION ORAL DAILY PRN
Status: DISCONTINUED | OUTPATIENT
Start: 2023-11-01 | End: 2023-12-09 | Stop reason: HOSPADM

## 2023-11-01 RX ORDER — ACETAMINOPHEN 650 MG/1
650 SUPPOSITORY RECTAL EVERY 6 HOURS PRN
Status: DISCONTINUED | OUTPATIENT
Start: 2023-11-01 | End: 2023-12-09 | Stop reason: HOSPADM

## 2023-11-01 RX ORDER — ONDANSETRON 4 MG/1
4 TABLET, ORALLY DISINTEGRATING ORAL EVERY 8 HOURS PRN
Status: DISCONTINUED | OUTPATIENT
Start: 2023-11-01 | End: 2023-12-09 | Stop reason: HOSPADM

## 2023-11-01 RX ORDER — SIMVASTATIN 20 MG
20 TABLET ORAL DAILY
Status: DISCONTINUED | OUTPATIENT
Start: 2023-11-02 | End: 2023-12-09 | Stop reason: HOSPADM

## 2023-11-01 RX ORDER — ATORVASTATIN CALCIUM 10 MG/1
10 TABLET, FILM COATED ORAL DAILY
Status: DISCONTINUED | OUTPATIENT
Start: 2023-11-02 | End: 2023-11-01

## 2023-11-01 RX ORDER — LEVOTHYROXINE SODIUM 0.12 MG/1
125 TABLET ORAL DAILY
Status: DISCONTINUED | OUTPATIENT
Start: 2023-11-02 | End: 2023-12-09 | Stop reason: HOSPADM

## 2023-11-01 RX ORDER — BUMETANIDE 1 MG/1
1 TABLET ORAL DAILY
Status: DISCONTINUED | OUTPATIENT
Start: 2023-11-02 | End: 2023-12-09 | Stop reason: HOSPADM

## 2023-11-01 RX ORDER — PANTOPRAZOLE SODIUM 40 MG/1
40 TABLET, DELAYED RELEASE ORAL NIGHTLY
Status: DISCONTINUED | OUTPATIENT
Start: 2023-11-01 | End: 2023-12-09 | Stop reason: HOSPADM

## 2023-11-01 RX ORDER — POTASSIUM CHLORIDE 20 MEQ/1
20 TABLET, EXTENDED RELEASE ORAL DAILY
Status: DISCONTINUED | OUTPATIENT
Start: 2023-11-01 | End: 2023-11-01

## 2023-11-01 RX ADMIN — PANTOPRAZOLE SODIUM 40 MG: 40 TABLET, DELAYED RELEASE ORAL at 20:54

## 2023-11-01 RX ADMIN — PIPERACILLIN AND TAZOBACTAM 4500 MG: 4; .5 INJECTION, POWDER, LYOPHILIZED, FOR SOLUTION INTRAVENOUS at 17:39

## 2023-11-01 RX ADMIN — HYDROCORTISONE 10 MG: 10 TABLET ORAL at 20:54

## 2023-11-01 RX ADMIN — IOPAMIDOL 100 ML: 755 INJECTION, SOLUTION INTRAVENOUS at 17:33

## 2023-11-01 RX ADMIN — VANCOMYCIN HYDROCHLORIDE 1750 MG: 5 INJECTION, POWDER, LYOPHILIZED, FOR SOLUTION INTRAVENOUS at 22:02

## 2023-11-01 RX ADMIN — SODIUM CHLORIDE: 9 INJECTION, SOLUTION INTRAVENOUS at 20:00

## 2023-11-01 ASSESSMENT — PAIN - FUNCTIONAL ASSESSMENT
PAIN_FUNCTIONAL_ASSESSMENT: NONE - DENIES PAIN

## 2023-11-01 ASSESSMENT — ENCOUNTER SYMPTOMS: SHORTNESS OF BREATH: 0

## 2023-11-01 NOTE — ED NOTES
EITAN Rao attempted to placed Ultrasound IV, unsuccessful. Dr. Cindy Tse informed.       Rigo Gaming, [address]  11/01/23 7644

## 2023-11-01 NOTE — H&P
Hospitalist History and Physical          Patient:  Emil Post  YOB: 1950    MRN: [id number]     Acct: [de-identified]    PCP: Urszula Molina MD    Date of Admission: 11/1/2023    Date of Service: Pt seen/examined on 11/01/23  and Admitted to Inpatient with expected LOS less than two midnights due to medical therapy. Chief Complaint:  Progressive weakness    ASSESSMENT/PLAN:    Suspected wound infection: See media below. Purulent discharge noted on RLE wound in ED. Leukocytosis with elevated lactate noted on arrival.  CTA with runoff showed loss of contrast in the right anterior tibial artery and right dorsalis pedis artery. Concern for arterial wound  -Continue vancomycin, pharmacy to dose  -Continue empiric Zosyn  -Wound cultures pending  -wound/ostomy following  -consult to ID for de-escalation of antibiotics  -We will review films with radiology in a.m. to discuss need for possible intervention  -Adrenal insufficiency screening pending  -May require stress dose steroids pending hospital course  Lactic acidosis: Lactic 2.3 on arrival.  Continue IV fluids   Renal lesion: measuring 0.7 x 0 0.6 cm  -Follow-up ultrasound for staging and classification  -May consider urology follow-up depending on course  Leukocytosis: Suspect secondary to chronic steroid use and possible wound infection. Sterile pyuria: Without bacteriuria noted on UA on arrival.  Patient is currently asymptomatic. She is on antibiotics as noted above  Physical debility: Patient has baseline deficits as noted below but has recently become bedbound and is unable to care for herself  -PT/OT consult pending  -Consult to social work for placement  Hx of CVA: Multiple ischemic events in 1988, 2015 and this past year. Known deficits with left-sided weakness. Patient is chair bound at baseline. CT head showed no acute findings. Previous infarct noted in the left lateral ventricle.   Paroxysmal atrial fibrillation: [id number]

## 2023-11-01 NOTE — ED NOTES
PT to be transported to [facility] spoke with EITAN Painting prior to departure. VSS no concerns voiced by the patient at this time.       Gunner Alarcon  11/01/23 4254

## 2023-11-01 NOTE — ED PROVIDER NOTES
PATIENT NAME: Chema Barlow  MRN: [id number]  : 1950  ESTEVEZ: 2023    I performed a history and physical examination of the patient and discussed management with the Resident. I reviewed the Resident's note and agree with the documented findings and plan of care. Any areas of disagreement are noted on the chart. I was personally present for the key portions of any procedures and have documented in the chart those procedures where I was not present during the key portions. I have reviewed the emergency nurses triage note and agree with the chief complaint, past medical history, past surgical history, allergies, medications, social and family history as documented unless otherwise noted below. MEDICAL DEDISION MAKING (MDM)     Chema Barlow is a 68 y.o. female who presents to Emergency Department with Sore and Wound Check     Patient is brought to the ED today by her best friend and family member, who noticed weeping wounds with purulent drainage from B/L LEs inclduign lower legs and toes. They also found patient had incontinent of urine and feces and was noted to have progressive weakness over the past two months and no care of her since 5 days after her only care giver, her  was admitted to [facility] due to advanced bladder CA. Her PMH remarkable for CVA w/ lefts sided deficits for past 30 years, paroxysmal atrial fibrillation on Eliquis,  pituitary tumor s/p resection at age 26 in [address], acquired hypothyroidism, which required adrenal insufficiency, left-sided meningioma, and chronic wounds secondary to bedbound status over last 2.5 months. No fever or chills. No SOB. No chest pain. No N/V. Pain from sacral decub sores and wounds from B/L LEs. No clinical signs of acute leg ischemia    Exam: AVSS, heart and lungs are unremarkable. Abdomen soft. Neurologically: Chronic left side hemiparesis. Normal mentation.   Skin: Stage I and II decub ulcer from sacral and ischial area, greater to left side,

## 2023-11-01 NOTE — ED NOTES
Per family, requesting possible admission of nursing home placement in [place] near family as primary care giver was admitted and transferred to [facility].       Kellen Son  11/01/23 3698

## 2023-11-01 NOTE — ED TRIAGE NOTES
Pt presents to the ed with [facility] ems for c/o bed sores. Pts caregiver is in the hospital and other people have been caring for the patient. One of the people who were caring for the patient wanted the patient checked out. pt is fully dependent.

## 2023-11-01 NOTE — ED NOTES
Pt resting in bed at this time, xray called to bedside to confirm placement of central line.       Kellen Burt  11/01/23 2030

## 2023-11-01 NOTE — ED NOTES
Pt back from testing, waiting for vancomycin to come from pharmacy.       Kellen Sorto  11/01/23 1148

## 2023-11-01 NOTE — ED PROVIDER NOTES
[place]          Pt Name: Jonathan Baugh  MRN: [id number]  [address] [place] 1950  Date of evaluation: 11/1/2023  Treating Resident Physician: Bob Liao MD  Supervising Physician: Dr. Tejinder Zacarias    History obtained from patient's daughter-in-law and family friend. CHIEF COMPLAINT       Chief Complaint   Patient presents with    Sore    Wound Check           HISTORY OF PRESENT ILLNESS    HPI  Jonathan Baugh is a 68 y.o. female who presents to the emergency department for evaluation of sacral ulcers and concerns for worsening stroke-like symptoms. Patient's daughter-in-law and family friend at bedside. 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 Presenting to ED today due to concerns for patient's health at home. Patient's  is her primary caretaker, but he was recently admitted to [facility] on 10/27 after being found down at home by family. Patient now in ED for evaluation of worsening weakness and new wounds of concern. Per family report, patient was able to ambulate with assistance over two months ago, but noted to be bedridden for the past two months. Concerns made at that time to bring patient to ED for evaluation, but these requests were denied by the . Patient family concerned since  has history of renal cancer, will be transferred to [facility], and will require continued care. Coming to ED for evaluation and possible need for nursing home placement. The patient has no other acute complaints at this time. REVIEW OF SYSTEMS   Review of Systems   Constitutional:  Negative for fever. Respiratory:  Negative for shortness of breath. Cardiovascular:  Negative for chest pain and palpitations. Skin:  Positive for wound.    Neurological:  Positive

## 2023-11-01 NOTE — ED NOTES
Pt resting in bed at this time, pt denies any needs. Call light within reach. Family at bedside.       [place], [place]  11/01/23 0072

## 2023-11-01 NOTE — CARE COORDINATION
Spoke with patient, daughter in law and family friend. They advised are looking for ECF in [place], [place] area near son and daughter in law. Explanation of skilled services verse private pay were discussed and all questions answered. CMS website provided for listing of facilities in area. Denied concerns or needs. This information was presented to Dr. Lico No.

## 2023-11-01 NOTE — ED NOTES
Pt resting in bed at this time, family at bedside, denies any needs. Call light within reach.       Tramaine Leavitt RN  11/01/23 5851

## 2023-11-02 ENCOUNTER — APPOINTMENT (OUTPATIENT)
Dept: INTERVENTIONAL RADIOLOGY/VASCULAR | Age: 73
DRG: 884 | End: 2023-11-02
Payer: MEDICARE

## 2023-11-02 LAB
ANION GAP SERPL CALC-SCNC: 12 MEQ/L (ref 8–16)
BACTERIA UR CULT: ABNORMAL
BUN SERPL-MCNC: 11 MG/DL (ref 7–22)
CALCIUM SERPL-MCNC: 8.9 MG/DL (ref 8.5–10.5)
CHLORIDE SERPL-SCNC: 109 MEQ/L (ref 98–111)
CO2 SERPL-SCNC: 26 MEQ/L (ref 23–33)
CORTIS SERPL-MCNC: 2.09 UG/DL
CORTISOL COLLECTION INFO: NORMAL
CREAT SERPL-MCNC: 1 MG/DL (ref 0.4–1.2)
DEPRECATED RDW RBC AUTO: 47.2 FL (ref 35–45)
ERYTHROCYTE [DISTWIDTH] IN BLOOD BY AUTOMATED COUNT: 13.7 % (ref 11.5–14.5)
GFR SERPL CREATININE-BSD FRML MDRD: 60 ML/MIN/1.73M2
GLUCOSE SERPL-MCNC: 81 MG/DL (ref 70–108)
HCT VFR BLD AUTO: 42.3 % (ref 37–47)
HGB BLD-MCNC: 13.6 GM/DL (ref 12–16)
MCH RBC QN AUTO: 30.3 PG (ref 26–33)
MCHC RBC AUTO-ENTMCNC: 32.2 GM/DL (ref 32.2–35.5)
MCV RBC AUTO: 94.2 FL (ref 81–99)
ORGANISM: ABNORMAL
PLATELET # BLD AUTO: 288 THOU/MM3 (ref 130–400)
PMV BLD AUTO: 10.3 FL (ref 9.4–12.4)
POTASSIUM SERPL-SCNC: 3.5 MEQ/L (ref 3.5–5.2)
RBC # BLD AUTO: 4.49 MILL/MM3 (ref 4.2–5.4)
SODIUM SERPL-SCNC: 147 MEQ/L (ref 135–145)
T4 FREE SERPL-MCNC: 2.32 NG/DL (ref 0.93–1.76)
TROPONIN, HIGH SENSITIVITY: 31 NG/L (ref 0–12)
TSH SERPL DL<=0.005 MIU/L-ACNC: 0.02 UIU/ML (ref 0.4–4.2)
WBC # BLD AUTO: 15.3 THOU/MM3 (ref 4.8–10.8)

## 2023-11-02 PROCEDURE — 80202 ASSAY OF VANCOMYCIN: CPT

## 2023-11-02 PROCEDURE — 6370000000 HC RX 637 (ALT 250 FOR IP): Performed by: STUDENT IN AN ORGANIZED HEALTH CARE EDUCATION/TRAINING PROGRAM

## 2023-11-02 PROCEDURE — 84134 ASSAY OF PREALBUMIN: CPT

## 2023-11-02 PROCEDURE — 84484 ASSAY OF TROPONIN QUANT: CPT

## 2023-11-02 PROCEDURE — 36415 COLL VENOUS BLD VENIPUNCTURE: CPT

## 2023-11-02 PROCEDURE — G0378 HOSPITAL OBSERVATION PER HR: HCPCS

## 2023-11-02 PROCEDURE — 99232 SBSQ HOSP IP/OBS MODERATE 35: CPT | Performed by: STUDENT IN AN ORGANIZED HEALTH CARE EDUCATION/TRAINING PROGRAM

## 2023-11-02 PROCEDURE — 96361 HYDRATE IV INFUSION ADD-ON: CPT

## 2023-11-02 PROCEDURE — 93925 LOWER EXTREMITY STUDY: CPT

## 2023-11-02 PROCEDURE — 82533 TOTAL CORTISOL: CPT

## 2023-11-02 PROCEDURE — 85027 COMPLETE CBC AUTOMATED: CPT

## 2023-11-02 PROCEDURE — 80048 BASIC METABOLIC PNL TOTAL CA: CPT

## 2023-11-02 PROCEDURE — 97166 OT EVAL MOD COMPLEX 45 MIN: CPT

## 2023-11-02 PROCEDURE — 84443 ASSAY THYROID STIM HORMONE: CPT

## 2023-11-02 PROCEDURE — 2580000003 HC RX 258

## 2023-11-02 PROCEDURE — 2580000003 HC RX 258: Performed by: STUDENT IN AN ORGANIZED HEALTH CARE EDUCATION/TRAINING PROGRAM

## 2023-11-02 PROCEDURE — 84439 ASSAY OF FREE THYROXINE: CPT

## 2023-11-02 PROCEDURE — 6360000002 HC RX W HCPCS

## 2023-11-02 PROCEDURE — 96366 THER/PROPH/DIAG IV INF ADDON: CPT

## 2023-11-02 RX ADMIN — HYDROCORTISONE 10 MG: 10 TABLET ORAL at 20:10

## 2023-11-02 RX ADMIN — RIVAROXABAN 15 MG: 15 TABLET, FILM COATED ORAL at 17:44

## 2023-11-02 RX ADMIN — HYDROCORTISONE 20 MG: 10 TABLET ORAL at 10:00

## 2023-11-02 RX ADMIN — VANCOMYCIN HYDROCHLORIDE 1000 MG: 1 INJECTION, POWDER, LYOPHILIZED, FOR SOLUTION INTRAVENOUS at 11:48

## 2023-11-02 RX ADMIN — BUMETANIDE 1 MG: 1 TABLET ORAL at 10:00

## 2023-11-02 RX ADMIN — SODIUM CHLORIDE, PRESERVATIVE FREE 10 ML: 5 INJECTION INTRAVENOUS at 20:10

## 2023-11-02 RX ADMIN — Medication 20 MG: at 20:10

## 2023-11-02 RX ADMIN — LEVOTHYROXINE SODIUM 125 MCG: 0.12 TABLET ORAL at 10:00

## 2023-11-02 RX ADMIN — POTASSIUM BICARBONATE 40 MEQ: 782 TABLET, EFFERVESCENT ORAL at 22:19

## 2023-11-02 RX ADMIN — POTASSIUM CHLORIDE 40 MEQ: 1500 TABLET, EXTENDED RELEASE ORAL at 09:59

## 2023-11-02 RX ADMIN — SODIUM CHLORIDE: 9 INJECTION, SOLUTION INTRAVENOUS at 13:27

## 2023-11-02 RX ADMIN — PANTOPRAZOLE SODIUM 40 MG: 40 TABLET, DELAYED RELEASE ORAL at 20:10

## 2023-11-02 NOTE — PROGRESS NOTES
Comprehensive Nutrition Assessment    Type and Reason for Visit:  Initial, Consult (ONS, wounds)    Nutrition Recommendations/Plan:   Recommend MVI  Continue diet per provider and encourage PO intakes at best efforts  Start ONS: Ensure Enlive (BID), Abhijit (BID)  Will monitor need for additional nutrition interventions. Malnutrition Assessment:  Malnutrition Status: At risk for malnutrition (Comment) (11/02/23 3167)    Context:  Chronic Illness     Findings of the 6 clinical characteristics of malnutrition:  Energy Intake:  Mild decrease in energy intake (Comment) (minimal intake for 3 days)  Weight Loss:  Unable to assess (-8.1% in 7 months, but unable to confirm as admit wt was stated)     Body Fat Loss:  Unable to assess     Muscle Mass Loss:  Unable to assess    Fluid Accumulation:  Mild Extremities   Strength:  Not Performed    Nutrition Assessment:     Pt. nutritionally compromised AEB wounds and poor PO intake PTA. At risk for further nutrition compromise r/t suspected wound infection, leukocytosis, renal lesion,  physical debility and underlying medical condition (PMHx: CA, CHF, CVA, DM, GERD, meningioma of the brain, HLD, HTN, hypothyroidism, obesity, PAF, stroke). Nutrition Related Findings:    Pt. Report/Treatments/Miscellaneous: Pt being taken to a procedure upon visiting, spoke with daughter in law about nutrition hx. Pt has not been eating well for about 2-3 days PTA, her  has CA and is in the hospital declining health wise. Her  is her primary care taker. Her son usually tries to come and make her meals for the week. Pt is feeling hungry though and did eat well at lunch today. Daughter in law did ask about ensure shakes at home, encouraged to make them for patient as able to. Per EMR pt has been bed bound since September.    GI Status: BM - 10/31  Pertinent Labs: Na 147  Pertinent Meds: bumex, synthroid, protonix, simvastatin, vancomycin     Wound Type:  (pretibial L and Weight    Discharge Planning:     Too soon to determine     Ced Love RD, LD  Contact: [phone]

## 2023-11-02 NOTE — CARE COORDINATION
DISCHARGE PLANNING EVALUATION  11/2/23, 2:59 PM EDT    Reason for Referral: discharge plan  Mental Status: alert, answers some questions   Decision Making:   is POA, is currently hospitalized elsewhere, son and daughter in law are assisting with discharge plan at patient's request  Family/Social/Home Environment: patient is from home with .  has been providing care, is not able to provide care at this time due to his own medical concerns. Son and daughter in law have been assisting, lives in [facility]. Patient requires total care  Current Services including food security, transportation and housekeeping: no current services, family provides all care,  unable to provide care now  Current Equipment: hospital bed, wheelchair  Payment Source: [employer] medicare   Concerns or Barriers to Discharge: will need precert for ecf   Post-acute [facility]) provider list was provided to patient. Patient was informed of their freedom to choose [facility] provider. Discussed and offered to show the patient the relevant [facility] Providers quality and resource use measures on Medicare Compare web site via computer based on patient's goals of care and treatment preferences. Questions regarding selection process were answered. Teach Back Method used with patient and daughter in law, Henri August regarding care plan and discharge plan  Patient and daughter in law verbalized understanding of the plan of care and contribute to goal setting. Patient goals, treatment preferences and discharge plan:  spoke with patient this morning, she confirms plan for ecf, defers to son and daughter in law. Spoke with daughter in law. Their choices for ecf are [facility]  and [facility]. Will need precert for ecf.       Electronically signed by LASHAY Mcrae on 11/2/2023 at 2:59 PM

## 2023-11-02 NOTE — PLAN OF CARE
Problem: Discharge Planning  Goal: Discharge to home or other facility with appropriate resources  Outcome: Progressing     Problem: Skin/Tissue Integrity  Goal: Absence of new skin breakdown  Description: 1. Monitor for areas of redness and/or skin breakdown  2. Assess vascular access sites hourly  3. Every 4-6 hours minimum:  Change oxygen saturation probe site  4. Every 4-6 hours:  If on nasal continuous positive airway pressure, respiratory therapy assess nares and determine need for appliance change or resting period. Outcome: Progressing     Problem: Safety - Adult  Goal: Free from fall injury  Outcome: Progressing     Problem: ABCDS Injury Assessment  Goal: Absence of physical injury  Outcome: Progressing  Care plan reviewed with patient. Patient verbalize understanding of the plan of care and contribute to goal setting.

## 2023-11-02 NOTE — CARE COORDINATION
Case Management Assessment  Initial Evaluation    Date/Time of Evaluation: 11/2/2023 12:28 PM  Assessment Completed by: An Hu RN    If patient is discharged prior to next notation, then this note serves as note for discharge by case management. Patient Name: Kirsten Jon                   YOB: 1950  Diagnosis: Failure to thrive in adult [R62.7]  Physical debility [R53.81]  Urinary tract infection without hematuria, site unspecified [N39.0]  Ulcers of both lower extremities, unspecified ulcer stage ([facility]) [L97.919, L97.929]  Pressure injury of back, stage 2 ([facility]) [L89.102]                   Date / Time: 11/1/2023 11:57 AM  Location: [address] [place] [address]     Patient Admission Status: Observation   Readmission Risk Low 0-14, Mod 15-19), High > 20: Readmission Risk Score: 26.1    Current PCP: Chaya Calvillo MD  PCP verified by CM? Yes    Chart Reviewed: Yes      History Provided by: Patient  Patient Orientation: Alert and Oriented    Patient Cognition: Alert    Hospitalization in the last 30 days (Readmission):  No    If yes, Readmission Assessment in CM Navigator will be completed.     Advance Directives:      Code Status: Full Code   Patient's Primary Decision Maker is: Named in [address]))      Discharge Planning:    Patient lives with: Spouse/Significant Other Type of Home: House  Primary Care Giver: Spouse  Patient Support Systems include: Spouse/Significant Other, Children, Family Members   Current Financial resources: Medicare  Current community resources: None  Current services prior to admission: Durable Medical Equipment            Current DME: Shower Chair, Walker, Wheelchair, Other (Comment) (External Catheter; Electric Bed)            Type of Home Care services:  None    ADLS  Prior functional level: Assistance with the following:, Bathing, Dressing, Toileting, Cooking, Housework, Shopping, Mobility  Current functional level: Assistance with the following:, Bathing, Dressing, Toileting, [address], Housework, Shopping, Mobility    Family can provide assistance at DC: No ( currently hospitalized at [facility])  Would you like Case Management to discuss the discharge plan with any other family members/significant others, and if so, who? Yes (DIL at bedside)  Plans to Return to Present Housing: No  Other Identified Issues/Barriers to RETURNING to current housing: yes;  is sole caregiver. He is currently Hospitalized at [facility]. All other family lives in [facility] and Darlene Sorensen is unable to care for herself. Potential Assistance needed at discharge: [address]            Potential DME:    Patient expects to discharge to: [address] for transportation at discharge:      Financial    Payor: [employer] [address] / Plan: [employer] PREFERRED PPO / Product Type: *No Product type* /     Does insurance require precert for SNF: Yes    Potential assistance Purchasing Medications: No  Meds-to-Beds request: No      [id number], [address] [place] [id number]  Phone: [phone] Fax: [phone]    [place], [place] -  [address] [place] [address] [phone] [facility] [phone]   [address] [place] [address]  Phone: [phone] Fax: [phone]      Notes:    Factors facilitating achievement of predicted outcomes: Family support, Motivated, Cooperative, Pleasant, and Has needed Durable Medical Equipment at home    Barriers to discharge: Limited family support, Upper extremity weakness, Lower extremity weakness, Long standing deficits, Medical complications, and Medication managment    Additional Case Management Notes: To ED for worsening stroke-like symptoms. Hospitalist and ID following. PT/OT to eval. Telemetry. Wound ostomy eval for sacral wound and L shin wound. IVF. IV vancomycin q18h. Afebrile.

## 2023-11-02 NOTE — PROGRESS NOTES
[facility] OCCUPATIONAL THERAPY  [facility] ORTHOPEDICS 7K  EVALUATION    Time:    Time In: 3146  Time Out: 1251  Timed Code Treatment Minutes: 0 Minutes  Minutes: 17          Date: 2023  Patient Name: Rita Tesfaye,   Gender: female      MRN: [id number]  : 1950  (68 y.o.)  Referring Practitioner: Joanie Virk DO  Diagnosis: failure to thrieve  Additional Pertinent Hx: Per ER note on 2023: 68 y.o. female who presents to the emergency department for evaluation of sacral ulcers and concerns for worsening stroke-like symptoms. Patient's daughter-in-law and family friend at bedside. 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 Presenting to ED today due to concerns for patient's health at home. Patient's  is her primary caretaker, but he was recently admitted to [facility] on 10/27 after being found down at home by family. Patient now in ED    Restrictions/Precautions:  Restrictions/Precautions: Fall Risk  Position Activity Restriction  Other position/activity restrictions: L hemiparesis from previous, thin skin with multiple wounds/scabs    Subjective  Chart Reviewed: Yes, Orders, Progress Notes, History and Physical  Patient assessed for rehabilitation services?: Yes  Family / Caregiver Present: No    Subjective: confused in bed upon arrival of therapist    Pain: 0/10: no c/o pain during session    Vitals: Vitals not assessed per clinical judgement, see nursing flowsheet    Social/Functional History:  Lives With: Spouse           ADL Assistance: Needs assistance  Ambulation Assistance: Non-ambulatory          Additional Comments: Pt has been bedbound x 2-3 months.  (caregiver) recently in hospital & unable to A Pt. Pt reports family member from out of town has been staying with her to A.  Pt reports having an Complexity    Treatment Initiated: No treatment initiated    Discharge Recommendations:  [address]    Patient Education:     Patient Education  Education Given To: Patient  Education Provided: Role of Therapy, Plan of Care  Education Method: Demonstration, Verbal  Barriers to Learning: Cognition  Education Outcome: Continued education needed    Equipment Recommendations: Other: monitor pending progress    Plan:  Times Per Week: 3-5x  Current Treatment Recommendations: Functional mobility training, Balance training, Self-Care / ADL, Strengthening, ROM, Endurance training. See long-term goal time frame for expected duration of plan of care. If no long-term goals established, a short length of stay is anticipated. Goals:  Patient goals : \"be able to walk\"  Short Term Goals  Time Frame for Short Term Goals: until discharge  Short Term Goal 1: Pt will tolerate sitting EOB 5-6 min with min A for eventual EOB UB dressing tasks  Short Term Goal 2: Pt will tolerate RUE AROM/light strengthen exercises to increase UB endurance to A with ADLs  Short Term Goal 3: Pt will complete oral care with min A & adaptive strategies prn  Short Term Goal 4: Pt will tolerate further assessment of functional t/fs by OTR when appropriate  Long Term Goals  Time Frame for Long Term Goals : No LTG set d/t short ELOS    AM-PAC Inpatient Daily Activity Raw Score: 11  AM-PAC Inpatient ADL T-Scale Score : 29.04    Following session, patient left in safe position with all fall risk precautions in place.

## 2023-11-03 LAB
ANION GAP SERPL CALC-SCNC: 9 MEQ/L (ref 8–16)
BUN SERPL-MCNC: 7 MG/DL (ref 7–22)
CALCIUM SERPL-MCNC: 8.6 MG/DL (ref 8.5–10.5)
CHLORIDE SERPL-SCNC: 114 MEQ/L (ref 98–111)
CO2 SERPL-SCNC: 25 MEQ/L (ref 23–33)
CREAT SERPL-MCNC: 0.8 MG/DL (ref 0.4–1.2)
DEPRECATED RDW RBC AUTO: 49.3 FL (ref 35–45)
EKG ATRIAL RATE: 67 BPM
EKG P AXIS: 26 DEGREES
EKG P-R INTERVAL: 126 MS
EKG Q-T INTERVAL: 412 MS
EKG QRS DURATION: 88 MS
EKG QTC CALCULATION (BAZETT): 435 MS
EKG R AXIS: -47 DEGREES
EKG T AXIS: 13 DEGREES
EKG VENTRICULAR RATE: 67 BPM
ERYTHROCYTE [DISTWIDTH] IN BLOOD BY AUTOMATED COUNT: 14 % (ref 11.5–14.5)
GFR SERPL CREATININE-BSD FRML MDRD: > 60 ML/MIN/1.73M2
GLUCOSE SERPL-MCNC: 97 MG/DL (ref 70–108)
HCT VFR BLD AUTO: 39.4 % (ref 37–47)
HGB BLD-MCNC: 12.3 GM/DL (ref 12–16)
MCH RBC QN AUTO: 29.9 PG (ref 26–33)
MCHC RBC AUTO-ENTMCNC: 31.2 GM/DL (ref 32.2–35.5)
MCV RBC AUTO: 95.6 FL (ref 81–99)
PLATELET # BLD AUTO: 298 THOU/MM3 (ref 130–400)
PMV BLD AUTO: 10.5 FL (ref 9.4–12.4)
POTASSIUM SERPL-SCNC: 3.7 MEQ/L (ref 3.5–5.2)
PREALB SERPL-MCNC: 16.9 MG/DL (ref 20–40)
RBC # BLD AUTO: 4.12 MILL/MM3 (ref 4.2–5.4)
SODIUM SERPL-SCNC: 148 MEQ/L (ref 135–145)
VANCOMYCIN SERPL-MCNC: 17.8 UG/ML (ref 0.1–39.9)
VANCOMYCIN SERPL-MCNC: 21.6 UG/ML (ref 0.1–39.9)
WBC # BLD AUTO: 13.3 THOU/MM3 (ref 4.8–10.8)

## 2023-11-03 PROCEDURE — 80202 ASSAY OF VANCOMYCIN: CPT

## 2023-11-03 PROCEDURE — 97530 THERAPEUTIC ACTIVITIES: CPT

## 2023-11-03 PROCEDURE — 36415 COLL VENOUS BLD VENIPUNCTURE: CPT

## 2023-11-03 PROCEDURE — 2580000003 HC RX 258: Performed by: STUDENT IN AN ORGANIZED HEALTH CARE EDUCATION/TRAINING PROGRAM

## 2023-11-03 PROCEDURE — 96361 HYDRATE IV INFUSION ADD-ON: CPT

## 2023-11-03 PROCEDURE — 97162 PT EVAL MOD COMPLEX 30 MIN: CPT

## 2023-11-03 PROCEDURE — 99232 SBSQ HOSP IP/OBS MODERATE 35: CPT | Performed by: STUDENT IN AN ORGANIZED HEALTH CARE EDUCATION/TRAINING PROGRAM

## 2023-11-03 PROCEDURE — 96366 THER/PROPH/DIAG IV INF ADDON: CPT

## 2023-11-03 PROCEDURE — G0378 HOSPITAL OBSERVATION PER HR: HCPCS

## 2023-11-03 PROCEDURE — 85027 COMPLETE CBC AUTOMATED: CPT

## 2023-11-03 PROCEDURE — 2580000003 HC RX 258

## 2023-11-03 PROCEDURE — 80048 BASIC METABOLIC PNL TOTAL CA: CPT

## 2023-11-03 PROCEDURE — 6360000002 HC RX W HCPCS

## 2023-11-03 RX ADMIN — LEVOTHYROXINE SODIUM 125 MCG: 0.12 TABLET ORAL at 09:39

## 2023-11-03 RX ADMIN — BUMETANIDE 1 MG: 1 TABLET ORAL at 09:38

## 2023-11-03 RX ADMIN — RIVAROXABAN 15 MG: 15 TABLET, FILM COATED ORAL at 17:45

## 2023-11-03 RX ADMIN — HYDROCORTISONE 20 MG: 10 TABLET ORAL at 09:39

## 2023-11-03 RX ADMIN — Medication 20 MG: at 22:56

## 2023-11-03 RX ADMIN — SODIUM CHLORIDE, PRESERVATIVE FREE 10 ML: 5 INJECTION INTRAVENOUS at 22:57

## 2023-11-03 RX ADMIN — HYDROCORTISONE 10 MG: 10 TABLET ORAL at 22:56

## 2023-11-03 RX ADMIN — VANCOMYCIN HYDROCHLORIDE 1000 MG: 1 INJECTION, POWDER, LYOPHILIZED, FOR SOLUTION INTRAVENOUS at 06:46

## 2023-11-03 RX ADMIN — PANTOPRAZOLE SODIUM 40 MG: 40 TABLET, DELAYED RELEASE ORAL at 22:56

## 2023-11-03 NOTE — PROGRESS NOTES
limited evaluation as the peroneal arteries could not be definitively visualized in either leg. 2. Normal bilateral ankle-brachial indices. 3. Good pulsatility throughout both legs with diffuse multiphasic Doppler waveforms. **This report has been created using voice recognition software. It may contain minor errors which are inherent in voice recognition technology. **      Final report electronically signed by Dr Oswald Giron on 11/2/2023 4:04 PM      US RENAL COMPLETE   Final Result   1. No sonographic findings for hydronephrosis. Questionable right renal    lesion not well seen on ultrasound due to patient immobility. This can be    followed for stability on future CT or MRI. 2. Filling defect partially distended urinary bladder may relate to Huntley    catheter balloon. Blood clot not excluded. This document has been electronically signed by: Billy Webber MD on    11/02/2023 01:05 AM      CT HEAD WO CONTRAST   Final Result   1. No acute intracranial findings. 2. Stable probable meningioma over the parafalcine left frontal lobe. 3. Opacification of the left mastoid air cells can be correlated for    mastoiditis. This document has been electronically signed by: Billy Webber MD on    11/01/2023 06:05 PM      All CTs at this facility use dose modulation techniques and iterative    reconstructions, and/or weight-based dosing   when appropriate to reduce radiation to a low as reasonably achievable. CTA ABDOMINAL AORTA W BILAT RUNOFF W WO CONTRAST   Final Result   1. No significant contrast opacification in the distal right anterior    tibial artery, right dorsalis pedis artery, and left plantaris artery    beginning in the proximal left midfoot. This may relate to underlying    diminished flow or occlusion. 2. Possible complex lesion in the upper midpole of the kidney measuring    0.7 x 0.6 cm.  Nonemergent ultrasound can be helpful to distinguish mass    from complex cyst.   3. Colonic diverticula. 4. Small hiatal hernia. 5. Asymmetric mild subcutaneous edema in the mid-distal left calf and left    ankle. This document has been electronically signed by: Oliver Smith MD on    11/01/2023 06:20 PM      All CTs at this facility use dose modulation techniques and iterative    reconstructions, and/or weight-based dosing   when appropriate to reduce radiation to a low as reasonably achievable. 3D Post-processing was performed on this study. XR CHEST PORTABLE   Final Result   1. Minimal linear peripheral opacities at the left lung base are present which may represent minimal atelectasis or scarring. 2. There is a right subclavian central line present with the tip overlying the cavoatrial junction. No pneumothorax is seen. **This report has been created using voice recognition software. It may contain minor errors which are inherent in voice recognition technology. **      Final report electronically signed by Dr. Gilberto Hill on 11/1/2023 4:56 PM      XR CHEST PORTABLE   Final Result      No acute intrathoracic process. **This report has been created using voice recognition software. It may contain minor errors which are inherent in voice recognition technology. **      Final report electronically signed by Dr. Leon Darnell on 11/1/2023 1:05 PM          DVT prophylaxis: [] Lovenox                                 [] SCDs                                 [] SQ Heparin                                 [] Encourage ambulation           [x] Already on Anticoagulation     Code Status: Full Code    PT/OT Eval Status: Ordered    Tele:   [] yes             [] no        Electronically signed by Guillaume Mcqueen DO on 11/3/2023 at 7:05 AM

## 2023-11-03 NOTE — CONSULTS
[address]                                  CONSULTATION    PATIENT NAME: Joe Giang                      :        1950  MED REC NO:   [id number]                           ROOM:       0013  ACCOUNT NO:   [de-identified]                           ADMIT DATE: 2023  PROVIDER:     Tena Palacio. GAVI Bernal Room:  2023    REASON FOR CONSULTATION:  Leg infection. HISTORY OF PRESENT ILLNESS:  She is a 71-year-old female patient brought  to the hospital from home due to generalized weakness. The patient has  history of CVA with left-sided weakness, known to me from her past  hospitalization, has history of hypothyroidism, adrenal insufficiency,  on chronic steroid; was brought to the hospital by her family. I was  asked to see this patient to evaluate. The patient was noted to have a  draining wound from her right leg. She was brought to hospital.  Her   unfortunately was recently hospitalized and transferred to  [facility] for further treatment and she did not get good care at home. Her son and daughter-in-law who live in [facility] came to see her. I  spoke with her daughter-in-law who updated me. She has no good support  at home. I was asked to evaluate the patient because she has a wound on  her right calf. She has also multiple wounds on her feet and has also a  nonhealing scalp wound. She follows with Dermatology for her scalp  wound and has not heard for the last four months. PAST MEDICAL HISTORY:  Significant for asthma, history of stroke,  congestive heart failure, diabetes insipidus, gout, history of  meningioma of the brain, hyperlipidemia, hypertension, hypopituitarism,  hypothyroidism, history of osteoarthritis, history of left-sided  weakness.     PAST SURGICAL HISTORY:  Includes appendectomy, back surgery, brain  surgery, cholecystectomy, hysterectomy, hip arthroplasty,

## 2023-11-03 NOTE — CARE COORDINATION
11/3/23, 12:21 PM EDT    DISCHARGE PLANNING EVALUATION  Referral completed to [facility], facility is reviewing    [facility] has declined, out of network, updated son, Monty Gray.   Waiting call back from [facility]

## 2023-11-03 NOTE — PROGRESS NOTES
Hospitalist Progress Note      Patient:  Daniel Dee    Unit/Bed:7K-13/013-A  YOB: 1950  MRN: [id number]   Acct: [de-identified]     PCP: Jennifer Mendoza MD  Date of Admission: 11/1/2023      Assessment/Plan:    Suspected wound infection: Improving, see media from H&P purulent discharge noted on RLE wound in ED. Leukocytosis with elevated lactate noted on arrival.    -Continue vancomycin, pharmacy to dose  -Continue empiric Zosyn  -Wound cultures pending  -wound/ostomy following  -consult to ID for de-escalation of antibiotics  -Arterial US to evaluate for ischemia  Lactic acidosis: Improving, lactic 2.3 on arrival.  Continue IV fluids   Renal lesion: measuring 0.7 x 0 0.6 cm. Not well visualized on repeat US  -Follow-up CT on discharge   -May consider urology follow-up depending on course  Leukocytosis: Suspect secondary to chronic steroid use and possible wound infection. Sterile pyuria: Without bacteriuria noted on UA on arrival.  Patient is currently asymptomatic. She is on antibiotics as noted above  Physical debility: Patient has baseline deficits as noted below but has recently become bedbound and is unable to care for herself  -PT/OT following  -Consult to social work for placement  Hx of CVA: Multiple ischemic events in 1988, 2015 and this past year. Known deficits with left-sided weakness. Patient is chair bound at baseline. CT head showed no acute findings. Previous infarct noted in the left lateral ventricle. Paroxysmal atrial fibrillation: [id number] 6 HB 3 on [address] with Xarelto rate controlled Coreg  Meningioma: Stable from previous imaging noted over the parafalcine left frontal lobe. Hx pituitary tumor: s/p surgery performed in [place]. On hydrocortisone and Synthroid  Acquired hypothyroidism: 2/2 pituitary tumor s/p resection. Continue Synthroid  Acquired adrenal insufficiency: We will resume home dose of hydrocortisone. We will check a.m. cortisol.   May require stress dose

## 2023-11-03 NOTE — PLAN OF CARE
Problem: Discharge Planning  Goal: Discharge to home or other facility with appropriate resources  Outcome: Progressing  Flowsheets (Taken 11/3/2023 1155)  Discharge to home or other facility with appropriate resources:   Identify barriers to discharge with patient and caregiver   Arrange for needed discharge resources and transportation as appropriate   Identify discharge learning needs (meds, wound care, etc)     Problem: Skin/Tissue Integrity  Goal: Absence of new skin breakdown  Description: 1. Monitor for areas of redness and/or skin breakdown  2. Assess vascular access sites hourly  3. Every 4-6 hours minimum:  Change oxygen saturation probe site  4. Every 4-6 hours:  If on nasal continuous positive airway pressure, respiratory therapy assess nares and determine need for appliance change or resting period.   Outcome: Progressing     Problem: Safety - Adult  Goal: Free from fall injury  Outcome: Progressing  Flowsheets (Taken 11/3/2023 1155)  Free From Fall Injury: Instruct family/caregiver on patient safety     Problem: ABCDS Injury Assessment  Goal: Absence of physical injury  Outcome: Progressing  Flowsheets (Taken 11/3/2023 1155)  Absence of Physical Injury: Implement safety measures based on patient assessment     Problem: Chronic Conditions and Co-morbidities  Goal: Patient's chronic conditions and co-morbidity symptoms are monitored and maintained or improved  Outcome: Progressing  Flowsheets (Taken 11/3/2023 1155)  Care Plan - Patient's Chronic Conditions and Co-Morbidity Symptoms are Monitored and Maintained or Improved:   Monitor and assess patient's chronic conditions and comorbid symptoms for stability, deterioration, or improvement   Collaborate with multidisciplinary team to address chronic and comorbid conditions and prevent exacerbation or deterioration   Update acute care plan with appropriate goals if chronic or comorbid symptoms are exacerbated and prevent overall improvement and discharge   Care plan reviewed with patient and patient verbalized understanding of the plan of care and contribute to goal setting.

## 2023-11-03 NOTE — CARE COORDINATION
11/3/23, 3:52 PM EDT    DISCHARGE ON GOING EVALUATION    [address] day: 0  Location: 7K-13/013-A Reason for admit: Failure to thrive in adult [R62.7]  Physical debility [R53.81]  Urinary tract infection without hematuria, site unspecified [N39.0]  Ulcers of both lower extremities, unspecified ulcer stage ([address]) [L97.919, L97.929]  Pressure injury of back, stage 2 ([address]) [L89.102]   Procedure:   11/1 CXR: No acute intrathoracic process. 11/1 Repeat CXR: Minimal linear peripheral opacities at the left lung base are present which may represent minimal atelectasis or scarring. 2. There is a right subclavian central line present with the tip overlying the cavoatrial junction. No pneumothorax is seen  11/1 CT Head WO: No acute intracranial findings. 2. Stable probable meningioma over the parafalcine left frontal lobe. 3. Opacification of the left mastoid air cells can be correlated for   mastoiditis  11/1 CTA Abdominal Aorta: No significant contrast opacification in the distal right anterior tibial artery, right dorsalis pedis artery, and left plantaris artery beginning in the proximal left midfoot. This may relate to underlying diminished flow or occlusion. 2. Possible complex lesion in the upper midpole of the kidney measuring 0.7 x 0.6 cm. Nonemergent ultrasound can be helpful to distinguish mass from complex cyst. 3. Colonic diverticula. 4. Small hiatal hernia. 5. Asymmetric mild subcutaneous edema in the mid-distal left calf and left   ankle. 11/2 US Renal: No sonographic findings for hydronephrosis. Questionable right renal   lesion not well seen on ultrasound due to patient immobility. This can be followed for stability on future CT or MRI. 2. Filling defect partially distended urinary bladder may relate to Huntley catheter balloon. Blood clot not excluded  11/2 BLE Arterial DUP:   Slightly limited evaluation as the peroneal arteries could not be definitively visualized in either leg.  Normal bilateral ankle-brachial indices. Good pulsatility throughout both legs with diffuse multiphasic Doppler waveforms. Barriers to Discharge: scalp wounds removed by ID, skin tumor likely cancer on the scalp. Moist dressing care. Left hallux ingrown nail: podiatry consulted. PT/OT follows. PCP: Valentina Gordillo MD   %  Patient Goals/Plan/Treatment Preferences: SW assisting with ECF placement in [facility] area.  See Aretta Simmonds note from today,

## 2023-11-03 NOTE — PROGRESS NOTES
[facility] Continence Nurse  Progress Note       Real Lynch  AGE: 68 y.o. GENDER: female  : 1950  UNIT: 7K-13/013-A  TODAY'S DATE:  11/3/2023  ADMISSION DATE: 2023 11:57 AM    Summary:      Consult received for L shin wound, Sacral wound. Documentation and photo reviewed. Tried to call unit to clarify wounds. No answer. Pt has podiatry on for leg wounds. Pt noted to have shearing vs stage 2 PI to buttocks POA. Recommend staff to utilize Je[facility]arl[facility] and Wound Care order sets. See below. Continue to turn every 2 hours and PRN, offload with pillows, ensure patient is on low air loss support surface [place], SPR+, [place], Bariatric bed), utilize moisture wicking underpads, limit use of depends, and if applicable, use waffle cushion when in chair. If wound evolves during hospital admission, please call. How to: [facility] and Wound Care Orders         Type WOUND in order set search bar. Right click Wound Care Orders, select add to favorites. Right click GEN Jeancarlos Scale Focused, select add to favorites. Check GEN Jeancarlos Scale Focus and Wound Care Orders. Select appropriate orders. Sign orders as Per Protocol: No Cosign Required. These are independent nursing orders.

## 2023-11-03 NOTE — PROGRESS NOTES
[address]   INPATIENT PHYSICAL THERAPY  EVALUATION  [facility] [place] ORTHOPEDICS 7K - 7K-13/013-A    Time In: 8833  Time Out: 8099  Timed Code Treatment Minutes: 13 Minutes  Minutes: 22          Date: 11/3/2023  Patient Name: Xavi Courtney,  Gender:  female        MRN: [id number]  : 1950  (68 y.o.)      Referring Practitioner: David Barajas DO  Diagnosis: Physical debility  Additional Pertinent Hx: 68 y.o. female who presents to [facility] ED 2023 with progressive weakness. Patient is a lifetime nonsmoker with a PMH significant for CVA w/ lefts sided deficits, paroxysmal atrial fibrillation on [address], pituitary tumor s/p resection, acquired hypothyroidism, which required adrenal insufficiency, left-sided meningioma, and chronic wounds secondary to bedbound state. Patient is brought to the ED today by her best friend, a retired RN. She reports she was visiting patient and noticed weeping wounds with purulent drainage around toes. Patient was noted to be incontinent of urine and feces and was noted to have progressive weakness over the past month. Patient is also attended by her daughter who expresses multiple concerns for her going home. Family members have noted progressive weakness since September. At baseline patient has been chair-bound since last CVA and is cared for by her . However  is currently undergoing treatment for complications related to bladder cancer and is currently being treated at [address] [place] [address] following syncopal episode with collapse. She no longer has the support she needs at home. Patient has progressed to becoming been-bed bound since September.      Restrictions/Precautions:  Restrictions/Precautions: Fall Risk, General Precautions  Position Activity Restriction  Other position/activity restrictions: L hemiparesis from previous, thin skin with multiple wounds/scabs    Subjective:  Chart Reviewed: Yes  Patient assessed for rehabilitation services?:

## 2023-11-03 NOTE — CONSULTS
Podiatric Surgery Consult    Reason for Consult:  Ingrown nails  Requesting Physician:  Jose Manuel Almodovar MD    CHIEF COMPLAINT:  Physical debility    HISTORY OF PRESENT ILLNESS:                The patient is a 68 y.o. female with significant past medical history of stroke, Cancer, HF, HTN, Hypothyroid who is seen at bedside on behalf of Dr. Rg Boyer. Patient states she has not walked for several months, at least three. She lives alone at home and was brought to the hospital by family due to inability to care for herself. She states she is not having much pain to her feet today but did notice some ingrown nails to her b/l hallux. She has not sought any treatment for this condition. She denies N/V/F/SOB/C/CP at this time. Past Medical History:        Diagnosis Date    Asthma     uses albuterol 1-2x per year    Atrial thrombosis     on OAC    Bursitis     right shoulder -s/p steroid injections    Cancer (HCC)     Chronic diastolic heart failure (HCC)     CVA (cerebral infarction) 6/14/15    Mult. small acute infarctions- Left temporal, internal capsule, basal ganglia    Diabetes insipidus ([address]) 1970's    borderline since 1970's    GERD (gastroesophageal reflux disease)     History of diabetes insipidus     History of meningioma of the brain     Hyperlipidemia     Hypertension     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 ([address])     Dr. Delos Carrel    Hypothyroidism     Meningioma [facility])     3 separate meningiomas, s/p gamma knife (1/2012) , Dr. Pinky Dandy at [facility]    MRSA nasal colonization 6/2013    Obesity (BMI 30-39. 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 in the 1970's    Pneumonia 11/2018    Stroke [facility]) 1988    due to radiation -bleeding CVA - residual left upper and lower extremity weakness    Stroke [facility]) October 2015    Urinary incontinence      Past Surgical History:        Procedure Hemoglobin 16.1 (H) 12.0 - 16.0 gm/dl    Hematocrit 50.3 (H) 37.0 - 47.0 %    MCV 92.8 81.0 - 99.0 fL    MCH 29.7 26.0 - 33.0 pg    MCHC 32.0 (L) 32.2 - 35.5 gm/dl    RDW-CV 13.6 11.5 - 14.5 %    RDW-SD 46.0 (H) 35.0 - 45.0 fL    Platelets 775 333 - 857 thou/mm3    MPV 10.2 9.4 - 12.4 fL    Seg Neutrophils 76.6 %    Lymphocytes 17.4 %    Monocytes 4.1 %    Eosinophils 1.3 %    Basophils 0.3 %    Immature Granulocytes 0.3 %    Segs Absolute 11.0 (H) 1.8 - 7.7 thou/mm3    Lymphocytes Absolute 2.5 1.0 - 4.8 thou/mm3    Monocytes Absolute 0.6 0.4 - 1.3 thou/mm3    Eosinophils Absolute 0.2 0.0 - 0.4 thou/mm3    Basophils Absolute 0.0 0.0 - 0.1 thou/mm3    Immature Grans (Abs) 0.05 0.00 - 0.07 thou/mm3    nRBC 0 /100 wbc   Basic Metabolic Panel   Result Value Ref Range    Sodium 147 (H) 135 - 145 meq/L    Potassium 3.9 3.5 - 5.2 meq/L    Chloride 105 98 - 111 meq/L    CO2 29 23 - 33 meq/L    Glucose 89 70 - 108 mg/dL    BUN 15 7 - 22 mg/dL    Creatinine 1.0 0.4 - 1.2 mg/dL    Calcium 10.0 8.5 - 10.5 mg/dL   Brain Natriuretic Peptide   Result Value Ref Range    Pro-.5 0.0 - 124.0 pg/mL   Hepatic Function Panel   Result Value Ref Range    Albumin 4.1 3.5 - 5.1 g/dL    Total Bilirubin 0.4 0.3 - 1.2 mg/dL    Bilirubin, Direct <0.2 0.0 - 0.3 mg/dL    Alkaline Phosphatase 98 38 - 126 U/L    AST 24 5 - 40 U/L    ALT 16 11 - 66 U/L    Total Protein 6.9 6.1 - 8.0 g/dL   Lactic Acid   Result Value Ref Range    Lactic Acid 2.3 (H) 0.5 - 2.0 mmol/L   Anion Gap   Result Value Ref Range    Anion Gap 13.0 8.0 - 16.0 meq/L   Osmolality   Result Value Ref Range    Osmolality Calc 292.7 275.0 - 300.0 mOsmol/kg   Glomerular Filtration Rate, Estimated   Result Value Ref Range    Est, Glom Filt Rate 60 >60 ml/min/1.73m2   Urine with Reflexed Micro   Result Value Ref Range    Glucose, Ur NEGATIVE NEGATIVE mg/dl    Bilirubin Urine NEGATIVE NEGATIVE    Ketones, Urine NEGATIVE NEGATIVE    Specific Gravity, Urine < 1.005 1.002 - 1.030

## 2023-11-03 NOTE — CARE COORDINATION
11/3/23, 8:20 AM EDT    DISCHARGE PLANNING EVALUATION    Call made to [address] [facility], message left with referral information for admissions, waiting return call    Call made to [phone], message left with referral information for admissions, waiting return call.

## 2023-11-04 PROCEDURE — 2580000003 HC RX 258: Performed by: STUDENT IN AN ORGANIZED HEALTH CARE EDUCATION/TRAINING PROGRAM

## 2023-11-04 PROCEDURE — G0378 HOSPITAL OBSERVATION PER HR: HCPCS

## 2023-11-04 PROCEDURE — 99232 SBSQ HOSP IP/OBS MODERATE 35: CPT | Performed by: STUDENT IN AN ORGANIZED HEALTH CARE EDUCATION/TRAINING PROGRAM

## 2023-11-04 RX ADMIN — SODIUM CHLORIDE, PRESERVATIVE FREE 10 ML: 5 INJECTION INTRAVENOUS at 21:19

## 2023-11-04 RX ADMIN — LEVOTHYROXINE SODIUM 125 MCG: 0.12 TABLET ORAL at 08:55

## 2023-11-04 RX ADMIN — SODIUM CHLORIDE, PRESERVATIVE FREE 10 ML: 5 INJECTION INTRAVENOUS at 08:53

## 2023-11-04 RX ADMIN — Medication 20 MG: at 21:19

## 2023-11-04 RX ADMIN — HYDROCORTISONE 10 MG: 10 TABLET ORAL at 21:19

## 2023-11-04 RX ADMIN — PANTOPRAZOLE SODIUM 40 MG: 40 TABLET, DELAYED RELEASE ORAL at 21:19

## 2023-11-04 RX ADMIN — BUMETANIDE 1 MG: 1 TABLET ORAL at 08:55

## 2023-11-04 RX ADMIN — HYDROCORTISONE 20 MG: 10 TABLET ORAL at 08:55

## 2023-11-04 RX ADMIN — RIVAROXABAN 15 MG: 15 TABLET, FILM COATED ORAL at 18:33

## 2023-11-04 NOTE — PLAN OF CARE
Problem: Discharge Planning  Goal: Discharge to home or other facility with appropriate resources  Outcome: Progressing  Flowsheets (Taken 11/3/2023 1155 by Dion Erazo, RN)  Discharge to home or other facility with appropriate resources:   Identify barriers to discharge with patient and caregiver   Arrange for needed discharge resources and transportation as appropriate   Identify discharge learning needs (meds, wound care, etc)     Problem: Skin/Tissue Integrity  Goal: Absence of new skin breakdown  Description: 1. Monitor for areas of redness and/or skin breakdown  2. Assess vascular access sites hourly  3. Every 4-6 hours minimum:  Change oxygen saturation probe site  4. Every 4-6 hours:  If on nasal continuous positive airway pressure, respiratory therapy assess nares and determine need for appliance change or resting period.   Outcome: Progressing     Problem: Safety - Adult  Goal: Free from fall injury  Outcome: Progressing  Flowsheets (Taken 11/4/2023 0008 by Aubrey Billings RN)  Free From Fall Injury: Instruct family/caregiver on patient safety     Problem: ABCDS Injury Assessment  Goal: Absence of physical injury  Outcome: Progressing  Flowsheets (Taken 11/4/2023 0008 by Aubrey Billings RN)  Absence of Physical Injury: Implement safety measures based on patient assessment     Problem: Chronic Conditions and Co-morbidities  Goal: Patient's chronic conditions and co-morbidity symptoms are monitored and maintained or improved  Outcome: Progressing  Flowsheets (Taken 11/3/2023 1155 by Dion Erazo, RN)  Care Plan - Patient's Chronic Conditions and Co-Morbidity Symptoms are Monitored and Maintained or Improved:   Monitor and assess patient's chronic conditions and comorbid symptoms for stability, deterioration, or improvement   Collaborate with multidisciplinary team to address chronic and comorbid conditions and prevent exacerbation or deterioration   Update acute care plan with appropriate goals if chronic or comorbid symptoms are exacerbated and prevent overall improvement and discharge

## 2023-11-04 NOTE — PLAN OF CARE
Problem: Discharge Planning  Goal: Discharge to home or other facility with appropriate resources  11/4/2023 1235 by Thong Carcamo LPN  Outcome: Progressing  Flowsheets (Taken 11/4/2023 1235)  Discharge to home or other facility with appropriate resources:   Identify barriers to discharge with patient and caregiver   Arrange for needed discharge resources and transportation as appropriate   Identify discharge learning needs (meds, wound care, etc)     Problem: Skin/Tissue Integrity  Goal: Absence of new skin breakdown  Description: 1. Monitor for areas of redness and/or skin breakdown  2. Assess vascular access sites hourly  3. Every 4-6 hours minimum:  Change oxygen saturation probe site  4. Every 4-6 hours:  If on nasal continuous positive airway pressure, respiratory therapy assess nares and determine need for appliance change or resting period. 11/4/2023 1235 by Thong Carcamo LPN  Outcome: Progressing  Note: No new skin breakdown noted at this time this shift.      Problem: Safety - Adult  Goal: Free from fall injury  11/4/2023 1235 by Thong Carcamo LPN  Outcome: Progressing  Flowsheets (Taken 11/4/2023 1235)  Free From Fall Injury: Instruct family/caregiver on patient safety     Problem: ABCDS Injury Assessment  Goal: Absence of physical injury  11/4/2023 1235 by Thong Carcamo LPN  Outcome: Progressing  Flowsheets (Taken 11/4/2023 1235)  Absence of Physical Injury: Implement safety measures based on patient assessment     Problem: Chronic Conditions and Co-morbidities  Goal: Patient's chronic conditions and co-morbidity symptoms are monitored and maintained or improved  11/4/2023 1235 by Thong Carcamo LPN  Outcome: Progressing  Flowsheets (Taken 11/4/2023 1235)  Care Plan - Patient's Chronic Conditions and Co-Morbidity Symptoms are Monitored and Maintained or Improved:   Monitor and assess patient's chronic conditions and comorbid symptoms for stability, deterioration, or improvement skill demonstration/verbal instruction/written material

## 2023-11-05 LAB
DEPRECATED RDW RBC AUTO: 48.6 FL (ref 35–45)
ERYTHROCYTE [DISTWIDTH] IN BLOOD BY AUTOMATED COUNT: 13.9 % (ref 11.5–14.5)
HCT VFR BLD AUTO: 39.7 % (ref 37–47)
HGB BLD-MCNC: 12.5 GM/DL (ref 12–16)
MCH RBC QN AUTO: 30 PG (ref 26–33)
MCHC RBC AUTO-ENTMCNC: 31.5 GM/DL (ref 32.2–35.5)
MCV RBC AUTO: 95.2 FL (ref 81–99)
PLATELET # BLD AUTO: 290 THOU/MM3 (ref 130–400)
PMV BLD AUTO: 10.3 FL (ref 9.4–12.4)
RBC # BLD AUTO: 4.17 MILL/MM3 (ref 4.2–5.4)
SODIUM SERPL-SCNC: 145 MEQ/L (ref 135–145)
WBC # BLD AUTO: 11.4 THOU/MM3 (ref 4.8–10.8)

## 2023-11-05 PROCEDURE — 99231 SBSQ HOSP IP/OBS SF/LOW 25: CPT | Performed by: STUDENT IN AN ORGANIZED HEALTH CARE EDUCATION/TRAINING PROGRAM

## 2023-11-05 PROCEDURE — G0378 HOSPITAL OBSERVATION PER HR: HCPCS

## 2023-11-05 PROCEDURE — 85027 COMPLETE CBC AUTOMATED: CPT

## 2023-11-05 PROCEDURE — 2580000003 HC RX 258: Performed by: STUDENT IN AN ORGANIZED HEALTH CARE EDUCATION/TRAINING PROGRAM

## 2023-11-05 PROCEDURE — 84295 ASSAY OF SERUM SODIUM: CPT

## 2023-11-05 RX ADMIN — BUMETANIDE 1 MG: 1 TABLET ORAL at 08:57

## 2023-11-05 RX ADMIN — HYDROCORTISONE 20 MG: 10 TABLET ORAL at 08:58

## 2023-11-05 RX ADMIN — SODIUM CHLORIDE, PRESERVATIVE FREE 10 ML: 5 INJECTION INTRAVENOUS at 08:59

## 2023-11-05 RX ADMIN — Medication 20 MG: at 21:45

## 2023-11-05 RX ADMIN — SODIUM CHLORIDE, PRESERVATIVE FREE 10 ML: 5 INJECTION INTRAVENOUS at 21:45

## 2023-11-05 RX ADMIN — RIVAROXABAN 15 MG: 15 TABLET, FILM COATED ORAL at 17:33

## 2023-11-05 RX ADMIN — HYDROCORTISONE 10 MG: 10 TABLET ORAL at 21:45

## 2023-11-05 RX ADMIN — PANTOPRAZOLE SODIUM 40 MG: 40 TABLET, DELAYED RELEASE ORAL at 21:45

## 2023-11-05 RX ADMIN — LEVOTHYROXINE SODIUM 125 MCG: 0.12 TABLET ORAL at 08:59

## 2023-11-05 ASSESSMENT — PAIN SCALES - GENERAL: PAINLEVEL_OUTOF10: 0

## 2023-11-05 NOTE — PROGRESS NOTES
Hospitalist Progress Note      Patient:  Jenifer Williamson    Unit/Bed:7K-13/013-A  YOB: 1950  MRN: [id number]   Acct: [de-identified]     PCP: Flavio Ferreira MD  Date of Admission: 11/1/2023    Patient was seen at bedside on 11/4/23 orignally misfiled   Assessment/Plan:    Chronic nonhealing wounds: Improving, see media from H&P. Initial concern for infection with purulent discharge noted on RLE wound in ED. Leukocytosis with elevated lactate noted on arrival.   -Following discussion with infectious disease we will continue to monitor patient off of antibiotics  -Arterial US shows no evidence of PAD  Physical debility: Patient has baseline deficits 2/2 CVA as noted below but has recently become bedbound and is unable to care for herself  -PT/OT following  -Consult to social work for placement  Renal lesion: measuring 0.7 x 0 0.6 cm. Not well visualized on repeat US  -Follow-up CT on discharge   -May consider urology follow-up depending on course  Protein Calorie malnutrition: low prealbumin. Dietician following. Protein supplement added  Hypernatremia: suspect 2/2 dehydration. Encourage PO intake. Trend  Ingrown toenail: Podiatry following. 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 ID following  Sterile pyuria: Without bacteriuria noted on UA on arrival.  Patient is currently asymptomatic. She is on antibiotics as noted above  Lactic acidosis (resolved)  lactic 2.3 on arrival.  Improved IV fluids  Leukocytosis (improving): Suspect secondary to chronic steroid use and possible wound infection. Hx of CVA: Multiple ischemic events in 1988, 2015 and this past year. Known deficits with left-sided weakness. Patient is chair bound at baseline. CT head showed no acute findings. Previous infarct noted in the left lateral ventricle.   Paroxysmal atrial fibrillation: [id number] 6 HB 3 on OAC with Xarelto rate controlled Coreg  Meningioma: Stable from NEGATIVE 11/01/2023 12:15 PM       Radiology:  VL DUP LOWER EXTREMITY ARTERIES BILATERAL   Final Result   1. Slightly limited evaluation as the peroneal arteries could not be definitively visualized in either leg. 2. Normal bilateral ankle-brachial indices. 3. Good pulsatility throughout both legs with diffuse multiphasic Doppler waveforms. **This report has been created using voice recognition software. It may contain minor errors which are inherent in voice recognition technology. **      Final report electronically signed by Dr Jj Levy on 11/2/2023 4:04 PM      US RENAL COMPLETE   Final Result   1. No sonographic findings for hydronephrosis. Questionable right renal    lesion not well seen on ultrasound due to patient immobility. This can be    followed for stability on future CT or MRI. 2. Filling defect partially distended urinary bladder may relate to [place]    catheter balloon. Blood clot not excluded. This document has been electronically signed by: Lorelee Primrose, MD on    11/02/2023 01:05 AM      CT HEAD WO CONTRAST   Final Result   1. No acute intracranial findings. 2. Stable probable meningioma over the parafalcine left frontal lobe. 3. Opacification of the left mastoid air cells can be correlated for    mastoiditis. This document has been electronically signed by: Lorelee Primrose, MD on    11/01/2023 06:05 PM      All CTs at this facility use dose modulation techniques and iterative    reconstructions, and/or weight-based dosing   when appropriate to reduce radiation to a low as reasonably achievable. CTA ABDOMINAL AORTA W BILAT RUNOFF W WO CONTRAST   Final Result   1. No significant contrast opacification in the distal right anterior    tibial artery, right dorsalis pedis artery, and left plantaris artery    beginning in the proximal left midfoot. This may relate to underlying    diminished flow or occlusion.    2. Possible complex lesion in the upper midpole of the

## 2023-11-05 NOTE — PLAN OF CARE
Problem: Discharge Planning  Goal: Discharge to home or other facility with appropriate resources  11/5/2023 1148 by Balbir Barrett RN  Outcome: Progressing  Flowsheets  Taken 11/5/2023 1148 by Balbir Barrett RN  Discharge to home or other facility with appropriate resources:   Identify barriers to discharge with patient and caregiver   Arrange for needed discharge resources and transportation as appropriate    Problem: Skin/Tissue Integrity  Goal: Absence of new skin breakdown  Description: 1. Monitor for areas of redness and/or skin breakdown  2. Assess vascular access sites hourly  3. Every 4-6 hours minimum:  Change oxygen saturation probe site  4. Every 4-6 hours:  If on nasal continuous positive airway pressure, respiratory therapy assess nares and determine need for appliance change or resting period.   11/5/2023 1148 by Balbir Barrett RN  Outcome: Progressing     Problem: Safety - Adult  Goal: Free from fall injury  11/5/2023 1148 by Balbir Barrett RN  Outcome: Progressing  Flowsheets  Taken 11/5/2023 1148 by Balbir Barrett RN  Free From Fall Injury: Instruct family/caregiver on patient safety    Problem: ABCDS Injury Assessment  Goal: Absence of physical injury  11/5/2023 1148 by Balbir Barrett RN  Outcome: Progressing  Flowsheets  Taken 11/5/2023 1148 by Balbir Barrett RN  Absence of Physical Injury: Implement safety measures based on patient assessment    Problem: Chronic Conditions and Co-morbidities  Goal: Patient's chronic conditions and co-morbidity symptoms are monitored and maintained or improved  11/5/2023 1148 by Balbir Barrett RN  Outcome: Progressing  Flowsheets  Taken 11/5/2023 1148 by Twan Hull [facility] - Patient's Chronic Conditions and Co-Morbidity Symptoms are Monitored and Maintained or Improved:   Monitor and assess patient's chronic conditions and comorbid symptoms for stability, deterioration, or improvement   Collaborate with multidisciplinary team to address

## 2023-11-05 NOTE — PLAN OF CARE
Problem: Discharge Planning  Goal: Discharge to home or other facility with appropriate resources  Outcome: Progressing  Flowsheets (Taken 11/5/2023 0311)  Discharge to home or other facility with appropriate resources:   Identify barriers to discharge with patient and caregiver   Arrange for needed discharge resources and transportation as appropriate   Identify discharge learning needs (meds, wound care, etc)   Refer to discharge planning if patient needs post-hospital services based on physician order or complex needs related to functional status, cognitive ability or social support system     Problem: Skin/Tissue Integrity  Goal: Absence of new skin breakdown  Description: 1. Monitor for areas of redness and/or skin breakdown  2. Assess vascular access sites hourly  3. Every 4-6 hours minimum:  Change oxygen saturation probe site  4. Every 4-6 hours:  If on nasal continuous positive airway pressure, respiratory therapy assess nares and determine need for appliance change or resting period. Outcome: Progressing  Note: See flowsheets for skin integrity documentation.       Problem: Safety - Adult  Goal: Free from fall injury  Outcome: Progressing  Flowsheets  Taken 11/5/2023 0311  Free From Fall Injury: Instruct family/caregiver on patient safety  Taken 11/5/2023 0153  Free From Fall Injury: Instruct family/caregiver on patient safety     Problem: ABCDS Injury Assessment  Goal: Absence of physical injury  Outcome: Progressing  Flowsheets  Taken 11/5/2023 0311  Absence of Physical Injury: Implement safety measures based on patient assessment  Taken 11/5/2023 0153  Absence of Physical Injury: Implement safety measures based on patient assessment     Problem: Chronic Conditions and Co-morbidities  Goal: Patient's chronic conditions and co-morbidity symptoms are monitored and maintained or improved  Outcome: Progressing  Flowsheets (Taken 11/5/2023 0311)  Care Plan - Patient's Chronic Conditions and Co-Morbidity

## 2023-11-05 NOTE — PROGRESS NOTES
Hospitalist Progress Note      Patient:  Alice Sutton    Unit/Bed:7K-13/013-A  YOB: 1950  MRN: [id number]   Acct: [de-identified]     PCP: Davie Kayser, MD  Date of Admission: 11/1/2023    Patient was seen at bedside on 11/4/23 orignally misfiled   Assessment/Plan:    Chronic nonhealing wounds: Improving, see media from H&P. Initial concern for infection with purulent discharge noted on RLE wound in ED. Leukocytosis with elevated lactate noted on arrival.   -Following discussion with infectious disease we will continue to monitor patient off of antibiotics  -Arterial US shows no evidence of PAD  Physical debility: Patient has baseline deficits 2/2 CVA as noted below but has recently become bedbound and is unable to care for herself  -PT/OT following  -Consult to social work for placement  Renal lesion: measuring 0.7 x 0 0.6 cm. Not well visualized on repeat US  -Follow-up CT on discharge   -May consider urology follow-up depending on course  Protein Calorie malnutrition: low prealbumin. Dietician consulted  Hypernatremia: suspect 2/2 dehydration. Encourage PO intake. Trend  Ingrown toenail: Podiatry following. Plan for extraction on discharge  Fungating lesion: Suspect malignancy associated with scalp  Sterile pyuria: Without bacteriuria noted on UA on arrival.  Patient is currently asymptomatic. She is on antibiotics as noted above  Lactic acidosis (resolved)  lactic 2.3 on arrival.  Improved IV fluids  Leukocytosis (improving): Suspect secondary to chronic steroid use and possible wound infection. Hx of CVA: Multiple ischemic events in 1988, 2015 and this past year. Known deficits with left-sided weakness. Patient is chair bound at baseline. CT head showed no acute findings. Previous infarct noted in the left lateral ventricle.   Paroxysmal atrial fibrillation: [id number] 6 HB 3 on [address] with Xarelto rate controlled Coreg  Meningioma: Stable from previous imaging noted over the

## 2023-11-06 PROBLEM — E44.0 MODERATE MALNUTRITION (HCC): Chronic | Status: ACTIVE | Noted: 2023-11-06

## 2023-11-06 LAB
ANION GAP SERPL CALC-SCNC: 8 MEQ/L (ref 8–16)
BACTERIA BLD AEROBE CULT: NORMAL
BACTERIA BLD AEROBE CULT: NORMAL
BUN SERPL-MCNC: 9 MG/DL (ref 7–22)
CALCIUM SERPL-MCNC: 9 MG/DL (ref 8.5–10.5)
CHLORIDE SERPL-SCNC: 106 MEQ/L (ref 98–111)
CO2 SERPL-SCNC: 31 MEQ/L (ref 23–33)
CREAT SERPL-MCNC: 0.9 MG/DL (ref 0.4–1.2)
DEPRECATED RDW RBC AUTO: 48 FL (ref 35–45)
ERYTHROCYTE [DISTWIDTH] IN BLOOD BY AUTOMATED COUNT: 13.8 % (ref 11.5–14.5)
GFR SERPL CREATININE-BSD FRML MDRD: > 60 ML/MIN/1.73M2
GLUCOSE SERPL-MCNC: 88 MG/DL (ref 70–108)
HCT VFR BLD AUTO: 36.7 % (ref 37–47)
HGB BLD-MCNC: 11.6 GM/DL (ref 12–16)
MCH RBC QN AUTO: 30.1 PG (ref 26–33)
MCHC RBC AUTO-ENTMCNC: 31.6 GM/DL (ref 32.2–35.5)
MCV RBC AUTO: 95.3 FL (ref 81–99)
PLATELET # BLD AUTO: 263 THOU/MM3 (ref 130–400)
PMV BLD AUTO: 10.2 FL (ref 9.4–12.4)
POTASSIUM SERPL-SCNC: 3.5 MEQ/L (ref 3.5–5.2)
RBC # BLD AUTO: 3.85 MILL/MM3 (ref 4.2–5.4)
SODIUM SERPL-SCNC: 145 MEQ/L (ref 135–145)
WBC # BLD AUTO: 10.3 THOU/MM3 (ref 4.8–10.8)

## 2023-11-06 PROCEDURE — 2580000003 HC RX 258: Performed by: STUDENT IN AN ORGANIZED HEALTH CARE EDUCATION/TRAINING PROGRAM

## 2023-11-06 PROCEDURE — 36415 COLL VENOUS BLD VENIPUNCTURE: CPT

## 2023-11-06 PROCEDURE — 99231 SBSQ HOSP IP/OBS SF/LOW 25: CPT | Performed by: STUDENT IN AN ORGANIZED HEALTH CARE EDUCATION/TRAINING PROGRAM

## 2023-11-06 PROCEDURE — G0378 HOSPITAL OBSERVATION PER HR: HCPCS

## 2023-11-06 PROCEDURE — 97110 THERAPEUTIC EXERCISES: CPT

## 2023-11-06 PROCEDURE — 97535 SELF CARE MNGMENT TRAINING: CPT

## 2023-11-06 PROCEDURE — 80048 BASIC METABOLIC PNL TOTAL CA: CPT

## 2023-11-06 PROCEDURE — 85027 COMPLETE CBC AUTOMATED: CPT

## 2023-11-06 RX ADMIN — Medication 20 MG: at 22:13

## 2023-11-06 RX ADMIN — BUMETANIDE 1 MG: 1 TABLET ORAL at 09:28

## 2023-11-06 RX ADMIN — RIVAROXABAN 15 MG: 15 TABLET, FILM COATED ORAL at 18:01

## 2023-11-06 RX ADMIN — HYDROCORTISONE 20 MG: 10 TABLET ORAL at 09:28

## 2023-11-06 RX ADMIN — PANTOPRAZOLE SODIUM 40 MG: 40 TABLET, DELAYED RELEASE ORAL at 22:13

## 2023-11-06 RX ADMIN — LEVOTHYROXINE SODIUM 125 MCG: 0.12 TABLET ORAL at 09:28

## 2023-11-06 RX ADMIN — SODIUM CHLORIDE, PRESERVATIVE FREE 10 ML: 5 INJECTION INTRAVENOUS at 22:12

## 2023-11-06 RX ADMIN — HYDROCORTISONE 10 MG: 10 TABLET ORAL at 22:13

## 2023-11-06 ASSESSMENT — PAIN SCALES - GENERAL: PAINLEVEL_OUTOF10: 0

## 2023-11-06 NOTE — PROGRESS NOTES
Comprehensive Nutrition Assessment    Type and Reason for Visit:  Reassess    Nutrition Recommendations/Plan:   Recommend MVI  Continue easy to chew diet per provider and encourage PO intake at best efforts  Modify ONS: Ensure Enlive (BID), Magic cups (BID)  Will monitor need for additional nutrition interventions. Malnutrition Assessment:  Malnutrition Status: Moderate malnutrition (11/06/23 1451)    Context:  Chronic Illness     Findings of the 6 clinical characteristics of malnutrition:  Energy Intake:  Mild decrease in energy intake   Weight Loss:  Unable to assess (r/t CHF and edema)     Body Fat Loss:  Mild body fat loss Orbital   Muscle Mass Loss:  Mild muscle mass loss Temples (temporalis)  Fluid Accumulation:  Mild Extremities, Generalized   Strength:  Not Performed    Nutrition Assessment:     Pt. moderately malnourished AEB criteria as listed above. At risk for further nutrition compromise r/t chronic nonhealing wounds, physical debility 2/2 CVA - recently became bed-bound, renal lesion, and underlying medical condition (PMHx: CA, CHF, CVA, DM, GERD, meningioma of the brain, HLD, HTN, hypothyroidism, obesity, PAF). Nutrition Related Findings:    Pt. Report/Treatments/Miscellaneous: Pt seen, reports good appetite and that she has been eating well now and PTA - per last RD visit, daughter in law stated that she has not eaten much recently r/t her  being in the hospital in [facility]. Pt reports good tolerance of Ensure and wishes to continue. Pt agreed to trying magic cups as well. Pt states she doesn't really like the food at [facility] - only tolerating the cooked veggies and occasionally the meat options. Discussed other options to try and encouraged pt to continue ordering things she will eat. Pt to d/c to ECF in [facility] to be closer to  and family members.    GI Status: BM - 11/5  Pertinent Labs: Hgb 11.6  Pertinent Meds: bumex, synthroid, protonix, simvastatin, ferrous sulfate Wound Type:  (pretibial L and R, Buttocks pressure injury R + L)       Current Nutrition Intake & Therapies:    Average Meal Intake: 26-50%  Average Supplements Intake:  (pt reports good tolerance of Ensure, wishes to continue)  ADULT DIET; Easy to Chew  ADULT ORAL NUTRITION SUPPLEMENT; Breakfast, Lunch; Standard High Calorie/High Protein Oral Supplement  ADULT ORAL NUTRITION SUPPLEMENT; Lunch; Frozen Oral Supplement    Anthropometric Measures:  Height: 162.6 cm (5' 4\")  Ideal Body Weight (IBW): 120 lbs (55 kg)    Admission Body Weight: 76.2 kg (168 lb) (11/1: RUE/LUE + 1, RLE/LLE +1, stated weight)  Current Body Weight: 76.2 kg (167 lb 15.9 oz) (11/1: RUE/LUE + 1, RLE/LLE +1, stated weight),   Current BMI (kg/m2): 28.8  Usual Body Weight:  (per EMR: 11/23/22: 193#, 2/6/23: 183# 10 oz)     Weight Adjustment For: No Adjustment                 BMI Categories: Overweight (BMI 25.0-29. 9)    Estimated Daily Nutrient Needs:  Energy Requirements Based On: Kcal/kg  Weight Used for Energy Requirements: Current (76.2 kg)  Energy (kcal/day): 4186-8234 kcals (25-30 kcals/kg)  Weight Used for Protein Requirements: Ideal (54.5 kg)  Protein (g/day): 71-82 grams (1.3-1.5 grams/kg of IBW) to promote wound healing efforts       Nutrition Diagnosis:   Moderate malnutrition, In context of chronic illness related to increase demand for energy/nutrients as evidenced by wounds, Criteria as identified in malnutrition assessment    Nutrition Interventions:   Food and/or Nutrient Delivery: Continue Current Diet, Modify Oral Nutrition Supplement  Nutrition Education/Counseling: Education initiated (encouraged PO intakes at best efforts and encouraged ONS intake, discussed food options she has enjoyed this admit)  Coordination of Nutrition Care: Continue to monitor while inpatient       Goals:  Previous Goal Met: No Progress toward Goal(s)  Goals: Meet at least 75% of estimated needs, by next RD assessment       Nutrition Monitoring and

## 2023-11-06 NOTE — PLAN OF CARE
Problem: Discharge Planning  Goal: Discharge to home or other facility with appropriate resources  11/5/2023 2348 by Ethan Vásquez RN  Outcome: Progressing  Flowsheets (Taken 11/5/2023 2348)  Discharge to home or other facility with appropriate resources:   Identify barriers to discharge with patient and caregiver   Arrange for needed discharge resources and transportation as appropriate   Identify discharge learning needs (meds, wound care, etc)   Refer to discharge planning if patient needs post-hospital services based on physician order or complex needs related to functional status, cognitive ability or social support system     Problem: Skin/Tissue Integrity  Goal: Absence of new skin breakdown  Description: 1. Monitor for areas of redness and/or skin breakdown  2. Assess vascular access sites hourly  3. Every 4-6 hours minimum:  Change oxygen saturation probe site  4. Every 4-6 hours:  If on nasal continuous positive airway pressure, respiratory therapy assess nares and determine need for appliance change or resting period. 11/5/2023 2348 by Ethan Vásquez RN  Outcome: Progressing  Note: See flowsheets for skin integrity documentation.      Problem: Safety - Adult  Goal: Free from fall injury  11/5/2023 2348 by Ethan Vásquez RN  Outcome: Progressing  Flowsheets  Taken 11/5/2023 2348  Free From Fall Injury: Instruct family/caregiver on patient safety  Taken 11/5/2023 2157  Free From Fall Injury: Instruct family/caregiver on patient safety     Problem: ABCDS Injury Assessment  Goal: Absence of physical injury  11/5/2023 2348 by Ethan Vásquez RN  Outcome: Progressing  Flowsheets  Taken 11/5/2023 2348  Absence of Physical Injury: Implement safety measures based on patient assessment  Taken 11/5/2023 2157  Absence of Physical Injury: Implement safety measures based on patient assessment     Problem: Chronic Conditions and Co-morbidities  Goal: Patient's chronic conditions and co-morbidity symptoms are medication as ordered   Assess activities of daily living deficits and provide assistive devices as needed   Obtain physical therapy/occupational therapy consults as needed   Assist and instruct patient to increase activity and self care as tolerated     Problem: Metabolic/Fluid and Electrolytes - Adult  Goal: Electrolytes maintained within normal limits  11/5/2023 2348 by Geri Choi RN  Outcome: Progressing  Flowsheets (Taken 11/5/2023 2348)  Electrolytes maintained within normal limits:   Monitor labs and assess patient for signs and symptoms of electrolyte imbalances   Administer electrolyte replacement as ordered     Problem: Pain  Goal: Verbalizes/displays adequate comfort level or baseline comfort level  11/5/2023 2348 by Geri Choi RN  Outcome: Progressing  Flowsheets (Taken 11/5/2023 2348)  Verbalizes/displays adequate comfort level or baseline comfort level:   Encourage patient to monitor pain and request assistance   Assess pain using appropriate pain scale   Implement non-pharmacological measures as appropriate and evaluate response   Administer analgesics based on type and severity of pain and evaluate response    Care plan reviewed with patient . Patient verbalizes understanding of the plan of care and contribute to goal setting.

## 2023-11-06 NOTE — PROGRESS NOTES
Pt is A&O x4. Pt is hard of hearing. Speech is clear. PERRLA. Pt reports no pain. Breath sounds clear anteriorly and posteriorly. Heart sounds normal sinus rhythm, clear S1 and S2. Radial pulses 1+, weak. Posterior tibial pulses 1+, weak. Skin is dry, scattered scabs upper & lower extremities. LUE, LLE weakness. No peripheral edema present. Scabs present on scalp. Skin on heels soft/squishy bilaterally. Active bowel sounds. No abdominal tenderness. Last BM 11/5. Urine is yellow, clear. Pt is voiding w/o difficulty.

## 2023-11-06 NOTE — CARE COORDINATION
11/6/23, 8:24 AM EST    DISCHARGE PLANNING EVALUATION       Left a message with admissions at [facility] to follow up on referral made last week.

## 2023-11-06 NOTE — PROGRESS NOTES
[address] [facility]  Occupational Therapy  Daily Note  Time:   Time In: 3118  Time Out: 1445  Timed Code Treatment Minutes: 23 Minutes  Minutes: 23          Date: 2023  Patient Name: Wade Gardner,   Gender: female      Room: [facility]13/013-A  MRN: [id number]  : 1950  (68 y.o.)  Referring Practitioner: Sarah Bocanegra DO  Diagnosis: failure to thrieve  Additional Pertinent Hx: Per ER note on 2023: 68 y.o. female who presents to the emergency department for evaluation of sacral ulcers and concerns for worsening stroke-like symptoms. Patient's daughter-in-law and family friend at bedside. 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 Presenting to ED today due to concerns for patient's health at home. Patient's  is her primary caretaker, but he was recently admitted to [facility] on 10/27 after being found down at home by family. Patient now in ED    Restrictions/Precautions:  Restrictions/Precautions: Fall Risk, General Precautions  Position Activity Restriction  Other position/activity restrictions: L hemiparesis from previous, thin skin with multiple wounds/scabs     SUBJECTIVE: RN okayed OT session. Pt. Pleasant and cooperative throughout. PAIN: Denies    Vitals: Nurse checked vitals prior to session    COGNITION: Decreased Recall    ADL:   Grooming: Stand By Assistance, with set-up, with verbal cues , and with increased time for completion. To complete oral care, wash face and hands and to apply lotion on hands . ADDITIONAL ACTIVITIES:  Patient completed RUE strengthening exercises with skilled education on HEP: completed x10 reps x1 set with AROM in all joints and all planes in order to improve UE strength and activity tolerance required for BADL routine and toilet / shower transfers.  Patient tolerated , requiring  rest breaks. Patient also required visual and verbal cues for technique. AM-PAC Inpatient Daily Activity Raw Score: 10  AM-PAC Inpatient ADL T-Scale Score : 27.31  ADL Inpatient CMS 0-100% Score: 74.7      ASSESSMENT:     Activity Tolerance:  Patient tolerance of  treatment: Good treatment tolerance      Discharge Recommendations: ECF with OT  Equipment Recommendations: Other: monitor pending progress  Plan: Times Per Week: 3-5x  Current Treatment Recommendations: Functional mobility training, Balance training, Self-Care / ADL, Strengthening, ROM, Endurance training    Patient Education  Patient Education: ADL's, Home Exercise Program, and Importance of Increasing Activity    Goals  Short Term Goals  Time Frame for Short Term Goals: until discharge  Short Term Goal 1: Pt will tolerate sitting EOB 5-6 min with min A for eventual EOB UB dressing tasks  Short Term Goal 2: Pt will tolerate RUE AROM/light strengthen exercises to increase UB endurance to A with ADLs  Short Term Goal 3: Pt will complete oral care with min A & adaptive strategies prn  Short Term Goal 4: Pt will tolerate further assessment of functional t/fs by OTR when appropriate  Long Term Goals  Time Frame for Long Term Goals : No LTG set d/t short ELOS    Following session, patient left in safe position with all fall risk precautions in place.

## 2023-11-06 NOTE — PLAN OF CARE
Problem: Discharge Planning  Goal: Discharge to home or other facility with appropriate resources  Outcome: Progressing  Flowsheets (Taken 11/6/2023 1352)  Discharge to home or other facility with appropriate resources:   Identify barriers to discharge with patient and caregiver   Arrange for needed discharge resources and transportation as appropriate   Identify discharge learning needs (meds, wound care, etc)     Problem: Skin/Tissue Integrity  Goal: Absence of new skin breakdown  Description: 1. Monitor for areas of redness and/or skin breakdown  2. Assess vascular access sites hourly  3. Every 4-6 hours minimum:  Change oxygen saturation probe site  4. Every 4-6 hours:  If on nasal continuous positive airway pressure, respiratory therapy assess nares and determine need for appliance change or resting period.   Outcome: Progressing     Problem: Safety - Adult  Goal: Free from fall injury  Outcome: Progressing  Flowsheets (Taken 11/6/2023 1358)  Free From Fall Injury: Instruct family/caregiver on patient safety     Problem: ABCDS Injury Assessment  Goal: Absence of physical injury  Outcome: Progressing  Flowsheets (Taken 11/6/2023 1351)  Absence of Physical Injury: Implement safety measures based on patient assessment     Problem: Chronic Conditions and Co-morbidities  Goal: Patient's chronic conditions and co-morbidity symptoms are monitored and maintained or improved  Outcome: Progressing  Flowsheets (Taken 11/6/2023 1354)  Care Plan - Patient's Chronic Conditions and Co-Morbidity Symptoms are Monitored and Maintained or Improved:   Monitor and assess patient's chronic conditions and comorbid symptoms for stability, deterioration, or improvement   Collaborate with multidisciplinary team to address chronic and comorbid conditions and prevent exacerbation or deterioration   Update acute care plan with appropriate goals if chronic or comorbid symptoms are exacerbated and prevent overall improvement and

## 2023-11-07 LAB
ANION GAP SERPL CALC-SCNC: 6 MEQ/L (ref 8–16)
BUN SERPL-MCNC: 10 MG/DL (ref 7–22)
CALCIUM SERPL-MCNC: 8.9 MG/DL (ref 8.5–10.5)
CHLORIDE SERPL-SCNC: 108 MEQ/L (ref 98–111)
CO2 SERPL-SCNC: 31 MEQ/L (ref 23–33)
CREAT SERPL-MCNC: 1 MG/DL (ref 0.4–1.2)
DEPRECATED RDW RBC AUTO: 48.2 FL (ref 35–45)
ERYTHROCYTE [DISTWIDTH] IN BLOOD BY AUTOMATED COUNT: 13.7 % (ref 11.5–14.5)
GFR SERPL CREATININE-BSD FRML MDRD: 60 ML/MIN/1.73M2
GLUCOSE SERPL-MCNC: 91 MG/DL (ref 70–108)
HCT VFR BLD AUTO: 35.7 % (ref 37–47)
HGB BLD-MCNC: 11.2 GM/DL (ref 12–16)
MCH RBC QN AUTO: 29.9 PG (ref 26–33)
MCHC RBC AUTO-ENTMCNC: 31.4 GM/DL (ref 32.2–35.5)
MCV RBC AUTO: 95.5 FL (ref 81–99)
PLATELET # BLD AUTO: 278 THOU/MM3 (ref 130–400)
PMV BLD AUTO: 10.3 FL (ref 9.4–12.4)
POTASSIUM SERPL-SCNC: 3.5 MEQ/L (ref 3.5–5.2)
RBC # BLD AUTO: 3.74 MILL/MM3 (ref 4.2–5.4)
SODIUM SERPL-SCNC: 145 MEQ/L (ref 135–145)
WBC # BLD AUTO: 11.5 THOU/MM3 (ref 4.8–10.8)

## 2023-11-07 PROCEDURE — 99231 SBSQ HOSP IP/OBS SF/LOW 25: CPT | Performed by: STUDENT IN AN ORGANIZED HEALTH CARE EDUCATION/TRAINING PROGRAM

## 2023-11-07 PROCEDURE — 36592 COLLECT BLOOD FROM PICC: CPT

## 2023-11-07 PROCEDURE — 97535 SELF CARE MNGMENT TRAINING: CPT

## 2023-11-07 PROCEDURE — 36415 COLL VENOUS BLD VENIPUNCTURE: CPT

## 2023-11-07 PROCEDURE — 97110 THERAPEUTIC EXERCISES: CPT

## 2023-11-07 PROCEDURE — 80048 BASIC METABOLIC PNL TOTAL CA: CPT

## 2023-11-07 PROCEDURE — G0378 HOSPITAL OBSERVATION PER HR: HCPCS

## 2023-11-07 PROCEDURE — 85027 COMPLETE CBC AUTOMATED: CPT

## 2023-11-07 PROCEDURE — 97530 THERAPEUTIC ACTIVITIES: CPT

## 2023-11-07 PROCEDURE — 2580000003 HC RX 258: Performed by: STUDENT IN AN ORGANIZED HEALTH CARE EDUCATION/TRAINING PROGRAM

## 2023-11-07 RX ADMIN — SODIUM CHLORIDE, PRESERVATIVE FREE 10 ML: 5 INJECTION INTRAVENOUS at 21:45

## 2023-11-07 RX ADMIN — HYDROCORTISONE 10 MG: 10 TABLET ORAL at 21:44

## 2023-11-07 RX ADMIN — PANTOPRAZOLE SODIUM 40 MG: 40 TABLET, DELAYED RELEASE ORAL at 21:45

## 2023-11-07 RX ADMIN — RIVAROXABAN 15 MG: 15 TABLET, FILM COATED ORAL at 18:26

## 2023-11-07 RX ADMIN — SODIUM CHLORIDE, PRESERVATIVE FREE 10 ML: 5 INJECTION INTRAVENOUS at 09:28

## 2023-11-07 RX ADMIN — Medication 20 MG: at 21:45

## 2023-11-07 RX ADMIN — HYDROCORTISONE 20 MG: 10 TABLET ORAL at 09:27

## 2023-11-07 RX ADMIN — BUMETANIDE 1 MG: 1 TABLET ORAL at 09:27

## 2023-11-07 RX ADMIN — LEVOTHYROXINE SODIUM 125 MCG: 0.12 TABLET ORAL at 09:27

## 2023-11-07 ASSESSMENT — PAIN SCALES - GENERAL: PAINLEVEL_OUTOF10: 0

## 2023-11-07 NOTE — PROGRESS NOTES
Hospitalist Progress Note      Patient:  Helen Persaud    Unit/Bed:7K-13/013-A  YOB: 1950  MRN: [id number]   Acct: [de-identified]     PCP: Fabienne Denton MD  Date of Admission: 11/1/2023    Patient was seen at bedside on 11/4/23 orignally misfiled   Assessment/Plan:    Chronic nonhealing wounds: Improving, see media from H&P. Initial concern for infection with purulent discharge noted on RLE wound in ED. Leukocytosis with elevated lactate noted on arrival.   -Following discussion with infectious disease we will continue to monitor patient off of antibiotics  -Arterial US shows no evidence of PAD  Physical debility: Patient has baseline deficits 2/2 CVA as noted below but has recently become bedbound and is unable to care for herself  -PT/OT following  -Consult to social work for placement  Renal lesion: measuring 0.7 x 0 0.6 cm. Not well visualized on repeat US  -Follow-up CT on discharge   -May consider urology follow-up depending on course  Protein Calorie malnutrition: low prealbumin. Dietician following. Protein supplement added  Hypernatremia: suspect 2/2 dehydration. Encourage PO intake. Trend  Ingrown toenail: Podiatry following. 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 ID following  Sterile pyuria: Without bacteriuria noted on UA on arrival.  Patient is currently asymptomatic. She is on antibiotics as noted above  Lactic acidosis (resolved)  lactic 2.3 on arrival.  Improved IV fluids  Leukocytosis (improving): Suspect secondary to chronic steroid use and possible wound infection. Hx of CVA: Multiple ischemic events in 1988, 2015 and this past year. Known deficits with left-sided weakness. Patient is chair bound at baseline. CT head showed no acute findings. Previous infarct noted in the left lateral ventricle.   Paroxysmal atrial fibrillation: [id number] 6 HB 3 on OAC with Xarelto rate controlled Coreg  Meningioma: Stable from previous imaging noted over the parafalcine left frontal lobe. Hx pituitary tumor: s/p surgery performed in [place]. On hydrocortisone and Synthroid  Acquired hypothyroidism: 2/2 pituitary tumor s/p resection. Continue Synthroid. TSH/reflex shows hyperthyroidism consistent with suppressive dose synthroid appropriate for hx of malignancy  Acquired adrenal insufficiency: We will resume home dose of hydrocortisone. We will check a.m. cortisol. May require stress dose steroids if infection progresses        Expected discharge date: TBD    Disposition Plan: ECF originally near [place] may need to stay locally    Chief Complaint: Debility, suspected infected wounds    Hospital Course:    Jordin Hancock is a 68 y.o. female who presents to [facility] ED 11/1/2023 with progressive weakness. Patient is a lifetime nonsmoker with a PMH significant for CVA w/ lefts sided deficits, paroxysmal atrial fibrillation on [address], pituitary tumor s/p resection, acquired hypothyroidism, which required adrenal insufficiency, left-sided meningioma, and chronic wounds secondary to bedbound state. Patient is brought to the ED today by her best friend, a retired RN. She reports she was visiting patient and noticed weeping wounds with purulent drainage around toes. Patient was noted to be incontinent of urine and feces and was noted to have progressive weakness over the past month. Patient is also attended by her daughter who expresses multiple concerns for her going home. Family members have noted progressive weakness since September. At baseline patient has been chair-bound since last CVA and is cared for by her . However  is currently undergoing treatment for complications related to bladder cancer and is currently being treated at [address] [place] [address] following syncopal episode with collapse. She no longer has the support she needs at home. Patient has progressed to becoming been-bed bound since September.  She does have

## 2023-11-07 NOTE — PLAN OF CARE
Problem: Discharge Planning  Goal: Discharge to home or other facility with appropriate resources  11/7/2023 1730 by Marco Antonio Bruno LPN  Outcome: Progressing  Flowsheets (Taken 11/7/2023 1730)  Discharge to home or other facility with appropriate resources:   Identify barriers to discharge with patient and caregiver   Arrange for needed discharge resources and transportation as appropriate   Identify discharge learning needs (meds, wound care, etc)     Problem: Skin/Tissue Integrity  Goal: Absence of new skin breakdown  Description: 1. Monitor for areas of redness and/or skin breakdown  2. Assess vascular access sites hourly  3. Every 4-6 hours minimum:  Change oxygen saturation probe site  4. Every 4-6 hours:  If on nasal continuous positive airway pressure, respiratory therapy assess nares and determine need for appliance change or resting period. 11/7/2023 1730 by Marco Antonio Bruno LPN  Outcome: Progressing  Note: No new skin breakdown noted at this time this shift. Problem: Safety - Adult  Goal: Free from fall injury  11/7/2023 1730 by Marco Antonio Bruno LPN  Outcome: Progressing  Flowsheets (Taken 11/7/2023 1730)  Free From Fall Injury: Instruct family/caregiver on patient safety     Problem: Safety - Adult  Goal: Isolation precautions  Description: Isolation precautions  11/7/2023 1730 by Marco Antonio Bruno LPN  Outcome: Progressing  Note: Isolation continues.      Problem: ABCDS Injury Assessment  Goal: Absence of physical injury  11/7/2023 1730 by Marco Antonio Bruno LPN  Outcome: Progressing  Flowsheets (Taken 11/7/2023 1730)  Absence of Physical Injury: Implement safety measures based on patient assessment     Problem: Chronic Conditions and Co-morbidities  Goal: Patient's chronic conditions and co-morbidity symptoms are monitored and maintained or improved  11/7/2023 1730 by Marco Antonio Bruno LPN  Outcome: Progressing  Flowsheets (Taken 11/7/2023 1730)  Care Plan - Patient's Chronic Conditions

## 2023-11-07 NOTE — CARE COORDINATION
11/7/23, 3:39 PM EST    DISCHARGE ON GOING EVALUATION    [address] day: 0  Location: 7K-13/013-A Reason for admit: Failure to thrive in adult [R62.7]  Physical debility [R53.81]  Urinary tract infection without hematuria, site unspecified [N39.0]  Ulcers of both lower extremities, unspecified ulcer stage ([address]) [L97.919, L97.929]  Pressure injury of back, stage 2 ([address]) [L89.102]   Procedure:   11/1 CXR: No acute intrathoracic process. 11/1 Repeat CXR: Minimal linear peripheral opacities at the left lung base are present which may represent minimal atelectasis or scarring. 2. There is a right subclavian central line present with the tip overlying the cavoatrial junction. No pneumothorax is seen  11/1 CT Head WO: No acute intracranial findings. 2. Stable probable meningioma over the parafalcine left frontal lobe. 3. Opacification of the left mastoid air cells can be correlated for   mastoiditis  11/1 CTA Abdominal Aorta: No significant contrast opacification in the distal right anterior tibial artery, right dorsalis pedis artery, and left plantaris artery beginning in the proximal left midfoot. This may relate to underlying diminished flow or occlusion. 2. Possible complex lesion in the upper midpole of the kidney measuring 0.7 x 0.6 cm. Nonemergent ultrasound can be helpful to distinguish mass from complex cyst. 3. Colonic diverticula. 4. Small hiatal hernia. 5. Asymmetric mild subcutaneous edema in the mid-distal left calf and left   ankle. 11/2 US Renal: No sonographic findings for hydronephrosis. Questionable right renal   lesion not well seen on ultrasound due to patient immobility. This can be followed for stability on future CT or MRI. 2. Filling defect partially distended urinary bladder may relate to Huntley catheter balloon. Blood clot not excluded  11/2 BLE Arterial DUP:  Slightly limited evaluation as the peroneal arteries could not be definitively visualized in either leg.  Normal bilateral ankle-brachial indices. Good pulsatility throughout both legs with diffuse multiphasic Doppler waveforms. Barriers to Discharge: Hospitalist, Podiatry and ID following. PT/OT. Daily dressing changed to RLE and left toe wound. Awaiting placement. PCP: Marylin Smalls MD   %  Patient Goals/Plan/Treatment Preferences: SW working with family on ECF/SNF placement. See SW notes.

## 2023-11-07 NOTE — CARE COORDINATION
11/7/23, 3:40 PM EST    DISCHARGE PLANNING EVALUATION       Spoke with patient's son Mohan Devi and he stated that he has been told by family to tell this writer to contact his father Lashell Palencia. He stated that Lashell Palencia is still hospitalized but is doing better and can be contacted. Dayana Henry and he stated that he remains in the hospital at [facility]. He stated that he prefers patient go to [facility] as she has been there before. Called [facility] and they have not had patient before. Contact Lashell Palencia again and reviewed chart and patient was previously at [facility]. Lashell Palencia approved referral to [facility]. Called and made a referral to [facility]. Spouse is aware that patient may not get a pre-cert approved and may need to go private pay.

## 2023-11-07 NOTE — PROGRESS NOTES
[facility]  Occupational Therapy  Daily Note  Time:   Time In: 9802  Time Out: 1502  Timed Code Treatment Minutes: 24 Minutes  Minutes: 24          Date: 2023  Patient Name: Meena Wilson,   Gender: female      Room: [facility]13/013-A  MRN: [id number]  : 1950  (68 y.o.)  Referring Practitioner: Nevaeh Payne DO  Diagnosis: failure to thrieve  Additional Pertinent Hx: Per ER note on 2023: 68 y.o. female who presents to the emergency department for evaluation of sacral ulcers and concerns for worsening stroke-like symptoms. Patient's daughter-in-law and family friend at bedside. 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 Presenting to ED today due to concerns for patient's health at home. Patient's  is her primary caretaker, but he was recently admitted to [facility] on 10/27 after being found down at home by family. Patient now in ED    Restrictions/Precautions:  Restrictions/Precautions: Fall Risk, General Precautions  Position Activity Restriction  Other position/activity restrictions: L hemiparesis from previous, thin skin with multiple wounds/scabs     SUBJECTIVE: Pt lying supine with HOB elevated upon arrival. Pt pleasant and agreeable to OT session. PAIN: denies    Vitals: Vitals not assessed per clinical judgement, see nursing flowsheet    COGNITION: Slow Processing, Decreased Problem Solving, Decreased Safety Awareness, Difficulty Following Commands, and Expressive Aphasia    ADL:   Grooming: Moderate Assistance, with set-up, and with increased time for completion. Pt completed oral care seated EOB for up to 5 min with 1-2 UE release out of PIPPA. When crossing midline pt demo'ed upright posture; with reaching right pt leans and demo'ed poor trunk control. Caitlyn Pole     BALANCE:  Sitting Balance:

## 2023-11-07 NOTE — PROGRESS NOTES
[facility]  INPATIENT PHYSICAL THERAPY  DAILY NOTE  [facility] ORTHOPEDICS 7K - 7K-13/013-A    Time In: 6143  Time Out: 5234  Timed Code Treatment Minutes: 30 Minutes  Minutes: 30          Date: 2023  Patient Name: Mirta Simons,  Gender:  female        MRN: [id number]  : 1950  (68 y.o.)     Referring Practitioner: Breanna Borden DO  Diagnosis: Physical debility  Additional Pertinent Hx: 68 y.o. female who presents to [facility] ED 2023 with progressive weakness. Patient is a lifetime nonsmoker with a PMH significant for CVA w/ lefts sided deficits, paroxysmal atrial fibrillation on [address], pituitary tumor s/p resection, acquired hypothyroidism, which required adrenal insufficiency, left-sided meningioma, and chronic wounds secondary to bedbound state. Patient is brought to the ED today by her best friend, a retired RN. She reports she was visiting patient and noticed weeping wounds with purulent drainage around toes. Patient was noted to be incontinent of urine and feces and was noted to have progressive weakness over the past month. Patient is also attended by her daughter who expresses multiple concerns for her going home. Family members have noted progressive weakness since September. At baseline patient has been chair-bound since last CVA and is cared for by her . However  is currently undergoing treatment for complications related to bladder cancer and is currently being treated at [address] [place] [address] following syncopal episode with collapse. She no longer has the support she needs at home. Patient has progressed to becoming been-bed bound since September.      Prior Level of Function:  Lives With: Spouse  Type of Home: House  Home Layout: One level  Home Access: [address]: [facility], 4 wheeled, [facility]        ADL Assistance: Needs assistance  Ambulation Assistance: Non-ambulatory  Transfer Assistance: Needs assistance  Active : set(s) x10 reps of exercise to both lower extremities. Ankle pumps, Glut sets, Quad sets, Heelslides (right LE only), Hip abduction/adduction, Straight leg raises, and Long arc quads. Exercises were completed for increased independence with functional mobility. AAROM required for left LE on SLR, and Hip ABD. AAROM for SLR on right LE. Functional Outcome Measures: Completed  AM-PAC Inpatient Mobility without Stair Climbing Raw Score : 7  AM-PAC Inpatient without Stair Climbing T-Scale Score : 28.66  Modified [place] Scale:  Not Tested    ASSESSMENT:  Assessment: Patient progressing toward established goals. Pt demonstrates impairments with strength, balance, endurance, activity tolerance, safety awareness, independence, requires hands on assistance for safety with mobility and would benefit from continued skilled PT improve these impairments, to maximize independence, to prevent falls and ensure safety with mobility, pt unsafe to return home at this time, pt will greatly benefit from continued skilled PT, pt will greatly benefit from SNF stay. Activity Tolerance:  Patient tolerance of  treatment: good.       Equipment Recommendations:Equipment Needed: No  Discharge Recommendations: ECF with PT  Plan: Current Treatment Recommendations: Strengthening, Balance training, Functional mobility training, Endurance training, Safety education & training, Therapeutic activities  General Plan:  (3-5x GM)    Patient Education  Patient Education: Bed Mobility, Verbal Exercise Instruction, Health Promotion and Wellness Education, Upright Seated Posture,  - Patient Verbalized Understanding, - Patient Requires Continued Education    Goals:  Patient Goals : none stated  Short Term Goals  Time Frame for Short Term Goals: by discharge  Short Term Goal 1: bed mobility with HOB flat, no rails ,mod I for increased functional ind  Short Term Goal 2: Pt to tolerate sitting EOB 10' with SBA for increased core strength/endurance  Short

## 2023-11-07 NOTE — PLAN OF CARE
Problem: Discharge Planning  Goal: Discharge to home or other facility with appropriate resources  Outcome: Progressing  Flowsheets (Taken 11/6/2023 1358 by Radha Castorena RN)  Discharge to home or other facility with appropriate resources:   Identify barriers to discharge with patient and caregiver   Arrange for needed discharge resources and transportation as appropriate   Identify discharge learning needs (meds, wound care, etc)     Problem: Skin/Tissue Integrity  Goal: Absence of new skin breakdown  Description: 1. Monitor for areas of redness and/or skin breakdown  2. Assess vascular access sites hourly  3. Every 4-6 hours minimum:  Change oxygen saturation probe site  4. Every 4-6 hours:  If on nasal continuous positive airway pressure, respiratory therapy assess nares and determine need for appliance change or resting period. Outcome: Progressing  Note: See flowsheets for skin integrity documentation.      Problem: Safety - Adult  Goal: Free from fall injury  Outcome: Progressing  Flowsheets  Taken 11/7/2023 0539  Free From Fall Injury: Instruct family/caregiver on patient safety  Taken 11/7/2023 0230  Free From Fall Injury: Instruct family/caregiver on patient safety  Goal: Isolation precautions  Description: Isolation precautions  Outcome: Progressing  Note: Contact ISO MRSA Rectum     Problem: ABCDS Injury Assessment  Goal: Absence of physical injury  Outcome: Progressing  Flowsheets  Taken 11/7/2023 0539  Absence of Physical Injury: Implement safety measures based on patient assessment  Taken 11/7/2023 0230  Absence of Physical Injury: Implement safety measures based on patient assessment     Problem: Chronic Conditions and Co-morbidities  Goal: Patient's chronic conditions and co-morbidity symptoms are monitored and maintained or improved  Outcome: Progressing  Flowsheets (Taken 11/7/2023 0539)  Care Plan - Patient's Chronic Conditions and Co-Morbidity Symptoms are Monitored and Maintained or

## 2023-11-08 PROCEDURE — 99232 SBSQ HOSP IP/OBS MODERATE 35: CPT | Performed by: PHYSICIAN ASSISTANT

## 2023-11-08 PROCEDURE — G0378 HOSPITAL OBSERVATION PER HR: HCPCS

## 2023-11-08 PROCEDURE — 2580000003 HC RX 258: Performed by: STUDENT IN AN ORGANIZED HEALTH CARE EDUCATION/TRAINING PROGRAM

## 2023-11-08 RX ADMIN — HYDROCORTISONE 20 MG: 10 TABLET ORAL at 09:20

## 2023-11-08 RX ADMIN — HYDROCORTISONE 10 MG: 10 TABLET ORAL at 20:11

## 2023-11-08 RX ADMIN — LEVOTHYROXINE SODIUM 125 MCG: 0.12 TABLET ORAL at 09:20

## 2023-11-08 RX ADMIN — PANTOPRAZOLE SODIUM 40 MG: 40 TABLET, DELAYED RELEASE ORAL at 20:12

## 2023-11-08 RX ADMIN — BUMETANIDE 1 MG: 1 TABLET ORAL at 09:20

## 2023-11-08 RX ADMIN — Medication 20 MG: at 20:12

## 2023-11-08 RX ADMIN — RIVAROXABAN 15 MG: 15 TABLET, FILM COATED ORAL at 18:03

## 2023-11-08 RX ADMIN — SODIUM CHLORIDE, PRESERVATIVE FREE 10 ML: 5 INJECTION INTRAVENOUS at 11:34

## 2023-11-08 ASSESSMENT — PAIN SCALES - GENERAL: PAINLEVEL_OUTOF10: 0

## 2023-11-08 NOTE — PLAN OF CARE
Problem: Discharge Planning  Goal: Discharge to home or other facility with appropriate resources  11/7/2023 2313 by Cristiano Ritter RN  Outcome: Progressing  Flowsheets  Taken 11/7/2023 2313  Discharge to home or other facility with appropriate resources:   Identify barriers to discharge with patient and caregiver   Identify discharge learning needs (meds, wound care, etc)  Taken 11/7/2023 2130  Discharge to home or other facility with appropriate resources: Identify barriers to discharge with patient and caregiver     Problem: Skin/Tissue Integrity  Goal: Absence of new skin breakdown  Description: 1. Monitor for areas of redness and/or skin breakdown  2. Assess vascular access sites hourly  3. Every 4-6 hours minimum:  Change oxygen saturation probe site  4. Every 4-6 hours:  If on nasal continuous positive airway pressure, respiratory therapy assess nares and determine need for appliance change or resting period.   11/7/2023 2313 by Cristiano Ritter RN  Outcome: Progressing     Problem: Safety - Adult  Goal: Free from fall injury  11/7/2023 2313 by Cristiano Ritter RN  Outcome: Progressing  Flowsheets (Taken 11/7/2023 2313)  Free From Fall Injury: Instruct family/caregiver on patient safety     Problem: ABCDS Injury Assessment  Goal: Absence of physical injury  11/7/2023 2313 by Cristiano Ritter RN  Outcome: Progressing  Flowsheets  Taken 11/7/2023 2313  Absence of Physical Injury: Implement safety measures based on patient assessment  Taken 11/7/2023 2257  Absence of Physical Injury: Implement safety measures based on patient assessment     Problem: Skin/Tissue Integrity - Adult  Goal: Skin integrity remains intact  11/7/2023 2313 by Cristiano Ritter RN  Outcome: Progressing  Flowsheets  Taken 11/7/2023 2313  Skin Integrity Remains Intact: Monitor for areas of redness and/or skin breakdown  Taken 11/7/2023 2130  Skin Integrity Remains Intact: Monitor for areas of redness and/or skin breakdown

## 2023-11-08 NOTE — PROGRESS NOTES
This student nurse did vital signs and hourly rounds. Patient is resting comfortably in bed, denies pain and belongings and call lights are in reach.

## 2023-11-08 NOTE — PROGRESS NOTES
This student nurse checked on patient before stepping off the floor for lunch break. Reported off to primary nurse. Patient is currently sitting up in bed eating dinner. Call light and belongings in reach. Patient has no needs at this time.

## 2023-11-08 NOTE — PROGRESS NOTES
Hospitalist Progress Note      Patient:  Joe Giang    Unit/Bed:7K-13/013-A  YOB: 1950  MRN: [id number]   Acct: [de-identified]   PCP: Cleveland Xie MD  Date of Admission: 11/1/2023    Assessment/Plan:    Chronic nonhealing wounds: Improving, see media from H&P. Initial concern for infection with purulent discharge noted on RLE wound in ED. Leukocytosis with elevated lactate noted on arrival.   -Following discussion with infectious disease we will continue to monitor patient off of antibiotics  -Arterial US shows no evidence of PAD  Physical debility: Patient has baseline deficits 2/2 CVA as noted below but has recently become bedbound and is unable to care for herself  -PT/OT following. Consult to social work for placement  Renal lesion: measuring 0.7 x 0 0.6 cm. Not well visualized on repeat US  -Follow-up CT on discharge   Protein Calorie malnutrition: low prealbumin. Dietician following. Protein supplement added  Hypernatremia: suspect 2/2 dehydration. Encourage PO intake. Trend  Ingrown toenail: Podiatry following. 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 ID following  Sterile pyuria: Without bacteriuria noted on UA on arrival.  Patient is currently asymptomatic. She is on antibiotics as noted above  Lactic acidosis (resolved)  lactic 2.3 on arrival.  Improved IV fluids  Leukocytosis (improving): Suspect secondary to chronic steroid use and possible wound infection. Hx of CVA: Multiple ischemic events in 1988, 2015 and this past year. Known deficits with left-sided weakness. Patient is chair bound at baseline. CT head showed no acute findings. Previous infarct noted in the left lateral ventricle.   Paroxysmal atrial fibrillation: [id number] 6 HB 3 on [address] with Xarelto rate controlled Coreg  Meningioma: Stable from previous imaging noted over the parafalcine left

## 2023-11-08 NOTE — CARE COORDINATION
11/8/23, 1:40 PM EST    DISCHARGE PLANNING EVALUATION    Yair Otoole is starting precert today.  is POA and has requested Yair Otoole, see sw note from 11/7.

## 2023-11-08 NOTE — PLAN OF CARE
Problem: Discharge Planning  Goal: Discharge to home or other facility with appropriate resources  Outcome: Progressing  Flowsheets (Taken 11/8/2023 1816)  Discharge to home or other facility with appropriate resources:   Identify barriers to discharge with patient and caregiver   Arrange for needed discharge resources and transportation as appropriate   Identify discharge learning needs (meds, wound care, etc)     Problem: Skin/Tissue Integrity  Goal: Absence of new skin breakdown  Description: 1. Monitor for areas of redness and/or skin breakdown  2. Assess vascular access sites hourly  3. Every 4-6 hours minimum:  Change oxygen saturation probe site  4. Every 4-6 hours:  If on nasal continuous positive airway pressure, respiratory therapy assess nares and determine need for appliance change or resting period. Outcome: Progressing  Note: No new skin breakdown noted at this time this shift. Problem: Safety - Adult  Goal: Free from fall injury  Outcome: Progressing  Flowsheets (Taken 11/8/2023 1816)  Free From Fall Injury: Instruct family/caregiver on patient safety     Problem: Safety - Adult  Goal: Isolation precautions  Description: Isolation precautions  Outcome: Progressing  Note: Isolation continues.      Problem: ABCDS Injury Assessment  Goal: Absence of physical injury  Outcome: Progressing  Flowsheets (Taken 11/8/2023 1816)  Absence of Physical Injury: Implement safety measures based on patient assessment     Problem: Chronic Conditions and Co-morbidities  Goal: Patient's chronic conditions and co-morbidity symptoms are monitored and maintained or improved  Outcome: Progressing  Flowsheets (Taken 11/8/2023 1816)  Care Plan - Patient's Chronic Conditions and Co-Morbidity Symptoms are Monitored and Maintained or Improved:   Monitor and assess patient's chronic conditions and comorbid symptoms for stability, deterioration, or improvement   Collaborate with multidisciplinary team to address chronic

## 2023-11-08 NOTE — PROGRESS NOTES
IV team to room for weekly dressing change. Insertion site of central line noted to be reddened. Pt denies pain at site. Notified primary RN Guadalupe Hardy.

## 2023-11-09 PROCEDURE — 2580000003 HC RX 258: Performed by: STUDENT IN AN ORGANIZED HEALTH CARE EDUCATION/TRAINING PROGRAM

## 2023-11-09 PROCEDURE — G0378 HOSPITAL OBSERVATION PER HR: HCPCS

## 2023-11-09 PROCEDURE — 99232 SBSQ HOSP IP/OBS MODERATE 35: CPT | Performed by: PHYSICIAN ASSISTANT

## 2023-11-09 RX ADMIN — SODIUM CHLORIDE, PRESERVATIVE FREE 30 ML: 5 INJECTION INTRAVENOUS at 20:23

## 2023-11-09 RX ADMIN — SODIUM CHLORIDE, PRESERVATIVE FREE 10 ML: 5 INJECTION INTRAVENOUS at 17:59

## 2023-11-09 RX ADMIN — LEVOTHYROXINE SODIUM 125 MCG: 0.12 TABLET ORAL at 09:15

## 2023-11-09 RX ADMIN — HYDROCORTISONE 20 MG: 10 TABLET ORAL at 09:15

## 2023-11-09 RX ADMIN — HYDROCORTISONE 10 MG: 10 TABLET ORAL at 20:23

## 2023-11-09 RX ADMIN — PANTOPRAZOLE SODIUM 40 MG: 40 TABLET, DELAYED RELEASE ORAL at 20:23

## 2023-11-09 RX ADMIN — Medication 20 MG: at 20:23

## 2023-11-09 RX ADMIN — BUMETANIDE 1 MG: 1 TABLET ORAL at 09:15

## 2023-11-09 RX ADMIN — RIVAROXABAN 15 MG: 15 TABLET, FILM COATED ORAL at 18:00

## 2023-11-09 ASSESSMENT — PAIN SCALES - GENERAL
PAINLEVEL_OUTOF10: 0

## 2023-11-09 NOTE — PROGRESS NOTES
Patient A&Ox3. Pupils from 4mm to 2mm with consensual response. Hand grasp weak bilaterally. Numbness and tingling to the left upper extremity. Heart sounds reg with no murmurs noted. Lung sound clear bilaterally. Bowel sounds active in all 4 quadrants. Pedal push and pull weak bilaterally. Numbness and tingling to the left lower extremity. Cannot assess for edema due to dressing to lower legs. Dressings dry, intact, and clean. Repositioned patient with floating pillows under each side. Denies pain. Call Light and bedside table with belongings in reach.

## 2023-11-09 NOTE — PROGRESS NOTES
Hospitalist Progress Note      Patient:  Nena Gonzalez    Unit/Bed:7K-13/013-A  YOB: 1950  MRN: [id number]   Acct: [de-identified]   PCP: Alysia King MD  Date of Admission: 11/1/2023    Assessment/Plan:    Chronic nonhealing wounds: Improving, see media from H&P. Initial concern for infection with purulent discharge noted on RLE wound in ED. Leukocytosis with elevated lactate noted on arrival.   -Following discussion with infectious disease we will continue to monitor patient off of antibiotics  -Arterial US shows no evidence of PAD  - This is due to long term steroid use. Physical debility: Patient has baseline deficits 2/2 CVA as noted below but has recently become bedbound and is unable to care for herself  -PT/OT following. Consult to social work for placement  Renal lesion: measuring 0.7 x 0 0.6 cm. Not well visualized on repeat US  -Follow-up CT on discharge   Protein Calorie malnutrition: low prealbumin. Dietician following. Protein supplement added  Hypernatremia: suspect 2/2 dehydration. Encourage PO intake. Trend  Ingrown toenail: Podiatry following. 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 ID following  Sterile pyuria: Without bacteriuria noted on UA on arrival.  Patient is currently asymptomatic. She is on antibiotics as noted above  Lactic acidosis (resolved)  lactic 2.3 on arrival.  Improved IV fluids  Leukocytosis (improving): Suspect secondary to chronic steroid use and possible wound infection. Hx of CVA: Multiple ischemic events in 1988, 2015 and this past year. Known deficits with left-sided weakness. Patient is chair bound at baseline. CT head showed no acute findings. Previous infarct noted in the left lateral ventricle.   Paroxysmal atrial fibrillation: [id number] 6 HB 3 on OAC with Xarelto rate controlled Coreg  Meningioma: Stable from previous Respiratory culture: No results found for: \"CULTRESP\"    Aerobic and Anaerobic :  Lab Results   Component Value Date/Time    LABAERO No Acinetobacter species isolated. 01/04/2023 10:00 PM     Lab Results   Component Value Date/Time    LABANAE  12/01/2021 12:00 PM     No anaerobes isolated- preliminary Culture yielded light growth of gram positive bacilli most consistent with Cutibacterium species. Clinical correlation required. Urinalysis:      Lab Results   Component Value Date/Time    NITRU NEGATIVE 11/01/2023 12:15 PM    WBCUA > 100 11/01/2023 12:15 PM    WBCUA 2-4 02/22/2012 01:00 PM    BACTERIA NONE SEEN 11/01/2023 12:15 PM    RBCUA NONE SEEN 11/01/2023 12:15 PM    BLOODU TRACE 11/01/2023 12:15 PM    SPECGRAV 1.025 09/18/2020 12:00 PM    GLUCOSEU NEGATIVE 11/01/2023 12:15 PM       Radiology:  VL DUP LOWER EXTREMITY ARTERIES BILATERAL   Final Result   1. Slightly limited evaluation as the peroneal arteries could not be definitively visualized in either leg. 2. Normal bilateral ankle-brachial indices. 3. Good pulsatility throughout both legs with diffuse multiphasic Doppler waveforms. **This report has been created using voice recognition software. It may contain minor errors which are inherent in voice recognition technology. **      Final report electronically signed by Dr Ella Ward on 11/2/2023 4:04 PM      US RENAL COMPLETE   Final Result   1. No sonographic findings for hydronephrosis. Questionable right renal    lesion not well seen on ultrasound due to patient immobility. This can be    followed for stability on future CT or MRI. 2. Filling defect partially distended urinary bladder may relate to Huntley    catheter balloon. Blood clot not excluded. This document has been electronically signed by: Deshawn Miller MD on    11/02/2023 01:05 AM      CT HEAD WO CONTRAST   Final Result   1. No acute intracranial findings.    2. Stable probable meningioma over the parafalcine

## 2023-11-09 NOTE — CARE COORDINATION
11/9/23, 2:34 PM EST    DISCHARGE ON GOING EVALUATION    [address] day: 0  Location: 7K-13/013-A Reason for admit: Failure to thrive in adult [R62.7]  Physical debility [R53.81]  Urinary tract infection without hematuria, site unspecified [N39.0]  Ulcers of both lower extremities, unspecified ulcer stage ([address]) [L97.919, L97.929]  Pressure injury of back, stage 2 ([address]) [L89.102]   Procedure:   11/1 CXR: No acute intrathoracic process. 11/1 Repeat CXR: Minimal linear peripheral opacities at the left lung base are present which may represent minimal atelectasis or scarring. 2. There is a right subclavian central line present with the tip overlying the cavoatrial junction. No pneumothorax is seen  11/1 CT Head WO: No acute intracranial findings. 2. Stable probable meningioma over the parafalcine left frontal lobe. 3. Opacification of the left mastoid air cells can be correlated for   mastoiditis  11/1 CTA Abdominal Aorta: No significant contrast opacification in the distal right anterior tibial artery, right dorsalis pedis artery, and left plantaris artery beginning in the proximal left midfoot. This may relate to underlying diminished flow or occlusion. 2. Possible complex lesion in the upper midpole of the kidney measuring 0.7 x 0.6 cm. Nonemergent ultrasound can be helpful to distinguish mass from complex cyst. 3. Colonic diverticula. 4. Small hiatal hernia. 5. Asymmetric mild subcutaneous edema in the mid-distal left calf and left   ankle. 11/2 US Renal: No sonographic findings for hydronephrosis. Questionable right renal   lesion not well seen on ultrasound due to patient immobility. This can be followed for stability on future CT or MRI. 2. Filling defect partially distended urinary bladder may relate to Huntley catheter balloon. Blood clot not excluded  11/2 BLE Arterial DUP:  Slightly limited evaluation as the peroneal arteries could not be definitively visualized in either leg.  Normal

## 2023-11-09 NOTE — CARE COORDINATION
11/9/23, 10:22 AM EST    DISCHARGE PLANNING EVALUATION     Spoke with daughter, Mendoza Daugherty, who is alternate POA. Discussed 's request for [facility] and other family's wishes for patient to be in [place] near family. At this time, proceeding with precert for [facility], and family will continue to discuss long term plans for patient.  is having surgery today and is unavailable for further discussion.

## 2023-11-09 NOTE — PLAN OF CARE
Problem: Discharge Planning  Goal: Discharge to home or other facility with appropriate resources  Outcome: Progressing  Flowsheets (Taken 11/9/2023 1420)  Discharge to home or other facility with appropriate resources: Identify barriers to discharge with patient and caregiver     Problem: Skin/Tissue Integrity  Goal: Absence of new skin breakdown  Description: 1. Monitor for areas of redness and/or skin breakdown  2. Assess vascular access sites hourly  3. Every 4-6 hours minimum:  Change oxygen saturation probe site  4. Every 4-6 hours:  If on nasal continuous positive airway pressure, respiratory therapy assess nares and determine need for appliance change or resting period.   Outcome: Progressing     Problem: Safety - Adult  Goal: Free from fall injury  Outcome: Progressing  Flowsheets (Taken 11/8/2023 1816 by Deidra Malik LPN)  Free From Fall Injury: Instruct family/caregiver on patient safety     Problem: Safety - Adult  Goal: Isolation precautions  Description: Isolation precautions  Outcome: Progressing     Problem: ABCDS Injury Assessment  Goal: Absence of physical injury  Outcome: Progressing  Flowsheets (Taken 11/9/2023 1420)  Absence of Physical Injury: Implement safety measures based on patient assessment     Problem: Chronic Conditions and Co-morbidities  Goal: Patient's chronic conditions and co-morbidity symptoms are monitored and maintained or improved  Outcome: Progressing  Flowsheets (Taken 11/9/2023 1420)  Care Plan - Patient's Chronic Conditions and Co-Morbidity Symptoms are Monitored and Maintained or Improved: Monitor and assess patient's chronic conditions and comorbid symptoms for stability, deterioration, or improvement     Problem: Skin/Tissue Integrity - Adult  Goal: Skin integrity remains intact  Outcome: Progressing  Flowsheets (Taken 11/9/2023 1420)  Skin Integrity Remains Intact: Monitor for areas of redness and/or skin breakdown     Problem: Musculoskeletal - Adult  Goal:

## 2023-11-10 PROCEDURE — 2580000003 HC RX 258: Performed by: STUDENT IN AN ORGANIZED HEALTH CARE EDUCATION/TRAINING PROGRAM

## 2023-11-10 PROCEDURE — 1200000003 HC TELEMETRY R&B

## 2023-11-10 PROCEDURE — 2500000003 HC RX 250 WO HCPCS

## 2023-11-10 PROCEDURE — 97110 THERAPEUTIC EXERCISES: CPT

## 2023-11-10 PROCEDURE — 1200000000 HC SEMI PRIVATE

## 2023-11-10 PROCEDURE — 99231 SBSQ HOSP IP/OBS SF/LOW 25: CPT | Performed by: PHYSICIAN ASSISTANT

## 2023-11-10 PROCEDURE — 6370000000 HC RX 637 (ALT 250 FOR IP): Performed by: STUDENT IN AN ORGANIZED HEALTH CARE EDUCATION/TRAINING PROGRAM

## 2023-11-10 RX ADMIN — MICONAZOLE NITRATE: 2 POWDER TOPICAL at 19:38

## 2023-11-10 RX ADMIN — PANTOPRAZOLE SODIUM 40 MG: 40 TABLET, DELAYED RELEASE ORAL at 19:36

## 2023-11-10 RX ADMIN — HYDROCORTISONE 10 MG: 10 TABLET ORAL at 19:36

## 2023-11-10 RX ADMIN — LEVOTHYROXINE SODIUM 125 MCG: 0.12 TABLET ORAL at 09:25

## 2023-11-10 RX ADMIN — SODIUM CHLORIDE, PRESERVATIVE FREE 10 ML: 5 INJECTION INTRAVENOUS at 10:45

## 2023-11-10 RX ADMIN — SODIUM CHLORIDE, PRESERVATIVE FREE 30 ML: 5 INJECTION INTRAVENOUS at 19:38

## 2023-11-10 RX ADMIN — Medication 20 MG: at 19:36

## 2023-11-10 RX ADMIN — HYDROCORTISONE 20 MG: 10 TABLET ORAL at 09:25

## 2023-11-10 RX ADMIN — RIVAROXABAN 15 MG: 15 TABLET, FILM COATED ORAL at 17:27

## 2023-11-10 RX ADMIN — BUMETANIDE 1 MG: 1 TABLET ORAL at 09:26

## 2023-11-10 ASSESSMENT — PAIN SCALES - GENERAL
PAINLEVEL_OUTOF10: 0
PAINLEVEL_OUTOF10: 0

## 2023-11-10 NOTE — PLAN OF CARE
Problem: Discharge Planning  Goal: Discharge to home or other facility with appropriate resources  11/10/2023 0050 by Macy Cabrales RN  Outcome: Progressing  Flowsheets (Taken 11/10/2023 0050)  Discharge to home or other facility with appropriate resources:   Identify barriers to discharge with patient and caregiver   Arrange for needed discharge resources and transportation as appropriate   Identify discharge learning needs (meds, wound care, etc)  11/9/2023 1420 by Yokasta Gao LPN  Outcome: Progressing  Flowsheets (Taken 11/9/2023 1420)  Discharge to home or other facility with appropriate resources: Identify barriers to discharge with patient and caregiver     Problem: Skin/Tissue Integrity  Goal: Absence of new skin breakdown  Description: 1. Monitor for areas of redness and/or skin breakdown  2. Assess vascular access sites hourly  3. Every 4-6 hours minimum:  Change oxygen saturation probe site  4. Every 4-6 hours:  If on nasal continuous positive airway pressure, respiratory therapy assess nares and determine need for appliance change or resting period. 11/10/2023 0050 by Macy Cabrales RN  Outcome: Progressing  Note: No new skin breakdown noted. Turn Q2.   11/9/2023 1420 by Yokasta Gao LPN  Outcome: Progressing     Problem: Safety - Adult  Goal: Free from fall injury  11/10/2023 0050 by Macy Cabrales RN  Outcome: Progressing  Flowsheets  Taken 11/10/2023 0050  Free From Fall Injury: Instruct family/caregiver on patient safety  Taken 11/9/2023 2237  Free From Fall Injury: Instruct family/caregiver on patient safety  11/9/2023 1420 by Yokasta Gao LPN  Outcome: Sobia Bermeo (Taken 11/8/2023 1816 by Pallavi Campa LPN)  Free From Fall Injury: Instruct family/caregiver on patient safety  Goal: Isolation precautions  Description: Isolation precautions  11/10/2023 0050 by Macy Cabrales RN  Outcome: Progressing  Note: MRSA.    11/9/2023 1420 by Marc Stevenson

## 2023-11-10 NOTE — PLAN OF CARE
Problem: Discharge Planning  Goal: Discharge to home or other facility with appropriate resources  Outcome: Progressing  Flowsheets (Taken 11/10/2023 1832)  Discharge to home or other facility with appropriate resources:   Identify barriers to discharge with patient and caregiver   Arrange for needed discharge resources and transportation as appropriate   Identify discharge learning needs (meds, wound care, etc)     Problem: Skin/Tissue Integrity  Goal: Absence of new skin breakdown  Description: 1. Monitor for areas of redness and/or skin breakdown  2. Assess vascular access sites hourly  3. Every 4-6 hours minimum:  Change oxygen saturation probe site  4. Every 4-6 hours:  If on nasal continuous positive airway pressure, respiratory therapy assess nares and determine need for appliance change or resting period. Outcome: Progressing  Note: No new skin breakdown noted at this time this shift. Problem: Safety - Adult  Goal: Free from fall injury  Outcome: Progressing  Flowsheets (Taken 11/10/2023 1832)  Free From Fall Injury: Instruct family/caregiver on patient safety     Problem: Safety - Adult  Goal: Isolation precautions  Description: Isolation precautions  Outcome: Progressing  Note: Isolation continues.      Problem: ABCDS Injury Assessment  Goal: Absence of physical injury  Outcome: Progressing  Flowsheets (Taken 11/10/2023 1832)  Absence of Physical Injury: Implement safety measures based on patient assessment     Problem: Chronic Conditions and Co-morbidities  Goal: Patient's chronic conditions and co-morbidity symptoms are monitored and maintained or improved  Outcome: Progressing  Flowsheets (Taken 11/10/2023 1832)  Care Plan - Patient's Chronic Conditions and Co-Morbidity Symptoms are Monitored and Maintained or Improved:   Monitor and assess patient's chronic conditions and comorbid symptoms for stability, deterioration, or improvement   Collaborate with multidisciplinary team to address

## 2023-11-10 NOTE — PROGRESS NOTES
Bilateral great toes cleansed with Betadine and new dressing placed. Covered with Kerlix and secured with tape.

## 2023-11-10 NOTE — PROGRESS NOTES
Denies pain. Pulse ox 99%. Interactive with staff. Alert,oriented to person, place, time. SHIREEN 4mm-3mm. Skin mottled, flaky, dry. Bruising on upper arms,lower legs. Mucous membranes pink,moist. Tongue midline. Smile symmetrical. Lung sounds clear throughout. No cough present. Bowel sounds active all 4 quadrants. Abdomen soft, flat, non tender. Last BM yesterday. Respirations easy. Apical regular,distant. Denies problems voiding. Denies nausea, vomiting. Radial pulses weak,equal. Arm drift negative. Hand grasp weak on left side, strong on right side. Skin turgor brisk, capillary refill less than 3 seconds. Triple lumen catheter site in right upper chest, dry, intact. Pedal pulses weak bilaterally. Pedal push, pull weak on left side, strong on right side. Nivia's sign is negative. No numbness,tingling. No edema is present. Leg lift weak on left side, strong on right side. Wound dressings dry, intact  with Kerlix on lower right legs, non adherent. Wound dressings dry, intact with Kerlix on lower left legs, non adherent. Bed in low position, wheels locked, no side rails up, call light within reach, has no further needs at this time. 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

## 2023-11-10 NOTE — PROGRESS NOTES
both lower extremities I87.2    Normocytic anemia D64.9    Chronic diastolic congestive heart failure (HCC) I50.32    CKD (chronic kidney disease), stage IV ([facility]) N18.4    Confusion R41.0    Chronic kidney disease (CKD), stage III (moderate) ([facility]) N18.30    Obesity (BMI 30.0-34. 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    Hypothyroidism E03.9    Scalp lesion L98.9    Non-compliance F/u advised Z91.199    Verruca vulgaris B07.9    COVID-19 U07.1    Septic shock ([facility]) A41.9, R65.21    Cellulitis of submandibular region K12.2    Severe malnutrition ([facility]) E43    Submandibular gland infection K11.20    Cellulitis L03.90    Non-pressure chronic ulcer of skin of other sites limited to breakdown of skin ([facility]) L98.491    Physical debility R53.81    Failure to thrive in adult R62.7    Pressure injury of back, stage 2 ([facility]) L89.102    Ulcers of both lower extremities ([facility]) L97.919, L97.929    Moderate malnutrition ([facility]) E44.0         ASSESSMENT/PLAN   Multiple wound on the scalp: looks clean and starting to scab  Left hallux ingrown nail: due to intermittent bleeding, will ask Podiatry to evaluate and atleast do partial avulsion of the nail  Shin and sacral wound: stable  Skin fragility due to long term use of steroid  Old stroke  Plan for ECF    Ha Escalera MD, MD, FACP 11/10/2023 11:50 AM

## 2023-11-10 NOTE — PROGRESS NOTES
Hospitalist Progress Note      Patient:  Xavi Abu    Unit/Bed:7K-13/013-A  YOB: 1950  MRN: [id number]   Acct: [de-identified]   PCP: Sheng Smith MD  Date of Admission: 11/1/2023    Assessment/Plan:    Chronic nonhealing wounds: Improving, see media from H&P. Initial concern for infection with purulent discharge noted on RLE wound in ED. Leukocytosis with elevated lactate noted on arrival.   -Following discussion with infectious disease we will continue to monitor patient off of antibiotics  -Arterial US shows no evidence of PAD  - This is due to long term steroid use. Physical debility: Patient has baseline deficits 2/2 CVA as noted below but has recently become bedbound and is unable to care for herself  -PT/OT following. Consult to social work for placement  Renal lesion: measuring 0.7 x 0 0.6 cm. Not well visualized on repeat US  -Follow-up CT on discharge   Protein Calorie malnutrition: low prealbumin. Dietician following. Protein supplement added  Hypernatremia: suspect 2/2 dehydration. Encourage PO intake. Trend  Ingrown toenail: Podiatry following. 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 ID following  Sterile pyuria: Without bacteriuria noted on UA on arrival.  Patient is currently asymptomatic. She is on antibiotics as noted above  Lactic acidosis (resolved)  lactic 2.3 on arrival.  Improved IV fluids  Leukocytosis (improving): Suspect secondary to chronic steroid use and possible wound infection. Hx of CVA: Multiple ischemic events in 1988, 2015 and this past year. Known deficits with left-sided weakness. Patient is chair bound at baseline. CT head showed no acute findings. Previous infarct noted in the left lateral ventricle.   Paroxysmal atrial fibrillation: [id number] 6 HB 3 on OAC with Xarelto rate controlled Coreg  Meningioma: Stable from previous 4:56 PM      XR CHEST PORTABLE   Final Result      No acute intrathoracic process. **This report has been created using voice recognition software. It may contain minor errors which are inherent in voice recognition technology. **      Final report electronically signed by Dr. Leon Darnell on 11/1/2023 1:05 PM        Electronically signed by OFELIA Pradhan on 11/10/2023 at 3:07 PM

## 2023-11-10 NOTE — CARE COORDINATION
11/10/23, 2:53 PM EST    DISCHARGE PLANNING EVALUATION    Spoke with son, Rafal Solares, his wife, Nery Kelly and patient's brother, with patient present. Family shares that they want patient at a facility in [facility] and will be speaking with patient's  tomorrow about also going to a facility there. Son and daughter in [facility] choices are [facility] and [facility]. Call made to [facility], but unable to reach admissions there. Both of these facilities are assisted living. Patient requires total care and would likely not qualify for assisted living. Will discuss other options with son. Family will need to make financial arrangements with facility of choice.

## 2023-11-10 NOTE — PROGRESS NOTES
Comprehensive Nutrition Assessment    Type and Reason for Visit:  Reassess    Nutrition Recommendations/Plan:   Recommend MVI  Continue easy to chew diet per provider and encourage PO intake at best efforts. Continue ONS: Ensure Enlive (BID) and magic cups (BID)  Will monitor need for additional nutrition interventions. Malnutrition Assessment:  Malnutrition Status: Moderate malnutrition (11/10/23 1400)    Context:  Chronic Illness     Findings of the 6 clinical characteristics of malnutrition:  Energy Intake:  Mild decrease in energy intake (Comment)  Weight Loss:  Unable to assess (r/t CHF and edema)     Body Fat Loss:  Mild body fat loss Orbital   Muscle Mass Loss:  Mild muscle mass loss Temples (temporalis)  Fluid Accumulation:  Mild Generalized, Extremities   Strength:  Not Performed    Nutrition Assessment:     Pt. moderately malnourished AEB criteria as listed above. At risk for further nutrition compromise r/t chronic nonhealing wounds, physical debility 2/2 CVA - recently became bed-bound, renal lesion, and underlying medical condition (PMHx: CA, CHF, CVA, DM, GERD, meningioma of the brain, HLD, HTN, hypothyroidism, obesity, PAF). Nutrition Related Findings:    Pt. Report/Treatments/Miscellaneous: Pt seen, brother, son, and daughter in law present at time of visit - visit with patient cut short d/t pt care needs. Spoke with family outside of pt's room, reported that she didn't eat much of her meals today because they were cold. They reported that she will take sips of ONS if encouraged. Will continue ONS. Still hoping to d/c to ECF closer to family.    GI Status: BM - 11/9  Pertinent Labs: Last labs 11/7 - Reviewed  Pertinent Meds: bumex, synthroid, protonix, simvastatin     Wound Type:  (pretibial L and R, Buttocks pressure injury R + L)       Current Nutrition Intake & Therapies:    Average Meal Intake: 26-50%  Average Supplements Intake:  (pt reports good tolerance of Ensure, wishes to

## 2023-11-10 NOTE — PROGRESS NOTES
[address] [facility]  Occupational Therapy  Daily Note  Time:   Time In: 1210  Time Out: 1219  Timed Code Treatment Minutes: 9 Minutes  Minutes: 9          Date: 11/10/2023  Patient Name: Xavi Courtney,   Gender: female      Room: [facility]13/013-A  MRN: [id number]  : 1950  (68 y.o.)  Referring Practitioner: David Barajas DO  Diagnosis: failure to thrieve  Additional Pertinent Hx: Per ER note on 2023: 68 y.o. female who presents to the emergency department for evaluation of sacral ulcers and concerns for worsening stroke-like symptoms. Patient's daughter-in-law and family friend at bedside. 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 Presenting to ED today due to concerns for patient's health at home. Patient's  is her primary caretaker, but he was recently admitted to [facility] on 10/27 after being found down at home by family. Patient now in ED    Restrictions/Precautions:  Restrictions/Precautions: Fall Risk, General Precautions  Position Activity Restriction  Other position/activity restrictions: L hemiparesis from previous, thin skin with multiple wounds/scabs     SUBJECTIVE: RN ok'ed session. Pt leaning to right while sitting up in bed upon arrival. Pt adjusted to the middle of the bed. Pt pleasant and agreeable to OT in bed before lunch. PAIN: denies    Vitals: Vitals not assessed per clinical judgement, see nursing flowsheet    COGNITION: Slow Processing, Decreased Recall, Decreased Insight, Decreased Problem Solving, Decreased Safety Awareness, and Difficulty Following Commands    ADL:   No ADL's completed this session. .    TRANSFERS:  Transfers Not Tested at this time secondary to: pt refused d/t arrival of lunch     ADDITIONAL ACTIVITIES:   Patient completed BUE  ROM strengthening exercises with skilled

## 2023-11-10 NOTE — CARE COORDINATION
11/10/23, 7:27 AM EST    DISCHARGE PLANNING EVALUATION    Insurance has denied ecf benefit, peer to peer can be done by end of day Monday, phone number is [phone], opt 1 then opt 2. Will need decision from family on private pay for ecf and in what location if peer to peer is not successful.  is POA, had surgery yesterday, not yet available for discussion about patient's discharge plan. Updating other family members so they can discuss plan.

## 2023-11-11 PROCEDURE — 1200000000 HC SEMI PRIVATE

## 2023-11-11 PROCEDURE — 6370000000 HC RX 637 (ALT 250 FOR IP): Performed by: STUDENT IN AN ORGANIZED HEALTH CARE EDUCATION/TRAINING PROGRAM

## 2023-11-11 PROCEDURE — 99231 SBSQ HOSP IP/OBS SF/LOW 25: CPT | Performed by: PHYSICIAN ASSISTANT

## 2023-11-11 PROCEDURE — 1200000003 HC TELEMETRY R&B

## 2023-11-11 PROCEDURE — 2580000003 HC RX 258: Performed by: STUDENT IN AN ORGANIZED HEALTH CARE EDUCATION/TRAINING PROGRAM

## 2023-11-11 RX ADMIN — RIVAROXABAN 15 MG: 15 TABLET, FILM COATED ORAL at 18:25

## 2023-11-11 RX ADMIN — MICONAZOLE NITRATE: 2 POWDER TOPICAL at 08:33

## 2023-11-11 RX ADMIN — SODIUM CHLORIDE, PRESERVATIVE FREE 10 ML: 5 INJECTION INTRAVENOUS at 20:25

## 2023-11-11 RX ADMIN — HYDROCORTISONE 20 MG: 10 TABLET ORAL at 08:32

## 2023-11-11 RX ADMIN — SODIUM CHLORIDE, PRESERVATIVE FREE 10 ML: 5 INJECTION INTRAVENOUS at 11:48

## 2023-11-11 RX ADMIN — MICONAZOLE NITRATE: 2 POWDER TOPICAL at 20:25

## 2023-11-11 RX ADMIN — Medication 20 MG: at 20:24

## 2023-11-11 RX ADMIN — HYDROCORTISONE 10 MG: 10 TABLET ORAL at 20:23

## 2023-11-11 RX ADMIN — BUMETANIDE 1 MG: 1 TABLET ORAL at 08:34

## 2023-11-11 RX ADMIN — PANTOPRAZOLE SODIUM 40 MG: 40 TABLET, DELAYED RELEASE ORAL at 20:24

## 2023-11-11 RX ADMIN — LEVOTHYROXINE SODIUM 125 MCG: 0.12 TABLET ORAL at 08:32

## 2023-11-11 NOTE — PLAN OF CARE
Problem: Discharge Planning  Goal: Discharge to home or other facility with appropriate resources  Outcome: Progressing  Flowsheets (Taken 11/11/2023 1452)  Discharge to home or other facility with appropriate resources:   Identify barriers to discharge with patient and caregiver   Arrange for needed discharge resources and transportation as appropriate   Identify discharge learning needs (meds, wound care, etc)     Problem: Skin/Tissue Integrity  Goal: Absence of new skin breakdown  Description: 1. Monitor for areas of redness and/or skin breakdown  2. Assess vascular access sites hourly  3. Every 4-6 hours minimum:  Change oxygen saturation probe site  4. Every 4-6 hours:  If on nasal continuous positive airway pressure, respiratory therapy assess nares and determine need for appliance change or resting period. Outcome: Progressing  Note: No new skin breakdown noted at this time this shift. Problem: Safety - Adult  Goal: Free from fall injury  Outcome: Progressing  Flowsheets (Taken 11/11/2023 1452)  Free From Fall Injury: Instruct family/caregiver on patient safety     Problem: Safety - Adult  Goal: Isolation precautions  Description: Isolation precautions  Outcome: Progressing  Note: Isolation continues.      Problem: ABCDS Injury Assessment  Goal: Absence of physical injury  Outcome: Progressing  Flowsheets (Taken 11/11/2023 1452)  Absence of Physical Injury: Implement safety measures based on patient assessment     Problem: Chronic Conditions and Co-morbidities  Goal: Patient's chronic conditions and co-morbidity symptoms are monitored and maintained or improved  Outcome: Progressing  Flowsheets (Taken 11/11/2023 1452)  Care Plan - Patient's Chronic Conditions and Co-Morbidity Symptoms are Monitored and Maintained or Improved:   Monitor and assess patient's chronic conditions and comorbid symptoms for stability, deterioration, or improvement   Collaborate with multidisciplinary team to address chronic and comorbid conditions and prevent exacerbation or deterioration     Problem: Skin/Tissue Integrity - Adult  Goal: Skin integrity remains intact  Outcome: Progressing  Flowsheets (Taken 11/11/2023 1452)  Skin Integrity Remains Intact:   Monitor for areas of redness and/or skin breakdown   Assess vascular access sites hourly     Problem: Musculoskeletal - Adult  Goal: Return mobility to safest level of function  Outcome: Progressing  Flowsheets (Taken 11/11/2023 1452)  Return Mobility to Safest Level of Function:   Assess patient stability and activity tolerance for standing, transferring and ambulating with or without assistive devices   Assist with transfers and ambulation using safe patient handling equipment as needed   Ensure adequate protection for wounds/incisions during mobilization     Problem: Musculoskeletal - Adult  Goal: Return ADL status to a safe level of function  Outcome: Progressing  Flowsheets (Taken 11/11/2023 1452)  Return ADL Status to a Safe Level of Function:   Administer medication as ordered   Assess activities of daily living deficits and provide assistive devices as needed     Problem: Metabolic/Fluid and Electrolytes - Adult  Goal: Electrolytes maintained within normal limits  Outcome: Progressing  Flowsheets (Taken 11/11/2023 1452)  Electrolytes maintained within normal limits:   Monitor labs and assess patient for signs and symptoms of electrolyte imbalances   Administer electrolyte replacement as ordered     Problem: Pain  Goal: Verbalizes/displays adequate comfort level or baseline comfort level  Outcome: Progressing  Flowsheets (Taken 11/11/2023 1452)  Verbalizes/displays adequate comfort level or baseline comfort level:   Encourage patient to monitor pain and request assistance   Assess pain using appropriate pain scale     Problem: Nutrition Deficit:  Goal: Optimize nutritional status  Outcome: Progressing  Flowsheets (Taken 11/11/2023 1452)  Nutrient intake appropriate

## 2023-11-11 NOTE — PROGRESS NOTES
Lab Results   Component Value Date/Time    LABURIN No growth-preliminary  No growth   02/09/2017 12:35 PM       Respiratory culture: No results found for: \"CULTRESP\"    Aerobic and Anaerobic :  Lab Results   Component Value Date/Time    LABAERO No Acinetobacter species isolated. 01/04/2023 10:00 PM     Lab Results   Component Value Date/Time    LABANAE  12/01/2021 12:00 PM     No anaerobes isolated- preliminary Culture yielded light growth of gram positive bacilli most consistent with Cutibacterium species. Clinical correlation required. Urinalysis:      Lab Results   Component Value Date/Time    NITRU NEGATIVE 11/01/2023 12:15 PM    WBCUA > 100 11/01/2023 12:15 PM    WBCUA 2-4 02/22/2012 01:00 PM    BACTERIA NONE SEEN 11/01/2023 12:15 PM    RBCUA NONE SEEN 11/01/2023 12:15 PM    BLOODU TRACE 11/01/2023 12:15 PM    SPECGRAV 1.025 09/18/2020 12:00 PM    GLUCOSEU NEGATIVE 11/01/2023 12:15 PM       Radiology:  VL DUP LOWER EXTREMITY ARTERIES BILATERAL   Final Result   1. Slightly limited evaluation as the peroneal arteries could not be definitively visualized in either leg. 2. Normal bilateral ankle-brachial indices. 3. Good pulsatility throughout both legs with diffuse multiphasic Doppler waveforms. **This report has been created using voice recognition software. It may contain minor errors which are inherent in voice recognition technology. **      Final report electronically signed by Dr Carlos Hoyt on 11/2/2023 4:04 PM      US RENAL COMPLETE   Final Result   1. No sonographic findings for hydronephrosis. Questionable right renal    lesion not well seen on ultrasound due to patient immobility. This can be    followed for stability on future CT or MRI. 2. Filling defect partially distended urinary bladder may relate to Huntley    catheter balloon. Blood clot not excluded.       This document has been electronically signed by: Lucretia Delgado MD on    11/02/2023 01:05 AM      CT HEAD WO report electronically signed by Dr. Shira Rodriguez on 11/1/2023 4:56 PM      XR CHEST PORTABLE   Final Result      No acute intrathoracic process. **This report has been created using voice recognition software. It may contain minor errors which are inherent in voice recognition technology. **      Final report electronically signed by Dr. Pratima Hoskins on 11/1/2023 1:05 PM        Electronically signed by OFELIA Barrow on 11/11/2023 at 11:26 AM

## 2023-11-11 NOTE — PLAN OF CARE
Problem: Discharge Planning  Goal: Discharge to home or other facility with appropriate resources  11/10/2023 2243 by Maximo Shah RN  Outcome: Progressing  Flowsheets (Taken 11/10/2023 2243)  Discharge to home or other facility with appropriate resources:   Identify barriers to discharge with patient and caregiver   Arrange for needed discharge resources and transportation as appropriate   Identify discharge learning needs (meds, wound care, etc)  11/10/2023 1832 by Dorina Delcid LPN  Outcome: Progressing  Flowsheets (Taken 11/10/2023 1832)  Discharge to home or other facility with appropriate resources:   Identify barriers to discharge with patient and caregiver   Arrange for needed discharge resources and transportation as appropriate   Identify discharge learning needs (meds, wound care, etc)     Problem: Skin/Tissue Integrity  Goal: Absence of new skin breakdown  Description: 1. Monitor for areas of redness and/or skin breakdown  2. Assess vascular access sites hourly  3. Every 4-6 hours minimum:  Change oxygen saturation probe site  4. Every 4-6 hours:  If on nasal continuous positive airway pressure, respiratory therapy assess nares and determine need for appliance change or resting period. 11/10/2023 2243 by Maximo Shah RN  Outcome: Progressing  Note: Antifungal powder ordered and applied to groin, bilateral breasts and abdominal folds. 11/10/2023 1832 by Dorina Delcid LPN  Outcome: Progressing  Note: No new skin breakdown noted at this time this shift.      Problem: Safety - Adult  Goal: Free from fall injury  11/10/2023 2243 by Maximo Shah RN  Outcome: Progressing  Flowsheets  Taken 11/10/2023 2243  Free From Fall Injury: Instruct family/caregiver on patient safety  Taken 11/10/2023 2242  Free From Fall Injury: Instruct family/caregiver on patient safety  11/10/2023 1832 by Dorina Delcid LPN  Outcome: Progressing  Flowsheets (Taken 11/10/2023 1832)  Free From Fall Injury:

## 2023-11-12 PROCEDURE — 99231 SBSQ HOSP IP/OBS SF/LOW 25: CPT | Performed by: PHYSICIAN ASSISTANT

## 2023-11-12 PROCEDURE — 1200000003 HC TELEMETRY R&B

## 2023-11-12 PROCEDURE — 6370000000 HC RX 637 (ALT 250 FOR IP): Performed by: STUDENT IN AN ORGANIZED HEALTH CARE EDUCATION/TRAINING PROGRAM

## 2023-11-12 PROCEDURE — 2580000003 HC RX 258: Performed by: STUDENT IN AN ORGANIZED HEALTH CARE EDUCATION/TRAINING PROGRAM

## 2023-11-12 PROCEDURE — 1200000000 HC SEMI PRIVATE

## 2023-11-12 RX ADMIN — HYDROCORTISONE 20 MG: 10 TABLET ORAL at 09:24

## 2023-11-12 RX ADMIN — Medication 20 MG: at 21:19

## 2023-11-12 RX ADMIN — SODIUM CHLORIDE, PRESERVATIVE FREE 10 ML: 5 INJECTION INTRAVENOUS at 21:20

## 2023-11-12 RX ADMIN — MICONAZOLE NITRATE: 2 POWDER TOPICAL at 21:19

## 2023-11-12 RX ADMIN — MICONAZOLE NITRATE: 2 POWDER TOPICAL at 08:23

## 2023-11-12 RX ADMIN — PANTOPRAZOLE SODIUM 40 MG: 40 TABLET, DELAYED RELEASE ORAL at 21:19

## 2023-11-12 RX ADMIN — BUMETANIDE 1 MG: 1 TABLET ORAL at 09:27

## 2023-11-12 RX ADMIN — LEVOTHYROXINE SODIUM 125 MCG: 0.12 TABLET ORAL at 09:24

## 2023-11-12 RX ADMIN — SODIUM CHLORIDE, PRESERVATIVE FREE 10 ML: 5 INJECTION INTRAVENOUS at 15:38

## 2023-11-12 RX ADMIN — HYDROCORTISONE 10 MG: 10 TABLET ORAL at 21:19

## 2023-11-12 RX ADMIN — RIVAROXABAN 15 MG: 15 TABLET, FILM COATED ORAL at 18:20

## 2023-11-12 NOTE — PLAN OF CARE
Problem: Discharge Planning  Goal: Discharge to home or other facility with appropriate resources  Outcome: Progressing  Flowsheets (Taken 11/12/2023 1310)  Discharge to home or other facility with appropriate resources:   Identify barriers to discharge with patient and caregiver   Arrange for needed discharge resources and transportation as appropriate   Identify discharge learning needs (meds, wound care, etc)     Problem: Skin/Tissue Integrity  Goal: Absence of new skin breakdown  Description: 1. Monitor for areas of redness and/or skin breakdown  2. Assess vascular access sites hourly  3. Every 4-6 hours minimum:  Change oxygen saturation probe site  4. Every 4-6 hours:  If on nasal continuous positive airway pressure, respiratory therapy assess nares and determine need for appliance change or resting period. Outcome: Progressing  Note: No new skin breakdown noted at this time this shift. Problem: Safety - Adult  Goal: Free from fall injury  Outcome: Progressing  Flowsheets (Taken 11/12/2023 1310)  Free From Fall Injury: Instruct family/caregiver on patient safety     Problem: Safety - Adult  Goal: Isolation precautions  Description: Isolation precautions  Outcome: Progressing  Note: Isolation continues.      Problem: ABCDS Injury Assessment  Goal: Absence of physical injury  Outcome: Progressing  Flowsheets (Taken 11/12/2023 1310)  Absence of Physical Injury: Implement safety measures based on patient assessment     Problem: Chronic Conditions and Co-morbidities  Goal: Patient's chronic conditions and co-morbidity symptoms are monitored and maintained or improved  Outcome: Progressing  Flowsheets (Taken 11/12/2023 1310)  Care Plan - Patient's Chronic Conditions and Co-Morbidity Symptoms are Monitored and Maintained or Improved:   Monitor and assess patient's chronic conditions and comorbid symptoms for stability, deterioration, or improvement   Collaborate with multidisciplinary team to address

## 2023-11-12 NOTE — PROGRESS NOTES
Hospitalist Progress Note      Patient:  [facility]    Unit/Bed:7K-13/013-A  YOB: 1950  MRN: [id number]   Acct: [de-identified]   PCP: Jacquelin Valdes MD  Date of Admission: 11/1/2023    Assessment/Plan:    Chronic nonhealing wounds: Improving, see media from H&P. Initial concern for infection with purulent discharge noted on RLE wound in ED. Leukocytosis with elevated lactate noted on arrival.   -Following discussion with infectious disease we will continue to monitor patient off of antibiotics  -Arterial US shows no evidence of PAD  - This is due to long term steroid use. Physical debility: Patient has baseline deficits 2/2 CVA as noted below but has recently become bedbound and is unable to care for herself  -PT/OT following. Consult to social work for placement  Renal lesion: measuring 0.7 x 0 0.6 cm. Not well visualized on repeat US  -Follow-up CT on discharge   Protein Calorie malnutrition: low prealbumin. Dietician following. Protein supplement added  Hypernatremia: suspect 2/2 dehydration. Encourage PO intake. Trend  Ingrown toenail: Podiatry following. 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 ID following  Sterile pyuria: Without bacteriuria noted on UA on arrival.  Patient is currently asymptomatic. She is on antibiotics as noted above  Lactic acidosis (resolved)  lactic 2.3 on arrival.  Improved IV fluids  Leukocytosis (improving): Suspect secondary to chronic steroid use and possible wound infection. Hx of CVA: Multiple ischemic events in 1988, 2015 and this past year. Known deficits with left-sided weakness. Patient is chair bound at baseline. CT head showed no acute findings. Previous infarct noted in the left lateral ventricle.   Paroxysmal atrial fibrillation: [id number] 6 HB 3 on OAC with Xarelto rate controlled Coreg  Meningioma: Stable from previous \"MRSAC\"    Urine culture:   Lab Results   Component Value Date/Time    LABURIN No growth-preliminary  No growth   02/09/2017 12:35 PM       Respiratory culture: No results found for: \"CULTRESP\"    Aerobic and Anaerobic :  Lab Results   Component Value Date/Time    LABAERO No Acinetobacter species isolated. 01/04/2023 10:00 PM     Lab Results   Component Value Date/Time    LABANAE  12/01/2021 12:00 PM     No anaerobes isolated- preliminary Culture yielded light growth of gram positive bacilli most consistent with Cutibacterium species. Clinical correlation required. Urinalysis:      Lab Results   Component Value Date/Time    NITRU NEGATIVE 11/01/2023 12:15 PM    WBCUA > 100 11/01/2023 12:15 PM    WBCUA 2-4 02/22/2012 01:00 PM    BACTERIA NONE SEEN 11/01/2023 12:15 PM    RBCUA NONE SEEN 11/01/2023 12:15 PM    BLOODU TRACE 11/01/2023 12:15 PM    SPECGRAV 1.025 09/18/2020 12:00 PM    GLUCOSEU NEGATIVE 11/01/2023 12:15 PM       Radiology:  VL DUP LOWER EXTREMITY ARTERIES BILATERAL   Final Result   1. Slightly limited evaluation as the peroneal arteries could not be definitively visualized in either leg. 2. Normal bilateral ankle-brachial indices. 3. Good pulsatility throughout both legs with diffuse multiphasic Doppler waveforms. **This report has been created using voice recognition software. It may contain minor errors which are inherent in voice recognition technology. **      Final report electronically signed by Dr Nithya Hunter on 11/2/2023 4:04 PM      US RENAL COMPLETE   Final Result   1. No sonographic findings for hydronephrosis. Questionable right renal    lesion not well seen on ultrasound due to patient immobility. This can be    followed for stability on future CT or MRI. 2. Filling defect partially distended urinary bladder may relate to Huntley    catheter balloon. Blood clot not excluded.       This document has been electronically signed by: Ventura Iniguez MD on    11/02/2023 01:05

## 2023-11-13 PROCEDURE — 6370000000 HC RX 637 (ALT 250 FOR IP): Performed by: STUDENT IN AN ORGANIZED HEALTH CARE EDUCATION/TRAINING PROGRAM

## 2023-11-13 PROCEDURE — 1200000000 HC SEMI PRIVATE

## 2023-11-13 PROCEDURE — 0HDRXZZ EXTRACTION OF TOE NAIL, EXTERNAL APPROACH: ICD-10-PCS | Performed by: PODIATRIST

## 2023-11-13 PROCEDURE — 2500000003 HC RX 250 WO HCPCS

## 2023-11-13 PROCEDURE — 1200000003 HC TELEMETRY R&B

## 2023-11-13 PROCEDURE — 99232 SBSQ HOSP IP/OBS MODERATE 35: CPT | Performed by: PHYSICIAN ASSISTANT

## 2023-11-13 PROCEDURE — 2580000003 HC RX 258: Performed by: STUDENT IN AN ORGANIZED HEALTH CARE EDUCATION/TRAINING PROGRAM

## 2023-11-13 RX ADMIN — MICONAZOLE NITRATE: 2 POWDER TOPICAL at 10:37

## 2023-11-13 RX ADMIN — PANTOPRAZOLE SODIUM 40 MG: 40 TABLET, DELAYED RELEASE ORAL at 19:57

## 2023-11-13 RX ADMIN — Medication 20 MG: at 19:57

## 2023-11-13 RX ADMIN — SODIUM CHLORIDE, PRESERVATIVE FREE 10 ML: 5 INJECTION INTRAVENOUS at 10:38

## 2023-11-13 RX ADMIN — RIVAROXABAN 15 MG: 15 TABLET, FILM COATED ORAL at 17:16

## 2023-11-13 RX ADMIN — LEVOTHYROXINE SODIUM 125 MCG: 0.12 TABLET ORAL at 10:36

## 2023-11-13 RX ADMIN — SODIUM CHLORIDE, PRESERVATIVE FREE 10 ML: 5 INJECTION INTRAVENOUS at 19:57

## 2023-11-13 RX ADMIN — HYDROCORTISONE 20 MG: 10 TABLET ORAL at 10:36

## 2023-11-13 RX ADMIN — MICONAZOLE NITRATE: 2 POWDER TOPICAL at 19:57

## 2023-11-13 RX ADMIN — HYDROCORTISONE 10 MG: 10 TABLET ORAL at 19:57

## 2023-11-13 RX ADMIN — BUMETANIDE 1 MG: 1 TABLET ORAL at 10:35

## 2023-11-13 ASSESSMENT — PAIN SCALES - GENERAL: PAINLEVEL_OUTOF10: 0

## 2023-11-13 NOTE — CARE COORDINATION
11/13/23, 3:21 PM EST    DISCHARGE PLANNING EVALUATION       Spoke with Fred Bautista daughter and she stated that they do not want a peer to peer continued for [employer]. Explained to her how to find ECF in the [address] using Medicare. gov for rating of facilities. Daughter to look at options and let this writer know what facility they prefer.

## 2023-11-13 NOTE — PROGRESS NOTES
Hospitalist Progress Note      Patient:  Rosetta Fountain    Unit/Bed:7K-13/013-A  YOB: 1950  MRN: [id number]   Acct: [de-identified]   PCP: Darron Alonso MD  Date of Admission: 11/1/2023    Assessment/Plan:    Chronic nonhealing wounds: Improving    Initial concern for infection with purulent discharge noted on RLE wound in ED. Following discussion with infectious disease we will continue to monitor patient off of antibiotics  Arterial US shows no evidence of PAD  Suspected due to long term steroid use   Physical debility:   Patient has baseline deficits 2/2 CVA as noted below but has recently become bedbound and is unable to care for herself  PT/OT following and recommending SNF  Working on placement with insurance. Peer to Peer is pending. SW following    Renal lesion: measuring 0.7 x 0 0.6 cm. Not well visualized on repeat US  -Follow-up CT on discharge   Protein Calorie malnutrition:   low prealbumin. Dietician following. Protein supplement added  Hypernatremia, resolved:   suspect 2/2 dehydration. Encourage PO intake. 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 ID following  Lactic acidosis (resolved)  lactic 2.3 on arrival- do not see where subsequent lactic acid ordered - will obtain. Suspect Type B  Leukocytosis- stable. : Suspect secondary to chronic steroid use and possible wound infection. Monitor   Hx of CVA: Multiple ischemic events in 1988, 2015 and this past year. Known deficits with left-sided weakness. Patient is chair bound at baseline. CT head showed no acute findings. Previous infarct noted in the left lateral ventricle. Paroxysmal atrial fibrillation: [id number] 6 HB 3 on [address] with Xarelto rate controlled Coreg  Meningioma: Stable from previous imaging noted over the parafalcine left frontal lobe. MRSA culture only:No results found for: \"MRSAC\"    Urine culture:   Lab Results   Component Value Date/Time    LABURIN No growth-preliminary  No growth   02/09/2017 12:35 PM       Respiratory culture: No results found for: \"CULTRESP\"    Aerobic and Anaerobic :  Lab Results   Component Value Date/Time    LABAERO No Acinetobacter species isolated. 01/04/2023 10:00 PM     Lab Results   Component Value Date/Time    LABANAE  12/01/2021 12:00 PM     No anaerobes isolated- preliminary Culture yielded light growth of gram positive bacilli most consistent with Cutibacterium species. Clinical correlation required. Urinalysis:      Lab Results   Component Value Date/Time    NITRU NEGATIVE 11/01/2023 12:15 PM    WBCUA > 100 11/01/2023 12:15 PM    WBCUA 2-4 02/22/2012 01:00 PM    BACTERIA NONE SEEN 11/01/2023 12:15 PM    RBCUA NONE SEEN 11/01/2023 12:15 PM    BLOODU TRACE 11/01/2023 12:15 PM    SPECGRAV 1.025 09/18/2020 12:00 PM    GLUCOSEU NEGATIVE 11/01/2023 12:15 PM       Radiology:  VL DUP LOWER EXTREMITY ARTERIES BILATERAL   Final Result   1. Slightly limited evaluation as the peroneal arteries could not be definitively visualized in either leg. 2. Normal bilateral ankle-brachial indices. 3. Good pulsatility throughout both legs with diffuse multiphasic Doppler waveforms. **This report has been created using voice recognition software. It may contain minor errors which are inherent in voice recognition technology. **      Final report electronically signed by Dr Greenfield Given on 11/2/2023 4:04 PM      US RENAL COMPLETE   Final Result   1. No sonographic findings for hydronephrosis. Questionable right renal    lesion not well seen on ultrasound due to patient immobility. This can be    followed for stability on future CT or MRI. 2. Filling defect partially distended urinary bladder may relate to Huntley    catheter balloon. Blood clot not excluded.       This document has been electronically

## 2023-11-13 NOTE — PLAN OF CARE
Problem: Discharge Planning  Goal: Discharge to home or other facility with appropriate resources  Outcome: Progressing     Problem: Skin/Tissue Integrity  Goal: Absence of new skin breakdown  Description: 1. Monitor for areas of redness and/or skin breakdown  2. Assess vascular access sites hourly  3. Every 4-6 hours minimum:  Change oxygen saturation probe site  4. Every 4-6 hours:  If on nasal continuous positive airway pressure, respiratory therapy assess nares and determine need for appliance change or resting period.   Outcome: Progressing     Problem: Safety - Adult  Goal: Free from fall injury  Outcome: Progressing  Flowsheets (Taken 11/13/2023 1216)  Free From Fall Injury: Instruct family/caregiver on patient safety  Goal: Isolation precautions  Description: Isolation precautions  Outcome: Progressing     Problem: ABCDS Injury Assessment  Goal: Absence of physical injury  Outcome: Progressing  Flowsheets (Taken 11/13/2023 1216)  Absence of Physical Injury: Implement safety measures based on patient assessment     Problem: Chronic Conditions and Co-morbidities  Goal: Patient's chronic conditions and co-morbidity symptoms are monitored and maintained or improved  Outcome: Progressing     Problem: Skin/Tissue Integrity - Adult  Goal: Skin integrity remains intact  Outcome: Progressing  Flowsheets  Taken 11/13/2023 1216  Skin Integrity Remains Intact: Monitor for areas of redness and/or skin breakdown  Taken 11/13/2023 1000  Skin Integrity Remains Intact: Monitor for areas of redness and/or skin breakdown     Problem: Musculoskeletal - Adult  Goal: Return mobility to safest level of function  Outcome: Progressing  Goal: Return ADL status to a safe level of function  Outcome: Progressing     Problem: Metabolic/Fluid and Electrolytes - Adult  Goal: Electrolytes maintained within normal limits  Outcome: Progressing     Problem: Pain  Goal: Verbalizes/displays adequate comfort level or baseline comfort

## 2023-11-13 NOTE — PROGRESS NOTES
Physician Progress Note      PATIENT:               Misty Mattson  CSN #:                  [id number]  :                       1950  ADMIT DATE:       2023 11:57 AM  DISCH DATE:  RESPONDING  PROVIDER #:        Bienvenido Arnold PA-C          QUERY TEXT:    Dear Attending,    Pt admitted with infected wounds. Pt noted to have WBC 15.3, HR 96, LA 2.3. If   possible, please document in the progress notes and discharge summary if you   are evaluating and /or treating any of the following: The medical record reflects the following:  Risk Factors: 68year old female, infected wound, Lactic acidosis, Physical   debility  Clinical Indicators: H&P  \"Suspected wound infection. Leukocytosis with   elevated lactate noted on arrival. Lactic acidosis\"  WBC 14.3-15.3-13.3-11.4-10.3-11.5  LA 2.3  HR 96  Treatment: IV Zosyn, IV Vancomycin      Please email [email or web address] with any questions  Options provided:  -- Sepsis due to infected wound, present on admission  -- Infected wound without Sepsis  -- Other - I will add my own diagnosis  -- Disagree - Not applicable / Not valid  -- Disagree - Clinically unable to determine / Unknown  -- Refer to Clinical Documentation Reviewer    PROVIDER RESPONSE TEXT:    This patient has Infected wound without Sepsis. Query created by:  Sabrina Garcia on 2023 9:35 AM      Electronically signed by:  Bienvenido Arnold PA-C 2023 3:11 PM

## 2023-11-14 LAB
ANION GAP SERPL CALC-SCNC: 10 MEQ/L (ref 8–16)
BUN SERPL-MCNC: 21 MG/DL (ref 7–22)
CALCIUM SERPL-MCNC: 8.5 MG/DL (ref 8.5–10.5)
CHLORIDE SERPL-SCNC: 107 MEQ/L (ref 98–111)
CO2 SERPL-SCNC: 28 MEQ/L (ref 23–33)
CREAT SERPL-MCNC: 1 MG/DL (ref 0.4–1.2)
DEPRECATED RDW RBC AUTO: 49.4 FL (ref 35–45)
ERYTHROCYTE [DISTWIDTH] IN BLOOD BY AUTOMATED COUNT: 13.9 % (ref 11.5–14.5)
GFR SERPL CREATININE-BSD FRML MDRD: 60 ML/MIN/1.73M2
GLUCOSE SERPL-MCNC: 119 MG/DL (ref 70–108)
HCT VFR BLD AUTO: 37.6 % (ref 37–47)
HGB BLD-MCNC: 11.7 GM/DL (ref 12–16)
MAGNESIUM SERPL-MCNC: 1.9 MG/DL (ref 1.6–2.4)
MCH RBC QN AUTO: 30 PG (ref 26–33)
MCHC RBC AUTO-ENTMCNC: 31.1 GM/DL (ref 32.2–35.5)
MCV RBC AUTO: 96.4 FL (ref 81–99)
PLATELET # BLD AUTO: 295 THOU/MM3 (ref 130–400)
PMV BLD AUTO: 10.2 FL (ref 9.4–12.4)
POTASSIUM SERPL-SCNC: 3.2 MEQ/L (ref 3.5–5.2)
RBC # BLD AUTO: 3.9 MILL/MM3 (ref 4.2–5.4)
SODIUM SERPL-SCNC: 145 MEQ/L (ref 135–145)
WBC # BLD AUTO: 10.6 THOU/MM3 (ref 4.8–10.8)

## 2023-11-14 PROCEDURE — 36415 COLL VENOUS BLD VENIPUNCTURE: CPT

## 2023-11-14 PROCEDURE — 6370000000 HC RX 637 (ALT 250 FOR IP): Performed by: PHYSICIAN ASSISTANT

## 2023-11-14 PROCEDURE — 6370000000 HC RX 637 (ALT 250 FOR IP): Performed by: STUDENT IN AN ORGANIZED HEALTH CARE EDUCATION/TRAINING PROGRAM

## 2023-11-14 PROCEDURE — 85027 COMPLETE CBC AUTOMATED: CPT

## 2023-11-14 PROCEDURE — 80048 BASIC METABOLIC PNL TOTAL CA: CPT

## 2023-11-14 PROCEDURE — 99232 SBSQ HOSP IP/OBS MODERATE 35: CPT | Performed by: PHYSICIAN ASSISTANT

## 2023-11-14 PROCEDURE — 97110 THERAPEUTIC EXERCISES: CPT

## 2023-11-14 PROCEDURE — 1200000000 HC SEMI PRIVATE

## 2023-11-14 PROCEDURE — 97530 THERAPEUTIC ACTIVITIES: CPT

## 2023-11-14 PROCEDURE — 6370000000 HC RX 637 (ALT 250 FOR IP)

## 2023-11-14 PROCEDURE — 83735 ASSAY OF MAGNESIUM: CPT

## 2023-11-14 PROCEDURE — 2580000003 HC RX 258: Performed by: STUDENT IN AN ORGANIZED HEALTH CARE EDUCATION/TRAINING PROGRAM

## 2023-11-14 RX ORDER — POTASSIUM CHLORIDE 20 MEQ/1
40 TABLET, EXTENDED RELEASE ORAL
Status: DISCONTINUED | OUTPATIENT
Start: 2023-11-14 | End: 2023-12-09 | Stop reason: HOSPADM

## 2023-11-14 RX ADMIN — ACETAMINOPHEN 650 MG: 325 TABLET ORAL at 20:17

## 2023-11-14 RX ADMIN — MICONAZOLE NITRATE: 2 POWDER TOPICAL at 20:18

## 2023-11-14 RX ADMIN — POTASSIUM CHLORIDE 40 MEQ: 1500 TABLET, EXTENDED RELEASE ORAL at 14:01

## 2023-11-14 RX ADMIN — SODIUM CHLORIDE, PRESERVATIVE FREE 27 ML: 5 INJECTION INTRAVENOUS at 10:01

## 2023-11-14 RX ADMIN — Medication 20 MG: at 20:18

## 2023-11-14 RX ADMIN — RIVAROXABAN 15 MG: 15 TABLET, FILM COATED ORAL at 17:23

## 2023-11-14 RX ADMIN — ACETAMINOPHEN 650 MG: 325 TABLET ORAL at 03:27

## 2023-11-14 RX ADMIN — LEVOTHYROXINE SODIUM 125 MCG: 0.12 TABLET ORAL at 09:55

## 2023-11-14 RX ADMIN — HYDROCORTISONE 20 MG: 10 TABLET ORAL at 09:53

## 2023-11-14 RX ADMIN — Medication: at 20:19

## 2023-11-14 RX ADMIN — SODIUM CHLORIDE, PRESERVATIVE FREE 27 ML: 5 INJECTION INTRAVENOUS at 09:57

## 2023-11-14 RX ADMIN — MICONAZOLE NITRATE: 2 POWDER TOPICAL at 09:51

## 2023-11-14 RX ADMIN — BUMETANIDE 1 MG: 1 TABLET ORAL at 09:51

## 2023-11-14 RX ADMIN — SODIUM CHLORIDE, PRESERVATIVE FREE 10 ML: 5 INJECTION INTRAVENOUS at 20:19

## 2023-11-14 RX ADMIN — PANTOPRAZOLE SODIUM 40 MG: 40 TABLET, DELAYED RELEASE ORAL at 20:18

## 2023-11-14 RX ADMIN — HYDROCORTISONE 10 MG: 10 TABLET ORAL at 20:18

## 2023-11-14 ASSESSMENT — PAIN DESCRIPTION - ONSET: ONSET: GRADUAL

## 2023-11-14 ASSESSMENT — PAIN DESCRIPTION - PAIN TYPE: TYPE: ACUTE PAIN

## 2023-11-14 ASSESSMENT — PAIN DESCRIPTION - ORIENTATION: ORIENTATION: RIGHT;LEFT

## 2023-11-14 ASSESSMENT — PAIN SCALES - GENERAL
PAINLEVEL_OUTOF10: 6
PAINLEVEL_OUTOF10: 9
PAINLEVEL_OUTOF10: 6

## 2023-11-14 ASSESSMENT — PAIN - FUNCTIONAL ASSESSMENT: PAIN_FUNCTIONAL_ASSESSMENT: PREVENTS OR INTERFERES SOME ACTIVE ACTIVITIES AND ADLS

## 2023-11-14 ASSESSMENT — PAIN DESCRIPTION - DESCRIPTORS: DESCRIPTORS: TENDER

## 2023-11-14 ASSESSMENT — PAIN DESCRIPTION - LOCATION: LOCATION: TOE (COMMENT WHICH ONE)

## 2023-11-14 ASSESSMENT — PAIN DESCRIPTION - FREQUENCY: FREQUENCY: INTERMITTENT

## 2023-11-14 NOTE — PROGRESS NOTES
[address] [facility]  Occupational Therapy  Daily Note  Time:   Time In: 1336  Time Out: 1403  Timed Code Treatment Minutes: 27 Minutes  Minutes: 27          Date: 2023  Patient Name: Rita Tesfaye,   Gender: female      Room: [facility]13/013-A  MRN: [id number]  : 1950  (68 y.o.)  Referring Practitioner: Joanie Virk DO  Diagnosis: failure to thrieve  Additional Pertinent Hx: Per ER note on 2023: 68 y.o. female who presents to the emergency department for evaluation of sacral ulcers and concerns for worsening stroke-like symptoms. Patient's daughter-in-law and family friend at bedside. 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 Presenting to ED today due to concerns for patient's health at home. Patient's  is her primary caretaker, but he was recently admitted to [facility] on 10/27 after being found down at home by family. Patient now in ED    Restrictions/Precautions:  Restrictions/Precautions: Fall Risk, General Precautions  Position Activity Restriction  Other position/activity restrictions: L hemiparesis from previous, thin skin with multiple wounds/scabs     SUBJECTIVE: Pt lying supine upon arrival, agreeable to OT session. PAIN: Denies. Vitals: Vitals not assessed per clinical judgement, see nursing flowsheet    COGNITION: Slow Processing, Decreased Problem Solving, and Decreased Safety Awareness    ADL:   No ADL's completed this session. Kaitlin Nelson BALANCE:  Sitting Balance:  Maximum Assistance, X 1. Pt seated EOB ~3 minutes with poor trunk control noted.     BED MOBILITY:  Rolling to Left: Maximum Assistance, X 1    Rolling to Right: Maximum Assistance, X 1    Supine to Sit: Maximum Assistance, X 1    Sit to Supine: Maximum Assistance, X 1    Scooting: Maximum Assistance, X 1 EOB    ADDITIONAL

## 2023-11-14 NOTE — PROGRESS NOTES
Wound ostomy consulted for \"worsening excoriation/redness groin/buttocks\". Wound ostomy not on case, Dr. Carlyon Felty has been following pt's wounds. Please notify Dr. Carlyon Felty with any wound changes.

## 2023-11-14 NOTE — PROGRESS NOTES
Wound ostomy consulted for \"worsening excoriation/redness groin/buttocks\". Wound ostomy not on case, Dr. Dayanna Perez has been following pt's wounds.

## 2023-11-14 NOTE — PLAN OF CARE
Problem: Discharge Planning  Goal: Discharge to home or other facility with appropriate resources  11/14/2023 0125 by Patience Laureano RN  Outcome: Progressing  Flowsheets (Taken 11/14/2023 0125)  Discharge to home or other facility with appropriate resources:   Identify barriers to discharge with patient and caregiver   Arrange for needed discharge resources and transportation as appropriate   Identify discharge learning needs (meds, wound care, etc)  11/13/2023 1414 by Ysidro Mcardle, RN  Outcome: Progressing     Problem: Skin/Tissue Integrity  Goal: Absence of new skin breakdown  Description: 1. Monitor for areas of redness and/or skin breakdown  2. Assess vascular access sites hourly  3. Every 4-6 hours minimum:  Change oxygen saturation probe site  4. Every 4-6 hours:  If on nasal continuous positive airway pressure, respiratory therapy assess nares and determine need for appliance change or resting period. 11/14/2023 0125 by Patience Laureano RN  Outcome: Progressing  Note: Full skin assessment performed. See flow sheets. 11/13/2023 1414 by Ysidro Mcardle, RN  Outcome: Progressing     Problem: Safety - Adult  Goal: Free from fall injury  11/14/2023 0125 by Patience Laureano RN  Outcome: Progressing  Flowsheets  Taken 11/14/2023 0125  Free From Fall Injury: Instruct family/caregiver on patient safety  Taken 11/14/2023 0124  Free From Fall Injury: Instruct family/caregiver on patient safety  11/13/2023 1414 by Ysidro Mcardle, RN  Outcome: Progressing  Flowsheets (Taken 11/13/2023 1216)  Free From Fall Injury: Instruct family/caregiver on patient safety  Goal: Isolation precautions  Description: Isolation precautions  11/14/2023 0125 by Patience Laureano RN  Outcome: Progressing  Note: Contact precautions in place.    11/13/2023 1414 by Ysidro Mcardle, RN  Outcome: Progressing     Problem: ABCDS Injury Assessment  Goal: Absence of physical injury  11/14/2023 0125 by Patience Laureano RN  Outcome: analgesics based on type and severity of pain and evaluate response   Implement non-pharmacological measures as appropriate and evaluate response  11/13/2023 1414 by Jennifer Monroe RN  Outcome: Progressing     Problem: Nutrition Deficit:  Goal: Optimize nutritional status  11/14/2023 0125 by Brad Gillis RN  Outcome: Progressing  Flowsheets (Taken 11/14/2023 0125)  Nutrient intake appropriate for improving, restoring, or maintaining nutritional needs:   Assess nutritional status and recommend course of action   Monitor oral intake, labs, and treatment plans   Recommend appropriate diets, oral nutritional supplements, and vitamin/mineral supplements  11/13/2023 1414 by Jennifer Monroe RN  Outcome: Progressing   Care plan reviewed with patient. Patient  verbalize understanding of the plan of care and contribute to goal setting.

## 2023-11-14 NOTE — PROGRESS NOTES
Hospitalist Progress Note      Patient:  Jenifer Williamson    Unit/Bed:7K-13/013-A  YOB: 1950  MRN: [id number]   Acct: [de-identified]   PCP: Flavio Ferreira MD  Date of Admission: 11/1/2023    Assessment/Plan:    Chronic nonhealing wounds: Improving   Initial concern for infection with purulent discharge noted on RLE wound in ED. Following discussion with infectious disease we will continue to monitor patient off of antibiotics  Arterial US shows no evidence of PAD  Suspected due to long term steroid use   Physical debility:   Patient has baseline deficits 2/2 CVA as noted below but has recently become bedbound and is unable to care for herself  PT/OT following and recommending SNF  Working on placement with  family   Renal lesion: measuring 0.7 x 0 0.6 cm. Not well visualized on repeat US  -Follow-up CT on discharge   Protein Calorie malnutrition:   low prealbumin. Dietician following. Protein supplement added  Hypernatremia, resolved:   suspect 2/2 dehydration. Encourage PO intake. 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 ID following  Lactic acidosis (resolved)  lactic 2.3 on arrival- do not see where subsequent lactic acid ordered - will obtain. Suspect Type B  Leukocytosis- stable. : Suspect secondary to chronic steroid use and possible wound infection. Monitor   Hx of CVA: Multiple ischemic events in 1988, 2015 and this past year. Known deficits with left-sided weakness. Patient is chair bound at baseline. CT head showed no acute findings. Previous infarct noted in the left lateral ventricle. Paroxysmal atrial fibrillation: [id number] 6 HB 3 on [address] with Xarelto rate controlled Coreg  Meningioma: Stable from previous imaging noted over the parafalcine left frontal lobe.     Hx pituitary tumor: s/p surgery performed in

## 2023-11-14 NOTE — CARE COORDINATION
11/14/23, 2:31 PM EST    DISCHARGE PLANNING EVALUATION    Need family's choices for ecf placement, family considering options in [facility], family will need to make financial arrangements with accepting ecf as insurance has denied ecf benefit.

## 2023-11-14 NOTE — PLAN OF CARE
Problem: Discharge Planning  Goal: Discharge to home or other facility with appropriate resources  11/14/2023 0948 by Jose Figueroa RN  Outcome: Progressing  Flowsheets (Taken 11/14/2023 7284)  Discharge to home or other facility with appropriate resources:   Identify barriers to discharge with patient and caregiver   Arrange for needed discharge resources and transportation as appropriate   Identify discharge learning needs (meds, wound care, etc)     Problem: Skin/Tissue Integrity  Goal: Absence of new skin breakdown  Description: 1. Monitor for areas of redness and/or skin breakdown  2. Assess vascular access sites hourly  3. Every 4-6 hours minimum:  Change oxygen saturation probe site  4. Every 4-6 hours:  If on nasal continuous positive airway pressure, respiratory therapy assess nares and determine need for appliance change or resting period.   11/14/2023 0948 by Jose Figueroa RN  Outcome: Progressing     Problem: Safety - Adult  Goal: Free from fall injury  11/14/2023 0948 by Jose Figueroa RN  Outcome: Progressing  Flowsheets (Taken 11/14/2023 0948)  Free From Fall Injury: Instruct family/caregiver on patient safety     Problem: Safety - Adult  Goal: Isolation precautions  Description: Isolation precautions  11/14/2023 0948 by Jose Figueroa RN  Outcome: Progressing     Problem: ABCDS Injury Assessment  Goal: Absence of physical injury  11/14/2023 0948 by Jose Figueroa RN  Outcome: Progressing  Flowsheets (Taken 11/14/2023 0948)  Absence of Physical Injury: Implement safety measures based on patient assessment     Problem: Chronic Conditions and Co-morbidities  Goal: Patient's chronic conditions and co-morbidity symptoms are monitored and maintained or improved  11/14/2023 0948 by Jose Figueroa RN  Outcome: Progressing  Flowsheets (Taken 11/14/2023 0948)  Care Plan - Patient's Chronic Conditions and Co-Morbidity Symptoms are Monitored and Maintained or Improved:   Monitor and assess patient's

## 2023-11-14 NOTE — CARE COORDINATION
11/14/23, 2:46 PM EST    DISCHARGE PLANNING EVALUATION    Spoke with [facility] independent living [facility] staff, who called requesting additional information about patient 's care needs. Discussed need for total care, staff shares they offer enhanced living to provide support with ADLS. [facility] staff will review patient's care needs and determine if they can accept.

## 2023-11-14 NOTE — CONSULTS
[facility]  Podiatric Surgery Consult    Reason for Consult: Onychocryptosis bilateral hallux  Requesting Physician: Dr. Knight Altair:  Failure to thrive in adult [R62.7]  Physical debility [R53.81]  Urinary tract infection without hematuria, site unspecified [N39.0]  Ulcers of both lower extremities, unspecified ulcer stage ([address]) [L97.919, L97.929]  Pressure injury of back, stage 2 ([address]) [L89.102]    HISTORY OF PRESENT ILLNESS:      Patient is a pleasant 76-year-old female with pertinent past medical history of CVA, cancer, hypertension, hypothyroidism. Patient was admitted by hospitalist with diagnosis of failure to thrive. Patient was initially evaluated by resident physician Geena teixeira on 11/3/2023. Patient was noted to have chronic incurvation along the lateral extent of the hallux nail plate bilateral with hypergranulation tissue present. Patient was initially deemed appropriate for discharge with follow-up in the office. I was contacted by infectious disease in regards to proceeding with nail avulsion due to concern for source of infection to bilateral hallux. Patient was seen bedside this evening. Patient found to have moderate diminished epicritic and protopathic sensations as well as altered mental status. Minimal pain on palpation of manipulation of bilateral hallux. We elected to proceed with a bedside nail avulsion to bilateral hallux which patient tolerated procedure well sites were dressed with nonadherent Adaptic and dry sterile dressing. We will continue to monitor moving forward. All questions concerns regarding medical management were otherwise addressed.     Past Medical History:        Diagnosis Date    Asthma     uses albuterol 1-2x per year    Atrial thrombosis     on [address]     Bursitis     right shoulder -s/p steroid injections    Cancer (HCC)     Chronic diastolic heart failure (HCC)     CVA (cerebral infarction) 6/14/15    Mult. small acute

## 2023-11-14 NOTE — PROGRESS NOTES
[facility]  INPATIENT PHYSICAL THERAPY  DAILY NOTE  [facility] ORTHOPEDICS 7K - 7K-13/013-A    Time In: 1107  Time Out: 1118  Timed Code Treatment Minutes: 11 Minutes  Minutes: 11          Date: 2023  Patient Name: Shaunna Mendieta,  Gender:  female        MRN: [id number]  : 1950  (68 y.o.)     Referring Practitioner: Bhanu Garcia DO  Diagnosis: Physical debility  Additional Pertinent Hx: 68 y.o. female who presents to [facility] ED 2023 with progressive weakness. Patient is a lifetime nonsmoker with a PMH significant for CVA w/ lefts sided deficits, paroxysmal atrial fibrillation on [address], pituitary tumor s/p resection, acquired hypothyroidism, which required adrenal insufficiency, left-sided meningioma, and chronic wounds secondary to bedbound state. Patient is brought to the ED today by her best friend, a retired RN. She reports she was visiting patient and noticed weeping wounds with purulent drainage around toes. Patient was noted to be incontinent of urine and feces and was noted to have progressive weakness over the past month. Patient is also attended by her daughter who expresses multiple concerns for her going home. Family members have noted progressive weakness since September. At baseline patient has been chair-bound since last CVA and is cared for by her . However  is currently undergoing treatment for complications related to bladder cancer and is currently being treated at [address] [place] [address] following syncopal episode with collapse. She no longer has the support she needs at home. Patient has progressed to becoming been-bed bound since September.      Prior Level of Function:  Lives With: Spouse  Type of Home: House  Home Layout: One level  Home Access: [facility] [address]: [facility], 4 wheeled, [facility]        ADL Assistance: Needs assistance  Ambulation Assistance: Non-ambulatory  Transfer Assistance: Needs assistance  Active : No  Additional Comments: Pt has been bedbound x 2-3 months.  (caregiver) recently in hospital & unable to A Pt. Pt reports family member from out of town has been staying with her to A. Restrictions/Precautions:  Restrictions/Precautions: Fall Risk, General Precautions  Position Activity Restriction  Other position/activity restrictions: L hemiparesis from previous, thin skin with multiple wounds/scabs     SUBJECTIVE: Pt in bed upon arrival, and agrees to therapy, RN approved session, Pt A&O X 4/4, Pt pleasant and cooperative, pt denied any pain and voiced feeling \"okay\" this morning. PAIN: 0/10: pt denied any pain     Vitals: Vitals not assessed per clinical judgement, see nursing flowsheet    OBJECTIVE:  Bed Mobility:  Not Tested; pt deferred     Transfers:  Not Tested    Ambulation:  Not Tested    Exercise:  Patient was guided in 1 set(s) x10-15 reps of exercise to both lower extremities. Ankle pumps, Glut sets, Quad sets, Heelslides (right LE only due to minimal to no knee flexion on left LE), Short arc quads (right LE only), Hip abduction/adduction (AAROM for left LE), Straight leg raises (AAROM for left LE), and Hooklying hip abduction/adduction (pillow squeezes). Exercises were completed for increased independence with functional mobility. Functional Outcome Measures: Completed  AM-PAC Inpatient Mobility without Stair Climbing Raw Score : 7  AM-PAC Inpatient without Stair Climbing T-Scale Score : 28.66  Modified [place] Scale:  Not Applicable    ASSESSMENT:  Assessment: Patient progressing toward established goals.  Pt demonstrates impairments with strength, balance, endurance, activity tolerance, safety awareness, insight to deficits, independence, requires hands on assistance for safety with mobility and would benefit from continued skilled PT improve these impairments, to maximize independence, to prevent falls and ensure safety with mobility, pt unsafe to return home at this time, pt

## 2023-11-15 LAB — POTASSIUM SERPL-SCNC: 4.5 MEQ/L (ref 3.5–5.2)

## 2023-11-15 PROCEDURE — 99231 SBSQ HOSP IP/OBS SF/LOW 25: CPT | Performed by: PHYSICIAN ASSISTANT

## 2023-11-15 PROCEDURE — 36415 COLL VENOUS BLD VENIPUNCTURE: CPT

## 2023-11-15 PROCEDURE — 6370000000 HC RX 637 (ALT 250 FOR IP): Performed by: PHYSICIAN ASSISTANT

## 2023-11-15 PROCEDURE — 97535 SELF CARE MNGMENT TRAINING: CPT

## 2023-11-15 PROCEDURE — 2580000003 HC RX 258: Performed by: STUDENT IN AN ORGANIZED HEALTH CARE EDUCATION/TRAINING PROGRAM

## 2023-11-15 PROCEDURE — 6370000000 HC RX 637 (ALT 250 FOR IP): Performed by: STUDENT IN AN ORGANIZED HEALTH CARE EDUCATION/TRAINING PROGRAM

## 2023-11-15 PROCEDURE — 1200000000 HC SEMI PRIVATE

## 2023-11-15 PROCEDURE — 84132 ASSAY OF SERUM POTASSIUM: CPT

## 2023-11-15 RX ADMIN — HYDROCORTISONE 10 MG: 10 TABLET ORAL at 21:00

## 2023-11-15 RX ADMIN — Medication 20 MG: at 21:03

## 2023-11-15 RX ADMIN — MICONAZOLE NITRATE: 2 POWDER TOPICAL at 08:17

## 2023-11-15 RX ADMIN — POTASSIUM CHLORIDE 40 MEQ: 1500 TABLET, EXTENDED RELEASE ORAL at 08:15

## 2023-11-15 RX ADMIN — MICONAZOLE NITRATE: 2 POWDER TOPICAL at 21:01

## 2023-11-15 RX ADMIN — Medication: at 06:03

## 2023-11-15 RX ADMIN — BUMETANIDE 1 MG: 1 TABLET ORAL at 08:15

## 2023-11-15 RX ADMIN — SODIUM CHLORIDE, PRESERVATIVE FREE 10 ML: 5 INJECTION INTRAVENOUS at 08:17

## 2023-11-15 RX ADMIN — LEVOTHYROXINE SODIUM 125 MCG: 0.12 TABLET ORAL at 08:18

## 2023-11-15 RX ADMIN — HYDROCORTISONE 20 MG: 10 TABLET ORAL at 08:18

## 2023-11-15 RX ADMIN — PANTOPRAZOLE SODIUM 40 MG: 40 TABLET, DELAYED RELEASE ORAL at 21:01

## 2023-11-15 RX ADMIN — ACETAMINOPHEN 650 MG: 325 TABLET ORAL at 08:15

## 2023-11-15 RX ADMIN — SODIUM CHLORIDE, PRESERVATIVE FREE 10 ML: 5 INJECTION INTRAVENOUS at 21:03

## 2023-11-15 RX ADMIN — RIVAROXABAN 15 MG: 15 TABLET, FILM COATED ORAL at 21:02

## 2023-11-15 ASSESSMENT — PAIN SCALES - GENERAL
PAINLEVEL_OUTOF10: 0
PAINLEVEL_OUTOF10: 8

## 2023-11-15 ASSESSMENT — PAIN DESCRIPTION - ONSET: ONSET: ON-GOING

## 2023-11-15 ASSESSMENT — PAIN DESCRIPTION - ORIENTATION: ORIENTATION: RIGHT

## 2023-11-15 ASSESSMENT — PAIN DESCRIPTION - DESCRIPTORS: DESCRIPTORS: ACHING;SHARP

## 2023-11-15 ASSESSMENT — PAIN DESCRIPTION - LOCATION: LOCATION: TOE (COMMENT WHICH ONE)

## 2023-11-15 ASSESSMENT — PAIN - FUNCTIONAL ASSESSMENT: PAIN_FUNCTIONAL_ASSESSMENT: ACTIVITIES ARE NOT PREVENTED

## 2023-11-15 ASSESSMENT — PAIN DESCRIPTION - FREQUENCY: FREQUENCY: INTERMITTENT

## 2023-11-15 ASSESSMENT — PAIN DESCRIPTION - PAIN TYPE: TYPE: ACUTE PAIN;SURGICAL PAIN

## 2023-11-15 NOTE — PLAN OF CARE
Problem: Discharge Planning  Goal: Discharge to home or other facility with appropriate resources  11/14/2023 2216 by Abhishek Harper RN  Outcome: Progressing  Flowsheets  Taken 11/14/2023 2216 by Abhishek Harper RN  Discharge to home or other facility with appropriate resources:   Identify barriers to discharge with patient and caregiver   Arrange for needed discharge resources and transportation as appropriate   Identify discharge learning needs (meds, wound care, etc)  Taken 11/14/2023 0948 by Medina Davis RN  Discharge to home or other facility with appropriate resources:   Identify barriers to discharge with patient and caregiver   Arrange for needed discharge resources and transportation as appropriate   Identify discharge learning needs (meds, wound care, etc)  11/14/2023 0948 by Medina Davis RN  Outcome: Progressing  Flowsheets (Taken 11/14/2023 0323)  Discharge to home or other facility with appropriate resources:   Identify barriers to discharge with patient and caregiver   Arrange for needed discharge resources and transportation as appropriate   Identify discharge learning needs (meds, wound care, etc)     Problem: Skin/Tissue Integrity  Goal: Absence of new skin breakdown  Description: 1. Monitor for areas of redness and/or skin breakdown  2. Assess vascular access sites hourly  3. Every 4-6 hours minimum:  Change oxygen saturation probe site  4. Every 4-6 hours:  If on nasal continuous positive airway pressure, respiratory therapy assess nares and determine need for appliance change or resting period. 11/14/2023 2216 by Abhishek Harper RN  Outcome: Progressing  Note: Full skin assessment performed. Turning Q2. Pinxav in use.    11/14/2023 0948 by Medina Davis RN  Outcome: Progressing     Problem: Safety - Adult  Goal: Free from fall injury  11/14/2023 2216 by Abhishek Harper RN  Outcome: Progressing  Flowsheets  Taken 11/14/2023 2216 by Abhishek Harper RN  Free From Fall Injury:

## 2023-11-15 NOTE — PLAN OF CARE
Problem: Skin/Tissue Integrity  Goal: Absence of new skin breakdown  Description: 1. Monitor for areas of redness and/or skin breakdown  2. Assess vascular access sites hourly  3. Every 4-6 hours minimum:  Change oxygen saturation probe site  4. Every 4-6 hours:  If on nasal continuous positive airway pressure, respiratory therapy assess nares and determine need for appliance change or resting period. 11/15/2023 1105 by Jocelin Chanel LPN  Outcome: Progressing  Skin assessment every shift. No new areas of concern, BLE ace wrapped this am by previous shift, turned side to side in bed, pillow support provided, micotin powder and Pinxav ointment applied to coccyx and groin after incontinence episode. Problem: Safety - Adult  Goal: Free from fall injury  11/15/2023 1105 by Jocelin Chanel LPN  Outcome: Progressing  Pt will remain free of falls. Pt is in bed, pillow elevating BLE, under hip side to side and arms. Problem: Chronic Conditions and Co-morbidities  Goal: Patient's chronic conditions and co-morbidity symptoms are monitored and maintained or improved  11/15/2023 1105 by Jocelin Chanel LPN  Outcome: Progressing  Flowsheets (Taken 11/15/2023 0989)  Care Plan - Patient's Chronic Conditions and Co-Morbidity Symptoms are Monitored and Maintained or Improved: Monitor and assess patient's chronic conditions and comorbid symptoms for stability, deterioration, or improvement. Pt is currently stable, large scabbed area on top of skull, moderate amount of drainage on pillow.

## 2023-11-15 NOTE — PROGRESS NOTES
[address] [facility]  Occupational Therapy  Daily Note  Time:   Time In: 8776  Time Out: 1505  Timed Code Treatment Minutes: 10 Minutes  Minutes: 10          Date: 11/15/2023  Patient Name: Brenda Mcgee,   Gender: female      Room: -13/013-A  MRN: [id number]  : 1950  (68 y.o.)  Referring Practitioner: Rosanne Taylor DO  Diagnosis: failure to thrieve  Additional Pertinent Hx: Per ER note on 2023: 68 y.o. female who presents to the emergency department for evaluation of sacral ulcers and concerns for worsening stroke-like symptoms. Patient's daughter-in-law and family friend at bedside. 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 Presenting to ED today due to concerns for patient's health at home. Patient's  is her primary caretaker, but he was recently admitted to [facility] on 10/27 after being found down at home by family. Patient now in ED    Restrictions/Precautions:  Restrictions/Precautions: Fall Risk, General Precautions  Position Activity Restriction  Other position/activity restrictions: L hemiparesis from previous, thin skin with multiple wounds/scabs     SUBJECTIVE:  Pt in bed on OT arrival, pleasant and cooperative. Pt is agreeable to ADL task and UB ROM HEP    PAIN: none    Vitals: Vitals not assessed per clinical judgement, see nursing flowsheet    COGNITION: Slow Processing, Decreased Insight, Impaired Memory, and Decreased Problem Solving    ADL:   Grooming: Minimal Assistance. Pt brushes teeth from bed level. .    Exercise: Instructed pt through R UE AROM exercises including shoulder flexion and elbow flexion x5 reps x2 sets. Increased time and cues required for continuity of exercises. Gentle PROM to L UE provided.  Pt able to achieve ~70 degrees shoulder flexion before reporting pain    Modified [place] Scale:   +5 - Severe disability; bedridden, incontinent and requiring constant nursing care and attention    ASSESSMENT:     Activity Tolerance:  Patient tolerance of  treatment: Poor treatment tolerance      Discharge Recommendations: ECF with OT  Equipment Recommendations: Other: monitor pending progress  Plan: Times Per Week: 3-5x  Current Treatment Recommendations: Functional mobility training, Balance training, Self-Care / ADL, Strengthening, ROM, Endurance training    Patient Education  Patient Education: Role of OT, Plan of Care, and ADL's    Goals  Short Term Goals  Time Frame for Short Term Goals: until discharge  Short Term Goal 1: Pt will tolerate sitting EOB 5-6 min with min A for eventual EOB UB dressing tasks  Short Term Goal 2: Pt will tolerate RUE AROM/light strengthen exercises to increase UB endurance to A with ADLs  Short Term Goal 3: Pt will complete oral care with min A & adaptive strategies prn  Short Term Goal 4: Pt will tolerate further assessment of functional t/fs by OTR when appropriate  Long Term Goals  Time Frame for Long Term Goals : No LTG set d/t short ELOS    Following session, patient left in safe position with all fall risk precautions in place.

## 2023-11-15 NOTE — PROGRESS NOTES
Hospitalist Progress Note      Patient:  Meena Wilson    Unit/Bed:7K-13/013-A  YOB: 1950  MRN: [id number]   Acct: [de-identified]   PCP: Hilda Mata MD  Date of Admission: 11/1/2023    Assessment/Plan:    Chronic nonhealing wounds: Improving   Initial concern for infection with purulent discharge noted on RLE wound in ED. Following discussion with infectious disease we will continue to monitor patient off of antibiotics  Arterial US shows no evidence of PAD  Suspected due to long term steroid use   Physical debility:   Patient has baseline deficits 2/2 CVA as noted below but has recently become bedbound and is unable to care for herself  PT/OT following and recommending SNF  Working on placement with  family   Renal lesion: measuring 0.7 x 0 0.6 cm. Not well visualized on repeat US  -Follow-up CT on discharge   Protein Calorie malnutrition:   low prealbumin. Dietician following. Protein supplement added  Hypernatremia, resolved:   suspect 2/2 dehydration. Encourage PO intake. 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 ID following  Lactic acidosis (resolved)  lactic 2.3 on arrival- do not see where subsequent lactic acid ordered - will obtain. Suspect Type B  Leukocytosis- stable. : Suspect secondary to chronic steroid use and possible wound infection. Monitor   Hx of CVA: Multiple ischemic events in 1988, 2015 and this past year. Known deficits with left-sided weakness. Patient is chair bound at baseline. CT head showed no acute findings. Previous infarct noted in the left lateral ventricle. Paroxysmal atrial fibrillation: [id number] 6 HB 3 on [address] with Xarelto rate controlled Coreg  Meningioma: Stable from previous imaging noted over the parafalcine left frontal lobe. Hx pituitary tumor: s/p surgery performed in [place].  On

## 2023-11-16 LAB
ANION GAP SERPL CALC-SCNC: 10 MEQ/L (ref 8–16)
BUN SERPL-MCNC: 22 MG/DL (ref 7–22)
CALCIUM SERPL-MCNC: 9 MG/DL (ref 8.5–10.5)
CHLORIDE SERPL-SCNC: 105 MEQ/L (ref 98–111)
CO2 SERPL-SCNC: 27 MEQ/L (ref 23–33)
CREAT SERPL-MCNC: 0.9 MG/DL (ref 0.4–1.2)
DEPRECATED RDW RBC AUTO: 49.2 FL (ref 35–45)
ERYTHROCYTE [DISTWIDTH] IN BLOOD BY AUTOMATED COUNT: 14 % (ref 11.5–14.5)
GFR SERPL CREATININE-BSD FRML MDRD: > 60 ML/MIN/1.73M2
GLUCOSE SERPL-MCNC: 128 MG/DL (ref 70–108)
HCT VFR BLD AUTO: 40.5 % (ref 37–47)
HGB BLD-MCNC: 12.6 GM/DL (ref 12–16)
MCH RBC QN AUTO: 29.7 PG (ref 26–33)
MCHC RBC AUTO-ENTMCNC: 31.1 GM/DL (ref 32.2–35.5)
MCV RBC AUTO: 95.5 FL (ref 81–99)
PLATELET # BLD AUTO: 377 THOU/MM3 (ref 130–400)
PMV BLD AUTO: 10.2 FL (ref 9.4–12.4)
POTASSIUM SERPL-SCNC: 5.1 MEQ/L (ref 3.5–5.2)
RBC # BLD AUTO: 4.24 MILL/MM3 (ref 4.2–5.4)
SODIUM SERPL-SCNC: 142 MEQ/L (ref 135–145)
WBC # BLD AUTO: 12.8 THOU/MM3 (ref 4.8–10.8)

## 2023-11-16 PROCEDURE — 80048 BASIC METABOLIC PNL TOTAL CA: CPT

## 2023-11-16 PROCEDURE — 1200000000 HC SEMI PRIVATE

## 2023-11-16 PROCEDURE — 85027 COMPLETE CBC AUTOMATED: CPT

## 2023-11-16 PROCEDURE — 2580000003 HC RX 258: Performed by: STUDENT IN AN ORGANIZED HEALTH CARE EDUCATION/TRAINING PROGRAM

## 2023-11-16 PROCEDURE — 6370000000 HC RX 637 (ALT 250 FOR IP): Performed by: STUDENT IN AN ORGANIZED HEALTH CARE EDUCATION/TRAINING PROGRAM

## 2023-11-16 PROCEDURE — 6370000000 HC RX 637 (ALT 250 FOR IP): Performed by: PHYSICIAN ASSISTANT

## 2023-11-16 PROCEDURE — 36415 COLL VENOUS BLD VENIPUNCTURE: CPT

## 2023-11-16 PROCEDURE — 99231 SBSQ HOSP IP/OBS SF/LOW 25: CPT | Performed by: PHYSICIAN ASSISTANT

## 2023-11-16 RX ADMIN — HYDROCORTISONE 10 MG: 10 TABLET ORAL at 19:52

## 2023-11-16 RX ADMIN — MICONAZOLE NITRATE: 2 POWDER TOPICAL at 19:50

## 2023-11-16 RX ADMIN — LEVOTHYROXINE SODIUM 125 MCG: 0.12 TABLET ORAL at 09:28

## 2023-11-16 RX ADMIN — SODIUM CHLORIDE, PRESERVATIVE FREE 10 ML: 5 INJECTION INTRAVENOUS at 19:50

## 2023-11-16 RX ADMIN — POTASSIUM CHLORIDE 40 MEQ: 1500 TABLET, EXTENDED RELEASE ORAL at 11:03

## 2023-11-16 RX ADMIN — ACETAMINOPHEN 650 MG: 325 TABLET ORAL at 15:01

## 2023-11-16 RX ADMIN — MICONAZOLE NITRATE: 2 POWDER TOPICAL at 09:28

## 2023-11-16 RX ADMIN — Medication 20 MG: at 19:52

## 2023-11-16 RX ADMIN — BUMETANIDE 1 MG: 1 TABLET ORAL at 09:28

## 2023-11-16 RX ADMIN — PANTOPRAZOLE SODIUM 40 MG: 40 TABLET, DELAYED RELEASE ORAL at 19:52

## 2023-11-16 RX ADMIN — SODIUM CHLORIDE, PRESERVATIVE FREE 5 ML: 5 INJECTION INTRAVENOUS at 09:28

## 2023-11-16 RX ADMIN — HYDROCORTISONE 20 MG: 10 TABLET ORAL at 09:28

## 2023-11-16 ASSESSMENT — PAIN SCALES - GENERAL
PAINLEVEL_OUTOF10: 6
PAINLEVEL_OUTOF10: 7
PAINLEVEL_OUTOF10: 0

## 2023-11-16 ASSESSMENT — PAIN DESCRIPTION - ORIENTATION: ORIENTATION: LEFT;RIGHT

## 2023-11-16 ASSESSMENT — PAIN DESCRIPTION - LOCATION: LOCATION: TOE (COMMENT WHICH ONE)

## 2023-11-16 ASSESSMENT — PAIN DESCRIPTION - DESCRIPTORS: DESCRIPTORS: ACHING

## 2023-11-16 NOTE — PROGRESS NOTES
Podiatric Progress Note  Kirstymathewjean-paul Browning  Subjective :   11/16/23  Patient was seen bedside today on behalf of of Dr. Storm Hannah. Patient is 3 days s/p bilateral great toenail avulsion. Patient relates no pain to the bilateral feet. Patient continuing with diminished mental status. Patient denies any N/V/F/C/SOB or CP. No other pedal complaints. HPI  Patient is a pleasant 81-year-old female with pertinent past medical history of CVA, cancer, hypertension, hypothyroidism. Patient was admitted by hospitalist with diagnosis of failure to thrive. Patient was initially evaluated by resident physician Jason teixeira on 11/3/2023. Patient was noted to have chronic incurvation along the lateral extent of the hallux nail plate bilateral with hypergranulation tissue present. Patient was initially deemed appropriate for discharge with follow-up in the office. I was contacted by infectious disease in regards to proceeding with nail avulsion due to concern for source of infection to bilateral hallux. Patient was seen bedside this evening. Patient found to have moderate diminished epicritic and protopathic sensations as well as altered mental status. Minimal pain on palpation of manipulation of bilateral hallux. We elected to proceed with a bedside nail avulsion to bilateral hallux which patient tolerated procedure well sites were dressed with nonadherent Adaptic and dry sterile dressing.       Current Medications:    Current Facility-Administered Medications: zinc oxide (PINXAV) 30 % ointment, , Topical, 4x Daily PRN  potassium chloride (KLOR-CON M) extended release tablet 40 mEq, 40 mEq, Oral, Daily with breakfast  miconazole (MICOTIN) 2 % powder, , Topical, BID  rivaroxaban (XARELTO) tablet 15 mg, 15 mg, Oral, Daily  hydrocortisone (CORTEF) tablet 20 mg, 20 mg, Oral, QAM  hydrocortisone (CORTEF) tablet 10 mg, 10 mg, Oral, Nightly  levothyroxine (SYNTHROID) tablet 125 mcg, 125 mcg, Oral, Daily  pantoprazole

## 2023-11-16 NOTE — PROGRESS NOTES
Hospitalist Progress Note      Patient:  Nena Gonzalez    Unit/Bed:7K-13/013-A  YOB: 1950  MRN: [id number]   Acct: [de-identified]   PCP: Alysia King MD  Date of Admission: 11/1/2023    Assessment/Plan:    Chronic nonhealing wounds: Improving   Initial concern for infection with purulent discharge noted on RLE wound in ED. Following discussion with infectious disease we will continue to monitor patient off of antibiotics  Arterial US shows no evidence of PAD  Suspected due to long term steroid use   Physical debility:   Patient has baseline deficits 2/2 CVA as noted below but has recently become bedbound and is unable to care for herself  PT/OT following and recommending SNF  Working on placement with  family   Renal lesion: measuring 0.7 x 0 0.6 cm. Not well visualized on repeat US  -Follow-up CT on discharge   Protein Calorie malnutrition:   low prealbumin. Dietician following. Protein supplement added  Hypernatremia, resolved:   suspect 2/2 dehydration. Encourage PO intake. 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 ID following  Lactic acidosis (resolved)  lactic 2.3 on arrival- do not see where subsequent lactic acid ordered - will obtain. Suspect Type B  Leukocytosis- stable. : Suspect secondary to chronic steroid use and possible wound infection. Monitor   Hx of CVA: Multiple ischemic events in 1988, 2015 and this past year. Known deficits with left-sided weakness. Patient is chair bound at baseline. CT head showed no acute findings. Previous infarct noted in the left lateral ventricle. Paroxysmal atrial fibrillation: [id number] 6 HB 3 on [address] with Xarelto rate controlled Coreg  Meningioma: Stable from previous imaging noted over the parafalcine left frontal lobe. Hx pituitary tumor: s/p surgery performed in [place].  On 02/16/2022 04:00 PM     No segmented neutrophils observed. Rare epithelial cells observed. No organisms observed. MRSA culture only:No results found for: \"MRSAC\"    Urine culture:   Lab Results   Component Value Date/Time    LABURIN No growth-preliminary  No growth   02/09/2017 12:35 PM       Respiratory culture: No results found for: \"CULTRESP\"    Aerobic and Anaerobic :  Lab Results   Component Value Date/Time    LABAERO No Acinetobacter species isolated. 01/04/2023 10:00 PM     Lab Results   Component Value Date/Time    LABANAE  12/01/2021 12:00 PM     No anaerobes isolated- preliminary Culture yielded light growth of gram positive bacilli most consistent with Cutibacterium species. Clinical correlation required. Urinalysis:      Lab Results   Component Value Date/Time    NITRU NEGATIVE 11/01/2023 12:15 PM    WBCUA > 100 11/01/2023 12:15 PM    WBCUA 2-4 02/22/2012 01:00 PM    BACTERIA NONE SEEN 11/01/2023 12:15 PM    RBCUA NONE SEEN 11/01/2023 12:15 PM    BLOODU TRACE 11/01/2023 12:15 PM    SPECGRAV 1.025 09/18/2020 12:00 PM    GLUCOSEU NEGATIVE 11/01/2023 12:15 PM       Radiology:  VL DUP LOWER EXTREMITY ARTERIES BILATERAL   Final Result   1. Slightly limited evaluation as the peroneal arteries could not be definitively visualized in either leg. 2. Normal bilateral ankle-brachial indices. 3. Good pulsatility throughout both legs with diffuse multiphasic Doppler waveforms. **This report has been created using voice recognition software. It may contain minor errors which are inherent in voice recognition technology. **      Final report electronically signed by Dr Odin Dickinson on 11/2/2023 4:04 PM      US RENAL COMPLETE   Final Result   1. No sonographic findings for hydronephrosis. Questionable right renal    lesion not well seen on ultrasound due to patient immobility. This can be    followed for stability on future CT or MRI.    2. Filling defect partially distended urinary bladder

## 2023-11-16 NOTE — CARE COORDINATION
11/16/23, 9:19 AM EST    DISCHARGE PLANNING EVALUATION    Call made to [facility] assisted [facility], message left. [facility] has not responded yet. Spoke with son, Sachin Varma, who shares that he is working with  at [place] on placement for patient's   and he will email the list of options they are considering. Patient will be private pay at an [facility] once accepted.

## 2023-11-16 NOTE — CARE COORDINATION
11/16/23, 1:47 PM EST    DISCHARGE PLANNING EVALUATION    Spoke with [address] [place] [address] Andria Tamez at family's request.  Family has not yet chosen a facility for referral for patient's  either. She has provided family with a list of 15 possible ecfs that will accept patient's  and may accept patient. Spoke with daughter in [address] [place] [address]. She requests referral to [facility]. Referral initiated to [facility], message left with admissions with referral information, waiting response.

## 2023-11-16 NOTE — CARE COORDINATION
11/16/23, 1:12 PM EST    DISCHARGE ON GOING EVALUATION    [address] day: 6  Location: -13/013-A Reason for admit: Failure to thrive in adult [R62.7]  Physical debility [R53.81]  Urinary tract infection without hematuria, site unspecified [N39.0]  Ulcers of both lower extremities, unspecified ulcer stage ([address]) [L97.919, L97.929]  Pressure injury of back, stage 2 ([address]) [L89.102]   Procedure:   11/1 CXR: No acute intrathoracic process. 11/1 Repeat CXR: Minimal linear peripheral opacities at the left lung base are present which may represent minimal atelectasis or scarring. 2. There is a right subclavian central line present with the tip overlying the cavoatrial junction. No pneumothorax is seen  11/1 CT Head WO: No acute intracranial findings. 2. Stable probable meningioma over the parafalcine left frontal lobe. 3. Opacification of the left mastoid air cells can be correlated for   mastoiditis  11/1 CTA Abdominal Aorta: No significant contrast opacification in the distal right anterior tibial artery, right dorsalis pedis artery, and left plantaris artery beginning in the proximal left midfoot. This may relate to underlying diminished flow or occlusion. 2. Possible complex lesion in the upper midpole of the kidney measuring 0.7 x 0.6 cm. Nonemergent ultrasound can be helpful to distinguish mass from complex cyst. 3. Colonic diverticula. 4. Small hiatal hernia. 5. Asymmetric mild subcutaneous edema in the mid-distal left calf and left   ankle. 11/2 US Renal: No sonographic findings for hydronephrosis. Questionable right renal   lesion not well seen on ultrasound due to patient immobility. This can be followed for stability on future CT or MRI. 2. Filling defect partially distended urinary bladder may relate to Huntley catheter balloon. Blood clot not excluded  11/2 BLE Arterial DUP:  Slightly limited evaluation as the peroneal arteries could not be definitively visualized in either leg.  Normal bilateral ankle-brachial indices. 11/3: scalp wounds removed by ID from pt head; shaved her hair to clean wound. 11/13 Bedside nail avulsion performed of bilateral hallux by podiatry  Barriers to Discharge: await placement; bilat foot dressing care  PCP: Chema Arreguin MD  Readmission Risk Score: 16.6%  Patient Goals/Plan/Treatment Preferences: SW conts find ecf for pt with family help; patient will be private pay at an ecf once accepted.

## 2023-11-16 NOTE — PROGRESS NOTES
Comprehensive Nutrition Assessment    Type and Reason for Visit:  Reassess    Nutrition Recommendations/Plan:   Recommend MVI  Continue diet per provider and encourage PO intakes at best efforts  Continue ONS: Ensure Enlive (BID) and Magic Cups (BID)  D/c plan ongoing     Malnutrition Assessment:  Malnutrition Status: Moderate malnutrition (11/10/23 1400)    Context:  Chronic Illness     Findings of the 6 clinical characteristics of malnutrition:  Energy Intake:  Mild decrease in energy intake (Comment)  Weight Loss:  Unable to assess (r/t CHF and edema)     Body Fat Loss:  Mild body fat loss Orbital   Muscle Mass Loss:  Mild muscle mass loss Temples (temporalis)  Fluid Accumulation:  Mild Generalized, Extremities   Strength:  Not Performed    Nutrition Assessment:     Pt. Is improving nutritionally AEB slight increase in PO intake. At risk for further nutrition compromise r/t chronic nonhealing wounds, physical debility 2/2 CVA - recently became bed-bound, renal lesion, LOS 15 days, and underlying medical condition (PMHx: [place], CHF, CVA, DM, GERD, meningioma of the brain, HLD, HTN, hypothyroidism, obesity, PAF). Nutrition Related Findings:    Pt. Report/Treatments/Miscellaneous: Pt seen, states she has been eating well, increase in PO documentation this week, reports good acceptance of ONS and wishes to continue receiving. Discharge planning still ongoing.    GI Status: BM 11/14  Pertinent Labs: K 4.5, glucose 119  Pertinent Meds: bumex, synthroid, protonix     Wound Type:  (pretibial L and R, Buttocks pressure injury R + L)       Current Nutrition Intake & Therapies:    Average Meal Intake: 26-50%, 51-75%  Average Supplements Intake:  (reports good tolerance)  ADULT DIET; Easy to Chew  ADULT ORAL NUTRITION SUPPLEMENT; Breakfast, Lunch; Standard High Calorie/High Protein Oral Supplement  ADULT ORAL NUTRITION SUPPLEMENT; Lunch; Frozen Oral Supplement    Anthropometric Measures:  Height: 162.6 cm (5' 4\")  Ideal Body Weight (IBW): 120 lbs (55 kg)    Admission Body Weight: 76.2 kg (168 lb) (11/1: RUE/LUE + 1, RLE/LLE +1, stated weight)  Current Body Weight: 76.2 kg (167 lb 15.9 oz) (11/1: RUE/LUE + 1, RLE/LLE +1, stated weight),   Current BMI (kg/m2): 28.8  Usual Body Weight:  (per EMR: 11/23/22: 193#, 2/6/23: 183# 10 oz)     Weight Adjustment For: No Adjustment                 BMI Categories: Overweight (BMI 25.0-29. 9)    Estimated Daily Nutrient Needs:  Energy Requirements Based On: Kcal/kg  Weight Used for Energy Requirements: Current (76.2 kg)  Energy (kcal/day): 8416-3286 kcals (25-30 kcals/kg)  Weight Used for Protein Requirements: Ideal (54.5 kg)  Protein (g/day): 71-82 grams (1.3-1.5 grams/kg of IBW) to promote wound healing efforts       Nutrition Diagnosis:   Moderate malnutrition, In context of chronic illness related to increase demand for energy/nutrients as evidenced by wounds, Criteria as identified in malnutrition assessment    Nutrition Interventions:   Food and/or Nutrient Delivery: Continue Current Diet, Modify Oral Nutrition Supplement  Nutrition Education/Counseling: Education initiated (continued to encourage PO intakes at best efforts and ONS use)  Coordination of Nutrition Care: Continue to monitor while inpatient       Goals:  Previous Goal Met: Progressing toward Goal(s)  Goals: Meet at least 75% of estimated needs, by next RD assessment       Nutrition Monitoring and Evaluation:      Food/Nutrient Intake Outcomes: Diet Advancement/Tolerance, Food and Nutrient Intake, Supplement Intake  Physical Signs/Symptoms Outcomes: Biochemical Data, Chewing or Swallowing, GI Status, Fluid Status or Edema, Nutrition Focused Physical Findings, Skin, Weight, Meal Time Behavior    Discharge Planning:     Too soon to determine     Geraldine Demarco RD, LD  Contact: [phone]

## 2023-11-16 NOTE — PLAN OF CARE
Problem: Discharge Planning  Goal: Discharge to home or other facility with appropriate resources  11/16/2023 1854 by Poonam Mariee LPN  Outcome: Progressing     Problem: Safety - Adult  Goal: Free from fall injury  11/16/2023 1854 by Poonam Mariee LPN  Outcome: Progressing     Problem: Skin/Tissue Integrity  Goal: Absence of new skin breakdown  Description: 1. Monitor for areas of redness and/or skin breakdown  2. Assess vascular access sites hourly  3. Every 4-6 hours minimum:  Change oxygen saturation probe site  4. Every 4-6 hours:  If on nasal continuous positive airway pressure, respiratory therapy assess nares and determine need for appliance change or resting period. 11/16/2023 1854 by Poonam Mariee LPN  Outcome: Progressing     Problem: Safety - Adult  Goal: Isolation precautions  Description: Isolation precautions  11/16/2023 1854 by Poonam Mariee LPN  Outcome: Progressing     Problem: Skin/Tissue Integrity - Adult  Goal: Skin integrity remains intact  11/16/2023 1854 by Poonam Mariee LPN  Outcome: Progressing     Problem: Musculoskeletal - Adult  Goal: Return mobility to safest level of function  11/16/2023 1854 by Poonam Mariee LPN  Outcome: Progressing     Problem: Musculoskeletal - Adult  Goal: Return ADL status to a safe level of function  11/16/2023 1854 by Poonam Mariee LPN  Outcome: Progressing     Problem: Metabolic/Fluid and Electrolytes - Adult  Goal: Electrolytes maintained within normal limits  11/16/2023 1854 by Poonam Mariee LPN  Outcome: Progressing   Care plan reviewed with patient. Patient verbalized understanding of the plan of care and contribute to goal setting.

## 2023-11-17 PROCEDURE — 97110 THERAPEUTIC EXERCISES: CPT

## 2023-11-17 PROCEDURE — 97530 THERAPEUTIC ACTIVITIES: CPT

## 2023-11-17 PROCEDURE — 2580000003 HC RX 258: Performed by: STUDENT IN AN ORGANIZED HEALTH CARE EDUCATION/TRAINING PROGRAM

## 2023-11-17 PROCEDURE — 6370000000 HC RX 637 (ALT 250 FOR IP): Performed by: PHYSICIAN ASSISTANT

## 2023-11-17 PROCEDURE — 1200000000 HC SEMI PRIVATE

## 2023-11-17 PROCEDURE — 6370000000 HC RX 637 (ALT 250 FOR IP): Performed by: STUDENT IN AN ORGANIZED HEALTH CARE EDUCATION/TRAINING PROGRAM

## 2023-11-17 PROCEDURE — 99231 SBSQ HOSP IP/OBS SF/LOW 25: CPT | Performed by: PHYSICIAN ASSISTANT

## 2023-11-17 RX ADMIN — MICONAZOLE NITRATE: 2 POWDER TOPICAL at 21:21

## 2023-11-17 RX ADMIN — BUMETANIDE 1 MG: 1 TABLET ORAL at 10:25

## 2023-11-17 RX ADMIN — LEVOTHYROXINE SODIUM 125 MCG: 0.12 TABLET ORAL at 10:26

## 2023-11-17 RX ADMIN — MICONAZOLE NITRATE: 2 POWDER TOPICAL at 10:22

## 2023-11-17 RX ADMIN — RIVAROXABAN 15 MG: 15 TABLET, FILM COATED ORAL at 18:36

## 2023-11-17 RX ADMIN — HYDROCORTISONE 10 MG: 10 TABLET ORAL at 21:21

## 2023-11-17 RX ADMIN — PANTOPRAZOLE SODIUM 40 MG: 40 TABLET, DELAYED RELEASE ORAL at 21:21

## 2023-11-17 RX ADMIN — SODIUM CHLORIDE, PRESERVATIVE FREE 10 ML: 5 INJECTION INTRAVENOUS at 21:21

## 2023-11-17 RX ADMIN — Medication 20 MG: at 21:21

## 2023-11-17 RX ADMIN — POTASSIUM CHLORIDE 40 MEQ: 1500 TABLET, EXTENDED RELEASE ORAL at 10:24

## 2023-11-17 RX ADMIN — SODIUM CHLORIDE, PRESERVATIVE FREE 10 ML: 5 INJECTION INTRAVENOUS at 10:24

## 2023-11-17 RX ADMIN — HYDROCORTISONE 20 MG: 10 TABLET ORAL at 10:25

## 2023-11-17 ASSESSMENT — PAIN SCALES - GENERAL
PAINLEVEL_OUTOF10: 0
PAINLEVEL_OUTOF10: 0

## 2023-11-17 NOTE — PROGRESS NOTES
States no pain. Pulse ox 97% on room air. Calm, interacts with staff. Alert, oriented to person, place and time. SHIREEN 3mm-2mm brisk. Mucous membranes pink/moist. Tongue midline. Smile symmetrical. Right forearm IV site no redness or swelling, dry and intact. Skin pink, warm and dry. Speech clear. Stated no difficulty swallowing. Lung sounds clear anterior/posterior and laterally. Respirations strong and regular. No cough stated. Heart sounds strong and regular. Bowels active in all 4 quadrants. Abdomen distended non-tender. Passing flatus. States last BM was 2 days ago. External catheter in place, yellow, clear urine. Radial pulses strength bilaterally. Hand grasp strong bilaterally. Arm drift negative. Capillary refill less than 3 seconds. Skin turgor brisk. Edema in both legs pitting +2. Pedal pulses strength bilaterally. Pedal push and pull leg leg weakness. Numbness/ tingling in left leg. Homans sign negative. Leg lift left leg weakness. Bed in low position with wheels locked. Side rails up x2. Call light and bed side table in reach. Denies Needs.

## 2023-11-17 NOTE — CARE COORDINATION
11/17/23, 11:08 AM EST    DISCHARGE PLANNING EVALUATION    Alida from [facility] ([phone]) Called LENNIE stating she couldn't reach López Bond, the DIL ([phone]), and requested I reach out to her and share her number regarding patient and patient 's placement. Spoke with López Bond who reported she wanted the patient and her  placed together and that she would reach out to [facility] and call SW back with preference list of ECFs for patient. Spoke to Dionne Adler of patient's  ([phone]), who asked for an update stating patient's  is ready for discharge and she was aware López Bond wanted them to go to the same facility. LENNIE shared Sophia Penaloza and Masoud pruitt's number with her and she reported she would call back with any news. Patient will need a precert for ecf once facility accepts. Patient's family would prefer a facility in [place].

## 2023-11-17 NOTE — PLAN OF CARE
Problem: Discharge Planning  Goal: Discharge to home or other facility with appropriate resources  11/17/2023 0255 by Kedar Morris RN  Outcome: Progressing  Flowsheets (Taken 11/17/2023 0255)  Discharge to home or other facility with appropriate resources:   Arrange for needed discharge resources and transportation as appropriate   Identify discharge learning needs (meds, wound care, etc)   Identify barriers to discharge with patient and caregiver   Refer to discharge planning if patient needs post-hospital services based on physician order or complex needs related to functional status, cognitive ability or social support system  11/16/2023 1854 by Carissa Brown LPN  Outcome: Progressing  11/16/2023 1854 by Carissa Brown LPN  Outcome: Progressing     Problem: Skin/Tissue Integrity  Goal: Absence of new skin breakdown  Description: 1. Monitor for areas of redness and/or skin breakdown  2. Assess vascular access sites hourly  3. Every 4-6 hours minimum:  Change oxygen saturation probe site  4. Every 4-6 hours:  If on nasal continuous positive airway pressure, respiratory therapy assess nares and determine need for appliance change or resting period.   11/17/2023 0255 by Kedar Morris RN  Outcome: Progressing  11/16/2023 1854 by Carissa Brown LPN  Outcome: Progressing  11/16/2023 1854 by Carissa Brown LPN  Outcome: Progressing     Problem: Safety - Adult  Goal: Free from fall injury  11/17/2023 0255 by Kedar Morris RN  Outcome: Progressing  Flowsheets (Taken 11/17/2023 0255)  Free From Fall Injury: Instruct family/caregiver on patient safety  11/16/2023 1854 by Carissa Brown LPN  Outcome: Progressing  11/16/2023 1854 by Carissa Brown LPN  Outcome: Progressing  Goal: Isolation precautions  Description: Isolation precautions  11/17/2023 0255 by Kedar Morris RN  Outcome: Progressing  11/16/2023 1854 by Carissa Brown LPN  Outcome:

## 2023-11-17 NOTE — PROGRESS NOTES
Hospitalist Progress Note      Patient:  Real Lynch    Unit/Bed:7K-13/013-A  YOB: 1950  MRN: [id number]   Acct: [de-identified]   PCP: Marilyn Ramon MD  Date of Admission: 11/1/2023    Assessment/Plan:    Chronic nonhealing wounds: Improving   Initial concern for infection with purulent discharge noted on RLE wound in ED. Following discussion with infectious disease we will continue to monitor patient off of antibiotics  Arterial US shows no evidence of PAD  Suspected due to long term steroid use   Physical debility:   Patient has baseline deficits 2/2 CVA as noted below but has recently become bedbound and is unable to care for herself  PT/OT following and recommending SNF  Working on placement with  family   Renal lesion: measuring 0.7 x 0 0.6 cm. Not well visualized on repeat US  -Follow-up CT on discharge   Protein Calorie malnutrition:   low prealbumin. Dietician following. Protein supplement added  Hypernatremia, resolved:   suspect 2/2 dehydration. Encourage PO intake. 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 ID following  Lactic acidosis (resolved)  lactic 2.3 on arrival- do not see where subsequent lactic acid ordered - will obtain. Suspect Type B  Leukocytosis- stable. : Suspect secondary to chronic steroid use and possible wound infection. Monitor   Hx of CVA: Multiple ischemic events in 1988, 2015 and this past year. Known deficits with left-sided weakness. Patient is chair bound at baseline. CT head showed no acute findings. Previous infarct noted in the left lateral ventricle. Paroxysmal atrial fibrillation: [id number] 6 HB 3 on [address] with Xarelto rate controlled Coreg  Meningioma: Stable from previous imaging noted over the parafalcine left frontal lobe. Hx pituitary tumor: s/p surgery performed in [place].  On hydrocortisone and Synthroid  Acquired hypothyroidism: 2/2 pituitary tumor s/p resection. Continue Synthroid. TSH/reflex shows hyperthyroidism consistent with suppressive dose synthroid appropriate for hx of malignancy  Acquired adrenal insufficiency: continue home dose of hydrocortisone. Dispo: Medically stable. Awaiting ECF placement. Chief Complaint: Debility, suspected infected wounds    Initial H and P:-    Mayra Varela is a 68 y.o. female who presents to [facility] ED 11/1/2023 with progressive weakness. Patient is a lifetime nonsmoker with a PMH significant for CVA w/ lefts sided deficits, paroxysmal atrial fibrillation on [address], pituitary tumor s/p resection, acquired hypothyroidism, which required adrenal insufficiency, left-sided meningioma, and chronic wounds secondary to bedbound state. Patient is brought to the ED today by her best friend, a retired RN. She reports she was visiting patient and noticed weeping wounds with purulent drainage around toes. Patient was noted to be incontinent of urine and feces and was noted to have progressive weakness over the past month. Patient is also attended by her daughter who expresses multiple concerns for her going home. Family members have noted progressive weakness since September. At baseline patient has been chair-bound since last CVA and is cared for by her . However  is currently undergoing treatment for complications related to bladder cancer and is currently being treated at [address] [place] [address] following syncopal episode with collapse. She no longer has the support she needs at home. Patient has progressed to becoming been-bed bound since September. She does have chronic wounds on sacrum and legs.  She reports she had reached out to family members who live in [address] [place] [address], but feels that they are too far away to care for her while her  is in the hospital. Family members are hoping she can be cared for at a facility close to

## 2023-11-17 NOTE — PLAN OF CARE
Problem: Discharge Planning  Goal: Discharge to home or other facility with appropriate resources  Outcome: Progressing  Flowsheets (Taken 11/17/2023 1747)  Discharge to home or other facility with appropriate resources: Identify barriers to discharge with patient and caregiver     Problem: Skin/Tissue Integrity  Goal: Absence of new skin breakdown  Description: 1. Monitor for areas of redness and/or skin breakdown  2. Assess vascular access sites hourly  3. Every 4-6 hours minimum:  Change oxygen saturation probe site  4. Every 4-6 hours:  If on nasal continuous positive airway pressure, respiratory therapy assess nares and determine need for appliance change or resting period.   Outcome: Progressing     Problem: Safety - Adult  Goal: Free from fall injury  Outcome: Progressing  Flowsheets (Taken 11/17/2023 0301 by Steve Conway RN)  Free From Fall Injury: Instruct family/caregiver on patient safety  Goal: Isolation precautions  Description: Isolation precautions  Outcome: Progressing     Problem: ABCDS Injury Assessment  Goal: Absence of physical injury  Outcome: Progressing  Flowsheets (Taken 11/17/2023 1747)  Absence of Physical Injury: Implement safety measures based on patient assessment     Problem: Chronic Conditions and Co-morbidities  Goal: Patient's chronic conditions and co-morbidity symptoms are monitored and maintained or improved  Outcome: Progressing  Flowsheets (Taken 11/17/2023 1747)  Care Plan - Patient's Chronic Conditions and Co-Morbidity Symptoms are Monitored and Maintained or Improved: Monitor and assess patient's chronic conditions and comorbid symptoms for stability, deterioration, or improvement     Problem: Skin/Tissue Integrity - Adult  Goal: Skin integrity remains intact  Outcome: Progressing  Flowsheets (Taken 11/17/2023 1747)  Skin Integrity Remains Intact: Assess vascular access sites hourly     Problem: Musculoskeletal - Adult  Goal: Return mobility to safest level of

## 2023-11-17 NOTE — PROGRESS NOTES
[facility]  Occupational Therapy  Daily Note  Time:   Time In: 5898  Time Out: 1445  Timed Code Treatment Minutes: 38 Minutes  Minutes: 38          Date: 2023  Patient Name: Alla Forrseter,   Gender: female      Room: [facility]13/013-A  MRN: [id number]  : 1950  (68 y.o.)  Referring Practitioner: Paddy Frost DO  Diagnosis: failure to thrieve  Additional Pertinent Hx: Per ER note on 2023: 68 y.o. female who presents to the emergency department for evaluation of sacral ulcers and concerns for worsening stroke-like symptoms. Patient's daughter-in-law and family friend at bedside. 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 Presenting to ED today due to concerns for patient's health at home. Patient's  is her primary caretaker, but he was recently admitted to [facility] on 10/27 after being found down at home by family. Patient now in ED    Restrictions/Precautions:  Restrictions/Precautions: Fall Risk, General Precautions  Position Activity Restriction  Other position/activity restrictions: L hemiparesis from previous, thin skin with multiple wounds/scabs     SUBJECTIVE: RN okayed OT session. Pt. In bed upon arrival pleasant and agreeable to participate. Pt.'s friend present and provided Pt. History in lengthy detail very pleasant. PAIN:no pain voiced    Vitals: Nurse checked vitals prior to session    COGNITION: Slow Processing, Decreased Insight, Impaired Memory, and Decreased Problem Solving    ADL:   No ADL's completed this session. Ashley Brennaner BALANCE:  Sitting Balance:  Stand By Assistance, [employer], Minimal Assistance. Assistance level varied once balance maintained for most part CGA to maintain balance while seated on EOB.  Pt. Sat for ~ 15 -20 mins     BED MOBILITY:  Rolling to Left: Maximum Assistance, X 1    Rolling to Right: Maximum Assistance, X 1    Supine to Sit: Maximum Assistance, X 1    Sit to Supine: Maximum Assistance, X 1    Scooting: Maximum Assistance, X 1 to advance hips to EOB      ADDITIONAL ACTIVITIES:  Patient completed BUE strengthening exercises with skilled education on HEP: completed x12 reps x1 set with AROM in all joints and all planes in order to improve UE strength and activity tolerance required for BADL routine and toilet / shower transfers. Patient tolerated , requiring  rest breaks. Patient also required visual; and verbal cues for technique. AM-[facility] Inpatient Daily Activity Raw Score: 10  AM-[facility] Inpatient ADL T-Scale Score : 27.31  ADL Inpatient CMS 0-100% Score: 74.7      ASSESSMENT:     Activity Tolerance:  Patient tolerance of  treatment: Good treatment tolerance      Discharge Recommendations: Subacute/skilled nursing facility  Equipment Recommendations: Other: monitor pending progress  Plan: Times Per Week: 3-5x  Current Treatment Recommendations: Functional mobility training, Balance training, Self-Care / ADL, Strengthening, ROM, Endurance training    Patient Education  Patient Education: Home Exercise Program, Precautions, and Importance of Increasing Activity    Goals  Short Term Goals  Time Frame for Short Term Goals: until discharge  Short Term Goal 1: Pt will tolerate sitting EOB 5-6 min with min A for eventual EOB UB dressing tasks  Short Term Goal 2: Pt will tolerate RUE AROM/light strengthen exercises to increase UB endurance to A with ADLs  Short Term Goal 3: Pt will complete oral care with min A & adaptive strategies prn  Short Term Goal 4: Pt will tolerate further assessment of functional t/fs by OTR when appropriate  Long Term Goals  Time Frame for Long Term Goals : No LTG set d/t short ELOS    Following session, patient left in safe position with all fall risk precautions in place.

## 2023-11-18 LAB
ANION GAP SERPL CALC-SCNC: 10 MEQ/L (ref 8–16)
BUN SERPL-MCNC: 25 MG/DL (ref 7–22)
CALCIUM SERPL-MCNC: 9.6 MG/DL (ref 8.5–10.5)
CHLORIDE SERPL-SCNC: 103 MEQ/L (ref 98–111)
CO2 SERPL-SCNC: 30 MEQ/L (ref 23–33)
CREAT SERPL-MCNC: 1.2 MG/DL (ref 0.4–1.2)
DEPRECATED RDW RBC AUTO: 47.7 FL (ref 35–45)
ERYTHROCYTE [DISTWIDTH] IN BLOOD BY AUTOMATED COUNT: 14 % (ref 11.5–14.5)
GFR SERPL CREATININE-BSD FRML MDRD: 48 ML/MIN/1.73M2
GLUCOSE SERPL-MCNC: 120 MG/DL (ref 70–108)
HCT VFR BLD AUTO: 41.2 % (ref 37–47)
HGB BLD-MCNC: 13 GM/DL (ref 12–16)
MCH RBC QN AUTO: 29.6 PG (ref 26–33)
MCHC RBC AUTO-ENTMCNC: 31.6 GM/DL (ref 32.2–35.5)
MCV RBC AUTO: 93.8 FL (ref 81–99)
PLATELET # BLD AUTO: 423 THOU/MM3 (ref 130–400)
PMV BLD AUTO: 10.4 FL (ref 9.4–12.4)
POTASSIUM SERPL-SCNC: 5.2 MEQ/L (ref 3.5–5.2)
RBC # BLD AUTO: 4.39 MILL/MM3 (ref 4.2–5.4)
SODIUM SERPL-SCNC: 143 MEQ/L (ref 135–145)
WBC # BLD AUTO: 12.8 THOU/MM3 (ref 4.8–10.8)

## 2023-11-18 PROCEDURE — 6370000000 HC RX 637 (ALT 250 FOR IP): Performed by: PHYSICIAN ASSISTANT

## 2023-11-18 PROCEDURE — 99231 SBSQ HOSP IP/OBS SF/LOW 25: CPT | Performed by: PHYSICIAN ASSISTANT

## 2023-11-18 PROCEDURE — 80048 BASIC METABOLIC PNL TOTAL CA: CPT

## 2023-11-18 PROCEDURE — 85027 COMPLETE CBC AUTOMATED: CPT

## 2023-11-18 PROCEDURE — 36415 COLL VENOUS BLD VENIPUNCTURE: CPT

## 2023-11-18 PROCEDURE — 2580000003 HC RX 258: Performed by: STUDENT IN AN ORGANIZED HEALTH CARE EDUCATION/TRAINING PROGRAM

## 2023-11-18 PROCEDURE — 6370000000 HC RX 637 (ALT 250 FOR IP): Performed by: STUDENT IN AN ORGANIZED HEALTH CARE EDUCATION/TRAINING PROGRAM

## 2023-11-18 PROCEDURE — 1200000000 HC SEMI PRIVATE

## 2023-11-18 RX ADMIN — LEVOTHYROXINE SODIUM 125 MCG: 0.12 TABLET ORAL at 12:05

## 2023-11-18 RX ADMIN — SODIUM CHLORIDE, PRESERVATIVE FREE 10 ML: 5 INJECTION INTRAVENOUS at 09:11

## 2023-11-18 RX ADMIN — Medication 20 MG: at 21:15

## 2023-11-18 RX ADMIN — POTASSIUM CHLORIDE 40 MEQ: 1500 TABLET, EXTENDED RELEASE ORAL at 09:14

## 2023-11-18 RX ADMIN — HYDROCORTISONE 20 MG: 10 TABLET ORAL at 09:11

## 2023-11-18 RX ADMIN — RIVAROXABAN 15 MG: 15 TABLET, FILM COATED ORAL at 17:55

## 2023-11-18 RX ADMIN — BUMETANIDE 1 MG: 1 TABLET ORAL at 09:11

## 2023-11-18 RX ADMIN — MICONAZOLE NITRATE: 2 POWDER TOPICAL at 09:12

## 2023-11-18 RX ADMIN — MICONAZOLE NITRATE: 2 POWDER TOPICAL at 21:15

## 2023-11-18 RX ADMIN — ACETAMINOPHEN 650 MG: 325 TABLET ORAL at 17:55

## 2023-11-18 RX ADMIN — HYDROCORTISONE 10 MG: 10 TABLET ORAL at 21:15

## 2023-11-18 RX ADMIN — PANTOPRAZOLE SODIUM 40 MG: 40 TABLET, DELAYED RELEASE ORAL at 21:15

## 2023-11-18 ASSESSMENT — PAIN DESCRIPTION - ORIENTATION: ORIENTATION: LEFT

## 2023-11-18 ASSESSMENT — PAIN DESCRIPTION - LOCATION: LOCATION: TOE (COMMENT WHICH ONE)

## 2023-11-18 ASSESSMENT — PAIN SCALES - GENERAL
PAINLEVEL_OUTOF10: 0
PAINLEVEL_OUTOF10: 5

## 2023-11-18 NOTE — PLAN OF CARE
Problem: Discharge Planning  Goal: Discharge to home or other facility with appropriate resources  11/17/2023 2248 by Lauri Brown RN  Outcome: Progressing  Flowsheets (Taken 11/17/2023 2248)  Discharge to home or other facility with appropriate resources:   Identify barriers to discharge with patient and caregiver   Arrange for needed discharge resources and transportation as appropriate   Identify discharge learning needs (meds, wound care, etc)     Problem: Skin/Tissue Integrity  Goal: Absence of new skin breakdown  Description: 1. Monitor for areas of redness and/or skin breakdown  2. Assess vascular access sites hourly  3. Every 4-6 hours minimum:  Change oxygen saturation probe site  4. Every 4-6 hours:  If on nasal continuous positive airway pressure, respiratory therapy assess nares and determine need for appliance change or resting period.   11/17/2023 2248 by Lauri Brown RN  Outcome: Progressing     Problem: Safety - Adult  Goal: Free from fall injury  11/17/2023 2248 by Lauri Brown RN  Outcome: Progressing  Flowsheets (Taken 11/17/2023 2248)  Free From Fall Injury:   Instruct family/caregiver on patient safety   Based on caregiver fall risk screen, instruct family/caregiver to ask for assistance with transferring infant if caregiver noted to have fall risk factors     Problem: Safety - Adult  Goal: Isolation precautions  Description: Isolation precautions  11/17/2023 2248 by Lauri Brown RN  Outcome: Progressing     Problem: ABCDS Injury Assessment  Goal: Absence of physical injury  11/17/2023 2248 by Lauri Brown RN  Outcome: Progressing  Flowsheets (Taken 11/17/2023 2248)  Absence of Physical Injury: Implement safety measures based on patient assessment     Problem: Chronic Conditions and Co-morbidities  Goal: Patient's chronic conditions and co-morbidity symptoms are monitored and maintained or improved  11/17/2023 2248 by Lauri Brown RN  Outcome:

## 2023-11-18 NOTE — PROGRESS NOTES
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)    Hypercoagulable state (HCC)    Fatigue    Atrial thrombosis    Microcytic anemia    Hypokalemia    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) ([address])    Hypersomnia    Long term (current) use of systemic steroids    Essential hypertension    Diabetes insipidus (HCC)    Somnolence    PAF (paroxysmal atrial fibrillation) ([address])    Sixth nerve palsy of left eye    Left hemiparesis ([address])    History of CVA with residual deficit    Subdural hematoma (HCC)    JESSE (acute kidney injury) ([address])    Hyperkalemia    Gastroenteritis due to norovirus    Acute diastolic heart failure (HCC)    History of stroke with residual effects    Chronic venous stasis dermatitis of both lower extremities    Normocytic anemia    Chronic diastolic congestive heart failure (HCC)    CKD (chronic kidney disease), stage IV (HCC)    Confusion    Chronic kidney disease (CKD), stage III (moderate) (HCC)    Obesity (BMI 30.0-34. 9)    Community acquired pneumonia of right lung    Acute renal failure superimposed on stage 3 chronic kidney disease (HCC)    Chronic anticoagulation    Hypoalbuminemia    Impaired mobility    Bilateral leg edema +2- better now trace    CKD (chronic kidney disease) stage 3, GFR 30-59 ml/min (HCC)    Fluid overload    Panhypopituitarism (HCC)    Hypothyroidism    Scalp lesion    Non-compliance F/u advised    Verruca vulgaris COVID-19    Septic shock (HCC)    Cellulitis of submandibular region    Severe malnutrition (HCC)    Submandibular gland infection    Cellulitis    Non-pressure chronic ulcer of skin of other sites limited to breakdown of skin ([address])    Physical debility    Failure to thrive in adult    Pressure injury of back, stage 2 (HCC)    Ulcers of both lower extremities (HCC)    Moderate malnutrition ([address])       PLAN:   - Pt. is a 68 y.o. female   - Patient was examined and evaluated  - Afebrile  - Continue daily dressing changes consisting of Adaptic to the bilateral leg wounds, cover with light Betadine soaked gauze, ABD. Cover the bilateral great toenail beds with Adaptic and dry gauze. Cover the BLE with Kerlix and Ace compression. Change all once daily.  - Weight bearing status: WBAT  - Discussed with patient that the wounds appear to be progressing well and we will continue with daily dressing changes. - Podiatry will continue to follow    DISPO: Stable from podiatric standpoint. Continue daily dressing changes as above. Keshia Morrissey D.P.M.  PGY-1  Podiatric Surgical Resident  11/18/2023   7:17 AM

## 2023-11-18 NOTE — PLAN OF CARE
Problem: Discharge Planning  Goal: Discharge to home or other facility with appropriate resources  11/18/2023 1218 by Isidoro Aschoff, RN  Outcome: Progressing  Flowsheets (Taken 11/17/2023 2248 by Deshawn Sam, RN)  Discharge to home or other facility with appropriate resources:   Identify barriers to discharge with patient and caregiver   Arrange for needed discharge resources and transportation as appropriate   Identify discharge learning needs (meds, wound care, etc)     Problem: Skin/Tissue Integrity  Goal: Absence of new skin breakdown  Description: 1. Monitor for areas of redness and/or skin breakdown  2. Assess vascular access sites hourly  3. Every 4-6 hours minimum:  Change oxygen saturation probe site  4. Every 4-6 hours:  If on nasal continuous positive airway pressure, respiratory therapy assess nares and determine need for appliance change or resting period.   11/18/2023 1218 by Isidoro Aschoff, RN  Outcome: Progressing  Note: No new skin breakdown this shift     Problem: Safety - Adult  Goal: Free from fall injury  11/18/2023 1218 by Isidoro Aschoff, RN  Outcome: Progressing  Flowsheets (Taken 11/17/2023 2248 by Deshawn Sam RN)  Free From Fall Injury:   Instruct family/caregiver on patient safety   Based on caregiver fall risk screen, instruct family/caregiver to ask for assistance with transferring infant if caregiver noted to have fall risk factors     Problem: Safety - Adult  Goal: Isolation precautions  Description: Isolation precautions  11/18/2023 1218 by Isidoro Aschoff, RN  Outcome: Progressing  Note: Isolation precautions followed     Problem: ABCDS Injury Assessment  Goal: Absence of physical injury  11/18/2023 1218 by Isidoro Aschoff, RN  Outcome: Progressing  Flowsheets (Taken 11/18/2023 1216)  Absence of Physical Injury: Implement safety measures based on patient assessment     Problem: Chronic Conditions and Co-morbidities  Goal: Patient's chronic conditions and

## 2023-11-18 NOTE — PROGRESS NOTES
Hospitalist Progress Note      Patient:  Chema Courser    Unit/Bed:7K-13/013-A  YOB: 1950  MRN: [id number]   Acct: [de-identified]   PCP: Marylin Smalls MD  Date of Admission: 11/1/2023    Assessment/Plan:    Chronic nonhealing wounds: Improving   Initial concern for infection with purulent discharge noted on RLE wound in ED. Following discussion with infectious disease we will continue to monitor patient off of antibiotics  Arterial US shows no evidence of PAD  Suspected due to long term steroid use   Physical debility:   Patient has baseline deficits 2/2 CVA as noted below but has recently become bedbound and is unable to care for herself  PT/OT following and recommending SNF  Working on placement with  family   Renal lesion: measuring 0.7 x 0 0.6 cm. Not well visualized on repeat US  -Follow-up CT on discharge   Protein Calorie malnutrition:   low prealbumin. Dietician following. Protein supplement added  Hypernatremia, resolved:   suspect 2/2 dehydration. Encourage PO intake. 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 ID following  Lactic acidosis (resolved)  lactic 2.3 on arrival- do not see where subsequent lactic acid ordered - will obtain. Suspect Type B  Leukocytosis- stable. : Suspect secondary to chronic steroid use and possible wound infection. Monitor   Hx of CVA: Multiple ischemic events in 1988, 2015 and this past year. Known deficits with left-sided weakness. Patient is chair bound at baseline. CT head showed no acute findings. Previous infarct noted in the left lateral ventricle. Paroxysmal atrial fibrillation: [id number] 6 HB 3 on [address] with Xarelto rate controlled Coreg  Meningioma: Stable from previous imaging noted over the parafalcine left frontal lobe. Hx pituitary tumor: s/p surgery performed in [place].  On

## 2023-11-19 PROCEDURE — 1200000000 HC SEMI PRIVATE

## 2023-11-19 PROCEDURE — 6370000000 HC RX 637 (ALT 250 FOR IP): Performed by: STUDENT IN AN ORGANIZED HEALTH CARE EDUCATION/TRAINING PROGRAM

## 2023-11-19 PROCEDURE — 99231 SBSQ HOSP IP/OBS SF/LOW 25: CPT | Performed by: PHYSICIAN ASSISTANT

## 2023-11-19 PROCEDURE — 6370000000 HC RX 637 (ALT 250 FOR IP): Performed by: PHYSICIAN ASSISTANT

## 2023-11-19 RX ADMIN — BUMETANIDE 1 MG: 1 TABLET ORAL at 08:24

## 2023-11-19 RX ADMIN — Medication 20 MG: at 20:53

## 2023-11-19 RX ADMIN — PANTOPRAZOLE SODIUM 40 MG: 40 TABLET, DELAYED RELEASE ORAL at 20:53

## 2023-11-19 RX ADMIN — MICONAZOLE NITRATE: 2 POWDER TOPICAL at 20:53

## 2023-11-19 RX ADMIN — ACETAMINOPHEN 650 MG: 325 TABLET ORAL at 16:33

## 2023-11-19 RX ADMIN — LEVOTHYROXINE SODIUM 125 MCG: 0.12 TABLET ORAL at 08:22

## 2023-11-19 RX ADMIN — HYDROCORTISONE 10 MG: 10 TABLET ORAL at 20:53

## 2023-11-19 RX ADMIN — ACETAMINOPHEN 650 MG: 325 TABLET ORAL at 23:19

## 2023-11-19 RX ADMIN — MICONAZOLE NITRATE: 2 POWDER TOPICAL at 08:22

## 2023-11-19 RX ADMIN — RIVAROXABAN 15 MG: 15 TABLET, FILM COATED ORAL at 17:24

## 2023-11-19 RX ADMIN — HYDROCORTISONE 20 MG: 10 TABLET ORAL at 08:23

## 2023-11-19 RX ADMIN — POTASSIUM CHLORIDE 40 MEQ: 1500 TABLET, EXTENDED RELEASE ORAL at 08:28

## 2023-11-19 ASSESSMENT — PAIN - FUNCTIONAL ASSESSMENT: PAIN_FUNCTIONAL_ASSESSMENT: PREVENTS OR INTERFERES SOME ACTIVE ACTIVITIES AND ADLS

## 2023-11-19 ASSESSMENT — PAIN DESCRIPTION - DESCRIPTORS: DESCRIPTORS: ACHING

## 2023-11-19 ASSESSMENT — PAIN SCALES - GENERAL
PAINLEVEL_OUTOF10: 7
PAINLEVEL_OUTOF10: 6
PAINLEVEL_OUTOF10: 6

## 2023-11-19 ASSESSMENT — PAIN DESCRIPTION - ORIENTATION
ORIENTATION: LEFT
ORIENTATION: LEFT;RIGHT

## 2023-11-19 ASSESSMENT — PAIN DESCRIPTION - LOCATION
LOCATION: ANKLE
LOCATION: TOE (COMMENT WHICH ONE)

## 2023-11-19 NOTE — PROGRESS NOTES
\"BLOODCULT2\"    Organism:  Lab Results   Component Value Date/Time    ORG Growth of Contaminants 11/01/2023 12:15 PM         Lab Results   Component Value Date/Time    LABGRAM  02/16/2022 04:00 PM     No segmented neutrophils observed. Rare epithelial cells observed. No organisms observed. MRSA culture only:No results found for: \"MRSAC\"    Urine culture:   Lab Results   Component Value Date/Time    LABURIN No growth-preliminary  No growth   02/09/2017 12:35 PM       Respiratory culture: No results found for: \"CULTRESP\"    Aerobic and Anaerobic :  Lab Results   Component Value Date/Time    LABAERO No Acinetobacter species isolated. 01/04/2023 10:00 PM     Lab Results   Component Value Date/Time    LABANAE  12/01/2021 12:00 PM     No anaerobes isolated- preliminary Culture yielded light growth of gram positive bacilli most consistent with Cutibacterium species. Clinical correlation required. Urinalysis:      Lab Results   Component Value Date/Time    NITRU NEGATIVE 11/01/2023 12:15 PM    WBCUA > 100 11/01/2023 12:15 PM    WBCUA 2-4 02/22/2012 01:00 PM    BACTERIA NONE SEEN 11/01/2023 12:15 PM    RBCUA NONE SEEN 11/01/2023 12:15 PM    BLOODU TRACE 11/01/2023 12:15 PM    SPECGRAV 1.025 09/18/2020 12:00 PM    GLUCOSEU NEGATIVE 11/01/2023 12:15 PM       Radiology:  VL DUP LOWER EXTREMITY ARTERIES BILATERAL   Final Result   1. Slightly limited evaluation as the peroneal arteries could not be definitively visualized in either leg. 2. Normal bilateral ankle-brachial indices. 3. Good pulsatility throughout both legs with diffuse multiphasic Doppler waveforms. **This report has been created using voice recognition software. It may contain minor errors which are inherent in voice recognition technology. **      Final report electronically signed by Dr Terrell Brooks on 11/2/2023 4:04 PM      US RENAL COMPLETE   Final Result   1. No sonographic findings for hydronephrosis.  Questionable right renal lesion not well seen on ultrasound due to patient immobility. This can be    followed for stability on future CT or MRI. 2. Filling defect partially distended urinary bladder may relate to [place]    catheter balloon. Blood clot not excluded. This document has been electronically signed by: Lucretia Delgado MD on    11/02/2023 01:05 AM      CT HEAD WO CONTRAST   Final Result   1. No acute intracranial findings. 2. Stable probable meningioma over the parafalcine left frontal lobe. 3. Opacification of the left mastoid air cells can be correlated for    mastoiditis. This document has been electronically signed by: Lucretia Delgado MD on    11/01/2023 06:05 PM      All CTs at this facility use dose modulation techniques and iterative    reconstructions, and/or weight-based dosing   when appropriate to reduce radiation to a low as reasonably achievable. CTA ABDOMINAL AORTA W BILAT RUNOFF W WO CONTRAST   Final Result   1. No significant contrast opacification in the distal right anterior    tibial artery, right dorsalis pedis artery, and left plantaris artery    beginning in the proximal left midfoot. This may relate to underlying    diminished flow or occlusion. 2. Possible complex lesion in the upper midpole of the kidney measuring    0.7 x 0.6 cm. Nonemergent ultrasound can be helpful to distinguish mass    from complex cyst.   3. Colonic diverticula. 4. Small hiatal hernia. 5. Asymmetric mild subcutaneous edema in the mid-distal left calf and left    ankle. This document has been electronically signed by: Lucretia Delgado MD on    11/01/2023 06:20 PM      All CTs at this facility use dose modulation techniques and iterative    reconstructions, and/or weight-based dosing   when appropriate to reduce radiation to a low as reasonably achievable. 3D Post-processing was performed on this study. XR CHEST PORTABLE   Final Result   1.  Minimal linear peripheral opacities at the left lung base are

## 2023-11-19 NOTE — PLAN OF CARE
Problem: Discharge Planning  Goal: Discharge to home or other facility with appropriate resources  11/18/2023 2219 by Ector Ghotra RN  Outcome: Progressing  Flowsheets (Taken 11/18/2023 2219)  Discharge to home or other facility with appropriate resources:   Identify barriers to discharge with patient and caregiver   Arrange for needed discharge resources and transportation as appropriate   Identify discharge learning needs (meds, wound care, etc)     Problem: Skin/Tissue Integrity  Goal: Absence of new skin breakdown  Description: 1. Monitor for areas of redness and/or skin breakdown  2. Assess vascular access sites hourly  3. Every 4-6 hours minimum:  Change oxygen saturation probe site  4. Every 4-6 hours:  If on nasal continuous positive airway pressure, respiratory therapy assess nares and determine need for appliance change or resting period.   11/18/2023 2219 by Ector Ghotra RN  Outcome: Progressing     Problem: Safety - Adult  Goal: Free from fall injury  11/18/2023 2219 by Ector Ghotra RN  Outcome: Progressing  Flowsheets (Taken 11/18/2023 2219)  Free From Fall Injury:   Instruct family/caregiver on patient safety   Based on caregiver fall risk screen, instruct family/caregiver to ask for assistance with transferring infant if caregiver noted to have fall risk factors     Problem: Safety - Adult  Goal: Isolation precautions  Description: Isolation precautions  11/18/2023 2219 by Ector Ghotra RN  Outcome: Progressing     Problem: ABCDS Injury Assessment  Goal: Absence of physical injury  11/18/2023 2219 by Ector Ghotra RN  Outcome: Progressing  [address] (Taken 11/18/2023 2219)  Absence of Physical Injury: Implement safety measures based on patient assessment     Problem: Chronic Conditions and Co-morbidities  Goal: Patient's chronic conditions and co-morbidity symptoms are monitored and maintained or improved  11/18/2023 2219 by Ector Ghotra RN  Outcome: Progressing  Flowsheets (Taken 11/18/2023 2219)  Care Plan - Patient's Chronic Conditions and Co-Morbidity Symptoms are Monitored and Maintained or Improved:   Monitor and assess patient's chronic conditions and comorbid symptoms for stability, deterioration, or improvement   Collaborate with multidisciplinary team to address chronic and comorbid conditions and prevent exacerbation or deterioration   Update acute care plan with appropriate goals if chronic or comorbid symptoms are exacerbated and prevent overall improvement and discharge     Problem: Skin/Tissue Integrity - Adult  Goal: Skin integrity remains intact  11/18/2023 2219 by Yoly Corbin RN  Outcome: Progressing  Flowsheets (Taken 11/18/2023 2219)  Skin Integrity Remains Intact: Monitor for areas of redness and/or skin breakdown     Problem: Musculoskeletal - Adult  Goal: Return mobility to safest level of function  11/18/2023 2219 by Yoly Corbin RN  Outcome: Progressing  Flowsheets (Taken 11/18/2023 2219)  Return Mobility to Safest Level of Function: Instruct patient/family in ordered activity level     Problem: Musculoskeletal - Adult  Goal: Return ADL status to a safe level of function  11/18/2023 2219 by Yoly Corbin RN  Outcome: Progressing  Flowsheets (Taken 11/18/2023 2219)  Return ADL Status to a Safe Level of Function: Administer medication as ordered     Problem: Metabolic/Fluid and Electrolytes - Adult  Goal: Electrolytes maintained within normal limits  11/18/2023 2219 by Yoly Corbin RN  Outcome: Progressing  Flowsheets (Taken 11/18/2023 2219)  Electrolytes maintained within normal limits: Monitor labs and assess patient for signs and symptoms of electrolyte imbalances     Problem: Pain  Goal: Verbalizes/displays adequate comfort level or baseline comfort level  11/18/2023 2219 by Yoly Corbin RN  Outcome: Progressing  Flowsheets (Taken 11/18/2023 2219)  Verbalizes/displays adequate comfort level or baseline comfort

## 2023-11-19 NOTE — PLAN OF CARE
co-morbidity symptoms are monitored and maintained or improved  11/19/2023 1102 by Olivier Peter RN  Outcome: Progressing  Flowsheets (Taken 11/18/2023 2219 by Tim Driver RN)  Care Plan - Patient's Chronic Conditions and Co-Morbidity Symptoms are Monitored and Maintained or Improved:   Monitor and assess patient's chronic conditions and comorbid symptoms for stability, deterioration, or improvement   Collaborate with multidisciplinary team to address chronic and comorbid conditions and prevent exacerbation or deterioration   Update acute care plan with appropriate goals if chronic or comorbid symptoms are exacerbated and prevent overall improvement and discharge     Problem: Skin/Tissue Integrity - Adult  Goal: Skin integrity remains intact  11/19/2023 1102 by Olivier Peter RN  Outcome: Progressing  Flowsheets  Taken 11/19/2023 1102  Skin Integrity Remains Intact: Monitor for areas of redness and/or skin breakdown  Taken 11/19/2023 1101  Skin Integrity Remains Intact: Monitor for areas of redness and/or skin breakdown     Problem: Musculoskeletal - Adult  Goal: Return mobility to safest level of function  11/19/2023 1102 by Olivier Peter RN  Outcome: Progressing  Flowsheets (Taken 11/18/2023 2219 by Kellen Carver)  Return Mobility to Safest Level of Function: Instruct patient/family in ordered activity level     Problem: Musculoskeletal - Adult  Goal: Return ADL status to a safe level of function  11/19/2023 1102 by Olivier Peter RN  Outcome: Progressing  Flowsheets (Taken 11/18/2023 2219 by Tim Driver RN)  Return ADL Status to a Safe Level of Function: Administer medication as ordered     Problem: Metabolic/Fluid and Electrolytes - Adult  Goal: Electrolytes maintained within normal limits  11/19/2023 1102 by Olivier Peter RN  Outcome: Progressing  Flowsheets (Taken 11/18/2023 2219 by Tim Driver RN)  Electrolytes maintained within normal limits: Monitor labs and assess patient for signs and symptoms of electrolyte imbalances     Problem: Pain  Goal: Verbalizes/displays adequate comfort level or baseline comfort level  11/19/2023 1102 by Rodney George RN  Outcome: Progressing  Flowsheets (Taken 11/18/2023 2219 by Winter Murray RN)  Verbalizes/displays adequate comfort level or baseline comfort level:   Encourage patient to monitor pain and request assistance   Assess pain using appropriate pain scale   Administer analgesics based on type and severity of pain and evaluate response   Implement non-pharmacological measures as appropriate and evaluate response     Problem: Nutrition Deficit:  Goal: Optimize nutritional status  11/19/2023 1102 by Rodney George RN  Outcome: Progressing  Flowsheets (Taken 11/18/2023 2219 by Winter Murray RN)  Nutrient intake appropriate for improving, restoring, or maintaining nutritional needs: Monitor oral intake, labs, and treatment plans   Care plan reviewed with patient. Patient verbalize understanding of the plan of care and contribute to goal setting.

## 2023-11-20 LAB
ANION GAP SERPL CALC-SCNC: 10 MEQ/L (ref 8–16)
BUN SERPL-MCNC: 22 MG/DL (ref 7–22)
CALCIUM SERPL-MCNC: 9.5 MG/DL (ref 8.5–10.5)
CHLORIDE SERPL-SCNC: 104 MEQ/L (ref 98–111)
CO2 SERPL-SCNC: 28 MEQ/L (ref 23–33)
CREAT SERPL-MCNC: 1.6 MG/DL (ref 0.4–1.2)
DEPRECATED RDW RBC AUTO: 49.3 FL (ref 35–45)
ERYTHROCYTE [DISTWIDTH] IN BLOOD BY AUTOMATED COUNT: 13.9 % (ref 11.5–14.5)
GFR SERPL CREATININE-BSD FRML MDRD: 34 ML/MIN/1.73M2
GLUCOSE SERPL-MCNC: 118 MG/DL (ref 70–108)
HCT VFR BLD AUTO: 43.4 % (ref 37–47)
HGB BLD-MCNC: 13.4 GM/DL (ref 12–16)
MCH RBC QN AUTO: 29.6 PG (ref 26–33)
MCHC RBC AUTO-ENTMCNC: 30.9 GM/DL (ref 32.2–35.5)
MCV RBC AUTO: 95.8 FL (ref 81–99)
PLATELET # BLD AUTO: 352 THOU/MM3 (ref 130–400)
PMV BLD AUTO: 10.1 FL (ref 9.4–12.4)
POTASSIUM SERPL-SCNC: 4.8 MEQ/L (ref 3.5–5.2)
RBC # BLD AUTO: 4.53 MILL/MM3 (ref 4.2–5.4)
SODIUM SERPL-SCNC: 142 MEQ/L (ref 135–145)
WBC # BLD AUTO: 13.5 THOU/MM3 (ref 4.8–10.8)

## 2023-11-20 PROCEDURE — 85027 COMPLETE CBC AUTOMATED: CPT

## 2023-11-20 PROCEDURE — 1200000000 HC SEMI PRIVATE

## 2023-11-20 PROCEDURE — 36415 COLL VENOUS BLD VENIPUNCTURE: CPT

## 2023-11-20 PROCEDURE — 99231 SBSQ HOSP IP/OBS SF/LOW 25: CPT | Performed by: PHYSICIAN ASSISTANT

## 2023-11-20 PROCEDURE — 6370000000 HC RX 637 (ALT 250 FOR IP): Performed by: STUDENT IN AN ORGANIZED HEALTH CARE EDUCATION/TRAINING PROGRAM

## 2023-11-20 PROCEDURE — 80048 BASIC METABOLIC PNL TOTAL CA: CPT

## 2023-11-20 PROCEDURE — 6370000000 HC RX 637 (ALT 250 FOR IP): Performed by: PHYSICIAN ASSISTANT

## 2023-11-20 RX ADMIN — RIVAROXABAN 15 MG: 15 TABLET, FILM COATED ORAL at 18:01

## 2023-11-20 RX ADMIN — POTASSIUM CHLORIDE 40 MEQ: 1500 TABLET, EXTENDED RELEASE ORAL at 09:42

## 2023-11-20 RX ADMIN — LEVOTHYROXINE SODIUM 125 MCG: 0.12 TABLET ORAL at 09:41

## 2023-11-20 RX ADMIN — HYDROCORTISONE 20 MG: 10 TABLET ORAL at 09:41

## 2023-11-20 RX ADMIN — MICONAZOLE NITRATE: 2 POWDER TOPICAL at 09:42

## 2023-11-20 RX ADMIN — PANTOPRAZOLE SODIUM 40 MG: 40 TABLET, DELAYED RELEASE ORAL at 21:52

## 2023-11-20 RX ADMIN — ACETAMINOPHEN 650 MG: 325 TABLET ORAL at 21:56

## 2023-11-20 RX ADMIN — Medication 20 MG: at 21:52

## 2023-11-20 RX ADMIN — BUMETANIDE 1 MG: 1 TABLET ORAL at 09:41

## 2023-11-20 RX ADMIN — MICONAZOLE NITRATE: 2 POWDER TOPICAL at 21:50

## 2023-11-20 RX ADMIN — HYDROCORTISONE 10 MG: 10 TABLET ORAL at 21:52

## 2023-11-20 ASSESSMENT — PAIN DESCRIPTION - LOCATION: LOCATION: TOE (COMMENT WHICH ONE)

## 2023-11-20 NOTE — CARE COORDINATION
11/20/23, 2:35 PM EST    DISCHARGE ON GOING EVALUATION    [address] day: 10  Location: -13/013-A Reason for admit: Failure to thrive in adult [R62.7]  Physical debility [R53.81]  Urinary tract infection without hematuria, site unspecified [N39.0]  Ulcers of both lower extremities, unspecified ulcer stage ([address]) [L97.919, L97.929]  Pressure injury of back, stage 2 ([address]) [L89.102]   Procedure:   11/1 Repeat CXR: Minimal linear peripheral opacities at the left lung base are present which may represent minimal atelectasis or scarring. 2. There is a right subclavian central line present with the tip overlying the cavoatrial junction. No pneumothorax is seen  11/1 CT Head WO: No acute intracranial findings. 2. Stable probable meningioma over the parafalcine left frontal lobe. 3. Opacification of the left mastoid air cells can be correlated for   mastoiditis  11/1 CTA Abdominal Aorta: No significant contrast opacification in the distal right anterior tibial artery, right dorsalis pedis artery, and left plantaris artery beginning in the proximal left midfoot. This may relate to underlying diminished flow or occlusion. 2. Possible complex lesion in the upper midpole of the kidney measuring 0.7 x 0.6 cm. Nonemergent ultrasound can be helpful to distinguish mass from complex cyst. 3. Colonic diverticula. 4. Small hiatal hernia. 5. Asymmetric mild subcutaneous edema in the mid-distal left calf and left   ankle. 11/2 US Renal: No sonographic findings for hydronephrosis. Questionable right renal   lesion not well seen on ultrasound due to patient immobility. This can be followed for stability on future CT or MRI. 2. Filling defect partially distended urinary bladder may relate to Huntley catheter balloon. Blood clot not excluded  11/2 BLE Arterial DUP:  Slightly limited evaluation as the peroneal arteries could not be definitively visualized in either leg. Normal bilateral ankle-brachial indices.     11/3: scalp

## 2023-11-20 NOTE — CARE COORDINATION
11/20/23, 8:20 AM EST    DISCHARGE PLANNING EVALUATION    Call made to [address] in [facility], which is family's first choice for placement. Message was left Thursday 11/16, message left again today requesting return call to complete referral.         Call made to [facility] at [facility], at family's request, to continue to coordinate placement for patient and her  together. Message left requesting return call. Call made to [facility] to return call to update that family is NOT [facility] or hospice at this time.

## 2023-11-20 NOTE — PLAN OF CARE
Problem: Discharge Planning  Goal: Discharge to home or other facility with appropriate resources  Outcome: Progressing  Flowsheets (Taken 11/20/2023 1407)  Discharge to home or other facility with appropriate resources:   Arrange for needed discharge resources and transportation as appropriate   Identify discharge learning needs (meds, wound care, etc)   Identify barriers to discharge with patient and caregiver     Problem: Skin/Tissue Integrity  Goal: Absence of new skin breakdown  Description: 1. Monitor for areas of redness and/or skin breakdown  2. Assess vascular access sites hourly  3. Every 4-6 hours minimum:  Change oxygen saturation probe site  4. Every 4-6 hours:  If on nasal continuous positive airway pressure, respiratory therapy assess nares and determine need for appliance change or resting period. Outcome: Progressing  Note: No new skin break noted at this time this shift. Problem: Safety - Adult  Goal: Free from fall injury  Outcome: Progressing  Flowsheets  Taken 11/20/2023 1407 by Gonzales Roberts LPN  Free From Fall Injury: Instruct family/caregiver on patient safety  Taken 11/20/2023 1346 by Danielle Patterson RN  Free From Fall Injury: Instruct family/caregiver on patient safety     Problem: Safety - Adult  Goal: Isolation precautions  Description: Isolation precautions  Outcome: Progressing  Note: Isolation continues.      Problem: ABCDS Injury Assessment  Goal: Absence of physical injury  Outcome: Progressing  Flowsheets (Taken 11/20/2023 1346 by Danielle Patterson RN)  Absence of Physical Injury: Implement safety measures based on patient assessment     Problem: Chronic Conditions and Co-morbidities  Goal: Patient's chronic conditions and co-morbidity symptoms are monitored and maintained or improved  Outcome: Progressing  Flowsheets (Taken 11/20/2023 1407)  Care Plan - Patient's Chronic Conditions and Co-Morbidity Symptoms are Monitored and Maintained or Improved:   Monitor and

## 2023-11-20 NOTE — PROGRESS NOTES
Podiatric Progress Note  Milady Marshallolvin  Subjective :     11/20/23  Patient was seen bedside today on behalf of Dr. Janusz Mathis. Patient is 7 days s/p bilateral great toenail avulsion. Patient relates no pain to the bilateral feet. Patient appears more alert and oriented today. Patient denies any N/V/F/C/SOB or CP. No other pedal complaints. 11/18/23  Patient was seen bedside today on behalf of Dr. Janusz Mathis. Patient is 5 days s/p bilateral great toenail avulsion. Patient relates no pain to the bilateral feet. Patient appears more alert and oriented today. Patient denies any N/V/F/C/SOB or CP. No other pedal complaints. 11/16/23  Patient was seen bedside today on behalf of of Dr. Janusz Mathis. Patient is 3 days s/p bilateral great toenail avulsion. Patient relates no pain to the bilateral feet. Patient continuing with diminished mental status. Patient denies any N/V/F/C/SOB or CP. No other pedal complaints. HPI  Patient is a pleasant 67-year-old female with pertinent past medical history of CVA, cancer, hypertension, hypothyroidism. Patient was admitted by hospitalist with diagnosis of failure to thrive. Patient was initially evaluated by resident physician Ivy teixeira on 11/3/2023. Patient was noted to have chronic incurvation along the lateral extent of the hallux nail plate bilateral with hypergranulation tissue present. Patient was initially deemed appropriate for discharge with follow-up in the office. I was contacted by infectious disease in regards to proceeding with nail avulsion due to concern for source of infection to bilateral hallux. Patient was seen bedside this evening. Patient found to have moderate diminished epicritic and protopathic sensations as well as altered mental status. Minimal pain on palpation of manipulation of bilateral hallux.   We elected to proceed with a bedside nail avulsion to bilateral hallux which patient tolerated procedure well sites were dressed with

## 2023-11-20 NOTE — PLAN OF CARE
Problem: Discharge Planning  Goal: Discharge to home or other facility with appropriate resources  11/19/2023 2256 by Gary Puckett RN  Outcome: Progressing  Flowsheets (Taken 11/19/2023 2256)  Discharge to home or other facility with appropriate resources:   Identify barriers to discharge with patient and caregiver   Arrange for needed discharge resources and transportation as appropriate   Identify discharge learning needs (meds, wound care, etc)     Problem: Skin/Tissue Integrity  Goal: Absence of new skin breakdown  Description: 1. Monitor for areas of redness and/or skin breakdown  2. Assess vascular access sites hourly  3. Every 4-6 hours minimum:  Change oxygen saturation probe site  4. Every 4-6 hours:  If on nasal continuous positive airway pressure, respiratory therapy assess nares and determine need for appliance change or resting period.   11/19/2023 2256 by Gary Puckett RN  Outcome: Progressing     Problem: Safety - Adult  Goal: Free from fall injury  11/19/2023 2256 by Gary Puckett RN  Outcome: Progressing  Flowsheets (Taken 11/19/2023 2256)  Free From Fall Injury: Instruct family/caregiver on patient safety     Problem: Safety - Adult  Goal: Isolation precautions  Description: Isolation precautions  11/19/2023 2256 by Gary Puckett RN  Outcome: Progressing  Note: On contact precaution     Problem: ABCDS Injury Assessment  Goal: Absence of physical injury  11/19/2023 2256 by Gary Puckett RN  Outcome: Progressing  Flowsheets (Taken 11/19/2023 2256)  Absence of Physical Injury: Implement safety measures based on patient assessment     Problem: Chronic Conditions and Co-morbidities  Goal: Patient's chronic conditions and co-morbidity symptoms are monitored and maintained or improved  11/19/2023 2256 by Gary Puckett RN  Outcome: Progressing  Flowsheets (Taken 11/19/2023 2256)  Care Plan - Patient's Chronic Conditions and Co-Morbidity Symptoms are Monitored and Maintained or Improved: Monitor and assess patient's chronic conditions and comorbid symptoms for stability, deterioration, or improvement   Collaborate with multidisciplinary team to address chronic and comorbid conditions and prevent exacerbation or deterioration     Problem: Skin/Tissue Integrity - Adult  Goal: Skin integrity remains intact  11/19/2023 2256 by Marshal Yost RN  Outcome: Progressing  Flowsheets (Taken 11/19/2023 2256)  Skin Integrity Remains Intact: Monitor for areas of redness and/or skin breakdown     Problem: Musculoskeletal - Adult  Goal: Return mobility to safest level of function  11/19/2023 2256 by Marshal Yost RN  Outcome: Progressing  Flowsheets (Taken 11/19/2023 2256)  Return Mobility to Safest Level of Function:   Assess patient stability and activity tolerance for standing, transferring and ambulating with or without assistive devices   Assist with transfers and ambulation using safe patient handling equipment as needed   Ensure adequate protection for wounds/incisions during mobilization     Problem: Musculoskeletal - Adult  Goal: Return ADL status to a safe level of function  11/19/2023 2256 by Marshal Yost RN  Outcome: Progressing  Flowsheets (Taken 11/19/2023 2256)  Return ADL Status to a Safe Level of Function: Administer medication as ordered     Problem: Metabolic/Fluid and Electrolytes - Adult  Goal: Electrolytes maintained within normal limits  11/19/2023 2256 by Marshal Yost RN  Outcome: Progressing  Flowsheets (Taken 11/19/2023 2256)  Electrolytes maintained within normal limits: Monitor labs and assess patient for signs and symptoms of electrolyte imbalances     Problem: Pain  Goal: Verbalizes/displays adequate comfort level or baseline comfort level  11/19/2023 2256 by Marshal Yost RN  Outcome: Progressing  Flowsheets (Taken 11/19/2023 2256)  Verbalizes/displays adequate comfort level or baseline comfort level:   Encourage patient to monitor pain and request assistance   Assess pain

## 2023-11-20 NOTE — PROGRESS NOTES
Hospitalist Progress Note      Patient:  Rosetta Fountain    Unit/Bed:7K-13/013-A  YOB: 1950  MRN: [id number]   Acct: [de-identified]   PCP: Darron Alonso MD  Date of Admission: 11/1/2023    Assessment/Plan:    Chronic nonhealing wounds: Improving   Initial concern for infection with purulent discharge noted on RLE wound in ED. Following discussion with infectious disease we will continue to monitor patient off of antibiotics  Arterial US shows no evidence of PAD  Suspected due to long term steroid use   Physical debility:   Patient has baseline deficits 2/2 CVA as noted below but has recently become bedbound and is unable to care for herself  PT/OT following and recommending SNF  Working on placement with  family   Renal lesion: measuring 0.7 x 0 0.6 cm. Not well visualized on repeat US  -Follow-up CT on discharge   Protein Calorie malnutrition:   low prealbumin. Dietician following. Protein supplement added  Hypernatremia, resolved:   suspect 2/2 dehydration. Encourage PO intake. 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 ID following  Lactic acidosis (resolved)  lactic 2.3 on arrival- do not see where subsequent lactic acid ordered - will obtain. Suspect Type B  Leukocytosis- stable. : Suspect secondary to chronic steroid use and possible wound infection. Monitor   Hx of CVA: Multiple ischemic events in 1988, 2015 and this past year. Known deficits with left-sided weakness. Patient is chair bound at baseline. CT head showed no acute findings. Previous infarct noted in the left lateral ventricle. Paroxysmal atrial fibrillation: [id number] 6 HB 3 on [address] with Xarelto rate controlled Coreg  Meningioma: Stable from previous imaging noted over the parafalcine left frontal lobe. Hx pituitary tumor: s/p surgery performed in [place].  On

## 2023-11-21 PROCEDURE — 6370000000 HC RX 637 (ALT 250 FOR IP): Performed by: PHYSICIAN ASSISTANT

## 2023-11-21 PROCEDURE — 6370000000 HC RX 637 (ALT 250 FOR IP): Performed by: STUDENT IN AN ORGANIZED HEALTH CARE EDUCATION/TRAINING PROGRAM

## 2023-11-21 PROCEDURE — 99231 SBSQ HOSP IP/OBS SF/LOW 25: CPT | Performed by: PHYSICIAN ASSISTANT

## 2023-11-21 PROCEDURE — 1200000000 HC SEMI PRIVATE

## 2023-11-21 PROCEDURE — 97110 THERAPEUTIC EXERCISES: CPT

## 2023-11-21 PROCEDURE — 97535 SELF CARE MNGMENT TRAINING: CPT

## 2023-11-21 RX ADMIN — POTASSIUM CHLORIDE 40 MEQ: 1500 TABLET, EXTENDED RELEASE ORAL at 09:45

## 2023-11-21 RX ADMIN — RIVAROXABAN 15 MG: 15 TABLET, FILM COATED ORAL at 17:46

## 2023-11-21 RX ADMIN — MICONAZOLE NITRATE: 2 POWDER TOPICAL at 09:41

## 2023-11-21 RX ADMIN — MICONAZOLE NITRATE: 2 POWDER TOPICAL at 20:08

## 2023-11-21 RX ADMIN — BUMETANIDE 1 MG: 1 TABLET ORAL at 09:41

## 2023-11-21 RX ADMIN — Medication 20 MG: at 20:09

## 2023-11-21 RX ADMIN — LEVOTHYROXINE SODIUM 125 MCG: 0.12 TABLET ORAL at 09:42

## 2023-11-21 RX ADMIN — HYDROCORTISONE 10 MG: 10 TABLET ORAL at 20:08

## 2023-11-21 RX ADMIN — HYDROCORTISONE 20 MG: 10 TABLET ORAL at 09:42

## 2023-11-21 RX ADMIN — PANTOPRAZOLE SODIUM 40 MG: 40 TABLET, DELAYED RELEASE ORAL at 20:09

## 2023-11-21 NOTE — PROGRESS NOTES
[facility]  Occupational Therapy  Daily Note  Time:   Time In: 2330  Time Out: 1202  Timed Code Treatment Minutes: 27 Minutes  Minutes: 27          Date: 2023  Patient Name: Mayra Varela,   Gender: female      Room: -13/013-A  MRN: [id number]  : 1950  (68 y.o.)  Referring Practitioner: Rob Beard DO  Diagnosis: failure to thrieve  Additional Pertinent Hx: Per ER note on 2023: 68 y.o. female who presents to the emergency department for evaluation of sacral ulcers and concerns for worsening stroke-like symptoms. Patient's daughter-in-law and family friend at bedside. 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 Presenting to ED today due to concerns for patient's health at home. Patient's  is her primary caretaker, but he was recently admitted to [facility] on 10/27 after being found down at home by family. Patient now in ED    Restrictions/Precautions:  Restrictions/Precautions: Fall Risk, General Precautions  Position Activity Restriction  Other position/activity restrictions: L hemiparesis from previous, thin skin with multiple wounds/scabs     SUBJECTIVE: Nurse ok'd session. Patient lying in bed upon arrival. Agreeable to OT session    PAIN: Denies    Vitals: Vitals not assessed per clinical judgement, see nursing flowsheet    COGNITION: Slow Processing, Decreased Recall, and Decreased Problem Solving    ADL:   Feeding: with set-up. For lunch tray with increased time to open small packages as able  Grooming: Contact Guard Assistance. For sitting balance EOB to complete tasks, increased time to open small containers. Patient demonstrates with slight R side lean with tactile/verbal cues provided for self correction . BALANCE:  Sitting Balance:  Minimal Assistance.  - CGA sitting EOB with

## 2023-11-21 NOTE — PROGRESS NOTES
Hospitalist Progress Note      Patient:  Chad Browning    Unit/Bed:7K-13/013-A  YOB: 1950  MRN: [id number]   Acct: [de-identified]   PCP: Zack Menard MD  Date of Admission: 11/1/2023    Assessment/Plan:    Chronic nonhealing wounds: Improving   Initial concern for infection with purulent discharge noted on RLE wound in ED. Following discussion with infectious disease we will continue to monitor patient off of antibiotics  Arterial US shows no evidence of PAD  Suspected due to long term steroid use   Physical debility:   Patient has baseline deficits 2/2 CVA as noted below but has recently become bedbound and is unable to care for herself  PT/OT following and recommending SNF  Working on placement with  family   Renal lesion: measuring 0.7 x 0 0.6 cm. Not well visualized on repeat US  -Follow-up CT on discharge   Protein Calorie malnutrition:   low prealbumin. Dietician following. Protein supplement added  Hypernatremia, resolved:   suspect 2/2 dehydration. Encourage PO intake. 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 ID following  Lactic acidosis (resolved)  lactic 2.3 on arrival- do not see where subsequent lactic acid ordered - will obtain. Suspect Type B  Leukocytosis- stable. : Suspect secondary to chronic steroid use and possible wound infection. Monitor   Hx of CVA: Multiple ischemic events in 1988, 2015 and this past year. Known deficits with left-sided weakness. Patient is chair bound at baseline. CT head showed no acute findings. Previous infarct noted in the left lateral ventricle. Paroxysmal atrial fibrillation: [id number] 6 HB 3 on [address] with Xarelto rate controlled Coreg  Meningioma: Stable from previous imaging noted over the parafalcine left frontal lobe. Hx pituitary tumor: s/p surgery performed in [place].  On [place]. Family has numerous social concerns regarding patient insurance and payment for possible ECF. On goals of care discussion patient is a limited code x4    11/13/2023  I assumed care of patient today. SW is now working on placement to facility in [facility] therefore no peer to peer needed. CBC and BMP ordered for tomorrow. Patient eating dinner in bed. Pleasant and no complaints at this time. 11/14/2023  Podiatry completed nail avulsion last night. Patient overall is stable- working on discharge plan    11/15/2023  No changes- continue with current plan  Working on placement     11/16/2023  No changes    11/17   No changes. To start precert for Masoud Nowak today     11/18  No changes    11/19  No changes-- still waiting for placement     11/20  No acute events overnight. Pt resting in bed without issues. Awaiting safe DC plan. 11/21  No changes. Pt resting in bed. Past medical history, family history, social history and allergies reviewed again and is unchanged since admission. ROS (All review of systems completed. Pertinent positives noted.  Otherwise All other systems reviewed and negative.)     Medications:  Reviewed    Infusion Medications    sodium chloride       Scheduled Medications    potassium chloride  40 mEq Oral Daily with breakfast    miconazole   Topical BID    rivaroxaban  15 mg Oral Daily    hydrocortisone  20 mg Oral QAM    hydrocortisone  10 mg Oral Nightly    levothyroxine  125 mcg Oral Daily    pantoprazole  40 mg Oral Nightly    bumetanide  1 mg Oral Daily    sodium chloride flush  5-40 mL IntraVENous 2 times per day    simvastatin  20 mg Oral Daily     PRN Meds: zinc oxide, sodium chloride flush, sodium chloride, magnesium sulfate, ondansetron **OR** ondansetron, polyethylene glycol, acetaminophen **OR** acetaminophen      Intake/Output Summary (Last 24 hours) at 11/21/2023 1126  Last data filed at 11/20/2023 1419  Gross per 24 hour   Intake 240 ml

## 2023-11-21 NOTE — CARE COORDINATION
11/21/23, 1:29 PM EST    DISCHARGE ON GOING EVALUATION    [address] day: 11  Location: -13/013-A Reason for admit: Failure to thrive in adult [R62.7]  Physical debility [R53.81]  Urinary tract infection without hematuria, site unspecified [N39.0]  Ulcers of both lower extremities, unspecified ulcer stage ([address]) [L97.919, L97.929]  Pressure injury of back, stage 2 ([address]) [L89.102]   Procedure:   11/1 Repeat CXR: Minimal linear peripheral opacities at the left lung base are present which may represent minimal atelectasis or scarring. 2. There is a right subclavian central line present with the tip overlying the cavoatrial junction. No pneumothorax is seen  11/1 CT Head WO: No acute intracranial findings. 2. Stable probable meningioma over the parafalcine left frontal lobe. 3. Opacification of the left mastoid air cells can be correlated for   mastoiditis  11/1 CTA Abdominal Aorta: No significant contrast opacification in the distal right anterior tibial artery, right dorsalis pedis artery, and left plantaris artery beginning in the proximal left midfoot. This may relate to underlying diminished flow or occlusion. 2. Possible complex lesion in the upper midpole of the kidney measuring 0.7 x 0.6 cm. Nonemergent ultrasound can be helpful to distinguish mass from complex cyst. 3. Colonic diverticula. 4. Small hiatal hernia. 5. Asymmetric mild subcutaneous edema in the mid-distal left calf and left   ankle. 11/2 US Renal: No sonographic findings for hydronephrosis. Questionable right renal   lesion not well seen on ultrasound due to patient immobility. This can be followed for stability on future CT or MRI. 2. Filling defect partially distended urinary bladder may relate to Huntley catheter balloon. Blood clot not excluded  11/2 BLE Arterial DUP:  Slightly limited evaluation as the peroneal arteries could not be definitively visualized in either leg. Normal bilateral ankle-brachial indices.     11/3: scalp

## 2023-11-21 NOTE — CARE COORDINATION
11/21/23, 12:03 PM EST    DISCHARGE PLANNING EVALUATION    This SW did leave a message for admissions at [facility] to check on referral, made them aware that patient has been here several days and we need to make a discharge plan for her. Asked for a call back. 2:27 PM- This SW left another message for admissions, still have not received a call back.

## 2023-11-21 NOTE — PLAN OF CARE
Problem: Discharge Planning  Goal: Discharge to home or other facility with appropriate resources  Outcome: Progressing  Flowsheets (Taken 11/21/2023 1820)  Discharge to home or other facility with appropriate resources:   Identify barriers to discharge with patient and caregiver   Arrange for needed discharge resources and transportation as appropriate   Identify discharge learning needs (meds, wound care, etc)     Problem: Skin/Tissue Integrity  Goal: Absence of new skin breakdown  Description: 1. Monitor for areas of redness and/or skin breakdown  2. Assess vascular access sites hourly  3. Every 4-6 hours minimum:  Change oxygen saturation probe site  4. Every 4-6 hours:  If on nasal continuous positive airway pressure, respiratory therapy assess nares and determine need for appliance change or resting period. Outcome: Progressing  Note: No new skin breakdown noted at this time this shift. Problem: Safety - Adult  Goal: Free from fall injury  Outcome: Progressing  Flowsheets (Taken 11/21/2023 1820)  Free From Fall Injury: Instruct family/caregiver on patient safety     Problem: Safety - Adult  Goal: Isolation precautions  Description: Isolation precautions  Outcome: Progressing  Note: Isolation continues.      Problem: ABCDS Injury Assessment  Goal: Absence of physical injury  Outcome: Progressing  Flowsheets (Taken 11/21/2023 1820)  Absence of Physical Injury: Implement safety measures based on patient assessment     Problem: Chronic Conditions and Co-morbidities  Goal: Patient's chronic conditions and co-morbidity symptoms are monitored and maintained or improved  Outcome: Progressing  Flowsheets (Taken 11/21/2023 1820)  Care Plan - Patient's Chronic Conditions and Co-Morbidity Symptoms are Monitored and Maintained or Improved:   Monitor and assess patient's chronic conditions and comorbid symptoms for stability, deterioration, or improvement   Collaborate with multidisciplinary team to address nutritional needs:   Assess nutritional status and recommend course of action   Monitor oral intake, labs, and treatment plans     Care plan reviewed with patient. Patient verbalized understanding of the plan of care and contribute to goal setting.

## 2023-11-22 LAB
ANION GAP SERPL CALC-SCNC: 10 MEQ/L (ref 8–16)
BUN SERPL-MCNC: 24 MG/DL (ref 7–22)
CALCIUM SERPL-MCNC: 8.9 MG/DL (ref 8.5–10.5)
CHLORIDE SERPL-SCNC: 103 MEQ/L (ref 98–111)
CO2 SERPL-SCNC: 26 MEQ/L (ref 23–33)
CREAT SERPL-MCNC: 1 MG/DL (ref 0.4–1.2)
DEPRECATED RDW RBC AUTO: 48.3 FL (ref 35–45)
ERYTHROCYTE [DISTWIDTH] IN BLOOD BY AUTOMATED COUNT: 14 % (ref 11.5–14.5)
GFR SERPL CREATININE-BSD FRML MDRD: 60 ML/MIN/1.73M2
GLUCOSE SERPL-MCNC: 128 MG/DL (ref 70–108)
HCT VFR BLD AUTO: 40.1 % (ref 37–47)
HGB BLD-MCNC: 12.7 GM/DL (ref 12–16)
MCH RBC QN AUTO: 30 PG (ref 26–33)
MCHC RBC AUTO-ENTMCNC: 31.7 GM/DL (ref 32.2–35.5)
MCV RBC AUTO: 94.8 FL (ref 81–99)
PLATELET # BLD AUTO: 341 THOU/MM3 (ref 130–400)
PMV BLD AUTO: 10.2 FL (ref 9.4–12.4)
POTASSIUM SERPL-SCNC: 5 MEQ/L (ref 3.5–5.2)
RBC # BLD AUTO: 4.23 MILL/MM3 (ref 4.2–5.4)
SODIUM SERPL-SCNC: 139 MEQ/L (ref 135–145)
WBC # BLD AUTO: 12.3 THOU/MM3 (ref 4.8–10.8)

## 2023-11-22 PROCEDURE — 85027 COMPLETE CBC AUTOMATED: CPT

## 2023-11-22 PROCEDURE — 36415 COLL VENOUS BLD VENIPUNCTURE: CPT

## 2023-11-22 PROCEDURE — 99231 SBSQ HOSP IP/OBS SF/LOW 25: CPT | Performed by: PHYSICIAN ASSISTANT

## 2023-11-22 PROCEDURE — 1200000000 HC SEMI PRIVATE

## 2023-11-22 PROCEDURE — 6370000000 HC RX 637 (ALT 250 FOR IP): Performed by: PHYSICIAN ASSISTANT

## 2023-11-22 PROCEDURE — 6370000000 HC RX 637 (ALT 250 FOR IP): Performed by: STUDENT IN AN ORGANIZED HEALTH CARE EDUCATION/TRAINING PROGRAM

## 2023-11-22 PROCEDURE — 80048 BASIC METABOLIC PNL TOTAL CA: CPT

## 2023-11-22 RX ADMIN — ACETAMINOPHEN 650 MG: 325 TABLET ORAL at 23:39

## 2023-11-22 RX ADMIN — MICONAZOLE NITRATE: 2 POWDER TOPICAL at 08:18

## 2023-11-22 RX ADMIN — RIVAROXABAN 15 MG: 15 TABLET, FILM COATED ORAL at 17:54

## 2023-11-22 RX ADMIN — Medication 20 MG: at 20:42

## 2023-11-22 RX ADMIN — LEVOTHYROXINE SODIUM 125 MCG: 0.12 TABLET ORAL at 08:20

## 2023-11-22 RX ADMIN — POTASSIUM CHLORIDE 40 MEQ: 1500 TABLET, EXTENDED RELEASE ORAL at 08:40

## 2023-11-22 RX ADMIN — HYDROCORTISONE 20 MG: 10 TABLET ORAL at 08:20

## 2023-11-22 RX ADMIN — MICONAZOLE NITRATE: 2 POWDER TOPICAL at 20:42

## 2023-11-22 RX ADMIN — BUMETANIDE 1 MG: 1 TABLET ORAL at 08:20

## 2023-11-22 RX ADMIN — PANTOPRAZOLE SODIUM 40 MG: 40 TABLET, DELAYED RELEASE ORAL at 20:42

## 2023-11-22 RX ADMIN — HYDROCORTISONE 10 MG: 10 TABLET ORAL at 20:42

## 2023-11-22 ASSESSMENT — PAIN SCALES - GENERAL
PAINLEVEL_OUTOF10: 0
PAINLEVEL_OUTOF10: 0
PAINLEVEL_OUTOF10: 3
PAINLEVEL_OUTOF10: 0

## 2023-11-22 ASSESSMENT — PAIN - FUNCTIONAL ASSESSMENT: PAIN_FUNCTIONAL_ASSESSMENT: ACTIVITIES ARE NOT PREVENTED

## 2023-11-22 ASSESSMENT — PAIN DESCRIPTION - LOCATION: LOCATION: TOE (COMMENT WHICH ONE)

## 2023-11-22 ASSESSMENT — PAIN DESCRIPTION - ORIENTATION: ORIENTATION: LEFT

## 2023-11-22 ASSESSMENT — PAIN DESCRIPTION - DESCRIPTORS: DESCRIPTORS: ACHING

## 2023-11-22 NOTE — PROGRESS NOTES
[place]. Family has numerous social concerns regarding patient insurance and payment for possible ECF. On goals of care discussion patient is a limited code x4    11/13/2023  I assumed care of patient today. SW is now working on placement to facility in [facility] therefore no peer to peer needed. CBC and BMP ordered for tomorrow. Patient eating dinner in bed. Pleasant and no complaints at this time. 11/14/2023  Podiatry completed nail avulsion last night. Patient overall is stable- working on discharge plan    11/15/2023  No changes- continue with current plan  Working on placement     11/16/2023  No changes    11/17   No changes. To start precert for Masoud Nowak today     11/18  No changes    11/19  No changes-- still waiting for placement     11/20  No acute events overnight. Pt resting in bed without issues. Awaiting safe DC plan. 11/21  No changes. Pt resting in bed. 11/22  Awaiting decision from facility. Pt resting in bed. Past medical history, family history, social history and allergies reviewed again and is unchanged since admission. ROS (All review of systems completed. Pertinent positives noted.  Otherwise All other systems reviewed and negative.)     Medications:  Reviewed    Infusion Medications    sodium chloride       Scheduled Medications    potassium chloride  40 mEq Oral Daily with breakfast    miconazole   Topical BID    rivaroxaban  15 mg Oral Daily    hydrocortisone  20 mg Oral QAM    hydrocortisone  10 mg Oral Nightly    levothyroxine  125 mcg Oral Daily    pantoprazole  40 mg Oral Nightly    bumetanide  1 mg Oral Daily    sodium chloride flush  5-40 mL IntraVENous 2 times per day    simvastatin  20 mg Oral Daily     PRN Meds: zinc oxide, sodium chloride flush, sodium chloride, magnesium sulfate, ondansetron **OR** ondansetron, polyethylene glycol, acetaminophen **OR** acetaminophen      Intake/Output Summary (Last 24 hours) at 11/22/2023 1312  Last data MRI.   2. Filling defect partially distended urinary bladder may relate to Huntley    catheter balloon. Blood clot not excluded. This document has been electronically signed by: Dmitri Godoy MD on    11/02/2023 01:05 AM      CT HEAD WO CONTRAST   Final Result   1. No acute intracranial findings. 2. Stable probable meningioma over the parafalcine left frontal lobe. 3. Opacification of the left mastoid air cells can be correlated for    mastoiditis. This document has been electronically signed by: Dmitri Godoy MD on    11/01/2023 06:05 PM      All CTs at this facility use dose modulation techniques and iterative    reconstructions, and/or weight-based dosing   when appropriate to reduce radiation to a low as reasonably achievable. CTA ABDOMINAL AORTA W BILAT RUNOFF W WO CONTRAST   Final Result   1. No significant contrast opacification in the distal right anterior    tibial artery, right dorsalis pedis artery, and left plantaris artery    beginning in the proximal left midfoot. This may relate to underlying    diminished flow or occlusion. 2. Possible complex lesion in the upper midpole of the kidney measuring    0.7 x 0.6 cm. Nonemergent ultrasound can be helpful to distinguish mass    from complex cyst.   3. Colonic diverticula. 4. Small hiatal hernia. 5. Asymmetric mild subcutaneous edema in the mid-distal left calf and left    ankle. This document has been electronically signed by: Dmitri Godoy MD on    11/01/2023 06:20 PM      All CTs at this facility use dose modulation techniques and iterative    reconstructions, and/or weight-based dosing   when appropriate to reduce radiation to a low as reasonably achievable. 3D Post-processing was performed on this study. XR CHEST PORTABLE   Final Result   1. Minimal linear peripheral opacities at the left lung base are present which may represent minimal atelectasis or scarring.    2. There is a right subclavian central line present

## 2023-11-22 NOTE — CARE COORDINATION
11/22/23, 9:12 AM EST    [facility] is reviewing for possible admission, private pay. Family is hoping to place with  at this facility. Call made to [facility], message left requesting call back with decision on accepting.          Spoke with sw for patient's , they anticipate discharge for him next week to [facility]

## 2023-11-22 NOTE — PLAN OF CARE
Problem: Discharge Planning  Goal: Discharge to home or other facility with appropriate resources  11/22/2023 0124 by Chris Horan RN  Outcome: Progressing  Flowsheets (Taken 11/22/2023 0124)  Discharge to home or other facility with appropriate resources:   Identify barriers to discharge with patient and caregiver   Arrange for needed discharge resources and transportation as appropriate    Problem: Skin/Tissue Integrity  Goal: Absence of new skin breakdown  Description: 1. Monitor for areas of redness and/or skin breakdown  2. Assess vascular access sites hourly  3. Every 4-6 hours minimum:  Change oxygen saturation probe site  4. Every 4-6 hours:  If on nasal continuous positive airway pressure, respiratory therapy assess nares and determine need for appliance change or resting period. 11/22/2023 0124 by Chris Horan RN  Outcome: Progressing  Note: Maintain every 2 hour turn. Inspect skin areas  Address issues with open skin with proper wound care per policies and procedures     Problem: Safety - Adult  Goal: Free from fall injury  11/22/2023 0124 by Chris Horan RN  Outcome: Progressing  Flowsheets (Taken 11/22/2023 0124)  Free From Fall Injury: Instruct family/caregiver on patient safety    Goal: Isolation precautions  Description: Isolation precautions  11/22/2023 0124 by Chris Horan RN  Outcome: Progressing     Problem: ABCDS Injury Assessment  Goal: Absence of physical injury  11/22/2023 0124 by Chris Horan RN  Outcome: Progressing  Flowsheets (Taken 11/22/2023 0124)  Absence of Physical Injury: Implement safety measures based on patient assessment  Note: Bed in low position  Call light in reach  Bed wheel lock  Teaching to use call light for assistance.     Problem: Chronic Conditions and Co-morbidities  Goal: Patient's chronic conditions and co-morbidity symptoms are monitored and maintained or improved  11/22/2023 0124 by Chris Horan RN  Outcome: Progressing  Flowsheets (Taken 11/22/2023 0124)  Care Plan - Patient's Chronic Conditions and Co-Morbidity Symptoms are Monitored and Maintained or Improved:   Monitor and assess patient's chronic conditions and comorbid symptoms for stability, deterioration, or improvement   Collaborate with multidisciplinary team to address chronic and comorbid conditions and prevent exacerbation or deterioration   Update acute care plan with appropriate goals if chronic or comorbid symptoms are exacerbated and prevent overall improvement and discharge     Problem: Skin/Tissue Integrity - Adult  Goal: Skin integrity remains intact  11/22/2023 0124 by Marquis Hugo RN  Outcome: Progressing  Flowsheets (Taken 11/22/2023 0124)  Skin Integrity Remains Intact: Monitor for areas of redness and/or skin breakdown  Note: Maintain every 2 hour turn.   Inspect skin areas  Address issues with open skin with proper wound care per policies and procedures     Problem: Musculoskeletal - Adult  Goal: Return mobility to safest level of function  11/22/2023 0124 by Marquis Hugo RN  Outcome: Progressing  Return Mobility to Safest Level of Function:   Assess patient stability and activity tolerance for standing, transferring and ambulating with or without assistive devices   Assist with transfers and ambulation using safe patient handling equipment as needed   Ensure adequate protection for wounds/incisions during mobilization  Goal: Return ADL status to a safe level of function  11/22/2023 0124 by Marquis Hugo RN  Outcome: Progressing  Return ADL Status to a Safe Level of Function:   Administer medication as ordered   Assess activities of daily living deficits and provide assistive devices as needed     Problem: Metabolic/Fluid and Electrolytes - Adult  Goal: Electrolytes maintained within normal limits  11/22/2023 0124 by Marquis Hugo RN  Outcome: Progressing  Flowsheets (Taken 11/22/2023 0124)  Electrolytes maintained within

## 2023-11-22 NOTE — PLAN OF CARE
Problem: Discharge Planning  Goal: Discharge to home or other facility with appropriate resources  11/22/2023 1030 by Danielle Patterson RN  Outcome: Progressing  Flowsheets (Taken 11/22/2023 0124 by Chris Horan RN)  Discharge to home or other facility with appropriate resources:   Identify barriers to discharge with patient and caregiver   Arrange for needed discharge resources and transportation as appropriate     Problem: Skin/Tissue Integrity  Goal: Absence of new skin breakdown  Description: 1. Monitor for areas of redness and/or skin breakdown  2. Assess vascular access sites hourly  3. Every 4-6 hours minimum:  Change oxygen saturation probe site  4. Every 4-6 hours:  If on nasal continuous positive airway pressure, respiratory therapy assess nares and determine need for appliance change or resting period.   11/22/2023 1030 by Danielle Patterson RN  Outcome: Progressing  Note: No new skin breakdown this shift     Problem: Safety - Adult  Goal: Free from fall injury  11/22/2023 1030 by Danielle Patterson RN  Outcome: Progressing  Flowsheets (Taken 11/22/2023 0124 by Chris Horan RN)  Free From Fall Injury: Instruct family/caregiver on patient safety     Problem: ABCDS Injury Assessment  Goal: Absence of physical injury  11/22/2023 1030 by Danielle Patterson RN  Outcome: Progressing  Flowsheets (Taken 11/22/2023 1029)  Absence of Physical Injury: Implement safety measures based on patient assessment     Problem: Chronic Conditions and Co-morbidities  Goal: Patient's chronic conditions and co-morbidity symptoms are monitored and maintained or improved  11/22/2023 1030 by Danielle Patterson RN  Outcome: Progressing  Flowsheets (Taken 11/22/2023 0124 by Chris Horan RN)  Care Plan - Patient's Chronic Conditions and Co-Morbidity Symptoms are Monitored and Maintained or Improved:   Monitor and assess patient's chronic conditions and comorbid symptoms for stability, deterioration, or improvement

## 2023-11-23 PROCEDURE — 1200000000 HC SEMI PRIVATE

## 2023-11-23 PROCEDURE — 99231 SBSQ HOSP IP/OBS SF/LOW 25: CPT | Performed by: PHYSICIAN ASSISTANT

## 2023-11-23 PROCEDURE — 6370000000 HC RX 637 (ALT 250 FOR IP): Performed by: STUDENT IN AN ORGANIZED HEALTH CARE EDUCATION/TRAINING PROGRAM

## 2023-11-23 PROCEDURE — 6370000000 HC RX 637 (ALT 250 FOR IP): Performed by: PHYSICIAN ASSISTANT

## 2023-11-23 RX ADMIN — ACETAMINOPHEN 650 MG: 325 TABLET ORAL at 22:46

## 2023-11-23 RX ADMIN — BUMETANIDE 1 MG: 1 TABLET ORAL at 08:45

## 2023-11-23 RX ADMIN — Medication 20 MG: at 21:06

## 2023-11-23 RX ADMIN — POTASSIUM CHLORIDE 40 MEQ: 1500 TABLET, EXTENDED RELEASE ORAL at 08:45

## 2023-11-23 RX ADMIN — MICONAZOLE NITRATE: 2 POWDER TOPICAL at 08:08

## 2023-11-23 RX ADMIN — LEVOTHYROXINE SODIUM 125 MCG: 0.12 TABLET ORAL at 08:08

## 2023-11-23 RX ADMIN — PANTOPRAZOLE SODIUM 40 MG: 40 TABLET, DELAYED RELEASE ORAL at 21:08

## 2023-11-23 RX ADMIN — RIVAROXABAN 15 MG: 15 TABLET, FILM COATED ORAL at 17:12

## 2023-11-23 RX ADMIN — MICONAZOLE NITRATE: 2 POWDER TOPICAL at 21:46

## 2023-11-23 RX ADMIN — HYDROCORTISONE 20 MG: 10 TABLET ORAL at 08:08

## 2023-11-23 RX ADMIN — HYDROCORTISONE 10 MG: 10 TABLET ORAL at 21:05

## 2023-11-23 ASSESSMENT — PAIN SCALES - GENERAL: PAINLEVEL_OUTOF10: 0

## 2023-11-23 NOTE — PROGRESS NOTES
Hospitalist Progress Note      Patient:  Chad Browning    Unit/Bed:7K-13/013-A  YOB: 1950  MRN: [id number]   Acct: [de-identified]   PCP: Zack Menard MD  Date of Admission: 11/1/2023    Assessment/Plan:    Chronic nonhealing wounds: Improving   Initial concern for infection with purulent discharge noted on RLE wound in ED. Following discussion with infectious disease we will continue to monitor patient off of antibiotics  Arterial US shows no evidence of PAD  Suspected due to long term steroid use   Physical debility:   Patient has baseline deficits 2/2 CVA as noted below but has recently become bedbound and is unable to care for herself  PT/OT following and recommending SNF  Working on placement with  family   Renal lesion: measuring 0.7 x 0 0.6 cm. Not well visualized on repeat US  -Follow-up CT on discharge   Protein Calorie malnutrition:   low prealbumin. Dietician following. Protein supplement added  Hypernatremia, resolved:   suspect 2/2 dehydration. Encourage PO intake. 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 ID following  Lactic acidosis (resolved)  lactic 2.3 on arrival- do not see where subsequent lactic acid ordered - will obtain. Suspect Type B  Leukocytosis- stable. : Suspect secondary to chronic steroid use and possible wound infection. Monitor   Hx of CVA: Multiple ischemic events in 1988, 2015 and this past year. Known deficits with left-sided weakness. Patient is chair bound at baseline. CT head showed no acute findings. Previous infarct noted in the left lateral ventricle. Paroxysmal atrial fibrillation: [id number] 6 HB 3 on [address] with Xarelto rate controlled Coreg  Meningioma: Stable from previous imaging noted over the parafalcine left frontal lobe. Hx pituitary tumor: s/p surgery performed in [place].  On RENAL COMPLETE   Final Result   1. No sonographic findings for hydronephrosis. Questionable right renal    lesion not well seen on ultrasound due to patient immobility. This can be    followed for stability on future CT or MRI. 2. Filling defect partially distended urinary bladder may relate to Huntley    catheter balloon. Blood clot not excluded. This document has been electronically signed by: Danna Webber MD on    11/02/2023 01:05 AM      CT HEAD WO CONTRAST   Final Result   1. No acute intracranial findings. 2. Stable probable meningioma over the parafalcine left frontal lobe. 3. Opacification of the left mastoid air cells can be correlated for    mastoiditis. This document has been electronically signed by: Danna Webber MD on    11/01/2023 06:05 PM      All CTs at this facility use dose modulation techniques and iterative    reconstructions, and/or weight-based dosing   when appropriate to reduce radiation to a low as reasonably achievable. CTA ABDOMINAL AORTA W BILAT RUNOFF W WO CONTRAST   Final Result   1. No significant contrast opacification in the distal right anterior    tibial artery, right dorsalis pedis artery, and left plantaris artery    beginning in the proximal left midfoot. This may relate to underlying    diminished flow or occlusion. 2. Possible complex lesion in the upper midpole of the kidney measuring    0.7 x 0.6 cm. Nonemergent ultrasound can be helpful to distinguish mass    from complex cyst.   3. Colonic diverticula. 4. Small hiatal hernia. 5. Asymmetric mild subcutaneous edema in the mid-distal left calf and left    ankle. This document has been electronically signed by: Danna Webber MD on    11/01/2023 06:20 PM      All CTs at this facility use dose modulation techniques and iterative    reconstructions, and/or weight-based dosing   when appropriate to reduce radiation to a low as reasonably achievable. 3D Post-processing was performed on this study.

## 2023-11-23 NOTE — PLAN OF CARE
Problem: Discharge Planning  Goal: Discharge to home or other facility with appropriate resources  Outcome: Progressing  Flowsheets (Taken 11/23/2023 1350)  Discharge to home or other facility with appropriate resources:   Identify barriers to discharge with patient and caregiver   Arrange for needed discharge resources and transportation as appropriate   Identify discharge learning needs (meds, wound care, etc)     Problem: Skin/Tissue Integrity  Goal: Absence of new skin breakdown  Description: 1. Monitor for areas of redness and/or skin breakdown  2. Assess vascular access sites hourly  3. Every 4-6 hours minimum:  Change oxygen saturation probe site  4. Every 4-6 hours:  If on nasal continuous positive airway pressure, respiratory therapy assess nares and determine need for appliance change or resting period.   Outcome: Progressing     Problem: Safety - Adult  Goal: Free from fall injury  Outcome: Progressing  Flowsheets (Taken 11/23/2023 1359)  Free From Fall Injury: Instruct family/caregiver on patient safety     Problem: Safety - Adult  Goal: Isolation precautions  Description: Isolation precautions  Outcome: Progressing     Problem: ABCDS Injury Assessment  Goal: Absence of physical injury  Outcome: Progressing  Flowsheets (Taken 11/23/2023 1355)  Absence of Physical Injury: Implement safety measures based on patient assessment     Problem: Chronic Conditions and Co-morbidities  Goal: Patient's chronic conditions and co-morbidity symptoms are monitored and maintained or improved  Outcome: Progressing  Flowsheets (Taken 11/23/2023 1354)  Care Plan - Patient's Chronic Conditions and Co-Morbidity Symptoms are Monitored and Maintained or Improved:   Monitor and assess patient's chronic conditions and comorbid symptoms for stability, deterioration, or improvement   Collaborate with multidisciplinary team to address chronic and comorbid conditions and prevent exacerbation or deterioration     Care plan

## 2023-11-23 NOTE — PLAN OF CARE
Problem: Discharge Planning  Goal: Discharge to home or other facility with appropriate resources  11/22/2023 2125 by Felisha Goss RN  Outcome: Progressing  Flowsheets (Taken 11/22/2023 2125)  Discharge to home or other facility with appropriate resources:   Identify barriers to discharge with patient and caregiver   Arrange for needed discharge resources and transportation as appropriate   Identify discharge learning needs (meds, wound care, etc)     Problem: Skin/Tissue Integrity  Goal: Absence of new skin breakdown  Description: 1. Monitor for areas of redness and/or skin breakdown  2. Assess vascular access sites hourly  3. Every 4-6 hours minimum:  Change oxygen saturation probe site  4. Every 4-6 hours:  If on nasal continuous positive airway pressure, respiratory therapy assess nares and determine need for appliance change or resting period.   11/22/2023 2125 by Felisha Goss RN  Outcome: Progressing     Problem: Safety - Adult  Goal: Free from fall injury  11/22/2023 2125 by Felisha Goss RN  Outcome: Progressing  Flowsheets (Taken 11/22/2023 2125)  Free From Fall Injury:   Instruct family/caregiver on patient safety   Based on caregiver fall risk screen, instruct family/caregiver to ask for assistance with transferring infant if caregiver noted to have fall risk factors     Problem: Safety - Adult  Goal: Isolation precautions  Description: Isolation precautions  11/22/2023 2125 by Felisha Goss RN  Outcome: Progressing     Problem: ABCDS Injury Assessment  Goal: Absence of physical injury  11/22/2023 2125 by Felisha Goss RN  Outcome: Progressing  [address] (Taken 11/22/2023 2125)  Absence of Physical Injury: Implement safety measures based on patient assessment     Problem: Chronic Conditions and Co-morbidities  Goal: Patient's chronic conditions and co-morbidity symptoms are monitored and maintained or improved  11/22/2023 2125 by Felisha Gsos RN  Outcome: Progressing  Flowsheets (Taken 11/22/2023 2125)  Care Plan - Patient's Chronic Conditions and Co-Morbidity Symptoms are Monitored and Maintained or Improved:   Monitor and assess patient's chronic conditions and comorbid symptoms for stability, deterioration, or improvement   Collaborate with multidisciplinary team to address chronic and comorbid conditions and prevent exacerbation or deterioration   Update acute care plan with appropriate goals if chronic or comorbid symptoms are exacerbated and prevent overall improvement and discharge     Problem: Skin/Tissue Integrity - Adult  Goal: Skin integrity remains intact  11/22/2023 2125 by Michelle Stahl RN  Outcome: Progressing  Flowsheets (Taken 11/22/2023 2125)  Skin Integrity Remains Intact: Assess vascular access sites hourly     Problem: Musculoskeletal - Adult  Goal: Return mobility to safest level of function  11/22/2023 2125 by Michelle Stahl RN  Outcome: Progressing  Flowsheets (Taken 11/22/2023 2125)  Return Mobility to Safest Level of Function:   Assess patient stability and activity tolerance for standing, transferring and ambulating with or without assistive devices   Assist with transfers and ambulation using safe patient handling equipment as needed   Ensure adequate protection for wounds/incisions during mobilization     Problem: Musculoskeletal - Adult  Goal: Return ADL status to a safe level of function  11/22/2023 2125 by Michelle Stahl RN  Outcome: Progressing  Flowsheets (Taken 11/22/2023 2125)  Return ADL Status to a Safe Level of Function: Administer medication as ordered     Problem: Metabolic/Fluid and Electrolytes - Adult  Goal: Electrolytes maintained within normal limits  11/22/2023 2125 by Michelle Stahl RN  Outcome: Progressing  Flowsheets (Taken 11/22/2023 2125)  Electrolytes maintained within normal limits:   Monitor labs and assess patient for signs and symptoms of electrolyte imbalances   Administer electrolyte replacement

## 2023-11-23 NOTE — PROGRESS NOTES
Podiatric Progress Note  Tomy   Subjective :   11/23/23  Patient was seen bedside today on behalf of Dr. Elliot Ribeiro. Patient is 10 days s/p bilateral great toenail avulsion. Patient relates no pain to the bilateral feet or legs. Patient appears more alert and oriented today. Patient denies any N/V/F/C/SOB or CP. No other pedal complaints. 11/20/23  Patient was seen bedside today on behalf of Dr. Elliot Ribeiro. Patient is 7 days s/p bilateral great toenail avulsion. Patient relates no pain to the bilateral feet. Patient appears more alert and oriented today. Patient denies any N/V/F/C/SOB or CP. No other pedal complaints. 11/18/23  Patient was seen bedside today on behalf of Dr. Elliot Ribeiro. Patient is 5 days s/p bilateral great toenail avulsion. Patient relates no pain to the bilateral feet. Patient appears more alert and oriented today. Patient denies any N/V/F/C/SOB or CP. No other pedal complaints. 11/16/23  Patient was seen bedside today on behalf of of Dr. Elliot Ribeiro. Patient is 3 days s/p bilateral great toenail avulsion. Patient relates no pain to the bilateral feet. Patient continuing with diminished mental status. Patient denies any N/V/F/C/SOB or CP. No other pedal complaints. HPI  Patient is a pleasant 61-year-old female with pertinent past medical history of CVA, cancer, hypertension, hypothyroidism. Patient was admitted by hospitalist with diagnosis of failure to thrive. Patient was initially evaluated by resident physician Nuzhat teixeira on 11/3/2023. Patient was noted to have chronic incurvation along the lateral extent of the hallux nail plate bilateral with hypergranulation tissue present. Patient was initially deemed appropriate for discharge with follow-up in the office. I was contacted by infectious disease in regards to proceeding with nail avulsion due to concern for source of infection to bilateral hallux. Patient was seen bedside this evening.   Patient found to have diastolic congestive heart failure (HCC)    CKD (chronic kidney disease), stage IV (HCC)    Confusion    Chronic kidney disease (CKD), stage III (moderate) (HCC)    Obesity (BMI 30.0-34. 9)    Community acquired pneumonia of right lung    Acute renal failure superimposed on stage 3 chronic kidney disease (HCC)    Chronic anticoagulation    Hypoalbuminemia    Impaired mobility    Bilateral leg edema +2- better now trace    CKD (chronic kidney disease) stage 3, GFR 30-59 ml/min (HCC)    Fluid overload    Panhypopituitarism (HCC)    Hypothyroidism    Scalp lesion    Non-compliance F/u advised    Verruca vulgaris    COVID-19    Septic shock (HCC)    Cellulitis of submandibular region    Severe malnutrition (HCC)    Submandibular gland infection    Cellulitis    Non-pressure chronic ulcer of skin of other sites limited to breakdown of skin ([address])    Physical debility    Failure to thrive in adult    Pressure injury of back, stage 2 (HCC)    Ulcers of both lower extremities ([address])    Moderate malnutrition ([address])       PLAN:   - Pt. is a 68 y.o. female   - Patient was examined and evaluated  - Afebrile  - Continue daily dressing changes consisting of Adaptic to the bilateral leg wounds, cover with light Betadine soaked gauze, ABD. Cover the bilateral great toenail beds with Adaptic and dry gauze. Cover the BLE with Kerlix and Ace compression. Change all once daily.  - Weight bearing status: WBAT  - Discussed with patient that the wounds appear to be progressing well and we will continue with daily dressing changes. - Podiatry will continue to follow    DISPO: Stable from podiatric standpoint. Continue daily dressing changes as above. Aldair Luna D.P.M.  PGY-1  Podiatric Surgical Resident  11/23/2023   10:18 AM

## 2023-11-24 PROCEDURE — 1200000000 HC SEMI PRIVATE

## 2023-11-24 PROCEDURE — 6370000000 HC RX 637 (ALT 250 FOR IP): Performed by: STUDENT IN AN ORGANIZED HEALTH CARE EDUCATION/TRAINING PROGRAM

## 2023-11-24 PROCEDURE — 99232 SBSQ HOSP IP/OBS MODERATE 35: CPT | Performed by: INTERNAL MEDICINE

## 2023-11-24 PROCEDURE — 97110 THERAPEUTIC EXERCISES: CPT

## 2023-11-24 PROCEDURE — 2500000003 HC RX 250 WO HCPCS

## 2023-11-24 PROCEDURE — 6370000000 HC RX 637 (ALT 250 FOR IP): Performed by: PHYSICIAN ASSISTANT

## 2023-11-24 PROCEDURE — 97535 SELF CARE MNGMENT TRAINING: CPT

## 2023-11-24 RX ADMIN — MICONAZOLE NITRATE: 2 POWDER TOPICAL at 08:31

## 2023-11-24 RX ADMIN — HYDROCORTISONE 10 MG: 10 TABLET ORAL at 21:33

## 2023-11-24 RX ADMIN — RIVAROXABAN 15 MG: 15 TABLET, FILM COATED ORAL at 17:22

## 2023-11-24 RX ADMIN — LEVOTHYROXINE SODIUM 125 MCG: 0.12 TABLET ORAL at 08:32

## 2023-11-24 RX ADMIN — ACETAMINOPHEN 650 MG: 325 TABLET ORAL at 21:34

## 2023-11-24 RX ADMIN — PANTOPRAZOLE SODIUM 40 MG: 40 TABLET, DELAYED RELEASE ORAL at 21:42

## 2023-11-24 RX ADMIN — MICONAZOLE NITRATE: 2 POWDER TOPICAL at 21:38

## 2023-11-24 RX ADMIN — POTASSIUM CHLORIDE 40 MEQ: 1500 TABLET, EXTENDED RELEASE ORAL at 08:31

## 2023-11-24 RX ADMIN — HYDROCORTISONE 20 MG: 10 TABLET ORAL at 08:32

## 2023-11-24 RX ADMIN — Medication 20 MG: at 21:43

## 2023-11-24 RX ADMIN — BUMETANIDE 1 MG: 1 TABLET ORAL at 08:31

## 2023-11-24 ASSESSMENT — PAIN DESCRIPTION - LOCATION: LOCATION: TOE (COMMENT WHICH ONE)

## 2023-11-24 ASSESSMENT — PAIN SCALES - GENERAL
PAINLEVEL_OUTOF10: 0
PAINLEVEL_OUTOF10: 5
PAINLEVEL_OUTOF10: 0

## 2023-11-24 ASSESSMENT — PAIN DESCRIPTION - ORIENTATION: ORIENTATION: RIGHT

## 2023-11-24 ASSESSMENT — PAIN DESCRIPTION - DESCRIPTORS: DESCRIPTORS: ACHING;SHOOTING

## 2023-11-24 NOTE — PROGRESS NOTES
[address] [facility]  Occupational Therapy  Daily Note  Time:   Time In: 6615  Time Out: 1452  Timed Code Treatment Minutes: 24 Minutes  Minutes: 24    Date: 2023  Patient Name: Kirsten Jon,   Gender: female      Room: -13/013-A  MRN: [id number]  : 1950  (68 y.o.)  Referring Practitioner: Pradeep Trinidad DO  Diagnosis: failure to thrieve  Additional Pertinent Hx: Per ER note on 2023: 68 y.o. female who presents to the emergency department for evaluation of sacral ulcers and concerns for worsening stroke-like symptoms. Patient's daughter-in-law and family friend at bedside. 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 Presenting to ED today due to concerns for patient's health at home. Patient's  is her primary caretaker, but he was recently admitted to [facility] on 10/27 after being found down at home by family. Patient now in ED    Restrictions/Precautions:  Restrictions/Precautions: Fall Risk, General Precautions  Position Activity Restriction  Other position/activity restrictions: L hemiparesis from previous, thin skin with multiple wounds/scabs     SUBJECTIVE: RN ok'ed session. Pt supine upon arrival, agreeable to OT. PAIN: Denies pain when asked     Vitals: Vitals not assessed per clinical judgement, see nursing flowsheet    COGNITION: Slow Processing and Impaired Attention    ADL:   Grooming: Contact Guard Assistance. Pt sat EOB x 10 min while completing oral care, requring CGA most consistently but did require min A with R sided LOB when releasing RUE, requiring closer set-up. LUE placed in supportive WBing for support . BALANCE:  Sitting Balance:  Contact Guard Assistance, Minimal Assistance. CGA most consistently, did require min A when RUE reached in fwd functional planes.  Cues for

## 2023-11-24 NOTE — PLAN OF CARE
Problem: Skin/Tissue Integrity  Goal: Absence of new skin breakdown  Description: 1. Monitor for areas of redness and/or skin breakdown  2. Assess vascular access sites hourly  3. Every 4-6 hours minimum:  Change oxygen saturation probe site  4. Every 4-6 hours:  If on nasal continuous positive airway pressure, respiratory therapy assess nares and determine need for appliance change or resting period.   11/23/2023 1359 by Balbir Barrett RN  Outcome: Progressing

## 2023-11-24 NOTE — PROGRESS NOTES
Comprehensive Nutrition Assessment    Type and Reason for Visit:  Reassess (Moderate Malnutrition)     Nutrition Recommendations/Plan:   Please weigh pt as able - pt has not been weighed this admission  Recommend MVI and probiotic  Continue diet per provider and encourage PO intake at best efforts  Continue ONS (BID) and Magic Cups (BID)  D/c planning on going     Malnutrition Assessment:  Malnutrition Status: Moderate malnutrition (11/10/23 1400)    Context:  Chronic Illness     Findings of the 6 clinical characteristics of malnutrition:  Energy Intake:  75% or less of estimated energy requirements for 7 or more days  Weight Loss:  Unable to assess (r/t CHF and edema), no weights taken this admission  Body Fat Loss:  Mild body fat loss Orbital   Muscle Mass Loss:  Mild muscle mass loss Temples (temporalis)  Fluid Accumulation:  Mild Generalized, Extremities   Strength:  Not Performed    Nutrition Assessment:     Pt. Is improving nutritionally AEB slight increase in PO intake. At risk for further nutrition compromise r/t chronic nonhealing wounds - improving, physical debility 2/2 CVA - recently became bed-bound, renal lesion, LOS 23 days, and underlying medical condition (PMHx: CA, CHF, CVA, DM, GERD, meningioma of the brain, HLD, HTN, hypothyroidism, obesity, PAF). Nutrition Related Findings:    Pt. Report/Treatments/Miscellaneous: Pt seen, eating lunch, ~25% of meal consumed at time of visit and 50% of Ensure drank. Reports continued good tolerance of ONS. Varied PO intake recently. Will continue ONS. D/c planning still on going - pt's  has been in ICU at [facility] for ~ 3 weeks.     GI Status:  - 11/23  Pertinent Labs: BUN 24, glucose 128  Pertinent Meds: Bumex, synthroid, protonix     Wound Type:  (pretibial L and R, Buttocks pressure injury R + L, Anterior toe - R & L)          Current Nutrition Intake & Therapies:    Average Meal Intake: 26-50%, 51-75%  Average Supplements Intake:  (reports good Weight, Meal Time Behavior    Discharge Planning:     Too soon to determine     Camden Forde RD, LD  Contact: [phone]

## 2023-11-24 NOTE — CARE COORDINATION
11/24/23, 9:26 AM EST    DISCHARGE PLANNING EVALUATION    Call made again to [address], message left requesting a decision on possible admission. Patient's insurance has declined, and patient will be private pay at the facility. Patient's , who is POA, is still a patient at a [facility], planning on going to [facility] next week. Family has not given a second choice, as they want patient placed in the same facility is her .

## 2023-11-25 PROCEDURE — 6370000000 HC RX 637 (ALT 250 FOR IP): Performed by: STUDENT IN AN ORGANIZED HEALTH CARE EDUCATION/TRAINING PROGRAM

## 2023-11-25 PROCEDURE — 1200000000 HC SEMI PRIVATE

## 2023-11-25 PROCEDURE — 99232 SBSQ HOSP IP/OBS MODERATE 35: CPT | Performed by: INTERNAL MEDICINE

## 2023-11-25 PROCEDURE — 6370000000 HC RX 637 (ALT 250 FOR IP): Performed by: PHYSICIAN ASSISTANT

## 2023-11-25 RX ADMIN — MICONAZOLE NITRATE: 2 POWDER TOPICAL at 19:37

## 2023-11-25 RX ADMIN — HYDROCORTISONE 20 MG: 10 TABLET ORAL at 08:30

## 2023-11-25 RX ADMIN — POTASSIUM CHLORIDE 40 MEQ: 1500 TABLET, EXTENDED RELEASE ORAL at 08:30

## 2023-11-25 RX ADMIN — LEVOTHYROXINE SODIUM 125 MCG: 0.12 TABLET ORAL at 08:31

## 2023-11-25 RX ADMIN — RIVAROXABAN 15 MG: 15 TABLET, FILM COATED ORAL at 17:25

## 2023-11-25 RX ADMIN — ACETAMINOPHEN 650 MG: 325 TABLET ORAL at 20:45

## 2023-11-25 RX ADMIN — PANTOPRAZOLE SODIUM 40 MG: 40 TABLET, DELAYED RELEASE ORAL at 19:37

## 2023-11-25 RX ADMIN — BUMETANIDE 1 MG: 1 TABLET ORAL at 08:30

## 2023-11-25 RX ADMIN — MICONAZOLE NITRATE: 2 POWDER TOPICAL at 08:27

## 2023-11-25 RX ADMIN — HYDROCORTISONE 10 MG: 10 TABLET ORAL at 19:37

## 2023-11-25 RX ADMIN — Medication 20 MG: at 19:37

## 2023-11-25 ASSESSMENT — PAIN - FUNCTIONAL ASSESSMENT
PAIN_FUNCTIONAL_ASSESSMENT: ACTIVITIES ARE NOT PREVENTED
PAIN_FUNCTIONAL_ASSESSMENT: ACTIVITIES ARE NOT PREVENTED

## 2023-11-25 ASSESSMENT — PAIN DESCRIPTION - DESCRIPTORS
DESCRIPTORS: ACHING
DESCRIPTORS: ACHING

## 2023-11-25 ASSESSMENT — PAIN DESCRIPTION - LOCATION
LOCATION: TOE (COMMENT WHICH ONE)
LOCATION: TOE (COMMENT WHICH ONE)

## 2023-11-25 ASSESSMENT — PAIN DESCRIPTION - ORIENTATION
ORIENTATION: LEFT
ORIENTATION: LEFT

## 2023-11-25 ASSESSMENT — PAIN SCALES - GENERAL
PAINLEVEL_OUTOF10: 6
PAINLEVEL_OUTOF10: 0
PAINLEVEL_OUTOF10: 6

## 2023-11-25 NOTE — PROGRESS NOTES
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) ([address])    Hypersomnia    Long term (current) use of systemic steroids    Essential hypertension    Diabetes insipidus (HCC)    Somnolence    PAF (paroxysmal atrial fibrillation) ([address])    Sixth nerve palsy of left eye    Left hemiparesis ([address])    History of CVA with residual deficit    Subdural hematoma (HCC)    JESSE (acute kidney injury) ([address])    Hyperkalemia    Gastroenteritis due to norovirus    Acute diastolic heart failure (HCC)    History of stroke with residual effects    Chronic venous stasis dermatitis of both lower extremities    Normocytic anemia    Chronic diastolic congestive heart failure (HCC)    CKD (chronic kidney disease), stage IV (HCC)    Confusion    Chronic kidney disease (CKD), stage III (moderate) (HCC)    Obesity (BMI 30.0-34. 9)    Community acquired pneumonia of right lung    Acute renal failure superimposed on stage 3 chronic kidney disease (HCC)    Chronic anticoagulation    Hypoalbuminemia    Impaired mobility    Bilateral leg edema +2- better now trace    CKD (chronic kidney disease) stage 3, GFR 30-59 ml/min (HCC)    Fluid overload    Panhypopituitarism (HCC)    Hypothyroidism    Scalp lesion    Non-compliance F/u advised    Verruca vulgaris    COVID-19    Septic shock (HCC)    Cellulitis of submandibular region    Severe malnutrition (HCC)    Submandibular gland infection    Cellulitis    Non-pressure chronic ulcer of skin of other sites limited to breakdown of skin ([address])    Physical debility    Failure to thrive in adult    Pressure injury of back, stage 2 (HCC)    Ulcers of both lower extremities ([address])    Moderate malnutrition ([address])       PLAN:   - Pt. is a 68 y.o. female   - Patient was examined and evaluated  - Afebrile  - Continue daily dressing changes consisting of Adaptic to the bilateral leg wounds, cover

## 2023-11-25 NOTE — PROGRESS NOTES
Hospitalist Progress Note      Patient:  Alla Forrester    Unit/Bed:7K-13/013-A  YOB: 1950  MRN: [id number]   Acct: [de-identified]   PCP: Mario Burgos MD  Date of Admission: 11/1/2023    Assessment/Plan:    Chronic nonhealing wounds: Improving   Initial concern for infection with purulent discharge noted on RLE wound in ED. Following discussion with infectious disease we will continue to monitor patient off of antibiotics  Arterial US shows no evidence of PAD  Suspected due to long term steroid use   Physical debility:   Patient has baseline deficits 2/2 CVA as noted below but has recently become bedbound and is unable to care for herself  PT/OT following and recommending SNF  Pending precert to SNF   Renal lesion: measuring 0.7 x 0 0.6 cm. Not well visualized on repeat US  -Follow-up CT on discharge   Protein Calorie malnutrition:   low prealbumin. Dietician following. Protein supplement added  Hypernatremia, resolved:   suspect 2/2 dehydration. Encourage PO intake. 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 ID following  Lactic acidosis (resolved)  lactic 2.3 on arrival- do not see where subsequent lactic acid ordered - will obtain. Suspect Type B  Leukocytosis- stable. : Suspect secondary to chronic steroid use and possible wound infection. Monitor   Hx of CVA: Multiple ischemic events in 1988, 2015 and this past year. Known deficits with left-sided weakness. Patient is chair bound at baseline. CT head showed no acute findings. Previous infarct noted in the left lateral ventricle. Paroxysmal atrial fibrillation: [id number] 6 HB 3 on [address] with Xarelto rate controlled Coreg  Meningioma: Stable from previous imaging noted over the parafalcine left frontal lobe. Hx pituitary tumor: s/p surgery performed in [place].  On hydrocortisone and 11/25/2023 at 11:49 AM

## 2023-11-25 NOTE — PLAN OF CARE
Problem: Discharge Planning  Goal: Discharge to home or other facility with appropriate resources     Problem: Safety - Adult  Goal: Free from fall injury     Problem: Musculoskeletal - Adult  Goal: Return mobility to safest level of function  Goal: Return ADL status to a safe level of function     Problem: Metabolic/Fluid and Electrolytes - Adult  Goal: Electrolytes maintained within normal limits     Problem: Pain  Goal: Verbalizes/displays adequate comfort level or baseline comfort level     Problem: Nutrition Deficit:  Goal: Optimize nutritional status

## 2023-11-26 LAB
ANION GAP SERPL CALC-SCNC: 11 MEQ/L (ref 8–16)
BUN SERPL-MCNC: 29 MG/DL (ref 7–22)
CALCIUM SERPL-MCNC: 9.7 MG/DL (ref 8.5–10.5)
CHLORIDE SERPL-SCNC: 106 MEQ/L (ref 98–111)
CO2 SERPL-SCNC: 21 MEQ/L (ref 23–33)
CREAT SERPL-MCNC: 1.1 MG/DL (ref 0.4–1.2)
GFR SERPL CREATININE-BSD FRML MDRD: 53 ML/MIN/1.73M2
GLUCOSE SERPL-MCNC: 82 MG/DL (ref 70–108)
MAGNESIUM SERPL-MCNC: 2.2 MG/DL (ref 1.6–2.4)
POTASSIUM SERPL-SCNC: 4.8 MEQ/L (ref 3.5–5.2)
SODIUM SERPL-SCNC: 138 MEQ/L (ref 135–145)

## 2023-11-26 PROCEDURE — 1200000000 HC SEMI PRIVATE

## 2023-11-26 PROCEDURE — 6370000000 HC RX 637 (ALT 250 FOR IP): Performed by: STUDENT IN AN ORGANIZED HEALTH CARE EDUCATION/TRAINING PROGRAM

## 2023-11-26 PROCEDURE — 99232 SBSQ HOSP IP/OBS MODERATE 35: CPT | Performed by: INTERNAL MEDICINE

## 2023-11-26 PROCEDURE — 80048 BASIC METABOLIC PNL TOTAL CA: CPT

## 2023-11-26 PROCEDURE — 36415 COLL VENOUS BLD VENIPUNCTURE: CPT

## 2023-11-26 PROCEDURE — 83735 ASSAY OF MAGNESIUM: CPT

## 2023-11-26 PROCEDURE — 6370000000 HC RX 637 (ALT 250 FOR IP): Performed by: PHYSICIAN ASSISTANT

## 2023-11-26 RX ADMIN — MICONAZOLE NITRATE: 2 POWDER TOPICAL at 19:56

## 2023-11-26 RX ADMIN — RIVAROXABAN 15 MG: 15 TABLET, FILM COATED ORAL at 19:55

## 2023-11-26 RX ADMIN — BUMETANIDE 1 MG: 1 TABLET ORAL at 08:00

## 2023-11-26 RX ADMIN — LEVOTHYROXINE SODIUM 125 MCG: 0.12 TABLET ORAL at 08:01

## 2023-11-26 RX ADMIN — HYDROCORTISONE 20 MG: 10 TABLET ORAL at 08:01

## 2023-11-26 RX ADMIN — MICONAZOLE NITRATE: 2 POWDER TOPICAL at 08:00

## 2023-11-26 RX ADMIN — POTASSIUM CHLORIDE 40 MEQ: 1500 TABLET, EXTENDED RELEASE ORAL at 08:00

## 2023-11-26 RX ADMIN — HYDROCORTISONE 10 MG: 10 TABLET ORAL at 19:56

## 2023-11-26 RX ADMIN — ACETAMINOPHEN 650 MG: 325 TABLET ORAL at 19:55

## 2023-11-26 RX ADMIN — Medication 20 MG: at 19:56

## 2023-11-26 RX ADMIN — PANTOPRAZOLE SODIUM 40 MG: 40 TABLET, DELAYED RELEASE ORAL at 19:56

## 2023-11-26 ASSESSMENT — PAIN DESCRIPTION - LOCATION: LOCATION: TOE (COMMENT WHICH ONE)

## 2023-11-26 ASSESSMENT — PAIN DESCRIPTION - ORIENTATION: ORIENTATION: LEFT

## 2023-11-26 ASSESSMENT — PAIN SCALES - GENERAL
PAINLEVEL_OUTOF10: 6
PAINLEVEL_OUTOF10: 0

## 2023-11-26 ASSESSMENT — PAIN - FUNCTIONAL ASSESSMENT: PAIN_FUNCTIONAL_ASSESSMENT: ACTIVITIES ARE NOT PREVENTED

## 2023-11-26 ASSESSMENT — PAIN DESCRIPTION - DESCRIPTORS: DESCRIPTORS: ACHING

## 2023-11-26 NOTE — PLAN OF CARE
Problem: Discharge Planning  Goal: Discharge to home or other facility with appropriate resources  Outcome: Progressing  Flowsheets (Taken 11/25/2023 2324)  Discharge to home or other facility with appropriate resources:   Identify barriers to discharge with patient and caregiver   Arrange for needed discharge resources and transportation as appropriate   Identify discharge learning needs (meds, wound care, etc)     Problem: Skin/Tissue Integrity  Goal: Absence of new skin breakdown  Description: 1. Monitor for areas of redness and/or skin breakdown  2. Assess vascular access sites hourly  3. Every 4-6 hours minimum:  Change oxygen saturation probe site  4. Every 4-6 hours:  If on nasal continuous positive airway pressure, respiratory therapy assess nares and determine need for appliance change or resting period.   Outcome: Progressing     Problem: Safety - Adult  Goal: Free from fall injury  Outcome: Progressing  Flowsheets (Taken 11/25/2023 2324)  Free From Fall Injury: Instruct family/caregiver on patient safety     Problem: Safety - Adult  Goal: Isolation precautions  Description: Isolation precautions  Outcome: Progressing  Note: On contact precaution     Problem: ABCDS Injury Assessment  Goal: Absence of physical injury  Outcome: Progressing  Flowsheets (Taken 11/25/2023 2324)  Absence of Physical Injury: Implement safety measures based on patient assessment     Problem: Chronic Conditions and Co-morbidities  Goal: Patient's chronic conditions and co-morbidity symptoms are monitored and maintained or improved  Outcome: Progressing  Flowsheets (Taken 11/25/2023 2324)  Care Plan - Patient's Chronic Conditions and Co-Morbidity Symptoms are Monitored and Maintained or Improved:   Monitor and assess patient's chronic conditions and comorbid symptoms for stability, deterioration, or improvement   Collaborate with multidisciplinary team to address chronic and comorbid conditions and prevent exacerbation or maintaining nutritional needs: Assess nutritional status and recommend course of action     Care plan reviewed with patient. Patient verbalized understanding of the plan of care and contribute to goal setting.

## 2023-11-26 NOTE — PROGRESS NOTES
Hospitalist Progress Note      Patient:  Shaunna Mendieta    Unit/Bed:7K-13/013-A  YOB: 1950  MRN: [id number]   Acct: [de-identified]   PCP: Fadi Vance MD  Date of Admission: 11/1/2023    Assessment/Plan:    Chronic nonhealing wounds: Improving   Initial concern for infection with purulent discharge noted on RLE wound in ED. Following discussion with infectious disease we will continue to monitor patient off of antibiotics  Arterial US shows no evidence of PAD  Suspected due to long term steroid use   Physical debility:   Patient has baseline deficits 2/2 CVA as noted below but has recently become bedbound and is unable to care for herself  PT/OT following and recommending SNF  Pending precert to SNF   Renal lesion: measuring 0.7 x 0 0.6 cm. Not well visualized on repeat US  -Follow-up CT on discharge   Protein Calorie malnutrition:   low prealbumin. Dietician following. Protein supplement added  Hypernatremia, resolved:   suspect 2/2 dehydration. Encourage PO intake. 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 ID following  Lactic acidosis (resolved)  lactic 2.3 on arrival- do not see where subsequent lactic acid ordered - will obtain. Suspect Type B  Leukocytosis- stable. : Suspect secondary to chronic steroid use and possible wound infection. Monitor   Hx of CVA: Multiple ischemic events in 1988, 2015 and this past year. Known deficits with left-sided weakness. Patient is chair bound at baseline. CT head showed no acute findings. Previous infarct noted in the left lateral ventricle. Paroxysmal atrial fibrillation: [id number] 6 HB 3 on [address] with Xarelto rate controlled Coreg  Meningioma: Stable from previous imaging noted over the parafalcine left frontal lobe. Hx pituitary tumor: s/p surgery performed in [place].  On hydrocortisone and at 12:26 PM

## 2023-11-27 PROCEDURE — 97110 THERAPEUTIC EXERCISES: CPT

## 2023-11-27 PROCEDURE — 99231 SBSQ HOSP IP/OBS SF/LOW 25: CPT | Performed by: STUDENT IN AN ORGANIZED HEALTH CARE EDUCATION/TRAINING PROGRAM

## 2023-11-27 PROCEDURE — 6370000000 HC RX 637 (ALT 250 FOR IP): Performed by: PHYSICIAN ASSISTANT

## 2023-11-27 PROCEDURE — 97535 SELF CARE MNGMENT TRAINING: CPT

## 2023-11-27 PROCEDURE — 1200000000 HC SEMI PRIVATE

## 2023-11-27 PROCEDURE — 6370000000 HC RX 637 (ALT 250 FOR IP): Performed by: STUDENT IN AN ORGANIZED HEALTH CARE EDUCATION/TRAINING PROGRAM

## 2023-11-27 RX ADMIN — HYDROCORTISONE 10 MG: 10 TABLET ORAL at 20:40

## 2023-11-27 RX ADMIN — BUMETANIDE 1 MG: 1 TABLET ORAL at 08:38

## 2023-11-27 RX ADMIN — Medication 20 MG: at 20:40

## 2023-11-27 RX ADMIN — LEVOTHYROXINE SODIUM 125 MCG: 0.12 TABLET ORAL at 08:39

## 2023-11-27 RX ADMIN — MICONAZOLE NITRATE: 2 POWDER TOPICAL at 08:38

## 2023-11-27 RX ADMIN — HYDROCORTISONE 20 MG: 10 TABLET ORAL at 08:38

## 2023-11-27 RX ADMIN — RIVAROXABAN 15 MG: 15 TABLET, FILM COATED ORAL at 20:42

## 2023-11-27 RX ADMIN — POTASSIUM CHLORIDE 40 MEQ: 1500 TABLET, EXTENDED RELEASE ORAL at 08:38

## 2023-11-27 RX ADMIN — MICONAZOLE NITRATE: 2 POWDER TOPICAL at 20:42

## 2023-11-27 RX ADMIN — PANTOPRAZOLE SODIUM 40 MG: 40 TABLET, DELAYED RELEASE ORAL at 20:40

## 2023-11-27 ASSESSMENT — PAIN SCALES - GENERAL: PAINLEVEL_OUTOF10: 0

## 2023-11-27 NOTE — PLAN OF CARE
Problem: Discharge Planning  Goal: Discharge to home or other facility with appropriate resources  Outcome: Progressing  Flowsheets (Taken 11/27/2023 1305)  Discharge to home or other facility with appropriate resources:   Identify barriers to discharge with patient and caregiver   Arrange for needed discharge resources and transportation as appropriate   Identify discharge learning needs (meds, wound care, etc)     Problem: Skin/Tissue Integrity  Goal: Absence of new skin breakdown  Description: 1. Monitor for areas of redness and/or skin breakdown  2. Assess vascular access sites hourly  3. Every 4-6 hours minimum:  Change oxygen saturation probe site  4. Every 4-6 hours:  If on nasal continuous positive airway pressure, respiratory therapy assess nares and determine need for appliance change or resting period. Outcome: Progressing  Note: No new skin breakdown noted at this time this shift. Problem: Safety - Adult  Goal: Free from fall injury  Outcome: Progressing  Flowsheets (Taken 11/27/2023 1305)  Free From Fall Injury: Instruct family/caregiver on patient safety     Problem: Safety - Adult  Goal: Isolation precautions  Description: Isolation precautions  Outcome: Progressing  Note: Isolation continues.      Problem: ABCDS Injury Assessment  Goal: Absence of physical injury  Outcome: Progressing  Flowsheets (Taken 11/27/2023 1305)  Absence of Physical Injury: Implement safety measures based on patient assessment     Problem: Chronic Conditions and Co-morbidities  Goal: Patient's chronic conditions and co-morbidity symptoms are monitored and maintained or improved  Outcome: Progressing  Flowsheets (Taken 11/27/2023 1305)  Care Plan - Patient's Chronic Conditions and Co-Morbidity Symptoms are Monitored and Maintained or Improved:   Monitor and assess patient's chronic conditions and comorbid symptoms for stability, deterioration, or improvement   Collaborate with multidisciplinary team to address Progressing  Flowsheets (Taken 11/27/2023 1305)  Nutrient intake appropriate for improving, restoring, or maintaining nutritional needs:   Assess nutritional status and recommend course of action   Monitor oral intake, labs, and treatment plans     Care plan reviewed with patient. Patient verbalized understanding of the plan of care and contribute to goal setting.

## 2023-11-27 NOTE — PLAN OF CARE
Problem: Discharge Planning  Goal: Discharge to home or other facility with appropriate resources  Outcome: Progressing  Flowsheets (Taken 11/26/2023 2117)  Discharge to home or other facility with appropriate resources:   Identify barriers to discharge with patient and caregiver   Arrange for needed discharge resources and transportation as appropriate   Identify discharge learning needs (meds, wound care, etc)     Problem: Skin/Tissue Integrity  Goal: Absence of new skin breakdown  Description: 1. Monitor for areas of redness and/or skin breakdown  2. Assess vascular access sites hourly  3. Every 4-6 hours minimum:  Change oxygen saturation probe site  4. Every 4-6 hours:  If on nasal continuous positive airway pressure, respiratory therapy assess nares and determine need for appliance change or resting period.   Outcome: Progressing     Problem: Safety - Adult  Goal: Free from fall injury  Outcome: Progressing  Flowsheets (Taken 11/26/2023 2117)  Free From Fall Injury: Instruct family/caregiver on patient safety     Problem: Safety - Adult  Goal: Isolation precautions  Description: Isolation precautions  Outcome: Progressing  Note: On contact precaution     Problem: ABCDS Injury Assessment  Goal: Absence of physical injury  Outcome: Progressing  Flowsheets (Taken 11/26/2023 2117)  Absence of Physical Injury: Implement safety measures based on patient assessment     Problem: Chronic Conditions and Co-morbidities  Goal: Patient's chronic conditions and co-morbidity symptoms are monitored and maintained or improved  Outcome: Progressing  Flowsheets (Taken 11/26/2023 2117)  Care Plan - Patient's Chronic Conditions and Co-Morbidity Symptoms are Monitored and Maintained or Improved:   Monitor and assess patient's chronic conditions and comorbid symptoms for stability, deterioration, or improvement   Collaborate with multidisciplinary team to address chronic and comorbid conditions and prevent exacerbation or deterioration     Problem: Skin/Tissue Integrity - Adult  Goal: Skin integrity remains intact  Outcome: Progressing  Flowsheets (Taken 11/26/2023 2117)  Skin Integrity Remains Intact: Monitor for areas of redness and/or skin breakdown     Problem: Musculoskeletal - Adult  Goal: Return mobility to safest level of function  Outcome: Progressing  Flowsheets (Taken 11/26/2023 2117)  Return Mobility to Safest Level of Function:   Assess patient stability and activity tolerance for standing, transferring and ambulating with or without assistive devices   Assist with transfers and ambulation using safe patient handling equipment as needed   Ensure adequate protection for wounds/incisions during mobilization     Problem: Musculoskeletal - Adult  Goal: Return ADL status to a safe level of function  Outcome: Progressing  Flowsheets (Taken 11/26/2023 2117)  Return ADL Status to a Safe Level of Function: Administer medication as ordered     Problem: Metabolic/Fluid and Electrolytes - Adult  Goal: Electrolytes maintained within normal limits  Outcome: Progressing  Flowsheets (Taken 11/26/2023 2117)  Electrolytes maintained within normal limits: Monitor labs and assess patient for signs and symptoms of electrolyte imbalances     Problem: Pain  Goal: Verbalizes/displays adequate comfort level or baseline comfort level  Outcome: Progressing  Flowsheets (Taken 11/26/2023 2117)  Verbalizes/displays adequate comfort level or baseline comfort level:   Encourage patient to monitor pain and request assistance   Assess pain using appropriate pain scale   Administer analgesics based on type and severity of pain and evaluate response   Implement non-pharmacological measures as appropriate and evaluate response     Problem: Nutrition Deficit:  Goal: Optimize nutritional status  Outcome: Progressing  Flowsheets (Taken 11/26/2023 2117)  Nutrient intake appropriate for improving, restoring, or maintaining nutritional needs: Assess nutritional

## 2023-11-27 NOTE — CARE COORDINATION
11/27/23, 9:01 AM EST    DISCHARGE PLANNING EVALUATION    Call made to [facility], message left again, there has not been a response from this facility. Call made to patient's , Leora Bello, updated on need to locate alternative placement. He will discuss with his family, and is in agreement with searching for other placement  Spoke with son Tayler Millard to update, he will review list and determine next preferences for ecf referrals. Spoke with daughter in law, Narendra Galarza, who requested referral to [address] [place] [address]. Referral made and facility will review. Family also requests referral to [facility] [place] [facility]", referral initiated.

## 2023-11-27 NOTE — PROGRESS NOTES
Hospitalist Progress Note      Patient:  Kirsten Jon    Unit/Bed:7K-13/013-A  YOB: 1950  MRN: [id number]   Acct: [de-identified]     PCP: Chaya Calvillo MD  Date of Admission: 11/1/2023      Assessment/Plan:    Chronic nonhealing wounds: Improving  -Initial concern for infection with purulent discharge noted on RLE wound in ED.    -Following discussion with infectious disease we will continue to monitor patient off of antibiotics  -Arterial US shows no evidence of PAD  -Suspected due to long term steroid use   Physical debility:   -Patient has baseline deficits 2/2 CVA as noted below but has recently become bedbound and is unable to care for herself  -PT/OT following and recommending SNF  -Pending precert to SNF   Renal lesion: measuring 0.7 x 0 0.6 cm. Not well visualized on repeat US  -Follow-up CT on discharge   Protein Calorie malnutrition:   -low prealbumin. Dietician following. Protein supplement added  Hypernatremia, resolved:   -suspect 2/2 dehydration. Encourage PO intake. 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 ID following  Lactic acidosis (resolved)  lactic 2.3 on arrival- do not see where subsequent lactic acid ordered - will obtain. Suspect Type B  Leukocytosis- stable. : Suspect secondary to chronic steroid use and possible wound infection. Monitor   Hx of CVA: Multiple ischemic events in 1988, 2015 and this past year. Known deficits with left-sided weakness. Patient is chair bound at baseline. CT head showed no acute findings. Previous infarct noted in the left lateral ventricle. Paroxysmal atrial fibrillation: [id number] 6 HB 3 on [address] with Xarelto rate controlled Coreg  Meningioma: Stable from previous imaging noted over the parafalcine left frontal lobe. Hx pituitary tumor: s/p surgery performed in [place].  On hydrocortisone and Synthroid  Acquired hypothyroidism: 2/2 pituitary 11/01/2023 06:20 PM      All CTs at this facility use dose modulation techniques and iterative    reconstructions, and/or weight-based dosing   when appropriate to reduce radiation to a low as reasonably achievable. 3D Post-processing was performed on this study. XR CHEST PORTABLE   Final Result   1. Minimal linear peripheral opacities at the left lung base are present which may represent minimal atelectasis or scarring. 2. There is a right subclavian central line present with the tip overlying the cavoatrial junction. No pneumothorax is seen. **This report has been created using voice recognition software. It may contain minor errors which are inherent in voice recognition technology. **      Final report electronically signed by Dr. Samson Webber on 11/1/2023 4:56 PM      XR CHEST PORTABLE   Final Result      No acute intrathoracic process. **This report has been created using voice recognition software. It may contain minor errors which are inherent in voice recognition technology. **      Final report electronically signed by Dr. Jeremy Spangler on 11/1/2023 1:05 PM          DVT prophylaxis: [] Lovenox                                 [] SCDs                                 [] SQ Heparin                                 [] Encourage ambulation           [x] Already on Anticoagulation     Code Status: Full Code    PT/OT Eval Status: ordered    Tele:   [] yes             [x] no        Electronically signed by Matthew Leavitt DO on 11/27/2023 at 11:48 AM

## 2023-11-27 NOTE — PROGRESS NOTES
[address] [facility]  Occupational Therapy  Daily Note  Time:   Time In: 9477  Time Out: 1437  Timed Code Treatment Minutes: 23 Minutes  Minutes: 23          Date: 2023  Patient Name: Dandy George,   Gender: female      Room: [facility]13/013-A  MRN: [id number]  : 1950  (68 y.o.)  Referring Practitioner: Matthew Leavitt DO  Diagnosis: failure to thrieve  Additional Pertinent Hx: Per ER note on 2023: 68 y.o. female who presents to the emergency department for evaluation of sacral ulcers and concerns for worsening stroke-like symptoms. Patient's daughter-in-law and family friend at bedside. 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 Presenting to ED today due to concerns for patient's health at home. Patient's  is her primary caretaker, but he was recently admitted to [facility] on 10/27 after being found down at home by family. Patient now in ED    Restrictions/Precautions:  Restrictions/Precautions: Fall Risk, General Precautions  Position Activity Restriction  Other position/activity restrictions: L hemiparesis from previous, thin skin with multiple wounds/scabs     SUBJECTIVE: RN okayed OT session. Pt. In room and agreeable to participate. PAIN: Denies    Vitals: Vitals not assessed per clinical judgement, see nursing flowsheet    COGNITION: Slow Processing and Impaired Attention    ADL:   Grooming: Stand By Assistance and with set-up. Pt. Sat EOB to complete oral care  . BALANCE:  Sitting Balance:  Stand By Assistance, [employer], Minimal Assistance. varies    BED MOBILITY:  Supine to Sit: Moderate Assistance    Sit to Supine: Maximum Assistance    Scooting:  Moderate Assistance to scoot hips forward    ADDITIONAL ACTIVITIES:  Patient completed RUE strengthening exercises with skilled

## 2023-11-28 PROCEDURE — 51798 US URINE CAPACITY MEASURE: CPT

## 2023-11-28 PROCEDURE — 6370000000 HC RX 637 (ALT 250 FOR IP): Performed by: STUDENT IN AN ORGANIZED HEALTH CARE EDUCATION/TRAINING PROGRAM

## 2023-11-28 PROCEDURE — 1200000000 HC SEMI PRIVATE

## 2023-11-28 PROCEDURE — 99231 SBSQ HOSP IP/OBS SF/LOW 25: CPT | Performed by: STUDENT IN AN ORGANIZED HEALTH CARE EDUCATION/TRAINING PROGRAM

## 2023-11-28 PROCEDURE — 6370000000 HC RX 637 (ALT 250 FOR IP): Performed by: PHYSICIAN ASSISTANT

## 2023-11-28 RX ORDER — BISACODYL 5 MG/1
5 TABLET, DELAYED RELEASE ORAL
Status: DISCONTINUED | OUTPATIENT
Start: 2023-11-28 | End: 2023-12-09 | Stop reason: HOSPADM

## 2023-11-28 RX ORDER — CALCIUM POLYCARBOPHIL 625 MG 625 MG/1
625 TABLET ORAL DAILY
Status: DISCONTINUED | OUTPATIENT
Start: 2023-11-28 | End: 2023-12-09 | Stop reason: HOSPADM

## 2023-11-28 RX ORDER — BISACODYL 5 MG/1
5 TABLET, DELAYED RELEASE ORAL DAILY PRN
Status: DISCONTINUED | OUTPATIENT
Start: 2023-11-28 | End: 2023-11-28

## 2023-11-28 RX ADMIN — ACETAMINOPHEN 650 MG: 325 TABLET ORAL at 13:44

## 2023-11-28 RX ADMIN — RIVAROXABAN 15 MG: 15 TABLET, FILM COATED ORAL at 17:32

## 2023-11-28 RX ADMIN — BUMETANIDE 1 MG: 1 TABLET ORAL at 08:08

## 2023-11-28 RX ADMIN — PANTOPRAZOLE SODIUM 40 MG: 40 TABLET, DELAYED RELEASE ORAL at 21:17

## 2023-11-28 RX ADMIN — CALCIUM POLYCARBOPHIL 625 MG: 625 TABLET, FILM COATED ORAL at 10:00

## 2023-11-28 RX ADMIN — MICONAZOLE NITRATE: 2 POWDER TOPICAL at 08:08

## 2023-11-28 RX ADMIN — Medication 20 MG: at 21:17

## 2023-11-28 RX ADMIN — HYDROCORTISONE 20 MG: 10 TABLET ORAL at 08:09

## 2023-11-28 RX ADMIN — POTASSIUM CHLORIDE 40 MEQ: 1500 TABLET, EXTENDED RELEASE ORAL at 08:08

## 2023-11-28 RX ADMIN — ACETAMINOPHEN 650 MG: 325 TABLET ORAL at 21:18

## 2023-11-28 RX ADMIN — LEVOTHYROXINE SODIUM 125 MCG: 0.12 TABLET ORAL at 08:09

## 2023-11-28 RX ADMIN — MICONAZOLE NITRATE: 2 POWDER TOPICAL at 21:18

## 2023-11-28 RX ADMIN — HYDROCORTISONE 10 MG: 10 TABLET ORAL at 21:17

## 2023-11-28 RX ADMIN — BISACODYL 5 MG: 5 TABLET, COATED ORAL at 10:00

## 2023-11-28 ASSESSMENT — PAIN DESCRIPTION - DESCRIPTORS
DESCRIPTORS: ACHING
DESCRIPTORS: ACHING

## 2023-11-28 ASSESSMENT — PAIN SCALES - GENERAL
PAINLEVEL_OUTOF10: 7
PAINLEVEL_OUTOF10: 8
PAINLEVEL_OUTOF10: 0
PAINLEVEL_OUTOF10: 0

## 2023-11-28 ASSESSMENT — PAIN DESCRIPTION - ORIENTATION
ORIENTATION: RIGHT;LEFT
ORIENTATION: LEFT

## 2023-11-28 ASSESSMENT — PAIN DESCRIPTION - LOCATION
LOCATION: TOE (COMMENT WHICH ONE)
LOCATION: TOE (COMMENT WHICH ONE)

## 2023-11-28 NOTE — PLAN OF CARE
Problem: Discharge Planning  Goal: Discharge to home or other facility with appropriate resources  11/27/2023 2339 by Mirna Vegas RN  Outcome: Felisha Wolff (Taken 11/27/2023 1305 by Antwon Live LPN)  Discharge to home or other facility with appropriate resources:   Identify barriers to discharge with patient and caregiver   Arrange for needed discharge resources and transportation as appropriate   Identify discharge learning needs (meds, wound care, etc)     Problem: Skin/Tissue Integrity  Goal: Absence of new skin breakdown  Description: 1. Monitor for areas of redness and/or skin breakdown  2. Assess vascular access sites hourly  3. Every 4-6 hours minimum:  Change oxygen saturation probe site  4. Every 4-6 hours:  If on nasal continuous positive airway pressure, respiratory therapy assess nares and determine need for appliance change or resting period. 11/27/2023 2339 by Mirna Vegas RN  Outcome: Progressing  Note: No evidence of new skin breakdown. Pt. Turned and repositioned every two hours. Problem: Safety - Adult  Goal: Free from fall injury  11/27/2023 2339 by Mirna Vegas RN  Outcome: Progressing  Flowsheets (Taken 11/27/2023 2339)  Free From Fall Injury: Instruct family/caregiver on patient safety     Problem: Safety - Adult  Goal: Isolation precautions  Description: Isolation precautions  11/27/2023 2339 by Mirna Vegas RN  Note: Pt. In contact isolation for MRSA.      Problem: ABCDS Injury Assessment  Goal: Absence of physical injury  11/27/2023 2339 by Mirna Vegas RN  Outcome: Progressing  Flowsheets (Taken 11/27/2023 1305 by Antwon Live LPN)  Absence of Physical Injury: Implement safety measures based on patient assessment     Problem: Chronic Conditions and Co-morbidities  Goal: Patient's chronic conditions and co-morbidity symptoms are monitored and maintained or improved  11/27/2023 2339 by Mirna Vegas RN  Outcome:

## 2023-11-28 NOTE — CARE COORDINATION
11/28/23, 11:39 AM EST    DISCHARGE ON GOING EVALUATION    [address] day: 18  Location: -13/013-A Reason for admit: Failure to thrive in adult [R62.7]  Physical debility [R53.81]  Urinary tract infection without hematuria, site unspecified [N39.0]  Ulcers of both lower extremities, unspecified ulcer stage ([address]) [L97.919, L97.929]  Pressure injury of back, stage 2 ([address]) [L89.102]   Procedure:   11/1 Repeat CXR: Minimal linear peripheral opacities at the left lung base are present which may represent minimal atelectasis or scarring. 2. There is a right subclavian central line present with the tip overlying the cavoatrial junction. No pneumothorax is seen  11/1 CT Head WO: No acute intracranial findings. 2. Stable probable meningioma over the parafalcine left frontal lobe. 3. Opacification of the left mastoid air cells can be correlated for   mastoiditis  11/1 CTA Abdominal Aorta: No significant contrast opacification in the distal right anterior tibial artery, right dorsalis pedis artery, and left plantaris artery beginning in the proximal left midfoot. This may relate to underlying diminished flow or occlusion. 2. Possible complex lesion in the upper midpole of the kidney measuring 0.7 x 0.6 cm. Nonemergent ultrasound can be helpful to distinguish mass from complex cyst. 3. Colonic diverticula. 4. Small hiatal hernia. 5. Asymmetric mild subcutaneous edema in the mid-distal left calf and left   ankle. 11/2 US Renal: No sonographic findings for hydronephrosis. Questionable right renal   lesion not well seen on ultrasound due to patient immobility. This can be followed for stability on future CT or MRI. 2. Filling defect partially distended urinary bladder may relate to Huntley catheter balloon. Blood clot not excluded  11/2 BLE Arterial DUP:  Slightly limited evaluation as the peroneal arteries could not be definitively visualized in either leg. Normal bilateral ankle-brachial indices.     11/3: scalp wounds removed by ID from pt head; shaved her hair to clean wound. 11/13 Bedside nail avulsion performed of bilateral hallux by podiatry  Barriers to Discharge: Difficult placement, no payor source for ECF. PCP: Urszula Molina MD  Readmission Risk Score: 16.3%  Patient Goals/Plan/Treatment Preferences: Referrals to [facility] and [facility]. Await review and call back. Will be private pay if accepted.

## 2023-11-28 NOTE — CARE COORDINATION
11/28/23, 1:45 PM EST    DISCHARGE PLANNING EVALUATION    Spoke with son, Davy Maradiaga. He plans to [facility] living [place] (similar to assisted living with additional services) tomorrow, is also requesting referral to [facility] to determine if hospice is appropriate for patient. If so, family is considering [facility] enhanced living private pay with hospice. Referral made to [facility] as requested .

## 2023-11-28 NOTE — PLAN OF CARE
Problem: Discharge Planning  Goal: Discharge to home or other facility with appropriate resources  11/28/2023 1546 by Sreekanth Grace LPN  Outcome: Progressing  Flowsheets (Taken 11/28/2023 1546)  Discharge to home or other facility with appropriate resources:   Identify barriers to discharge with patient and caregiver   Arrange for needed discharge resources and transportation as appropriate   Identify discharge learning needs (meds, wound care, etc)     Problem: Skin/Tissue Integrity  Goal: Absence of new skin breakdown  Description: 1. Monitor for areas of redness and/or skin breakdown  2. Assess vascular access sites hourly  3. Every 4-6 hours minimum:  Change oxygen saturation probe site  4. Every 4-6 hours:  If on nasal continuous positive airway pressure, respiratory therapy assess nares and determine need for appliance change or resting period.   11/28/2023 1546 by Sreekanth Grace LPN  Outcome: Progressing     Problem: Safety - Adult  Goal: Free from fall injury  11/28/2023 1546 by Sreekanth Grace LPN  Outcome: Progressing  Flowsheets (Taken 11/28/2023 1546)  Free From Fall Injury: Instruct family/caregiver on patient safety     Problem: Safety - Adult  Goal: Isolation precautions  Description: Isolation precautions  11/28/2023 1546 by Sreekanth Grace LPN  Outcome: Progressing     Problem: ABCDS Injury Assessment  Goal: Absence of physical injury  11/28/2023 1546 by Sreekanth Grace LPN  Outcome: Progressing  Flowsheets (Taken 11/28/2023 1546)  Absence of Physical Injury: Implement safety measures based on patient assessment     Problem: Chronic Conditions and Co-morbidities  Goal: Patient's chronic conditions and co-morbidity symptoms are monitored and maintained or improved  11/28/2023 1546 by Sreekanth Grace LPN  Outcome: Progressing  Flowsheets (Taken 11/28/2023 1546)  Care Plan - Patient's Chronic Conditions and Co-Morbidity Symptoms are Monitored and Maintained or Improved:   Monitor and assess patient's chronic conditions and comorbid symptoms for stability, deterioration, or improvement   Collaborate with multidisciplinary team to address chronic and comorbid conditions and prevent exacerbation or deterioration     Problem: Skin/Tissue Integrity - Adult  Goal: Incisions, wounds, or drain sites healing without S/S of infection  Outcome: Progressing  Flowsheets (Taken 11/28/2023 1546)  Incisions, Wounds, or Drain Sites Healing Without Sign and Symptoms of Infection:   TWICE DAILY: Assess and document skin integrity   TWICE DAILY: Assess and document dressing/incision, wound bed, drain sites and surrounding tissue     Problem: Musculoskeletal - Adult  Goal: Return ADL status to a safe level of function  11/28/2023 1546 by Siena Palacios LPN  Outcome: Progressing  Flowsheets (Taken 11/28/2023 1546)  Return ADL Status to a Safe Level of Function:   Assess activities of daily living deficits and provide assistive devices as needed   Administer medication as ordered     Problem: Pain  Goal: Verbalizes/displays adequate comfort level or baseline comfort level  Outcome: Progressing  Flowsheets (Taken 11/28/2023 1546)  Verbalizes/displays adequate comfort level or baseline comfort level:   Encourage patient to monitor pain and request assistance   Administer analgesics based on type and severity of pain and evaluate response   Assess pain using appropriate pain scale   Implement non-pharmacological measures as appropriate and evaluate response     Problem: Nutrition Deficit:  Goal: Optimize nutritional status  Outcome: Progressing  Flowsheets (Taken 11/28/2023 1546)  Nutrient intake appropriate for improving, restoring, or maintaining nutritional needs:   Assess nutritional status and recommend course of action   Monitor oral intake, labs, and treatment plans   Care plan reviewed with patient. Patient verbalize understanding of the plan of care and contribute to goal setting.

## 2023-11-28 NOTE — PROCEDURES
Bladder scan completed at 0830 and results told to [facility]. Scan showed 196 mL in the patients bladder. Last void was unknown.   Roberta Swanson

## 2023-11-28 NOTE — CARE COORDINATION
11/28/23, 8:24 AM EST    DISCHARGE PLANNING EVALUATION    Call made to [facility] left requesting return call. Call made to [facility] admissions, confirmed fax number and resent referral information.

## 2023-11-28 NOTE — PROGRESS NOTES
Hospitalist Progress Note      Patient:  Real Lynch    Unit/Bed:7K-13/013-A  YOB: 1950  MRN: [id number]   Acct: [de-identified]     PCP: Marilyn Ramon MD  Date of Admission: 11/1/2023      Assessment/Plan:    Chronic nonhealing wounds: Improving  -Initial concern for infection with purulent discharge noted on RLE wound in ED.    -Following discussion with infectious disease we will continue to monitor patient off of antibiotics  -Arterial US shows no evidence of PAD  -Suspected due to long term steroid use   Physical debility:   -Patient has baseline deficits 2/2 CVA as noted below but has recently become bedbound and is unable to care for herself  -PT/OT following and recommending SNF  -Pending precert to SNF   Renal lesion: measuring 0.7 x 0 0.6 cm. Not well visualized on repeat US  -Follow-up CT on discharge   Protein Calorie malnutrition:   -low prealbumin. Dietician following. Protein supplement added  Hypernatremia, resolved:   -suspect 2/2 dehydration. Encourage PO intake. 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 ID following  Lactic acidosis (resolved)  lactic 2.3 on arrival- do not see where subsequent lactic acid ordered - will obtain. Suspect Type B  Leukocytosis- stable. : Suspect secondary to chronic steroid use and possible wound infection. Monitor   Hx of CVA: Multiple ischemic events in 1988, 2015 and this past year. Known deficits with left-sided weakness. Patient is chair bound at baseline. CT head showed no acute findings. Previous infarct noted in the left lateral ventricle. Paroxysmal atrial fibrillation: [id number] 6 HB 3 on [address] with Xarelto rate controlled Coreg  Meningioma: Stable from previous imaging noted over the parafalcine left frontal lobe. Hx pituitary tumor: s/p surgery performed in [place].  On hydrocortisone and Synthroid  Acquired hypothyroidism: 2/2 pituitary

## 2023-11-29 LAB
ANION GAP SERPL CALC-SCNC: 7 MEQ/L (ref 8–16)
BUN SERPL-MCNC: 25 MG/DL (ref 7–22)
CALCIUM SERPL-MCNC: 9.5 MG/DL (ref 8.5–10.5)
CHLORIDE SERPL-SCNC: 103 MEQ/L (ref 98–111)
CO2 SERPL-SCNC: 29 MEQ/L (ref 23–33)
CREAT SERPL-MCNC: 1.1 MG/DL (ref 0.4–1.2)
DEPRECATED RDW RBC AUTO: 49 FL (ref 35–45)
ERYTHROCYTE [DISTWIDTH] IN BLOOD BY AUTOMATED COUNT: 13.8 % (ref 11.5–14.5)
GFR SERPL CREATININE-BSD FRML MDRD: 53 ML/MIN/1.73M2
GLUCOSE SERPL-MCNC: 85 MG/DL (ref 70–108)
HCT VFR BLD AUTO: 41.5 % (ref 37–47)
HGB BLD-MCNC: 12.9 GM/DL (ref 12–16)
MCH RBC QN AUTO: 30.4 PG (ref 26–33)
MCHC RBC AUTO-ENTMCNC: 31.1 GM/DL (ref 32.2–35.5)
MCV RBC AUTO: 97.6 FL (ref 81–99)
PLATELET # BLD AUTO: 275 THOU/MM3 (ref 130–400)
PMV BLD AUTO: 10.8 FL (ref 9.4–12.4)
POTASSIUM SERPL-SCNC: 5.2 MEQ/L (ref 3.5–5.2)
RBC # BLD AUTO: 4.25 MILL/MM3 (ref 4.2–5.4)
SODIUM SERPL-SCNC: 139 MEQ/L (ref 135–145)
WBC # BLD AUTO: 9.9 THOU/MM3 (ref 4.8–10.8)

## 2023-11-29 PROCEDURE — 80048 BASIC METABOLIC PNL TOTAL CA: CPT

## 2023-11-29 PROCEDURE — 85027 COMPLETE CBC AUTOMATED: CPT

## 2023-11-29 PROCEDURE — 6370000000 HC RX 637 (ALT 250 FOR IP): Performed by: STUDENT IN AN ORGANIZED HEALTH CARE EDUCATION/TRAINING PROGRAM

## 2023-11-29 PROCEDURE — 99231 SBSQ HOSP IP/OBS SF/LOW 25: CPT | Performed by: PHYSICIAN ASSISTANT

## 2023-11-29 PROCEDURE — 6370000000 HC RX 637 (ALT 250 FOR IP): Performed by: PHYSICIAN ASSISTANT

## 2023-11-29 PROCEDURE — 36415 COLL VENOUS BLD VENIPUNCTURE: CPT

## 2023-11-29 PROCEDURE — 1200000000 HC SEMI PRIVATE

## 2023-11-29 RX ADMIN — BUMETANIDE 1 MG: 1 TABLET ORAL at 08:35

## 2023-11-29 RX ADMIN — CALCIUM POLYCARBOPHIL 625 MG: 625 TABLET, FILM COATED ORAL at 08:34

## 2023-11-29 RX ADMIN — HYDROCORTISONE 10 MG: 10 TABLET ORAL at 19:34

## 2023-11-29 RX ADMIN — HYDROCORTISONE 20 MG: 10 TABLET ORAL at 08:37

## 2023-11-29 RX ADMIN — PANTOPRAZOLE SODIUM 40 MG: 40 TABLET, DELAYED RELEASE ORAL at 19:34

## 2023-11-29 RX ADMIN — LEVOTHYROXINE SODIUM 125 MCG: 0.12 TABLET ORAL at 08:37

## 2023-11-29 RX ADMIN — Medication 20 MG: at 19:34

## 2023-11-29 RX ADMIN — POTASSIUM CHLORIDE 40 MEQ: 1500 TABLET, EXTENDED RELEASE ORAL at 08:34

## 2023-11-29 RX ADMIN — RIVAROXABAN 15 MG: 15 TABLET, FILM COATED ORAL at 17:03

## 2023-11-29 RX ADMIN — MICONAZOLE NITRATE: 2 POWDER TOPICAL at 08:37

## 2023-11-29 RX ADMIN — MICONAZOLE NITRATE: 2 POWDER TOPICAL at 19:38

## 2023-11-29 ASSESSMENT — PAIN SCALES - GENERAL
PAINLEVEL_OUTOF10: 0
PAINLEVEL_OUTOF10: 0

## 2023-11-29 NOTE — PROGRESS NOTES
Podiatric Progress Note  Brenda Mcgee  Subjective :   11/29/23  Patient was seen bedside today on behalf of Dr. Gianna Cain. Patient is 16 days s/p bilateral great toe nail avulsion. Patient relates that she is doing well she does not have any pain to her legs or feet at this time. Patient denies any nausea, vomiting, fever, chills, shortness of breath or chest pain. No pedal concerns at this time. 11/27/23  Patient was seen bedside today on behalf of Dr. Gianna Cain. Patient is 14 days s/p bilateral great toe nail avulsion. Patient relates that she is doing well she does not have any pain to her legs or feet at this time. She sleeps most the visit. Patient denies any nausea, vomiting, fever, chills, shortness of breath or chest pain. No pedal concerns at this time. 11/25/23  Patient was seen bedside today on behalf of Dr. Gianna Cain. Patient is 12 days s/p bilateral great toe nail avulsion. Patient relates that she is doing well she does not have any pain to her legs or feet at this time. She appears to be fairly alert and oriented at this visit. Patient denies any nausea, vomiting, fever, chills, shortness of breath or chest pain. No pedal concerns at this time. 11/23/23  Patient was seen bedside today on behalf of Dr. Gianna Cain. Patient is 10 days s/p bilateral great toenail avulsion. Patient relates no pain to the bilateral feet or legs. Patient appears more alert and oriented today. Patient denies any N/V/F/C/SOB or CP. No other pedal complaints. 11/20/23  Patient was seen bedside today on behalf of Dr. Gianna Cain. Patient is 7 days s/p bilateral great toenail avulsion. Patient relates no pain to the bilateral feet. Patient appears more alert and oriented today. Patient denies any N/V/F/C/SOB or CP. No other pedal complaints. 11/18/23  Patient was seen bedside today on behalf of Dr. Gianna Cain. Patient is 5 days s/p bilateral great toenail avulsion. Patient relates no pain to the bilateral feet.

## 2023-11-29 NOTE — PLAN OF CARE
Problem: Discharge Planning  Goal: Discharge to home or other facility with appropriate resources  11/28/2023 2241 by Josep Lucero RN  Outcome: Progressing  Flowsheets (Taken 11/28/2023 1546 by Jimmy Corey LPN)  Discharge to home or other facility with appropriate resources:   Identify barriers to discharge with patient and caregiver   Arrange for needed discharge resources and transportation as appropriate   Identify discharge learning needs (meds, wound care, etc)     Problem: Skin/Tissue Integrity  Goal: Absence of new skin breakdown  Description: 1. Monitor for areas of redness and/or skin breakdown  2. Assess vascular access sites hourly  3. Every 4-6 hours minimum:  Change oxygen saturation probe site  4. Every 4-6 hours:  If on nasal continuous positive airway pressure, respiratory therapy assess nares and determine need for appliance change or resting period. 11/28/2023 2241 by Josep Lucero RN  Outcome: Progressing  Note: No evidence of new skin breakdown. Pt. Turned and repositioned every two hour. Problem: Safety - Adult  Goal: Free from fall injury  11/28/2023 2241 by Josep Lucero RN  Outcome: Progressing  Flowsheets (Taken 11/28/2023 2241)  Free From Fall Injury: Instruct family/caregiver on patient safety     Problem: Safety - Adult  Goal: Isolation precautions  Description: Isolation precautions  11/28/2023 2241 by Josep Lucero RN  Outcome: Progressing  Note: Pt. In contact in MRSA.      Problem: ABCDS Injury Assessment  Goal: Absence of physical injury  11/28/2023 2241 by Josep Lucero RN  Outcome: Progressing  Flowsheets (Taken 11/28/2023 2241)  Absence of Physical Injury: Implement safety measures based on patient assessment     Problem: Chronic Conditions and Co-morbidities  Goal: Patient's chronic conditions and co-morbidity symptoms are monitored and maintained or improved  11/28/2023 2241 by Josep Lucero RN  Outcome: Progressing  Flowsheets (Taken

## 2023-11-29 NOTE — PLAN OF CARE
Problem: Discharge Planning  Goal: Discharge to home or other facility with appropriate resources  Outcome: Progressing  Flowsheets (Taken 11/29/2023 1406)  Discharge to home or other facility with appropriate resources:   Identify barriers to discharge with patient and caregiver   Identify discharge learning needs (meds, wound care, etc)   Refer to discharge planning if patient needs post-hospital services based on physician order or complex needs related to functional status, cognitive ability or social support system   Arrange for needed discharge resources and transportation as appropriate     Problem: Skin/Tissue Integrity  Goal: Absence of new skin breakdown  Description: 1. Monitor for areas of redness and/or skin breakdown  2. Assess vascular access sites hourly  3. Every 4-6 hours minimum:  Change oxygen saturation probe site  4. Every 4-6 hours:  If on nasal continuous positive airway pressure, respiratory therapy assess nares and determine need for appliance change or resting period.   Outcome: Progressing     Problem: Safety - Adult  Goal: Free from fall injury  Outcome: Progressing  Flowsheets (Taken 11/29/2023 1406)  Free From Fall Injury: Instruct family/caregiver on patient safety     Problem: Safety - Adult  Goal: Isolation precautions  Description: Isolation precautions  Outcome: Progressing     Problem: ABCDS Injury Assessment  Goal: Absence of physical injury  Outcome: Progressing  Flowsheets (Taken 11/29/2023 1406)  Absence of Physical Injury: Implement safety measures based on patient assessment     Problem: Chronic Conditions and Co-morbidities  Goal: Patient's chronic conditions and co-morbidity symptoms are monitored and maintained or improved  Outcome: Progressing  Flowsheets (Taken 11/29/2023 1406)  Care Plan - Patient's Chronic Conditions and Co-Morbidity Symptoms are Monitored and Maintained or Improved:   Monitor and assess patient's chronic conditions and comorbid symptoms for stability, deterioration, or improvement   Collaborate with multidisciplinary team to address chronic and comorbid conditions and prevent exacerbation or deterioration   Update acute care plan with appropriate goals if chronic or comorbid symptoms are exacerbated and prevent overall improvement and discharge     Problem: Skin/Tissue Integrity - Adult  Goal: Incisions, wounds, or drain sites healing without S/S of infection  Outcome: Progressing  Flowsheets (Taken 11/29/2023 1406)  Incisions, Wounds, or Drain Sites Healing Without Sign and Symptoms of Infection:   ADMISSION and DAILY: Assess and document risk factors for pressure ulcer development   TWICE DAILY: Assess and document skin integrity   TWICE DAILY: Assess and document dressing/incision, wound bed, drain sites and surrounding tissue   Initiate isolation precautions as appropriate     Problem: Musculoskeletal - Adult  Goal: Return ADL status to a safe level of function  Outcome: Progressing  Flowsheets (Taken 11/29/2023 1406)  Return ADL Status to a Safe Level of Function:   Administer medication as ordered   Assess activities of daily living deficits and provide assistive devices as needed   Assist and instruct patient to increase activity and self care as tolerated   Obtain physical therapy/occupational therapy consults as needed     Problem: Pain  Goal: Verbalizes/displays adequate comfort level or baseline comfort level  Outcome: Progressing  Flowsheets (Taken 11/29/2023 1406)  Verbalizes/displays adequate comfort level or baseline comfort level:   Encourage patient to monitor pain and request assistance   Administer analgesics based on type and severity of pain and evaluate response   Consider cultural and social influences on pain and pain management   Assess pain using appropriate pain scale   Implement non-pharmacological measures as appropriate and evaluate response   Notify Licensed Independent Practitioner if interventions unsuccessful or

## 2023-11-29 NOTE — CARE COORDINATION
11/29/23, 1:48 PM EST    DISCHARGE PLANNING EVALUATION    Son is meeting with [facility] today to determine if facility can meet level of care needs. Spoke with [facility], agency is reviewing, has concerns about level of  care at [facility]. Discharge planning is ongoing.

## 2023-11-29 NOTE — PROGRESS NOTES
Small hiatal hernia. 5. Asymmetric mild subcutaneous edema in the mid-distal left calf and left    ankle. This document has been electronically signed by: Billy Webber MD on    11/01/2023 06:20 PM      All CTs at this facility use dose modulation techniques and iterative    reconstructions, and/or weight-based dosing   when appropriate to reduce radiation to a low as reasonably achievable. 3D Post-processing was performed on this study. XR CHEST PORTABLE   Final Result   1. Minimal linear peripheral opacities at the left lung base are present which may represent minimal atelectasis or scarring. 2. There is a right subclavian central line present with the tip overlying the cavoatrial junction. No pneumothorax is seen. **This report has been created using voice recognition software. It may contain minor errors which are inherent in voice recognition technology. **      Final report electronically signed by Dr. Gurinder Khan on 11/1/2023 4:56 PM      XR CHEST PORTABLE   Final Result      No acute intrathoracic process. **This report has been created using voice recognition software. It may contain minor errors which are inherent in voice recognition technology. **      Final report electronically signed by Dr. Shena Ramos on 11/1/2023 1:05 PM          DVT prophylaxis: [] Lovenox                                 [] SCDs                                 [] SQ Heparin                                 [] Encourage ambulation           [x] Already on Anticoagulation     Code Status: Full Code    PT/OT Eval Status: ordered    Tele:   [] yes             [x] no        Electronically signed by Kimberly Bonilla PA-C on 11/29/2023 at 6:05 PM

## 2023-11-30 PROCEDURE — 6370000000 HC RX 637 (ALT 250 FOR IP): Performed by: PHYSICIAN ASSISTANT

## 2023-11-30 PROCEDURE — 99231 SBSQ HOSP IP/OBS SF/LOW 25: CPT | Performed by: PHYSICIAN ASSISTANT

## 2023-11-30 PROCEDURE — 6370000000 HC RX 637 (ALT 250 FOR IP): Performed by: STUDENT IN AN ORGANIZED HEALTH CARE EDUCATION/TRAINING PROGRAM

## 2023-11-30 PROCEDURE — 1200000000 HC SEMI PRIVATE

## 2023-11-30 RX ADMIN — LEVOTHYROXINE SODIUM 125 MCG: 0.12 TABLET ORAL at 09:29

## 2023-11-30 RX ADMIN — PANTOPRAZOLE SODIUM 40 MG: 40 TABLET, DELAYED RELEASE ORAL at 20:59

## 2023-11-30 RX ADMIN — CALCIUM POLYCARBOPHIL 625 MG: 625 TABLET, FILM COATED ORAL at 09:28

## 2023-11-30 RX ADMIN — Medication 20 MG: at 21:00

## 2023-11-30 RX ADMIN — HYDROCORTISONE 20 MG: 10 TABLET ORAL at 09:29

## 2023-11-30 RX ADMIN — MICONAZOLE NITRATE: 2 POWDER TOPICAL at 09:28

## 2023-11-30 RX ADMIN — BUMETANIDE 1 MG: 1 TABLET ORAL at 09:28

## 2023-11-30 RX ADMIN — MICONAZOLE NITRATE: 2 POWDER TOPICAL at 20:57

## 2023-11-30 RX ADMIN — RIVAROXABAN 15 MG: 15 TABLET, FILM COATED ORAL at 17:32

## 2023-11-30 RX ADMIN — POTASSIUM CHLORIDE 40 MEQ: 1500 TABLET, EXTENDED RELEASE ORAL at 09:28

## 2023-11-30 RX ADMIN — HYDROCORTISONE 10 MG: 10 TABLET ORAL at 20:57

## 2023-11-30 ASSESSMENT — PAIN SCALES - GENERAL
PAINLEVEL_OUTOF10: 0
PAINLEVEL_OUTOF10: 0

## 2023-11-30 NOTE — PROGRESS NOTES
Comprehensive Nutrition Assessment    Type and Reason for Visit:  Reassess    Nutrition Recommendations/Plan:   Recommend MVI and probiotic. Continue diet per provider  D/c Magic cups. Continue Ensure Enlive (BID)  Discharge planning ongoing. Malnutrition Assessment:  Malnutrition Status: At risk for malnutrition (Comment) (11/30/23 0302)    Context:  Chronic Illness     Findings of the 6 clinical characteristics of malnutrition:  Energy Intake:  Mild decrease in energy intake (Comment) (pt with increased PO intake the last week and ONS intake)  Weight Loss:   (unable to assess weight changes with fluid and no weight taken this admission of 29 days)     Body Fat Loss:  No significant body fat loss Orbital   Muscle Mass Loss:  Mild muscle mass loss Temples (temporalis)  Fluid Accumulation:  Mild Extremities   Strength:  Not Performed    Nutrition Assessment:     Pt. Is improving nutritionally AEB slight increase in PO intake. At risk for further nutrition compromise r/t chronic nonhealing wounds - improving, physical debility 2/2 CVA - recently became bed-bound, renal lesion, LOS 29 days, and underlying medical condition (PMHx: CA, CHF, CVA, DM, GERD, meningioma of the brain, HLD, HTN, hypothyroidism, obesity, PAF). Nutrition Related Findings:    Pt. Report/Treatments/Miscellaneous: Pt seen, % of breakfast tray completed and ~50% of ensure drank. Pt states that she still likes Ensure and wishes to continue, requests to stop magic cups. Encouraged continued good PO intake and ONS use. Improvements nutritionally - malnutrition status changed to at risk.   GI Status: BM 11/29, hypoactive BS  Pertinent Labs: BUN 25  Pertinent Meds: bumex, synthroid, protonix, dulcolax, fibercon, zocor     Wound Type:  (pretibial L and R, Buttocks pressure injury R + L)       Current Nutrition Intake & Therapies:    Average Meal Intake: 51-75%, %  Average Supplements Intake:  (pt reports good acceptance of

## 2023-11-30 NOTE — PLAN OF CARE
Problem: Discharge Planning  Goal: Discharge to home or other facility with appropriate resources  Outcome: Progressing  Flowsheets (Taken 11/30/2023 1432)  Discharge to home or other facility with appropriate resources:   Identify barriers to discharge with patient and caregiver   Refer to discharge planning if patient needs post-hospital services based on physician order or complex needs related to functional status, cognitive ability or social support system   Identify discharge learning needs (meds, wound care, etc)   Arrange for needed discharge resources and transportation as appropriate     Problem: Skin/Tissue Integrity  Goal: Absence of new skin breakdown  Description: 1. Monitor for areas of redness and/or skin breakdown  2. Assess vascular access sites hourly  3. Every 4-6 hours minimum:  Change oxygen saturation probe site  4. Every 4-6 hours:  If on nasal continuous positive airway pressure, respiratory therapy assess nares and determine need for appliance change or resting period.   Outcome: Progressing     Problem: Safety - Adult  Goal: Free from fall injury  Outcome: Progressing  Flowsheets (Taken 11/30/2023 1432)  Free From Fall Injury: Instruct family/caregiver on patient safety     Problem: Safety - Adult  Goal: Isolation precautions  Description: Isolation precautions  Outcome: Progressing     Problem: ABCDS Injury Assessment  Goal: Absence of physical injury  Outcome: Progressing  Flowsheets (Taken 11/30/2023 1432)  Absence of Physical Injury: Implement safety measures based on patient assessment     Problem: Chronic Conditions and Co-morbidities  Goal: Patient's chronic conditions and co-morbidity symptoms are monitored and maintained or improved  Outcome: Progressing  Flowsheets (Taken 11/30/2023 1432)  Care Plan - Patient's Chronic Conditions and Co-Morbidity Symptoms are Monitored and Maintained or Improved:   Monitor and assess patient's chronic conditions and comorbid symptoms for evaluate response     Problem: Nutrition Deficit:  Goal: Optimize nutritional status  Outcome: Progressing  Flowsheets (Taken 11/30/2023 3122)  Nutrient intake appropriate for improving, restoring, or maintaining nutritional needs:   Assess nutritional status and recommend course of action   Monitor oral intake, labs, and treatment plans     Care plan reviewed with patient. Patient verbalizes understanding of plan of care and contributes to goal setting.

## 2023-11-30 NOTE — CARE COORDINATION
11/30/23, 1:52 PM EST    DISCHARGE PLANNING EVALUATION    Spoke with Pop Victoria at [facility] enhanced living. Discussed potential level of care needs and appropriateness for their facility. [facility] clinical liason is calling RN today for further information about patient's care needs. If able to accept, plan if for [facility] with [facility]. [facility] shared that they were planning a virtual assessment for next Wednesday with patient and family.   Requested [facility] make a decision earlier than next week, as patient has been ready for discharge

## 2023-11-30 NOTE — PROGRESS NOTES
peroneal arteries could not be definitively visualized in either leg. 2. Normal bilateral ankle-brachial indices. 3. Good pulsatility throughout both legs with diffuse multiphasic Doppler waveforms. **This report has been created using voice recognition software. It may contain minor errors which are inherent in voice recognition technology. **      Final report electronically signed by Dr Briseida Garduno on 11/2/2023 4:04 PM      US RENAL COMPLETE   Final Result   1. No sonographic findings for hydronephrosis. Questionable right renal    lesion not well seen on ultrasound due to patient immobility. This can be    followed for stability on future CT or MRI. 2. Filling defect partially distended urinary bladder may relate to [place]    catheter balloon. Blood clot not excluded. This document has been electronically signed by: Briseida De La Cruz MD on    11/02/2023 01:05 AM      CT HEAD WO CONTRAST   Final Result   1. No acute intracranial findings. 2. Stable probable meningioma over the parafalcine left frontal lobe. 3. Opacification of the left mastoid air cells can be correlated for    mastoiditis. This document has been electronically signed by: Briseida De La Cruz MD on    11/01/2023 06:05 PM      All CTs at this facility use dose modulation techniques and iterative    reconstructions, and/or weight-based dosing   when appropriate to reduce radiation to a low as reasonably achievable. CTA ABDOMINAL AORTA W BILAT RUNOFF W WO CONTRAST   Final Result   1. No significant contrast opacification in the distal right anterior    tibial artery, right dorsalis pedis artery, and left plantaris artery    beginning in the proximal left midfoot. This may relate to underlying    diminished flow or occlusion. 2. Possible complex lesion in the upper midpole of the kidney measuring    0.7 x 0.6 cm. Nonemergent ultrasound can be helpful to distinguish mass    from complex cyst.   3. Colonic diverticula.    4. Small hiatal hernia. 5. Asymmetric mild subcutaneous edema in the mid-distal left calf and left    ankle. This document has been electronically signed by: Watson Rodríguez MD on    11/01/2023 06:20 PM      All CTs at this facility use dose modulation techniques and iterative    reconstructions, and/or weight-based dosing   when appropriate to reduce radiation to a low as reasonably achievable. 3D Post-processing was performed on this study. XR CHEST PORTABLE   Final Result   1. Minimal linear peripheral opacities at the left lung base are present which may represent minimal atelectasis or scarring. 2. There is a right subclavian central line present with the tip overlying the cavoatrial junction. No pneumothorax is seen. **This report has been created using voice recognition software. It may contain minor errors which are inherent in voice recognition technology. **      Final report electronically signed by Dr. Lopez Robles on 11/1/2023 4:56 PM      XR CHEST PORTABLE   Final Result      No acute intrathoracic process. **This report has been created using voice recognition software. It may contain minor errors which are inherent in voice recognition technology. **      Final report electronically signed by Dr. Marilyn Marrero on 11/1/2023 1:05 PM          DVT prophylaxis: [] Lovenox                                 [] SCDs                                 [] SQ Heparin                                 [] Encourage ambulation           [x] Already on Anticoagulation     Code Status: Full Code    PT/OT Eval Status: ordered    Tele:   [] yes             [x] no        Electronically signed by Mignon Ma PA-C on 11/30/2023 at 1:07 PM

## 2023-11-30 NOTE — PLAN OF CARE
Problem: Discharge Planning  Goal: Discharge to home or other facility with appropriate resources  11/29/2023 2345 by Josep Lucero RN  Outcome: Progressing  Flowsheets (Taken 11/29/2023 1406 by Ender Mcdaniels RN)  Discharge to home or other facility with appropriate resources:   Identify barriers to discharge with patient and caregiver   Identify discharge learning needs (meds, wound care, etc)   Refer to discharge planning if patient needs post-hospital services based on physician order or complex needs related to functional status, cognitive ability or social support system   Arrange for needed discharge resources and transportation as appropriate     Problem: Skin/Tissue Integrity  Goal: Absence of new skin breakdown  Description: 1. Monitor for areas of redness and/or skin breakdown  2. Assess vascular access sites hourly  3. Every 4-6 hours minimum:  Change oxygen saturation probe site  4. Every 4-6 hours:  If on nasal continuous positive airway pressure, respiratory therapy assess nares and determine need for appliance change or resting period. 11/29/2023 2345 by Josep Lucero RN  Outcome: Progressing  Note: No evidence of new skin breakdown. PT. Turned and repositioned every two hours. Problem: Safety - Adult  Goal: Free from fall injury  11/29/2023 2345 by Josep Lucero RN  Outcome: Progressing  Flowsheets (Taken 11/29/2023 2345)  Free From Fall Injury: Instruct family/caregiver on patient safety     Problem: ABCDS Injury Assessment  Goal: Absence of physical injury  11/29/2023 2345 by Josep Lucero RN  Outcome: Progressing  Flowsheets (Taken 11/29/2023 2345)  Absence of Physical Injury: Implement safety measures based on patient assessment     Problem: Safety - Adult  Goal: Isolation precautions  Description: Isolation precautions  11/29/2023 2345 by Josep Lucero RN  Outcome: Progressing  Note: Pt. In contact isolation for MRSA.      Problem: Chronic Conditions and 11/29/2023 1406 by Deonna Milton RN)  Verbalizes/displays adequate comfort level or baseline comfort level:   Encourage patient to monitor pain and request assistance   Administer analgesics based on type and severity of pain and evaluate response   Consider cultural and social influences on pain and pain management   Assess pain using appropriate pain scale   Implement non-pharmacological measures as appropriate and evaluate response   Notify Licensed Independent Practitioner if interventions unsuccessful or patient reports new pain     Problem: Nutrition Deficit:  Goal: Optimize nutritional status  11/29/2023 2345 by Elin Barajas RN  Outcome: Progressing  Flowsheets (Taken 11/29/2023 2345)  Nutrient intake appropriate for improving, restoring, or maintaining nutritional needs:   Assess nutritional status and recommend course of action   Monitor oral intake, labs, and treatment plans   Care plan reviewed with patient. Patient verbalize understanding of the plan of care and contribute to goal setting.

## 2023-11-30 NOTE — PROGRESS NOTES
Umesh Dupont from [employer] returned call. This RN notified her that our spiritual care team will set up the zoom call for tomorrow, 12/01. Umesh Duopnt gave the following e-mail addresses for the zoom call:  [email or web address]  [email or web address]  [email or web address]

## 2023-12-01 PROCEDURE — 99232 SBSQ HOSP IP/OBS MODERATE 35: CPT | Performed by: INTERNAL MEDICINE

## 2023-12-01 PROCEDURE — 6370000000 HC RX 637 (ALT 250 FOR IP): Performed by: PHYSICIAN ASSISTANT

## 2023-12-01 PROCEDURE — 6370000000 HC RX 637 (ALT 250 FOR IP): Performed by: STUDENT IN AN ORGANIZED HEALTH CARE EDUCATION/TRAINING PROGRAM

## 2023-12-01 PROCEDURE — 1200000000 HC SEMI PRIVATE

## 2023-12-01 RX ADMIN — BISACODYL 5 MG: 5 TABLET, COATED ORAL at 06:49

## 2023-12-01 RX ADMIN — RIVAROXABAN 15 MG: 15 TABLET, FILM COATED ORAL at 17:45

## 2023-12-01 RX ADMIN — Medication 20 MG: at 21:17

## 2023-12-01 RX ADMIN — PANTOPRAZOLE SODIUM 40 MG: 40 TABLET, DELAYED RELEASE ORAL at 21:16

## 2023-12-01 RX ADMIN — LEVOTHYROXINE SODIUM 125 MCG: 0.12 TABLET ORAL at 10:10

## 2023-12-01 RX ADMIN — CALCIUM POLYCARBOPHIL 625 MG: 625 TABLET, FILM COATED ORAL at 10:13

## 2023-12-01 RX ADMIN — HYDROCORTISONE 10 MG: 10 TABLET ORAL at 21:15

## 2023-12-01 RX ADMIN — POTASSIUM CHLORIDE 40 MEQ: 1500 TABLET, EXTENDED RELEASE ORAL at 10:12

## 2023-12-01 RX ADMIN — HYDROCORTISONE 20 MG: 10 TABLET ORAL at 10:10

## 2023-12-01 RX ADMIN — MICONAZOLE NITRATE: 2 POWDER TOPICAL at 10:15

## 2023-12-01 RX ADMIN — MICONAZOLE NITRATE: 2 POWDER TOPICAL at 21:14

## 2023-12-01 RX ADMIN — BUMETANIDE 1 MG: 1 TABLET ORAL at 10:13

## 2023-12-01 ASSESSMENT — PAIN SCALES - GENERAL: PAINLEVEL_OUTOF10: 0

## 2023-12-01 NOTE — PROGRESS NOTES
Pt is a 73y. o. female, propped up in bed awaiting Zoom meeting with potential palliative care location, in 7K-13.  arranged for telecart, initiated zoom meeting, set up for optimum viewing and sound for participants and retrieved for storing, once meeting concluded.      12/01/23 1615   Encounter Summary   Encounter Overview/Reason  Palliative Care   Service Provided For: Patient   Referral/Consult From: [address] Family members   Last Encounter  12/01/23   Complexity of Encounter Low   Begin Time 1055   End Time  1120   Total Time Calculated 25 min   Palliative Care   Type Palliative Care, Patient/Family Care Conference   Assessment/Intervention/Outcome   Assessment Peaceful;Calm;Coping   Intervention Facilitated/Participated in Patient/Family Care Conference   Outcome Expressed Gratitude

## 2023-12-01 NOTE — CARE COORDINATION
Electronically signed by Lorelei Borrero RN on 12/1/2023 at 2:08 PM      11:00 - 11:40 am Maggie RN in room with zoom video chat with Lukas Gilliam  and another leadership staff member from [facility]. They discussed pt level of care needed with pt and help of primary nurse. This CM joined meeting 11:30 am and requested a date when both [facility] with [facility] could take the patient. They feel they can get all DME in place Monday and for [facility] SW to set up transport for Tuesday 12/5/23. Updated Kaci HOGUE.

## 2023-12-01 NOTE — PLAN OF CARE
Problem: Discharge Planning  Goal: Discharge to home or other facility with appropriate resources  Outcome: Progressing  Flowsheets (Taken 12/1/2023 1728)  Discharge to home or other facility with appropriate resources: Identify barriers to discharge with patient and caregiver     Problem: Skin/Tissue Integrity  Goal: Absence of new skin breakdown  Description: 1. Monitor for areas of redness and/or skin breakdown  2. Assess vascular access sites hourly  3. Every 4-6 hours minimum:  Change oxygen saturation probe site  4. Every 4-6 hours:  If on nasal continuous positive airway pressure, respiratory therapy assess nares and determine need for appliance change or resting period.   Outcome: Progressing     Problem: Safety - Adult  Goal: Free from fall injury  Outcome: Progressing  Flowsheets (Taken 12/1/2023 1728)  Free From Fall Injury: Instruct family/caregiver on patient safety  Goal: Isolation precautions  Description: Isolation precautions  Outcome: Progressing     Problem: ABCDS Injury Assessment  Goal: Absence of physical injury  Outcome: Progressing  Flowsheets (Taken 12/1/2023 1728)  Absence of Physical Injury: Implement safety measures based on patient assessment     Problem: Chronic Conditions and Co-morbidities  Goal: Patient's chronic conditions and co-morbidity symptoms are monitored and maintained or improved  Outcome: Progressing  Flowsheets (Taken 12/1/2023 1728)  Care Plan - Patient's Chronic Conditions and Co-Morbidity Symptoms are Monitored and Maintained or Improved: Monitor and assess patient's chronic conditions and comorbid symptoms for stability, deterioration, or improvement     Problem: Skin/Tissue Integrity - Adult  Goal: Incisions, wounds, or drain sites healing without S/S of infection  Outcome: Progressing  Flowsheets (Taken 12/1/2023 1728)  Incisions, Wounds, or Drain Sites Healing Without Sign and Symptoms of Infection: ADMISSION and DAILY: Assess and document risk factors for pressure ulcer development     Problem: Musculoskeletal - Adult  Goal: Return ADL status to a safe level of function  Outcome: Progressing  Flowsheets (Taken 12/1/2023 1728)  Return ADL Status to a Safe Level of Function: Administer medication as ordered     Problem: Pain  Goal: Verbalizes/displays adequate comfort level or baseline comfort level  Outcome: Progressing  Flowsheets (Taken 12/1/2023 1728)  Verbalizes/displays adequate comfort level or baseline comfort level: Assess pain using appropriate pain scale     Problem: Nutrition Deficit:  Goal: Optimize nutritional status  Outcome: Progressing  Flowsheets (Taken 12/1/2023 1728)  Nutrient intake appropriate for improving, restoring, or maintaining nutritional needs: Assess nutritional status and recommend course of action   Care plan reviewed with patient. Patient verbalize understanding of the plan of care and contribute to goal setting.

## 2023-12-02 PROCEDURE — 6370000000 HC RX 637 (ALT 250 FOR IP): Performed by: STUDENT IN AN ORGANIZED HEALTH CARE EDUCATION/TRAINING PROGRAM

## 2023-12-02 PROCEDURE — 99232 SBSQ HOSP IP/OBS MODERATE 35: CPT | Performed by: INTERNAL MEDICINE

## 2023-12-02 PROCEDURE — 6370000000 HC RX 637 (ALT 250 FOR IP): Performed by: PHYSICIAN ASSISTANT

## 2023-12-02 PROCEDURE — 1200000000 HC SEMI PRIVATE

## 2023-12-02 RX ADMIN — POTASSIUM CHLORIDE 40 MEQ: 1500 TABLET, EXTENDED RELEASE ORAL at 09:45

## 2023-12-02 RX ADMIN — LEVOTHYROXINE SODIUM 125 MCG: 0.12 TABLET ORAL at 09:41

## 2023-12-02 RX ADMIN — BUMETANIDE 1 MG: 1 TABLET ORAL at 09:41

## 2023-12-02 RX ADMIN — HYDROCORTISONE 10 MG: 10 TABLET ORAL at 21:36

## 2023-12-02 RX ADMIN — RIVAROXABAN 15 MG: 15 TABLET, FILM COATED ORAL at 17:51

## 2023-12-02 RX ADMIN — CALCIUM POLYCARBOPHIL 625 MG: 625 TABLET, FILM COATED ORAL at 09:43

## 2023-12-02 RX ADMIN — HYDROCORTISONE 20 MG: 10 TABLET ORAL at 09:41

## 2023-12-02 RX ADMIN — MICONAZOLE NITRATE: 2 POWDER TOPICAL at 09:42

## 2023-12-02 RX ADMIN — PANTOPRAZOLE SODIUM 40 MG: 40 TABLET, DELAYED RELEASE ORAL at 21:35

## 2023-12-02 RX ADMIN — MICONAZOLE NITRATE: 2 POWDER TOPICAL at 21:34

## 2023-12-02 RX ADMIN — Medication 20 MG: at 21:36

## 2023-12-02 ASSESSMENT — PAIN SCALES - GENERAL
PAINLEVEL_OUTOF10: 0

## 2023-12-02 NOTE — PROGRESS NOTES
Hospitalist Progress Note      Patient:  Shaunna Mendieta    Unit/Bed:7K-13/013-A  YOB: 1950  MRN: [id number]   Acct: [de-identified]     PCP: Fadi Vance MD  Date of Admission: 11/1/2023      Assessment/Plan:    Chronic nonhealing wounds:  -Initial concern for infection with purulent discharge noted on RLE wound in ED.    -Following discussion with infectious disease we will continue to monitor patient off of antibiotics  -Arterial US shows no evidence of PAD  -Suspected due to long term steroid use   Physical debility:   -Patient has baseline deficits 2/2 CVA as noted below but has recently become bedbound and is unable to care for herself  -PT/OT following and recommending SNF  -Pending precert to SNF   Protein Calorie malnutrition:   -low prealbumin. Dietician following. 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 ID following  Lactic acidosis (resolved)  lactic 2.3 on arrival- do not see where subsequent lactic acid ordered - will obtain. Suspect Type B  Leukocytosis- stable. : Suspect secondary to chronic steroid use and possible wound infection. Monitor   Hx of CVA: Multiple ischemic events in 1988, 2015 and this past year. Known deficits with left-sided weakness. Patient is chair bound at baseline. CT head showed no acute findings. Previous infarct noted in the left lateral ventricle. Paroxysmal atrial fibrillation: [id number] 6 HB 3 on [address] with Xarelto rate controlled Coreg  Meningioma: Stable from previous imaging noted over the parafalcine left frontal lobe. Hx pituitary tumor: s/p surgery performed in [place]. On hydrocortisone and Synthroid  Acquired hypothyroidism: 2/2 pituitary tumor s/p resection. Continue Synthroid.  TSH/reflex shows hyperthyroidism consistent with suppressive dose synthroid appropriate for hx of malignancy  Acquired adrenal insufficiency: continue home dose of Normal bilateral ankle-brachial indices. 3. Good pulsatility throughout both legs with diffuse multiphasic Doppler waveforms. **This report has been created using voice recognition software. It may contain minor errors which are inherent in voice recognition technology. **      Final report electronically signed by Dr Faye Camarena on 11/2/2023 4:04 PM      US RENAL COMPLETE   Final Result   1. No sonographic findings for hydronephrosis. Questionable right renal    lesion not well seen on ultrasound due to patient immobility. This can be    followed for stability on future CT or MRI. 2. Filling defect partially distended urinary bladder may relate to Huntley    catheter balloon. Blood clot not excluded. This document has been electronically signed by: Grady Edmonds MD on    11/02/2023 01:05 AM      CT HEAD WO CONTRAST   Final Result   1. No acute intracranial findings. 2. Stable probable meningioma over the parafalcine left frontal lobe. 3. Opacification of the left mastoid air cells can be correlated for    mastoiditis. This document has been electronically signed by: Grady Edmonds MD on    11/01/2023 06:05 PM      All CTs at this facility use dose modulation techniques and iterative    reconstructions, and/or weight-based dosing   when appropriate to reduce radiation to a low as reasonably achievable. CTA ABDOMINAL AORTA W BILAT RUNOFF W WO CONTRAST   Final Result   1. No significant contrast opacification in the distal right anterior    tibial artery, right dorsalis pedis artery, and left plantaris artery    beginning in the proximal left midfoot. This may relate to underlying    diminished flow or occlusion. 2. Possible complex lesion in the upper midpole of the kidney measuring    0.7 x 0.6 cm. Nonemergent ultrasound can be helpful to distinguish mass    from complex cyst.   3. Colonic diverticula. 4. Small hiatal hernia.    5. Asymmetric mild subcutaneous edema in the

## 2023-12-02 NOTE — PROGRESS NOTES
Podiatric Progress Note  Dandy George  Subjective :   12/2  Patient was seen bedside today on behalf of Dr. Wil Mcmillan. Patient is 20 days s/p bilateral great toe nail avulsion. Patient relates that she is doing well she does not have any pain to her legs or feet at this time. Patient denies any nausea, vomiting, fever, chills, shortness of breath or chest pain. No pedal concerns at this time. 11/29/23  Patient was seen bedside today on behalf of Dr. Wil Mcmillan. Patient is 16 days s/p bilateral great toe nail avulsion. Patient relates that she is doing well she does not have any pain to her legs or feet at this time. Patient denies any nausea, vomiting, fever, chills, shortness of breath or chest pain. No pedal concerns at this time. 11/27/23  Patient was seen bedside today on behalf of Dr. Wil Mcmillan. Patient is 14 days s/p bilateral great toe nail avulsion. Patient relates that she is doing well she does not have any pain to her legs or feet at this time. She sleeps most the visit. Patient denies any nausea, vomiting, fever, chills, shortness of breath or chest pain. No pedal concerns at this time. 11/25/23  Patient was seen bedside today on behalf of Dr. Wil Mcmillan. Patient is 12 days s/p bilateral great toe nail avulsion. Patient relates that she is doing well she does not have any pain to her legs or feet at this time. She appears to be fairly alert and oriented at this visit. Patient denies any nausea, vomiting, fever, chills, shortness of breath or chest pain. No pedal concerns at this time. 11/23/23  Patient was seen bedside today on behalf of Dr. Wil Mcmillan. Patient is 10 days s/p bilateral great toenail avulsion. Patient relates no pain to the bilateral feet or legs. Patient appears more alert and oriented today. Patient denies any N/V/F/C/SOB or CP. No other pedal complaints. 11/20/23  Patient was seen bedside today on behalf of Dr. Wil Mcmillan.  Patient is 7 days s/p bilateral great toenail avulsion. Patient relates no pain to the bilateral feet. Patient appears more alert and oriented today. Patient denies any N/V/F/C/SOB or CP. No other pedal complaints. 11/18/23  Patient was seen bedside today on behalf of Dr. Rodríguez Stockton. Patient is 5 days s/p bilateral great toenail avulsion. Patient relates no pain to the bilateral feet. Patient appears more alert and oriented today. Patient denies any N/V/F/C/SOB or CP. No other pedal complaints. 11/16/23  Patient was seen bedside today on behalf of of Dr. Rodríguez Stockton. Patient is 3 days s/p bilateral great toenail avulsion. Patient relates no pain to the bilateral feet. Patient continuing with diminished mental status. Patient denies any N/V/F/C/SOB or CP. No other pedal complaints. HPI  Patient is a pleasant 73-year-old female with pertinent past medical history of CVA, cancer, hypertension, hypothyroidism. Patient was admitted by hospitalist with diagnosis of failure to thrive. Patient was initially evaluated by resident physician Naman teixeira on 11/3/2023. Patient was noted to have chronic incurvation along the lateral extent of the hallux nail plate bilateral with hypergranulation tissue present. Patient was initially deemed appropriate for discharge with follow-up in the office. I was contacted by infectious disease in regards to proceeding with nail avulsion due to concern for source of infection to bilateral hallux. Patient was seen bedside this evening. Patient found to have moderate diminished epicritic and protopathic sensations as well as altered mental status. Minimal pain on palpation of manipulation of bilateral hallux. We elected to proceed with a bedside nail avulsion to bilateral hallux which patient tolerated procedure well sites were dressed with nonadherent Adaptic and dry sterile dressing.       Current Medications:    Current Facility-Administered Medications: bisacodyl (DULCOLAX) EC tablet 5 mg, 5 mg, Oral, QAM

## 2023-12-03 PROCEDURE — 1200000000 HC SEMI PRIVATE

## 2023-12-03 PROCEDURE — 6370000000 HC RX 637 (ALT 250 FOR IP): Performed by: STUDENT IN AN ORGANIZED HEALTH CARE EDUCATION/TRAINING PROGRAM

## 2023-12-03 PROCEDURE — 99232 SBSQ HOSP IP/OBS MODERATE 35: CPT | Performed by: INTERNAL MEDICINE

## 2023-12-03 PROCEDURE — 6370000000 HC RX 637 (ALT 250 FOR IP): Performed by: PHYSICIAN ASSISTANT

## 2023-12-03 RX ADMIN — HYDROCORTISONE 10 MG: 10 TABLET ORAL at 20:16

## 2023-12-03 RX ADMIN — PANTOPRAZOLE SODIUM 40 MG: 40 TABLET, DELAYED RELEASE ORAL at 20:16

## 2023-12-03 RX ADMIN — CALCIUM POLYCARBOPHIL 625 MG: 625 TABLET, FILM COATED ORAL at 09:30

## 2023-12-03 RX ADMIN — Medication 20 MG: at 20:16

## 2023-12-03 RX ADMIN — MICONAZOLE NITRATE: 2 POWDER TOPICAL at 20:58

## 2023-12-03 RX ADMIN — MICONAZOLE NITRATE: 2 POWDER TOPICAL at 09:31

## 2023-12-03 RX ADMIN — RIVAROXABAN 15 MG: 15 TABLET, FILM COATED ORAL at 17:17

## 2023-12-03 RX ADMIN — POTASSIUM CHLORIDE 40 MEQ: 1500 TABLET, EXTENDED RELEASE ORAL at 09:28

## 2023-12-03 RX ADMIN — BUMETANIDE 1 MG: 1 TABLET ORAL at 09:30

## 2023-12-03 RX ADMIN — HYDROCORTISONE 20 MG: 10 TABLET ORAL at 09:29

## 2023-12-03 RX ADMIN — BISACODYL 5 MG: 5 TABLET, COATED ORAL at 05:29

## 2023-12-03 RX ADMIN — LEVOTHYROXINE SODIUM 125 MCG: 0.12 TABLET ORAL at 09:30

## 2023-12-03 ASSESSMENT — PAIN SCALES - GENERAL
PAINLEVEL_OUTOF10: 0
PAINLEVEL_OUTOF10: 0

## 2023-12-03 NOTE — PROGRESS NOTES
Hospitalist Progress Note      Patient:  Yasmin Alonso    Unit/Bed:7K-13/013-A  YOB: 1950  MRN: [id number]   Acct: [de-identified]     PCP: Katie Mckeon MD  Date of Admission: 11/1/2023      Assessment/Plan:    Chronic nonhealing wounds:  -Initial concern for infection with purulent discharge noted on RLE wound in ED.    -Following discussion with infectious disease we will continue to monitor patient off of antibiotics  -Arterial US shows no evidence of PAD  -Suspected due to long term steroid use   Physical debility:   -Patient has baseline deficits 2/2 CVA as noted below but has recently become bedbound and is unable to care for herself  -PT/OT following and recommending SNF  -Pending precert to SNF   Protein Calorie malnutrition:   -low prealbumin. Dietician following. 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 ID following  Lactic acidosis (resolved)  lactic 2.3 on arrival- do not see where subsequent lactic acid ordered - will obtain. Suspect Type B  Leukocytosis- stable. : Suspect secondary to chronic steroid use and possible wound infection. Monitor   Hx of CVA: Multiple ischemic events in 1988, 2015 and this past year. Known deficits with left-sided weakness. Patient is chair bound at baseline. CT head showed no acute findings. Previous infarct noted in the left lateral ventricle. Paroxysmal atrial fibrillation: [id number] 6 HB 3 on [address] with Xarelto rate controlled Coreg  Meningioma: Stable from previous imaging noted over the parafalcine left frontal lobe. Hx pituitary tumor: s/p surgery performed in [place]. On hydrocortisone and Synthroid  Acquired hypothyroidism: 2/2 pituitary tumor s/p resection. Continue Synthroid.  TSH/reflex shows hyperthyroidism consistent with suppressive dose synthroid appropriate for hx of malignancy  Acquired adrenal insufficiency: continue home dose of

## 2023-12-04 PROCEDURE — 1200000000 HC SEMI PRIVATE

## 2023-12-04 PROCEDURE — 6370000000 HC RX 637 (ALT 250 FOR IP): Performed by: PHYSICIAN ASSISTANT

## 2023-12-04 PROCEDURE — 99231 SBSQ HOSP IP/OBS SF/LOW 25: CPT | Performed by: PHYSICIAN ASSISTANT

## 2023-12-04 PROCEDURE — 6370000000 HC RX 637 (ALT 250 FOR IP): Performed by: STUDENT IN AN ORGANIZED HEALTH CARE EDUCATION/TRAINING PROGRAM

## 2023-12-04 RX ADMIN — BUMETANIDE 1 MG: 1 TABLET ORAL at 09:38

## 2023-12-04 RX ADMIN — LEVOTHYROXINE SODIUM 125 MCG: 0.12 TABLET ORAL at 09:38

## 2023-12-04 RX ADMIN — CALCIUM POLYCARBOPHIL 625 MG: 625 TABLET, FILM COATED ORAL at 09:38

## 2023-12-04 RX ADMIN — Medication 20 MG: at 21:38

## 2023-12-04 RX ADMIN — POTASSIUM CHLORIDE 40 MEQ: 1500 TABLET, EXTENDED RELEASE ORAL at 09:43

## 2023-12-04 RX ADMIN — RIVAROXABAN 15 MG: 15 TABLET, FILM COATED ORAL at 17:33

## 2023-12-04 RX ADMIN — MICONAZOLE NITRATE: 2 POWDER TOPICAL at 21:39

## 2023-12-04 RX ADMIN — BISACODYL 5 MG: 5 TABLET, COATED ORAL at 04:31

## 2023-12-04 RX ADMIN — HYDROCORTISONE 20 MG: 10 TABLET ORAL at 09:37

## 2023-12-04 RX ADMIN — HYDROCORTISONE 10 MG: 10 TABLET ORAL at 21:39

## 2023-12-04 RX ADMIN — ONDANSETRON 4 MG: 4 TABLET, ORALLY DISINTEGRATING ORAL at 09:43

## 2023-12-04 RX ADMIN — PANTOPRAZOLE SODIUM 40 MG: 40 TABLET, DELAYED RELEASE ORAL at 21:38

## 2023-12-04 RX ADMIN — MICONAZOLE NITRATE: 2 POWDER TOPICAL at 09:39

## 2023-12-04 ASSESSMENT — PAIN SCALES - GENERAL: PAINLEVEL_OUTOF10: 0

## 2023-12-04 NOTE — PROGRESS NOTES
Podiatric Progress Note  Rita Grooms  Subjective :   12/4/23  Patient was seen bedside today on behalf of Dr. Gurinder Lau. Patient is 22 days s/p bilateral great toe nail avulsion. Patient relates that she is having an upset stomach this morning but states she does not have any pain to her legs or feet at this time. Patient denies any nausea, vomiting, fever, chills, shortness of breath or chest pain. No pedal concerns at this time. 12/2  Patient was seen bedside today on behalf of Dr. Gurinder Lau. Patient is 20 days s/p bilateral great toe nail avulsion. Patient relates that she is doing well she does not have any pain to her legs or feet at this time. Patient denies any nausea, vomiting, fever, chills, shortness of breath or chest pain. No pedal concerns at this time. 11/29/23  Patient was seen bedside today on behalf of Dr. Gurinder Lau. Patient is 16 days s/p bilateral great toe nail avulsion. Patient relates that she is doing well she does not have any pain to her legs or feet at this time. Patient denies any nausea, vomiting, fever, chills, shortness of breath or chest pain. No pedal concerns at this time. 11/27/23  Patient was seen bedside today on behalf of Dr. Gurinder Lau. Patient is 14 days s/p bilateral great toe nail avulsion. Patient relates that she is doing well she does not have any pain to her legs or feet at this time. She sleeps most the visit. Patient denies any nausea, vomiting, fever, chills, shortness of breath or chest pain. No pedal concerns at this time. 11/25/23  Patient was seen bedside today on behalf of Dr. Gurinder Lau. Patient is 12 days s/p bilateral great toe nail avulsion. Patient relates that she is doing well she does not have any pain to her legs or feet at this time. She appears to be fairly alert and oriented at this visit. Patient denies any nausea, vomiting, fever, chills, shortness of breath or chest pain. No pedal concerns at this time.     11/23/23  Patient was seen filed at 12/3/2023 1350  Gross per 24 hour   Intake 1000 ml   Output --   Net 1000 ml                Wt Readings from Last 3 Encounters:   11/01/23 76.2 kg (168 lb)   02/06/23 83.3 kg (183 lb 10.3 oz)   01/15/23 83.1 kg (183 lb 4.8 oz)       LABS:    No results for input(s): \"WBC\", \"HGB\", \"HCT\", \"PLT\" in the last 72 hours. No results for input(s): \"NA\", \"K\", \"CL\", \"CO2\", \"PHOS\", \"BUN\", \"CREATININE\", \"CA\" in the last 72 hours. No results for input(s): \"PROT\", \"INR\", \"APTT\" in the last 72 hours. No results for input(s): \"CKTOTAL\", \"CKMB\", \"CKMBINDEX\", \"TROPONINI\" in the last 72 hours. REVIEW OF SYSTEMS:    General Appearance  Awake, alert, oriented, chronically sick looking. Clean dressing on the scalp wound, less drainage. HEENT - normocephalic, atraumatic,pale  conjunctiva,  anicteric sclera. Neck - Supple, no mass  Lungs -  Bilateral air entry  Cardiovascular - Heart sounds are normal.     Abdomen - soft, not distended, nontender,   Neurologic -awake and oriented  Skin - +bruising or bleeding  Extremities - has multiple open wound on the lower extremties,the swelling is less      Exam:   BLE dressing intact and well maintained. No apparent strike through noted. Vascular: Dorsalis pedis and posterior tibial pulses are faintly palpable bilaterally. Skin temperature is warm to cool from proximal tibial tuberosity to distal digits. CFT brisk to exposed digits. Edema 2+ pitting present diffusely to b/l LE. Hair growth absent. Quality of skin thin and atrophic. Dermatologic: Nails 2-5 bilaterally are thickened, elongated and dystrophic, with presence of subungual debris. Webspaces 1-4 bilaterally are clean, dry and intact. There are multiple excoriations noted to b/l legs anteriorly. Bilateral hallux nail beds are clean with dried eschar. No purulence. No bleeding appreciated with increased pyogenic granuloma. Neurovascular: Epicritic and protopathic sensations are grossly diminished.

## 2023-12-04 NOTE — CARE COORDINATION
12/4/23, 3:32 PM EST    DISCHARGE PLANNING EVALUATION    This SW spoke with son Patricia Davis on the phone and discussed that they cannot afford what Fredy Woodward is asking for madie porter. Patriciaguillermo Davis states his next option would be [place] (where patient's  is) on hospice and third choice would be [facility] on hospice.

## 2023-12-04 NOTE — CARE COORDINATION
12/4/23, 2:03 PM EST    DISCHARGE ON GOING EVALUATION    [address] day: 24  Location: -13/013-A Reason for admit: Failure to thrive in adult [R62.7]  Physical debility [R53.81]  Urinary tract infection without hematuria, site unspecified [N39.0]  Ulcers of both lower extremities, unspecified ulcer stage ([address]) [L97.919, L97.929]  Pressure injury of back, stage 2 ([address]) [L89.102]   Procedure:   11/1 Repeat CXR: Minimal linear peripheral opacities at the left lung base are present which may represent minimal atelectasis or scarring. 2. There is a right subclavian central line present with the tip overlying the cavoatrial junction. No pneumothorax is seen  11/1 CT Head WO: No acute intracranial findings. 2. Stable probable meningioma over the parafalcine left frontal lobe. 3. Opacification of the left mastoid air cells can be correlated for   mastoiditis  11/1 CTA Abdominal Aorta: No significant contrast opacification in the distal right anterior tibial artery, right dorsalis pedis artery, and left plantaris artery beginning in the proximal left midfoot. This may relate to underlying diminished flow or occlusion. 2. Possible complex lesion in the upper midpole of the kidney measuring 0.7 x 0.6 cm. Nonemergent ultrasound can be helpful to distinguish mass from complex cyst. 3. Colonic diverticula. 4. Small hiatal hernia. 5. Asymmetric mild subcutaneous edema in the mid-distal left calf and left   ankle. 11/2 US Renal: No sonographic findings for hydronephrosis. Questionable right renal   lesion not well seen on ultrasound due to patient immobility. This can be followed for stability on future CT or MRI. 2. Filling defect partially distended urinary bladder may relate to Huntley catheter balloon. Blood clot not excluded  11/2 BLE Arterial DUP:  Slightly limited evaluation as the peroneal arteries could not be definitively visualized in either leg. Normal bilateral ankle-brachial indices.     11/3: scalp wounds removed by ID from pt head; shaved her hair to clean wound. 11/13 Bedside nail avulsion performed of bilateral hallux by podiatry  Barriers to Discharge: needs placement in ecf; financial concerns. PCP: Hilda Mata MD  Readmission Risk Score: 15.9%  Patient Goals/Plan/Treatment Preferences:   2:07 PM  Received a call from son Singh Feliciano; he shared family was asked to pay $12,000 up front and not able to follow thru. Updated [facility]. Await accepting ecf; SW continues to work with family and requested facilities.

## 2023-12-04 NOTE — PROGRESS NOTES
reactive to light. Extra ocular muscles intact. Conjunctivae/corneas clear. Neck: Supple, with full range of motion. No jugular venous distention. Trachea midline. Respiratory:  Normal respiratory effort. Clear to auscultation, bilaterally without Rales/Wheezes/Rhonchi. Cardiovascular:  Regular rate and rhythm with normal S1/S2 without murmurs, rubs, gallops, or ectopic beat  Abdomen: Soft, non-tender, non-distended with normal bowel sounds. Musculoskeletal:  No clubbing, cyanosis or edema bilaterally. Skin: Clean intact dressings applied to both feet. Fungating lesion on scalp. Multiple plaques on arms. Neurologic:  Cranial nerves: II-XII intact. DTRs 2/4 and symmetric, sensation intact to fine touch and muscle strength 4/5 throughout   Psychiatric:  Alert and oriented, thought content appropriate, normal insight  Capillary Refill: Brisk,< 3 seconds   Peripheral Pulses: +3 palpable, equal bilaterally     Microbiology:      Urinalysis:      Lab Results   Component Value Date/Time    NITRU NEGATIVE 11/01/2023 12:15 PM    WBCUA > 100 11/01/2023 12:15 PM    WBCUA 2-4 02/22/2012 01:00 PM    BACTERIA NONE SEEN 11/01/2023 12:15 PM    RBCUA NONE SEEN 11/01/2023 12:15 PM    BLOODU TRACE 11/01/2023 12:15 PM    SPECGRAV 1.025 09/18/2020 12:00 PM    GLUCOSEU NEGATIVE 11/01/2023 12:15 PM       Radiology:  VL DUP LOWER EXTREMITY ARTERIES BILATERAL   Final Result   1. Slightly limited evaluation as the peroneal arteries could not be definitively visualized in either leg. 2. Normal bilateral ankle-brachial indices. 3. Good pulsatility throughout both legs with diffuse multiphasic Doppler waveforms. **This report has been created using voice recognition software. It may contain minor errors which are inherent in voice recognition technology. **      Final report electronically signed by Dr Odin Dickinson on 11/2/2023 4:04 PM      US RENAL COMPLETE   Final Result   1.  No sonographic findings for hydronephrosis. Questionable right renal    lesion not well seen on ultrasound due to patient immobility. This can be    followed for stability on future CT or MRI. 2. Filling defect partially distended urinary bladder may relate to Huntley    catheter balloon. Blood clot not excluded. This document has been electronically signed by: Kalpana Saul MD on    11/02/2023 01:05 AM      CT HEAD WO CONTRAST   Final Result   1. No acute intracranial findings. 2. Stable probable meningioma over the parafalcine left frontal lobe. 3. Opacification of the left mastoid air cells can be correlated for    mastoiditis. This document has been electronically signed by: Kalpana Saul MD on    11/01/2023 06:05 PM      All CTs at this facility use dose modulation techniques and iterative    reconstructions, and/or weight-based dosing   when appropriate to reduce radiation to a low as reasonably achievable. CTA ABDOMINAL AORTA W BILAT RUNOFF W WO CONTRAST   Final Result   1. No significant contrast opacification in the distal right anterior    tibial artery, right dorsalis pedis artery, and left plantaris artery    beginning in the proximal left midfoot. This may relate to underlying    diminished flow or occlusion. 2. Possible complex lesion in the upper midpole of the kidney measuring    0.7 x 0.6 cm. Nonemergent ultrasound can be helpful to distinguish mass    from complex cyst.   3. Colonic diverticula. 4. Small hiatal hernia. 5. Asymmetric mild subcutaneous edema in the mid-distal left calf and left    ankle. This document has been electronically signed by: Kalpana Saul MD on    11/01/2023 06:20 PM      All CTs at this facility use dose modulation techniques and iterative    reconstructions, and/or weight-based dosing   when appropriate to reduce radiation to a low as reasonably achievable. 3D Post-processing was performed on this study. XR CHEST PORTABLE   Final Result   1.  Minimal linear

## 2023-12-04 NOTE — PLAN OF CARE
Problem: Discharge Planning  Goal: Discharge to home or other facility with appropriate resources  Outcome: Progressing  Flowsheets (Taken 12/3/2023 2245)  Discharge to home or other facility with appropriate resources:   Identify barriers to discharge with patient and caregiver   Arrange for needed discharge resources and transportation as appropriate   Identify discharge learning needs (meds, wound care, etc)     Problem: Skin/Tissue Integrity  Goal: Absence of new skin breakdown  Description: 1. Monitor for areas of redness and/or skin breakdown  2. Assess vascular access sites hourly  3. Every 4-6 hours minimum:  Change oxygen saturation probe site  4. Every 4-6 hours:  If on nasal continuous positive airway pressure, respiratory therapy assess nares and determine need for appliance change or resting period.   Outcome: Progressing     Problem: Safety - Adult  Goal: Free from fall injury  Outcome: Progressing  Flowsheets (Taken 12/3/2023 2245)  Free From Fall Injury: Instruct family/caregiver on patient safety     Problem: Safety - Adult  Goal: Isolation precautions  Description: Isolation precautions  Outcome: Progressing  Note: On contact precaution     Problem: ABCDS Injury Assessment  Goal: Absence of physical injury  Outcome: Progressing  Flowsheets (Taken 12/3/2023 2245)  Absence of Physical Injury: Implement safety measures based on patient assessment     Problem: Chronic Conditions and Co-morbidities  Goal: Patient's chronic conditions and co-morbidity symptoms are monitored and maintained or improved  Outcome: Progressing  Flowsheets (Taken 12/3/2023 2245)  Care Plan - Patient's Chronic Conditions and Co-Morbidity Symptoms are Monitored and Maintained or Improved:   Monitor and assess patient's chronic conditions and comorbid symptoms for stability, deterioration, or improvement   Collaborate with multidisciplinary team to address chronic and comorbid conditions and prevent exacerbation or

## 2023-12-05 PROCEDURE — 1200000000 HC SEMI PRIVATE

## 2023-12-05 PROCEDURE — 6370000000 HC RX 637 (ALT 250 FOR IP): Performed by: STUDENT IN AN ORGANIZED HEALTH CARE EDUCATION/TRAINING PROGRAM

## 2023-12-05 PROCEDURE — 6370000000 HC RX 637 (ALT 250 FOR IP): Performed by: PHYSICIAN ASSISTANT

## 2023-12-05 PROCEDURE — 99231 SBSQ HOSP IP/OBS SF/LOW 25: CPT | Performed by: PHYSICIAN ASSISTANT

## 2023-12-05 RX ADMIN — RIVAROXABAN 15 MG: 15 TABLET, FILM COATED ORAL at 18:04

## 2023-12-05 RX ADMIN — BUMETANIDE 1 MG: 1 TABLET ORAL at 10:01

## 2023-12-05 RX ADMIN — HYDROCORTISONE 10 MG: 10 TABLET ORAL at 19:33

## 2023-12-05 RX ADMIN — PANTOPRAZOLE SODIUM 40 MG: 40 TABLET, DELAYED RELEASE ORAL at 19:35

## 2023-12-05 RX ADMIN — POTASSIUM CHLORIDE 40 MEQ: 1500 TABLET, EXTENDED RELEASE ORAL at 10:01

## 2023-12-05 RX ADMIN — Medication 20 MG: at 19:35

## 2023-12-05 RX ADMIN — LEVOTHYROXINE SODIUM 125 MCG: 0.12 TABLET ORAL at 10:00

## 2023-12-05 RX ADMIN — BISACODYL 5 MG: 5 TABLET, COATED ORAL at 06:30

## 2023-12-05 RX ADMIN — MICONAZOLE NITRATE: 2 POWDER TOPICAL at 19:35

## 2023-12-05 RX ADMIN — CALCIUM POLYCARBOPHIL 625 MG: 625 TABLET, FILM COATED ORAL at 10:01

## 2023-12-05 RX ADMIN — MICONAZOLE NITRATE: 2 POWDER TOPICAL at 11:35

## 2023-12-05 RX ADMIN — HYDROCORTISONE 20 MG: 10 TABLET ORAL at 10:00

## 2023-12-05 NOTE — CARE COORDINATION
12/5/23, 2:51 PM EST    DISCHARGE ON GOING EVALUATION    [address] day: 25  Location: -13/013-A Reason for admit: Failure to thrive in adult [R62.7]  Physical debility [R53.81]  Urinary tract infection without hematuria, site unspecified [N39.0]  Ulcers of both lower extremities, unspecified ulcer stage ([address]) [L97.919, L97.929]  Pressure injury of back, stage 2 ([address]) [L89.102]   Procedure:   11/1 Repeat CXR: Minimal linear peripheral opacities at the left lung base are present which may represent minimal atelectasis or scarring. 2. There is a right subclavian central line present with the tip overlying the cavoatrial junction. No pneumothorax is seen  11/1 CT Head WO: No acute intracranial findings. 2. Stable probable meningioma over the parafalcine left frontal lobe. 3. Opacification of the left mastoid air cells can be correlated for   mastoiditis  11/1 CTA Abdominal Aorta: No significant contrast opacification in the distal right anterior tibial artery, right dorsalis pedis artery, and left plantaris artery beginning in the proximal left midfoot. This may relate to underlying diminished flow or occlusion. 2. Possible complex lesion in the upper midpole of the kidney measuring 0.7 x 0.6 cm. Nonemergent ultrasound can be helpful to distinguish mass from complex cyst. 3. Colonic diverticula. 4. Small hiatal hernia. 5. Asymmetric mild subcutaneous edema in the mid-distal left calf and left   ankle. 11/2 US Renal: No sonographic findings for hydronephrosis. Questionable right renal   lesion not well seen on ultrasound due to patient immobility. This can be followed for stability on future CT or MRI. 2. Filling defect partially distended urinary bladder may relate to Huntley catheter balloon. Blood clot not excluded  11/2 BLE Arterial DUP:  Slightly limited evaluation as the peroneal arteries could not be definitively visualized in either leg. Normal bilateral ankle-brachial indices.     11/3: scalp

## 2023-12-05 NOTE — PLAN OF CARE
Problem: Discharge Planning  Goal: Discharge to home or other facility with appropriate resources  Outcome: Progressing  Flowsheets (Taken 12/5/2023 1637)  Discharge to home or other facility with appropriate resources:   Identify barriers to discharge with patient and caregiver   Identify discharge learning needs (meds, wound care, etc)   Arrange for needed discharge resources and transportation as appropriate     Problem: Skin/Tissue Integrity  Goal: Absence of new skin breakdown  Description: 1. Monitor for areas of redness and/or skin breakdown  2. Assess vascular access sites hourly  3. Every 4-6 hours minimum:  Change oxygen saturation probe site  4. Every 4-6 hours:  If on nasal continuous positive airway pressure, respiratory therapy assess nares and determine need for appliance change or resting period.   Outcome: Progressing     Problem: Safety - Adult  Goal: Free from fall injury  Outcome: Progressing     Problem: Safety - Adult  Goal: Isolation precautions  Description: Isolation precautions  Outcome: Progressing     Problem: Chronic Conditions and Co-morbidities  Goal: Patient's chronic conditions and co-morbidity symptoms are monitored and maintained or improved  Outcome: Progressing  Flowsheets (Taken 12/5/2023 8020)  Care Plan - Patient's Chronic Conditions and Co-Morbidity Symptoms are Monitored and Maintained or Improved:   Monitor and assess patient's chronic conditions and comorbid symptoms for stability, deterioration, or improvement   Collaborate with multidisciplinary team to address chronic and comorbid conditions and prevent exacerbation or deterioration   Update acute care plan with appropriate goals if chronic or comorbid symptoms are exacerbated and prevent overall improvement and discharge     Problem: Skin/Tissue Integrity - Adult  Goal: Incisions, wounds, or drain sites healing without S/S of infection  Outcome: Progressing  Flowsheets  Taken 12/5/2023 3587  Incisions, Wounds, or

## 2023-12-05 NOTE — CARE COORDINATION
12/5/23, 8:15 AM EST    DISCHARGE PLANNING EVALUATION    Call made again to [facility], message left for admissions, multiple calls made over last two weeks and this facility has not yet responded, family shares that patient's  is at this facility and it is their first choice. Call made to [facility], which is family's second choice, private pay with hospice. Facility is reviewing. Facility typically requires 30 day payment prior to admission, which would be $9600. If hospice, they may only require 14 day payment, which is $4400. Multiple referrals have been made to [facility] and [facility]. Accepting facilities have been too expensive for family. Searching for accepting ecf with hospice that is within financial abilities of patient and family.

## 2023-12-05 NOTE — PROGRESS NOTES
Hospitalist Progress Note      Patient:  Alice Sutton    Unit/Bed:7-13/013-A  YOB: 1950  MRN: [id number]   Acct: [de-identified]     PCP: Davie Kayser, MD  Date of Admission: 11/1/2023      Assessment/Plan:    Chronic nonhealing wounds:  -Initial concern for infection with purulent discharge noted on RLE wound in ED.    -Following discussion with infectious disease we will continue to monitor patient off of antibiotics  -Arterial US shows no evidence of PAD  -Suspected due to long term steroid use   Physical debility:   -Patient has baseline deficits 2/2 CVA as noted below but has recently become bedbound and is unable to care for herself  -PT/OT following and recommending SNF  -Pending precert to SNF   Protein Calorie malnutrition:   -low prealbumin. Dietician following. 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 ID following  Lactic acidosis (resolved)  lactic 2.3 on arrival- do not see where subsequent lactic acid ordered - will obtain. Suspect Type B  Leukocytosis- stable. : Suspect secondary to chronic steroid use and possible wound infection. Monitor   Hx of CVA: Multiple ischemic events in 1988, 20 15 and this past year. Known deficits with left-sided weakness. Patient is chair bound at baseline. CT head showed no acute findings. Previous infarct noted in the left lateral ventricle. Paroxysmal atrial fibrillation: [id number] 6 HB 3 on [address] with Xarelto rate controlled Coreg  Meningioma: Stable from previous imaging noted over the parafalcine left frontal lobe. Hx pituitary tumor: s/p surgery performed in [place]. On hydrocortisone and Synthroid  Acquired hypothyroidism: 2/2 pituitary tumor s/p resection. Continue Synthroid.  TSH/reflex shows hyperthyroidism consistent with suppressive dose synthroid appropriate for hx of malignancy  Acquired adrenal insufficiency: continue home dose of

## 2023-12-05 NOTE — CARE COORDINATION
12/5/23, 1:11 PM EST    DISCHARGE PLANNING EVALUATION    Received message from [facility], facility is willing to reconsider.

## 2023-12-06 PROCEDURE — 99232 SBSQ HOSP IP/OBS MODERATE 35: CPT | Performed by: PHYSICIAN ASSISTANT

## 2023-12-06 PROCEDURE — 6370000000 HC RX 637 (ALT 250 FOR IP): Performed by: STUDENT IN AN ORGANIZED HEALTH CARE EDUCATION/TRAINING PROGRAM

## 2023-12-06 PROCEDURE — 1200000000 HC SEMI PRIVATE

## 2023-12-06 PROCEDURE — 6370000000 HC RX 637 (ALT 250 FOR IP): Performed by: PHYSICIAN ASSISTANT

## 2023-12-06 RX ADMIN — RIVAROXABAN 15 MG: 15 TABLET, FILM COATED ORAL at 18:23

## 2023-12-06 RX ADMIN — BISACODYL 5 MG: 5 TABLET, COATED ORAL at 06:10

## 2023-12-06 RX ADMIN — CALCIUM POLYCARBOPHIL 625 MG: 625 TABLET, FILM COATED ORAL at 11:23

## 2023-12-06 RX ADMIN — Medication 20 MG: at 20:07

## 2023-12-06 RX ADMIN — LEVOTHYROXINE SODIUM 125 MCG: 0.12 TABLET ORAL at 11:23

## 2023-12-06 RX ADMIN — HYDROCORTISONE 10 MG: 10 TABLET ORAL at 20:06

## 2023-12-06 RX ADMIN — HYDROCORTISONE 20 MG: 10 TABLET ORAL at 11:24

## 2023-12-06 RX ADMIN — MICONAZOLE NITRATE: 2 POWDER TOPICAL at 11:23

## 2023-12-06 RX ADMIN — MICONAZOLE NITRATE: 2 POWDER TOPICAL at 20:05

## 2023-12-06 RX ADMIN — PANTOPRAZOLE SODIUM 40 MG: 40 TABLET, DELAYED RELEASE ORAL at 20:07

## 2023-12-06 RX ADMIN — BUMETANIDE 1 MG: 1 TABLET ORAL at 11:23

## 2023-12-06 RX ADMIN — POTASSIUM CHLORIDE 40 MEQ: 1500 TABLET, EXTENDED RELEASE ORAL at 11:23

## 2023-12-06 ASSESSMENT — PAIN SCALES - GENERAL: PAINLEVEL_OUTOF10: 0

## 2023-12-06 NOTE — PROGRESS NOTES
Hospitalist Progress Note      Patient:  Wade Gardner    Unit/Bed:7-13/013-A  YOB: 1950  MRN: [id number]   Acct: [de-identified]     PCP: Ciara Burks MD  Date of Admission: 11/1/2023      Assessment/Plan:    Chronic nonhealing wounds:  -Initial concern for infection with purulent discharge noted on RLE wound in ED.    -Following discussion with infectious disease we will continue to monitor patient off of antibiotics  -Arterial US shows no evidence of PAD  -Suspected due to long term steroid use   Physical debility:   -Patient has baseline deficits 2/2 CVA as noted below but has recently become bedbound and is unable to care for herself  -PT/OT following and recommending SNF  -Pending precert to SNF   Protein Calorie malnutrition:   -low prealbumin. Dietician following. 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 ID following  Lactic acidosis (resolved)  lactic 2.3 on arrival- do not see where subsequent lactic acid ordered - will obtain. Suspect Type B  Leukocytosis- stable. : Suspect secondary to chronic steroid use and possible wound infection. Monitor   Hx of CVA: Multiple ischemic events in 1988, 20 15 and this past year. Known deficits with left-sided weakness. Patient is chair bound at baseline. CT head showed no acute findings. Previous infarct noted in the left lateral ventricle. Paroxysmal atrial fibrillation: [id number] 6 HB 3 on [address] with Xarelto rate controlled Coreg  Meningioma: Stable from previous imaging noted over the parafalcine left frontal lobe. Hx pituitary tumor: s/p surgery performed in [place]. On hydrocortisone and Synthroid  Acquired hypothyroidism: 2/2 pituitary tumor s/p resection. Continue Synthroid.  TSH/reflex shows hyperthyroidism consistent with suppressive dose synthroid appropriate for hx of malignancy  Acquired adrenal insufficiency: continue home dose of

## 2023-12-06 NOTE — PLAN OF CARE
Problem: Discharge Planning  Goal: Discharge to home or other facility with appropriate resources  12/6/2023 1245 by Samuel Brambila RN  Outcome: Progressing  Flowsheets (Taken 12/6/2023 1245)  Discharge to home or other facility with appropriate resources:   Identify barriers to discharge with patient and caregiver   Arrange for needed discharge resources and transportation as appropriate   Identify discharge learning needs (meds, wound care, etc)     Problem: Skin/Tissue Integrity  Goal: Absence of new skin breakdown  Description: 1. Monitor for areas of redness and/or skin breakdown  2. Assess vascular access sites hourly  3. Every 4-6 hours minimum:  Change oxygen saturation probe site  4. Every 4-6 hours:  If on nasal continuous positive airway pressure, respiratory therapy assess nares and determine need for appliance change or resting period.   12/6/2023 1245 by Samuel Brambila RN  Outcome: Progressing     Problem: Safety - Adult  Goal: Free from fall injury  12/6/2023 1245 by Samuel Brambila RN  Outcome: Progressing  Flowsheets (Taken 12/6/2023 1245)  Free From Fall Injury: Instruct family/caregiver on patient safety     Problem: ABCDS Injury Assessment  Goal: Absence of physical injury  12/6/2023 1245 by Samuel Brambila RN  Outcome: Progressing  Flowsheets (Taken 12/6/2023 1245)  Absence of Physical Injury: Implement safety measures based on patient assessment     Problem: Chronic Conditions and Co-morbidities  Goal: Patient's chronic conditions and co-morbidity symptoms are monitored and maintained or improved  12/6/2023 1245 by Samuel Brambila RN  Outcome: Progressing  Flowsheets (Taken 12/6/2023 1245)  Care Plan - Patient's Chronic Conditions and Co-Morbidity Symptoms are Monitored and Maintained or Improved:   Monitor and assess patient's chronic conditions and comorbid symptoms for stability, deterioration, or improvement   Collaborate with multidisciplinary team to address chronic and comorbid

## 2023-12-06 NOTE — PLAN OF CARE
Problem: Discharge Planning  Goal: Discharge to home or other facility with appropriate resources  12/6/2023 0543 by Vickie Aaron RN  Outcome: Progressing  Flowsheets (Taken 12/6/2023 0543)  Discharge to home or other facility with appropriate resources:   Identify barriers to discharge with patient and caregiver   Identify discharge learning needs (meds, wound care, etc)   Arrange for needed discharge resources and transportation as appropriate     Problem: Skin/Tissue Integrity  Goal: Absence of new skin breakdown  Description: 1. Monitor for areas of redness and/or skin breakdown  2. Assess vascular access sites hourly  3. Every 4-6 hours minimum:  Change oxygen saturation probe site  4. Every 4-6 hours:  If on nasal continuous positive airway pressure, respiratory therapy assess nares and determine need for appliance change or resting period. 12/6/2023 0543 by Vickie Aaron RN  Outcome: Progressing  Note: No new skin breakdown     Problem: Safety - Adult  Goal: Free from fall injury  12/6/2023 0543 by Vickie Aaron RN  Outcome: Progressing  Flowsheets (Taken 12/6/2023 0543)  Free From Fall Injury: Instruct family/caregiver on patient safety  Note: Patient has remained free of physical injury during this shift. Safe environment provided, call light within reach, and hourly rounding completed. Patient has remained free of physical injury during this shift. Safe environment provided, call light within reach, and hourly rounding completed.       Problem: Safety - Adult  Goal: Isolation precautions  Description: Isolation precautions  12/6/2023 0543 by Vickie Aaron RN  Outcome: Progressing      Problem: ABCDS Injury Assessment  Goal: Absence of physical injury  12/6/2023 0543 by Vickie Aaron RN  Outcome: Progressing  Flowsheets (Taken 12/6/2023 0543)  Absence of Physical Injury: Implement safety measures based on patient assessment  Note: Patient has remained free of physical injury during this shift. Safe environment provided, call light within reach, and hourly rounding completed. Problem: Pain  Goal: Verbalizes/displays adequate comfort level or baseline comfort level  12/6/2023 0543 by Hector Ram RN  Outcome: Progressing   Care plan reviewed with patient. Patient verbalize understanding of the plan of care and contribute to goal setting.

## 2023-12-06 NOTE — CARE COORDINATION
12/6/23, 3:04 PM EST    DISCHARGE PLANNING EVALUATION    Spoke with [facility] and [address], facility plans to accept once private pay arrangements are complete with family. Contact is [address], [address]. Spoke with son Tayler Millard, [phone]. He has spoken with [facility] and will be completing private pay arrangements for admission. He is now unsure about hospice, wants to wait before establishing with hospice. Anticipate arrangements will be complete 12/7 or 12/8, will need ambulance transport. Spoke with [facility], tentatively planning transport for 12/8.

## 2023-12-07 PROCEDURE — 1200000000 HC SEMI PRIVATE

## 2023-12-07 PROCEDURE — 2500000003 HC RX 250 WO HCPCS

## 2023-12-07 PROCEDURE — 6370000000 HC RX 637 (ALT 250 FOR IP): Performed by: PHYSICIAN ASSISTANT

## 2023-12-07 PROCEDURE — 6370000000 HC RX 637 (ALT 250 FOR IP): Performed by: STUDENT IN AN ORGANIZED HEALTH CARE EDUCATION/TRAINING PROGRAM

## 2023-12-07 PROCEDURE — 99231 SBSQ HOSP IP/OBS SF/LOW 25: CPT | Performed by: PHYSICIAN ASSISTANT

## 2023-12-07 RX ADMIN — LEVOTHYROXINE SODIUM 125 MCG: 0.12 TABLET ORAL at 10:54

## 2023-12-07 RX ADMIN — BISACODYL 5 MG: 5 TABLET, COATED ORAL at 05:08

## 2023-12-07 RX ADMIN — MICONAZOLE NITRATE: 2 POWDER TOPICAL at 21:21

## 2023-12-07 RX ADMIN — MICONAZOLE NITRATE: 2 POWDER TOPICAL at 09:00

## 2023-12-07 RX ADMIN — POTASSIUM CHLORIDE 40 MEQ: 1500 TABLET, EXTENDED RELEASE ORAL at 10:53

## 2023-12-07 RX ADMIN — CALCIUM POLYCARBOPHIL 625 MG: 625 TABLET, FILM COATED ORAL at 10:53

## 2023-12-07 RX ADMIN — HYDROCORTISONE 10 MG: 10 TABLET ORAL at 21:20

## 2023-12-07 RX ADMIN — HYDROCORTISONE 20 MG: 10 TABLET ORAL at 10:53

## 2023-12-07 RX ADMIN — BUMETANIDE 1 MG: 1 TABLET ORAL at 10:53

## 2023-12-07 RX ADMIN — RIVAROXABAN 15 MG: 15 TABLET, FILM COATED ORAL at 17:17

## 2023-12-07 RX ADMIN — Medication 20 MG: at 21:21

## 2023-12-07 RX ADMIN — PANTOPRAZOLE SODIUM 40 MG: 40 TABLET, DELAYED RELEASE ORAL at 21:21

## 2023-12-07 ASSESSMENT — PAIN SCALES - GENERAL: PAINLEVEL_OUTOF10: 0

## 2023-12-07 NOTE — PLAN OF CARE
Problem: Discharge Planning  Goal: Discharge to home or other facility with appropriate resources  Outcome: Progressing  Flowsheets (Taken 12/7/2023 1544)  Discharge to home or other facility with appropriate resources:   Identify barriers to discharge with patient and caregiver   Arrange for needed discharge resources and transportation as appropriate   Identify discharge learning needs (meds, wound care, etc)     Problem: Skin/Tissue Integrity  Goal: Absence of new skin breakdown  Description: 1. Monitor for areas of redness and/or skin breakdown  2. Assess vascular access sites hourly  3. Every 4-6 hours minimum:  Change oxygen saturation probe site  4. Every 4-6 hours:  If on nasal continuous positive airway pressure, respiratory therapy assess nares and determine need for appliance change or resting period.   Outcome: Progressing     Problem: Safety - Adult  Goal: Free from fall injury  Outcome: Progressing  Flowsheets (Taken 12/7/2023 1544)  Free From Fall Injury: Instruct family/caregiver on patient safety    Problem: ABCDS Injury Assessment  Goal: Absence of physical injury  Outcome: Progressing  Flowsheets (Taken 12/7/2023 1544)  Absence of Physical Injury: Implement safety measures based on patient assessment     Problem: Chronic Conditions and Co-morbidities  Goal: Patient's chronic conditions and co-morbidity symptoms are monitored and maintained or improved  Outcome: Progressing  Flowsheets (Taken 12/7/2023 1544)  Care Plan - Patient's Chronic Conditions and Co-Morbidity Symptoms are Monitored and Maintained or Improved:   Monitor and assess patient's chronic conditions and comorbid symptoms for stability, deterioration, or improvement   Collaborate with multidisciplinary team to address chronic and comorbid conditions and prevent exacerbation or deterioration     Problem: Skin/Tissue Integrity - Adult  Goal: Incisions, wounds, or drain sites healing without S/S of infection  Outcome:

## 2023-12-07 NOTE — CARE COORDINATION
12/7/23, 7:19 AM EST    DISCHARGE ON GOING EVALUATION    [address] day: 27  Location: -13/013-A Reason for admit: Failure to thrive in adult [R62.7]  Physical debility [R53.81]  Urinary tract infection without hematuria, site unspecified [N39.0]  Ulcers of both lower extremities, unspecified ulcer stage ([address]) [L97.919, L97.929]  Pressure injury of back, stage 2 ([address]) [L89.102]   Procedure:   11/1 Repeat CXR: Minimal linear peripheral opacities at the left lung base are present which may represent minimal atelectasis or scarring. 2. There is a right subclavian central line present with the tip overlying the cavoatrial junction. No pneumothorax is seen  11/1 CT Head WO: No acute intracranial findings. 2. Stable probable meningioma over the parafalcine left frontal lobe. 3. Opacification of the left mastoid air cells can be correlated for   mastoiditis  11/1 CTA Abdominal Aorta: No significant contrast opacification in the distal right anterior tibial artery, right dorsalis pedis artery, and left plantaris artery beginning in the proximal left midfoot. This may relate to underlying diminished flow or occlusion. 2. Possible complex lesion in the upper midpole of the kidney measuring 0.7 x 0.6 cm. Nonemergent ultrasound can be helpful to distinguish mass from complex cyst. 3. Colonic diverticula. 4. Small hiatal hernia. 5. Asymmetric mild subcutaneous edema in the mid-distal left calf and left   ankle. 11/2 US Renal: No sonographic findings for hydronephrosis. Questionable right renal   lesion not well seen on ultrasound due to patient immobility. This can be followed for stability on future CT or MRI. 2. Filling defect partially distended urinary bladder may relate to Huntley catheter balloon. Blood clot not excluded  11/2 BLE Arterial DUP:  Slightly limited evaluation as the peroneal arteries could not be definitively visualized in either leg. Normal bilateral ankle-brachial indices.     11/3: scalp

## 2023-12-07 NOTE — CARE COORDINATION
12/7/23, 11:14 AM EST    DISCHARGE PLANNING EVALUATION       Left a message for admission at facility to confirm acceptance and family has made payment arrangements so transport can be set up for patient for tomorrow.

## 2023-12-07 NOTE — CARE COORDINATION
12/7/23, 4:24 PM EST    DISCHARGE PLANNING EVALUATION       Spoke with patient's spouse and he stated that he can not pay the $30,000 that [place] is asking up front. He is [place] in [place] and aware that he would have to pay for 30 days up front there at about $9,600. He is aware that plan is for discharge tomorrow if facility accepts. Called and made a referral to [facility] and she will see if bed is still open and then would need payment up front. [facility] transport has been canceled for tomorrow morning.

## 2023-12-07 NOTE — CARE COORDINATION
Talked with the . I did tell him according to the notes [facility] is $9,600. He did ask about Medicaid. I did say that [facility] could possibly help with a nursing home Medicaid but we screened his wife while she was here and with owning assets she will not get traditional Medicaid. I reported to [place] that Nelly Minor SW and will call him at [phone] this evening for discharge tomorrow.

## 2023-12-07 NOTE — CARE COORDINATION
Called and left message with  and asked that he call back or talk with the facility that he is currently at regarding payment for placement. I did explain that his wife stay is no longer necessary to diagnose or treat any health condition in an acute care setting and that it would no longer be billed to Medicare. There may be an associated charge for her continued stay. I asked that a decision be made on discharge transition and that he call us.

## 2023-12-07 NOTE — PROGRESS NOTES
Hospitalist Progress Note      Patient:  Josie Reddy    Unit/Bed:7K-13/013-A  YOB: 1950  MRN: [id number]   Acct: [de-identified]     PCP: Shamar Rosales MD  Date of Admission: 11/1/2023      Assessment/Plan:    Chronic nonhealing wounds:  -Initial concern for infection with purulent discharge noted on RLE wound in ED.    -Following discussion with infectious disease we will continue to monitor patient off of antibiotics  -Arterial US shows no evidence of PAD  -Suspected due to long term steroid use   Physical debility:   -Patient has baseline deficits 2/2 CVA as noted below but has recently become bedbound and is unable to care for herself  -PT/OT following and recommending SNF  -Pending precert to SNF   Protein Calorie malnutrition:   -low prealbumin. Dietician following. 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 ID following  Lactic acidosis (resolved)  lactic 2.3 on arrival- do not see where subsequent lactic acid ordered - will obtain. Suspect Type B  Leukocytosis- stable. : Suspect secondary to chronic steroid use and possible wound infection. Monitor   Hx of CVA: Multiple ischemic events in 1988, 20 15 and this past year. Known deficits with left-sided weakness. Patient is chair bound at baseline. CT head showed no acute findings. Previous infarct noted in the left lateral ventricle. Paroxysmal atrial fibrillation: [id number] 6 HB 3 on [address] with Xarelto rate controlled Coreg  Meningioma: Stable from previous imaging noted over the parafalcine left frontal lobe. Hx pituitary tumor: s/p surgery performed in [place]. On hydrocortisone and Synthroid  Acquired hypothyroidism: 2/2 pituitary tumor s/p resection. Continue Synthroid.  TSH/reflex shows hyperthyroidism consistent with suppressive dose synthroid appropriate for hx of malignancy  Acquired adrenal insufficiency: continue home dose of both legs with diffuse multiphasic Doppler waveforms. **This report has been created using voice recognition software. It may contain minor errors which are inherent in voice recognition technology. **      Final report electronically signed by Dr Chloe Banerjee on 11/2/2023 4:04 PM      US RENAL COMPLETE   Final Result   1. No sonographic findings for hydronephrosis. Questionable right renal    lesion not well seen on ultrasound due to patient immobility. This can be    followed for stability on future CT or MRI. 2. Filling defect partially distended urinary bladder may relate to [place]    catheter balloon. Blood clot not excluded. This document has been electronically signed by: Rossana Valentine MD on    11/02/2023 01:05 AM      CT HEAD WO CONTRAST   Final Result   1. No acute intracranial findings. 2. Stable probable meningioma over the parafalcine left frontal lobe. 3. Opacification of the left mastoid air cells can be correlated for    mastoiditis. This document has been electronically signed by: Rossana Valentine MD on    11/01/2023 06:05 PM      All CTs at this facility use dose modulation techniques and iterative    reconstructions, and/or weight-based dosing   when appropriate to reduce radiation to a low as reasonably achievable. CTA ABDOMINAL AORTA W BILAT RUNOFF W WO CONTRAST   Final Result   1. No significant contrast opacification in the distal right anterior    tibial artery, right dorsalis pedis artery, and left plantaris artery    beginning in the proximal left midfoot. This may relate to underlying    diminished flow or occlusion. 2. Possible complex lesion in the upper midpole of the kidney measuring    0.7 x 0.6 cm. Nonemergent ultrasound can be helpful to distinguish mass    from complex cyst.   3. Colonic diverticula. 4. Small hiatal hernia. 5. Asymmetric mild subcutaneous edema in the mid-distal left calf and left    ankle.       This document has been

## 2023-12-08 PROCEDURE — 6370000000 HC RX 637 (ALT 250 FOR IP): Performed by: PHYSICIAN ASSISTANT

## 2023-12-08 PROCEDURE — 99231 SBSQ HOSP IP/OBS SF/LOW 25: CPT | Performed by: PHYSICIAN ASSISTANT

## 2023-12-08 PROCEDURE — 97110 THERAPEUTIC EXERCISES: CPT

## 2023-12-08 PROCEDURE — 6370000000 HC RX 637 (ALT 250 FOR IP): Performed by: STUDENT IN AN ORGANIZED HEALTH CARE EDUCATION/TRAINING PROGRAM

## 2023-12-08 PROCEDURE — 1200000000 HC SEMI PRIVATE

## 2023-12-08 PROCEDURE — 97535 SELF CARE MNGMENT TRAINING: CPT

## 2023-12-08 RX ORDER — CALCIUM POLYCARBOPHIL 625 MG 625 MG/1
625 TABLET ORAL DAILY
Refills: 4 | DISCHARGE
Start: 2023-12-09

## 2023-12-08 RX ADMIN — MICONAZOLE NITRATE: 2 POWDER TOPICAL at 10:11

## 2023-12-08 RX ADMIN — LEVOTHYROXINE SODIUM 125 MCG: 0.12 TABLET ORAL at 10:15

## 2023-12-08 RX ADMIN — MICONAZOLE NITRATE: 2 POWDER TOPICAL at 20:41

## 2023-12-08 RX ADMIN — Medication 20 MG: at 20:42

## 2023-12-08 RX ADMIN — PANTOPRAZOLE SODIUM 40 MG: 40 TABLET, DELAYED RELEASE ORAL at 20:42

## 2023-12-08 RX ADMIN — BISACODYL 5 MG: 5 TABLET, COATED ORAL at 06:26

## 2023-12-08 RX ADMIN — BUMETANIDE 1 MG: 1 TABLET ORAL at 10:09

## 2023-12-08 RX ADMIN — HYDROCORTISONE 10 MG: 10 TABLET ORAL at 20:43

## 2023-12-08 RX ADMIN — HYDROCORTISONE 20 MG: 10 TABLET ORAL at 10:16

## 2023-12-08 RX ADMIN — CALCIUM POLYCARBOPHIL 625 MG: 625 TABLET, FILM COATED ORAL at 10:09

## 2023-12-08 RX ADMIN — RIVAROXABAN 15 MG: 15 TABLET, FILM COATED ORAL at 20:40

## 2023-12-08 RX ADMIN — POTASSIUM CHLORIDE 40 MEQ: 1500 TABLET, EXTENDED RELEASE ORAL at 10:09

## 2023-12-08 ASSESSMENT — PAIN SCALES - GENERAL
PAINLEVEL_OUTOF10: 0
PAINLEVEL_OUTOF10: 0

## 2023-12-08 NOTE — CARE COORDINATION
12/8/23, 10:53 AM EST    DISCHARGE PLANNING EVALUATION       Spoke with patient's spouse at 80 AM and provided him the number to contact [employer] to discuss payment. At 10:52 am another message left for spouse to follow up.

## 2023-12-08 NOTE — CARE COORDINATION
12/8/23, 11:48 AM EST    DISCHARGE PLANNING EVALUATION       Left a message for Angie at [facility] to determine if facility can accept patient.

## 2023-12-08 NOTE — CARE COORDINATION
12/8/23, 12:55 PM EST    DISCHARGE PLANNING EVALUATION       Still no answer from [facility]. Called and spoke with Jeancarlos Alvarado at [facility] and she stated that they are working with patient's spouse and corporate to determine if they can take the patient.

## 2023-12-08 NOTE — PROGRESS NOTES
Hospitalist Progress Note      Patient:  Joe Giang    Unit/Bed:7K-13/013-A  YOB: 1950  MRN: [id number]   Acct: [de-identified]     PCP: Cleveland Xie MD  Date of Admission: 11/1/2023      Assessment/Plan:    Chronic nonhealing wounds:  -Initial concern for infection with purulent discharge noted on RLE wound in ED.    -Following discussion with infectious disease we will continue to monitor patient off of antibiotics  -Arterial US shows no evidence of PAD  -Suspected due to long term steroid use   Physical debility:   -Patient has baseline deficits 2/2 CVA as noted below but has recently become bedbound and is unable to care for herself  -PT/OT following and recommending SNF  -Pending precert to SNF   Protein Calorie malnutrition:   -low prealbumin. Dietician following. 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 ID following  Lactic acidosis (resolved)    Leukocytosis- stable. : Suspect secondary to chronic steroid use and possible wound infection. Monitor   Hx of CVA: Multiple ischemic events in 1988, 20 15 and this past year. Known deficits with left-sided weakness. Patient is chair bound at baseline. CT head showed no acute findings. Previous infarct noted in the left lateral ventricle. Paroxysmal atrial fibrillation: [id number] 6 HB 3 on [address] with Xarelto rate controlled Coreg  Meningioma: Stable from previous imaging noted over the parafalcine left frontal lobe. Hx pituitary tumor: s/p surgery performed in [place]. On hydrocortisone and Synthroid  Acquired hypothyroidism: 2/2 pituitary tumor s/p resection. Continue Synthroid. TSH/reflex shows hyperthyroidism consistent with suppressive dose synthroid appropriate for hx of malignancy  Acquired adrenal insufficiency: continue home dose of hydrocortisone. Disposition Plan: Medically stable. Awaiting ECF placement.     Chief Complaint:

## 2023-12-08 NOTE — DISCHARGE INSTR - COC
0.5mL 01/28/2016    Pneumococcal, PPSV23, PNEUMOVAX 23, (age 2y+), SC/IM, 0.5mL 01/31/2017    TDaP, ADACEL (age 6y-58y), Zen[place]ia  (age 10y+), IM, 0.5mL 08/29/2015, 03/13/2017    Zoster Live (Zostavax) 06/08/2015       Active Problems:  Patient Active Problem List   Diagnosis Code    Asthma J45.909    Hyperlipidemia E78.5    Osteoarthritis M19.90    GERD (gastroesophageal reflux disease) K21.9    Meningioma ([facility]) D32.9    Mechanical loosening of prosthetic joint ([address]) T84.039A    Hyperglycemia R73.9    SIRS (systemic inflammatory response syndrome) ([facility]) R65.10    Leukocytosis D72.829    Right sided weakness R53.1    Cerebrovascular disease A48.1    Metabolic acidosis N77.88    Sinus bradycardia R00.1    Generalized weakness R53.1    Hx of subdural hematoma Z86.79    Cerebral infarction ([facility]) I63.9    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, 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    Hypercoagulable state ([address]) D68.59    Fatigue R53.83    Atrial thrombosis I51.3    Microcytic anemia D50.9    Hypokalemia E87.6    Altered mental status R41.82    Postoperative hypothyroidism E89.0    Hypernatremia Q83.0    Metabolic encephalopathy Q56.66    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 5XAA    JESSE (acute kidney injury) ([address]) N17.9    Hyperkalemia E87.5    Gastroenteritis due to norovirus C09.10    Acute diastolic heart failure ([facility]) I50.31    History of stroke with residual effects I69.30    Chronic venous stasis dermatitis of both lower extremities I87.2    Normocytic anemia D64.9    Chronic diastolic congestive heart failure (HCC) I50.32    CKD Agency   Name:  [facility]  Address: [address]  Phone: [phone]  Fax:  [phone]    Dialysis Facility (if applicable)   Name:  Address:  Dialysis Schedule:  Phone:  Fax:    / signature: Electronically signed by LASHAY Solorio on 12/8/23 at 1:27 PM EST    PHYSICIAN SECTION    Prognosis: Fair    Condition at Discharge: Stable    Rehab Potential (if transferring to Rehab): Fair    Recommended Labs or Other Treatments After Discharge: None at this time     Physician Certification: I certify the above information and transfer of Xavi Courtney  is necessary for the continuing treatment of the diagnosis listed and that she requires [address] for greater 30 days.      Update Admission H&P: No change in H&P    PHYSICIAN SIGNATURE:  Electronically signed by OFELIA Collazo on 12/8/23 at 1:29 PM EST

## 2023-12-08 NOTE — CARE COORDINATION
12/8/23, 2:33 PM EST    Patient goals/plan/ treatment preferences discussed by  and . Patient goals/plan/ treatment preferences reviewed with patient/ family. Patient/ family verbalize understanding of discharge plan and are in agreement with goal/plan/treatment preferences. Understanding was demonstrated using the teach back method. AVS provided by RN at time of discharge, which includes all necessary medical information pertaining to the patients current course of illness, treatment, post-discharge goals of care, and treatment preferences. Services At/After Discharge: [address] [facility]"" (SNF)       IMM Letter  IMM Letter given to Patient/Family/Significant other/Guardian/POA/by[de-identified] CM: [facility] RN  IMM Letter date given[de-identified] 12/08/23  IMM Letter time given[de-identified] 80  Observation Status Letter date given[de-identified] 11/01/23  Observation Status Letter time given[de-identified] 2308  Observation Status Letter given to Patient/Family/Significant other/Guardian/POA/by[de-identified] patient access        Received word from Joyce from Joyce at [facility] that they can accept patient. Set up transport but was not able to get get transport until 12-9 at 11 am.  Updated facility of transport time. Spoke with  and he is aware of transport time for tomorrow at 11 am and so in RN and provider. No further needs voiced.

## 2023-12-08 NOTE — CARE COORDINATION
IMM updated with  Surya Acevedo over phone; he is currently at [facility] and [facility]. Faxed [facility] to [phone].

## 2023-12-08 NOTE — PROGRESS NOTES
[address] [facility]  Occupational Therapy  Daily Note  Time:    Time In: 0500  Time Out: 1115  Timed Code Treatment Minutes: 32 Minutes  Minutes: 32          Date: 2023  Patient Name: Sophia Cornelius,   Gender: female      [facility]: [facility]A  MRN: [id number]  : 1950  (68 y.o.)  Referring Practitioner: Ysabel Araya DO  Diagnosis: failure to thrieve  Additional Pertinent Hx: Per ER note on 2023: 68 y.o. female who presents to the emergency department for evaluation of sacral ulcers and concerns for worsening stroke-like symptoms. Patient's daughter-in-law and family friend at bedside. 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 Presenting to ED today due to concerns for patient's health at home. Patient's  is her primary caretaker, but he was recently [facility] after being found down at home by family. Patient now in ED    Restrictions/Precautions:  Restrictions/Precautions: Fall Risk, General Precautions  Position Activity Restriction  Other position/activity restrictions: L hemiparesis from previous, thin skin with multiple wounds/scabs      SUBJECTIVE: agreeable to EOB, affect bright-reports she will be seeing her  today. Pt was putting mascara on in bed upon arrival of therapist.      PAIN: 0/10: no c/o pain during session    Vitals: Vitals not assessed per clinical judgement, see nursing flowsheet    COGNITION: Slow Processing, Decreased Insight, Impaired Memory, Decreased Problem Solving, and Decreased Safety Awareness    ADL:   Grooming: with set-up. Toileting: Dependent. Pt was incontinent of stool during session & unaware. Dependent for rolling for clean up .     BALANCE:  Sitting Balance:  Stand By Assistance- sat EOB x 12 min (Initially min A then close SBA) Noted Pt

## 2023-12-08 NOTE — CARE COORDINATION
12/8/23, 8:57 AM EST    DISCHARGE PLANNING EVALUATION     Spoke with Maryellen from [facility] and  [facility]. Left a message for patient's spouse that  [facility] has bed available for patient. Requested a call back to get patient the number to contact facility to discuss payment options.

## 2023-12-09 VITALS
HEIGHT: 64 IN | DIASTOLIC BLOOD PRESSURE: 62 MMHG | BODY MASS INDEX: 28.68 KG/M2 | RESPIRATION RATE: 18 BRPM | OXYGEN SATURATION: 97 % | SYSTOLIC BLOOD PRESSURE: 118 MMHG | HEART RATE: 78 BPM | WEIGHT: 168 LBS | TEMPERATURE: 97.7 F

## 2023-12-09 PROCEDURE — 6370000000 HC RX 637 (ALT 250 FOR IP): Performed by: STUDENT IN AN ORGANIZED HEALTH CARE EDUCATION/TRAINING PROGRAM

## 2023-12-09 PROCEDURE — 6370000000 HC RX 637 (ALT 250 FOR IP): Performed by: PHYSICIAN ASSISTANT

## 2023-12-09 RX ADMIN — LEVOTHYROXINE SODIUM 125 MCG: 0.12 TABLET ORAL at 08:55

## 2023-12-09 RX ADMIN — POTASSIUM CHLORIDE 40 MEQ: 1500 TABLET, EXTENDED RELEASE ORAL at 08:54

## 2023-12-09 RX ADMIN — BISACODYL 5 MG: 5 TABLET, COATED ORAL at 08:54

## 2023-12-09 RX ADMIN — MICONAZOLE NITRATE: 2 POWDER TOPICAL at 08:55

## 2023-12-09 RX ADMIN — HYDROCORTISONE 20 MG: 10 TABLET ORAL at 08:55

## 2023-12-09 RX ADMIN — CALCIUM POLYCARBOPHIL 625 MG: 625 TABLET, FILM COATED ORAL at 08:54

## 2023-12-09 RX ADMIN — BUMETANIDE 1 MG: 1 TABLET ORAL at 09:50

## 2023-12-09 ASSESSMENT — PAIN SCALES - GENERAL: PAINLEVEL_OUTOF10: 0

## 2023-12-09 NOTE — PLAN OF CARE
Problem: Discharge Planning  Goal: Discharge to home or other facility with appropriate resources  12/9/2023 0959 by Vanessa Yee RN  Outcome: Completed     Problem: Skin/Tissue Integrity  Goal: Absence of new skin breakdown  Description: 1. Monitor for areas of redness and/or skin breakdown  2. Assess vascular access sites hourly  3. Every 4-6 hours minimum:  Change oxygen saturation probe site  4. Every 4-6 hours:  If on nasal continuous positive airway pressure, respiratory therapy assess nares and determine need for appliance change or resting period.   12/9/2023 0959 by Vanessa Yee RN  Outcome: Completed     Problem: Safety - Adult  Goal: Free from fall injury  12/9/2023 0959 by Vanessa Yee RN  Outcome: Completed     Problem: Safety - Adult  Goal: Isolation precautions  Description: Isolation precautions  12/9/2023 0959 by Vanessa Yee RN  Outcome: Completed     Problem: ABCDS Injury Assessment  Goal: Absence of physical injury  12/9/2023 0959 by Vanessa Yee RN  Outcome: Completed     Problem: Chronic Conditions and Co-morbidities  Goal: Patient's chronic conditions and co-morbidity symptoms are monitored and maintained or improved  12/9/2023 0959 by Vanessa Yee RN  Outcome: Completed     Problem: Skin/Tissue Integrity - Adult  Goal: Incisions, wounds, or drain sites healing without S/S of infection  12/9/2023 0959 by Vanessa Yee RN  Outcome: Completed     Problem: Musculoskeletal - Adult  Goal: Return ADL status to a safe level of function  12/9/2023 0959 by Vansesa Yee RN  Outcome: Completed     Problem: Pain  Goal: Verbalizes/displays adequate comfort level or baseline comfort level  12/9/2023 0959 by Vanessa Yee RN  Outcome: Completed  Flowsheets (Taken 12/9/2023 0815)  Verbalizes/displays adequate comfort level or baseline comfort level: Encourage patient to monitor pain and request assistance     Problem: Nutrition Deficit:  Goal: Optimize nutritional status  12/9/2023 0959

## 2023-12-09 NOTE — PROGRESS NOTES
Report called to [facility], pt transported via stretcher with belongings by Hitesh Leggett to [facility]

## 2023-12-09 NOTE — PLAN OF CARE
Problem: Discharge Planning  Goal: Discharge to home or other facility with appropriate resources  Outcome: Progressing  Flowsheets  Taken 12/8/2023 2317  Discharge to home or other facility with appropriate resources: Identify barriers to discharge with patient and caregiver  Taken 12/8/2023 2030  Discharge to home or other facility with appropriate resources: Identify barriers to discharge with patient and caregiver     Problem: Skin/Tissue Integrity  Goal: Absence of new skin breakdown  Description: 1. Monitor for areas of redness and/or skin breakdown  2. Assess vascular access sites hourly  3. Every 4-6 hours minimum:  Change oxygen saturation probe site  4. Every 4-6 hours:  If on nasal continuous positive airway pressure, respiratory therapy assess nares and determine need for appliance change or resting period.   Outcome: Progressing     Problem: Safety - Adult  Goal: Isolation precautions  Description: Isolation precautions  Outcome: Progressing     Problem: ABCDS Injury Assessment  Goal: Absence of physical injury  Outcome: Progressing  Flowsheets (Taken 12/8/2023 2317)  Absence of Physical Injury: Implement safety measures based on patient assessment     Problem: Chronic Conditions and Co-morbidities  Goal: Patient's chronic conditions and co-morbidity symptoms are monitored and maintained or improved  Outcome: Progressing  Flowsheets  Taken 12/8/2023 2317  Care Plan - Patient's Chronic Conditions and Co-Morbidity Symptoms are Monitored and Maintained or Improved: Monitor and assess patient's chronic conditions and comorbid symptoms for stability, deterioration, or improvement  Taken 12/8/2023 2030  Care Plan - Patient's Chronic Conditions and Co-Morbidity Symptoms are Monitored and Maintained or Improved: Monitor and assess patient's chronic conditions and comorbid symptoms for stability, deterioration, or improvement     Problem: Skin/Tissue Integrity - Adult  Goal: Incisions, wounds, or drain

## 2023-12-09 NOTE — PLAN OF CARE
Problem: Discharge Planning  Goal: Discharge to home or other facility with appropriate resources  12/9/2023 0212 by Caio Buckley RN  Outcome: Progressing  12/8/2023 2317 by Nichole Sauceda RN  Outcome: Progressing  Flowsheets  Taken 12/8/2023 2317  Discharge to home or other facility with appropriate resources: Identify barriers to discharge with patient and caregiver  Taken 12/8/2023 2030  Discharge to home or other facility with appropriate resources: Identify barriers to discharge with patient and caregiver     Problem: Skin/Tissue Integrity  Goal: Absence of new skin breakdown  Description: 1. Monitor for areas of redness and/or skin breakdown  2. Assess vascular access sites hourly  3. Every 4-6 hours minimum:  Change oxygen saturation probe site  4. Every 4-6 hours:  If on nasal continuous positive airway pressure, respiratory therapy assess nares and determine need for appliance change or resting period.   12/9/2023 0212 by Caio Buckley RN  Outcome: Progressing  12/8/2023 2317 by Nichole Sauceda RN  Outcome: Progressing     Problem: Safety - Adult  Goal: Free from fall injury  12/9/2023 0212 by Caio Buckley RN  Outcome: Progressing  12/8/2023 2317 by Nichole Sauceda RN  Flowsheets (Taken 12/8/2023 2317)  Free From Fall Injury: Instruct family/caregiver on patient safety

## 2023-12-10 ENCOUNTER — TELEPHONE (OUTPATIENT)
Dept: FAMILY MEDICINE CLINIC | Age: 73
End: 2023-12-10

## 2023-12-26 NOTE — TELEPHONE ENCOUNTER
detrol LA has been moved on a different tier and will be more expensive . Judy Pressley is asking if there is anything else she can take that might be cheaper. stated

## 2024-01-18 ENCOUNTER — APPOINTMENT (OUTPATIENT)
Dept: GENERAL RADIOLOGY | Age: 74
End: 2024-01-18
Payer: MEDICARE

## 2024-01-18 ENCOUNTER — HOSPITAL ENCOUNTER (EMERGENCY)
Age: 74
Discharge: HOME | End: 2024-01-19
Attending: EMERGENCY MEDICINE
Payer: MEDICARE

## 2024-01-18 DIAGNOSIS — S82.892A CLOSED FRACTURE OF LEFT ANKLE, INITIAL ENCOUNTER: Primary | ICD-10-CM

## 2024-01-18 DIAGNOSIS — R55 SYNCOPE AND COLLAPSE: ICD-10-CM

## 2024-01-18 LAB
ALBUMIN SERPL BCG-MCNC: 3.8 G/DL (ref 3.5–5.1)
ALP SERPL-CCNC: 109 U/L (ref 38–126)
ALT SERPL W/O P-5'-P-CCNC: 23 U/L (ref 11–66)
ANION GAP SERPL CALC-SCNC: 16 MEQ/L (ref 8–16)
APTT PPP: 66 SECONDS (ref 22–38)
AST SERPL-CCNC: 29 U/L (ref 5–40)
BACTERIA URNS QL MICRO: ABNORMAL /HPF
BASOPHILS ABSOLUTE: 0 THOU/MM3 (ref 0–0.1)
BASOPHILS NFR BLD AUTO: 0.3 %
BILIRUB SERPL-MCNC: 0.4 MG/DL (ref 0.3–1.2)
BILIRUB UR QL STRIP.AUTO: NEGATIVE
BUN SERPL-MCNC: 13 MG/DL (ref 7–22)
CALCIUM SERPL-MCNC: 9.9 MG/DL (ref 8.5–10.5)
CASTS #/AREA URNS LPF: ABNORMAL /LPF
CASTS 2: ABNORMAL /LPF
CHARACTER UR: CLEAR
CHLORIDE SERPL-SCNC: 107 MEQ/L (ref 98–111)
CO2 SERPL-SCNC: 25 MEQ/L (ref 23–33)
COLOR: YELLOW
CREAT SERPL-MCNC: 1.4 MG/DL (ref 0.4–1.2)
CRYSTALS URNS MICRO: ABNORMAL
DEPRECATED RDW RBC AUTO: 45 FL (ref 35–45)
EOSINOPHIL NFR BLD AUTO: 1.2 %
EOSINOPHILS ABSOLUTE: 0.2 THOU/MM3 (ref 0–0.4)
EPITHELIAL CELLS, UA: ABNORMAL /HPF
ERYTHROCYTE [DISTWIDTH] IN BLOOD BY AUTOMATED COUNT: 13.2 % (ref 11.5–14.5)
GFR SERPL CREATININE-BSD FRML MDRD: 40 ML/MIN/1.73M2
GLUCOSE SERPL-MCNC: 118 MG/DL (ref 70–108)
GLUCOSE UR QL STRIP.AUTO: NEGATIVE MG/DL
HCT VFR BLD AUTO: 50.2 % (ref 37–47)
HGB BLD-MCNC: 16 GM/DL (ref 12–16)
HGB UR QL STRIP.AUTO: ABNORMAL
IMM GRANULOCYTES # BLD AUTO: 0.07 THOU/MM3 (ref 0–0.07)
IMM GRANULOCYTES NFR BLD AUTO: 0.5 %
INR PPP: 1.69 (ref 0.85–1.13)
KETONES UR QL STRIP.AUTO: NEGATIVE
LYMPHOCYTES ABSOLUTE: 3.4 THOU/MM3 (ref 1–4.8)
LYMPHOCYTES NFR BLD AUTO: 22.3 %
MCH RBC QN AUTO: 29.7 PG (ref 26–33)
MCHC RBC AUTO-ENTMCNC: 31.9 GM/DL (ref 32.2–35.5)
MCV RBC AUTO: 93.1 FL (ref 81–99)
MISCELLANEOUS 2: ABNORMAL
MONOCYTES ABSOLUTE: 1 THOU/MM3 (ref 0.4–1.3)
MONOCYTES NFR BLD AUTO: 6.3 %
NEUTROPHILS NFR BLD AUTO: 69.4 %
NITRITE UR QL STRIP: NEGATIVE
NRBC BLD AUTO-RTO: 0 /100 WBC
OSMOLALITY SERPL CALC.SUM OF ELEC: 295.5 MOSMOL/KG (ref 275–300)
PH UR STRIP.AUTO: 6 [PH] (ref 5–9)
PLATELET # BLD AUTO: 356 THOU/MM3 (ref 130–400)
PMV BLD AUTO: 10.8 FL (ref 9.4–12.4)
POTASSIUM SERPL-SCNC: 4.1 MEQ/L (ref 3.5–5.2)
PROT SERPL-MCNC: 6.6 G/DL (ref 6.1–8)
PROT UR STRIP.AUTO-MCNC: NEGATIVE MG/DL
RBC # BLD AUTO: 5.39 MILL/MM3 (ref 4.2–5.4)
RBC URINE: ABNORMAL /HPF
RENAL EPI CELLS #/AREA URNS HPF: ABNORMAL /[HPF]
SEGMENTED NEUTROPHILS ABSOLUTE COUNT: 10.6 THOU/MM3 (ref 1.8–7.7)
SODIUM SERPL-SCNC: 148 MEQ/L (ref 135–145)
SP GR UR REFRACT.AUTO: 1.01 (ref 1–1.03)
TROPONIN, HIGH SENSITIVITY: 46 NG/L (ref 0–12)
TROPONIN, HIGH SENSITIVITY: 50 NG/L (ref 0–12)
UROBILINOGEN, URINE: 0.2 EU/DL (ref 0–1)
WBC # BLD AUTO: 15.3 THOU/MM3 (ref 4.8–10.8)
WBC #/AREA URNS HPF: ABNORMAL /HPF
WBC #/AREA URNS HPF: NEGATIVE /[HPF]
YEAST LIKE FUNGI URNS QL MICRO: ABNORMAL

## 2024-01-18 PROCEDURE — 93010 ELECTROCARDIOGRAM REPORT: CPT | Performed by: NUCLEAR MEDICINE

## 2024-01-18 PROCEDURE — 6370000000 HC RX 637 (ALT 250 FOR IP): Performed by: STUDENT IN AN ORGANIZED HEALTH CARE EDUCATION/TRAINING PROGRAM

## 2024-01-18 PROCEDURE — 73610 X-RAY EXAM OF ANKLE: CPT

## 2024-01-18 PROCEDURE — 80053 COMPREHEN METABOLIC PANEL: CPT

## 2024-01-18 PROCEDURE — 84484 ASSAY OF TROPONIN QUANT: CPT

## 2024-01-18 PROCEDURE — 96360 HYDRATION IV INFUSION INIT: CPT

## 2024-01-18 PROCEDURE — 85610 PROTHROMBIN TIME: CPT

## 2024-01-18 PROCEDURE — 93005 ELECTROCARDIOGRAM TRACING: CPT | Performed by: EMERGENCY MEDICINE

## 2024-01-18 PROCEDURE — 2580000003 HC RX 258: Performed by: STUDENT IN AN ORGANIZED HEALTH CARE EDUCATION/TRAINING PROGRAM

## 2024-01-18 PROCEDURE — 99285 EMERGENCY DEPT VISIT HI MDM: CPT

## 2024-01-18 PROCEDURE — 85730 THROMBOPLASTIN TIME PARTIAL: CPT

## 2024-01-18 PROCEDURE — 81001 URINALYSIS AUTO W/SCOPE: CPT

## 2024-01-18 PROCEDURE — 29515 APPLICATION SHORT LEG SPLINT: CPT

## 2024-01-18 PROCEDURE — 85025 COMPLETE CBC W/AUTO DIFF WBC: CPT

## 2024-01-18 PROCEDURE — 36415 COLL VENOUS BLD VENIPUNCTURE: CPT

## 2024-01-18 RX ORDER — TRAMADOL HYDROCHLORIDE 50 MG/1
50 TABLET ORAL EVERY 8 HOURS PRN
Qty: 9 TABLET | Refills: 0 | Status: SHIPPED | OUTPATIENT
Start: 2024-01-18 | End: 2024-01-19

## 2024-01-18 RX ORDER — 0.9 % SODIUM CHLORIDE 0.9 %
500 INTRAVENOUS SOLUTION INTRAVENOUS ONCE
Status: COMPLETED | OUTPATIENT
Start: 2024-01-18 | End: 2024-01-18

## 2024-01-18 RX ORDER — ACETAMINOPHEN 500 MG
1000 TABLET ORAL ONCE
Status: COMPLETED | OUTPATIENT
Start: 2024-01-18 | End: 2024-01-18

## 2024-01-18 RX ADMIN — SODIUM CHLORIDE 500 ML: 9 INJECTION, SOLUTION INTRAVENOUS at 20:14

## 2024-01-18 RX ADMIN — ACETAMINOPHEN 1000 MG: 500 TABLET ORAL at 19:22

## 2024-01-18 ASSESSMENT — PAIN DESCRIPTION - DESCRIPTORS: DESCRIPTORS: SHARP

## 2024-01-18 ASSESSMENT — PAIN DESCRIPTION - ORIENTATION
ORIENTATION: RIGHT
ORIENTATION: LEFT

## 2024-01-18 ASSESSMENT — PAIN SCALES - GENERAL
PAINLEVEL_OUTOF10: 8
PAINLEVEL_OUTOF10: 7

## 2024-01-18 ASSESSMENT — PAIN - FUNCTIONAL ASSESSMENT
PAIN_FUNCTIONAL_ASSESSMENT: 0-10
PAIN_FUNCTIONAL_ASSESSMENT: 0-10

## 2024-01-18 ASSESSMENT — PAIN DESCRIPTION - LOCATION
LOCATION: ANKLE
LOCATION: ANKLE

## 2024-01-18 NOTE — ED TRIAGE NOTES
Pt arrives via EMS from nursing home for syncopal episode. Family at bedside states they tried to help pt stand up and she passed out for ~1-2 minutes. Hx stroke with left sided deficits. Pt very soft spoken. Pt c/o left ankle pain, which son states he believes was injured while putting pt in wheelchair.

## 2024-01-19 ENCOUNTER — CARE COORDINATION (OUTPATIENT)
Dept: CARE COORDINATION | Age: 74
End: 2024-01-19

## 2024-01-19 VITALS
HEIGHT: 64 IN | BODY MASS INDEX: 27.49 KG/M2 | TEMPERATURE: 97.8 F | SYSTOLIC BLOOD PRESSURE: 90 MMHG | WEIGHT: 161 LBS | RESPIRATION RATE: 18 BRPM | HEART RATE: 92 BPM | OXYGEN SATURATION: 97 % | DIASTOLIC BLOOD PRESSURE: 60 MMHG

## 2024-01-19 RX ORDER — TRAMADOL HYDROCHLORIDE 50 MG/1
50 TABLET ORAL EVERY 8 HOURS PRN
Qty: 9 TABLET | Refills: 0 | Status: SHIPPED | OUTPATIENT
Start: 2024-01-19 | End: 2024-01-22

## 2024-01-19 ASSESSMENT — PAIN - FUNCTIONAL ASSESSMENT: PAIN_FUNCTIONAL_ASSESSMENT: 0-10

## 2024-01-19 ASSESSMENT — PAIN SCALES - GENERAL: PAINLEVEL_OUTOF10: 6

## 2024-01-19 NOTE — ED NOTES
Patient resting on cot. VS stable. External catheter in place. Patient watching tv on cot. Call light in reach. Patient denies needs at this time.

## 2024-01-19 NOTE — ED PROVIDER NOTES
I accessed this chart because the NH called and stated that Rx needs to be sent to [facility] in [place] rather than local pharmacy.  Rx sent per their request.      Gisel Jenkins, APRN - CNP  01/19/24 0456    
to manage pain Max Daily Amount: 150 mg         This transcription was electronically signed. Parts of this transcriptions may have been dictated by use of voice recognition software and electronically transcribed, and parts may have been transcribed with the assistance of an ED scribe. The transcription may contain errors not detected in proofreading.  Please refer to my supervising physician's documentation if my documentation differs.    Electronically Signed: Josh Little MD, 01/18/24, 9:04 PM

## 2024-01-19 NOTE — CARE COORDINATION
Chart reviewed for Care Coordination enrollment s/p recent list referral for assistance with the management of her healthcare needs and in f/u to recent ED visit.  Per chart patient recently discharged to [facility].  [facility] spoke with Sylvia at SNF and she verified patient remains at their facility and is currently receiving LTC treatment.  No plans for Care Coordination enrollment d/t SNF status.  PCP updated.

## 2024-01-19 NOTE — DISCHARGE INSTRUCTIONS
You are seen in the emergency department today after a fainting spell.  There is no evidence of serious illness or injury at this time.  You do have a crack in your ankle.  Follow-up with podiatry as soon as possible.  Return to the emergency department if your pain becomes uncontrollable, you develop chest pain, shortness of breath or another episode of passing out.  Your kidney function today was a little worse than it usually is.  Follow-up with your PCP in the next couple days to ensure that this is getting better.

## 2024-01-21 LAB
EKG ATRIAL RATE: 104 BPM
EKG P AXIS: 45 DEGREES
EKG P-R INTERVAL: 122 MS
EKG Q-T INTERVAL: 332 MS
EKG QRS DURATION: 86 MS
EKG QTC CALCULATION (BAZETT): 436 MS
EKG R AXIS: -62 DEGREES
EKG T AXIS: 60 DEGREES
EKG VENTRICULAR RATE: 104 BPM

## 2024-01-22 ENCOUNTER — TELEPHONE (OUTPATIENT)
Dept: FAMILY MEDICINE CLINIC | Age: 74
End: 2024-01-22

## 2024-05-13 NOTE — PROGRESS NOTES
01/17/23 1040   Encounter Summary   Encounter Overview/Reason  Spiritual/Emotional Needs   Service Provided For: Patient   Referral/Consult From: Edmar   Last Encounter  01/17/23   Complexity of Encounter Low   Begin Time 1035   End Time  1040   Total Time Calculated 5 min   Spiritual/Emotional needs   Type Spiritual Support   Assessment/Intervention/Outcome   Assessment Unable to assess   Intervention Prayer (assurance of)/[place]   During my encounter with the 71 yr old patient, I attempted to visit with the pt on 5K. The patient appears to be resting (not responsive) now and I didn't want to disturb the patient. I or another Kita Yu will attempt to visit the patient or the family at another time. The pt was admitted due to septic shock. Physical Therapy Visit    Visit Type: Daily Treatment Note  Visit: 5  Referring Provider: Fady Huffman DO  Medical Diagnosis (from order): S46.911A - Strain of right shoulder, initial encounter     SUBJECTIVE                                                                                                               She's still having some pain with reaching behind her back but it seems like her range is improving. Overall, the shoulder has been feeling good.   Functional Change: See above.     Pain / Symptoms  - Pain rating (out of 10): Current: 2       OBJECTIVE                                                                                                                                     Treatment     Therapeutic Exercise  Arm bike x6 min   Rows yellow band x 10  Shoulder extension yellow band  x 10  Standing shoulder flexion x 10, 1# x bilateral   Shoulder external rotation yellow theraband x10 bilateral   Serratus punches 2# x10   Passive range of motion shoulder - tight in internal rotation   Sidelying external rotation 2# x10   Wall slides flexion x10 bilateral     Manual Therapy   GH mobs inferior and posterior Gr IV    Skilled input: verbal instruction/cues    Writer verbally educated and received verbal consent for hand placement, positioning of patient, and techniques to be performed today from patient for therapist position for techniques and hand placement and palpation for techniques as described above and how they are pertinent to the patient's plan of care.  Home Exercise Program  Access Code: [email or web address]  URL: [email or web address]  Date: 04/22/2024  Prepared by: Sowmya Wheat    Exercises  - Seated Scapular Retraction  - 2 x daily - 1 sets - 10 reps - 5 sec hold  - Table Walkouts  - 2 x daily - 1 sets - 10 reps  - Supine Scapular Protraction in Flexion with Dumbbells  - 2 x daily - 7 x weekly - 2 sets - 10 reps  - Standing Shoulder Row with Anchored Resistance  - 2 x  daily - 7 x weekly - 1 sets - 10 reps  - Shoulder extension with resistance - Neutral  - 2 x daily - 7 x weekly - 1 sets - 10 reps  Shoulder external rotation yellow theraband x10 bilateral       ASSESSMENT                                                                                                            Patient's motion was much improved for internal rotation compared to last session. She still has some pain with reaching behind the back but not as intense. She will decide next session whether to continue or discharge from PT.   Education:   - Results of above outlined education: Verbalizes understanding and Demonstrates understanding    PLAN                                                                                                                           Suggestions for next session as indicated: Progress per plan of care, trial red band next session       Therapy procedure time and total treatment time can be found documented on the Time Entry flowsheet

## 2024-12-21 NOTE — PROGRESS NOTES
Patient discharged in stable condition via transport to [facility] at this time. [NL] : moves all extremities x4, warm, well perfused x4 [de-identified] : extensive erythema throughout diaper area with marginal satellite lesions

## (undated) DEVICE — GLOVE ORTHO 8   MSG9480